# Patient Record
Sex: FEMALE | Race: WHITE | Employment: OTHER | URBAN - METROPOLITAN AREA
[De-identification: names, ages, dates, MRNs, and addresses within clinical notes are randomized per-mention and may not be internally consistent; named-entity substitution may affect disease eponyms.]

---

## 2017-01-15 ENCOUNTER — HOSPITAL ENCOUNTER (EMERGENCY)
Age: 77
Discharge: HOME | End: 2017-01-15
Attending: EMERGENCY MEDICINE | Admitting: EMERGENCY MEDICINE
Payer: MEDICARE

## 2017-01-15 VITALS
HEIGHT: 65 IN | BODY MASS INDEX: 20.83 KG/M2 | DIASTOLIC BLOOD PRESSURE: 84 MMHG | SYSTOLIC BLOOD PRESSURE: 159 MMHG | OXYGEN SATURATION: 100 % | RESPIRATION RATE: 18 BRPM | HEART RATE: 73 BPM | TEMPERATURE: 97.4 F | WEIGHT: 125 LBS
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
## 2017-01-15 PROCEDURE — 99282 EMERGENCY DEPT VISIT SF MDM: CPT

## 2017-01-15 RX ORDER — DOXYCYCLINE 100 MG/1
100 CAPSULE ORAL 2 TIMES DAILY
Qty: 20 CAP | Refills: 0 | Status: SHIPPED | OUTPATIENT
Start: 2017-01-15 | End: 2017-01-25

## 2017-01-15 NOTE — ED TRIAGE NOTES
Triage: pt reports a cat bite to her right hand that occurred last night. The cat is her pet and shots are UTD.

## 2017-01-15 NOTE — ED PROVIDER NOTES
HPI Comments: 8:40 AM Gerry Kuhn is a 64 y.o. female who presents to the ED c/o a cat bite to the right hand that occurred yesterday. The pt reports that both her immunizations and her cat's immunizations are UTD. The pt does not note pain to the area, she is just concerned because the area around the bite had become red. The pt's cat bit her once in the past~2 years ago and she required IV abx and rx abx because she waited too long to be seen. The pt denies fever, drainage from the bite, and any further complaints. The history is provided by the patient. Past Medical History:   Diagnosis Date    Basal cell cancer     CAD (coronary artery disease), native coronary artery 03/21/2011     s/p PCI with with CARLITO (Xience 2.75x12, 2.5x12) mLAD and mild disease in rest of coronaries. Midly depressed LV syst fct     Cardiomyopathy secondary      ischemic +/- stress induced    Dyslipidemia     GERD (gastroesophageal reflux disease)     History of echocardiogram 09/14/2011     EF 60%. Gr 1 DDfx. RVSP 25-30. Mild LAE. Mild TR.      Hx of colonoscopy 07/12/2016     Normal. Dr. Andersen Dianne    Hypertension     Normal nuclear stress test 05/20/2008     Negative for ischemia or infarction. EF 79%.  Parkinson's disease     S/P cardiac cath 03/21/2011     mLAD 90% (2.75 x 12 mm Xience stent). Otherwise patent coronaries. LVEDP 10 mmHg. EF 40-45%. Anteroapical hypk. Renal arteries patent.     Syncope      s/p ILD implantation       Past Surgical History:   Procedure Laterality Date    Hx hemorrhoidectomy      Hx tonsillectomy      Hx heart catheterization      Hx coronary stent placement      Hx bunionectomy       dorsal    Hx implantable loop recorder  3315-1714    Hx cataract removal  11/2012     left    Hx cataract removal  10/2012     right    Hx total abdominal hysterectomy  1996     partial    Hx hysterectomy  1996     partial    Colonoscopy N/A 7/12/2016     COLONOSCOPY performed by Michel Ashton MD at [facility]         Family History:   Problem Relation Age of Onset    Heart Attack Father 80    Heart Disease Father     Cancer Mother      pancreatic    Hypertension Brother        Social History     Social History    Marital status:      Spouse name: N/A    Number of children: N/A    Years of education: N/A     Occupational History    Not on file. Social History Main Topics    Smoking status: Never Smoker    Smokeless tobacco: Never Used    Alcohol use Yes      Comment: glass of wine occasionally.  Drug use: No    Sexual activity: No     Other Topics Concern    Not on file     Social History Narrative         ALLERGIES: Keflex [cephalexin] and Pcn [penicillins]    Review of Systems   Constitutional: Negative for chills and fever. HENT: Negative for congestion and rhinorrhea. Respiratory: Negative for cough and shortness of breath. Cardiovascular: Negative for chest pain and leg swelling. Gastrointestinal: Negative for abdominal pain and nausea. Genitourinary: Negative for dysuria and hematuria. Musculoskeletal: Negative for arthralgias and myalgias. Skin: Positive for wound (Cat bite to the right hand). Negative for rash. Neurological: Negative for light-headedness and headaches. Psychiatric/Behavioral: Negative for confusion and hallucinations. Vitals:    01/15/17 0830   BP: 159/84   Pulse: 73   Resp: 18   Temp: 97.4 °F (36.3 °C)   SpO2: 100%   Weight: 56.7 kg (125 lb)   Height: 5' 5\" (1.651 m)            Physical Exam   Constitutional: She is oriented to person, place, and time. She appears well-developed and well-nourished. No distress. HENT:   Head: Normocephalic and atraumatic. Right Ear: External ear normal.   Left Ear: External ear normal.   Nose: Nose normal.   Mouth/Throat: Oropharynx is clear and moist.   Eyes: Conjunctivae and EOM are normal. Pupils are equal, round, and reactive to light. No scleral icterus. Neck: Normal range of motion. Neck supple. No JVD present. No tracheal deviation present. No thyromegaly present. Cardiovascular: Normal rate, regular rhythm, normal heart sounds and intact distal pulses. Exam reveals no gallop and no friction rub. No murmur heard. Pulmonary/Chest: Effort normal and breath sounds normal. She exhibits no tenderness. Abdominal: Soft. Bowel sounds are normal. She exhibits no distension. There is no tenderness. There is no rebound and no guarding. Musculoskeletal: Normal range of motion. She exhibits no edema. Lymphadenopathy:     She has no cervical adenopathy. Neurological: She is alert and oriented to person, place, and time. No cranial nerve deficit. Coordination normal.   No sensory loss, Gait normal, Motor 5/5   Skin: Skin is warm and dry. Superficial cat bite to the dorsum of the right thumb with mild erythema and tenderness. No abscess. Psychiatric: She has a normal mood and affect. Her behavior is normal. Judgment and thought content normal.   Nursing note and vitals reviewed. MDM  Number of Diagnoses or Management Options  Cat bite, initial encounter:     ED Course       Procedures           Vitals:  Patient Vitals for the past 12 hrs:   Temp Pulse Resp BP SpO2   01/15/17 0830 97.4 °F (36.3 °C) 73 18 159/84 100 %     Pulse ox reviewed and WNL    Medications ordered:   Medications - No data to display        Progress notes, Consult notes or additional Procedure notes:   8:46 AM Pt reevaluated at this time and is resting comfortably in NAD. Discussed results and findings, as well as, diagnosis and plan for discharge. Pt verbalizes understanding and agreement with plan. All questions addressed at this time. Disposition:  Diagnosis:   1.  Cat bite, initial encounter        Disposition: Discharge    Follow-up Information     Follow up With Details Comments Leonid Quigley MD In 1 week If no better Jaren Reece DIONISIO/Kristine Na[address]u [address]  [phone]      [facility] EMERGENCY DEPT  As needed, If symptoms worsen, or in 5-7 days if no better [place] [id number] [phone]           Patient's Medications   Start Taking    DOXYCYCLINE (MONODOX) 100 MG CAPSULE    Take 1 Cap by mouth two (2) times a day for 10 days. Continue Taking    ACETAMINOPHEN (TYLENOL) 650 MG CR TABLET    Take 650 mg by mouth every six (6) hours as needed for Pain. ASPIRIN 81 MG TABLET    Take 1 Tab by mouth daily. CARBIDOPA-LEVODOPA (SINEMET)  MG PER TABLET    Take 1 Tab by mouth three (3) times daily. CARBIDOPA-LEVODOPA CR (SINEMET CR)  MG PER TABLET    Take  by mouth nightly. CHOLECALCIFEROL, VITAMIN D3, (VITAMIN D) 1,000 UNIT CAP    Take 1 Cap by mouth daily. CO-ENZYME Q-10 (CO Q-10) 100 MG CAPSULE    Take 100 mg by mouth daily. DEXLANSOPRAZOLE (DEXILANT) 60 MG CPDM    Take 1 Cap by mouth daily. DICLOFENAC (VOLTAREN) 1 % TOPICAL GEL    Apply 2 g to affected area four (4) times daily as needed. DOCUSATE CALCIUM (STOOL SOFTENER PO)    Take 1 Cap by mouth. FLAXSEED OIL 1,000 MG CAP    Take 1 Cap by mouth daily. FLUTICASONE (FLONASE) 50 MCG/ACTUATION NASAL SPRAY    2 Sprays by Both Nostrils route daily. LECITHIN 1,200 MG CAP    Take 1 Cap by mouth daily. LISINOPRIL (PRINIVIL, ZESTRIL) 10 MG TABLET    Take 1 Tab by mouth daily. MAGNESIUM 250 MG TAB    Take 250 mg by mouth two (2) times a day. MAGNESIUM HYDROXIDE (NEGRO MILK OF MAGNESIA) 400 MG/5 ML SUSPENSION    Take 30 mL by mouth daily as needed for Constipation. METOPROLOL SUCCINATE (TOPROL-XL) 50 MG XL TABLET    Take 1 Tab by mouth daily. MULTIVITAMIN PO    Take 1 Tab by mouth daily. NITROGLYCERIN (NITROSTAT) 0.4 MG SL TABLET    1 sublingual every 5 minutes for chest pain for no more than 3 doses    OMEGA-3 FATTY ACIDS-VITAMIN E (FISH OIL) 1,000 MG CAP    Take 1 Cap by mouth daily.     POTASSIUM CHLORIDE (K-DUR, KLOR-CON) 10 MEQ TABLET    Take 1 Tab by mouth every other day. POTASSIUM CHLORIDE SR 10 MEQ TAB    1 tab by mouth every other day    PRAVASTATIN (PRAVACHOL) 20 MG TABLET    Take 1 Tab by mouth nightly. PSYLLIUM SEED-SUCROSE (METAMUCIL) POWD    1 tablespoon bid    RESVERATROL 100 MG CAP    Take 2 Caps by mouth daily. VIT A/VIT C/VIT E/ZINC/COPPER (ICAPS AREDS PO)    Take 1 Tab by mouth two (2) times a day. VITAMIN B COMPLEX (BALANCE B-50 PO)    Take 1 Tab by mouth daily. These Medications have changed    No medications on file   Stop Taking    No medications on file         SCRIBE ATTESTATION STATEMENT  Documented by: Perla Arauz for, and in the presence of, Eduar Paz MD 8:46 AM     Signed by: Hue Aly, 01/15/17 8:46 AM    PROVIDER ATTESTATION STATEMENT  I personally performed the services described in the documentation, reviewed the documentation, as recorded by the scribe in my presence, and it accurately and completely records my words and actions.   Eduar Paz MD

## 2017-01-15 NOTE — DISCHARGE INSTRUCTIONS
Animal Bites: Care Instructions  Your Care Instructions  After an animal bite, the biggest concern is infection. The chance of infection depends on the type of animal that bit you, where on your body you were bitten, and your general health. Many animal bites are not closed with stitches, because this can increase the chance of infection. Your bite may take as little as 7 days or as long as several months to heal, depending on how bad it is. Taking good care of your wound at home will help it heal and reduce your chance of infection. The doctor has checked you carefully, but problems can develop later. If you notice any problems or new symptoms, get medical treatment right away. Follow-up care is a key part of your treatment and safety. Be sure to make and go to all appointments, and call your doctor if you are having problems. It's also a good idea to know your test results and keep a list of the medicines you take. How can you care for yourself at home? · If your doctor told you how to care for your wound, follow your doctor's instructions. If you did not get instructions, follow this general advice:  ¨ After 24 to 48 hours, gently wash the wound with clean water 2 times a day. Do not scrub or soak the wound. Don't use hydrogen peroxide or alcohol, which can slow healing. ¨ You may cover the wound with a thin layer of petroleum jelly, such as Vaseline, and a nonstick bandage. ¨ Apply more petroleum jelly and replace the bandage as needed. · After you shower, gently dry the wound with a clean towel. · If your doctor has closed the wound, cover the bandage with a plastic bag before you take a shower. · A small amount of skin redness and swelling around the wound edges and the stitches or staples is normal. Your wound may itch or feel irritated. Do not scratch or rub the wound.   · Ask your doctor if you can take an over-the-counter pain medicine, such as acetaminophen (Tylenol), ibuprofen (Advil, Motrin), or naproxen (Aleve). Read and follow all instructions on the label. · Do not take two or more pain medicines at the same time unless the doctor told you to. Many pain medicines have acetaminophen, which is Tylenol. Too much acetaminophen (Tylenol) can be harmful. · If your bite puts you at risk for rabies, you will get a series of shots over the next few weeks to prevent rabies. Your doctor will tell you when to get the shots. It is very important that you get the full cycle of shots. Follow your doctor's instructions exactly. · You may need a tetanus shot if you have not received one in the last 5 years. · If your doctor prescribed antibiotics, take them as directed. Do not stop taking them just because you feel better. You need to take the full course of antibiotics. When should you call for help? Call your doctor now or seek immediate medical care if:  · The skin near the bite turns cold or pale or it changes color. · You lose feeling in the area near the bite, or it feels numb or tingly. · You have trouble moving a limb near the bite. · You have symptoms of infection, such as:  ¨ Increased pain, swelling, warmth, or redness near the wound. ¨ Red streaks leading from the wound. ¨ Pus draining from the wound. ¨ A fever. · Blood soaks through the bandage. Oozing small amounts of blood is normal.  · Your pain is getting worse. Watch closely for changes in your health, and be sure to contact your doctor if you are not getting better as expected. Where can you learn more? Go to http://torin-atif.info/. Enter D206 in the search box to learn more about \"Animal Bites: Care Instructions. \"  Current as of: May 27, 2016  Content Version: 11.1  © 7871-6951 [employer]. Care instructions adapted under license by [employer] (which disclaims liability or warranty for this information).  If you have questions about a medical condition or this instruction, always ask your healthcare professional. [place] [address] any warranty or liability for your use of this information.

## 2017-01-16 ENCOUNTER — PATIENT OUTREACH (OUTPATIENT)
Dept: INTERNAL MEDICINE CLINIC | Age: 77
End: 2017-01-16

## 2017-01-16 NOTE — PROGRESS NOTES
NNTOCED  Patient admitted to [address] [place] [address], 1/15/17 to 1/15/17 for cat bite to right hand. New medications/changes to current medications:  Monodox    [place] Comorbidity Score: 4  ED admissions in the past 6 months: 0    Contacted patient for ED follow up. Verified 2 patient identifiers. Introduced self, role and reason for call. Patient reported redness to right hand has improved. Denied pain, warmth, fever or drainage. Has started Monodox. C/o gas since starting medication. No other complaints. Instructed patient to contact office is condition worsens.

## 2017-02-06 NOTE — TELEPHONE ENCOUNTER
Patient called for   Requested Prescriptions     Pending Prescriptions Disp Refills    potassium chloride (K-DUR, KLOR-CON) 10 mEq tablet 45 Tab 3     Sig: Take 1 Tab by mouth every other day. Please print patient would like to .

## 2017-02-07 RX ORDER — POTASSIUM CHLORIDE 750 MG/1
10 TABLET, EXTENDED RELEASE ORAL EVERY OTHER DAY
Qty: 45 TAB | Refills: 3 | Status: SHIPPED | OUTPATIENT
Start: 2017-02-07 | End: 2018-02-06 | Stop reason: SDUPTHER

## 2017-02-16 ENCOUNTER — PATIENT OUTREACH (OUTPATIENT)
Dept: INTERNAL MEDICINE CLINIC | Age: 77
End: 2017-02-16

## 2017-02-16 NOTE — PROGRESS NOTES
Episode closed:   Patient admitted to [address] [place] [address], 1/15/17 to 1/15/17 for cat bite to right hand. No hospitalization or ED admission post 30 days from discharge of 1/15/2017.

## 2017-03-20 RX ORDER — LISINOPRIL 10 MG/1
10 TABLET ORAL DAILY
Qty: 90 TAB | Refills: 3 | Status: SHIPPED | OUTPATIENT
Start: 2017-03-20 | End: 2017-12-20 | Stop reason: SDUPTHER

## 2017-03-20 NOTE — TELEPHONE ENCOUNTER
Patient called for   Requested Prescriptions     Pending Prescriptions Disp Refills    lisinopril (PRINIVIL, ZESTRIL) 10 mg tablet 90 Tab 3     Sig: Take 1 Tab by mouth daily. Please call when ready for .

## 2017-05-03 ENCOUNTER — HOSPITAL ENCOUNTER (OUTPATIENT)
Dept: LAB | Age: 77
Discharge: HOME | End: 2017-05-03
Payer: MEDICARE

## 2017-05-03 DIAGNOSIS — M85.9 DISORDER OF BONE DENSITY AND STRUCTURE, UNSPECIFIED: ICD-10-CM

## 2017-05-03 DIAGNOSIS — E78.5 DYSLIPIDEMIA: ICD-10-CM

## 2017-05-03 DIAGNOSIS — I10 ESSENTIAL HYPERTENSION: ICD-10-CM

## 2017-05-03 DIAGNOSIS — M85.80 OSTEOPENIA: ICD-10-CM

## 2017-05-03 LAB
25(OH)D3 SERPL-MCNC: 49.8 NG/ML (ref 30–100)
ALBUMIN SERPL BCP-MCNC: 3.9 G/DL (ref 3.4–5)
ALBUMIN/GLOB SERPL: 1.4 {RATIO} (ref 0.8–1.7)
ALP SERPL-CCNC: 92 U/L (ref 45–117)
ALT SERPL-CCNC: 27 U/L (ref 13–56)
ANION GAP BLD CALC-SCNC: 6 MMOL/L (ref 3–18)
APPEARANCE UR: CLEAR
AST SERPL W P-5'-P-CCNC: 20 U/L (ref 15–37)
BACTERIA URNS QL MICRO: NEGATIVE /HPF
BASOPHILS # BLD AUTO: 0 K/UL (ref 0–0.06)
BASOPHILS # BLD: 0 % (ref 0–2)
BILIRUB SERPL-MCNC: 0.5 MG/DL (ref 0.2–1)
BILIRUB UR QL: NEGATIVE
BUN SERPL-MCNC: 11 MG/DL (ref 7–18)
BUN/CREAT SERPL: 14 (ref 12–20)
CALCIUM SERPL-MCNC: 9.3 MG/DL (ref 8.5–10.1)
CHLORIDE SERPL-SCNC: 105 MMOL/L (ref 100–108)
CHOLEST SERPL-MCNC: 150 MG/DL
CO2 SERPL-SCNC: 31 MMOL/L (ref 21–32)
COLOR UR: ABNORMAL
CREAT SERPL-MCNC: 0.76 MG/DL (ref 0.6–1.3)
DIFFERENTIAL METHOD BLD: ABNORMAL
EOSINOPHIL # BLD: 0.2 K/UL (ref 0–0.4)
EOSINOPHIL NFR BLD: 5 % (ref 0–5)
EPITH CASTS URNS QL MICRO: ABNORMAL /LPF (ref 0–5)
ERYTHROCYTE [DISTWIDTH] IN BLOOD BY AUTOMATED COUNT: 13.5 % (ref 11.6–14.5)
GLOBULIN SER CALC-MCNC: 2.8 G/DL (ref 2–4)
GLUCOSE SERPL-MCNC: 87 MG/DL (ref 74–99)
GLUCOSE UR STRIP.AUTO-MCNC: NEGATIVE MG/DL
HCT VFR BLD AUTO: 41.3 % (ref 35–45)
HDLC SERPL-MCNC: 66 MG/DL (ref 40–60)
HDLC SERPL: 2.3 {RATIO} (ref 0–5)
HGB BLD-MCNC: 13.4 G/DL (ref 12–16)
HGB UR QL STRIP: NEGATIVE
KETONES UR QL STRIP.AUTO: NEGATIVE MG/DL
LDLC SERPL CALC-MCNC: 69.2 MG/DL (ref 0–100)
LEUKOCYTE ESTERASE UR QL STRIP.AUTO: ABNORMAL
LIPID PROFILE,FLP: ABNORMAL
LYMPHOCYTES # BLD AUTO: 26 % (ref 21–52)
LYMPHOCYTES # BLD: 1 K/UL (ref 0.9–3.6)
MAGNESIUM SERPL-MCNC: 2.3 MG/DL (ref 1.6–2.6)
MCH RBC QN AUTO: 31.1 PG (ref 24–34)
MCHC RBC AUTO-ENTMCNC: 32.4 G/DL (ref 31–37)
MCV RBC AUTO: 95.8 FL (ref 74–97)
MONOCYTES # BLD: 0.5 K/UL (ref 0.05–1.2)
MONOCYTES NFR BLD AUTO: 12 % (ref 3–10)
NEUTS SEG # BLD: 2.2 K/UL (ref 1.8–8)
NEUTS SEG NFR BLD AUTO: 57 % (ref 40–73)
NITRITE UR QL STRIP.AUTO: NEGATIVE
PH UR STRIP: 7.5 [PH] (ref 5–8)
PLATELET # BLD AUTO: 223 K/UL (ref 135–420)
PMV BLD AUTO: 9.8 FL (ref 9.2–11.8)
POTASSIUM SERPL-SCNC: 4.5 MMOL/L (ref 3.5–5.5)
PROT SERPL-MCNC: 6.7 G/DL (ref 6.4–8.2)
PROT UR STRIP-MCNC: NEGATIVE MG/DL
RBC # BLD AUTO: 4.31 M/UL (ref 4.2–5.3)
RBC #/AREA URNS HPF: 0 /HPF (ref 0–5)
SODIUM SERPL-SCNC: 142 MMOL/L (ref 136–145)
SP GR UR REFRACTOMETRY: 1.02 (ref 1–1.03)
TRIGL SERPL-MCNC: 74 MG/DL (ref ?–150)
TSH SERPL DL<=0.05 MIU/L-ACNC: 2.17 UIU/ML (ref 0.36–3.74)
UROBILINOGEN UR QL STRIP.AUTO: 1 EU/DL (ref 0.2–1)
VLDLC SERPL CALC-MCNC: 14.8 MG/DL
WBC # BLD AUTO: 3.9 K/UL (ref 4.6–13.2)
WBC URNS QL MICRO: ABNORMAL /HPF (ref 0–4)

## 2017-05-03 PROCEDURE — 81001 URINALYSIS AUTO W/SCOPE: CPT | Performed by: INTERNAL MEDICINE

## 2017-05-03 PROCEDURE — 80061 LIPID PANEL: CPT | Performed by: INTERNAL MEDICINE

## 2017-05-03 PROCEDURE — 84443 ASSAY THYROID STIM HORMONE: CPT | Performed by: INTERNAL MEDICINE

## 2017-05-03 PROCEDURE — 82306 VITAMIN D 25 HYDROXY: CPT | Performed by: INTERNAL MEDICINE

## 2017-05-03 PROCEDURE — 80053 COMPREHEN METABOLIC PANEL: CPT | Performed by: INTERNAL MEDICINE

## 2017-05-03 PROCEDURE — 85025 COMPLETE CBC W/AUTO DIFF WBC: CPT | Performed by: INTERNAL MEDICINE

## 2017-05-03 PROCEDURE — 36415 COLL VENOUS BLD VENIPUNCTURE: CPT | Performed by: INTERNAL MEDICINE

## 2017-05-03 PROCEDURE — 83735 ASSAY OF MAGNESIUM: CPT | Performed by: INTERNAL MEDICINE

## 2017-05-10 ENCOUNTER — TELEPHONE (OUTPATIENT)
Dept: INTERNAL MEDICINE CLINIC | Age: 77
End: 2017-05-10

## 2017-05-10 ENCOUNTER — PATIENT OUTREACH (OUTPATIENT)
Dept: INTERNAL MEDICINE CLINIC | Age: 77
End: 2017-05-10

## 2017-05-10 ENCOUNTER — OFFICE VISIT (OUTPATIENT)
Dept: INTERNAL MEDICINE CLINIC | Age: 77
End: 2017-05-10

## 2017-05-10 VITALS
OXYGEN SATURATION: 100 % | HEIGHT: 65 IN | SYSTOLIC BLOOD PRESSURE: 160 MMHG | TEMPERATURE: 98.4 F | HEART RATE: 55 BPM | BODY MASS INDEX: 21.99 KG/M2 | WEIGHT: 132 LBS | DIASTOLIC BLOOD PRESSURE: 70 MMHG

## 2017-05-10 DIAGNOSIS — K21.9 LARYNGOPHARYNGEAL REFLUX DISEASE: ICD-10-CM

## 2017-05-10 DIAGNOSIS — Z98.61 CAD S/P PERCUTANEOUS CORONARY ANGIOPLASTY: ICD-10-CM

## 2017-05-10 DIAGNOSIS — I25.10 CAD S/P PERCUTANEOUS CORONARY ANGIOPLASTY: ICD-10-CM

## 2017-05-10 DIAGNOSIS — H35.30 MACULAR DEGENERATION OF LEFT EYE: ICD-10-CM

## 2017-05-10 DIAGNOSIS — E78.5 DYSLIPIDEMIA: ICD-10-CM

## 2017-05-10 DIAGNOSIS — Z12.31 SCREENING MAMMOGRAM, ENCOUNTER FOR: ICD-10-CM

## 2017-05-10 DIAGNOSIS — G20 PARKINSON DISEASE (HCC): ICD-10-CM

## 2017-05-10 DIAGNOSIS — I10 ESSENTIAL HYPERTENSION: Primary | ICD-10-CM

## 2017-05-10 DIAGNOSIS — R55 VASOVAGAL SYNCOPE: ICD-10-CM

## 2017-05-10 DIAGNOSIS — R92.8 ABNORMAL MAMMOGRAM: ICD-10-CM

## 2017-05-10 DIAGNOSIS — Z78.9 ADVANCE DIRECTIVE ON FILE: ICD-10-CM

## 2017-05-10 DIAGNOSIS — Z00.00 MEDICARE ANNUAL WELLNESS VISIT, SUBSEQUENT: ICD-10-CM

## 2017-05-10 DIAGNOSIS — M85.89 OSTEOPENIA OF MULTIPLE SITES: ICD-10-CM

## 2017-05-10 RX ORDER — CLOBETASOL PROPIONATE 0.46 MG/ML
SOLUTION TOPICAL
Qty: 25 ML | Refills: 1 | Status: SHIPPED | OUTPATIENT
Start: 2017-05-10 | End: 2019-04-11 | Stop reason: ALTCHOICE

## 2017-05-10 NOTE — TELEPHONE ENCOUNTER
You want to see Ms. Rivera Corbett back in 2 weeks but your schedule is full.  Where would you like us to fit her in?

## 2017-05-10 NOTE — PROGRESS NOTES
1. Have you been to the ER, urgent care clinic or hospitalized since your last visit? NO.     2. Have you seen or consulted any other health care providers outside of the [address] [place] [address] since your last visit (Include any pap smears or colon screening)? YES  Neurology Melanie Singh 3 May 2017. Dr. Salmon Prost ENT Yesterday. [facility] with Dr. Susana Quintanilla.

## 2017-05-10 NOTE — PROGRESS NOTES
Advance Care Planning (ACP) Provider Note - Comprehensive     Date of ACP Conversation: 05/10/17  Persons included in Conversation:  patient  Length of ACP Conversation in minutes:  16 minutes    Authorized Decision Maker (if patient is incapable of making informed decisions):  (primary) sons (secondary)  This person is:  Healthcare Agent/Medical Power of  under Advance Directive        General ACP for ALL Patients with Decision Making Capacity:   Importance of advance care planning, including choosing a healthcare agent to communicate patient's healthcare decisions if patient lost the ability to make decisions, such as after a sudden illness or accident  Understanding of the healthcare agent role was assessed and information provided  Exploration of values, goals, and preferences if recovery is not expected, even with continued medical treatment in the event of: Imminent death  Severe, permanent brain injury  \"In these circumstances, what matters most to you? \"  Care focused more on comfort or quality of life. Review of Existing Advance Directive:  Patient with advance directive on file. Reviewed document with patient. Designates her  as her primary healthcare agent and two of her three sons as her secondary agents. However, she reports today that both of those sons are not readily accessible since one lives in [facility] and the other travels frequently. She would like to designate her son, Luciana Escobar, as her secondary healthcare agent. She also stresses her wish to not have life prolonging measures at end of life. For Serious or Chronic Illness:  Understanding of medical condition      Interventions Provided:  Reviewed existing Advance Directive    Provided with new paperwork to complete so as to change her heathcare agent designees. Recommended to complete and return completed form to office to be copied and scanned into chart.

## 2017-05-10 NOTE — ACP (ADVANCE CARE PLANNING)
Advance care planning discussion initiated, copy on file. Patient would like to make a change, advance directive form provided to patient to update.

## 2017-05-10 NOTE — PATIENT INSTRUCTIONS
Medicare Part B Preventive Services Limitations Recommendation Scheduled   Bone Mass Measurement  (age 72 & older, biennial) Requires diagnosis related to osteoporosis or estrogen deficiency. Biennial benefit unless patient has history of long-term glucocorticoid tx or baseline is needed because initial test was by other method Every 2 years or more frequently if medically necessary. Done:  5/23/2016       Cardiovascular Screening Blood Tests (every 5 years)  Total cholesterol, HDL, Triglycerides Order as a panel if possible Every 5 years. Done:  5/3/2017         Colorectal Cancer Screening  -Fecal occult blood test (annual)  -Flexible sigmoidoscopy (5y)  -Screening colonoscopy (10y)  -Barium Enema Colorectal cancer screening, ages 54-65. For all patients 48 and older:  -Annual fecal occult blood test or  colonoscopy every 10 years or  -Flexible sigmoidoscopy every 5 years or  -lower endoscopy to be performed more frequently, if advised by GI. N/A       Counseling to Prevent Tobacco Use (up to 8 sessions per year)  - Counseling greater than 3 and up to 10 minutes  - Counseling greater than 10 minutes Patients must be asymptomatic of tobacco-related conditions to receive as preventive service Two cessation counseling attempts (up to 8 counseling sessions) per year. N/A   Diabetes Screening Tests (at least every 3 years, Medicare covers annually or at 6-month intervals for prediabetic patients)    Fasting blood sugar (FBS) or glucose tolerance test (GTT) Patient must be diagnosed with one of the following:  -Hypertension, Dyslipidemia, obesity, previous impaired FBS or GTT  Or any two of the following: overweight, FH of diabetes, age ? 72, history of gestational diabetes, birth of baby weighing more than 9 pounds Annually or every 6 months if previous diagnosis of elevated FBS, elevated HbA1c, or impaired GTT, or glucosuria.  Done:  5/3/2017         Diabetes Self-Management Training (DSMT) (no USPSTF recommendation) Requires referral by treating physician for patient with diabetes or renal disease. 10 hours of initial DSMT session of no less than 30 minutes each in a continuous 12-month period. 2 hours of follow-up DSMT in subsequent years. Up to 10 hours of initial training within a continuous 12 month period of subsequent years: up to 2 hours of follow-up training each year after the initial year. N/A   Glaucoma Screening (no USPSTF recommendation) Diabetes mellitus, family history, , age 48 or over,  American, age 72 or over Annually for covered beneficiaries. Done:  5/6/2016         Human Immunodeficiency Virus (HIV) Screening (annually for increased risk patients)  HIV-1 and HIV-2 by EIA, PRECIOUS, rapid antibody test, or oral mucosa transudate Patient must be at increased risk for HIV infection per USPSTF guidelines or pregnant. Tests covered annually for patients at increased risk. Pregnant patients may receive up to 3 test during pregnancy. Annually for beneficiaries at increased risk, including anyone who asks for the test. Not high risk   Medical Nutrition Therapy (MNT) (for diabetes or renal disease not recommended schedule) Requires referral by treating physician for patient with diabetes or renal disease. Can be provided in same year as diabetes self-management training (DSMT), and CMS recommends medical nutrition therapy take place after DSMT. Up to 3 hours for initial year and 2 hours in subsequent years. First year: 3 hours of one-on-one counseling or subsequent years: 2 hours.  N/A   Prostate Cancer Screening (annually up to age 76)  - Digital rectal exam (DEREK)  - Prostate specific antigen (PSA) Annually (age 48 or over), DEREK not paid separately when covered E/M service is provided on same date Once every 12 months for patients age older than 48years of age includes: digital rectal exam and/or prostate specific antigen test. N/A       Seasonal Influenza Vaccination (annually)  Once per fall or winter season. Done:  11/1/2016         Pneumococcal Vaccination (once after 72)  Once after age 72 and if more than 5 years since last vaccination and/or uncertainty of vaccine status. Pneumococcal:  1/1/2003    Prevnar 13:  10/5/2015   Hepatitis B Vaccinations (if medium/high risk) Medium/high risk factors:  End-stage renal disease,  Hemophiliacs who received Factor VIII or IX concentrates, Clients of institutions for the mentally retarded, Persons who live in the same house as a HepB virus carrier, Homosexual men, Illicit injectable drug abusers. Schedule course of vaccines if patient not previously vaccinated  *additional shots if medically necessary. Not high risk   Screening Mammography (biennial age 54-69)? Annually (age 36 or over) Age 28 through 44: one baseline or aged 36 and older: annually. Done:  5/23/2016    Ordered     Screening Pap Tests and Pelvic Examination (up to age 72 and after 72 if unknown history or abnormal study last 10 years) Every 24 months except high risk Annually if at high risk for developing cervical or vaginal cancer, or childbearing, age with abnormal Pap test within past 3 years or every 2 years for women at normal risk. N/A         Ultrasound Screening for Abdominal Aortic Aneurysm (AAA) (once) Patient must be referred through IPPE and not have had a screening for abdominal aortic aneurysm before under Medicare. Limited to patients who meet one of the following criteria:  - Men who are 73-68 years old and have smoked more than 100 cigarettes in their lifetime.  -Anyone with a FH of AAA  -Anyone recommended for screening by USPSTF Once in a lifetime. N/A           Schedule of Personalized Health Plan  (Provide Copy to Patient)  The best way to stay healthy is to live a healthy lifestyle. A healthy lifestyle includes regular exercise, eating a well-balanced diet, keeping a healthy weight and not smoking.     Regular physical exams and screening tests are another important way to take care of yourself. Preventive exams provided by health care providers can find health problems early when treatment works best and can keep you from getting certain diseases or illnesses. Preventive services include exams, lab tests, screenings, shots, monitoring and information to help you take care of your own health. All people over 65 should have a pneumonia shot. Pneumonia shots are usually only needed once in a lifetime unless your doctor decides differently. All people over 65 should have a yearly flu shot. People over 65 are at medium to high risk for Hepatitis B. Three shots are needed for complete protection. In addition to your physical exam, some screening tests are recommended:    Bone mass measurement (dexa scan) is recommended every two years  Diabetes Mellitus screening is recommended every year. Glaucoma is an eye disease caused by high pressure in the eye. An eye exam is recommended every year. Cardiovascular screening tests that check your cholesterol and other blood fat (lipid) levels are recommended every five years. Colorectal Cancer screening tests help to find pre-cancerous polyps (growths in the colon) so they can be removed before they turn into cancer. Tests ordered for screening depend on your personal and family history risk factors.     Screening for Breast Cancer is recommended yearly with a mammogram.    Screening for Cervical Cancer is recommended every two years (annually for certain risk factors, such as previous history of STD or abnormal PAP in past 7 years), with a Pelvic Exam with PAP    Here is a list of your current Health Maintenance items with a due date:  Health Maintenance   Topic Date Due    INFLUENZA AGE 9 TO ADULT  08/01/2017    GLAUCOMA SCREENING Q2Y  05/06/2018    MEDICARE YEARLY EXAM  05/11/2018    DTaP/Tdap/Td series (2 - Td) 09/25/2023    OSTEOPOROSIS SCREENING (DEXA)  Completed    ZOSTER VACCINE AGE 60>  Completed  Pneumococcal 65+ Low/Medium Risk  Addressed          Preventing Falls: Care Instructions  Your Care Instructions  Getting around your home safely can be a challenge if you have injuries or health problems that make it easy for you to fall. Loose rugs and furniture in walkways are among the dangers for many older people who have problems walking or who have poor eyesight. People who have conditions such as arthritis, osteoporosis, or dementia also have to be careful not to fall. You can make your home safer with a few simple measures. Follow-up care is a key part of your treatment and safety. Be sure to make and go to all appointments, and call your doctor if you are having problems. It's also a good idea to know your test results and keep a list of the medicines you take. How can you care for yourself at home? Taking care of yourself  · You may get dizzy if you do not drink enough water. To prevent dehydration, drink plenty of fluids, enough so that your urine is light yellow or clear like water. Choose water and other caffeine-free clear liquids. If you have kidney, heart, or liver disease and have to limit fluids, talk with your doctor before you increase the amount of fluids you drink. · Exercise regularly to improve your strength, muscle tone, and balance. Walk if you can. Swimming may be a good choice if you cannot walk easily. · Have your vision and hearing checked each year or any time you notice a change. If you have trouble seeing and hearing, you might not be able to avoid objects and could lose your balance. · Know the side effects of the medicines you take. Ask your doctor or pharmacist whether the medicines you take can affect your balance. Sleeping pills or sedatives can affect your balance. · Limit the amount of alcohol you drink. Alcohol can impair your balance and other senses. · Ask your doctor whether calluses or corns on your feet need to be removed.  If you wear loose-fitting shoes because of calluses or corns, you can lose your balance and fall. · Talk to your doctor if you have numbness in your feet. Preventing falls at home  · Remove raised doorway thresholds, throw rugs, and clutter. Repair loose carpet or raised areas in the floor. · Move furniture and electrical cords to keep them out of walking paths. · Use nonskid floor wax, and wipe up spills right away, especially on ceramic tile floors. · If you use a walker or cane, put rubber tips on it. If you use crutches, clean the bottoms of them regularly with an abrasive pad, such as steel wool. · Keep your house well lit, especially Taryn Sin, and outside walkways. Use night-lights in areas such as hallways and bathrooms. Add extra light switches or use remote switches (such as switches that go on or off when you clap your hands) to make it easier to turn lights on if you have to get up during the night. · Install sturdy handrails on stairways. · Move items in your cabinets so that the things you use a lot are on the lower shelves (about waist level). · Keep a cordless phone and a flashlight with new batteries by your bed. If possible, put a phone in each of the main rooms of your house, or carry a cell phone in case you fall and cannot reach a phone. Or, you can wear a device around your neck or wrist. You push a button that sends a signal for help. · Wear low-heeled shoes that fit well and give your feet good support. Use footwear with nonskid soles. Check the heels and soles of your shoes for wear. Repair or replace worn heels or soles. · Do not wear socks without shoes on wood floors. · Walk on the grass when the sidewalks are slippery. If you live in an area that gets snow and ice in the winter, sprinkle salt on slippery steps and sidewalks. Preventing falls in the bath  · Install grab bars and nonskid mats inside and outside your shower or tub and near the toilet and sinks.   · Use shower chairs and bath benches. · Use a hand-held shower head that will allow you to sit while showering. · Get into a tub or shower by putting the weaker leg in first. Get out of a tub or shower with your strong side first.  · Repair loose toilet seats and consider installing a raised toilet seat to make getting on and off the toilet easier. · Keep your bathroom door unlocked while you are in the shower. Where can you learn more? Go to http://torin-atif.info/. Enter [phone] [id number] [phone] in the search box to learn more about \"Preventing Falls: Care Instructions. \"  Current as of: August 4, 2016  Content Version: 11.2  © 1231-3942 [place], [place]. Care instructions adapted under license by [employer] (which disclaims liability or warranty for this information). If you have questions about a medical condition or this instruction, always ask your healthcare professional. [place] [address] any warranty or liability for your use of this information. Please monitor and record your blood pressure every morning for the next 2 weeks. Please bring record of readings to our office for appointment.

## 2017-05-10 NOTE — PROGRESS NOTES
Bryan Coates is a 64 y.o. female and presents for annual Medicare Wellness Visit. Problem List: Reviewed with patient and discussed risk factors. Patient Active Problem List   Diagnosis Code    Hypertension I10    CAD S/P percutaneous coronary angioplasty I25.10, Z98.61    Dyslipidemia E78.5    Syncope R55    Parkinson disease ([facility] [address] G20    GERD (gastroesophageal reflux disease) K21.9    Abnormal mammogram R92.8    Advance directive on file Z78.9    Macular degeneration of left eye H35.30    Laryngopharyngeal reflux disease K21.9    Osteopenia M85.80       Current medical providers:  Patient Care Team:  Ann Prather MD as PCP - General (Internal Medicine)  Tracy Garcia MD (Cardiology)  Brandon Reilly MD as Consulting Provider (Neurology)  Brenda Neff RN as Ambulatory Care Navigator (Internal Medicine)  Rakesh Carnes RN as Ambulatory Care Navigator (Internal Medicine)  Taylor Camacho MD (Otolaryngology)  Gio Kumar MD (Orthopedic Surgery)  Estela Cary MD (Ophthalmology)  Jaki Tilley MD (Gastroenterology)    PSH: Reviewed with patient  Past Surgical History:   Procedure Laterality Date    COLONOSCOPY N/A 7/12/2016    COLONOSCOPY performed by Jaki Tilley MD at [address]      dorsal    HX CATARACT REMOVAL  11/2012    left    HX CATARACT REMOVAL  10/2012    right    HX CORONARY STENT PLACEMENT      HX HEART CATHETERIZATION      HX HEMORRHOIDECTOMY      HX [address] [employer]    partial    HX IMPLANTABLE LOOP RECORDER  [id number]    HX TONSILLECTOMY      HX TOTAL ABDOMINAL HYSTERECTOMY  1996    partial        SH: Reviewed with patient  Social History   Substance Use Topics    Smoking status: Never Smoker    Smokeless tobacco: Never Used    Alcohol use Yes      Comment: glass of wine occasionally.        FH: Reviewed with patient  Family History   Problem Relation Age of Onset    Heart Attack Father 80    Heart Disease Father     Cancer Mother      pancreatic    Hypertension Brother        Medications/Allergies: Reviewed with patient  Current Outpatient Prescriptions on File Prior to Visit   Medication Sig Dispense Refill    lisinopril (PRINIVIL, ZESTRIL) 10 mg tablet Take 1 Tab by mouth daily. 90 Tab 3    potassium chloride (K-DUR, KLOR-CON) 10 mEq tablet Take 1 Tab by mouth every other day. 45 Tab 3    metoprolol succinate (TOPROL-XL) 50 mg XL tablet Take 1 Tab by mouth daily. 90 Tab 3    magnesium hydroxide (NEGRO MILK OF MAGNESIA) 400 mg/5 mL suspension Take 30 mL by mouth daily as needed for Constipation.  pravastatin (PRAVACHOL) 20 mg tablet Take 1 Tab by mouth nightly. 90 Tab 3    carbidopa-levodopa CR (SINEMET CR)  mg per tablet Take  by mouth nightly.  nitroglycerin (NITROSTAT) 0.4 mg SL tablet 1 sublingual every 5 minutes for chest pain for no more than 3 doses 25 tablet 3    psyllium seed-sucrose (METAMUCIL) powd 1 tablespoon bid 861 g 3    acetaminophen (TYLENOL) 650 mg CR tablet Take 650 mg by mouth every six (6) hours as needed for Pain.  fluticasone (FLONASE) 50 mcg/actuation nasal spray 2 Sprays by Both Nostrils route daily. 1 Bottle 2    co-enzyme Q-10 (CO Q-10) 100 mg capsule Take 100 mg by mouth daily.  VIT A/VIT C/VIT E/ZINC/COPPER (ICAPS AREDS PO) Take 1 Tab by mouth two (2) times a day.  Cholecalciferol, Vitamin D3, (VITAMIN D) 1,000 unit Cap Take 1 Cap by mouth daily.  VITAMIN B COMPLEX (BALANCE B-50 PO) Take 1 Tab by mouth daily.  carbidopa-levodopa (SINEMET)  mg per tablet Take 1 Tab by mouth three (3) times daily.  flaxseed oil 1,000 mg Cap Take 1 Cap by mouth daily.  aspirin 81 mg tablet Take 1 Tab by mouth daily. 1 Tab 0    MULTIVITAMIN PO Take 1 Tab by mouth daily.  omega-3 fatty acids-vitamin e (FISH OIL) 1,000 mg Cap Take 1 Cap by mouth daily.  Dexlansoprazole (DEXILANT) 60 mg CpDM Take 1 Cap by mouth daily.       DOCUSATE CALCIUM (STOOL SOFTENER PO) Take 1 Cap by mouth.  magnesium 250 mg Tab Take 250 mg by mouth two (2) times a day.  diclofenac (VOLTAREN) 1 % topical gel Apply 2 g to affected area four (4) times daily as needed. 200 g 3    resveratrol 100 mg Cap Take 2 Caps by mouth daily.  lecithin 1,200 mg Cap Take 1 Cap by mouth daily. No current facility-administered medications on file prior to visit. Allergies   Allergen Reactions    Keflex [Cephalexin] Other (comments)     Yeast infection    Pcn [Penicillins] Other (comments)       Objective:  Visit Vitals    /70    Pulse (!) 55    Temp 98.4 °F (36.9 °C) (Oral)    Ht 5' 5\" (1.651 m)    Wt 132 lb (59.9 kg)    SpO2 100%    BMI 21.97 kg/m2    Body mass index is 21.97 kg/(m^2). Assessment of cognitive impairment: Alert and oriented x 3    Depression Screen:   PHQ over the last two weeks 5/10/2017   Little interest or pleasure in doing things Not at all   Feeling down, depressed or hopeless Not at all   Total Score PHQ 2 0       Fall Risk Assessment:    Fall Risk Assessment, last 12 mths 5/10/2017   Able to walk? Yes   Fall in past 12 months? No       Functional Ability:   Does the patient exhibit a steady gait? yes   How long did it take the patient to get up and walk from a sitting position? 1 second. Is the patient self reliant?  (ie can do own laundry, meals, household chores)  yes     Does the patient handle his/her own medications? yes     Does the patient handle his/her own money? yes     Is the patients home safe (ie good lighting, handrails on stairs and bath, etc.)? yes     Did you notice or did patient express any hearing difficulties? no     Did you notice or did patient express any vision difficulties?   no     Were distance and reading eye charts used?   no       Advance Care Planning:   Patient was offered the opportunity to discuss advance care planning:  yes     Does patient have an Advance Directive:  Yes, copy on file. Patient provided with a blank form to update. If no, did you provide information on [facility]? Yes. Plan:      Orders Placed This Encounter    clobetasol (TEMOVATE) 0.05 % external solution       Health Maintenance   Topic Date Due    INFLUENZA AGE 9 TO ADULT  08/01/2017    GLAUCOMA SCREENING Q2Y  05/06/2018    MEDICARE YEARLY EXAM  05/11/2018    DTaP/Tdap/Td series (2 - Td) 09/25/2023    OSTEOPOROSIS SCREENING (DEXA)  Completed    ZOSTER VACCINE AGE 60>  Completed    Pneumococcal 65+ Low/Medium Risk  Addressed       *Patient verbalized understanding and agreement with the plan. A copy of the After Visit Summary with personalized health plan was given to the patient today.

## 2017-05-14 NOTE — PROGRESS NOTES
HPI:   Rex Banuelos is a 64y.o. year old female who presents today for evaluation of hypertension, ASCVD, hyperlipidemia, syncope, Parkinson's disease, GERD, and macular degeneration. She reports that she is doing relatively well. She states that she has been having some difficulty with left ankle pain and recently went to a lecture at [place] regarding joint pain. She was impressed with the physician who gave the lecture and has made an appointment to see him. She also reports that her 's memory difficulties continue to progress, and she has had to hire an  to do their finances as he missed filing their tax returns last year. This has been causing her some stress, although she is also relieved that they now have help. SHe is otherwise without complaints. She has a history of hypertension, hyperlipidemia, ASCVD, and syncope. In 3/2011, she had three syncopal episodes while donating blood. Over the subsequent four days, she developed exertional substernal chest tightness with left arm pain and dyspnea. She was evaluated and EKG revealed new T wave inversions in leads V2 and V3. Troponins were negative. She underwent cardiac catheterization (3/21/2011) which showed a 90% mid LAD and mild disease in the rest of the coronaries. She underwent PCI and placement of two drug-eluting stents for the mid LAD lesion; LV function was mildly diminished (EF 40-45%) with anteroapical hypokinesis. Follow-up echocardiogram (9/2011) showed return to normal LV function (EF 60%) and no RWMA. She has a history of multiple syncopal episodes, usually related to stressful situations, and thought to be vasovagal in origin. An implantable loop recorder was placed in 3/2011 and remained until 4/2013, and interrogation was negative for any significant arrhythmia. In 4/2014, she was hospitalized following another syncopal episode, which occurred after she had taken her morning medications.  Afterward, she became nauseated and flushed, and called EMS. When they arrived, they found her on the floor and reportedly without a pulse. They began CPR and within 10-15 seconds, she recovered. Evaluation included EKG/troponins, which were negative, and an echocardiogram showing mild MR and normal LV function (EF 60-65%). She was found to have hypokalemia and hypomagnesemia and hydrochlorothiazide was discontinued. It was thought that this event also represented a vasovagal reaction given her prodromal symptoms. She has had no further events since that time. Her current regimen includes metoprolol, lisinopril, aspirin, pravastatin, and sublingual nitroglycerin prn. She has been followed by Dr. Michelle Menchaca, and will begin seeing Dr. Liberty Cleveland. She remains very active line dancing 3x/week, doing yardwork and riding her bicycle. She denies any chest pain, shortness of breath at rest or with exertion, palpitations, lightheadedness, or edema. She has a history of idiopathic Parkinson's disease, predominantly left-sided. She is currently being treated with Sinemet, and reports relatively good control of symptoms. She has difficulty with  writing at times, particularly towards the end of the day, and also describes increasing tremors midday. She is followed by Dr. Jonathan Almodoavr. She has a history of laryngopharyngeal reflux disease, treated with dexlansoprazole, and she is followed by Dr. Abida Ojeda. In 3/2010, she underwent evaluation for iron deficiency anemia. She had previously had a negative colonoscopy and air contrast barium enema in 2009 by Dr. Garth Mcdonald, and she had an upper endoscopy by Dr. Ghassan Rodriguez which was normal. She was treated with iron with improvement. She had a repeat screening colonoscopy in 7/2016 by Dr. Ghassan Rodriguez which was normal. No further follow-up was recommended given her age. She denies any abdominal pain, nausea, vomiting, melena, hematochezia, or change in bowel movements.     She has a history of osteopenia with last bone density study in 5/2016 showing T-scores:  femoral neck left -1.0  /right -1.4 and lumbar -0.8 . She continues to take calcium and Vitamin D supplements. She has no history of pathologic fractures. She has a recent history of abnormal mammograms since 10/2014 with microcalcifcations in the right breast. She has been undergoing surveillance mammograms every six months, which have been unchanged. She had a follow-up mammogram in 5/2016 which was negative, and routine annual screening was recommended. Past Medical History:   Diagnosis Date    Basal cell cancer     CAD (coronary artery disease), native coronary artery 03/21/2011    s/p PCI with with CARLITO (Xience 2.75x12, 2.5x12) mLAD and mild disease in rest of coronaries. Midly depressed LV syst fct     Cardiomyopathy secondary     ischemic +/- stress induced    Dyslipidemia     GERD (gastroesophageal reflux disease)     History of echocardiogram 09/14/2011    EF 60%. Gr 1 DDfx. RVSP 25-30. Mild LAE. Mild TR.      Hx of colonoscopy 07/12/2016    Normal. Dr. Ernestina Sow    Hypertension     Normal nuclear stress test 05/20/2008    Negative for ischemia or infarction. EF 79%.  Parkinson's disease     S/P cardiac cath 03/21/2011    mLAD 90% (2.75 x 12 mm Xience stent). Otherwise patent coronaries. LVEDP 10 mmHg. EF 40-45%. Anteroapical hypk. Renal arteries patent.     Syncope     s/p ILD implantation     Past Surgical History:   Procedure Laterality Date    COLONOSCOPY N/A 7/12/2016    COLONOSCOPY performed by Maikel Vuong MD at [address] HX BUNIONECTOMY      dorsal    HX CATARACT REMOVAL  11/2012    left    HX CATARACT REMOVAL  10/2012    right    HX CORONARY STENT PLACEMENT      HX HEART CATHETERIZATION      HX HEMORRHOIDECTOMY      HX HYSTERECTOMY  1996    partial    HX IMPLANTABLE LOOP RECORDER  [id number]    HX TONSILLECTOMY      HX TOTAL ABDOMINAL HYSTERECTOMY  1996    partial     Current Outpatient Prescriptions   Medication Sig  clobetasol (TEMOVATE) 0.05 % external solution Apply as directed.  lisinopril (PRINIVIL, ZESTRIL) 10 mg tablet Take 1 Tab by mouth daily.  potassium chloride (K-DUR, KLOR-CON) 10 mEq tablet Take 1 Tab by mouth every other day.  metoprolol succinate (TOPROL-XL) 50 mg XL tablet Take 1 Tab by mouth daily.  magnesium hydroxide (NEGRO MILK OF MAGNESIA) 400 mg/5 mL suspension Take 30 mL by mouth daily as needed for Constipation.  pravastatin (PRAVACHOL) 20 mg tablet Take 1 Tab by mouth nightly.  carbidopa-levodopa CR (SINEMET CR)  mg per tablet Take  by mouth nightly.  nitroglycerin (NITROSTAT) 0.4 mg SL tablet 1 sublingual every 5 minutes for chest pain for no more than 3 doses    psyllium seed-sucrose (METAMUCIL) powd 1 tablespoon bid    acetaminophen (TYLENOL) 650 mg CR tablet Take 650 mg by mouth every six (6) hours as needed for Pain.  fluticasone (FLONASE) 50 mcg/actuation nasal spray 2 Sprays by Both Nostrils route daily.  co-enzyme Q-10 (CO Q-10) 100 mg capsule Take 100 mg by mouth daily.  VIT A/VIT C/VIT E/ZINC/COPPER (ICAPS AREDS PO) Take 1 Tab by mouth two (2) times a day.  Cholecalciferol, Vitamin D3, (VITAMIN D) 1,000 unit Cap Take 1 Cap by mouth daily.  VITAMIN B COMPLEX (BALANCE B-50 PO) Take 1 Tab by mouth daily.  carbidopa-levodopa (SINEMET)  mg per tablet Take 1 Tab by mouth three (3) times daily.  flaxseed oil 1,000 mg Cap Take 1 Cap by mouth daily.  aspirin 81 mg tablet Take 1 Tab by mouth daily.  MULTIVITAMIN PO Take 1 Tab by mouth daily.  omega-3 fatty acids-vitamin e (FISH OIL) 1,000 mg Cap Take 1 Cap by mouth daily.  Dexlansoprazole (DEXILANT) 60 mg CpDM Take 1 Cap by mouth daily.  DOCUSATE CALCIUM (STOOL SOFTENER PO) Take 1 Cap by mouth.  magnesium 250 mg Tab Take 250 mg by mouth two (2) times a day.  diclofenac (VOLTAREN) 1 % topical gel Apply 2 g to affected area four (4) times daily as needed.     resveratrol 100 mg Cap Take 2 Caps by mouth daily.  lecithin 1,200 mg Cap Take 1 Cap by mouth daily. No current facility-administered medications for this visit. Allergies and Intolerances: Allergies   Allergen Reactions    Keflex [Cephalexin] Other (comments)     Yeast infection    Pcn [Penicillins] Other (comments)     Family History: She has no family history of colon or breast cancer. Family History   Problem Relation Age of Onset    Heart Attack Father 80    Heart Disease Father     Cancer Mother      pancreatic    Hypertension Brother      Social History:   She  reports that she has never smoked. She has never used smokeless tobacco. She lives with her , who is having difficulty with mild cognitive impairment. She states that they sold their RV and now stay at home for the winter. She is a retired x-ray technician. History   Alcohol Use    Yes     Comment: glass of wine occasionally. Immunization History:  Immunization History   Administered Date(s) Administered    Influenza High Dose Vaccine PF 09/29/2014, 10/05/2015, 11/01/2016    Influenza Vaccine Whole 09/07/2010, 09/03/2011, 10/04/2012    Pneumococcal Conjugate (PCV-13) 10/05/2015    Pneumococcal Vaccine (Unspecified Type) 01/01/2003    TD Vaccine 01/01/2003    Tdap 09/25/2013    Zoster 01/01/2007       Review of Systems:   As above included in HPI. Otherwise 11 point review of systems negative including constitutional, skin, HENT, eyes, respiratory, cardiovascular, gastrointestinal, genitourinary, musculoskeletal, endo/heme/aller, neurological.    Physical:   Vitals:   BP: 160/70  HR: (!) 55  WT: 132 lb (59.9 kg)  BMI:  21.97 kg/m2    Exam:   Patient appears in no apparent distress. Affect is appropriate.   HEENT --Anicteric sclerae, tympanic membranes normal,  ear canals normal.  PERRL, EOMI, conjunctiva and lids normal.   Sinuses were nontender, turbinates normal, hearing normal.  Oropharynx without  erythema, normal tongue, oral mucosa and tonsils. No cervical lymphadenopathy. No thyromegaly, JVD, or bruits. Carotid pulses 2+ with normal upstroke. Lungs --Clear to auscultation. No wheezing or rales. Heart --Regular rate and rhythm, no murmurs, rubs, gallops, or clicks. Breasts -- no masses, skin dimpling, asymmetry, nipple discharge, nodules, axillary lymphadenopathy. Chest wall --Nontender to palpation. PMI normal.  Abdomen -- Soft and nontender, no hepatosplenomegaly or masses. Extremities -- Without cyanosis, clubbing, edema. 2+ pulses equally and bilaterally. Normal looking digits, ROM intact  Derm  no obvious abnormalities noted, no rash    Review of Data:  Labs:  Hospital Outpatient Visit on 05/03/2017   Component Date Value Ref Range Status    WBC 05/03/2017 3.9* 4.6 - 13.2 K/uL Final    RBC 05/03/2017 4.31  4.20 - 5.30 M/uL Final    HGB 05/03/2017 13.4  12.0 - 16.0 g/dL Final    HCT 05/03/2017 41.3  35.0 - 45.0 % Final    MCV 05/03/2017 95.8  74.0 - 97.0 FL Final    MCH 05/03/2017 31.1  24.0 - 34.0 PG Final    MCHC 05/03/2017 32.4  31.0 - 37.0 g/dL Final    RDW 05/03/2017 13.5  11.6 - 14.5 % Final    PLATELET 11/09/4024 073  135 - 420 K/uL Final    MPV 05/03/2017 9.8  9.2 - 11.8 FL Final    NEUTROPHILS 05/03/2017 57  40 - 73 % Final    LYMPHOCYTES 05/03/2017 26  21 - 52 % Final    MONOCYTES 05/03/2017 12* 3 - 10 % Final    EOSINOPHILS 05/03/2017 5  0 - 5 % Final    BASOPHILS 05/03/2017 0  0 - 2 % Final    ABS. NEUTROPHILS 05/03/2017 2.2  1.8 - 8.0 K/UL Final    ABS. LYMPHOCYTES 05/03/2017 1.0  0.9 - 3.6 K/UL Final    ABS. MONOCYTES 05/03/2017 0.5  0.05 - 1.2 K/UL Final    ABS. EOSINOPHILS 05/03/2017 0.2  0.0 - 0.4 K/UL Final    ABS.  BASOPHILS 05/03/2017 0.0  0.0 - 0.06 K/UL Final    DF 05/03/2017 AUTOMATED    Final    LIPID PROFILE 05/03/2017        Final    Cholesterol, total 05/03/2017 150  <200 MG/DL Final    Triglyceride 05/03/2017 74  <150 MG/DL Final    HDL Cholesterol 05/03/2017 66* 40 - 60 MG/DL Final    LDL, calculated 05/03/2017 69.2  0 - 100 MG/DL Final    VLDL, calculated 05/03/2017 14.8  MG/DL Final    CHOL/HDL Ratio 05/03/2017 2.3  0 - 5.0   Final    Sodium 05/03/2017 142  136 - 145 mmol/L Final    Potassium 05/03/2017 4.5  3.5 - 5.5 mmol/L Final    Chloride 05/03/2017 105  100 - 108 mmol/L Final    CO2 05/03/2017 31  21 - 32 mmol/L Final    Anion gap 05/03/2017 6  3.0 - 18 mmol/L Final    Glucose 05/03/2017 87  74 - 99 mg/dL Final    BUN 05/03/2017 11  7.0 - 18 MG/DL Final    Creatinine 05/03/2017 0.76  0.6 - 1.3 MG/DL Final    BUN/Creatinine ratio 05/03/2017 14  12 - 20   Final    GFR est AA 05/03/2017 >60  >60 ml/min/1.73m2 Final    GFR est non-AA 05/03/2017 >60  >60 ml/min/1.73m2 Final    Calcium 05/03/2017 9.3  8.5 - 10.1 MG/DL Final    Bilirubin, total 05/03/2017 0.5  0.2 - 1.0 MG/DL Final    ALT (SGPT) 05/03/2017 27  13 - 56 U/L Final    AST (SGOT) 05/03/2017 20  15 - 37 U/L Final    Alk.  phosphatase 05/03/2017 92  45 - 117 U/L Final    Protein, total 05/03/2017 6.7  6.4 - 8.2 g/dL Final    Albumin 05/03/2017 3.9  3.4 - 5.0 g/dL Final    Globulin 05/03/2017 2.8  2.0 - 4.0 g/dL Final    A-G Ratio 05/03/2017 1.4  0.8 - 1.7   Final    Color 05/03/2017 DARK YELLOW    Final    Appearance 05/03/2017 CLEAR    Final    Specific gravity 05/03/2017 1.020  1.005 - 1.030   Final    pH (UA) 05/03/2017 7.5  5.0 - 8.0   Final    Protein 05/03/2017 NEGATIVE   NEG mg/dL Final    Glucose 05/03/2017 NEGATIVE   NEG mg/dL Final    Ketone 05/03/2017 NEGATIVE   NEG mg/dL Final    Bilirubin 05/03/2017 NEGATIVE   NEG   Final    Blood 05/03/2017 NEGATIVE   NEG   Final    Urobilinogen 05/03/2017 1.0  0.2 - 1.0 EU/dL Final    Nitrites 05/03/2017 NEGATIVE   NEG   Final    Leukocyte Esterase 05/03/2017 MODERATE* NEG   Final    WBC 05/03/2017 4 to 8  0 - 4 /hpf Final    RBC 05/03/2017 0  0 - 5 /hpf Final    Epithelial cells 05/03/2017 FEW  0 - 5 /lpf Final  Bacteria 05/03/2017 NEGATIVE   NEG /hpf Final    TSH 05/03/2017 2.17  0.36 - 3.74 uIU/mL Final    Vitamin D 25-Hydroxy 05/03/2017 49.8  30 - 100 ng/mL Final    Magnesium 05/03/2017 2.3  1.6 - 2.6 mg/dL Final     Health Maintenance:  Screening:    Mammogram: negative (5/2016)   PAP smear: s/p ROHINI. No further screening. Colorectal: colonoscopy (7/2016) normal. Dr. Marylen Cheney. No further screening. Depression: none   DM (HbA1c/FPG): FPG 87 (5/2017)   Hepatitis C: N/A   Falls: none   DEXA: osteopenia (5/2016)   Glaucoma: regular eye exams with Dr. Gianna Linares (last 5/2017) and Dr. Adarsh Youngblood for left macular degeneration. Smoking: none   Vitamin D: 49.8 (5/2017)   Medicare Wellness: today    Impression:  Patient Active Problem List   Diagnosis Code    Hypertension I10    CAD S/P percutaneous coronary angioplasty I25.10, Z98.61    Dyslipidemia E78.5    Syncope R55    Parkinson disease ([facility] [address] G20    GERD (gastroesophageal reflux disease) K21.9    Abnormal mammogram R92.8    Advance directive on file Z78.9    Macular degeneration of left eye H35.30    Laryngopharyngeal reflux disease K21.9    Osteopenia M85.80       Plan:  1. Hypertension. Blood pressure elevated today, although she reports that it has been controlled at all of her recent physician visits. Continues taking metoprolol and lisinopril. Instructed patient to monitor and record her blood pressure every morning for the next 2 weeks prior to and 2 hours after taking her medications. Asked to bring record of readings to our office for review. Renal function normal with creatinine 0.76 / eGFR >60. Continue to follow. 2. ASCVD. Remains asymptomatic. Resolution of cardiomyopathy with last echocardiogram revealing normal LV function. Continue current regimen. To be followed by Dr. Chante Martinez. 3. H/O syncope. No further episodes since 2014. Most likely secondary to vasovagal reaction. Follow. 4. Hyperlipidemia.  On low intensity dose pravastatin with LDL 69, indicative of excellent control. Emphasized importance of lifestyle modifications, including diet, exercise, and weight loss. Continue to follow. 5. Parkinson's disease. Doing well on Sinemet. Followed by Dr. Conner Prado.  6. Osteopenia. Last bone density scan 5/2016. Using femoral neck T-scores, calculated FRAX score estimates her 10 year risk of a major osteoporetic fracture at 11 % and hip fracture at 2.3 %, which are not an indication for biphosphonate treatment (>20% and >3%, respectively). Continue calcium and vitamin D supplements. Encouraged exercise, particularly weight bearing activities. Repeat bone density scan in 2018. 7. Macular degeneration, left. Being followed by Dr. Shobha Parker. 8. Laryngopharyngeal reflux. Doing well on Dexilant. Followed by Dr. Jason Solorzano. 9. Abnormal mammograms. Most recent mammogram in 5/2016 stable, so annual follow-up recommended. Due this month and encouraged to schedule. 10. Psoriasis. Patient reports that she has had some difficulty with scalp psoriasis and is asking for a refill for clobetasol solution. Prescription given. 11. Health maintenance. Immunizations up to date. No further colorectal cancer screening needed. Encouraged to schedule mammogram. Continue regular eye exams with Ankur Narayan and Shobha Parker. Vitamin D level normal. Continue maintenance dose supplement. In addition, an annual Medicare wellness visit was done today. Reviewed and agree with assessment. Patient understands recommendations and agrees with plan. Follow-up in weeks to reevaluate blood pressure.

## 2017-05-21 ENCOUNTER — HOSPITAL ENCOUNTER (EMERGENCY)
Age: 77
Discharge: HOME | End: 2017-05-21
Attending: EMERGENCY MEDICINE | Admitting: EMERGENCY MEDICINE
Payer: MEDICARE

## 2017-05-21 VITALS
OXYGEN SATURATION: 100 % | WEIGHT: 133 LBS | HEART RATE: 68 BPM | DIASTOLIC BLOOD PRESSURE: 88 MMHG | TEMPERATURE: 98.1 F | BODY MASS INDEX: 22.13 KG/M2 | SYSTOLIC BLOOD PRESSURE: 172 MMHG | RESPIRATION RATE: 23 BRPM

## 2017-05-21 DIAGNOSIS — R04.0 EPISTAXIS: Primary | ICD-10-CM

## 2017-05-21 LAB
ANION GAP BLD CALC-SCNC: 9 MMOL/L (ref 3–18)
APTT PPP: 27.5 SEC (ref 23–36.4)
BASOPHILS # BLD AUTO: 0 K/UL (ref 0–0.1)
BASOPHILS # BLD: 1 % (ref 0–2)
BUN SERPL-MCNC: 12 MG/DL (ref 7–18)
BUN/CREAT SERPL: 18 (ref 12–20)
CALCIUM SERPL-MCNC: 9.2 MG/DL (ref 8.5–10.1)
CHLORIDE SERPL-SCNC: 107 MMOL/L (ref 100–108)
CO2 SERPL-SCNC: 25 MMOL/L (ref 21–32)
CREAT SERPL-MCNC: 0.65 MG/DL (ref 0.6–1.3)
DIFFERENTIAL METHOD BLD: ABNORMAL
EOSINOPHIL # BLD: 0.3 K/UL (ref 0–0.4)
EOSINOPHIL NFR BLD: 6 % (ref 0–5)
ERYTHROCYTE [DISTWIDTH] IN BLOOD BY AUTOMATED COUNT: 12.9 % (ref 11.6–14.5)
GLUCOSE SERPL-MCNC: 108 MG/DL (ref 74–99)
HCT VFR BLD AUTO: 35.9 % (ref 35–45)
HGB BLD-MCNC: 12.3 G/DL (ref 12–16)
INR PPP: 0.9 (ref 0.8–1.2)
LYMPHOCYTES # BLD AUTO: 43 % (ref 21–52)
LYMPHOCYTES # BLD: 1.9 K/UL (ref 0.9–3.6)
MCH RBC QN AUTO: 31.4 PG (ref 24–34)
MCHC RBC AUTO-ENTMCNC: 34.3 G/DL (ref 31–37)
MCV RBC AUTO: 91.6 FL (ref 74–97)
MONOCYTES # BLD: 0.5 K/UL (ref 0.05–1.2)
MONOCYTES NFR BLD AUTO: 11 % (ref 3–10)
NEUTS SEG # BLD: 1.7 K/UL (ref 1.8–8)
NEUTS SEG NFR BLD AUTO: 39 % (ref 40–73)
PLATELET # BLD AUTO: 209 K/UL (ref 135–420)
PMV BLD AUTO: 9.7 FL (ref 9.2–11.8)
POTASSIUM SERPL-SCNC: 3.9 MMOL/L (ref 3.5–5.5)
PROTHROMBIN TIME: 12.1 SEC (ref 11.5–15.2)
RBC # BLD AUTO: 3.92 M/UL (ref 4.2–5.3)
SODIUM SERPL-SCNC: 141 MMOL/L (ref 136–145)
WBC # BLD AUTO: 4.4 K/UL (ref 4.6–13.2)

## 2017-05-21 PROCEDURE — 85730 THROMBOPLASTIN TIME PARTIAL: CPT | Performed by: PHYSICIAN ASSISTANT

## 2017-05-21 PROCEDURE — 74011250637 HC RX REV CODE- 250/637: Performed by: PHYSICIAN ASSISTANT

## 2017-05-21 PROCEDURE — 99284 EMERGENCY DEPT VISIT MOD MDM: CPT

## 2017-05-21 PROCEDURE — 85610 PROTHROMBIN TIME: CPT | Performed by: PHYSICIAN ASSISTANT

## 2017-05-21 PROCEDURE — 80048 BASIC METABOLIC PNL TOTAL CA: CPT | Performed by: EMERGENCY MEDICINE

## 2017-05-21 PROCEDURE — 99285 EMERGENCY DEPT VISIT HI MDM: CPT

## 2017-05-21 PROCEDURE — 85025 COMPLETE CBC W/AUTO DIFF WBC: CPT | Performed by: EMERGENCY MEDICINE

## 2017-05-21 RX ORDER — METOPROLOL SUCCINATE 50 MG/1
50 TABLET, EXTENDED RELEASE ORAL
Status: COMPLETED | OUTPATIENT
Start: 2017-05-21 | End: 2017-05-21

## 2017-05-21 RX ORDER — LISINOPRIL 10 MG/1
10 TABLET ORAL
Status: COMPLETED | OUTPATIENT
Start: 2017-05-21 | End: 2017-05-21

## 2017-05-21 RX ADMIN — LISINOPRIL 10 MG: 10 TABLET ORAL at 07:19

## 2017-05-21 RX ADMIN — METOPROLOL SUCCINATE 50 MG: 50 TABLET, FILM COATED, EXTENDED RELEASE ORAL at 07:19

## 2017-05-21 NOTE — ED PROVIDER NOTES
HPI Comments: 68yo presents to ER complaining of nose bleed that started last night about 2am.  States bleeding started initially several days ago while she was in the bathroom having bowel movement. Admits to slight straining with BM and bending/leaning forward and states she felt something wet dripping on leg and noted blood. Patient admits to saline nasal rinses intermittently last time was Wednesday, 4 days ago. Saw her ENT specialist Dr. Yves Carrel last week for daily sore throats and was told sore throat was likely secondary to GERD, taking Dexilant. Denies any steroid nasal sprays, recent URI symptoms, sneezing, nose picking, injuries. Patient takes daily 81mg ASA. Denies any other bleeding issues. Patient is a 64 y.o. female presenting with hypertension and epistaxis. Hypertension    Pertinent negatives include no headaches, no dizziness, no nausea and no vomiting. Epistaxis           Past Medical History:   Diagnosis Date    Basal cell cancer     CAD (coronary artery disease), native coronary artery 03/21/2011    s/p PCI with with CARLITO (Xience 2.75x12, 2.5x12) mLAD and mild disease in rest of coronaries. Midly depressed LV syst fct     Cardiomyopathy secondary     ischemic +/- stress induced    Dyslipidemia     GERD (gastroesophageal reflux disease)     History of echocardiogram 09/14/2011    EF 60%. Gr 1 DDfx. RVSP 25-30. Mild LAE. Mild TR.      Hx of colonoscopy 07/12/2016    Normal. Dr. Harmony Bond    Hypertension     Normal nuclear stress test 05/20/2008    Negative for ischemia or infarction. EF 79%.  Parkinson's disease     S/P cardiac cath 03/21/2011    mLAD 90% (2.75 x 12 mm Xience stent). Otherwise patent coronaries. LVEDP 10 mmHg. EF 40-45%. Anteroapical hypk. Renal arteries patent.     Syncope     s/p ILD implantation       Past Surgical History:   Procedure Laterality Date    COLONOSCOPY N/A 7/12/2016    COLONOSCOPY performed by Brittnee Ibarra MD at [facility]COPY  HX BUNIONECTOMY      dorsal    HX CATARACT REMOVAL  11/2012    left    HX CATARACT REMOVAL  10/2012    right    HX CORONARY STENT PLACEMENT      HX HEART CATHETERIZATION      HX HEMORRHOIDECTOMY      HX HYSTERECTOMY  1996    partial    HX IMPLANTABLE LOOP RECORDER  [id number]    HX TONSILLECTOMY      HX TOTAL ABDOMINAL HYSTERECTOMY  1996    partial         Family History:   Problem Relation Age of Onset    Heart Attack Father 80    Heart Disease Father     Cancer Mother      pancreatic    Hypertension Brother        Social History     Social History    Marital status:      Spouse name: N/A    Number of children: N/A    Years of education: N/A     Occupational History    Not on file. Social History Main Topics    Smoking status: Never Smoker    Smokeless tobacco: Never Used    Alcohol use Yes      Comment: glass of wine occasionally.  Drug use: No    Sexual activity: No     Other Topics Concern    Not on file     Social History Narrative         ALLERGIES: Keflex [cephalexin] and Pcn [penicillins]    Review of Systems   Constitutional: Negative for activity change, appetite change and fatigue. HENT: Positive for nosebleeds. Negative for ear pain, rhinorrhea, sinus pressure, sneezing and sore throat. Respiratory: Negative. Cardiovascular: Negative. Gastrointestinal: Negative for nausea and vomiting. Neurological: Negative for dizziness, light-headedness and headaches. All other systems reviewed and are negative. Vitals:    05/21/17 0430 05/21/17 0500 05/21/17 0530 05/21/17 0552   BP: (!) 160/91 (!) 169/97 (!) 166/96    Pulse: 71 65 66    Resp: 18 15 13    Temp:       SpO2: 98% 99% 99% 99%   Weight:                Physical Exam   Constitutional: She is oriented to person, place, and time. She appears well-developed and well-nourished. No distress. HENT:   Clotted blood noted in posterior pharynx without evidence of gross active bleeding.   There is clotted blood noted in right nare without active bleeding, I am unable to visualize where bleeding originated. Neck: Normal range of motion. Neck supple. Cardiovascular: Normal rate, regular rhythm and normal heart sounds. Pulmonary/Chest: Effort normal and breath sounds normal.   Musculoskeletal: Normal range of motion. Neurological: She is alert and oriented to person, place, and time. Skin: Skin is warm and dry. She is not diaphoretic. Psychiatric: She has a normal mood and affect. Nursing note and vitals reviewed. MDM  Number of Diagnoses or Management Options  Epistaxis:   Diagnosis management comments: 68yo female presents to ER with nosebleed. Started earlier this past week, she was able to stop the bleeding herself with pressure. Admits initially started while she was having BM, able to stop herself and re-bleed again while she was using exercise rubber band 2 days ago, again able to stop herself. Bleeding started again last night at 2am and was unable to stop but when I evaluated patient the bleed was not active. Clots evacuated from nose and patient eventually able to clear clots in nasopharynx without reoccurrence of bleeding. Labs reassuring. Advised to follow up with her ENT specialist in 1-2 days for recheck or return to ER if bleeding resumes and unable to stop herself.  .    ED Course       Procedures

## 2017-05-21 NOTE — ED TRIAGE NOTES
The patient's nose started bleeding at 02:30. The patient experienced two nose bleed this week that last 15-20 minutes. This nose bleed has been persistent since 02:30. The patient has not seen ENT for this.

## 2017-05-21 NOTE — ED NOTES
Patient ambulated to the restroom with no complaints. The patient denies SOB, lightheadedness, SOB, chest pain. Will continue to monitor.

## 2017-05-22 ENCOUNTER — PATIENT OUTREACH (OUTPATIENT)
Dept: INTERNAL MEDICINE CLINIC | Age: 77
End: 2017-05-22

## 2017-05-22 NOTE — PROGRESS NOTES
Patient admitted to [facility], 5/21/17 to 5/21/17 for epistaxis. Presenting symptoms:   Nosebleed X 2    New medications/changes to current medications:  None     ED utilization in past 6 months: 1    Contacted patient for ED follow up. Verified 2 patient identifiers. Introduced self, role and reason for call. Patient reports:  Taking BP meds as directed  /65    Patient denies:  Recurrent nosebleed  Lightheadedness/dizziness    ADL's:  Independently performs all ADL's. DME:   None      Support:        Patient states \"I don't feel like a million bucks\"; \"moving slowly\". Reports nosebleeds on Friday and Saturday nights. Reported Nosebleed on Sunday morning bleed uncontrollable for 30 minutes so she decided to go the ED. Denies any further nosebleeds, lightheadedness or dizziness. BP today 113/65; verbalizes taking Lisinopril and Metoprolol as directed. does have a follow up appointment with Dr. Clara White, ENT on 6/13/17. Avoiding bending/leaning forward or heavy lifting. Not using saline spray or taking baby aspirin. Patient reports she was given directions on what to do if she experiences another nosebleed. Was able to verbalize directions given. Opportunity to ask questions was provided. Contact information was provided for future reference or further questions. Appointment(s):  Dr. Yani Srivastava on 5/23/17 at 2 PM  Patient aware.

## 2017-05-23 ENCOUNTER — OFFICE VISIT (OUTPATIENT)
Dept: INTERNAL MEDICINE CLINIC | Age: 77
End: 2017-05-23

## 2017-05-23 VITALS
WEIGHT: 130 LBS | HEART RATE: 72 BPM | BODY MASS INDEX: 21.66 KG/M2 | OXYGEN SATURATION: 96 % | DIASTOLIC BLOOD PRESSURE: 58 MMHG | TEMPERATURE: 97.7 F | HEIGHT: 65 IN | SYSTOLIC BLOOD PRESSURE: 108 MMHG

## 2017-05-23 DIAGNOSIS — I10 ESSENTIAL HYPERTENSION: Primary | ICD-10-CM

## 2017-05-23 DIAGNOSIS — M85.89 OSTEOPENIA OF MULTIPLE SITES: ICD-10-CM

## 2017-05-23 DIAGNOSIS — R04.0 EPISTAXIS: ICD-10-CM

## 2017-05-23 DIAGNOSIS — E78.5 DYSLIPIDEMIA: ICD-10-CM

## 2017-05-23 DIAGNOSIS — K21.9 LARYNGOPHARYNGEAL REFLUX DISEASE: ICD-10-CM

## 2017-05-23 DIAGNOSIS — I25.10 CAD S/P PERCUTANEOUS CORONARY ANGIOPLASTY: ICD-10-CM

## 2017-05-23 DIAGNOSIS — Z98.61 CAD S/P PERCUTANEOUS CORONARY ANGIOPLASTY: ICD-10-CM

## 2017-05-23 DIAGNOSIS — H35.30 MACULAR DEGENERATION OF LEFT EYE: ICD-10-CM

## 2017-05-23 DIAGNOSIS — G20 PARKINSON DISEASE (HCC): ICD-10-CM

## 2017-05-23 NOTE — MR AVS SNAPSHOT
Visit Information Date & Time Provider Department Dept. Phone Encounter #  
 5/23/2017  2:00 PM Waleska Darling MD Internist of [address] [place] [address] [phone] Follow-up Instructions Return in about 6 months (around 11/23/2017), or if symptoms worsen or fail to improve. Your Appointments 7/7/2017  8:20 AM  
Follow Up with Odalis Read MD  
[facility] ([address]) Appt Note: 1 year follow up with an EKG with Dr. Grecia Clarkejose e [address] [address] [id number] [phone] [place]  
  
    
 11/21/2017  8:30 AM  
LAB with [facility] NURSE VISIT Internist of [facility] ([address] [place] [address]) Appt Note: lab  
 [address], [address] [place] [address] [address] [place]  
  
   
 [address], [address]  
  
    
 11/28/2017 10:30 AM  
Office Visit with Waleska Darling MD  
Internist of [address]) Appt Note: 6 months [address], [address] [place] [address] [address] [place]  
  
   
 [address], [address] Upcoming Health Maintenance Date Due INFLUENZA AGE 9 TO ADULT 8/1/2017 GLAUCOMA SCREENING Q2Y 5/6/2018 MEDICARE YEARLY EXAM 5/11/2018 DTaP/Tdap/Td series (2 - Td) 9/25/2023 Allergies as of 5/23/2017  Review Complete On: 5/23/2017 By: Billie Reyes Severity Noted Reaction Type Reactions Keflex [Cephalexin]  11/21/2011   Intolerance Other (comments) Yeast infection Pcn [Penicillins]   Intolerance Other (comments) Current Immunizations  Reviewed on 11/1/2016 Name Date Influenza High Dose Vaccine PF 11/1/2016  1:08 PM, 10/5/2015  1:45 PM, 9/29/2014 Influenza Vaccine Whole 10/4/2012, 9/3/2011, 9/7/2010 Pneumococcal Conjugate (PCV-13) 10/5/2015  1:46 PM  
 Pneumococcal Vaccine (Unspecified Type) 1/1/2003 TD Vaccine 1/1/2003 Tdap 9/25/2013 Zoster 1/1/2007 Not reviewed this visit Vitals BP Pulse Temp Height(growth percentile) Weight(growth percentile) SpO2  
 108/58 72 97.7 °F (36.5 °C) (Oral) 5' 5\" (1.651 m) 130 lb (59 kg) 96% BMI OB Status Smoking Status 21.63 kg/m2 Hysterectomy Never Smoker Vitals History BMI and BSA Data Body Mass Index Body Surface Area  
 21.63 kg/m 2 1.64 m 2 Your Updated Medication List  
  
   
This list is accurate as of: 5/23/17  2:19 PM.  Always use your most recent med list.  
  
  
  
  
 acetaminophen 650 mg CR tablet Commonly known as:  TYLENOL Take 650 mg by mouth every six (6) hours as needed for Pain. aspirin 81 mg tablet Take 1 Tab by mouth daily. BALANCE B-50 PO Take 1 Tab by mouth daily. clobetasol 0.05 % external solution Commonly known as:  [place] Diones Apply as directed. co-enzyme Q-10 100 mg capsule Commonly known as:  CO Q-10 Take 100 mg by mouth daily. DEXILANT 60 mg Cpdb Generic drug:  Dexlansoprazole Take 1 Cap by mouth daily. diclofenac 1 % Gel Commonly known as:  VOLTAREN Apply 2 g to affected area four (4) times daily as needed. FISH OIL 1,000 mg Cap Generic drug:  omega-3 fatty acids-vitamin e Take 1 Cap by mouth daily. flaxseed oil 1,000 mg Cap Take 1 Cap by mouth daily. fluticasone 50 mcg/actuation nasal spray Commonly known as:  Ala Lips 2 Sprays by Both Nostrils route daily. ICAPS AREDS PO Take 1 Tab by mouth two (2) times a day. lecithin 1,200 mg Cap Take 1 Cap by mouth daily. lisinopril 10 mg tablet Commonly known as:  Nury Acosta Take 1 Tab by mouth daily. magnesium 250 mg Tab Take 250 mg by mouth two (2) times a day. metoprolol succinate 50 mg XL tablet Commonly known as:  TOPROL-XL Take 1 Tab by mouth daily. MULTIVITAMIN PO Take 1 Tab by mouth daily. nitroglycerin 0.4 mg SL tablet Commonly known as:  NITROSTAT  
1 sublingual every 5 minutes for chest pain for no more than 3 doses NEGRO MILK OF MAGNESIA 400 mg/5 mL suspension Generic drug:  magnesium hydroxide Take 30 mL by mouth daily as needed for Constipation. potassium chloride 10 mEq tablet Commonly known as:  KLOR-CON Take 1 Tab by mouth every other day. pravastatin 20 mg tablet Commonly known as:  PRAVACHOL Take 1 Tab by mouth nightly. psyllium seed-sucrose Powd Commonly known as:  METAMUCIL (SUGAR) 1 tablespoon bid  
  
 resveratrol 100 mg Cap Take 2 Caps by mouth daily. * SINEMET CR  mg per tablet Generic drug:  carbidopa-levodopa CR Take  by mouth nightly. * SINEMET  mg per tablet Generic drug:  carbidopa-levodopa Take 1 Tab by mouth three (3) times daily. STOOL SOFTENER PO Take 1 Cap by mouth. VITAMIN D3 1,000 unit Cap Generic drug:  cholecalciferol Take 1 Cap by mouth daily. * Notice: This list has 2 medication(s) that are the same as other medications prescribed for you. Read the directions carefully, and ask your doctor or other care provider to review them with you. Follow-up Instructions Return in about 6 months (around 11/23/2017), or if symptoms worsen or fail to improve. To-Do List   
 05/24/2017 7:00 AM  
  Appointment with ISREAL VICK 2 at [address] [place] [address] ([phone]) OUTSIDE FILMS  - Any outside films related to the study being scheduled should be brought with you on the day of the exam.  If this cannot be done there may be a delay in the reading of the study.   MEDICATIONS  - Patient must bring a complete list of all medications currently taking to include prescriptions, over-the-counter meds, herbals, vitamins & any dietary supplements  GENERAL INSTRUCTIONS  - On the day of your exam do not use any bath powder, deodorant or lotions on the armpit area. -Tenderness of breasts may cause an increase of discomfort during procedure. If you are experiencing breast tenderness on the day of your appointment and would like to reschedule, please call [phone]. Patient Instructions Nosebleeds: Care Instructions Your Care Instructions Nosebleeds are common, especially if you have colds or allergies. Many things can cause a nosebleed. Some nosebleeds stop on their own with pressure. Others need packing. Some get cauterized (sealed). If you have gauze or other packing materials in your nose, you will need to follow up with your doctor to have the packing removed. You may need more treatment if you get nosebleeds a lot. The doctor has checked you carefully, but problems can develop later. If you notice any problems or new symptoms, get medical treatment right away. Follow-up care is a key part of your treatment and safety. Be sure to make and go to all appointments, and call your doctor if you are having problems. It's also a good idea to know your test results and keep a list of the medicines you take. How can you care for yourself at home? · If you get another nosebleed: ¨ Sit up and tilt your head slightly forward. This keeps blood from going down your throat. ¨ Use your thumb and index finger to pinch your nose shut for 10 minutes. Use a clock. Do not check to see if the bleeding has stopped before the 10 minutes are up. If the bleeding has not stopped, pinch your nose shut for another 10 minutes. ¨ When the bleeding has stopped, try not to pick, rub, or blow your nose for 12 hours. Avoiding these things helps keep your nose from bleeding again. · If your doctor prescribed antibiotics, take them as directed. Do not stop taking them just because you feel better. You need to take the full course of antibiotics. To prevent nosebleeds · Do not blow your nose too hard. · Try not to lift or strain after a nosebleed. · Raise your head on a pillow while you sleep. · Put a thin layer of a saline- or water-based nasal gel, such as NasoGel, inside your nose. Put it on the septum, which divides your nostrils. This will prevent dryness that can cause nosebleeds. · Use a vaporizer or humidifier to add moisture to your bedroom. Follow the directions for cleaning the machine. · Do not use aspirin, ibuprofen (Advil, Motrin), or naproxen (Aleve) for 36 to 48 hours after a nosebleed unless your doctor tells you to. You can use acetaminophen (Tylenol) for pain relief. · Talk to your doctor about stopping any other medicines you are taking. Some medicines may make you more likely to get a nosebleed. · Do not use cold medicines or nasal sprays without first talking to your doctor. They can make your nose dry. When should you call for help? Call 911 anytime you think you may need emergency care. For example, call if: 
· You passed out (lost consciousness). Call your doctor now or seek immediate medical care if: 
· You get another nosebleed and your nose is still bleeding after you have applied pressure 3 times for 10 minutes each time (30 minutes total). · There is a lot of blood running down the back of your throat even after you pinch your nose and tilt your head forward. · You have a fever. · You have sinus pain. Watch closely for changes in your health, and be sure to contact your doctor if: 
· You get nosebleeds often, even if they stop. · You do not get better as expected. Where can you learn more? Go to http://torin-atif.info/. Enter S156 in the search box to learn more about \"Nosebleeds: Care Instructions. \" Current as of: May 27, 2016 Content Version: 11.2 © 2317-2702 [employer].  Care instructions adapted under license by [employer] (which disclaims liability or warranty for this information). If you have questions about a medical condition or this instruction, always ask your healthcare professional. [address] any warranty or liability for your use of this information. Introducing [facility] SERVICES! [facility] introduces flexReceipts patient portal. Now you can access parts of your medical record, email your doctor's office, and request medication refills online. 1. In your internet browser, go to [email or web address] 2. Click on the First Time User? Click Here link in the Sign In box. You will see the New Member Sign Up page. 3. Enter your flexReceipts Access Code exactly as it appears below. You will not need to use this code after youve completed the sign-up process. If you do not sign up before the expiration date, you must request a new code. · flexReceipts Access Code: [email or web address] Expires: 8/19/2017  7:44 AM 
 
4. Enter the last four digits of your Social Security Number (xxxx) and Date of Birth (mm/dd/yyyy) as indicated and click Submit. You will be taken to the next sign-up page. 5. Create a flexReceipts ID. This will be your flexReceipts login ID and cannot be changed, so think of one that is secure and easy to remember. 6. Create a flexReceipts password. You can change your password at any time. 7. Enter your Password Reset Question and Answer. This can be used at a later time if you forget your password. 8. Enter your e-mail address. You will receive e-mail notification when new information is available in [address] 9. Click Sign Up. You can now view and download portions of your medical record. 10. Click the Download Summary menu link to download a portable copy of your medical information. If you have questions, please visit the Frequently Asked Questions section of the flexReceipts website. Remember, flexReceipts is NOT to be used for urgent needs. For medical emergencies, dial 911. Now available from your iPhone and Android! Please provide this summary of care documentation to your next provider. Your primary care clinician is listed as Suzette Cho. If you have any questions after today's visit, please call [phone].

## 2017-05-23 NOTE — PATIENT INSTRUCTIONS
Nosebleeds: Care Instructions  Your Care Instructions    Nosebleeds are common, especially if you have colds or allergies. Many things can cause a nosebleed. Some nosebleeds stop on their own with pressure. Others need packing. Some get cauterized (sealed). If you have gauze or other packing materials in your nose, you will need to follow up with your doctor to have the packing removed. You may need more treatment if you get nosebleeds a lot. The doctor has checked you carefully, but problems can develop later. If you notice any problems or new symptoms, get medical treatment right away. Follow-up care is a key part of your treatment and safety. Be sure to make and go to all appointments, and call your doctor if you are having problems. It's also a good idea to know your test results and keep a list of the medicines you take. How can you care for yourself at home? · If you get another nosebleed:  ¨ Sit up and tilt your head slightly forward. This keeps blood from going down your throat. ¨ Use your thumb and index finger to pinch your nose shut for 10 minutes. Use a clock. Do not check to see if the bleeding has stopped before the 10 minutes are up. If the bleeding has not stopped, pinch your nose shut for another 10 minutes. ¨ When the bleeding has stopped, try not to pick, rub, or blow your nose for 12 hours. Avoiding these things helps keep your nose from bleeding again. · If your doctor prescribed antibiotics, take them as directed. Do not stop taking them just because you feel better. You need to take the full course of antibiotics. To prevent nosebleeds  · Do not blow your nose too hard. · Try not to lift or strain after a nosebleed. · Raise your head on a pillow while you sleep. · Put a thin layer of a saline- or water-based nasal gel, such as NasoGel, inside your nose. Put it on the septum, which divides your nostrils. This will prevent dryness that can cause nosebleeds.   · Use a vaporizer or humidifier to add moisture to your bedroom. Follow the directions for cleaning the machine. · Do not use aspirin, ibuprofen (Advil, Motrin), or naproxen (Aleve) for 36 to 48 hours after a nosebleed unless your doctor tells you to. You can use acetaminophen (Tylenol) for pain relief. · Talk to your doctor about stopping any other medicines you are taking. Some medicines may make you more likely to get a nosebleed. · Do not use cold medicines or nasal sprays without first talking to your doctor. They can make your nose dry. When should you call for help? Call 911 anytime you think you may need emergency care. For example, call if:  · You passed out (lost consciousness). Call your doctor now or seek immediate medical care if:  · You get another nosebleed and your nose is still bleeding after you have applied pressure 3 times for 10 minutes each time (30 minutes total). · There is a lot of blood running down the back of your throat even after you pinch your nose and tilt your head forward. · You have a fever. · You have sinus pain. Watch closely for changes in your health, and be sure to contact your doctor if:  · You get nosebleeds often, even if they stop. · You do not get better as expected. Where can you learn more? Go to http://torin-atif.info/. Enter S156 in the search box to learn more about \"Nosebleeds: Care Instructions. \"  Current as of: May 27, 2016  Content Version: 11.2  © 7805-6984 [employer]. Care instructions adapted under license by [employer] (which disclaims liability or warranty for this information). If you have questions about a medical condition or this instruction, always ask your healthcare professional. [address] any warranty or liability for your use of this information.

## 2017-05-23 NOTE — PROGRESS NOTES
1. Have you been to the ER, urgent care clinic or hospitalized since your last visit? YES. [facility] ER 5/21/2017 for high blood pressure and a nose bleed     2. Have you seen or consulted any other health care providers outside of the [address] [place] [address] since your last visit (Include any pap smears or colon screening)? YES Dr. Josee Rich ENT on 3 weeks ago and next appointment is in June.

## 2017-05-24 ENCOUNTER — HOSPITAL ENCOUNTER (OUTPATIENT)
Dept: MAMMOGRAPHY | Age: 77
Discharge: HOME | End: 2017-05-24
Attending: INTERNAL MEDICINE
Payer: MEDICARE

## 2017-05-24 DIAGNOSIS — Z12.31 SCREENING MAMMOGRAM, ENCOUNTER FOR: ICD-10-CM

## 2017-05-24 PROCEDURE — 77063 BREAST TOMOSYNTHESIS BI: CPT

## 2017-05-27 PROBLEM — R04.0 EPISTAXIS: Status: ACTIVE | Noted: 2017-05-27

## 2017-05-27 NOTE — PROGRESS NOTES
HPI:   Elina Paul is a 64y.o. year old female who presents today for evaluation of hypertension, ASCVD, hyperlipidemia, syncope, Parkinson's disease, GERD, and macular degeneration. She reports that she is doing relatively well. She was last seen on 5/10/2017 and noted to have elevated blood pressure. She returns today with her blood pressure readings for the last two weeks with monitoring twice per day, showing them generally 120-140/ 60-80 with occasional readings 150-160/ 80-85. Since her last visit, however, she experienced three episodes of epistaxis. The first episode occurred on 5/19/2017 while she was having a bowel movement. She stated that the bleeding lasted for approximately 20-30 minutes and resolved. She had another episode on 5/20/2017 while exercising using a tension band, and this lasted a similar amount of time. On the morning of 5/21/2017, while moving around on her mattress, she developed another episode. This was more severe and continued for at least two hours, at which point she presented to the [facility] ED for evaluation. Upon arrival in ED, her bleeding was noted to have resolved. Clots were evacuated from her nose and she cleared them from her nasopharynx without recurrence. CBC showed a stable Hb/ Hct of 12.3/ 35.9 and PT/INR and PTT were normal. She admitted to using saline irrigation and was advised to discontinue this practice. She reports that she has had not further episodes of bleeding since being discharged from ED. She has an appointment this week with Dr. Jason Solorzano. She is otherwise without complaints. She has a history of hypertension, hyperlipidemia, ASCVD, and syncope. In 3/2011, she had three syncopal episodes while donating blood. Over the subsequent four days, she developed exertional substernal chest tightness with left arm pain and dyspnea. She was evaluated and EKG revealed new T wave inversions in leads V2 and V3. Troponins were negative.  She underwent cardiac catheterization (3/21/2011) which showed a 90% mid LAD and mild disease in the rest of the coronaries. She underwent PCI and placement of two drug-eluting stents for the mid LAD lesion; LV function was mildly diminished (EF 40-45%) with anteroapical hypokinesis. Follow-up echocardiogram (9/2011) showed return to normal LV function (EF 60%) and no RWMA. She has a history of multiple syncopal episodes, usually related to stressful situations, and thought to be vasovagal in origin. An implantable loop recorder was placed in 3/2011 and remained until 4/2013, and interrogation was negative for any significant arrhythmia. In 4/2014, she was hospitalized following another syncopal episode, which occurred after she had taken her morning medications. Afterward, she became nauseated and flushed, and called EMS. When they arrived, they found her on the floor and reportedly without a pulse. They began CPR and within 10-15 seconds, she recovered. Evaluation included EKG/troponins, which were negative, and an echocardiogram showing mild MR and normal LV function (EF 60-65%). She was found to have hypokalemia and hypomagnesemia and hydrochlorothiazide was discontinued. It was thought that this event also represented a vasovagal reaction given her prodromal symptoms. She has had no further events since that time. Her current regimen includes metoprolol, lisinopril, aspirin, pravastatin, and sublingual nitroglycerin prn. She has been followed by Dr. Layton Callow, and will begin seeing Dr. Dent Later. She remains very active line dancing 3x/week, doing yardwork and riding her bicycle. She denies any chest pain, shortness of breath at rest or with exertion, palpitations, lightheadedness, or edema. She has a history of idiopathic Parkinson's disease, predominantly left-sided. She is currently being treated with Sinemet, and reports relatively good control of symptoms.  She has difficulty with  writing at times, particularly towards the end of the day, and also describes increasing tremors midday. She is followed by Dr. Jocelyn Connors. She has a history of laryngopharyngeal reflux disease, treated with dexlansoprazole, and she is followed by Dr. Bret Acevedo. In 3/2010, she underwent evaluation for iron deficiency anemia. She had previously had a negative colonoscopy and air contrast barium enema in 2009 by Dr. Corrie Gomez, and she had an upper endoscopy by Dr. Juan Ramsey which was normal. She was treated with iron with improvement. She had a repeat screening colonoscopy in 7/2016 by Dr. Juan Ramsey which was normal. No further follow-up was recommended given her age. She denies any abdominal pain, nausea, vomiting, melena, hematochezia, or change in bowel movements. She has a history of osteopenia with last bone density study in 5/2016 showing T-scores:  femoral neck left -1.0  /right -1.4 and lumbar -0.8 . She continues to take calcium and Vitamin D supplements. She has no history of pathologic fractures. She has a recent history of abnormal mammograms since 10/2014 with microcalcifcations in the right breast. She has been undergoing surveillance mammograms every six months, which have been unchanged. She had a follow-up mammogram in 5/2016 which was negative, and routine annual screening was recommended. Past Medical History:   Diagnosis Date    Basal cell cancer     CAD (coronary artery disease), native coronary artery 03/21/2011    s/p PCI with with CARLITO (Xience 2.75x12, 2.5x12) mLAD and mild disease in rest of coronaries. Midly depressed LV syst fct     Cardiomyopathy secondary     ischemic +/- stress induced    Dyslipidemia     GERD (gastroesophageal reflux disease)     History of echocardiogram 09/14/2011    EF 60%. Gr 1 DDfx. RVSP 25-30. Mild LAE. Mild TR.      Hx of colonoscopy 07/12/2016    Normal. Dr. Juan Ramsey    Hypertension     Normal nuclear stress test 05/20/2008    Negative for ischemia or infarction. EF 79%.     Parkinson's disease     S/P cardiac cath 03/21/2011    mLAD 90% (2.75 x 12 mm Xience stent). Otherwise patent coronaries. LVEDP 10 mmHg. EF 40-45%. Anteroapical hypk. Renal arteries patent.  Syncope     s/p ILD implantation     Past Surgical History:   Procedure Laterality Date    COLONOSCOPY N/A 7/12/2016    COLONOSCOPY performed by Brittnee Ibarra MD at [address] HX BUNIONECTOMY      dorsal    HX CATARACT REMOVAL  11/2012    left    HX CATARACT REMOVAL  10/2012    right    HX CORONARY STENT PLACEMENT      HX HEART CATHETERIZATION      HX HEMORRHOIDECTOMY      HX HYSTERECTOMY  1996    partial    HX IMPLANTABLE LOOP RECORDER  [id number]    HX TONSILLECTOMY      HX TOTAL ABDOMINAL HYSTERECTOMY  1996    partial     Current Outpatient Prescriptions   Medication Sig    clobetasol (TEMOVATE) 0.05 % external solution Apply as directed.  lisinopril (PRINIVIL, ZESTRIL) 10 mg tablet Take 1 Tab by mouth daily.  potassium chloride (K-DUR, KLOR-CON) 10 mEq tablet Take 1 Tab by mouth every other day.  metoprolol succinate (TOPROL-XL) 50 mg XL tablet Take 1 Tab by mouth daily.  magnesium hydroxide (NEGRO MILK OF MAGNESIA) 400 mg/5 mL suspension Take 30 mL by mouth daily as needed for Constipation.  pravastatin (PRAVACHOL) 20 mg tablet Take 1 Tab by mouth nightly.  diclofenac (VOLTAREN) 1 % topical gel Apply 2 g to affected area four (4) times daily as needed.  carbidopa-levodopa CR (SINEMET CR)  mg per tablet Take  by mouth nightly.  nitroglycerin (NITROSTAT) 0.4 mg SL tablet 1 sublingual every 5 minutes for chest pain for no more than 3 doses    acetaminophen (TYLENOL) 650 mg CR tablet Take 650 mg by mouth every six (6) hours as needed for Pain.  co-enzyme Q-10 (CO Q-10) 100 mg capsule Take 100 mg by mouth daily.  VIT A/VIT C/VIT E/ZINC/COPPER (ICAPS AREDS PO) Take 1 Tab by mouth two (2) times a day.  Cholecalciferol, Vitamin D3, (VITAMIN D) 1,000 unit Cap Take 1 Cap by mouth daily.  VITAMIN B COMPLEX (BALANCE B-50 PO) Take 1 Tab by mouth daily.  carbidopa-levodopa (SINEMET)  mg per tablet Take 1 Tab by mouth three (3) times daily.  flaxseed oil 1,000 mg Cap Take 1 Cap by mouth daily.  aspirin 81 mg tablet Take 1 Tab by mouth daily.  MULTIVITAMIN PO Take 1 Tab by mouth daily.  omega-3 fatty acids-vitamin e (FISH OIL) 1,000 mg Cap Take 1 Cap by mouth daily.  Dexlansoprazole (DEXILANT) 60 mg CpDM Take 1 Cap by mouth daily.  DOCUSATE CALCIUM (STOOL SOFTENER PO) Take 1 Cap by mouth.  magnesium 250 mg Tab Take 250 mg by mouth two (2) times a day.  resveratrol 100 mg Cap Take 2 Caps by mouth daily.  lecithin 1,200 mg Cap Take 1 Cap by mouth daily.  psyllium seed-sucrose (METAMUCIL) powd 1 tablespoon bid    fluticasone (FLONASE) 50 mcg/actuation nasal spray 2 Sprays by Both Nostrils route daily. No current facility-administered medications for this visit. Allergies and Intolerances: Allergies   Allergen Reactions    Keflex [Cephalexin] Other (comments)     Yeast infection    Pcn [Penicillins] Other (comments)     Family History: She has no family history of colon or breast cancer. Family History   Problem Relation Age of Onset    Heart Attack Father 80    Heart Disease Father     Cancer Mother      pancreatic    Hypertension Brother      Social History:   She  reports that she has never smoked. She has never used smokeless tobacco. She lives with her , who is having difficulty with mild cognitive impairment. She states that they sold their RV and now stay at home for the winter. She is a retired x-ray technician. History   Alcohol Use    Yes     Comment: glass of wine occasionally.      Immunization History:  Immunization History   Administered Date(s) Administered    Influenza High Dose Vaccine PF 09/29/2014, 10/05/2015, 11/01/2016    Influenza Vaccine Whole 09/07/2010, 09/03/2011, 10/04/2012    Pneumococcal Conjugate (PCV-13) 10/05/2015    Pneumococcal Vaccine (Unspecified Type) 01/01/2003    TD Vaccine 01/01/2003    Tdap 09/25/2013    Zoster 01/01/2007       Review of Systems:   As above included in HPI. Otherwise 11 point review of systems negative including constitutional, skin, HENT, eyes, respiratory, cardiovascular, gastrointestinal, genitourinary, musculoskeletal, endo/heme/aller, neurological.    Physical:   Vitals:   BP: 108/58; repeat: 132/60 and patient's cuff 132/90  HR: 72  WT: 130 lb (59 kg)  BMI:  21.63 kg/m2    Exam:   Pt appears well; alert and oriented x 3; appropriate affect. HEENT: PERRLA, anicteric, nares without evidence of bleeding or dried blood, oropharynx clear, no JVD, adenopathy or thyromegaly. No carotid bruits or radiated murmur. Lungs: clear to auscultation, no wheezes, rhonchi, or rales. Heart: regular rate and rhythm. No murmur, rubs, gallops  Abdomen: soft, nontender, nondistended, normal bowel sounds, no hepatosplenomegaly or masses. Extremities: without edema. Pulses 1-2+ bilaterally. Review of Data:  Labs:  Admission on 05/21/2017, Discharged on 05/21/2017   Component Date Value Ref Range Status    WBC 05/21/2017 4.4* 4.6 - 13.2 K/uL Final    RBC 05/21/2017 3.92* 4.20 - 5.30 M/uL Final    HGB 05/21/2017 12.3  12.0 - 16.0 g/dL Final    HCT 05/21/2017 35.9  35.0 - 45.0 % Final    MCV 05/21/2017 91.6  74.0 - 97.0 FL Final    MCH 05/21/2017 31.4  24.0 - 34.0 PG Final    MCHC 05/21/2017 34.3  31.0 - 37.0 g/dL Final    RDW 05/21/2017 12.9  11.6 - 14.5 % Final    PLATELET 55/15/1033 445  135 - 420 K/uL Final    MPV 05/21/2017 9.7  9.2 - 11.8 FL Final    NEUTROPHILS 05/21/2017 39* 40 - 73 % Final    LYMPHOCYTES 05/21/2017 43  21 - 52 % Final    MONOCYTES 05/21/2017 11* 3 - 10 % Final    EOSINOPHILS 05/21/2017 6* 0 - 5 % Final    BASOPHILS 05/21/2017 1  0 - 2 % Final    ABS. NEUTROPHILS 05/21/2017 1.7* 1.8 - 8.0 K/UL Final    ABS.  LYMPHOCYTES 05/21/2017 1.9  0.9 - 3.6 K/UL Final    ABS. MONOCYTES 05/21/2017 0.5  0.05 - 1.2 K/UL Final    ABS. EOSINOPHILS 05/21/2017 0.3  0.0 - 0.4 K/UL Final    ABS. BASOPHILS 05/21/2017 0.0  0.0 - 0.1 K/UL Final    DF 05/21/2017 AUTOMATED    Final    Sodium 05/21/2017 141  136 - 145 mmol/L Final    Potassium 05/21/2017 3.9  3.5 - 5.5 mmol/L Final    Chloride 05/21/2017 107  100 - 108 mmol/L Final    CO2 05/21/2017 25  21 - 32 mmol/L Final    Anion gap 05/21/2017 9  3.0 - 18 mmol/L Final    Glucose 05/21/2017 108* 74 - 99 mg/dL Final    BUN 05/21/2017 12  7.0 - 18 MG/DL Final    Creatinine 05/21/2017 0.65  0.6 - 1.3 MG/DL Final    BUN/Creatinine ratio 05/21/2017 18  12 - 20   Final    GFR est AA 05/21/2017 >60  >60 ml/min/1.73m2 Final    GFR est non-AA 05/21/2017 >60  >60 ml/min/1.73m2 Final    Calcium 05/21/2017 9.2  8.5 - 10.1 MG/DL Final    Prothrombin time 05/21/2017 12.1  11.5 - 15.2 sec Final    INR 05/21/2017 0.9  0.8 - 1.2   Final    aPTT 05/21/2017 27.5  23.0 - 36.4 SEC Final   Hospital Outpatient Visit on 05/03/2017   Component Date Value Ref Range Status    WBC 05/03/2017 3.9* 4.6 - 13.2 K/uL Final    RBC 05/03/2017 4.31  4.20 - 5.30 M/uL Final    HGB 05/03/2017 13.4  12.0 - 16.0 g/dL Final    HCT 05/03/2017 41.3  35.0 - 45.0 % Final    MCV 05/03/2017 95.8  74.0 - 97.0 FL Final    MCH 05/03/2017 31.1  24.0 - 34.0 PG Final    MCHC 05/03/2017 32.4  31.0 - 37.0 g/dL Final    RDW 05/03/2017 13.5  11.6 - 14.5 % Final    PLATELET 22/39/0449 348  135 - 420 K/uL Final    MPV 05/03/2017 9.8  9.2 - 11.8 FL Final    NEUTROPHILS 05/03/2017 57  40 - 73 % Final    LYMPHOCYTES 05/03/2017 26  21 - 52 % Final    MONOCYTES 05/03/2017 12* 3 - 10 % Final    EOSINOPHILS 05/03/2017 5  0 - 5 % Final    BASOPHILS 05/03/2017 0  0 - 2 % Final    ABS. NEUTROPHILS 05/03/2017 2.2  1.8 - 8.0 K/UL Final    ABS. LYMPHOCYTES 05/03/2017 1.0  0.9 - 3.6 K/UL Final    ABS. MONOCYTES 05/03/2017 0.5  0.05 - 1.2 K/UL Final    ABS.  EOSINOPHILS 05/03/2017 0.2  0.0 - 0.4 K/UL Final    ABS. BASOPHILS 05/03/2017 0.0  0.0 - 0.06 K/UL Final    DF 05/03/2017 AUTOMATED    Final    LIPID PROFILE 05/03/2017        Final    Cholesterol, total 05/03/2017 150  <200 MG/DL Final    Triglyceride 05/03/2017 74  <150 MG/DL Final    HDL Cholesterol 05/03/2017 66* 40 - 60 MG/DL Final    LDL, calculated 05/03/2017 69.2  0 - 100 MG/DL Final    VLDL, calculated 05/03/2017 14.8  MG/DL Final    CHOL/HDL Ratio 05/03/2017 2.3  0 - 5.0   Final    Sodium 05/03/2017 142  136 - 145 mmol/L Final    Potassium 05/03/2017 4.5  3.5 - 5.5 mmol/L Final    Chloride 05/03/2017 105  100 - 108 mmol/L Final    CO2 05/03/2017 31  21 - 32 mmol/L Final    Anion gap 05/03/2017 6  3.0 - 18 mmol/L Final    Glucose 05/03/2017 87  74 - 99 mg/dL Final    BUN 05/03/2017 11  7.0 - 18 MG/DL Final    Creatinine 05/03/2017 0.76  0.6 - 1.3 MG/DL Final    BUN/Creatinine ratio 05/03/2017 14  12 - 20   Final    GFR est AA 05/03/2017 >60  >60 ml/min/1.73m2 Final    GFR est non-AA 05/03/2017 >60  >60 ml/min/1.73m2 Final    Calcium 05/03/2017 9.3  8.5 - 10.1 MG/DL Final    Bilirubin, total 05/03/2017 0.5  0.2 - 1.0 MG/DL Final    ALT (SGPT) 05/03/2017 27  13 - 56 U/L Final    AST (SGOT) 05/03/2017 20  15 - 37 U/L Final    Alk.  phosphatase 05/03/2017 92  45 - 117 U/L Final    Protein, total 05/03/2017 6.7  6.4 - 8.2 g/dL Final    Albumin 05/03/2017 3.9  3.4 - 5.0 g/dL Final    Globulin 05/03/2017 2.8  2.0 - 4.0 g/dL Final    A-G Ratio 05/03/2017 1.4  0.8 - 1.7   Final    Color 05/03/2017 DARK YELLOW    Final    Appearance 05/03/2017 CLEAR    Final    Specific gravity 05/03/2017 1.020  1.005 - 1.030   Final    pH (UA) 05/03/2017 7.5  5.0 - 8.0   Final    Protein 05/03/2017 NEGATIVE   NEG mg/dL Final    Glucose 05/03/2017 NEGATIVE   NEG mg/dL Final    Ketone 05/03/2017 NEGATIVE   NEG mg/dL Final    Bilirubin 05/03/2017 NEGATIVE   NEG   Final    Blood 05/03/2017 NEGATIVE   NEG   Final  Urobilinogen 05/03/2017 1.0  0.2 - 1.0 EU/dL Final    Nitrites 05/03/2017 NEGATIVE   NEG   Final    Leukocyte Esterase 05/03/2017 MODERATE* NEG   Final    WBC 05/03/2017 4 to 8  0 - 4 /hpf Final    RBC 05/03/2017 0  0 - 5 /hpf Final    Epithelial cells 05/03/2017 FEW  0 - 5 /lpf Final    Bacteria 05/03/2017 NEGATIVE   NEG /hpf Final    TSH 05/03/2017 2.17  0.36 - 3.74 uIU/mL Final    Vitamin D 25-Hydroxy 05/03/2017 49.8  30 - 100 ng/mL Final    Magnesium 05/03/2017 2.3  1.6 - 2.6 mg/dL Final     Health Maintenance:  Screening:    Mammogram: negative (5/2017)   PAP smear: s/p ROHINI. No further screening. Colorectal: colonoscopy (7/2016) normal. Dr. Fani Bui. No further screening. Depression: none   DM (HbA1c/FPG): FPG 87 (5/2017)   Hepatitis C: N/A   Falls: none   DEXA: osteopenia (5/2016)   Glaucoma: regular eye exams with Dr. Jeanne Laguna (last 5/2017) and Dr. Shayy Rodriguez for left macular degeneration. Smoking: none   Vitamin D: 49.8 (5/2017)   Medicare Wellness: 5/10/2017    Impression:  Patient Active Problem List   Diagnosis Code    Hypertension I10    CAD S/P percutaneous coronary angioplasty I25.10, Z98.61    Dyslipidemia E78.5    Syncope R55    Parkinson disease ([facility] [address] G20    GERD (gastroesophageal reflux disease) K21.9    Abnormal mammogram R92.8    Advance directive on file Z78.9    Macular degeneration of left eye H35.30    Laryngopharyngeal reflux disease K21.9    Osteopenia M85.80    Epistaxis R04.0       Plan:  1. Hypertension. Blood pressure improved today and home readings acceptable. Home cuff appears comparable for systolic blood pressure readings, so relatively reliable. Will continue metoprolol and lisinopril and instructed patient to continue to monitor and record her blood pressure readings. Instructed to notify office if >140/90. Renal function normal with creatinine 0.76 / eGFR >60. Continue to follow. 2. Epistaxis. No further recurrence since ED visit.  To avoid nasal irrigation for now. Has follow-up appointment with Dr. Clarice Monroe this week. Instructed on conservative measures to prevent repeat episode and means to control episode if recurs. Follow. 3. ASCVD. Remains asymptomatic. Resolution of cardiomyopathy with last echocardiogram revealing normal LV function. Continue current regimen. To be followed by Dr. Destiny Orozco. 4. H/O syncope. No further episodes since 2014. Most likely secondary to vasovagal reaction. Follow. 5. Hyperlipidemia. On low intensity dose pravastatin with LDL 69, indicative of excellent control. Emphasized importance of lifestyle modifications, including diet, exercise, and weight loss. Continue to follow. 6. Parkinson's disease. Doing well on Sinemet. Followed by Dr. Aba Holland.  7. Osteopenia. Last bone density scan 5/2016. Using femoral neck T-scores, calculated FRAX score estimates her 10 year risk of a major osteoporetic fracture at 11 % and hip fracture at 2.3 %, which are not an indication for biphosphonate treatment (>20% and >3%, respectively). Continue calcium and vitamin D supplements. Encouraged exercise, particularly weight bearing activities. Repeat bone density scan in 2018. 8. Macular degeneration, left. Being followed by Dr. Dorena Baumgarten. 9. Laryngopharyngeal reflux. Doing well on Dexilant. Followed by Dr. Clarice Monroe. 10. Abnormal mammograms. Most recent mammogram in 5/2016 stable, so annual follow-up recommended. Appointment scheduled for tomorrow. 11. Psoriasis. Using clobetasol solution for scalp psoriasis. 12. Health maintenance. Immunizations up to date. No further colorectal cancer screening needed. Mammogram scheduled for tomorrow. Continue regular eye exams with Ankur Mercer and Dorena Baumgarten. Vitamin D level normal. Continue maintenance dose supplement. Patient understands recommendations and agrees with plan. Follow-up in 6 months.

## 2017-05-31 ENCOUNTER — OFFICE VISIT (OUTPATIENT)
Dept: ORTHOPEDIC SURGERY | Facility: CLINIC | Age: 77
End: 2017-05-31

## 2017-05-31 VITALS
BODY MASS INDEX: 21.8 KG/M2 | WEIGHT: 131 LBS | SYSTOLIC BLOOD PRESSURE: 149 MMHG | HEART RATE: 70 BPM | DIASTOLIC BLOOD PRESSURE: 60 MMHG | TEMPERATURE: 98 F

## 2017-05-31 DIAGNOSIS — M25.562 LEFT KNEE PAIN, UNSPECIFIED CHRONICITY: Primary | ICD-10-CM

## 2017-05-31 DIAGNOSIS — M25.572 LEFT ANKLE PAIN, UNSPECIFIED CHRONICITY: ICD-10-CM

## 2017-05-31 RX ORDER — TRIAMCINOLONE ACETONIDE 40 MG/ML
60 INJECTION, SUSPENSION INTRA-ARTICULAR; INTRAMUSCULAR ONCE
Qty: 2 ML | Refills: 0
Start: 2017-05-31 | End: 2017-05-31

## 2017-05-31 RX ORDER — LIDOCAINE HYDROCHLORIDE 10 MG/ML
4 INJECTION INFILTRATION; PERINEURAL ONCE
Qty: 6 ML | Refills: 0
Start: 2017-05-31 | End: 2017-05-31

## 2017-05-31 NOTE — PROGRESS NOTES
Patient: Elina Paul                MRN: [id number]       SSN: [id number]  YOB: 1940        AGE: 64 y.o. SEX: female  There is no height or weight on file to calculate BMI. PCP: Kareem Alberto MD  05/31/17      No chief complaint on file. HISTORY OF PRESENT ILLNESS: Elina Paul is a 64 y.o. female who presents to the office for left knee pain and left ankle pain. She has had no recent falls or other injuries. She notices the pain going down stairs. She has had no locking or giving way of the knee. She has had no numbness in the LLE. Review of Systems   Constitutional: Negative. HENT: Negative. Eyes: Negative. Respiratory: Negative. Cardiovascular: Negative. Gastrointestinal: Negative. Genitourinary: Negative. Musculoskeletal: Positive for joint pain (left knee). Skin: Negative. Neurological: Negative. Endo/Heme/Allergies: Negative. Psychiatric/Behavioral: Negative. Social History     Social History    Marital status:      Spouse name: N/A    Number of children: N/A    Years of education: N/A     Occupational History    Not on file. Social History Main Topics    Smoking status: Never Smoker    Smokeless tobacco: Never Used    Alcohol use Yes      Comment: glass of wine occasionally.  Drug use: No    Sexual activity: No     Other Topics Concern    Not on file     Social History Narrative        Past Medical History:   Diagnosis Date    Basal cell cancer     CAD (coronary artery disease), native coronary artery 03/21/2011    s/p PCI with with CARLITO (Xience 2.75x12, 2.5x12) mLAD and mild disease in rest of coronaries. Midly depressed LV syst fct     Cardiomyopathy secondary     ischemic +/- stress induced    Dyslipidemia     GERD (gastroesophageal reflux disease)     History of echocardiogram 09/14/2011    EF 60%. Gr 1 DDfx. RVSP 25-30. Mild LAE.   Mild TR.      Hx of colonoscopy 07/12/2016 Normal. Dr. Pratima Collins Hypertension     Normal nuclear stress test 05/20/2008    Negative for ischemia or infarction. EF 79%.  Parkinson's disease     S/P cardiac cath 03/21/2011    mLAD 90% (2.75 x 12 mm Xience stent). Otherwise patent coronaries. LVEDP 10 mmHg. EF 40-45%. Anteroapical hypk. Renal arteries patent.  Syncope     s/p ILD implantation        Allergies   Allergen Reactions    Keflex [Cephalexin] Other (comments)     Yeast infection    Pcn [Penicillins] Other (comments)         Current Outpatient Prescriptions   Medication Sig    clobetasol (TEMOVATE) 0.05 % external solution Apply as directed.  lisinopril (PRINIVIL, ZESTRIL) 10 mg tablet Take 1 Tab by mouth daily.  potassium chloride (K-DUR, KLOR-CON) 10 mEq tablet Take 1 Tab by mouth every other day.  metoprolol succinate (TOPROL-XL) 50 mg XL tablet Take 1 Tab by mouth daily.  magnesium hydroxide (AGNITiO MILK OF MAGNESIA) 400 mg/5 mL suspension Take 30 mL by mouth daily as needed for Constipation.  pravastatin (PRAVACHOL) 20 mg tablet Take 1 Tab by mouth nightly.  diclofenac (VOLTAREN) 1 % topical gel Apply 2 g to affected area four (4) times daily as needed.  carbidopa-levodopa CR (SINEMET CR)  mg per tablet Take  by mouth nightly.  nitroglycerin (NITROSTAT) 0.4 mg SL tablet 1 sublingual every 5 minutes for chest pain for no more than 3 doses    psyllium seed-sucrose (METAMUCIL) powd 1 tablespoon bid    acetaminophen (TYLENOL) 650 mg CR tablet Take 650 mg by mouth every six (6) hours as needed for Pain.  fluticasone (FLONASE) 50 mcg/actuation nasal spray 2 Sprays by Both Nostrils route daily.  co-enzyme Q-10 (CO Q-10) 100 mg capsule Take 100 mg by mouth daily.  VIT A/VIT C/VIT E/ZINC/COPPER (ICAPS AREDS PO) Take 1 Tab by mouth two (2) times a day.  Cholecalciferol, Vitamin D3, (VITAMIN D) 1,000 unit Cap Take 1 Cap by mouth daily.     VITAMIN B COMPLEX (BALANCE B-50 PO) Take 1 Tab by mouth daily.  carbidopa-levodopa (SINEMET)  mg per tablet Take 1 Tab by mouth three (3) times daily.  flaxseed oil 1,000 mg Cap Take 1 Cap by mouth daily.  aspirin 81 mg tablet Take 1 Tab by mouth daily.  MULTIVITAMIN PO Take 1 Tab by mouth daily.  omega-3 fatty acids-vitamin e (FISH OIL) 1,000 mg Cap Take 1 Cap by mouth daily.  Dexlansoprazole (DEXILANT) 60 mg CpDM Take 1 Cap by mouth daily.  DOCUSATE CALCIUM (STOOL SOFTENER PO) Take 1 Cap by mouth.  magnesium 250 mg Tab Take 250 mg by mouth two (2) times a day.  resveratrol 100 mg Cap Take 2 Caps by mouth daily.  lecithin 1,200 mg Cap Take 1 Cap by mouth daily. No current facility-administered medications for this visit. Physical Exam  Constitutional: she is oriented to person, place, and time and well-developed, well-nourished, and in no distress. No distress. HENT:   Head: Normocephalic and atraumatic. Right Ear: Hearing normal.   Left Ear: Hearing normal.   Nose: Nose normal.   Eyes: Conjunctivae, EOM and lids are normal. Pupils are equal, round, and reactive to light. Neck: Trachea normal.   Pulmonary/Chest: Effort normal and breath sounds normal. No respiratory distress. Abdominal: Soft. Neurological: she is alert and oriented to person, place, and time. Skin: Skin is warm, dry and intact. she is not diaphoretic. Psychiatric: Affect normal.   Nursing note and vitals reviewed. Ortho Exam   Left knee shows small effusion but no increased warmth. Ligaments were stable when tested. Knee extension against resistance caused some mild pain. Ankle showed no swelling or increased warmth. She localizes the pain to the lateral aspect of the ankle. ROM of the ankle and foot against resistance caused no pain. Procedure: After timeout and under sterile conditions, patient's left knee was injected with 4 cc of Xylocaine and 1.5 cc of Kenalog.     [address] [place] [address] PROCEDURE PROGRESS NOTE        Chart reviewed for the following:   Viv Khanna MD, have reviewed the History, Physical and updated the Allergic reactions for [place] [address] performed immediately prior to start of procedure:   Viv Khanna MD, have performed the following reviews on Jossy Stark prior to the start of the procedure:            * Patient was identified by name and date of birth   * Agreement on procedure being performed was verified  * Risks and Benefits explained to the patient  * Procedure site verified and marked as necessary  * Patient was positioned for comfort  * Consent was signed and verified     Time: 11:09 AM        Date of procedure: 5/31/2017    Procedure performed by: Naldo Christine MD    Provider assisted by: None     Patient assisted by: self    How tolerated by patient: tolerated the procedure well with no complications    Comments: none      RADIOGRAPHS:   XR's of the ankle were normal for all 3 views. AP and tunnel views show a small bone spur on the medial femoral condylar. Otherwise the joint space was well preserved. IMPRESSION & PLAN: I feel the knee pain is due to early OA. I feel the ankle pain is due to altering her gait due to her knee pain. I reccommended a cortisone injection for her knee. I feel once the knee pain is resolved, the ankle pain will resolve as well. I will see her back prn. Written by Leon Palma, as dictated by Dr. Eleno Gaston, Dr. Naldo Christine, confirm that all documentation is accurate.

## 2017-06-07 ENCOUNTER — TELEPHONE (OUTPATIENT)
Dept: ORTHOPEDIC SURGERY | Facility: CLINIC | Age: 77
End: 2017-06-07

## 2017-06-07 NOTE — TELEPHONE ENCOUNTER
Informed pt that Dr. Rhiannon Lucas was out of the office today and will not be back until Monday. She just wanted him to know the information. Informed her that I would definitely forward it on to him.

## 2017-06-07 NOTE — TELEPHONE ENCOUNTER
Patient came to the [facility] office today, wanting to let Dr Dulce Maria Phillips know that her lt knee has improved, but that she still has pain in her lt ankle. Patient states the Dr Dulce Maria Phillips told her to stop by to let him know how she was doing. Patient can be reahced at [phone].

## 2017-06-12 NOTE — TELEPHONE ENCOUNTER
Contacted pt at her home number. Pt informed of Dr. Shireen Blanchard recommendation. Pt states that she is unable to take NSAIDs due to 2 stents and she is taking Aspirin daily. Pt states that she has an appointment on Wednesday morning with Dr. Shireen Blanchard and will discuss her ankle at that time.

## 2017-06-14 ENCOUNTER — OFFICE VISIT (OUTPATIENT)
Dept: ORTHOPEDIC SURGERY | Facility: CLINIC | Age: 77
End: 2017-06-14

## 2017-06-14 VITALS
TEMPERATURE: 98 F | HEART RATE: 70 BPM | SYSTOLIC BLOOD PRESSURE: 161 MMHG | DIASTOLIC BLOOD PRESSURE: 73 MMHG | BODY MASS INDEX: 21.8 KG/M2 | WEIGHT: 131 LBS

## 2017-06-14 DIAGNOSIS — M25.572 ACUTE LEFT ANKLE PAIN: Primary | ICD-10-CM

## 2017-06-14 NOTE — PROGRESS NOTES
Patient: Ness Dyson                MRN: [id number]       SSN: [id number]  YOB: 1940        AGE: 64 y.o. SEX: female  There is no height or weight on file to calculate BMI. PCP: Waleska Darling MD  06/14/17      Chief Complaint   Patient presents with    Ankle Pain     Left       HISTORY OF PRESENT ILLNESS: Ness Dyson is a 64 y.o. female who presents to the office for continued pain to the left lateral aspect of the ankle. It is to be remembered we performed a cortisone for her and that alleviated her pain. She has had no event or trauma to account for the ankle pain. She localizes the pain over the lateral aspect as before. Review of Systems   Constitutional: Negative. HENT: Negative. Eyes: Negative. Respiratory: Negative. Cardiovascular: Negative. Gastrointestinal: Negative. Genitourinary: Negative. Musculoskeletal: Positive for joint pain (left ankle). Skin: Negative. Neurological: Negative. Endo/Heme/Allergies: Negative. Psychiatric/Behavioral: Negative. Social History     Social History    Marital status:      Spouse name: N/A    Number of children: N/A    Years of education: N/A     Occupational History    Not on file. Social History Main Topics    Smoking status: Never Smoker    Smokeless tobacco: Never Used    Alcohol use Yes      Comment: glass of wine occasionally.  Drug use: No    Sexual activity: No     Other Topics Concern    Not on file     Social History Narrative        Past Medical History:   Diagnosis Date    Basal cell cancer     CAD (coronary artery disease), native coronary artery 03/21/2011    s/p PCI with with CARLITO (Xience 2.75x12, 2.5x12) mLAD and mild disease in rest of coronaries.  Midly depressed LV syst fct     Cardiomyopathy secondary     ischemic +/- stress induced    Dyslipidemia     GERD (gastroesophageal reflux disease)     History of echocardiogram 09/14/2011 EF 60%. Gr 1 DDfx. RVSP 25-30. Mild LAE. Mild TR.      Hx of colonoscopy 07/12/2016    Normal. Dr. Jose Alcaraz    Hypertension     Normal nuclear stress test 05/20/2008    Negative for ischemia or infarction. EF 79%.  Parkinson's disease     S/P cardiac cath 03/21/2011    mLAD 90% (2.75 x 12 mm Xience stent). Otherwise patent coronaries. LVEDP 10 mmHg. EF 40-45%. Anteroapical hypk. Renal arteries patent.  Syncope     s/p ILD implantation        Allergies   Allergen Reactions    Keflex [Cephalexin] Other (comments)     Yeast infection    Pcn [Penicillins] Other (comments)         Current Outpatient Prescriptions   Medication Sig    clobetasol (TEMOVATE) 0.05 % external solution Apply as directed.  lisinopril (PRINIVIL, ZESTRIL) 10 mg tablet Take 1 Tab by mouth daily.  potassium chloride (K-DUR, KLOR-CON) 10 mEq tablet Take 1 Tab by mouth every other day.  metoprolol succinate (TOPROL-XL) 50 mg XL tablet Take 1 Tab by mouth daily.  magnesium hydroxide (Applicasa MILK OF MAGNESIA) 400 mg/5 mL suspension Take 30 mL by mouth daily as needed for Constipation.  diclofenac (VOLTAREN) 1 % topical gel Apply 2 g to affected area four (4) times daily as needed.  carbidopa-levodopa CR (SINEMET CR)  mg per tablet Take  by mouth nightly.  nitroglycerin (NITROSTAT) 0.4 mg SL tablet 1 sublingual every 5 minutes for chest pain for no more than 3 doses    acetaminophen (TYLENOL) 650 mg CR tablet Take 650 mg by mouth every six (6) hours as needed for Pain.  fluticasone (FLONASE) 50 mcg/actuation nasal spray 2 Sprays by Both Nostrils route daily.  co-enzyme Q-10 (CO Q-10) 100 mg capsule Take 100 mg by mouth daily.  VIT A/VIT C/VIT E/ZINC/COPPER (ICAPS AREDS PO) Take 1 Tab by mouth two (2) times a day.  Cholecalciferol, Vitamin D3, (VITAMIN D) 1,000 unit Cap Take 1 Cap by mouth daily.  VITAMIN B COMPLEX (BALANCE B-50 PO) Take 1 Tab by mouth daily.     carbidopa-levodopa (SINEMET)  mg per tablet Take 1 Tab by mouth three (3) times daily.  flaxseed oil 1,000 mg Cap Take 1 Cap by mouth daily.  aspirin 81 mg tablet Take 1 Tab by mouth daily.  MULTIVITAMIN PO Take 1 Tab by mouth daily.  omega-3 fatty acids-vitamin e (FISH OIL) 1,000 mg Cap Take 1 Cap by mouth daily.  Dexlansoprazole (DEXILANT) 60 mg CpDM Take 1 Cap by mouth daily.  DOCUSATE CALCIUM (STOOL SOFTENER PO) Take 1 Cap by mouth.  magnesium 250 mg Tab Take 250 mg by mouth two (2) times a day.  resveratrol 100 mg Cap Take 2 Caps by mouth daily.  lecithin 1,200 mg Cap Take 1 Cap by mouth daily.  pravastatin (PRAVACHOL) 20 mg tablet Take 1 Tab by mouth nightly.  psyllium seed-sucrose (METAMUCIL) powd 1 tablespoon bid     No current facility-administered medications for this visit. Physical Exam  Constitutional: she is oriented to person, place, and time and well-developed, well-nourished, and in no distress. No distress. HENT:   Head: Normocephalic and atraumatic. Right Ear: Hearing normal.   Left Ear: Hearing normal.   Nose: Nose normal.   Eyes: Conjunctivae, EOM and lids are normal. Pupils are equal, round, and reactive to light. Neck: Trachea normal.   Pulmonary/Chest: Effort normal and breath sounds normal. No respiratory distress. Abdominal: Soft. Neurological: she is alert and oriented to person, place, and time. Skin: Skin is warm, dry and intact. she is not diaphoretic. Psychiatric: Affect normal.   Nursing note and vitals reviewed. Ortho Exam   Shows swelling over the lateral right ankle primarily posterior to the lateral malleus. It is TTP over the perineal tendons in that location. Otherwise ROM and do not have much pain against resistance. There was not much warmth or bruising of the ankle. RADIOGRAPHS:   No new XR's taken today. IMPRESSION & PLAN: I am concerned the pain may be coming from degenerative changes in the eirnela tendons.  We will obtain an MRI. Depending on what we find will dictate her further tretment. I will see her back after the results of the MRI. Written by Drew Nobles, as dictated by Dr. Kimberlyn Bahena, Dr. Alex Duke, confirm that all documentation is accurate.

## 2017-06-22 ENCOUNTER — HOSPITAL ENCOUNTER (OUTPATIENT)
Age: 77
Discharge: HOME | End: 2017-06-22
Attending: ORTHOPAEDIC SURGERY
Payer: MEDICARE

## 2017-06-22 ENCOUNTER — PATIENT OUTREACH (OUTPATIENT)
Dept: INTERNAL MEDICINE CLINIC | Age: 77
End: 2017-06-22

## 2017-06-22 DIAGNOSIS — M25.572 ACUTE LEFT ANKLE PAIN: ICD-10-CM

## 2017-06-22 PROCEDURE — 73721 MRI JNT OF LWR EXTRE W/O DYE: CPT

## 2017-06-22 NOTE — PROGRESS NOTES
Episode closed:   Patient admitted to [facility], 5/21/17 to 5/21/17 for epistaxis. Attended ED follow up appt on 5/23. No hospitalization or ED admission post 30 days from discharge of 5/21/17.

## 2017-06-27 ENCOUNTER — OFFICE VISIT (OUTPATIENT)
Dept: ORTHOPEDIC SURGERY | Age: 77
End: 2017-06-27

## 2017-06-27 VITALS
DIASTOLIC BLOOD PRESSURE: 66 MMHG | TEMPERATURE: 96.5 F | WEIGHT: 131.2 LBS | BODY MASS INDEX: 21.86 KG/M2 | SYSTOLIC BLOOD PRESSURE: 136 MMHG | HEIGHT: 65 IN | HEART RATE: 68 BPM

## 2017-06-27 DIAGNOSIS — M25.572 ACUTE LEFT ANKLE PAIN: Primary | ICD-10-CM

## 2017-06-27 NOTE — PROGRESS NOTES
Patient: Tyler Piedra                MRN: [id number]       SSN: [id number]  YOB: 1940        AGE: 64 y.o. SEX: female  There is no height or weight on file to calculate BMI. PCP: Americo Jonas MD  06/27/17      No chief complaint on file. HISTORY OF PRESENT ILLNESS: Tyler Piedra is a 64 y.o. female who presents to the office for management of her left ankle pain. MRI showed increased edema and fluid both on the medial and lateral sides of her ankle when compared to a previous MRI of the same region but there was no tendon degeneration or tears. Review of Systems   Constitutional: Negative. HENT: Negative. Eyes: Negative. Respiratory: Negative. Cardiovascular: Negative. Gastrointestinal: Negative. Genitourinary: Negative. Musculoskeletal: Positive for joint pain (left ankle). Skin: Negative. Neurological: Negative. Endo/Heme/Allergies: Negative. Psychiatric/Behavioral: Negative. Social History     Social History    Marital status:      Spouse name: N/A    Number of children: N/A    Years of education: N/A     Occupational History    Not on file. Social History Main Topics    Smoking status: Never Smoker    Smokeless tobacco: Never Used    Alcohol use Yes      Comment: glass of wine occasionally.  Drug use: No    Sexual activity: No     Other Topics Concern    Not on file     Social History Narrative        Past Medical History:   Diagnosis Date    Basal cell cancer     CAD (coronary artery disease), native coronary artery 03/21/2011    s/p PCI with with CARLITO (Xience 2.75x12, 2.5x12) mLAD and mild disease in rest of coronaries. Midly depressed LV syst fct     Cardiomyopathy secondary     ischemic +/- stress induced    Dyslipidemia     GERD (gastroesophageal reflux disease)     History of echocardiogram 09/14/2011    EF 60%. Gr 1 DDfx. RVSP 25-30. Mild LAE.   Mild TR.      Hx of colonoscopy 07/12/2016    Normal. Dr. Cherry     Hypertension     Normal nuclear stress test 05/20/2008    Negative for ischemia or infarction. EF 79%.  Parkinson's disease     S/P cardiac cath 03/21/2011    mLAD 90% (2.75 x 12 mm Xience stent). Otherwise patent coronaries. LVEDP 10 mmHg. EF 40-45%. Anteroapical hypk. Renal arteries patent.  Syncope     s/p ILD implantation        Allergies   Allergen Reactions    Keflex [Cephalexin] Other (comments)     Yeast infection    Pcn [Penicillins] Other (comments)         Current Outpatient Prescriptions   Medication Sig    clobetasol (TEMOVATE) 0.05 % external solution Apply as directed.  lisinopril (PRINIVIL, ZESTRIL) 10 mg tablet Take 1 Tab by mouth daily.  potassium chloride (K-DUR, KLOR-CON) 10 mEq tablet Take 1 Tab by mouth every other day.  metoprolol succinate (TOPROL-XL) 50 mg XL tablet Take 1 Tab by mouth daily.  magnesium hydroxide (NEGRO MILK OF MAGNESIA) 400 mg/5 mL suspension Take 30 mL by mouth daily as needed for Constipation.  pravastatin (PRAVACHOL) 20 mg tablet Take 1 Tab by mouth nightly.  diclofenac (VOLTAREN) 1 % topical gel Apply 2 g to affected area four (4) times daily as needed.  carbidopa-levodopa CR (SINEMET CR)  mg per tablet Take  by mouth nightly.  nitroglycerin (NITROSTAT) 0.4 mg SL tablet 1 sublingual every 5 minutes for chest pain for no more than 3 doses    psyllium seed-sucrose (METAMUCIL) powd 1 tablespoon bid    acetaminophen (TYLENOL) 650 mg CR tablet Take 650 mg by mouth every six (6) hours as needed for Pain.  fluticasone (FLONASE) 50 mcg/actuation nasal spray 2 Sprays by Both Nostrils route daily.  co-enzyme Q-10 (CO Q-10) 100 mg capsule Take 100 mg by mouth daily.  VIT A/VIT C/VIT E/ZINC/COPPER (ICAPS AREDS PO) Take 1 Tab by mouth two (2) times a day.  Cholecalciferol, Vitamin D3, (VITAMIN D) 1,000 unit Cap Take 1 Cap by mouth daily.     VITAMIN B COMPLEX (BALANCE B-50 PO) Take 1 Tab by mouth daily.  carbidopa-levodopa (SINEMET)  mg per tablet Take 1 Tab by mouth three (3) times daily.  flaxseed oil 1,000 mg Cap Take 1 Cap by mouth daily.  aspirin 81 mg tablet Take 1 Tab by mouth daily.  MULTIVITAMIN PO Take 1 Tab by mouth daily.  omega-3 fatty acids-vitamin e (FISH OIL) 1,000 mg Cap Take 1 Cap by mouth daily.  Dexlansoprazole (DEXILANT) 60 mg CpDM Take 1 Cap by mouth daily.  DOCUSATE CALCIUM (STOOL SOFTENER PO) Take 1 Cap by mouth.  magnesium 250 mg Tab Take 250 mg by mouth two (2) times a day.  resveratrol 100 mg Cap Take 2 Caps by mouth daily.  lecithin 1,200 mg Cap Take 1 Cap by mouth daily. No current facility-administered medications for this visit. Physical Exam  Constitutional: she is oriented to person, place, and time and well-developed, well-nourished, and in no distress. No distress. HENT:   Head: Normocephalic and atraumatic. Right Ear: Hearing normal.   Left Ear: Hearing normal.   Nose: Nose normal.   Eyes: Conjunctivae, EOM and lids are normal. Pupils are equal, round, and reactive to light. Neck: Trachea normal.   Pulmonary/Chest: Effort normal and breath sounds normal. No respiratory distress. Abdominal: Soft. Neurological: she is alert and oriented to person, place, and time. Skin: Skin is warm, dry and intact. she is not diaphoretic. Psychiatric: Affect normal.   Nursing note and vitals reviewed. Ortho Exam   Shows the swelling of the lateral aspect is less. ROM of the ankle is normal but she still has some TTP laterally. Otherwise ROM of the ankle is normal and her walking gait is normal.    RADIOGRAPHS:   No new XR's taken today. IMPRESSION & PLAN: Soft tissue inflammation of the above structures but no damage to the structures. Soaking, antiinflammatories and avoidance of activities that cause her pain. I will see her back prn.       Written by Kathryn Olea, as dictated by Dr. Grace Schofield, Dr. Jennifer Cordoba, confirm that all documentation is accurate.

## 2017-06-28 ENCOUNTER — OFFICE VISIT (OUTPATIENT)
Dept: ORTHOPEDIC SURGERY | Facility: CLINIC | Age: 77
End: 2017-06-28

## 2017-06-28 VITALS
WEIGHT: 131 LBS | TEMPERATURE: 97.2 F | HEIGHT: 65 IN | DIASTOLIC BLOOD PRESSURE: 83 MMHG | SYSTOLIC BLOOD PRESSURE: 160 MMHG | HEART RATE: 67 BPM | BODY MASS INDEX: 21.83 KG/M2

## 2017-06-28 DIAGNOSIS — M17.11 PRIMARY OSTEOARTHRITIS OF RIGHT KNEE: Primary | ICD-10-CM

## 2017-06-28 RX ORDER — TRIAMCINOLONE ACETONIDE 40 MG/ML
80 INJECTION, SUSPENSION INTRA-ARTICULAR; INTRAMUSCULAR ONCE
Qty: 2 ML | Refills: 0
Start: 2017-06-28 | End: 2017-06-28

## 2017-06-28 RX ORDER — LIDOCAINE HYDROCHLORIDE 10 MG/ML
2 INJECTION INFILTRATION; PERINEURAL ONCE
Qty: 6 ML | Refills: 0
Start: 2017-06-28 | End: 2017-06-28

## 2017-06-28 NOTE — PROGRESS NOTES
Patient: Cecile Miller                MRN: [id number]       SSN: [id number]  YOB: 1940        AGE: 64 y.o. SEX: female  There is no height or weight on file to calculate BMI. PCP: Suzette Cho MD  06/28/17      Chief Complaint   Patient presents with    Knee Pain     RIGHT       HISTORY OF PRESENT ILLNESS: Cecile Miller is a 64 y.o. female who presents to the office for an accident by tripping over her husbands cane. She struck her right knee when she fell. She is complaining of tightness and stiffness in the knee. She states she has some discomfort in the knee when attempting to ambulate. Review of Systems   Constitutional: Negative. HENT: Negative. Eyes: Negative. Respiratory: Negative. Cardiovascular: Negative. Gastrointestinal: Negative. Genitourinary: Negative. Musculoskeletal: Positive for joint pain (right knee). Skin: Negative. Neurological: Negative. Endo/Heme/Allergies: Negative. Psychiatric/Behavioral: Negative. Social History     Social History    Marital status:      Spouse name: N/A    Number of children: N/A    Years of education: N/A     Occupational History    Not on file. Social History Main Topics    Smoking status: Never Smoker    Smokeless tobacco: Never Used    Alcohol use Yes      Comment: glass of wine occasionally.  Drug use: No    Sexual activity: No     Other Topics Concern    Not on file     Social History Narrative        Past Medical History:   Diagnosis Date    Basal cell cancer     CAD (coronary artery disease), native coronary artery 03/21/2011    s/p PCI with with CARLITO (Xience 2.75x12, 2.5x12) mLAD and mild disease in rest of coronaries. Midly depressed LV syst fct     Cardiomyopathy secondary     ischemic +/- stress induced    Dyslipidemia     GERD (gastroesophageal reflux disease)     History of echocardiogram 09/14/2011    EF 60%. Gr 1 DDfx. RVSP 25-30. Mild LAE. Mild TR.      Hx of colonoscopy 07/12/2016    Normal. Dr. Warren Garner    Hypertension     Normal nuclear stress test 05/20/2008    Negative for ischemia or infarction. EF 79%.  Parkinson's disease     S/P cardiac cath 03/21/2011    mLAD 90% (2.75 x 12 mm Xience stent). Otherwise patent coronaries. LVEDP 10 mmHg. EF 40-45%. Anteroapical hypk. Renal arteries patent.  Syncope     s/p ILD implantation        Allergies   Allergen Reactions    Keflex [Cephalexin] Other (comments)     Yeast infection    Pcn [Penicillins] Other (comments)         Current Outpatient Prescriptions   Medication Sig    clobetasol (TEMOVATE) 0.05 % external solution Apply as directed.  potassium chloride (K-DUR, KLOR-CON) 10 mEq tablet Take 1 Tab by mouth every other day.  metoprolol succinate (TOPROL-XL) 50 mg XL tablet Take 1 Tab by mouth daily.  pravastatin (PRAVACHOL) 20 mg tablet Take 1 Tab by mouth nightly.  carbidopa-levodopa CR (SINEMET CR)  mg per tablet Take  by mouth nightly.  nitroglycerin (NITROSTAT) 0.4 mg SL tablet 1 sublingual every 5 minutes for chest pain for no more than 3 doses    psyllium seed-sucrose (METAMUCIL) powd 1 tablespoon bid    acetaminophen (TYLENOL) 650 mg CR tablet Take 650 mg by mouth every six (6) hours as needed for Pain.  fluticasone (FLONASE) 50 mcg/actuation nasal spray 2 Sprays by Both Nostrils route daily.  VIT A/VIT C/VIT E/ZINC/COPPER (ICAPS AREDS PO) Take 1 Tab by mouth two (2) times a day.  Cholecalciferol, Vitamin D3, (VITAMIN D) 1,000 unit Cap Take 1 Cap by mouth daily.  VITAMIN B COMPLEX (BALANCE B-50 PO) Take 1 Tab by mouth daily.  carbidopa-levodopa (SINEMET)  mg per tablet Take 1 Tab by mouth three (3) times daily.  flaxseed oil 1,000 mg Cap Take 1 Cap by mouth daily.  aspirin 81 mg tablet Take 1 Tab by mouth daily.  MULTIVITAMIN PO Take 1 Tab by mouth daily.     omega-3 fatty acids-vitamin e (FISH OIL) 1,000 mg Cap Take 1 Cap by mouth daily.  Dexlansoprazole (DEXILANT) 60 mg CpDM Take 1 Cap by mouth daily.  DOCUSATE CALCIUM (STOOL SOFTENER PO) Take 1 Cap by mouth.  magnesium 250 mg Tab Take 250 mg by mouth two (2) times a day.  resveratrol 100 mg Cap Take 2 Caps by mouth daily.  lecithin 1,200 mg Cap Take 1 Cap by mouth daily.  lisinopril (PRINIVIL, ZESTRIL) 10 mg tablet Take 1 Tab by mouth daily.  magnesium hydroxide (NEGRO MILK OF MAGNESIA) 400 mg/5 mL suspension Take 30 mL by mouth daily as needed for Constipation.  diclofenac (VOLTAREN) 1 % topical gel Apply 2 g to affected area four (4) times daily as needed.  co-enzyme Q-10 (CO Q-10) 100 mg capsule Take 100 mg by mouth daily. No current facility-administered medications for this visit. Physical Exam  Constitutional: she is oriented to person, place, and time and well-developed, well-nourished, and in no distress. No distress. HENT:   Head: Normocephalic and atraumatic. Right Ear: Hearing normal.   Left Ear: Hearing normal.   Nose: Nose normal.   Eyes: Conjunctivae, EOM and lids are normal. Pupils are equal, round, and reactive to light. Neck: Trachea normal.   Pulmonary/Chest: Effort normal and breath sounds normal. No respiratory distress. Abdominal: Soft. Neurological: she is alert and oriented to person, place, and time. Skin: Skin is warm, dry and intact. she is not diaphoretic. Psychiatric: Affect normal.   Nursing note and vitals reviewed. Ortho Exam   Shows an obvious effusion but no increased warmth. She has some mild superficial abrasions from the fall. Collateral ligaments are stable. She was too uncomfortable to perform an anterior draw test.     Procedure: After timeout and under sterile conditions, patient's right knee was injected with 2 cc of Xylocaine and 2 cc of Kenalog.     [facility]  OFFICE PROCEDURE PROGRESS NOTE        Chart reviewed for the following:   Maxwell Oliva MD, have reviewed the History, Physical and updated the Allergic reactions for [place] [address] performed immediately prior to start of procedure:   Maxwell Oliva MD, have performed the following reviews on Katia Harry prior to the start of the procedure:            * Patient was identified by name and date of birth   * Agreement on procedure being performed was verified  * Risks and Benefits explained to the patient  * Procedure site verified and marked as necessary  * Patient was positioned for comfort  * Consent was signed and verified     Time: 11:12 AM        Date of procedure: 6/28/2017    Procedure performed by: Young Noriega MD    Provider assisted by: None     Patient assisted by: self    How tolerated by patient: tolerated the procedure well with no complications    Comments: none      RADIOGRAPHS:   Standing AP and tunnel views show some medial compartment joint space narrowing of the right knee consistent with early OA but no evidence of fracture. IMPRESSION & PLAN: After discussing treatment options she agreed to aspirate the knee. The knee produced 45 cc of joint fluid containing blood. I mentioned to the pt that if this does not resolve with aspiration, use of a walker and decrease activities, we could consider an MRI to see if she sustained a bone bruise or a torn ligament. But at this stage, I will leave the follow up to her discretion. Written by Pete Walton, as dictated by Dr. Adriana Ferrer, Dr. Young Noriega, confirm that all documentation is accurate.

## 2017-07-07 ENCOUNTER — OFFICE VISIT (OUTPATIENT)
Dept: CARDIOLOGY CLINIC | Age: 77
End: 2017-07-07

## 2017-07-07 VITALS
OXYGEN SATURATION: 98 % | DIASTOLIC BLOOD PRESSURE: 74 MMHG | WEIGHT: 130 LBS | SYSTOLIC BLOOD PRESSURE: 138 MMHG | HEART RATE: 63 BPM | HEIGHT: 65 IN | BODY MASS INDEX: 21.66 KG/M2

## 2017-07-07 DIAGNOSIS — I10 ESSENTIAL HYPERTENSION: ICD-10-CM

## 2017-07-07 DIAGNOSIS — E78.5 DYSLIPIDEMIA: ICD-10-CM

## 2017-07-07 DIAGNOSIS — Z98.61 CAD S/P PERCUTANEOUS CORONARY ANGIOPLASTY: Primary | ICD-10-CM

## 2017-07-07 DIAGNOSIS — I25.10 CAD S/P PERCUTANEOUS CORONARY ANGIOPLASTY: Primary | ICD-10-CM

## 2017-07-07 NOTE — MR AVS SNAPSHOT
Visit Information Date & Time Provider Department Dept. Phone Encounter #  
 7/7/2017  8:20 AM King Sanderson MD [facility] [phone] Follow-up Instructions Return in about 1 year (around 7/7/2018). Follow-up and Disposition History Your Appointments 7/19/2017 10:40 AM  
New Patient with Vera Williamson MD  
[address] [facility], [address] and [facility] ([address]) Appt Note: rt knee pain, ref'd by Bill Cee office [address], [address] [facility]  
[phone] [address] [facility], [address]  
  
    
 11/21/2017  8:30 AM  
LAB with [facility] NURSE VISIT Internist of [facility] ([address]) Appt Note: lab  
 [address], [address] [place] [address] [address]  
  
   
 [address], [address]  
  
    
 11/28/2017 10:30 AM  
Office Visit with Sabrina Staton MD  
Internist of [address]) Appt Note: 6 months [address], [address] [place] [address] [address]  
  
   
 [address], [address] Upcoming Health Maintenance Date Due INFLUENZA AGE 9 TO ADULT 8/1/2017 GLAUCOMA SCREENING Q2Y 5/6/2018 MEDICARE YEARLY EXAM 5/11/2018 DTaP/Tdap/Td series (2 - Td) 9/25/2023 Allergies as of 7/7/2017  Review Complete On: 7/7/2017 By: King Sanderson MD  
  
 Severity Noted Reaction Type Reactions Keflex [Cephalexin]  11/21/2011   Intolerance Other (comments) Yeast infection Pcn [Penicillins]   Intolerance Other (comments) Current Immunizations  Reviewed on 11/1/2016 Name Date Influenza High Dose Vaccine PF 11/1/2016  1:08 PM, 10/5/2015  1:45 PM, 9/29/2014 Influenza Vaccine Whole 10/4/2012, 9/3/2011, 9/7/2010  Pneumococcal Conjugate (PCV-13) 10/5/2015  1:46 PM  
 Pneumococcal Vaccine (Unspecified Type) 1/1/2003 TD Vaccine 1/1/2003 Tdap 9/25/2013 Zoster 1/1/2007 Not reviewed this visit You Were Diagnosed With   
  
 Codes Comments CAD S/P percutaneous coronary angioplasty    -  Primary ICD-10-CM: I25.10, Z98.61 ICD-9-CM: 414.01, V45.82 Dyslipidemia     ICD-10-CM: E78.5 ICD-9-CM: 272.4 Essential hypertension     ICD-10-CM: I10 
ICD-9-CM: 401.9 Vitals BP Pulse Height(growth percentile) Weight(growth percentile) SpO2 BMI  
 138/74 63 5' 5\" (1.651 m) 130 lb (59 kg) 98% 21.63 kg/m2 OB Status Smoking Status Hysterectomy Never Smoker Vitals History BMI and BSA Data Body Mass Index Body Surface Area  
 21.63 kg/m 2 1.64 m 2 Your Updated Medication List  
  
   
This list is accurate as of: 7/7/17  9:12 AM.  Always use your most recent med list.  
  
  
  
  
 acetaminophen 650 mg CR tablet Commonly known as:  TYLENOL Take 650 mg by mouth every six (6) hours as needed for Pain. aspirin 81 mg tablet Take 1 Tab by mouth daily. BALANCE B-50 PO Take 1 Tab by mouth daily. clobetasol 0.05 % external solution Commonly known as:  Petra Diaz Apply as directed. co-enzyme Q-10 100 mg capsule Commonly known as:  CO Q-10 Take 100 mg by mouth daily. DEXILANT 60 mg Cpdb Generic drug:  Dexlansoprazole Take 1 Cap by mouth daily. diclofenac 1 % Gel Commonly known as:  VOLTAREN Apply 2 g to affected area four (4) times daily as needed. FISH OIL 1,000 mg Cap Generic drug:  omega-3 fatty acids-vitamin e Take 1 Cap by mouth daily. flaxseed oil 1,000 mg Cap Take 1 Cap by mouth daily. fluticasone 50 mcg/actuation nasal spray Commonly known as:  Jed Bobby 2 Sprays by Both Nostrils route daily. ICAPS AREDS PO Take 1 Tab by mouth two (2) times a day. lecithin 1,200 mg Cap Take 1 Cap by mouth daily. lisinopril 10 mg tablet Commonly known as:  Everardo Juan Take 1 Tab by mouth daily. magnesium 250 mg Tab Take 250 mg by mouth two (2) times a day. metoprolol succinate 50 mg XL tablet Commonly known as:  TOPROL-XL Take 1 Tab by mouth daily. MULTIVITAMIN PO Take 1 Tab by mouth daily. nitroglycerin 0.4 mg SL tablet Commonly known as:  NITROSTAT  
1 sublingual every 5 minutes for chest pain for no more than 3 doses NEGRO MILK OF MAGNESIA 400 mg/5 mL suspension Generic drug:  magnesium hydroxide Take 30 mL by mouth daily as needed for Constipation. potassium chloride 10 mEq tablet Commonly known as:  KLOR-CON Take 1 Tab by mouth every other day. pravastatin 20 mg tablet Commonly known as:  PRAVACHOL Take 1 Tab by mouth nightly. psyllium seed-sucrose Powd Commonly known as:  METAMUCIL (SUGAR) 1 tablespoon bid  
  
 resveratrol 100 mg Cap Take 2 Caps by mouth daily. * SINEMET CR  mg per tablet Generic drug:  carbidopa-levodopa CR Take  by mouth nightly. * SINEMET  mg per tablet Generic drug:  carbidopa-levodopa Take 1 Tab by mouth three (3) times daily. STOOL SOFTENER PO Take 1 Cap by mouth. VITAMIN D3 1,000 unit Cap Generic drug:  cholecalciferol Take 1 Cap by mouth daily. * Notice: This list has 2 medication(s) that are the same as other medications prescribed for you. Read the directions carefully, and ask your doctor or other care provider to review them with you. We Performed the Following AMB POC EKG ROUTINE W/ 12 LEADS, INTER & REP [44545 CPT(R)] Follow-up Instructions Return in about 1 year (around 7/7/2018). To-Do List   
 07/07/2017 ECG:  CARDIAC SPECT REST AND STRESS   
  
 07/07/2017 ECG:  NUCLEAR STRESS TEST   
  
 07/14/2017 7:30 AM  
  Appointment with [facility] NUC CARD ROOM at [facility] NON-INVASIVE CARD ([phone]) This is a 2-part test which takes approximately 4 hours to complete. Please see part 2 of exam below for full instructions 07/14/2017 8:00 AM  
  Appointment with [facility] NUC CARD ROOM at [facility] NON-INVASIVE CARD ([phone]) 1-No eating or coffee after midnight  2-Do not take diabetic meds (bring with) 3-Please take all other meds unless specified by cardiology Introducing \[facility]\"" SERVICES! Senait Cervantes introduces [employer] patient portal. Now you can access parts of your medical record, email your doctor's office, and request medication refills online. 1. In your internet browser, go to [email or web address] 2. Click on the First Time User? Click Here link in the Sign In box. You will see the New Member Sign Up page. 3. Enter your [employer] Access Code exactly as it appears below. You will not need to use this code after youve completed the sign-up process. If you do not sign up before the expiration date, you must request a new code. · [employer] Access Code: [email or web address] Expires: 8/19/2017  7:44 AM 
 
4. Enter the last four digits of your Social Security Number (xxxx) and Date of Birth (mm/dd/yyyy) as indicated and click Submit. You will be taken to the next sign-up page. 5. Create a [employer] ID. This will be your [employer] login ID and cannot be changed, so think of one that is secure and easy to remember. 6. Create a [employer] password. You can change your password at any time. 7. Enter your Password Reset Question and Answer. This can be used at a later time if you forget your password. 8. Enter your e-mail address. You will receive e-mail notification when new information is available in [address] 9. Click Sign Up. You can now view and download portions of your medical record. 10. Click the Download Summary menu link to download a portable copy of your medical information.  
 
If you have questions, please visit the Frequently Asked Questions section of the [employer]. Remember, [employer] is NOT to be used for urgent needs. For medical emergencies, dial 911. Now available from your iPhone and Android! Please provide this summary of care documentation to your next provider. Your primary care clinician is listed as Audelia Franco. If you have any questions after today's visit, please call [phone].

## 2017-07-07 NOTE — PROGRESS NOTES
HISTORY OF PRESENT ILLNESS  Ilya Berger is a 64 y.o. female. HPI    Patient presents for scheduled follow-up office visit. She has a past medical history significant for single-vessel coronary artery disease status post PCI to her mid LAD with a total of 2 drug-eluting stents in 2011. Patient presents with symptoms of unstable angina at that time. It should be noted that she did have classic exertional anginal symptoms leading up to her PCI that were likely unrecognized. She reports symptoms of substernal chest pain radiating to her left arm and jaw prior to her PCI. Since her procedure, she has had no recurrent symptoms. The patient stays active doing yard work and gardening. She denies any recurrent chest pain or shortness of breath. She states she is able to tolerate a low-dose pravastatin as long as she takes coenzyme Q 10. She did have difficult myalgias on higher doses of this medication and with atorvastatin as well. Past Medical History:   Diagnosis Date    Basal cell cancer     CAD (coronary artery disease), native coronary artery 03/21/2011    s/p PCI with with CARLITO (Xience 2.75x12, 2.5x12) mLAD and mild disease in rest of coronaries. Midly depressed LV syst fct     Cardiac cath 03/21/2011    mLAD 90% (2.75 x 12 mm Xience stent). Otherwise patent coronaries. LVEDP 10 mmHg. EF 40-45%. Anteroapical hypk. Renal arteries patent.  Cardiac echocardiogram 04/17/2014    EF 60-65%. No WMA. Gr 1 DDfx. Mild MR. Similar to study of 9/14/11.  Cardiac nuclear imaging test 05/20/2008    Negative for ischemia or infarction. EF 79%.     Cardiomyopathy secondary     ischemic +/- stress induced    Dyslipidemia     GERD (gastroesophageal reflux disease)     Hx of colonoscopy 07/12/2016    Normal. Dr. lAex Calderon Hypertension     Parkinson's disease     Syncope     s/p ILD implantation     Current Outpatient Prescriptions   Medication Sig Dispense Refill    clobetasol (Nadeen Falk) 0.05 % external solution Apply as directed. 25 mL 1    lisinopril (PRINIVIL, ZESTRIL) 10 mg tablet Take 1 Tab by mouth daily. 90 Tab 3    potassium chloride (K-DUR, KLOR-CON) 10 mEq tablet Take 1 Tab by mouth every other day. 45 Tab 3    metoprolol succinate (TOPROL-XL) 50 mg XL tablet Take 1 Tab by mouth daily. 90 Tab 3    magnesium hydroxide (NEGRO MILK OF MAGNESIA) 400 mg/5 mL suspension Take 30 mL by mouth daily as needed for Constipation.  pravastatin (PRAVACHOL) 20 mg tablet Take 1 Tab by mouth nightly. 90 Tab 3    diclofenac (VOLTAREN) 1 % topical gel Apply 2 g to affected area four (4) times daily as needed. 200 g 3    carbidopa-levodopa CR (SINEMET CR)  mg per tablet Take  by mouth nightly.  nitroglycerin (NITROSTAT) 0.4 mg SL tablet 1 sublingual every 5 minutes for chest pain for no more than 3 doses 25 tablet 3    psyllium seed-sucrose (METAMUCIL) powd 1 tablespoon bid 861 g 3    acetaminophen (TYLENOL) 650 mg CR tablet Take 650 mg by mouth every six (6) hours as needed for Pain.  fluticasone (FLONASE) 50 mcg/actuation nasal spray 2 Sprays by Both Nostrils route daily. 1 Bottle 2    co-enzyme Q-10 (CO Q-10) 100 mg capsule Take 100 mg by mouth daily.  VIT A/VIT C/VIT E/ZINC/COPPER (ICAPS AREDS PO) Take 1 Tab by mouth two (2) times a day.  Cholecalciferol, Vitamin D3, (VITAMIN D) 1,000 unit Cap Take 1 Cap by mouth daily.  VITAMIN B COMPLEX (BALANCE B-50 PO) Take 1 Tab by mouth daily.  carbidopa-levodopa (SINEMET)  mg per tablet Take 1 Tab by mouth three (3) times daily.  flaxseed oil 1,000 mg Cap Take 1 Cap by mouth daily.  aspirin 81 mg tablet Take 1 Tab by mouth daily. 1 Tab 0    MULTIVITAMIN PO Take 1 Tab by mouth daily.  omega-3 fatty acids-vitamin e (FISH OIL) 1,000 mg Cap Take 1 Cap by mouth daily.  Dexlansoprazole (DEXILANT) 60 mg CpDM Take 1 Cap by mouth daily.       DOCUSATE CALCIUM (STOOL SOFTENER PO) Take 1 Cap by mouth.      magnesium 250 mg Tab Take 250 mg by mouth two (2) times a day.  resveratrol 100 mg Cap Take 2 Caps by mouth daily.  lecithin 1,200 mg Cap Take 1 Cap by mouth daily. Allergies   Allergen Reactions    Keflex [Cephalexin] Other (comments)     Yeast infection    Pcn [Penicillins] Other (comments)      Social History   Substance Use Topics    Smoking status: Never Smoker    Smokeless tobacco: Never Used    Alcohol use Yes      Comment: glass of wine occasionally. Review of Systems   Constitutional: Negative for chills, fever and weight loss. HENT: Negative for nosebleeds. Eyes: Negative for blurred vision and double vision. Respiratory: Negative for cough, shortness of breath and wheezing. Cardiovascular: Negative for chest pain, palpitations, orthopnea, claudication, leg swelling and PND. Gastrointestinal: Negative for abdominal pain, heartburn, nausea and vomiting. Genitourinary: Negative for dysuria and hematuria. Musculoskeletal: Negative for falls and myalgias. Skin: Negative for rash. Neurological: Negative for dizziness, focal weakness and headaches. Endo/Heme/Allergies: Does not bruise/bleed easily. Psychiatric/Behavioral: Negative for substance abuse. Visit Vitals    /74    Pulse 63    Ht 5' 5\" (1.651 m)    Wt 59 kg (130 lb)    SpO2 98%    BMI 21.63 kg/m2       Physical Exam   Constitutional: She is oriented to person, place, and time. She appears well-developed and well-nourished. HENT:   Head: Normocephalic and atraumatic. Eyes: Conjunctivae are normal.   Neck: Neck supple. No JVD present. Carotid bruit is not present. Cardiovascular: Normal rate, regular rhythm, S1 normal, S2 normal and normal pulses. Exam reveals no gallop. Murmur heard. Midsystolic murmur is present with a grade of 2/6   Pulmonary/Chest: Effort normal and breath sounds normal. She has no wheezes. She has no rales. Abdominal: Soft.  Bowel sounds are normal. There is no tenderness. Musculoskeletal: She exhibits no edema. Neurological: She is alert and oriented to person, place, and time. Skin: Skin is warm and dry. EKG: Normal sinus rhythm, normal axis, normal QTc interval, no ST or T-wave abnormalities concerning for ischemia. Normal tracing. No change compared to the previous EKG. ASSESSMENT and PLAN    Coronary artery disease. Single-vessel disease, status post PCI to her mid LAD in 2011 using 2 drug-eluting stents (Xience 2.75 x 12, 2.5 x 12). No recurrent anginal symptoms since that time. Patient is now taking an aspirin, beta-blocker low-dose statin. I have recommended an exercise nuclear stress test for further evaluation now that has been over 6 years since her PCI. Dyslipidemia. This has been managed with a low-dose pravastatin due to myalgias on more potent statins. However her lipids have been well controlled on this regimen her most recent lipid panel, total cholesterol 150, HDL 66, LDL 69. I will continue this regimen. Essential hypertension. Patient's blood pressure looks very reasonable on her current regimen which includes metoprolol and lisinopril. As long as her stress test is unremarkable, the patient can continue to follow up annually.

## 2017-07-14 ENCOUNTER — HOSPITAL ENCOUNTER (OUTPATIENT)
Dept: NON INVASIVE DIAGNOSTICS | Age: 77
Discharge: HOME | End: 2017-07-14
Attending: INTERNAL MEDICINE
Payer: MEDICARE

## 2017-07-14 DIAGNOSIS — I25.10 CAD S/P PERCUTANEOUS CORONARY ANGIOPLASTY: ICD-10-CM

## 2017-07-14 DIAGNOSIS — Z98.61 CAD S/P PERCUTANEOUS CORONARY ANGIOPLASTY: ICD-10-CM

## 2017-07-14 LAB
ATTENDING PHYSICIAN, CST07: NORMAL
DIAGNOSIS, 93000: NORMAL
DUKE TM SCORE RESULT, CST14: NORMAL
DUKE TREADMILL SCORE, CST13: NORMAL
ECG INTERP BEFORE EX, CST11: NORMAL
ECG INTERP DURING EX, CST12: NORMAL
FUNCTIONAL CAPACITY, CST17: NORMAL
KNOWN CARDIAC CONDITION, CST08: NORMAL
MAX. DIASTOLIC BP, CST04: 68 MMHG
MAX. HEART RATE, CST05: 102 BPM
MAX. SYSTOLIC BP, CST03: 140 MMHG
OVERALL BP RESPONSE TO EXERCISE, CST16: NORMAL
OVERALL HR RESPONSE TO EXERCISE, CST15: NORMAL
PEAK EX METS, CST10: 1.5 METS
PROTOCOL NAME, CST01: NORMAL
TEST INDICATION, CST09: NORMAL

## 2017-07-14 PROCEDURE — 74011250636 HC RX REV CODE- 250/636: Performed by: INTERNAL MEDICINE

## 2017-07-14 PROCEDURE — A9500 TC99M SESTAMIBI: HCPCS

## 2017-07-14 PROCEDURE — 78452 HT MUSCLE IMAGE SPECT MULT: CPT | Performed by: INTERNAL MEDICINE

## 2017-07-14 PROCEDURE — 93017 CV STRESS TEST TRACING ONLY: CPT | Performed by: INTERNAL MEDICINE

## 2017-07-14 RX ORDER — SODIUM CHLORIDE 0.9 % (FLUSH) 0.9 %
10 SYRINGE (ML) INJECTION AS NEEDED
Status: COMPLETED | OUTPATIENT
Start: 2017-07-14 | End: 2017-07-14

## 2017-07-14 RX ADMIN — Medication 10 ML: at 07:05

## 2017-07-14 RX ADMIN — Medication 10 ML: at 08:45

## 2017-07-14 RX ADMIN — REGADENOSON 0.4 MG: 0.08 INJECTION, SOLUTION INTRAVENOUS at 08:45

## 2017-07-14 NOTE — PROGRESS NOTES
Patient was injected with 30.8 millicuries 01MOE Sestamibi on 7/14/17 at 0705. Patient was injected with 05.4 millicuries 70ZNW Sestamibi on  7/14/17 at [facility]. Patient's armbands were removed and placed in shred-it box.     Patient had a Nuclear Lexiscan Stress Test.

## 2017-07-15 NOTE — PROCEDURES
[address] STRESS    Name:  Neha Euceda  MR#:  [id number]  :  1940  Account #:  [de-identified]  Date of Adm:  2017  Date of Service:  2017      PHARMACOLOGIC NUCLEAR SCAN RESULTS    PROCEDURE PERFORMED:  Pharmacologic nuclear scan. FINDINGS:  The patient's electrocardiogram showed a normal sinus  rhythm. The patient had an IV in the left arm. She received Lexiscan per  protocol. She tolerated the procedure well. No chest pain was noted. She received a resting dose of sestamibi 50.0 millicuries at 8:13 a.m. She received a stress dose of sestamibi 02.9 millicuries at 4:70 a.m. TREADMILL FINDINGS  1. ST segment changes:  None. 2. Chest pain:  None. 3. Dysrhythmia:  None. 4. Both heart rate and blood pressure responses were normal.    TREADMILL CONCLUSION:  This was a negative pharmacologic  stress test from an electrocardiographic standpoint. MYOCARDIAL NUCLEAR PERFUSION STUDY FINDINGS  1. Perfusion imaging showed no evidence of significant ongoing  ischemia or prior infarction. 2. Gated SPECT imaging showed normal left ventricular chamber size  and hyperdynamic left ventricular function, with an estimated ejection  fraction of over 70%. No obvious regional wall motion abnormalities  were noted. CONCLUSIONS  1. Negative pharmacologic stress from and electrocardiographic  standpoint. 2. Normal perfusion study. 3. Perfusion imaging showed no evidence of significant ongoing  ischemia or prior infarction. The resting images are somewhat lacking. Nevertheless, the stress images are normal.  4. Gated SPECT imaging showed normal left ventricular chamber size  and hyperdynamic left ventricular function, with an estimated ejection  fraction noted at 70%. No obvious regional wall motion abnormalities  noted. 5. In comparison to the prior study of , no significant changes  were noted.   6. This is a low risk finding.         MD Yoko Morgan / 1971 [address]  D:  07/14/2017   13:26  T:  07/14/2017   21:21  Job #:  [id number]

## 2017-08-07 ENCOUNTER — TELEPHONE (OUTPATIENT)
Dept: CARDIOLOGY CLINIC | Age: 77
End: 2017-08-07

## 2017-08-07 NOTE — LETTER
9/21/2017 1:36 PM 
 
Ms. Yarelis Brown [address] [facility] [address] Dear Ms. Darshan Mcmahan, We have been unable to reach you by phone to notify you of your test results. Please call our office at [phone] and ask to speak with my nurse in order to explain these results to you and advise you of any recommendations. Sincerely, Maren Sutherland MD

## 2017-08-07 NOTE — TELEPHONE ENCOUNTER
----- Message from Jefferson Stevenson MD sent at 7/27/2017  3:50 PM EDT -----  Please let the patient know that her stress test was unremarkable  ----- Message -----     From: Madelin Grover RN     Sent: 7/18/2017  12:10 PM       To: Jefferson Stevenson MD    Per your last note \" Coronary artery disease. Single-vessel disease, status post PCI to her mid LAD in 2011 using 2 drug-eluting stents (Xience 2.75 x 12, 2.5 x 12). No recurrent anginal symptoms since that time. Patient is now taking an aspirin, beta-blocker low-dose statin. I have recommended an exercise nuclear stress test for further evaluation now that has been over 6 years since her PCI.

## 2017-08-30 ENCOUNTER — CLINICAL SUPPORT (OUTPATIENT)
Dept: INTERNAL MEDICINE CLINIC | Age: 77
End: 2017-08-30

## 2017-08-30 DIAGNOSIS — Z23 ENCOUNTER FOR IMMUNIZATION: Primary | ICD-10-CM

## 2017-08-30 NOTE — PROGRESS NOTES
Patient given influenza vaccine, Fluzone, into left deltoid without complications or adverse reactions. Patient given vaccine information statement handout.

## 2017-09-12 RX ORDER — PRAVASTATIN SODIUM 20 MG/1
20 TABLET ORAL
Qty: 90 TAB | Refills: 3 | Status: SHIPPED | OUTPATIENT
Start: 2017-09-12 | End: 2017-12-20 | Stop reason: SINTOL

## 2017-09-27 ENCOUNTER — TELEPHONE (OUTPATIENT)
Dept: CARDIOLOGY CLINIC | Age: 77
End: 2017-09-27

## 2017-11-21 ENCOUNTER — HOSPITAL ENCOUNTER (OUTPATIENT)
Dept: LAB | Age: 77
Discharge: HOME | End: 2017-11-21
Payer: MEDICARE

## 2017-11-21 DIAGNOSIS — R04.0 EPISTAXIS: ICD-10-CM

## 2017-11-21 DIAGNOSIS — I10 ESSENTIAL HYPERTENSION: ICD-10-CM

## 2017-11-21 DIAGNOSIS — E78.5 DYSLIPIDEMIA: ICD-10-CM

## 2017-11-21 LAB
ANION GAP SERPL CALC-SCNC: 7 MMOL/L (ref 3–18)
BASOPHILS # BLD: 0 K/UL (ref 0–0.06)
BASOPHILS NFR BLD: 1 % (ref 0–2)
BUN SERPL-MCNC: 14 MG/DL (ref 7–18)
BUN/CREAT SERPL: 19 (ref 12–20)
CALCIUM SERPL-MCNC: 9 MG/DL (ref 8.5–10.1)
CHLORIDE SERPL-SCNC: 105 MMOL/L (ref 100–108)
CHOLEST SERPL-MCNC: 139 MG/DL
CO2 SERPL-SCNC: 29 MMOL/L (ref 21–32)
CREAT SERPL-MCNC: 0.73 MG/DL (ref 0.6–1.3)
DIFFERENTIAL METHOD BLD: ABNORMAL
EOSINOPHIL # BLD: 0.2 K/UL (ref 0–0.4)
EOSINOPHIL NFR BLD: 6 % (ref 0–5)
ERYTHROCYTE [DISTWIDTH] IN BLOOD BY AUTOMATED COUNT: 13.4 % (ref 11.6–14.5)
GLUCOSE SERPL-MCNC: 92 MG/DL (ref 74–99)
HCT VFR BLD AUTO: 40.3 % (ref 35–45)
HDLC SERPL-MCNC: 66 MG/DL (ref 40–60)
HDLC SERPL: 2.1 {RATIO} (ref 0–5)
HGB BLD-MCNC: 12.7 G/DL (ref 12–16)
LDLC SERPL CALC-MCNC: 58.4 MG/DL (ref 0–100)
LIPID PROFILE,FLP: ABNORMAL
LYMPHOCYTES # BLD: 1.2 K/UL (ref 0.9–3.6)
LYMPHOCYTES NFR BLD: 28 % (ref 21–52)
MCH RBC QN AUTO: 30.6 PG (ref 24–34)
MCHC RBC AUTO-ENTMCNC: 31.5 G/DL (ref 31–37)
MCV RBC AUTO: 97.1 FL (ref 74–97)
MONOCYTES # BLD: 0.3 K/UL (ref 0.05–1.2)
MONOCYTES NFR BLD: 8 % (ref 3–10)
NEUTS SEG # BLD: 2.4 K/UL (ref 1.8–8)
NEUTS SEG NFR BLD: 57 % (ref 40–73)
PLATELET # BLD AUTO: 228 K/UL (ref 135–420)
PMV BLD AUTO: 10.2 FL (ref 9.2–11.8)
POTASSIUM SERPL-SCNC: 4 MMOL/L (ref 3.5–5.5)
RBC # BLD AUTO: 4.15 M/UL (ref 4.2–5.3)
SODIUM SERPL-SCNC: 141 MMOL/L (ref 136–145)
TRIGL SERPL-MCNC: 73 MG/DL (ref ?–150)
VLDLC SERPL CALC-MCNC: 14.6 MG/DL
WBC # BLD AUTO: 4.2 K/UL (ref 4.6–13.2)

## 2017-11-21 PROCEDURE — 85025 COMPLETE CBC W/AUTO DIFF WBC: CPT | Performed by: INTERNAL MEDICINE

## 2017-11-21 PROCEDURE — 80048 BASIC METABOLIC PNL TOTAL CA: CPT | Performed by: INTERNAL MEDICINE

## 2017-11-21 PROCEDURE — 36415 COLL VENOUS BLD VENIPUNCTURE: CPT | Performed by: INTERNAL MEDICINE

## 2017-11-21 PROCEDURE — 80061 LIPID PANEL: CPT | Performed by: INTERNAL MEDICINE

## 2017-11-28 ENCOUNTER — OFFICE VISIT (OUTPATIENT)
Dept: INTERNAL MEDICINE CLINIC | Age: 77
End: 2017-11-28

## 2017-11-28 VITALS
HEART RATE: 63 BPM | TEMPERATURE: 98.3 F | BODY MASS INDEX: 21.66 KG/M2 | SYSTOLIC BLOOD PRESSURE: 134 MMHG | DIASTOLIC BLOOD PRESSURE: 74 MMHG | HEIGHT: 65 IN | OXYGEN SATURATION: 99 % | RESPIRATION RATE: 14 BRPM | WEIGHT: 130 LBS

## 2017-11-28 DIAGNOSIS — H35.30 MACULAR DEGENERATION OF LEFT EYE: ICD-10-CM

## 2017-11-28 DIAGNOSIS — I25.10 CAD S/P PERCUTANEOUS CORONARY ANGIOPLASTY: ICD-10-CM

## 2017-11-28 DIAGNOSIS — L40.9 PSORIASIS OF SCALP: ICD-10-CM

## 2017-11-28 DIAGNOSIS — K21.9 LARYNGOPHARYNGEAL REFLUX DISEASE: ICD-10-CM

## 2017-11-28 DIAGNOSIS — E78.5 DYSLIPIDEMIA: ICD-10-CM

## 2017-11-28 DIAGNOSIS — G20 PARKINSON DISEASE (HCC): ICD-10-CM

## 2017-11-28 DIAGNOSIS — M79.651 BILATERAL THIGH PAIN: ICD-10-CM

## 2017-11-28 DIAGNOSIS — Z98.61 CAD S/P PERCUTANEOUS CORONARY ANGIOPLASTY: ICD-10-CM

## 2017-11-28 DIAGNOSIS — I10 ESSENTIAL HYPERTENSION: Primary | ICD-10-CM

## 2017-11-28 DIAGNOSIS — R55 VASOVAGAL SYNCOPE: ICD-10-CM

## 2017-11-28 DIAGNOSIS — M85.89 OSTEOPENIA OF MULTIPLE SITES: ICD-10-CM

## 2017-11-28 DIAGNOSIS — M79.652 BILATERAL THIGH PAIN: ICD-10-CM

## 2017-11-28 RX ORDER — PNV NO.95/FERROUS FUM/FOLIC AC 28MG-0.8MG
TABLET ORAL
COMMUNITY
End: 2018-04-04

## 2017-11-28 NOTE — PROGRESS NOTES
Chief Complaint   Patient presents with    Hypertension     6 month follow up with lab results. 1. Have you been to the ER, urgent care clinic or hospitalized since your last visit? NO.     2. Have you seen or consulted any other health care providers outside of the [address] [place] [address] since your last visit (Include any pap smears or colon screening)? YES  Dr. Edgar Angelo, neurologist, October 2017.

## 2017-11-28 NOTE — MR AVS SNAPSHOT
Visit Information Date & Time Provider Department Dept. Phone Encounter #  
 11/28/2017 10:30 AM Anselmo Rick MD [facility] [phone] [id number] Follow-up Instructions Return in about 3 weeks (around 12/19/2017), or if symptoms worsen or fail to improve. Your Appointments 7/6/2018  8:20 AM  
Follow Up with Olena Cook MD  
[facility] (Ijeoma Neff) Appt Note: 1 year follow up with an EKG  
 [facility] [address] [address] [address] [phone] [address] Upcoming Health Maintenance Date Due  
 GLAUCOMA SCREENING Q2Y 5/6/2018 MEDICARE YEARLY EXAM 5/11/2018 DTaP/Tdap/Td series (2 - Td) 9/25/2023 Allergies as of 11/28/2017  Review Complete On: 11/28/2017 By: Belgica Cage Severity Noted Reaction Type Reactions Keflex [Cephalexin]  11/21/2011   Intolerance Other (comments) Yeast infection Pcn [Penicillins]   Intolerance Other (comments) Current Immunizations  Reviewed on 8/30/2017 Name Date Influenza High Dose Vaccine PF 8/30/2017  2:22 PM, 11/1/2016  1:08 PM, 10/5/2015  1:45 PM, 9/29/2014 Influenza Vaccine Whole 10/4/2012, 9/3/2011, 9/7/2010 Pneumococcal Conjugate (PCV-13) 10/5/2015  1:46 PM  
 TD Vaccine 1/1/2003 Tdap 9/25/2013 ZZZ-RETIRED (DO NOT USE) Pneumococcal Vaccine (Unspecified Type) 1/1/2003 Zoster 1/1/2007 Not reviewed this visit Vitals BP Pulse Temp Resp Height(growth percentile) Weight(growth percentile) 134/74 (BP 1 Location: Left arm, BP Patient Position: Sitting) 63 98.3 °F (36.8 °C) (Oral) 14 5' 5\" (1.651 m) 130 lb (59 kg) SpO2 BMI OB Status Smoking Status 99% 21.63 kg/m2 Hysterectomy Never Smoker Vitals History BMI and BSA Data Body Mass Index Body Surface Area  
 21.63 kg/m 2 1.64 m 2 Your Updated Medication List  
  
 This list is accurate as of: 11/28/17 11:43 AM.  Always use your most recent med list.  
  
  
  
  
 acetaminophen 650 mg Tber Commonly known as:  TYLENOL Take 650 mg by mouth every six (6) hours as needed for Pain. aspirin 81 mg tablet Take 1 Tab by mouth daily. BALANCE B-50 PO Take 1 Tab by mouth daily. clobetasol 0.05 % external solution Commonly known as:  Rufino Pipes Apply as directed. co-enzyme Q-10 100 mg capsule Commonly known as:  CO Q-10 Take 100 mg by mouth daily. DEXILANT 60 mg Cpdb Generic drug:  Dexlansoprazole Take 1 Cap by mouth daily. 1 tab every other day. Indications: Gastric Ulcer  
  
 diclofenac 1 % Gel Commonly known as:  VOLTAREN Apply 2 g to affected area four (4) times daily as needed. flaxseed oil 1,000 mg Cap Take 1 Cap by mouth daily. fluticasone 50 mcg/actuation nasal spray Commonly known as:  Shelvia Birks 2 Sprays by Both Nostrils route daily. ICAPS AREDS PO Take 1 Tab by mouth two (2) times a day. KRILL -62-79-50 mg Cap Generic drug:  krill-omega-3-dha-epa-lipids Take  by mouth.  
  
 lecithin 1,200 mg Cap Take 1 Cap by mouth daily. lisinopril 10 mg tablet Commonly known as:  Angela Primmer Take 1 Tab by mouth daily. magnesium 250 mg Tab Take 250 mg by mouth two (2) times a day. metoprolol succinate 50 mg XL tablet Commonly known as:  TOPROL-XL Take 1 Tab by mouth daily. MULTIVITAMIN PO Take 1 Tab by mouth daily. nitroglycerin 0.4 mg SL tablet Commonly known as:  NITROSTAT  
1 sublingual every 5 minutes for chest pain for no more than 3 doses NEGRO MILK OF MAGNESIA 400 mg/5 mL suspension Generic drug:  magnesium hydroxide Take 30 mL by mouth daily as needed for Constipation. potassium chloride 10 mEq tablet Commonly known as:  KLOR-CON Take 1 Tab by mouth every other day. pravastatin 20 mg tablet Commonly known as:  PRAVACHOL Take 1 Tab by mouth nightly. psyllium seed-sucrose Powd Commonly known as:  METAMUCIL (SUGAR) 1 tablespoon bid  
  
 resveratrol 100 mg Cap Take 2 Caps by mouth daily. * SINEMET CR  mg per tablet Generic drug:  carbidopa-levodopa ER Take  by mouth nightly. * SINEMET  mg per tablet Generic drug:  carbidopa-levodopa Take 1 Tab by mouth three (3) times daily. STOOL SOFTENER PO Take 1 Cap by mouth. VITAMIN D3 1,000 unit Cap Generic drug:  cholecalciferol Take 1 Cap by mouth daily. * Notice: This list has 2 medication(s) that are the same as other medications prescribed for you. Read the directions carefully, and ask your doctor or other care provider to review them with you. Follow-up Instructions Return in about 3 weeks (around 12/19/2017), or if symptoms worsen or fail to improve. Patient Instructions Hyperlipidemia: After Your Visit Your Care Instructions Hyperlipidemia is too much fat in your blood. The body has several kinds of fat, including cholesterol and triglycerides. Your body needs fat for many things, such as making new cells. But too much fat in your blood increases your chances of having a heart attack or stroke. You may be able to lower your cholesterol and triglycerides with a heart-healthy diet, exercise, and if needed, medicine. Your doctor may want you to try lifestyle changes first to see whether they lower the fat in your blood. You may need to take medicine if lifestyle changes do not lower the fat in your blood enough. Follow-up care is a key part of your treatment and safety. Be sure to make and go to all appointments, and call your doctor if you are having problems. Its also a good idea to know your test results and keep a list of the medicines you take. How can you care for yourself at home? Take your medicines · Take your medicines exactly as prescribed. Call your doctor if you think you are having a problem with your medicine. · If you take medicine to lower your cholesterol, go to follow-up visits. You will need to have blood tests. · Do not take large doses of niacin, which is a B vitamin, while taking medicine called statins. It may increase the chance of muscle pain and liver problems. · Talk to your doctor about avoiding grapefruit juice if you are taking statins. Grapefruit juice can raise the level of this medicine in your blood. This could increase side effects. Eat more fruits, vegetables, and fiber · Fruits and vegetables have lots of nutrients that help protect against heart disease, and they have littleif anyfat. Try to eat at least five servings a day. Dark green, deep orange, or yellow fruits and vegetables are healthy choices. · Keep carrots, celery, and other veggies handy for snacks. Buy fruit that is in season and store it where you can see it so that you will be tempted to eat it. Cook dishes that have a lot of veggies in them, such as stir-fries and soups. · Foods high in fiber may reduce your cholesterol and provide important vitamins and minerals. High-fiber foods include whole-grain cereals and breads, oatmeal, beans, brown rice, citrus fruits, and apples. · Buy whole-grain breads and cereals instead of white bread and pastries. Limit saturated fat · Read food labels and try to avoid saturated fat and trans fat. They increase your risk of heart disease. · Use olive or canola oil when you cook. Try cholesterol-lowering spreads, such as Benecol or Take Control. · Bake, broil, grill, or steam foods instead of frying them. · Limit the amount of high-fat meats you eat, including hot dogs and sausages. Cut out all visible fat when you prepare meat. · Eat fish, skinless poultry, and soy products such as tofu instead of high-fat meats. Soybeans may be especially good for your heart.  Eat at least two servings of fish a week. Certain fish, such as salmon, contain omega-3 fatty acids, which may help reduce your risk of heart attack. · Choose low-fat or fat-free milk and dairy products. Get exercise, limit alcohol, and quit smoking · Get more exercise. Work with your doctor to set up an exercise program. Even if you can do only a small amount, exercise will help you get stronger, have more energy, and manage your weight and your stress. Walking is an easy way to get exercise. Gradually increase the amount you walk every day. Aim for at least 30 minutes on most days of the week. You also may want to swim, bike, or do other activities. · Limit alcohol to no more than 2 drinks a day for men and 1 drink a day for women. · Do not smoke. If you need help quitting, talk to your doctor about stop-smoking programs and medicines. These can increase your chances of quitting for good. When should you call for help? Call 911 anytime you think you may need emergency care. For example, call if: 
· You have symptoms of a heart attack. These may include: ¨ Chest pain or pressure, or a strange feeling in the chest. 
¨ Sweating. ¨ Shortness of breath. ¨ Nausea or vomiting. ¨ Pain, pressure, or a strange feeling in the back, neck, jaw, or upper belly or in one or both shoulders or arms. ¨ Lightheadedness or sudden weakness. ¨ A fast or irregular heartbeat. After you call 911, the  may tell you to chew 1 adult-strength or 2 to 4 low-dose aspirin. Wait for an ambulance. Do not try to drive yourself. · You have signs of a stroke. These may include: 
¨ Sudden numbness, paralysis, or weakness in your face, arm, or leg, especially on only one side of your body. ¨ New problems with walking or balance. ¨ Sudden vision changes. ¨ Drooling or slurred speech. ¨ New problems speaking or understanding simple statements, or feeling confused. ¨ A sudden, severe headache that is different from past headaches. · You passed out (lost consciousness). Call your doctor now or seek immediate medical care if: 
· You have muscle pain or weakness. Watch closely for changes in your health, and be sure to contact your doctor if: 
· You are very tired. · You have an upset stomach, gas, constipation, or belly pain or cramps. Where can you learn more? Go to [employer] Enter C406 in the search box to learn more about \"Hyperlipidemia: After Your Visit. \"  
© 1168-5913 Healthwise, Incorporated. Care instructions adapted under license by [facility] (which disclaims liability or warranty for this information). This care instruction is for use with your licensed healthcare professional. If you have questions about a medical condition or this instruction, always ask your healthcare professional. [address] any warranty or liability for your use of this information. Content Version: 3.4.870302; Last Revised: October 13, 2011 Introducing [facility] SERVICES! [facility] introduces [employer] patient portal. Now you can access parts of your medical record, email your doctor's office, and request medication refills online. 1. In your internet browser, go to [email or web address] 2. Click on the First Time User? Click Here link in the Sign In box. You will see the New Member Sign Up page. 3. Enter your [employer] Access Code exactly as it appears below. You will not need to use this code after youve completed the sign-up process. If you do not sign up before the expiration date, you must request a new code. · [employer] Access Code: [email or web address] Expires: 11/28/2017  1:08 PM 
 
4. Enter the last four digits of your Social Security Number (xxxx) and Date of Birth (mm/dd/yyyy) as indicated and click Submit. You will be taken to the next sign-up page. 5. Create a [employer] ID.  This will be your [employer] login ID and cannot be changed, so think of one that is secure and easy to remember. 6. Create a [employer] password. You can change your password at any time. 7. Enter your Password Reset Question and Answer. This can be used at a later time if you forget your password. 8. Enter your e-mail address. You will receive e-mail notification when new information is available in [address] 9. Click Sign Up. You can now view and download portions of your medical record. 10. Click the Download Summary menu link to download a portable copy of your medical information. If you have questions, please visit the Frequently Asked Questions section of the [employer] website. Remember, [employer] is NOT to be used for urgent needs. For medical emergencies, dial 911. Now available from your iPhone and Android! Please provide this summary of care documentation to your next provider. Your primary care clinician is listed as James Ferguson. If you have any questions after today's visit, please call [phone].

## 2017-11-28 NOTE — PATIENT INSTRUCTIONS
Hyperlipidemia: After Your Visit  Your Care Instructions  Hyperlipidemia is too much fat in your blood. The body has several kinds of fat, including cholesterol and triglycerides. Your body needs fat for many things, such as making new cells. But too much fat in your blood increases your chances of having a heart attack or stroke. You may be able to lower your cholesterol and triglycerides with a heart-healthy diet, exercise, and if needed, medicine. Your doctor may want you to try lifestyle changes first to see whether they lower the fat in your blood. You may need to take medicine if lifestyle changes do not lower the fat in your blood enough. Follow-up care is a key part of your treatment and safety. Be sure to make and go to all appointments, and call your doctor if you are having problems. Its also a good idea to know your test results and keep a list of the medicines you take. How can you care for yourself at home? Take your medicines  · Take your medicines exactly as prescribed. Call your doctor if you think you are having a problem with your medicine. · If you take medicine to lower your cholesterol, go to follow-up visits. You will need to have blood tests. · Do not take large doses of niacin, which is a B vitamin, while taking medicine called statins. It may increase the chance of muscle pain and liver problems. · Talk to your doctor about avoiding grapefruit juice if you are taking statins. Grapefruit juice can raise the level of this medicine in your blood. This could increase side effects. Eat more fruits, vegetables, and fiber  · Fruits and vegetables have lots of nutrients that help protect against heart disease, and they have littleif anyfat. Try to eat at least five servings a day. Dark green, deep orange, or yellow fruits and vegetables are healthy choices. · Keep carrots, celery, and other veggies handy for snacks.  Buy fruit that is in season and store it where you can see it so that you will be tempted to eat it. Cook dishes that have a lot of veggies in them, such as stir-fries and soups. · Foods high in fiber may reduce your cholesterol and provide important vitamins and minerals. High-fiber foods include whole-grain cereals and breads, oatmeal, beans, brown rice, citrus fruits, and apples. · Buy whole-grain breads and cereals instead of white bread and pastries. Limit saturated fat  · Read food labels and try to avoid saturated fat and trans fat. They increase your risk of heart disease. · Use olive or canola oil when you cook. Try cholesterol-lowering spreads, such as Benecol or Take Control. · Bake, broil, grill, or steam foods instead of frying them. · Limit the amount of high-fat meats you eat, including hot dogs and sausages. Cut out all visible fat when you prepare meat. · Eat fish, skinless poultry, and soy products such as tofu instead of high-fat meats. Soybeans may be especially good for your heart. Eat at least two servings of fish a week. Certain fish, such as salmon, contain omega-3 fatty acids, which may help reduce your risk of heart attack. · Choose low-fat or fat-free milk and dairy products. Get exercise, limit alcohol, and quit smoking  · Get more exercise. Work with your doctor to set up an exercise program. Even if you can do only a small amount, exercise will help you get stronger, have more energy, and manage your weight and your stress. Walking is an easy way to get exercise. Gradually increase the amount you walk every day. Aim for at least 30 minutes on most days of the week. You also may want to swim, bike, or do other activities. · Limit alcohol to no more than 2 drinks a day for men and 1 drink a day for women. · Do not smoke. If you need help quitting, talk to your doctor about stop-smoking programs and medicines. These can increase your chances of quitting for good. When should you call for help?   Call 911 anytime you think you may need emergency care. For example, call if:  · You have symptoms of a heart attack. These may include:  ¨ Chest pain or pressure, or a strange feeling in the chest.  ¨ Sweating. ¨ Shortness of breath. ¨ Nausea or vomiting. ¨ Pain, pressure, or a strange feeling in the back, neck, jaw, or upper belly or in one or both shoulders or arms. ¨ Lightheadedness or sudden weakness. ¨ A fast or irregular heartbeat. After you call 911, the  may tell you to chew 1 adult-strength or 2 to 4 low-dose aspirin. Wait for an ambulance. Do not try to drive yourself. · You have signs of a stroke. These may include:  ¨ Sudden numbness, paralysis, or weakness in your face, arm, or leg, especially on only one side of your body. ¨ New problems with walking or balance. ¨ Sudden vision changes. ¨ Drooling or slurred speech. ¨ New problems speaking or understanding simple statements, or feeling confused. ¨ A sudden, severe headache that is different from past headaches. · You passed out (lost consciousness). Call your doctor now or seek immediate medical care if:  · You have muscle pain or weakness. Watch closely for changes in your health, and be sure to contact your doctor if:  · You are very tired. · You have an upset stomach, gas, constipation, or belly pain or cramps. Where can you learn more? Go to [employer]  Enter C406 in the search box to learn more about \"Hyperlipidemia: After Your Visit. \"   © 5831-2724 Healthwise, Incorporated. Care instructions adapted under license by [employer] (which disclaims liability or warranty for this information). This care instruction is for use with your licensed healthcare professional. If you have questions about a medical condition or this instruction, always ask your healthcare professional. [place] [address] any warranty or liability for your use of this information.   Content Version: 5.0.749763; Last Revised: October 13, 2011

## 2017-11-30 ENCOUNTER — DOCUMENTATION ONLY (OUTPATIENT)
Dept: INTERNAL MEDICINE CLINIC | Age: 77
End: 2017-11-30

## 2017-11-30 PROBLEM — R04.0 EPISTAXIS: Status: RESOLVED | Noted: 2017-05-27 | Resolved: 2017-11-30

## 2017-11-30 PROBLEM — L40.9 PSORIASIS OF SCALP: Status: ACTIVE | Noted: 2017-11-30

## 2017-11-30 PROBLEM — M79.652 BILATERAL THIGH PAIN: Status: ACTIVE | Noted: 2017-11-30

## 2017-11-30 PROBLEM — M79.651 BILATERAL THIGH PAIN: Status: ACTIVE | Noted: 2017-11-30

## 2017-11-30 NOTE — PROGRESS NOTES
HPI:   Mellissa Bamberger is a 64y.o. year old female who presents today for evaluation of hypertension, ASCVD, hyperlipidemia, syncope, Parkinson's disease, GERD, and macular degeneration. She reports that she is doing relatively well. She reports for the last several months, she has noted increasing bilateral thigh aching pain, which increases with ambulation particularly when walking up stairs. She states that the discomfort is similar to that which developed previously with use of simvastatin and atorvastatin. She has been on pravastatin since 10/2014 and did not note any difficulty until the last several months. She states that she has been using Tylenol arthritis without improvement. She denies any back pain, and denies any pain or weakness involving other muscles. She does have chronic bilateral knee pain, but reports that this is unchanged. She is otherwise without complaints. She has a history of hypertension, hyperlipidemia, ASCVD, and syncope. In 3/2011, she had three syncopal episodes while donating blood. Over the subsequent four days, she developed exertional substernal chest tightness with left arm pain and dyspnea. She was evaluated and EKG revealed new T wave inversions in leads V2 and V3. Troponins were negative. She underwent cardiac catheterization (3/21/2011) which showed a 90% mid LAD and mild disease in the rest of the coronaries. She underwent PCI and placement of two drug-eluting stents for the mid LAD lesion; LV function was mildly diminished (EF 40-45%) with anteroapical hypokinesis. Follow-up echocardiogram (9/2011) showed return to normal LV function (EF 60%) and no RWMA. She has a history of multiple syncopal episodes, usually related to stressful situations, and thought to be vasovagal in origin. An implantable loop recorder was placed in 3/2011 and remained until 4/2013, and interrogation was negative for any significant arrhythmia.  In 4/2014, she was hospitalized following another syncopal episode, which occurred after she had taken her morning medications. Afterward, she became nauseated and flushed, and called EMS. When they arrived, they found her on the floor and reportedly without a pulse. They began CPR and within 10-15 seconds, she recovered. Evaluation included EKG/troponins, which were negative, and an echocardiogram showing mild MR and normal LV function (EF 60-65%). She was found to have hypokalemia and hypomagnesemia and hydrochlorothiazide was discontinued. It was thought that this event also represented a vasovagal reaction given her prodromal symptoms. She has had no further events since that time. She had a repeat pharmacologic nuclear stress test (7/14/2017) which was a normal, low risk study, negative for evidence of ischemia or prior infarction, and with normal LV size and function (EF 70%). Her current regimen includes metoprolol, lisinopril, aspirin, pravastatin, and sublingual nitroglycerin prn. She is being followed by Dr. Kathleen Zaidi. She remains very active line dancing 3x/week, doing yardwork and riding her bicycle. She denies any chest pain, shortness of breath at rest or with exertion, palpitations, lightheadedness, or edema. She has a history of idiopathic Parkinson's disease, predominantly left-sided. She is currently being treated with Sinemet, and reports relatively good control of symptoms. She has difficulty with  writing at times, particularly towards the end of the day, and also describes increasing tremors midday. She is followed by Dr. Al Morgan. She has a history of laryngopharyngeal reflux disease, treated with dexlansoprazole, and she is followed by Dr. Leroy Sapp. In 5/2017 she had multiple episodes of epistaxis prompting an ED visit. She subsequently underwent silver nitrate cautery of anterior vessels in her right nares by Dr. Leroy Sapp, and has had no recurrence. In 3/2010, she underwent evaluation for iron deficiency anemia.  She had previously had a negative colonoscopy and air contrast barium enema in 2009 by Dr. Evelin Cuevas, and she had an upper endoscopy by Dr. Saida Sierra which was normal. She was treated with iron with improvement. She had a repeat screening colonoscopy in 7/2016 by Dr. Cintron Records which was normal. No further follow-up was recommended given her age. She denies any abdominal pain, nausea, vomiting, melena, hematochezia, or change in bowel movements. She has a history of osteopenia with last bone density study in 5/2016 showing T-scores:  femoral neck left -1.0  /right -1.4 and lumbar -0.8 . She continues to take calcium and Vitamin D supplements. She has no history of pathologic fractures. She has a recent history of abnormal mammograms since 10/2014 with microcalcifcations in the right breast. She has been undergoing surveillance mammograms every six months, which have been unchanged. She had a follow-up mammogram in 5/2016 which was negative, and routine annual screening was recommended. Past Medical History:   Diagnosis Date    Basal cell cancer     CAD (coronary artery disease), native coronary artery 03/21/2011    s/p PCI with with CARLITO (Xience 2.75x12, 2.5x12) mLAD and mild disease in rest of coronaries. Midly depressed LV syst fct     Cardiac cath 03/21/2011    mLAD 90% (2.75 x 12 mm Xience stent). Otherwise patent coronaries. LVEDP 10 mmHg. EF 40-45%. Anteroapical hypk. Renal arteries patent.  Cardiac echocardiogram 04/17/2014    EF 60-65%. No WMA. Gr 1 DDfx. Mild MR. Similar to study of 9/14/11.  Cardiac nuclear imaging test 05/20/2008    Negative for ischemia or infarction. EF 79%.     Cardiomyopathy secondary     ischemic +/- stress induced    Dyslipidemia     GERD (gastroesophageal reflux disease)     Hx of colonoscopy 07/12/2016    Normal. Dr. Will Vigil Hypertension     Parkinson's disease     Syncope     s/p ILD implantation     Past Surgical History:   Procedure Laterality Date    COLONOSCOPY N/A 7/12/2016    COLONOSCOPY performed by Brianne Araya MD at [address] [facility] HX BUNIONECTOMY      dorsal    HX CATARACT REMOVAL  11/2012    left    HX CATARACT REMOVAL  10/2012    right    HX CORONARY STENT PLACEMENT      HX HEART CATHETERIZATION      HX HEMORRHOIDECTOMY      HX HYSTERECTOMY  1996    partial    HX IMPLANTABLE LOOP RECORDER  [id number]    HX TONSILLECTOMY      HX TOTAL ABDOMINAL HYSTERECTOMY  1996    partial     Current Outpatient Prescriptions   Medication Sig    krill-omega-3-dha-epa-lipids (KRILL OIL) 492-34-08-57 mg cap Take  by mouth.  pravastatin (PRAVACHOL) 20 mg tablet Take 1 Tab by mouth nightly.  clobetasol (TEMOVATE) 0.05 % external solution Apply as directed.  lisinopril (PRINIVIL, ZESTRIL) 10 mg tablet Take 1 Tab by mouth daily.  potassium chloride (K-DUR, KLOR-CON) 10 mEq tablet Take 1 Tab by mouth every other day.  metoprolol succinate (TOPROL-XL) 50 mg XL tablet Take 1 Tab by mouth daily.  carbidopa-levodopa CR (SINEMET CR)  mg per tablet Take  by mouth nightly.  co-enzyme Q-10 (CO Q-10) 100 mg capsule Take 100 mg by mouth daily.  VIT A/VIT C/VIT E/ZINC/COPPER (ICAPS AREDS PO) Take 1 Tab by mouth two (2) times a day.  Cholecalciferol, Vitamin D3, (VITAMIN D) 1,000 unit Cap Take 1 Cap by mouth daily.  VITAMIN B COMPLEX (BALANCE B-50 PO) Take 1 Tab by mouth daily.  carbidopa-levodopa (SINEMET)  mg per tablet Take 1 Tab by mouth three (3) times daily.  flaxseed oil 1,000 mg Cap Take 1 Cap by mouth daily.  aspirin 81 mg tablet Take 1 Tab by mouth daily.  MULTIVITAMIN PO Take 1 Tab by mouth daily.  Dexlansoprazole (DEXILANT) 60 mg CpDM Take 1 Cap by mouth daily. 1 tab every other day. Indications: Gastric Ulcer    DOCUSATE CALCIUM (STOOL SOFTENER PO) Take 1 Cap by mouth.  magnesium 250 mg Tab Take 250 mg by mouth two (2) times a day.  resveratrol 100 mg Cap Take 2 Caps by mouth daily.     lecithin 1,200 mg Cap Take 1 Cap by mouth daily.  magnesium hydroxide (NEGRO MILK OF MAGNESIA) 400 mg/5 mL suspension Take 30 mL by mouth daily as needed for Constipation.  diclofenac (VOLTAREN) 1 % topical gel Apply 2 g to affected area four (4) times daily as needed.  nitroglycerin (NITROSTAT) 0.4 mg SL tablet 1 sublingual every 5 minutes for chest pain for no more than 3 doses    psyllium seed-sucrose (METAMUCIL) powd 1 tablespoon bid    acetaminophen (TYLENOL) 650 mg CR tablet Take 650 mg by mouth every six (6) hours as needed for Pain.  fluticasone (FLONASE) 50 mcg/actuation nasal spray 2 Sprays by Both Nostrils route daily. No current facility-administered medications for this visit. Allergies and Intolerances: Allergies   Allergen Reactions    Keflex [Cephalexin] Other (comments)     Yeast infection    Pcn [Penicillins] Other (comments)     Family History: She has no family history of colon or breast cancer. Family History   Problem Relation Age of Onset    Heart Attack Father 80    Heart Disease Father     Cancer Mother      pancreatic    Hypertension Brother      Social History:   She  reports that she has never smoked. She has never used smokeless tobacco. She lives with her , who is having difficulty with mild cognitive impairment. She states that they sold their RV and now stay at home for the winter. She is a retired x-ray technician. History   Alcohol Use    Yes     Comment: glass of wine occasionally.      Immunization History:  Immunization History   Administered Date(s) Administered    Influenza High Dose Vaccine PF 09/29/2014, 10/05/2015, 11/01/2016, 08/30/2017    Influenza Vaccine Whole 09/07/2010, 09/03/2011, 10/04/2012    Pneumococcal Conjugate (PCV-13) 10/05/2015    TD Vaccine 01/01/2003    Tdap 09/25/2013    ZZZ-RETIRED (DO NOT USE) Pneumococcal Vaccine (Unspecified Type) 01/01/2003    Zoster 01/01/2007       Review of Systems:   As above included in HPI.  Otherwise 11 point review of systems negative including constitutional, skin, HENT, eyes, respiratory, cardiovascular, gastrointestinal, genitourinary, musculoskeletal, endo/heme/aller, neurological.    Physical:   Vitals:   BP: 134/74   HR: 63  WT: 130 lb (59 kg)  BMI:  21.63 kg/m2    Exam:   Pt appears well; alert and oriented x 3; appropriate affect. HEENT: PERRLA, anicteric, oropharynx clear, no JVD, adenopathy or thyromegaly. No carotid bruits or radiated murmur. Lungs: clear to auscultation, no wheezes, rhonchi, or rales. Heart: regular rate and rhythm. No murmur, rubs, gallops  Abdomen: soft, nontender, nondistended, normal bowel sounds, no hepatosplenomegaly or masses. Extremities: without edema. Pulses 1-2+ bilaterally. Review of Data:  Labs:  Hospital Outpatient Visit on 11/21/2017   Component Date Value Ref Range Status    WBC 11/21/2017 4.2* 4.6 - 13.2 K/uL Final    RBC 11/21/2017 4.15* 4.20 - 5.30 M/uL Final    HGB 11/21/2017 12.7  12.0 - 16.0 g/dL Final    HCT 11/21/2017 40.3  35.0 - 45.0 % Final    MCV 11/21/2017 97.1* 74.0 - 97.0 FL Final    MCH 11/21/2017 30.6  24.0 - 34.0 PG Final    MCHC 11/21/2017 31.5  31.0 - 37.0 g/dL Final    RDW 11/21/2017 13.4  11.6 - 14.5 % Final    PLATELET 02/30/5940 383  135 - 420 K/uL Final    MPV 11/21/2017 10.2  9.2 - 11.8 FL Final    NEUTROPHILS 11/21/2017 57  40 - 73 % Final    LYMPHOCYTES 11/21/2017 28  21 - 52 % Final    MONOCYTES 11/21/2017 8  3 - 10 % Final    EOSINOPHILS 11/21/2017 6* 0 - 5 % Final    BASOPHILS 11/21/2017 1  0 - 2 % Final    ABS. NEUTROPHILS 11/21/2017 2.4  1.8 - 8.0 K/UL Final    ABS. LYMPHOCYTES 11/21/2017 1.2  0.9 - 3.6 K/UL Final    ABS. MONOCYTES 11/21/2017 0.3  0.05 - 1.2 K/UL Final    ABS. EOSINOPHILS 11/21/2017 0.2  0.0 - 0.4 K/UL Final    ABS.  BASOPHILS 11/21/2017 0.0  0.0 - 0.06 K/UL Final    DF 11/21/2017 AUTOMATED    Final    Sodium 11/21/2017 141  136 - 145 mmol/L Final    Potassium 11/21/2017 4.0  3.5 - 5.5 mmol/L Final    Chloride 11/21/2017 105  100 - 108 mmol/L Final    CO2 11/21/2017 29  21 - 32 mmol/L Final    Anion gap 11/21/2017 7  3.0 - 18 mmol/L Final    Glucose 11/21/2017 92  74 - 99 mg/dL Final    BUN 11/21/2017 14  7.0 - 18 MG/DL Final    Creatinine 11/21/2017 0.73  0.6 - 1.3 MG/DL Final    BUN/Creatinine ratio 11/21/2017 19  12 - 20   Final    GFR est AA 11/21/2017 >60  >60 ml/min/1.73m2 Final    GFR est non-AA 11/21/2017 >60  >60 ml/min/1.73m2 Final    Calcium 11/21/2017 9.0  8.5 - 10.1 MG/DL Final    LIPID PROFILE 11/21/2017        Final    Cholesterol, total 11/21/2017 139  <200 MG/DL Final    Triglyceride 11/21/2017 73  <150 MG/DL Final    HDL Cholesterol 11/21/2017 66* 40 - 60 MG/DL Final    LDL, calculated 11/21/2017 58.4  0 - 100 MG/DL Final    VLDL, calculated 11/21/2017 14.6  MG/DL Final    CHOL/HDL Ratio 11/21/2017 2.1  0 - 5.0   Final     Health Maintenance:  Screening:    Mammogram: negative (5/2017)   PAP smear: s/p ROHINI. No further screening. Colorectal: colonoscopy (7/2016) normal. Dr. Candido Laguerre. No further screening. Depression: none   DM (HbA1c/FPG): FPG 92 (11/2017)   Hepatitis C: N/A   Falls: none   DEXA: osteopenia (5/2016)   Glaucoma: regular eye exams with Dr. Jeannette Almonte (last 5/2017) and Dr. James Hammond for left macular degeneration. Smoking: none   Vitamin D: 49.8 (5/2017)   Medicare Wellness: 5/10/2017    Impression:  Patient Active Problem List   Diagnosis Code    Hypertension I10    CAD S/P percutaneous coronary angioplasty I25.10, Z98.61    Dyslipidemia E78.5    Syncope R55    Parkinson disease ([address] G20    GERD (gastroesophageal reflux disease) K21.9    Advance directive on file Z78.9    Macular degeneration of left eye H35.30    Laryngopharyngeal reflux disease K21.9    Osteopenia M85.80    Epistaxis R04.0       Plan:  1. Hypertension. Blood pressure appears well controlled on lisinopril and metoprolol.  Renal function remains normal with creatinine 0.73 / eGFR >60. Continue to follow. 2. ASCVD. Remains asymptomatic. Resolution of cardiomyopathy with last echocardiogram revealing normal LV function. Negative pharmacologic nuclear stress test in 7/2017. Continue current regimen. Being followed by Dr. Los Driver. 3. H/O syncope. No further episodes since 2014. Most likely secondary to vasovagal reaction. Follow. 4. Hyperlipidemia. On low intensity dose pravastatin with LDL 58, indicative of excellent control. However, patient complaining of severe bilateral thigh pain, worse with ambulation. She has continued to take CoQ-10 without relief. Reports similar symptoms in past with statin. Was taking Zocor until 2012, and then Lipitor until 9/2014, but due to myalgias, was changed to Crestor. She only took Crestor for one month, but due to lack of insurance coverage, was changed to pravastatin in 10/2014. Discussed importance of statin therapy due to known CAD and LAD stent. Patient aware of need. Will discontinue pravastatin to see if symptoms resolve. If do not improve, will restart pravastatin and consider other etiology for myalgias. If do resolve, would consider changing to rosuvastatin. Will check Vitamin D level, CK, and hepatic panel at next visit. Emphasized importance of lifestyle modifications, including diet, exercise, and weight loss. Continue to follow. 5. Parkinson's disease. Doing well on Sinemet. Followed by Dr. Campos King.  6. Osteopenia. Last bone density scan 5/2016. Using femoral neck T-scores, calculated FRAX score estimates her 10 year risk of a major osteoporetic fracture at 11 % and hip fracture at 2.3 %, which are not an indication for biphosphonate treatment (>20% and >3%, respectively). Continue calcium and vitamin D supplements. Encouraged exercise, particularly weight bearing activities. Repeat bone density scan in 5/2018. 7. Macular degeneration, left. Being followed by Dr. Aaliyah Lilly. 8. Laryngopharyngeal reflux. Doing well on Dexilant. Followed by Dr. Leroy Sapp. 9. Epistaxis. No further recurrence since application of silver nitrate by Dr. Leroy Sapp. Continue to follow. 10. Psoriasis. Using clobetasol solution for scalp psoriasis. 11. Health maintenance. Already received influenza vaccine. Other immunizations up to date. No further colorectal cancer screening needed. Mammogram up to date. Continue regular eye exams with [facility] Lerm[facility] and [place]. Vitamin D level normal. Continue maintenance dose supplement. Patient understands recommendations and agrees with plan. Follow-up in 3 weeks.

## 2017-12-20 ENCOUNTER — HOSPITAL ENCOUNTER (OUTPATIENT)
Dept: LAB | Age: 77
Discharge: HOME | End: 2017-12-20
Payer: MEDICARE

## 2017-12-20 ENCOUNTER — OFFICE VISIT (OUTPATIENT)
Dept: INTERNAL MEDICINE CLINIC | Age: 77
End: 2017-12-20

## 2017-12-20 VITALS
OXYGEN SATURATION: 97 % | WEIGHT: 130.6 LBS | HEART RATE: 64 BPM | HEIGHT: 65 IN | TEMPERATURE: 98.6 F | SYSTOLIC BLOOD PRESSURE: 140 MMHG | BODY MASS INDEX: 21.76 KG/M2 | RESPIRATION RATE: 16 BRPM | DIASTOLIC BLOOD PRESSURE: 80 MMHG

## 2017-12-20 DIAGNOSIS — G20 PARKINSON DISEASE (HCC): ICD-10-CM

## 2017-12-20 DIAGNOSIS — R55 VASOVAGAL SYNCOPE: ICD-10-CM

## 2017-12-20 DIAGNOSIS — M85.9 DISORDER OF BONE DENSITY AND STRUCTURE, UNSPECIFIED: ICD-10-CM

## 2017-12-20 DIAGNOSIS — K21.9 LARYNGOPHARYNGEAL REFLUX DISEASE: ICD-10-CM

## 2017-12-20 DIAGNOSIS — Z98.61 CAD S/P PERCUTANEOUS CORONARY ANGIOPLASTY: ICD-10-CM

## 2017-12-20 DIAGNOSIS — M79.652 BILATERAL THIGH PAIN: ICD-10-CM

## 2017-12-20 DIAGNOSIS — E78.5 DYSLIPIDEMIA: Primary | ICD-10-CM

## 2017-12-20 DIAGNOSIS — E78.5 DYSLIPIDEMIA: ICD-10-CM

## 2017-12-20 DIAGNOSIS — M85.89 OSTEOPENIA OF MULTIPLE SITES: ICD-10-CM

## 2017-12-20 DIAGNOSIS — M79.651 BILATERAL THIGH PAIN: ICD-10-CM

## 2017-12-20 DIAGNOSIS — H35.30 MACULAR DEGENERATION OF LEFT EYE: ICD-10-CM

## 2017-12-20 DIAGNOSIS — I25.10 CAD S/P PERCUTANEOUS CORONARY ANGIOPLASTY: ICD-10-CM

## 2017-12-20 DIAGNOSIS — I10 ESSENTIAL HYPERTENSION: ICD-10-CM

## 2017-12-20 LAB
ALBUMIN SERPL-MCNC: 3.7 G/DL (ref 3.4–5)
ALBUMIN/GLOB SERPL: 1.3 {RATIO} (ref 0.8–1.7)
ALP SERPL-CCNC: 100 U/L (ref 45–117)
ALT SERPL-CCNC: 17 U/L (ref 13–56)
ANION GAP SERPL CALC-SCNC: 7 MMOL/L (ref 3–18)
AST SERPL-CCNC: 22 U/L (ref 15–37)
BILIRUB SERPL-MCNC: 0.3 MG/DL (ref 0.2–1)
BUN SERPL-MCNC: 13 MG/DL (ref 7–18)
BUN/CREAT SERPL: 19 (ref 12–20)
CALCIUM SERPL-MCNC: 9.1 MG/DL (ref 8.5–10.1)
CHLORIDE SERPL-SCNC: 103 MMOL/L (ref 100–108)
CK SERPL-CCNC: 138 U/L (ref 26–192)
CO2 SERPL-SCNC: 30 MMOL/L (ref 21–32)
CREAT SERPL-MCNC: 0.7 MG/DL (ref 0.6–1.3)
GLOBULIN SER CALC-MCNC: 2.9 G/DL (ref 2–4)
GLUCOSE SERPL-MCNC: 123 MG/DL (ref 74–99)
MAGNESIUM SERPL-MCNC: 2.4 MG/DL (ref 1.6–2.6)
POTASSIUM SERPL-SCNC: 3.9 MMOL/L (ref 3.5–5.5)
PROT SERPL-MCNC: 6.6 G/DL (ref 6.4–8.2)
SODIUM SERPL-SCNC: 140 MMOL/L (ref 136–145)

## 2017-12-20 PROCEDURE — 82550 ASSAY OF CK (CPK): CPT | Performed by: INTERNAL MEDICINE

## 2017-12-20 PROCEDURE — 80053 COMPREHEN METABOLIC PANEL: CPT | Performed by: INTERNAL MEDICINE

## 2017-12-20 PROCEDURE — 83735 ASSAY OF MAGNESIUM: CPT | Performed by: INTERNAL MEDICINE

## 2017-12-20 PROCEDURE — 82306 VITAMIN D 25 HYDROXY: CPT | Performed by: INTERNAL MEDICINE

## 2017-12-20 RX ORDER — METOPROLOL SUCCINATE 50 MG/1
50 TABLET, EXTENDED RELEASE ORAL DAILY
Qty: 90 TAB | Refills: 3 | Status: SHIPPED | OUTPATIENT
Start: 2017-12-20 | End: 2018-12-17 | Stop reason: SDUPTHER

## 2017-12-20 RX ORDER — LISINOPRIL 10 MG/1
10 TABLET ORAL DAILY
Qty: 90 TAB | Refills: 3 | Status: SHIPPED | OUTPATIENT
Start: 2017-12-20 | End: 2018-12-17 | Stop reason: SDUPTHER

## 2017-12-20 RX ORDER — ROSUVASTATIN CALCIUM 10 MG/1
10 TABLET, COATED ORAL
Qty: 90 TAB | Refills: 3 | Status: SHIPPED | OUTPATIENT
Start: 2017-12-20 | End: 2019-04-11 | Stop reason: SDUPTHER

## 2017-12-20 NOTE — PROGRESS NOTES
Chief Complaint   Patient presents with    Hypertension     3 week follow up. Patient states she has been checking her blood pressure at home daily. Patient's blood drawn, for lab, out of right antecubital area without complications.

## 2017-12-20 NOTE — PATIENT INSTRUCTIONS
Hyperlipidemia: After Your Visit  Your Care Instructions  Hyperlipidemia is too much fat in your blood. The body has several kinds of fat, including cholesterol and triglycerides. Your body needs fat for many things, such as making new cells. But too much fat in your blood increases your chances of having a heart attack or stroke. You may be able to lower your cholesterol and triglycerides with a heart-healthy diet, exercise, and if needed, medicine. Your doctor may want you to try lifestyle changes first to see whether they lower the fat in your blood. You may need to take medicine if lifestyle changes do not lower the fat in your blood enough. Follow-up care is a key part of your treatment and safety. Be sure to make and go to all appointments, and call your doctor if you are having problems. Its also a good idea to know your test results and keep a list of the medicines you take. How can you care for yourself at home? Take your medicines  · Take your medicines exactly as prescribed. Call your doctor if you think you are having a problem with your medicine. · If you take medicine to lower your cholesterol, go to follow-up visits. You will need to have blood tests. · Do not take large doses of niacin, which is a B vitamin, while taking medicine called statins. It may increase the chance of muscle pain and liver problems. · Talk to your doctor about avoiding grapefruit juice if you are taking statins. Grapefruit juice can raise the level of this medicine in your blood. This could increase side effects. Eat more fruits, vegetables, and fiber  · Fruits and vegetables have lots of nutrients that help protect against heart disease, and they have littleif anyfat. Try to eat at least five servings a day. Dark green, deep orange, or yellow fruits and vegetables are healthy choices. · Keep carrots, celery, and other veggies handy for snacks.  Buy fruit that is in season and store it where you can see it so that you will be tempted to eat it. Cook dishes that have a lot of veggies in them, such as stir-fries and soups. · Foods high in fiber may reduce your cholesterol and provide important vitamins and minerals. High-fiber foods include whole-grain cereals and breads, oatmeal, beans, brown rice, citrus fruits, and apples. · Buy whole-grain breads and cereals instead of white bread and pastries. Limit saturated fat  · Read food labels and try to avoid saturated fat and trans fat. They increase your risk of heart disease. · Use olive or canola oil when you cook. Try cholesterol-lowering spreads, such as Benecol or Take Control. · Bake, broil, grill, or steam foods instead of frying them. · Limit the amount of high-fat meats you eat, including hot dogs and sausages. Cut out all visible fat when you prepare meat. · Eat fish, skinless poultry, and soy products such as tofu instead of high-fat meats. Soybeans may be especially good for your heart. Eat at least two servings of fish a week. Certain fish, such as salmon, contain omega-3 fatty acids, which may help reduce your risk of heart attack. · Choose low-fat or fat-free milk and dairy products. Get exercise, limit alcohol, and quit smoking  · Get more exercise. Work with your doctor to set up an exercise program. Even if you can do only a small amount, exercise will help you get stronger, have more energy, and manage your weight and your stress. Walking is an easy way to get exercise. Gradually increase the amount you walk every day. Aim for at least 30 minutes on most days of the week. You also may want to swim, bike, or do other activities. · Limit alcohol to no more than 2 drinks a day for men and 1 drink a day for women. · Do not smoke. If you need help quitting, talk to your doctor about stop-smoking programs and medicines. These can increase your chances of quitting for good. When should you call for help?   Call 911 anytime you think you may need emergency care. For example, call if:  · You have symptoms of a heart attack. These may include:  ¨ Chest pain or pressure, or a strange feeling in the chest.  ¨ Sweating. ¨ Shortness of breath. ¨ Nausea or vomiting. ¨ Pain, pressure, or a strange feeling in the back, neck, jaw, or upper belly or in one or both shoulders or arms. ¨ Lightheadedness or sudden weakness. ¨ A fast or irregular heartbeat. After you call 911, the  may tell you to chew 1 adult-strength or 2 to 4 low-dose aspirin. Wait for an ambulance. Do not try to drive yourself. · You have signs of a stroke. These may include:  ¨ Sudden numbness, paralysis, or weakness in your face, arm, or leg, especially on only one side of your body. ¨ New problems with walking or balance. ¨ Sudden vision changes. ¨ Drooling or slurred speech. ¨ New problems speaking or understanding simple statements, or feeling confused. ¨ A sudden, severe headache that is different from past headaches. · You passed out (lost consciousness). Call your doctor now or seek immediate medical care if:  · You have muscle pain or weakness. Watch closely for changes in your health, and be sure to contact your doctor if:  · You are very tired. · You have an upset stomach, gas, constipation, or belly pain or cramps. Where can you learn more? Go to [employer]  Enter C406 in the search box to learn more about \"Hyperlipidemia: After Your Visit. \"   © 4059-2073 [employer]. Care instructions adapted under license by [facility] (which disclaims liability or warranty for this information). This care instruction is for use with your licensed healthcare professional. If you have questions about a medical condition or this instruction, always ask your healthcare professional. [place] [address] any warranty or liability for your use of this information.   Content Version: 5.3.731943; Last Revised: October 13, 2011

## 2017-12-20 NOTE — MR AVS SNAPSHOT
Visit Information Date & Time Provider Department Dept. Phone Encounter #  
 12/20/2017 12:00 PM Yandel Contreras MD [facility] [phone] [id number] Follow-up Instructions Return in about 3 months (around 3/20/2018), or if symptoms worsen or fail to improve. Your Appointments 3/28/2018  8:00 AM  
LAB with [facility] NURSE VISIT [facility] ([address] [place] [address]) Appt Note: lab  
 [address], [address] [id number] [address] [place]  
  
   
 [address], [facility]  
  
    
 4/4/2018  8:00 AM  
Office Visit with Yandel Contreras MD  
[facility] [address] Appt Note: 3 month follow up  
 [address], [address] [address] [address] [place]  
  
   
 [address], [facility]  
  
    
 7/6/2018  8:20 AM  
Follow Up with Ignacia Bundy MD  
[facility] ([address] [place] [address]) Appt Note: 1 year follow up with an EKG  
 [facility] [address] [address] [id number] [phone] [address] [place] [address] [facility] Upcoming Health Maintenance Date Due  
 GLAUCOMA SCREENING Q2Y 5/6/2018 MEDICARE YEARLY EXAM 5/11/2018 DTaP/Tdap/Td series (2 - Td) 9/25/2023 Allergies as of 12/20/2017  Review Complete On: 12/20/2017 By: Viky Connolly Severity Noted Reaction Type Reactions Keflex [Cephalexin]  11/21/2011   Intolerance Other (comments) Yeast infection Pcn [Penicillins]   Intolerance Other (comments) Current Immunizations  Reviewed on 8/30/2017 Name Date Influenza High Dose Vaccine PF 8/30/2017  2:22 PM, 11/1/2016  1:08 PM, 10/5/2015  1:45 PM, 9/29/2014 Influenza Vaccine Whole 10/4/2012, 9/3/2011, 9/7/2010 Pneumococcal Conjugate (PCV-13) 10/5/2015  1:46 PM  
 TD Vaccine 1/1/2003 Tdap 9/25/2013 ZZZ-RETIRED (DO NOT USE) Pneumococcal Vaccine (Unspecified Type) 1/1/2003 Zoster 1/1/2007 Not reviewed this visit You Were Diagnosed With   
  
 Codes Comments Dyslipidemia    -  Primary ICD-10-CM: E78.5 ICD-9-CM: 272.4 Bilateral thigh pain     ICD-10-CM: M79.651, W17.199 ICD-9-CM: 729.5 Disorder of bone density and structure, unspecified     ICD-10-CM: M85.9 ICD-9-CM: 733.90 Vitals BP Pulse Temp Resp Height(growth percentile) Weight(growth percentile) 140/80 (BP 1 Location: Left arm, BP Patient Position: Sitting) 64 98.6 °F (37 °C) (Oral) 16 5' 5\" (1.651 m) 130 lb 9.6 oz (59.2 kg) SpO2 BMI OB Status Smoking Status 97% 21.73 kg/m2 Hysterectomy Never Smoker Vitals History BMI and BSA Data Body Mass Index Body Surface Area 21.73 kg/m 2 1.65 m 2 Your Updated Medication List  
  
   
This list is accurate as of: 12/20/17 12:58 PM.  Always use your most recent med list.  
  
  
  
  
 acetaminophen 650 mg Tber Commonly known as:  TYLENOL Take 650 mg by mouth every six (6) hours as needed for Pain. aspirin 81 mg tablet Take 1 Tab by mouth daily. BALANCE B-50 PO Take 1 Tab by mouth daily. clobetasol 0.05 % external solution Commonly known as:  Roxanne Lunch Apply as directed. co-enzyme Q-10 100 mg capsule Commonly known as:  CO Q-10 Take 100 mg by mouth daily. DEXILANT 60 mg Cpdb Generic drug:  Dexlansoprazole Take 1 Cap by mouth daily. 1 tab every other day. Indications: Gastric Ulcer  
  
 diclofenac 1 % Gel Commonly known as:  VOLTAREN Apply 2 g to affected area four (4) times daily as needed. flaxseed oil 1,000 mg Cap Take 1 Cap by mouth daily. fluticasone 50 mcg/actuation nasal spray Commonly known as:  [place] 2 Sprays by Both Nostrils route daily. ICAPS AREDS PO Take 1 Tab by mouth two (2) times a day. KRILL -87-75-50 mg Cap Generic drug:  krill-omega-3-dha-epa-lipids Take  by mouth.  
  
 lecithin 1,200 mg Cap Take 1 Cap by mouth daily. lisinopril 10 mg tablet Commonly known as:  Doris Pinch Take 1 Tab by mouth daily. magnesium 250 mg Tab Take 250 mg by mouth two (2) times a day. metoprolol succinate 50 mg XL tablet Commonly known as:  TOPROL-XL Take 1 Tab by mouth daily. MULTIVITAMIN PO Take 1 Tab by mouth daily. nitroglycerin 0.4 mg SL tablet Commonly known as:  NITROSTAT  
1 sublingual every 5 minutes for chest pain for no more than 3 doses NEGRO MILK OF MAGNESIA 400 mg/5 mL suspension Generic drug:  magnesium hydroxide Take 30 mL by mouth daily as needed for Constipation. potassium chloride 10 mEq tablet Commonly known as:  KLOR-CON Take 1 Tab by mouth every other day. psyllium seed-sucrose Powd Commonly known as:  METAMUCIL (SUGAR) 1 tablespoon bid  
  
 resveratrol 100 mg Cap Take 2 Caps by mouth daily. rosuvastatin 10 mg tablet Commonly known as:  CRESTOR Take 1 Tab by mouth nightly. * SINEMET CR  mg per tablet Generic drug:  carbidopa-levodopa ER Take  by mouth nightly. * SINEMET  mg per tablet Generic drug:  carbidopa-levodopa Take 1 Tab by mouth three (3) times daily. STOOL SOFTENER PO Take 1 Cap by mouth. VITAMIN D3 1,000 unit Cap Generic drug:  cholecalciferol Take 1 Cap by mouth daily. * Notice: This list has 2 medication(s) that are the same as other medications prescribed for you. Read the directions carefully, and ask your doctor or other care provider to review them with you. Prescriptions Printed Refills  
 lisinopril (PRINIVIL, ZESTRIL) 10 mg tablet 3 Sig: Take 1 Tab by mouth daily. Class: Print Route: Oral  
 metoprolol succinate (TOPROL-XL) 50 mg XL tablet 3 Sig: Take 1 Tab by mouth daily. Class: Print  Route: Oral  
 rosuvastatin (CRESTOR) 10 mg tablet 3 Sig: Take 1 Tab by mouth nightly. Class: Print Route: Oral  
  
Follow-up Instructions Return in about 3 months (around 3/20/2018), or if symptoms worsen or fail to improve. To-Do List   
 12/20/2017 Lab:  CK   
  
 12/20/2017 Lab:  MAGNESIUM   
  
 12/20/2017 Lab:  METABOLIC PANEL, COMPREHENSIVE   
  
 12/20/2017 Lab:  VITAMIN D, 25 HYDROXY Patient Instructions Hyperlipidemia: After Your Visit Your Care Instructions Hyperlipidemia is too much fat in your blood. The body has several kinds of fat, including cholesterol and triglycerides. Your body needs fat for many things, such as making new cells. But too much fat in your blood increases your chances of having a heart attack or stroke. You may be able to lower your cholesterol and triglycerides with a heart-healthy diet, exercise, and if needed, medicine. Your doctor may want you to try lifestyle changes first to see whether they lower the fat in your blood. You may need to take medicine if lifestyle changes do not lower the fat in your blood enough. Follow-up care is a key part of your treatment and safety. Be sure to make and go to all appointments, and call your doctor if you are having problems. Its also a good idea to know your test results and keep a list of the medicines you take. How can you care for yourself at home? Take your medicines · Take your medicines exactly as prescribed. Call your doctor if you think you are having a problem with your medicine. · If you take medicine to lower your cholesterol, go to follow-up visits. You will need to have blood tests. · Do not take large doses of niacin, which is a B vitamin, while taking medicine called statins. It may increase the chance of muscle pain and liver problems. · Talk to your doctor about avoiding grapefruit juice if you are taking statins.  Grapefruit juice can raise the level of this medicine in your blood. This could increase side effects. Eat more fruits, vegetables, and fiber · Fruits and vegetables have lots of nutrients that help protect against heart disease, and they have littleif anyfat. Try to eat at least five servings a day. Dark green, deep orange, or yellow fruits and vegetables are healthy choices. · Keep carrots, celery, and other veggies handy for snacks. Buy fruit that is in season and store it where you can see it so that you will be tempted to eat it. Cook dishes that have a lot of veggies in them, such as stir-fries and soups. · Foods high in fiber may reduce your cholesterol and provide important vitamins and minerals. High-fiber foods include whole-grain cereals and breads, oatmeal, beans, brown rice, citrus fruits, and apples. · Buy whole-grain breads and cereals instead of white bread and pastries. Limit saturated fat · Read food labels and try to avoid saturated fat and trans fat. They increase your risk of heart disease. · Use olive or canola oil when you cook. Try cholesterol-lowering spreads, such as Benecol or Take Control. · Bake, broil, grill, or steam foods instead of frying them. · Limit the amount of high-fat meats you eat, including hot dogs and sausages. Cut out all visible fat when you prepare meat. · Eat fish, skinless poultry, and soy products such as tofu instead of high-fat meats. Soybeans may be especially good for your heart. Eat at least two servings of fish a week. Certain fish, such as salmon, contain omega-3 fatty acids, which may help reduce your risk of heart attack. · Choose low-fat or fat-free milk and dairy products. Get exercise, limit alcohol, and quit smoking · Get more exercise. Work with your doctor to set up an exercise program. Even if you can do only a small amount, exercise will help you get stronger, have more energy, and manage your weight and your stress. Walking is an easy way to get exercise.  Gradually increase the amount you walk every day. Aim for at least 30 minutes on most days of the week. You also may want to swim, bike, or do other activities. · Limit alcohol to no more than 2 drinks a day for men and 1 drink a day for women. · Do not smoke. If you need help quitting, talk to your doctor about stop-smoking programs and medicines. These can increase your chances of quitting for good. When should you call for help? Call 911 anytime you think you may need emergency care. For example, call if: 
· You have symptoms of a heart attack. These may include: ¨ Chest pain or pressure, or a strange feeling in the chest. 
¨ Sweating. ¨ Shortness of breath. ¨ Nausea or vomiting. ¨ Pain, pressure, or a strange feeling in the back, neck, jaw, or upper belly or in one or both shoulders or arms. ¨ Lightheadedness or sudden weakness. ¨ A fast or irregular heartbeat. After you call 911, the  may tell you to chew 1 adult-strength or 2 to 4 low-dose aspirin. Wait for an ambulance. Do not try to drive yourself. · You have signs of a stroke. These may include: 
¨ Sudden numbness, paralysis, or weakness in your face, arm, or leg, especially on only one side of your body. ¨ New problems with walking or balance. ¨ Sudden vision changes. ¨ Drooling or slurred speech. ¨ New problems speaking or understanding simple statements, or feeling confused. ¨ A sudden, severe headache that is different from past headaches. · You passed out (lost consciousness). Call your doctor now or seek immediate medical care if: 
· You have muscle pain or weakness. Watch closely for changes in your health, and be sure to contact your doctor if: 
· You are very tired. · You have an upset stomach, gas, constipation, or belly pain or cramps. Where can you learn more? Go to [employer].be Enter C406 in the search box to learn more about \"Hyperlipidemia: After Your Visit. \"  
 © 6909-1586 [employer]. Care instructions adapted under license by Eliza Cottrell (which disclaims liability or warranty for this information). This care instruction is for use with your licensed healthcare professional. If you have questions about a medical condition or this instruction, always ask your healthcare professional. [address] any warranty or liability for your use of this information. Content Version: 4.9.719671; Last Revised: October 13, 2011 Introducing \[facility]\"" & [facility] SERVICES! Eliza Cottrell introduces [employer] patient portal. Now you can access parts of your medical record, email your doctor's office, and request medication refills online. 1. In your internet browser, go to [email or web address] 2. Click on the First Time User? Click Here link in the Sign In box. You will see the New Member Sign Up page. 3. Enter your [employer] Access Code exactly as it appears below. You will not need to use this code after youve completed the sign-up process. If you do not sign up before the expiration date, you must request a new code. · [employer] Access Code: [email or web address] Expires: 3/20/2018 12:58 PM 
 
4. Enter the last four digits of your Social Security Number (xxxx) and Date of Birth (mm/dd/yyyy) as indicated and click Submit. You will be taken to the next sign-up page. 5. Create a e[employer]o ID. This will be your e[employer]o login ID and cannot be changed, so think of one that is secure and easy to remember. 6. Create a [employer] password. You can change your password at any time. 7. Enter your Password Reset Question and Answer. This can be used at a later time if you forget your password. 8. Enter your e-mail address. You will receive e-mail notification when new information is available in [address] 9. Click Sign Up. You can now view and download portions of your medical record. 10. Click the Download Summary menu link to download a portable copy of your medical information. If you have questions, please visit the Frequently Asked Questions section of the [employer] website. Remember, [employer] is NOT to be used for urgent needs. For medical emergencies, dial 911. Now available from your iPhone and Android! Please provide this summary of care documentation to your next provider. Your primary care clinician is listed as Vanita Jefferson. If you have any questions after today's visit, please call [phone].

## 2017-12-21 ENCOUNTER — TELEPHONE (OUTPATIENT)
Dept: INTERNAL MEDICINE CLINIC | Age: 77
End: 2017-12-21

## 2017-12-21 LAB — 25(OH)D3 SERPL-MCNC: 52.6 NG/ML (ref 30–100)

## 2017-12-21 NOTE — TELEPHONE ENCOUNTER
Please let the patient know that the blood work drawn at her visit showed normal liver and kidney function.  Vitamin D level was normal.

## 2017-12-22 ENCOUNTER — TELEPHONE (OUTPATIENT)
Dept: INTERNAL MEDICINE CLINIC | Age: 77
End: 2017-12-22

## 2017-12-22 NOTE — TELEPHONE ENCOUNTER
Patient returned call regarding lab results and I gave her Dr Mark Orozco'  message. She voiced understanding and was happy to get good news!

## 2017-12-24 NOTE — PROGRESS NOTES
HPI:   Oliver Larose is a 76y.o. year old female who presents today for evaluation of hypertension, ASCVD, hyperlipidemia, syncope, Parkinson's disease, GERD, and macular degeneration. She reports that she is doing relatively well. She was last seen on 11/28/2017 and reported that for the last several months, she had noted increasing bilateral thigh aching pain, which increased with ambulation particularly when walking up stairs. She stated that the discomfort is similar to that which developed previously with use of simvastatin and atorvastatin. She had been on pravastatin since 10/2014 and did not note any difficulty previously. She was instructed to discontinue pravastatin to see if her symptoms improved. She presents today for follow-up and reports that the thigh pain has completely resolved. She reports no further difficulty with ambulation. She is otherwise without complaints. She has a history of hypertension, hyperlipidemia, ASCVD, and syncope. In 3/2011, she had three syncopal episodes while donating blood. Over the subsequent four days, she developed exertional substernal chest tightness with left arm pain and dyspnea. She was evaluated and EKG revealed new T wave inversions in leads V2 and V3. Troponins were negative. She underwent cardiac catheterization (3/21/2011) which showed a 90% mid LAD and mild disease in the rest of the coronaries. She underwent PCI and placement of two drug-eluting stents for the mid LAD lesion; LV function was mildly diminished (EF 40-45%) with anteroapical hypokinesis. Follow-up echocardiogram (9/2011) showed return to normal LV function (EF 60%) and no RWMA. She has a history of multiple syncopal episodes, usually related to stressful situations, and thought to be vasovagal in origin. An implantable loop recorder was placed in 3/2011 and remained until 4/2013, and interrogation was negative for any significant arrhythmia.  In 4/2014, she was hospitalized following another syncopal episode, which occurred after she had taken her morning medications. Afterward, she became nauseated and flushed, and called EMS. When they arrived, they found her on the floor and reportedly without a pulse. They began CPR and within 10-15 seconds, she recovered. Evaluation included EKG/troponins, which were negative, and an echocardiogram showing mild MR and normal LV function (EF 60-65%). She was found to have hypokalemia and hypomagnesemia and hydrochlorothiazide was discontinued. It was thought that this event also represented a vasovagal reaction given her prodromal symptoms. She has had no further events since that time. She had a repeat pharmacologic nuclear stress test (7/14/2017) which was a normal, low risk study, negative for evidence of ischemia or prior infarction, and with normal LV size and function (EF 70%). Her current regimen includes metoprolol, lisinopril, aspirin, pravastatin, and sublingual nitroglycerin prn. She is being followed by Dr. Emely Roland. She remains very active line dancing 3x/week, doing yardwork and riding her bicycle. She denies any chest pain, shortness of breath at rest or with exertion, palpitations, lightheadedness, or edema. She has a history of idiopathic Parkinson's disease, predominantly left-sided. She is currently being treated with Sinemet, and reports relatively good control of symptoms. She has difficulty with  writing at times, particularly towards the end of the day, and also describes increasing tremors midday. She is followed by Dr. Sneha Horn. She has a history of laryngopharyngeal reflux disease, treated with dexlansoprazole, and she is followed by Dr. Abram Cedeño. In 5/2017 she had multiple episodes of epistaxis prompting an ED visit. She subsequently underwent silver nitrate cautery of anterior vessels in her right nares by Dr. Abram Cedeño, and has had no recurrence. In 3/2010, she underwent evaluation for iron deficiency anemia.  She had previously had a negative colonoscopy and air contrast barium enema in 2009 by Dr. Aida You, and she had an upper endoscopy by Dr. Destiny Hills which was normal. She was treated with iron with improvement. She had a repeat screening colonoscopy in 7/2016 by Dr. Destiny Hills which was normal. No further follow-up was recommended given her age. She denies any abdominal pain, nausea, vomiting, melena, hematochezia, or change in bowel movements. She has a history of osteopenia with last bone density study in 5/2016 showing T-scores:  femoral neck left -1.0  /right -1.4 and lumbar -0.8 . She continues to take calcium and Vitamin D supplements. She has no history of pathologic fractures. She has a recent history of abnormal mammograms since 10/2014 with microcalcifcations in the right breast. She has been undergoing surveillance mammograms every six months, which have been unchanged. She had a follow-up mammogram in 5/2016 which was negative, and routine annual screening was recommended. Past Medical History:   Diagnosis Date    Basal cell cancer     CAD (coronary artery disease), native coronary artery 03/21/2011    s/p PCI with with CARLITO (Xience 2.75x12, 2.5x12) mLAD and mild disease in rest of coronaries. Midly depressed LV syst fct     Cardiac cath 03/21/2011    mLAD 90% (2.75 x 12 mm Xience stent). Otherwise patent coronaries. LVEDP 10 mmHg. EF 40-45%. Anteroapical hypk. Renal arteries patent.  Cardiac echocardiogram 04/17/2014    EF 60-65%. No WMA. Gr 1 DDfx. Mild MR. Similar to study of 9/14/11.  Cardiac nuclear imaging test 05/20/2008    Negative for ischemia or infarction. EF 79%.     Cardiomyopathy secondary     ischemic +/- stress induced    Dyslipidemia     GERD (gastroesophageal reflux disease)     Hx of colonoscopy 07/12/2016    Normal. Dr. Selvin Valdez Hypertension     Parkinson's disease     Syncope     s/p ILD implantation     Past Surgical History:   Procedure Laterality Date    COLONOSCOPY N/A 7/12/2016    COLONOSCOPY performed by Adan Owusu MD at [address] [facility] HX BUNIONECTOMY      dorsal    HX CATARACT REMOVAL  11/2012    left    HX CATARACT REMOVAL  10/2012    right    HX CORONARY STENT PLACEMENT      HX HEART CATHETERIZATION      HX HEMORRHOIDECTOMY      HX HYSTERECTOMY  1996    partial    HX IMPLANTABLE LOOP RECORDER  [id number]    HX TONSILLECTOMY      HX TOTAL ABDOMINAL HYSTERECTOMY  1996    partial     Current Outpatient Prescriptions   Medication Sig    lisinopril (PRINIVIL, ZESTRIL) 10 mg tablet Take 1 Tab by mouth daily.  metoprolol succinate (TOPROL-XL) 50 mg XL tablet Take 1 Tab by mouth daily.  rosuvastatin (CRESTOR) 10 mg tablet Take 1 Tab by mouth nightly.  krill-omega-3-dha-epa-lipids (KRILL OIL) 861-36-64-81 mg cap Take  by mouth.  potassium chloride (K-DUR, KLOR-CON) 10 mEq tablet Take 1 Tab by mouth every other day.  magnesium hydroxide (NEGRO MILK OF MAGNESIA) 400 mg/5 mL suspension Take 30 mL by mouth daily as needed for Constipation.  carbidopa-levodopa CR (SINEMET CR)  mg per tablet Take  by mouth nightly.  psyllium seed-sucrose (METAMUCIL) powd 1 tablespoon bid    acetaminophen (TYLENOL) 650 mg CR tablet Take 650 mg by mouth every six (6) hours as needed for Pain.  co-enzyme Q-10 (CO Q-10) 100 mg capsule Take 100 mg by mouth daily.  VIT A/VIT C/VIT E/ZINC/COPPER (ICAPS AREDS PO) Take 1 Tab by mouth two (2) times a day.  Cholecalciferol, Vitamin D3, (VITAMIN D) 1,000 unit Cap Take 1 Cap by mouth daily.  VITAMIN B COMPLEX (BALANCE B-50 PO) Take 1 Tab by mouth daily.  carbidopa-levodopa (SINEMET)  mg per tablet Take 1 Tab by mouth three (3) times daily.  flaxseed oil 1,000 mg Cap Take 1 Cap by mouth daily.  aspirin 81 mg tablet Take 1 Tab by mouth daily.  MULTIVITAMIN PO Take 1 Tab by mouth daily.  Dexlansoprazole (DEXILANT) 60 mg CpDM Take 1 Cap by mouth daily. 1 tab every other day. Indications: Gastric Ulcer    DOCUSATE CALCIUM (STOOL SOFTENER PO) Take 1 Cap by mouth.  magnesium 250 mg Tab Take 250 mg by mouth two (2) times a day.  resveratrol 100 mg Cap Take 2 Caps by mouth daily.  lecithin 1,200 mg Cap Take 1 Cap by mouth daily.  clobetasol (TEMOVATE) 0.05 % external solution Apply as directed.  diclofenac (VOLTAREN) 1 % topical gel Apply 2 g to affected area four (4) times daily as needed.  nitroglycerin (NITROSTAT) 0.4 mg SL tablet 1 sublingual every 5 minutes for chest pain for no more than 3 doses    fluticasone (FLONASE) 50 mcg/actuation nasal spray 2 Sprays by Both Nostrils route daily. No current facility-administered medications for this visit. Allergies and Intolerances: Allergies   Allergen Reactions    Keflex [Cephalexin] Other (comments)     Yeast infection    Pcn [Penicillins] Other (comments)     Family History: She has no family history of colon or breast cancer. Family History   Problem Relation Age of Onset    Heart Attack Father 80    Heart Disease Father     Cancer Mother      pancreatic    Hypertension Brother      Social History:   She  reports that she has never smoked. She has never used smokeless tobacco. She lives with her , who is having difficulty with mild cognitive impairment. She states that they sold their RV and now stay at home for the winter. She is a retired x-ray technician. History   Alcohol Use    Yes     Comment: glass of wine occasionally.      Immunization History:  Immunization History   Administered Date(s) Administered    Influenza High Dose Vaccine PF 09/29/2014, 10/05/2015, 11/01/2016, 08/30/2017    Influenza Vaccine Whole 09/07/2010, 09/03/2011, 10/04/2012    Pneumococcal Conjugate (PCV-13) 10/05/2015    TD Vaccine 01/01/2003    Tdap 09/25/2013    ZZZ-RETIRED (DO NOT USE) Pneumococcal Vaccine (Unspecified Type) 01/01/2003    Zoster 01/01/2007       Review of Systems:   As above included in HPI.  Otherwise 11 point review of systems negative including constitutional, skin, HENT, eyes, respiratory, cardiovascular, gastrointestinal, genitourinary, musculoskeletal, endo/heme/aller, neurological.    Physical:   Vitals:   BP: 140/80   HR: 64  WT: 130 lb 9.6 oz (59.2 kg)  BMI:  21.73 kg/m2    Exam:   Pt appears well; alert and oriented x 3; appropriate affect. HEENT: PERRLA, anicteric, oropharynx clear, no JVD, adenopathy or thyromegaly. No carotid bruits or radiated murmur. Lungs: clear to auscultation, no wheezes, rhonchi, or rales. Heart: regular rate and rhythm. No murmur, rubs, gallops  Abdomen: soft, nontender, nondistended, normal bowel sounds, no hepatosplenomegaly or masses. Extremities: without edema. Pulses 1-2+ bilaterally. Muscle strength 5/5 bilaterally. Review of Data:  Labs:  Hospital Outpatient Visit on 11/21/2017   Component Date Value Ref Range Status    WBC 11/21/2017 4.2* 4.6 - 13.2 K/uL Final    RBC 11/21/2017 4.15* 4.20 - 5.30 M/uL Final    HGB 11/21/2017 12.7  12.0 - 16.0 g/dL Final    HCT 11/21/2017 40.3  35.0 - 45.0 % Final    MCV 11/21/2017 97.1* 74.0 - 97.0 FL Final    MCH 11/21/2017 30.6  24.0 - 34.0 PG Final    MCHC 11/21/2017 31.5  31.0 - 37.0 g/dL Final    RDW 11/21/2017 13.4  11.6 - 14.5 % Final    PLATELET 60/30/1470 429  135 - 420 K/uL Final    MPV 11/21/2017 10.2  9.2 - 11.8 FL Final    NEUTROPHILS 11/21/2017 57  40 - 73 % Final    LYMPHOCYTES 11/21/2017 28  21 - 52 % Final    MONOCYTES 11/21/2017 8  3 - 10 % Final    EOSINOPHILS 11/21/2017 6* 0 - 5 % Final    BASOPHILS 11/21/2017 1  0 - 2 % Final    ABS. NEUTROPHILS 11/21/2017 2.4  1.8 - 8.0 K/UL Final    ABS. LYMPHOCYTES 11/21/2017 1.2  0.9 - 3.6 K/UL Final    ABS. MONOCYTES 11/21/2017 0.3  0.05 - 1.2 K/UL Final    ABS. EOSINOPHILS 11/21/2017 0.2  0.0 - 0.4 K/UL Final    ABS.  BASOPHILS 11/21/2017 0.0  0.0 - 0.06 K/UL Final    DF 11/21/2017 AUTOMATED    Final    Sodium 11/21/2017 141  136 - 145 mmol/L Final    Potassium 11/21/2017 4.0  3.5 - 5.5 mmol/L Final    Chloride 11/21/2017 105  100 - 108 mmol/L Final    CO2 11/21/2017 29  21 - 32 mmol/L Final    Anion gap 11/21/2017 7  3.0 - 18 mmol/L Final    Glucose 11/21/2017 92  74 - 99 mg/dL Final    BUN 11/21/2017 14  7.0 - 18 MG/DL Final    Creatinine 11/21/2017 0.73  0.6 - 1.3 MG/DL Final    BUN/Creatinine ratio 11/21/2017 19  12 - 20   Final    GFR est AA 11/21/2017 >60  >60 ml/min/1.73m2 Final    GFR est non-AA 11/21/2017 >60  >60 ml/min/1.73m2 Final    Calcium 11/21/2017 9.0  8.5 - 10.1 MG/DL Final    LIPID PROFILE 11/21/2017        Final    Cholesterol, total 11/21/2017 139  <200 MG/DL Final    Triglyceride 11/21/2017 73  <150 MG/DL Final    HDL Cholesterol 11/21/2017 66* 40 - 60 MG/DL Final    LDL, calculated 11/21/2017 58.4  0 - 100 MG/DL Final    VLDL, calculated 11/21/2017 14.6  MG/DL Final    CHOL/HDL Ratio 11/21/2017 2.1  0 - 5.0   Final     Health Maintenance:  Screening:    Mammogram: negative (5/2017)   PAP smear: s/p ROHINI. No further screening. Colorectal: colonoscopy (7/2016) normal. Dr. Michelle Montesinos. No further screening. Depression: none   DM (HbA1c/FPG): FPG 92 (11/2017)   Hepatitis C: N/A   Falls: none   DEXA: osteopenia (5/2016)   Glaucoma: regular eye exams with Dr. Saqib Rogel (last 5/2017) and Dr. Merry Encinas for left macular degeneration. Smoking: none   Vitamin D: 49.8 (5/2017)   Medicare Wellness: 5/10/2017    Impression:  Patient Active Problem List   Diagnosis Code    Hypertension I10    CAD S/P percutaneous coronary angioplasty I25.10, Z98.61    Dyslipidemia E78.5    Syncope R55    Parkinson disease ([facility] [address] G20    GERD (gastroesophageal reflux disease) K21.9    Advance directive on file Z78.9    Macular degeneration of left eye H35.30    Laryngopharyngeal reflux disease K21.9    Osteopenia M85.80    Bilateral thigh pain M79.651, M79.652    Psoriasis of scalp L40.9       Plan:  1. Hyperlipidemia. Previously on low intensity dose pravastatin with LDL 58, indicative of excellent control. However, patient complaining of severe bilateral thigh pain, worse with ambulation. She has continued to take CoQ-10 without relief. Reports similar symptoms in past with statin. Was taking Zocor until 2012, and then Lipitor until 9/2014, but due to myalgias, was changed to Crestor. She only took Crestor for one month, but due to lack of insurance coverage, was changed to pravastatin in 10/2014. Discussed importance of statin therapy due to known CAD and LAD stent. Patient aware of need. Discontinuation of pravastatin did result in resolution of symptoms. Will check Vitamin D level, CK, and hepatic panel today and begin rosuvastatin 10 mg daily. Emphasized importance of lifestyle modifications, including diet, exercise, and weight loss. Continue to follow and recheck lipid panel in 3 months. 2. Hypertension. Blood pressure mildly elevated today, but has been well controlled previously at home and in office on lisinopril and metoprolol. Renal function remains normal with creatinine 0.73 / eGFR >60. Continue to follow. 3. ASCVD. Remains asymptomatic. Resolution of cardiomyopathy with last echocardiogram revealing normal LV function. Negative pharmacologic nuclear stress test in 7/2017. Continue current regimen. Being followed by Dr. Ivis Lee. 4. H/O syncope. No further episodes since 2014. Most likely secondary to vasovagal reaction. Follow. 5. Parkinson's disease. Doing well on Sinemet. Followed by Dr. Natty Anders.  6. Osteopenia. Last bone density scan 5/2016. Using femoral neck T-scores, calculated FRAX score estimates her 10 year risk of a major osteoporetic fracture at 11 % and hip fracture at 2.3 %, which are not an indication for biphosphonate treatment (>20% and >3%, respectively). Continue calcium and vitamin D supplements. Encouraged exercise, particularly weight bearing activities.  Repeat bone density scan in 5/2018. 7. Macular degeneration, left. Being followed by Dr. Javier Rush. 8. Laryngopharyngeal reflux. Doing well on Dexilant. Followed by Dr. John Henderson. 9. Epistaxis. No further recurrence since application of silver nitrate by Dr. John Henderson. Continue to follow. 10. Psoriasis. Using clobetasol solution for scalp psoriasis. 11. Health maintenance. Already received influenza vaccine. Other immunizations up to date. No further colorectal cancer screening needed. Mammogram up to date. Continue regular eye exams with Ankur Zavala and Javier Rush. Vitamin D level normal. Continue maintenance dose supplement. Medicare wellness up to date. Patient understands recommendations and agrees with plan. Follow-up in 3 months.

## 2017-12-26 NOTE — TELEPHONE ENCOUNTER
Patient reached and states she spoke last week with someone regarding her Lab results and understands all are normal.

## 2018-02-06 RX ORDER — POTASSIUM CHLORIDE 750 MG/1
10 TABLET, EXTENDED RELEASE ORAL EVERY OTHER DAY
Qty: 45 TAB | Refills: 3 | Status: SHIPPED | OUTPATIENT
Start: 2018-02-06 | End: 2018-02-07 | Stop reason: SDUPTHER

## 2018-02-07 RX ORDER — POTASSIUM CHLORIDE 750 MG/1
10 TABLET, EXTENDED RELEASE ORAL EVERY OTHER DAY
Qty: 45 TAB | Refills: 3 | Status: SHIPPED | OUTPATIENT
Start: 2018-02-07 | End: 2019-12-23 | Stop reason: SDUPTHER

## 2018-03-14 ENCOUNTER — OFFICE VISIT (OUTPATIENT)
Dept: ORTHOPEDIC SURGERY | Age: 78
End: 2018-03-14

## 2018-03-14 VITALS
TEMPERATURE: 96.5 F | BODY MASS INDEX: 20.83 KG/M2 | WEIGHT: 125 LBS | HEIGHT: 65 IN | OXYGEN SATURATION: 100 % | DIASTOLIC BLOOD PRESSURE: 88 MMHG | HEART RATE: 61 BPM | SYSTOLIC BLOOD PRESSURE: 143 MMHG

## 2018-03-14 DIAGNOSIS — M77.52 RETROCALCANEAL BURSITIS, LEFT: ICD-10-CM

## 2018-03-14 DIAGNOSIS — M17.12 PRIMARY OSTEOARTHRITIS OF LEFT KNEE: Primary | ICD-10-CM

## 2018-03-14 RX ORDER — TRIAMCINOLONE ACETONIDE 40 MG/ML
40 INJECTION, SUSPENSION INTRA-ARTICULAR; INTRAMUSCULAR ONCE
Qty: 1 ML | Refills: 0
Start: 2018-03-14 | End: 2018-03-14

## 2018-03-14 NOTE — PROGRESS NOTES
Cory Villar  1940   Chief Complaint   Patient presents with    Knee Pain     BILAT    Ankle Pain     LEFT        HISTORY OF PRESENT ILLNESS  Cory Villar is a 76 y.o. female who presents today for evaluation of bilateral knee pain and left ankle pain. she rates her pain 8/10 today. Pain has been present for a long time. She has aching in the ankle that worsens throughout the day. Reports swelling in the ankle. She would like an injection in the left ankle. Patient's b/l knee pain L>R is worse with walking up and down stairs. Has tried injection in the past. She was seen by Dr. Christin Dinh in the past.        Allergies   Allergen Reactions    Keflex [Cephalexin] Other (comments)     Yeast infection    Pcn [Penicillins] Other (comments)        Past Medical History:   Diagnosis Date    Basal cell cancer     CAD (coronary artery disease), native coronary artery 03/21/2011    s/p PCI with with CARLITO (Xience 2.75x12, 2.5x12) mLAD and mild disease in rest of coronaries. Midly depressed LV syst fct     Cardiac cath 03/21/2011    mLAD 90% (2.75 x 12 mm Xience stent). Otherwise patent coronaries. LVEDP 10 mmHg. EF 40-45%. Anteroapical hypk. Renal arteries patent.  Cardiac echocardiogram 04/17/2014    EF 60-65%. No WMA. Gr 1 DDfx. Mild MR. Similar to study of 9/14/11.  Cardiac nuclear imaging test 05/20/2008    Negative for ischemia or infarction. EF 79%.  Cardiomyopathy secondary     ischemic +/- stress induced    Dyslipidemia     GERD (gastroesophageal reflux disease)     Hx of colonoscopy 07/12/2016    Normal. Dr. Tika Cruz Hypertension     Parkinson's disease     Syncope     s/p ILD implantation      Social History     Social History    Marital status:      Spouse name: N/A    Number of children: N/A    Years of education: N/A     Occupational History    Not on file.      Social History Main Topics    Smoking status: Never Smoker    Smokeless tobacco: Never Used    Alcohol use Yes      Comment: glass of wine occasionally.  Drug use: No    Sexual activity: No     Other Topics Concern    Not on file     Social History Narrative      Past Surgical History:   Procedure Laterality Date    COLONOSCOPY N/A 7/12/2016    COLONOSCOPY performed by Harpreet Fabian MD at [address] [place] [address] HX BUNIONECTOMY      dorsal    HX CATARACT REMOVAL  11/2012    left    HX CATARACT REMOVAL  10/2012    right    HX CORONARY STENT PLACEMENT      HX HEART CATHETERIZATION      HX HEMORRHOIDECTOMY      HX HYSTERECTOMY  1996    partial    HX IMPLANTABLE LOOP RECORDER  [id number]    HX TONSILLECTOMY      HX TOTAL ABDOMINAL HYSTERECTOMY  1996    partial      Family History   Problem Relation Age of Onset    Heart Attack Father 80    Heart Disease Father     Cancer Mother      pancreatic    Hypertension Brother       Current Outpatient Prescriptions   Medication Sig    triamcinolone acetonide (KENALOG) 40 mg/mL injection 1 mL by IntraMUSCular route once for 1 dose.  triamcinolone acetonide (KENALOG) 40 mg/mL injection 1 mL by IntraMUSCular route once for 1 dose.  potassium chloride (KLOR-CON) 10 mEq tablet Take 1 Tab by mouth every other day.  lisinopril (PRINIVIL, ZESTRIL) 10 mg tablet Take 1 Tab by mouth daily.  metoprolol succinate (TOPROL-XL) 50 mg XL tablet Take 1 Tab by mouth daily.  rosuvastatin (CRESTOR) 10 mg tablet Take 1 Tab by mouth nightly.  krill-omega-3-dha-epa-lipids (KRILL OIL) 401-10-86-95 mg cap Take  by mouth.  clobetasol (TEMOVATE) 0.05 % external solution Apply as directed.  magnesium hydroxide (NEGRO MILK OF MAGNESIA) 400 mg/5 mL suspension Take 30 mL by mouth daily as needed for Constipation.  carbidopa-levodopa CR (SINEMET CR)  mg per tablet Take  by mouth nightly.     nitroglycerin (NITROSTAT) 0.4 mg SL tablet 1 sublingual every 5 minutes for chest pain for no more than 3 doses    psyllium seed-sucrose (METAMUCIL) powd 1 tablespoon bid    co-enzyme Q-10 (CO Q-10) 100 mg capsule Take 100 mg by mouth daily.  VIT A/VIT C/VIT E/ZINC/COPPER (ICAPS AREDS PO) Take 1 Tab by mouth two (2) times a day.  Cholecalciferol, Vitamin D3, (VITAMIN D) 1,000 unit Cap Take 1 Cap by mouth daily.  VITAMIN B COMPLEX (BALANCE B-50 PO) Take 1 Tab by mouth daily.  flaxseed oil 1,000 mg Cap Take 1 Cap by mouth daily.  aspirin 81 mg tablet Take 1 Tab by mouth daily.  MULTIVITAMIN PO Take 1 Tab by mouth daily.  Dexlansoprazole (DEXILANT) 60 mg CpDM Take 1 Cap by mouth daily. 1 tab every other day. Indications: Gastric Ulcer    DOCUSATE CALCIUM (STOOL SOFTENER PO) Take 1 Cap by mouth.  magnesium 250 mg Tab Take 250 mg by mouth two (2) times a day.  resveratrol 100 mg Cap Take 2 Caps by mouth daily.  lecithin 1,200 mg Cap Take 1 Cap by mouth daily.  diclofenac (VOLTAREN) 1 % topical gel Apply 2 g to affected area four (4) times daily as needed.  acetaminophen (TYLENOL) 650 mg CR tablet Take 650 mg by mouth every six (6) hours as needed for Pain.  fluticasone (FLONASE) 50 mcg/actuation nasal spray 2 Sprays by Both Nostrils route daily. No current facility-administered medications for this visit. REVIEW OF SYSTEM   Patient denies: Weight loss, Fever/Chills, HA, Visual changes, Fatigue, Chest pain, SOB, Abdominal pain, N/V/D/C, Blood in stool or urine, Edema. Pertinent positive as above in HPI. All others were negative    PHYSICAL EXAM:   Visit Vitals    /88    Pulse 61    Temp 96.5 °F (35.8 °C) (Oral)    Ht 5' 5\" (1.651 m)    Wt 125 lb (56.7 kg)    SpO2 100%    BMI 20.8 kg/m2     The patient is a well-developed, well-nourished female   in no acute distress. The patient is alert and oriented times three. The patient is alert and oriented times three. Mood and affect are normal.  LYMPHATIC: lymph nodes are not enlarged and are within normal limits  SKIN: normal in color and non tender to palpation. There are no bruises or abrasions noted. NEUROLOGICAL: Motor sensory exam is within normal limits. Reflexes are equal bilaterally. There is normal sensation to pinprick and light touch  MUSCULOSKELETAL:  Examination Left knee Right knee   Skin Intact Intact   Range of motion 0-130 0-130   Effusion + -   Medial joint line tenderness + -   Lateral joint line tenderness - -   Tenderness Pes Bursa - -   Tenderness insertion MCL - -   Tenderness insertion LCL - -   Moshes - -   Patella crepitus + +   Patella grind - -   Lachman - -   Pivot shift - -   Anterior drawer - -   Posterior drawer - -   Varus stress - -   Valgus stress - -   Neurovascular Intact Intact   Calf Swelling and Tenderness to Palpation - -   Breezy's Test - -   Hamstring Cord Tightness - -        Examination Left Ankle/Foot   Skin Intact   Swelling +, retrocalcaneal bursa   Dorsiflexion 10   Plantarflexion 25   Deformity -   Inversion laxity -   Anterior drawer -   Medial malleolus tenderness -   Lateral malleolus tenderness -   Heel cord Intact   Heel cord Tightness -   Silva's Test -   Breezy's Test -   Sensation Intact   Bunion -   Capillary refill Normal       PROCEDURE: After sterile prep, 4 cc of Xylocaine and 1 cc of Kenalog were injected into the left knee and 1 cc of Xylocaine and 1 cc of Kenalog were injected into the left ankle.        [facility]  OFFICE PROCEDURE PROGRESS NOTE        Chart reviewed for the following:  John Ruiz MD, have reviewed the History, Physical and updated the Allergic reactions for [place] [address] performed immediately prior to start of procedure:  John Ruiz MD, have performed the following reviews on Sabina Rahman prior to the start of the procedure:            * Patient was identified by name and date of birth   * Agreement on procedure being performed was verified  * Risks and Benefits explained to the patient  * Procedure site verified and marked as necessary  * Patient was positioned for comfort  * Consent was signed and verified     Time: 3:50 PM    Date of procedure: 3/14/2018    Procedure performed by:  Alison Bay MD    Provider assisted by: (see medication administration)    How tolerated by patient: tolerated the procedure well with no complications    Comments: none      IMAGING: MRI of the left ankle dated 6/22/17 was reviewed and read:   IMPRESSION: More severe subcutaneous edema, more severe loculated joint  effusion. More severe flexor hallucis longusret tenosynovitis with os trigonum, os  trigonum syndrome. Anterior talofibular ligament tear, with additional findings  as above. IMPRESSION:      ICD-10-CM ICD-9-CM    1. Primary osteoarthritis of left knee M17.12 715.16 TRIAMCINOLONE ACETONIDE INJ      triamcinolone acetonide (KENALOG) 40 mg/mL injection      DRAIN/INJECT LARGE JOINT/BURSA   2. Retrocalcaneal bursitis, left M77.52 726.79 TRIAMCINOLONE ACETONIDE INJ      triamcinolone acetonide (KENALOG) 40 mg/mL injection      CA DRAIN/INJECT SMALL JOINT/BURSA        PLAN:  1. Patient's b/l knee pain is coming from OA. Her left ankle pain is coming from retrocalcaneal bursitis. Since the left knee is worse than the right, we will inject that side today. I will have her follow up in a few weeks to inject the right knee. Risk factors include: htn  2. Yes cortisone injection indicated today L KNEE, L ANKLE  3. No Physical/Occupational Therapy indicated today  4. No diagnostic test indicated today  5. No durable medical equipment indicated today  6. Yes referral indicated today HILARY  7. No medications indicated today  8. No Narcotic indicated today     RTC 4 weeks  Follow-up Disposition: Not on File    Scribed by Shanta Hendrix [facility]) as dictated by Alison Bay MD    I, Dr. Alison Bay, confirm that all documentation is accurate.     Alison Bay M.D. [facility]

## 2018-03-27 ENCOUNTER — OFFICE VISIT (OUTPATIENT)
Dept: ORTHOPEDIC SURGERY | Age: 78
End: 2018-03-27

## 2018-03-27 VITALS
RESPIRATION RATE: 14 BRPM | OXYGEN SATURATION: 100 % | DIASTOLIC BLOOD PRESSURE: 77 MMHG | WEIGHT: 132 LBS | HEART RATE: 67 BPM | BODY MASS INDEX: 21.99 KG/M2 | HEIGHT: 65 IN | TEMPERATURE: 97.5 F | SYSTOLIC BLOOD PRESSURE: 141 MMHG

## 2018-03-27 DIAGNOSIS — M76.72 TENDINITIS OF LEFT PERONEUS BREVIS TENDON: Primary | ICD-10-CM

## 2018-03-27 DIAGNOSIS — M25.572 LEFT ANKLE PAIN, UNSPECIFIED CHRONICITY: ICD-10-CM

## 2018-03-27 NOTE — MR AVS SNAPSHOT
[address], [address] 200 [facility] 
[phone] Patient: Maxim Ann MRN: [id number] Visit Information Date & Time Provider Department Dept. Phone Encounter #  
 3/27/2018  8:30 AM Robby Huitron, [facility] [phone] [id number] Your Appointments 3/28/2018  8:00 AM  
LAB with [facility] NURSE VISIT [facility] [address] [place] [address] ([facility]) Appt Note: lab  
 [address], [address] [address] [address]  
  
   
 [address], [address]  
  
    
 4/4/2018  8:00 AM  
Office Visit with Tyler Ferguson MD  
[facility] Appt Note: 3 month follow up  
 [address], [address] [id number] [address]  
  
   
 [address], [address]  
  
    
 7/6/2018  8:20 AM  
Follow Up with Deng Jang MD  
[facility] ([facility]) Appt Note: 1 year follow up with an EKG  
 [facility] [address] [place] [phone]  
[phone] [facility] [place] [address] Upcoming Health Maintenance Date Due  
 GLAUCOMA SCREENING Q2Y 5/6/2018 MEDICARE YEARLY EXAM 5/11/2018 DTaP/Tdap/Td series (2 - Td) 9/25/2023 Allergies as of 3/27/2018  Review Complete On: 3/27/2018 By: Robby Huitron MD  
  
 Severity Noted Reaction Type Reactions Keflex [Cephalexin]  11/21/2011   Intolerance Other (comments) Yeast infection Pcn [Penicillins]   Intolerance Other (comments) Current Immunizations  Reviewed on 8/30/2017 Name Date Influenza High Dose Vaccine PF 8/30/2017  2:22 PM, 11/1/2016  1:08 PM, 10/5/2015  1:45 PM, 9/29/2014 Influenza Vaccine Whole 10/4/2012, 9/3/2011, 9/7/2010 Pneumococcal Conjugate (PCV-13) 10/5/2015  1:46 PM  
 TD Vaccine 1/1/2003 Tdap 9/25/2013 ZZZ-RETIRED (DO NOT USE) Pneumococcal Vaccine (Unspecified Type) 1/1/2003 Zoster 1/1/2007 Not reviewed this visit You Were Diagnosed With   
  
 Codes Comments Left ankle pain, unspecified chronicity    -  Primary ICD-10-CM: J73.922 ICD-9-CM: 719.47 Vitals BP Pulse Temp Resp Height(growth percentile) Weight(growth percentile) 141/77 67 97.5 °F (36.4 °C) 14 5' 5\" (1.651 m) 132 lb (59.9 kg) SpO2 BMI OB Status Smoking Status 100% 21.97 kg/m2 Hysterectomy Never Smoker Vitals History BMI and BSA Data Body Mass Index Body Surface Area  
 21.97 kg/m 2 1.66 m 2 Preferred Pharmacy Pharmacy Name Phone [address] [place] [phone] Your Updated Medication List  
  
   
This list is accurate as of 3/27/18  8:58 AM.  Always use your most recent med list.  
  
  
  
  
 acetaminophen 650 mg Tber Commonly known as:  TYLENOL Take 650 mg by mouth every six (6) hours as needed for Pain. aspirin 81 mg tablet Take 1 Tab by mouth daily. BALANCE B-50 PO Take 1 Tab by mouth daily. clobetasol 0.05 % external solution Commonly known as:  Renetta Oar Apply as directed. co-enzyme Q-10 100 mg capsule Commonly known as:  CO Q-10 Take 100 mg by mouth daily. DEXILANT 60 mg Cpdb Generic drug:  Dexlansoprazole Take 1 Cap by mouth daily. 1 tab every other day. Indications: Gastric Ulcer  
  
 diclofenac 1 % Gel Commonly known as:  VOLTAREN Apply 2 g to affected area four (4) times daily as needed. flaxseed oil 1,000 mg Cap Take 1 Cap by mouth daily. fluticasone 50 mcg/actuation nasal spray Commonly known as:  Lorry Sinning 2 Sprays by Both Nostrils route daily. ICAPS AREDS PO Take 1 Tab by mouth two (2) times a day. KRILL -66-14-50 mg Cap Generic drug:  krill-omega-3-dha-epa-lipids Take  by mouth.  
  
 lecithin 1,200 mg Cap Take 1 Cap by mouth daily. lisinopril 10 mg tablet Commonly known as:  Era Shaji Take 1 Tab by mouth daily. magnesium 250 mg Tab Take 250 mg by mouth two (2) times a day. metoprolol succinate 50 mg XL tablet Commonly known as:  TOPROL-XL Take 1 Tab by mouth daily. MULTIVITAMIN PO Take 1 Tab by mouth daily. nitroglycerin 0.4 mg SL tablet Commonly known as:  NITROSTAT  
1 sublingual every 5 minutes for chest pain for no more than 3 doses NEGRO MILK OF MAGNESIA 400 mg/5 mL suspension Generic drug:  magnesium hydroxide Take 30 mL by mouth daily as needed for Constipation. potassium chloride 10 mEq tablet Commonly known as:  KLOR-CON Take 1 Tab by mouth every other day. psyllium seed-sucrose Powd Commonly known as:  METAMUCIL (SUGAR) 1 tablespoon bid  
  
 resveratrol 100 mg Cap Take 2 Caps by mouth daily. rosuvastatin 10 mg tablet Commonly known as:  CRESTOR Take 1 Tab by mouth nightly. SINEMET CR  mg per tablet Generic drug:  carbidopa-levodopa ER Take  by mouth nightly. STOOL SOFTENER PO Take 1 Cap by mouth. VITAMIN D3 1,000 unit Cap Generic drug:  cholecalciferol Take 1 Cap by mouth daily. We Performed the Following AMB POC XRAY, ANKLE; COMPLETE, 3+ VIE [13356 CPT(R)] Patient Instructions Please follow up in 4 weeks. You are advised to contact us if your condition worsens. Foot Pain: Care Instructions Your Care Instructions Foot injuries that cause pain and swelling are fairly common. Almost all sports or home repair projects can cause a misstep that ends up as foot pain. Normal wear and tear, especially as you get older, also can cause foot pain.  
Most minor foot injuries will heal on their own, and home treatment is usually all you need to do. If you have a severe injury, you may need tests and treatment. Follow-up care is a key part of your treatment and safety. Be sure to make and go to all appointments, and call your doctor if you are having problems. It's also a good idea to know your test results and keep a list of the medicines you take. How can you care for yourself at home? · Take pain medicines exactly as directed. ¨ If the doctor gave you a prescription medicine for pain, take it as prescribed. ¨ If you are not taking a prescription pain medicine, ask your doctor if you can take an over-the-counter medicine. · Rest and protect your foot. Take a break from any activity that may cause pain. · Put ice or a cold pack on your foot for 10 to 20 minutes at a time. Put a thin cloth between the ice and your skin. · Prop up the sore foot on a pillow when you ice it or anytime you sit or lie down during the next 3 days. Try to keep it above the level of your heart. This will help reduce swelling. · Your doctor may recommend that you wrap your foot with an elastic bandage. Keep your foot wrapped for as long as your doctor advises. · If your doctor recommends crutches, use them as directed. · Wear roomy footwear. · As soon as pain and swelling end, begin gentle exercises of your foot. Your doctor can tell you which exercises will help. When should you call for help? Call 911 anytime you think you may need emergency care. For example, call if: 
? · Your foot turns pale, white, blue, or cold. ?Call your doctor now or seek immediate medical care if: 
? · You cannot move or stand on your foot. ? · Your foot looks twisted or out of its normal position. ? · Your foot is not stable when you step down. ? · You have signs of infection, such as: 
¨ Increased pain, swelling, warmth, or redness. ¨ Red streaks leading from the sore area. ¨ Pus draining from a place on your foot. ¨ A fever. ? · Your foot is numb or tingly. ? Watch closely for changes in your health, and be sure to contact your doctor if: 
? · You do not get better as expected. ? · You have bruises from an injury that last longer than 2 weeks. Where can you learn more? Go to http://torin-atif.info/. Enter Z364 in the search box to learn more about \"Foot Pain: Care Instructions. \" Current as of: March 21, 2017 Content Version: 11.4 © 3795-2606 [email or web address].[employer]. Care instructions adapted under license by [employer] (which disclaims liability or warranty for this information). If you have questions about a medical condition or this instruction, always ask your healthcare professional. [address] any warranty or liability for your use of this information. Introducing [facility]! Florecita De La Garza introduces [employer] patient portal. Now you can access parts of your medical record, email your doctor's office, and request medication refills online. 1. In your internet browser, go to [email or web address] 2. Click on the First Time User? Click Here link in the Sign In box. You will see the New Member Sign Up page. 3. Enter your Revistronic Access Code exactly as it appears below. You will not need to use this code after youve completed the sign-up process. If you do not sign up before the expiration date, you must request a new code. · Rev[employer] Access Code: [email or web address] Expires: 6/25/2018  8:58 AM 
 
4. Enter the last four digits of your Social Security Number (xxxx) and Date of Birth (mm/dd/yyyy) as indicated and click Submit. You will be taken to the next sign-up page. 5. Create a Revistronic ID. This will be your Revistronic login ID and cannot be changed, so think of one that is secure and easy to remember. 6. Create a Exclusive Networkst password. You can change your password at any time. 7. Enter your Password Reset Question and Answer. This can be used at a later time if you forget your password. 8. Enter your e-mail address. You will receive e-mail notification when new information is available in [address] 9. Click Sign Up. You can now view and download portions of your medical record. 10. Click the Download Summary menu link to download a portable copy of your medical information. If you have questions, please visit the Frequently Asked Questions section of the Intrallect website. Remember, Intr[employer] is NOT to be used for urgent needs. For medical emergencies, dial 911. Now available from your iPhone and Android! Please provide this summary of care documentation to your next provider. Your primary care clinician is listed as Nishi Felton. If you have any questions after today's visit, please call [phone].

## 2018-03-27 NOTE — PATIENT INSTRUCTIONS
Please follow up in 4 weeks. You are advised to contact us if your condition worsens. Foot Pain: Care Instructions  Your Care Instructions  Foot injuries that cause pain and swelling are fairly common. Almost all sports or home repair projects can cause a misstep that ends up as foot pain. Normal wear and tear, especially as you get older, also can cause foot pain. Most minor foot injuries will heal on their own, and home treatment is usually all you need to do. If you have a severe injury, you may need tests and treatment. Follow-up care is a key part of your treatment and safety. Be sure to make and go to all appointments, and call your doctor if you are having problems. It's also a good idea to know your test results and keep a list of the medicines you take. How can you care for yourself at home? · Take pain medicines exactly as directed. ¨ If the doctor gave you a prescription medicine for pain, take it as prescribed. ¨ If you are not taking a prescription pain medicine, ask your doctor if you can take an over-the-counter medicine. · Rest and protect your foot. Take a break from any activity that may cause pain. · Put ice or a cold pack on your foot for 10 to 20 minutes at a time. Put a thin cloth between the ice and your skin. · Prop up the sore foot on a pillow when you ice it or anytime you sit or lie down during the next 3 days. Try to keep it above the level of your heart. This will help reduce swelling. · Your doctor may recommend that you wrap your foot with an elastic bandage. Keep your foot wrapped for as long as your doctor advises. · If your doctor recommends crutches, use them as directed. · Wear roomy footwear. · As soon as pain and swelling end, begin gentle exercises of your foot. Your doctor can tell you which exercises will help. When should you call for help? Call 911 anytime you think you may need emergency care.  For example, call if:  ? · Your foot turns pale, white, blue, or cold. ?Call your doctor now or seek immediate medical care if:  ? · You cannot move or stand on your foot. ? · Your foot looks twisted or out of its normal position. ? · Your foot is not stable when you step down. ? · You have signs of infection, such as:  ¨ Increased pain, swelling, warmth, or redness. ¨ Red streaks leading from the sore area. ¨ Pus draining from a place on your foot. ¨ A fever. ? · Your foot is numb or tingly. ? Watch closely for changes in your health, and be sure to contact your doctor if:  ? · You do not get better as expected. ? · You have bruises from an injury that last longer than 2 weeks. Where can you learn more? Go to http://torin-atif.info/. Enter M056 in the search box to learn more about \"Foot Pain: Care Instructions. \"  Current as of: March 21, 2017  Content Version: 11.4  © 8788-4592 [employer]. Care instructions adapted under license by [employer] (which disclaims liability or warranty for this information). If you have questions about a medical condition or this instruction, always ask your healthcare professional. [address] any warranty or liability for your use of this information.

## 2018-03-27 NOTE — PROGRESS NOTES
AMBULATORY PROGRESS NOTE      Patient: Vivian Grier             MRN: [id number]     SSN: [id number] Body mass index is 21.97 kg/(m^2). YOB: 1940     AGE: 76 y.o. EX: female    PCP: Jeffrey Mccall MD    IMPRESSION/DIAGNOSIS AND TREATMENT PLAN     DIAGNOSES  1. Tendinitis of left peroneus brevis tendon    2. Left ankle pain, unspecified chronicity        Orders Placed This Encounter    AMB SUPPLY ORDER    [31658] Ankle Min 3V      Vivian Grier understands her diagnoses and the proposed plan. As such, in this individual who has had posterolateral left ankle pain for about one year now and has had an MRI, roughly June 2017, that showed soft tissue swelling on the opposite side, the medial ankle, but showed the peroneal tendons to be intact without any evidence or significant tendinopathy, I am still going to recommend conservative care for her. A detailed examination did reveal some mild tenderness to the peroneal tendons and good peripheral pulses. It did not show any instability of her ankle. She is nontender medially. There is no instability of her ankle and/or the peroneal tendons, but clearly, she still has some tenderness posterolaterally along the peroneal tendons, but it was improved after she had a cortisone injection by Dr. Kristie Roth a few weeks ago. The plan is to continue to observe her and see how well she does. I do recommend a laterally-posted orthotic just to kind of offload her peroneal tendons and decrease some of the tension along the peroneal tendons as she is walking and standing. DIAGNOSTIC STUDIES:  She had x-rays of her ankle today, three views, AP, lateral, and oblique. It looks like she had an old injury to the lateral process of the talus, but no acute fractures are seen. She has some generalized osteopenia to the distal tibia, fibula, and to the talus but no osteochondral defect.   There is a large calcific bone spur seen at the first TMT region and degenerative changes across this area, but otherwise, no acute fractures are seen on these images. Plan:    1) DME Order: Low Profile laterally posted orthotic, goal to offload peroneal tendons. 2) Continue activity modification as directed. RTO - 4 weeks    HPI AND EXAMINATION     Janeth Millan IS A 76 y.o. female who presents to my outpatient office complaining of left ankle pain. Ms. Rashad Ambriz reports that she has experienced lateral left ankle pain for over a year. She notes the discomfort is exacerbated by prolonged ambulation. She denies any recent falls, twists, or trauma. She previously saw Dr. Bal Perry who injected Cortisone to the affected area but the patient notes she continues to experience discomfort on a daily basis even though the injected provided some relief. Trace swelling anterolateral   Crepitus on inversion or eversion    ANKLE/FOOT Left    Psychiatry: Alert, Oriented x 3; Speech normal in context and clarity,            Memory intact grossly, no involuntary movements - tremors, no dementia  Gait: slow  Tenderness: Slight tenderness to posterior tibial tendon, no to plantar fascia, no PM spring ligament, no  for calf or gastrocnemius muscle regions  Cutaneous: Trace swelling to posterior tibial tendon,            otherwise WNL   Joint Motion: Normal ROM noted to ankle/foot,  Inversion motion is not diminished. Joint / Tendon Stability: No Ankle or Subtalar instability or joint laxity.                        No peroneal sublux ability or dislocation  Alignment: Hindfoot remains in neutral position with single stance rise attempt    Hindfoot alignment when patient seated: neutral         Calcaneo fibular Impingement is not present with weight bearing   Abduction of hindfoot at TN not present, midfoot not present          Medial column collapse not present, planovalgus alignment is not present          Forefoot compensatory Varus is not present  Contractures:  Achilles not present, Gastrocnemius Contractures not present  Neuro Motor/Sensory: NL/NL, Vascular: NL foot/ankle pulses  Lymphatics: No extremity lymphedema, No calf swelling, no tenderness to calf muscles. CHART REVIEW     Past Medical History:   Diagnosis Date    Basal cell cancer     CAD (coronary artery disease), native coronary artery 03/21/2011    s/p PCI with with CARLITO (Xience 2.75x12, 2.5x12) mLAD and mild disease in rest of coronaries. Midly depressed LV syst fct     Cardiac cath 03/21/2011    mLAD 90% (2.75 x 12 mm Xience stent). Otherwise patent coronaries. LVEDP 10 mmHg. EF 40-45%. Anteroapical hypk. Renal arteries patent.  Cardiac echocardiogram 04/17/2014    EF 60-65%. No WMA. Gr 1 DDfx. Mild MR. Similar to study of 9/14/11.  Cardiac nuclear imaging test 05/20/2008    Negative for ischemia or infarction. EF 79%.  Cardiomyopathy secondary     ischemic +/- stress induced    Dyslipidemia     GERD (gastroesophageal reflux disease)     Hx of colonoscopy 07/12/2016    Normal. Dr. Tika Cruz Hypertension     Parkinson's disease     Syncope     s/p ILD implantation     Current Outpatient Prescriptions   Medication Sig    potassium chloride (KLOR-CON) 10 mEq tablet Take 1 Tab by mouth every other day.  lisinopril (PRINIVIL, ZESTRIL) 10 mg tablet Take 1 Tab by mouth daily.  metoprolol succinate (TOPROL-XL) 50 mg XL tablet Take 1 Tab by mouth daily.  rosuvastatin (CRESTOR) 10 mg tablet Take 1 Tab by mouth nightly.  krill-omega-3-dha-epa-lipids (KRILL OIL) 115-27-20-05 mg cap Take  by mouth.  clobetasol (TEMOVATE) 0.05 % external solution Apply as directed.  magnesium hydroxide (NEGRO MILK OF MAGNESIA) 400 mg/5 mL suspension Take 30 mL by mouth daily as needed for Constipation.  diclofenac (VOLTAREN) 1 % topical gel Apply 2 g to affected area four (4) times daily as needed.     carbidopa-levodopa CR (SINEMET CR)  mg per tablet Take  by mouth nightly.  nitroglycerin (NITROSTAT) 0.4 mg SL tablet 1 sublingual every 5 minutes for chest pain for no more than 3 doses    psyllium seed-sucrose (METAMUCIL) powd 1 tablespoon bid    acetaminophen (TYLENOL) 650 mg CR tablet Take 650 mg by mouth every six (6) hours as needed for Pain.  fluticasone (FLONASE) 50 mcg/actuation nasal spray 2 Sprays by Both Nostrils route daily.  co-enzyme Q-10 (CO Q-10) 100 mg capsule Take 100 mg by mouth daily.  VIT A/VIT C/VIT E/ZINC/COPPER (ICAPS AREDS PO) Take 1 Tab by mouth two (2) times a day.  Cholecalciferol, Vitamin D3, (VITAMIN D) 1,000 unit Cap Take 1 Cap by mouth daily.  VITAMIN B COMPLEX (BALANCE B-50 PO) Take 1 Tab by mouth daily.  flaxseed oil 1,000 mg Cap Take 1 Cap by mouth daily.  aspirin 81 mg tablet Take 1 Tab by mouth daily.  MULTIVITAMIN PO Take 1 Tab by mouth daily.  Dexlansoprazole (DEXILANT) 60 mg CpDM Take 1 Cap by mouth daily. 1 tab every other day. Indications: Gastric Ulcer    DOCUSATE CALCIUM (STOOL SOFTENER PO) Take 1 Cap by mouth.  magnesium 250 mg Tab Take 250 mg by mouth two (2) times a day.  resveratrol 100 mg Cap Take 2 Caps by mouth daily.  lecithin 1,200 mg Cap Take 1 Cap by mouth daily. No current facility-administered medications for this visit.       Allergies   Allergen Reactions    Keflex [Cephalexin] Other (comments)     Yeast infection    Pcn [Penicillins] Other (comments)     Past Surgical History:   Procedure Laterality Date    COLONOSCOPY N/A 7/12/2016    COLONOSCOPY performed by [facility] [place] [facility] [place] at [address] HX BUNIONECTOMY      dorsal    HX CATARACT REMOVAL  11/2012    left    HX CATARACT REMOVAL  10/2012    right    HX CORONARY STENT PLACEMENT      HX HEART CATHETERIZATION      HX HEMORRHOIDECTOMY      HX HYSTERECTOMY  1996    partial    HX IMPLANTABLE LOOP RECORDER  [id number]    HX TONSILLECTOMY      HX TOTAL ABDOMINAL HYSTERECTOMY  1996    partial Social History     Occupational History    Not on file. Social History Main Topics    Smoking status: Never Smoker    Smokeless tobacco: Never Used    Alcohol use Yes      Comment: glass of wine occasionally.  Drug use: No    Sexual activity: No     Family History   Problem Relation Age of Onset    Heart Attack Father 80    Heart Disease Father     Cancer Mother      pancreatic    Hypertension Brother        REVIEW OF SYSTEMS : 3/27/2018  ALL BELOW ARE Negative except : SEE HPI       Constitutional: Negative for fever, chills and weight loss. Neg Weigh Loss  Cardiovascular: Negative for chest pain, claudication and leg swelling. SOB, HOGUE   Gastrointestinal: Negative for  pain, N/V/D/C, Blood in stool or urine,dysuria, hematuria,        Incontinence, pelvic pain  Musculoskeletal: see HPI. Neurological: Negative for dizziness and weakness. Negative for headaches,Visual Changes, Confusion, Seizures,   Psychiatric/Behavioral: Negative for depression, memory loss and substance abuse. Extremities:  Negative for  hair changes, rash or skin lesion changes. Hematologic: Negative for Bleeding problems, bruising, pallor or swollen lymph nodes. Peripheral Vascular: No calf pain, vascular vein tenderness to calf pain              No calf throbbing, posterior knee throbbing pain    DIAGNOSTIC IMAGING     No notes on file    Result Information   Status Provider Status      Final result (Exam End: 6/22/2017  7:16 AM) Reviewed    Study Result   Multisequence multiplanar MR images of the left ankle were obtained.     Comparison September 24, 2009     Achilles tendon is intact with mild edema in the soleus muscle. Thickened  plantar fascia with no active inflammation. No fracture. Developing edema along  the lateral side of the talus. Talar dome remains intact. No fracture line. Much  more severe loculated anterior and posterior tibiotalar joint effusion, subtalar  joint effusion.  Loculated fluid collection in the sinus tarsi.     Os trigonum present with mild edema. Advanced flexor hallucis longus  tenosynovitis, much worse than before. Mild posterior tibial tendinosis. Flexor  digitorum longus is unremarkable. More severe medial subcutaneous edema. Deltoid  ligament is intact.     More severe lateral subcutaneous edema. Peroneus longus and brevis remain  intact. Tibiofibular ligaments are intact. Anterior talofibular ligament tear  with edema in the posterior ligament. Calcaneofibular ligament is intact.     More severe tarsometatarsal articular disease, worse in the second toe. Lisfranc  ligament is intact.     Dorsal soft tissue edema also seen with no discrete fluid collection. Dorsal  spur at the second cuneiform and metatarsal joint. Dorsal distal talar spur.     IMPRESSION  IMPRESSION: More severe subcutaneous edema, more severe loculated joint  effusion. More severe flexor hallucis longus tenosynovitis with os trigonum, os  trigonum syndrome. Anterior talofibular ligament tear, with additional findings  as above. Written by Reginald Navarro, as dictated by Dyan Love MD. Dr. JOSE ROBERTO, Dyan Love MD, confirm that all documentation is accurate.

## 2018-03-28 ENCOUNTER — HOSPITAL ENCOUNTER (OUTPATIENT)
Dept: LAB | Age: 78
Discharge: HOME | End: 2018-03-28
Payer: MEDICARE

## 2018-03-28 ENCOUNTER — APPOINTMENT (OUTPATIENT)
Dept: INTERNAL MEDICINE CLINIC | Age: 78
End: 2018-03-28

## 2018-03-28 DIAGNOSIS — M85.89 OSTEOPENIA OF MULTIPLE SITES: ICD-10-CM

## 2018-03-28 DIAGNOSIS — M85.9 DISORDER OF BONE DENSITY AND STRUCTURE, UNSPECIFIED: ICD-10-CM

## 2018-03-28 DIAGNOSIS — E78.5 DYSLIPIDEMIA: ICD-10-CM

## 2018-03-28 DIAGNOSIS — I10 ESSENTIAL HYPERTENSION: ICD-10-CM

## 2018-03-28 DIAGNOSIS — I25.10 CAD S/P PERCUTANEOUS CORONARY ANGIOPLASTY: ICD-10-CM

## 2018-03-28 DIAGNOSIS — Z98.61 CAD S/P PERCUTANEOUS CORONARY ANGIOPLASTY: ICD-10-CM

## 2018-03-28 LAB
ALBUMIN SERPL-MCNC: 3.8 G/DL (ref 3.4–5)
ALBUMIN/GLOB SERPL: 1.4 {RATIO} (ref 0.8–1.7)
ALP SERPL-CCNC: 85 U/L (ref 45–117)
ALT SERPL-CCNC: 27 U/L (ref 13–56)
ANION GAP SERPL CALC-SCNC: 4 MMOL/L (ref 3–18)
APPEARANCE UR: CLEAR
AST SERPL-CCNC: 21 U/L (ref 15–37)
BACTERIA URNS QL MICRO: NEGATIVE /HPF
BASOPHILS # BLD: 0 K/UL (ref 0–0.06)
BASOPHILS NFR BLD: 0 % (ref 0–2)
BILIRUB SERPL-MCNC: 0.5 MG/DL (ref 0.2–1)
BILIRUB UR QL: NEGATIVE
BUN SERPL-MCNC: 12 MG/DL (ref 7–18)
BUN/CREAT SERPL: 16 (ref 12–20)
CALCIUM SERPL-MCNC: 9.1 MG/DL (ref 8.5–10.1)
CHLORIDE SERPL-SCNC: 103 MMOL/L (ref 100–108)
CHOLEST SERPL-MCNC: 140 MG/DL
CK SERPL-CCNC: 140 U/L (ref 26–192)
CO2 SERPL-SCNC: 30 MMOL/L (ref 21–32)
COLOR UR: YELLOW
CREAT SERPL-MCNC: 0.76 MG/DL (ref 0.6–1.3)
DIFFERENTIAL METHOD BLD: NORMAL
EOSINOPHIL # BLD: 0.2 K/UL (ref 0–0.4)
EOSINOPHIL NFR BLD: 3 % (ref 0–5)
EPITH CASTS URNS QL MICRO: ABNORMAL /LPF (ref 0–5)
ERYTHROCYTE [DISTWIDTH] IN BLOOD BY AUTOMATED COUNT: 13.9 % (ref 11.6–14.5)
GLOBULIN SER CALC-MCNC: 2.8 G/DL (ref 2–4)
GLUCOSE SERPL-MCNC: 93 MG/DL (ref 74–99)
GLUCOSE UR STRIP.AUTO-MCNC: NEGATIVE MG/DL
HCT VFR BLD AUTO: 41.4 % (ref 35–45)
HDLC SERPL-MCNC: 81 MG/DL (ref 40–60)
HDLC SERPL: 1.7 {RATIO} (ref 0–5)
HGB BLD-MCNC: 13.5 G/DL (ref 12–16)
HGB UR QL STRIP: NEGATIVE
KETONES UR QL STRIP.AUTO: NEGATIVE MG/DL
LDLC SERPL CALC-MCNC: 48.2 MG/DL (ref 0–100)
LEUKOCYTE ESTERASE UR QL STRIP.AUTO: ABNORMAL
LIPID PROFILE,FLP: ABNORMAL
LYMPHOCYTES # BLD: 1.3 K/UL (ref 0.9–3.6)
LYMPHOCYTES NFR BLD: 23 % (ref 21–52)
MCH RBC QN AUTO: 31 PG (ref 24–34)
MCHC RBC AUTO-ENTMCNC: 32.6 G/DL (ref 31–37)
MCV RBC AUTO: 95.2 FL (ref 74–97)
MONOCYTES # BLD: 0.5 K/UL (ref 0.05–1.2)
MONOCYTES NFR BLD: 9 % (ref 3–10)
NEUTS SEG # BLD: 3.6 K/UL (ref 1.8–8)
NEUTS SEG NFR BLD: 65 % (ref 40–73)
NITRITE UR QL STRIP.AUTO: NEGATIVE
PH UR STRIP: 7 [PH] (ref 5–8)
PLATELET # BLD AUTO: 183 K/UL (ref 135–420)
PMV BLD AUTO: 10.2 FL (ref 9.2–11.8)
POTASSIUM SERPL-SCNC: 4.9 MMOL/L (ref 3.5–5.5)
PROT SERPL-MCNC: 6.6 G/DL (ref 6.4–8.2)
PROT UR STRIP-MCNC: NEGATIVE MG/DL
RBC # BLD AUTO: 4.35 M/UL (ref 4.2–5.3)
RBC #/AREA URNS HPF: 0 /HPF (ref 0–5)
SODIUM SERPL-SCNC: 137 MMOL/L (ref 136–145)
SP GR UR REFRACTOMETRY: 1.02 (ref 1–1.03)
TRIGL SERPL-MCNC: 54 MG/DL (ref ?–150)
TSH SERPL DL<=0.05 MIU/L-ACNC: 2.43 UIU/ML (ref 0.36–3.74)
UROBILINOGEN UR QL STRIP.AUTO: 0.2 EU/DL (ref 0.2–1)
VLDLC SERPL CALC-MCNC: 10.8 MG/DL
WBC # BLD AUTO: 5.6 K/UL (ref 4.6–13.2)
WBC URNS QL MICRO: ABNORMAL /HPF (ref 0–4)

## 2018-03-28 PROCEDURE — 81001 URINALYSIS AUTO W/SCOPE: CPT | Performed by: INTERNAL MEDICINE

## 2018-03-28 PROCEDURE — 82306 VITAMIN D 25 HYDROXY: CPT | Performed by: INTERNAL MEDICINE

## 2018-03-28 PROCEDURE — 82550 ASSAY OF CK (CPK): CPT | Performed by: INTERNAL MEDICINE

## 2018-03-28 PROCEDURE — 85025 COMPLETE CBC W/AUTO DIFF WBC: CPT | Performed by: INTERNAL MEDICINE

## 2018-03-28 PROCEDURE — 80053 COMPREHEN METABOLIC PANEL: CPT | Performed by: INTERNAL MEDICINE

## 2018-03-28 PROCEDURE — 80061 LIPID PANEL: CPT | Performed by: INTERNAL MEDICINE

## 2018-03-28 PROCEDURE — 36415 COLL VENOUS BLD VENIPUNCTURE: CPT | Performed by: INTERNAL MEDICINE

## 2018-03-28 PROCEDURE — 84443 ASSAY THYROID STIM HORMONE: CPT | Performed by: INTERNAL MEDICINE

## 2018-03-29 LAB — 25(OH)D3 SERPL-MCNC: 57.1 NG/ML (ref 30–100)

## 2018-04-04 ENCOUNTER — TELEPHONE (OUTPATIENT)
Dept: INTERNAL MEDICINE CLINIC | Age: 78
End: 2018-04-04

## 2018-04-04 ENCOUNTER — OFFICE VISIT (OUTPATIENT)
Dept: INTERNAL MEDICINE CLINIC | Age: 78
End: 2018-04-04

## 2018-04-04 VITALS
OXYGEN SATURATION: 98 % | DIASTOLIC BLOOD PRESSURE: 72 MMHG | WEIGHT: 130.4 LBS | HEART RATE: 64 BPM | SYSTOLIC BLOOD PRESSURE: 136 MMHG | BODY MASS INDEX: 21.73 KG/M2 | RESPIRATION RATE: 14 BRPM | TEMPERATURE: 98.5 F | HEIGHT: 65 IN

## 2018-04-04 DIAGNOSIS — I25.10 CAD S/P PERCUTANEOUS CORONARY ANGIOPLASTY: ICD-10-CM

## 2018-04-04 DIAGNOSIS — I10 ESSENTIAL HYPERTENSION: ICD-10-CM

## 2018-04-04 DIAGNOSIS — R55 VASOVAGAL SYNCOPE: ICD-10-CM

## 2018-04-04 DIAGNOSIS — G20 PARKINSON DISEASE (HCC): ICD-10-CM

## 2018-04-04 DIAGNOSIS — Z12.31 SCREENING MAMMOGRAM, ENCOUNTER FOR: ICD-10-CM

## 2018-04-04 DIAGNOSIS — M85.89 OSTEOPENIA OF MULTIPLE SITES: Primary | ICD-10-CM

## 2018-04-04 DIAGNOSIS — K21.9 GASTROESOPHAGEAL REFLUX DISEASE, ESOPHAGITIS PRESENCE NOT SPECIFIED: ICD-10-CM

## 2018-04-04 DIAGNOSIS — Z98.61 CAD S/P PERCUTANEOUS CORONARY ANGIOPLASTY: ICD-10-CM

## 2018-04-04 DIAGNOSIS — K21.9 LARYNGOPHARYNGEAL REFLUX DISEASE: ICD-10-CM

## 2018-04-04 DIAGNOSIS — L40.9 PSORIASIS OF SCALP: ICD-10-CM

## 2018-04-04 DIAGNOSIS — E78.5 DYSLIPIDEMIA: ICD-10-CM

## 2018-04-04 DIAGNOSIS — H35.30 MACULAR DEGENERATION OF LEFT EYE, UNSPECIFIED TYPE: ICD-10-CM

## 2018-04-04 NOTE — TELEPHONE ENCOUNTER
Please request recent eye exam from Dr. Pantera Dickerson and Dr. Serenity Domingo . Patient was seen within last 6 months . Thank you.

## 2018-04-04 NOTE — PATIENT INSTRUCTIONS
Please monitor and record your blood pressure every morning for the next 2 weeks two hours after taking your medications. Please deliver record of readings to our office if greater than 130/80. DASH Diet: Care Instructions  Your Care Instructions    The DASH diet is an eating plan that can help lower your blood pressure. DASH stands for Dietary Approaches to Stop Hypertension. Hypertension is high blood pressure. The DASH diet focuses on eating foods that are high in calcium, potassium, and magnesium. These nutrients can lower blood pressure. The foods that are highest in these nutrients are fruits, vegetables, low-fat dairy products, nuts, seeds, and legumes. But taking calcium, potassium, and magnesium supplements instead of eating foods that are high in those nutrients does not have the same effect. The DASH diet also includes whole grains, fish, and poultry. The DASH diet is one of several lifestyle changes your doctor may recommend to lower your high blood pressure. Your doctor may also want you to decrease the amount of sodium in your diet. Lowering sodium while following the DASH diet can lower blood pressure even further than just the DASH diet alone. Follow-up care is a key part of your treatment and safety. Be sure to make and go to all appointments, and call your doctor if you are having problems. It's also a good idea to know your test results and keep a list of the medicines you take. How can you care for yourself at home? Following the DASH diet  · Eat 4 to 5 servings of fruit each day. A serving is 1 medium-sized piece of fruit, ½ cup chopped or canned fruit, 1/4 cup dried fruit, or 4 ounces (½ cup) of fruit juice. Choose fruit more often than fruit juice. · Eat 4 to 5 servings of vegetables each day. A serving is 1 cup of lettuce or raw leafy vegetables, ½ cup of chopped or cooked vegetables, or 4 ounces (½ cup) of vegetable juice.  Choose vegetables more often than vegetable juice.  · Get 2 to 3 servings of low-fat and fat-free dairy each day. A serving is 8 ounces of milk, 1 cup of yogurt, or 1 ½ ounces of cheese. · Eat 6 to 8 servings of grains each day. A serving is 1 slice of bread, 1 ounce of dry cereal, or ½ cup of cooked rice, pasta, or cooked cereal. Try to choose whole-grain products as much as possible. · Limit lean meat, poultry, and fish to 2 servings each day. A serving is 3 ounces, about the size of a deck of cards. · Eat 4 to 5 servings of nuts, seeds, and legumes (cooked dried beans, lentils, and split peas) each week. A serving is 1/3 cup of nuts, 2 tablespoons of seeds, or ½ cup of cooked beans or peas. · Limit fats and oils to 2 to 3 servings each day. A serving is 1 teaspoon of vegetable oil or 2 tablespoons of salad dressing. · Limit sweets and added sugars to 5 servings or less a week. A serving is 1 tablespoon jelly or jam, ½ cup sorbet, or 1 cup of lemonade. · Eat less than 2,300 milligrams (mg) of sodium a day. If you limit your sodium to 1,500 mg a day, you can lower your blood pressure even more. Tips for success  · Start small. Do not try to make dramatic changes to your diet all at once. You might feel that you are missing out on your favorite foods and then be more likely to not follow the plan. Make small changes, and stick with them. Once those changes become habit, add a few more changes. · Try some of the following:  ¨ Make it a goal to eat a fruit or vegetable at every meal and at snacks. This will make it easy to get the recommended amount of fruits and vegetables each day. ¨ Try yogurt topped with fruit and nuts for a snack or healthy dessert. ¨ Add lettuce, tomato, cucumber, and onion to sandwiches. ¨ Combine a ready-made pizza crust with low-fat mozzarella cheese and lots of vegetable toppings. Try using tomatoes, squash, spinach, broccoli, carrots, cauliflower, and onions.   ¨ Have a variety of cut-up vegetables with a low-fat dip as an appetizer instead of chips and dip. ¨ Sprinkle sunflower seeds or chopped almonds over salads. Or try adding chopped walnuts or almonds to cooked vegetables. ¨ Try some vegetarian meals using beans and peas. Add garbanzo or kidney beans to salads. Make burritos and tacos with mashed isbell beans or black beans. Where can you learn more? Go to http://torin-atif.info/. Enter T278 in the search box to learn more about \"DASH Diet: Care Instructions. \"  Current as of: September 21, 2016  Content Version: 11.4  © 0273-1102 [employer]. Care instructions adapted under license by [employer] (which disclaims liability or warranty for this information). If you have questions about a medical condition or this instruction, always ask your healthcare professional. [address] any warranty or liability for your use of this information.

## 2018-04-04 NOTE — MR AVS SNAPSHOT
[facility] 
 
 
 [address], [facility] [address] [facility] 
[phone] Patient: Paul Brooks MRN: [id number] Visit Information Date & Time Provider Department Dept. Phone Encounter #  
 4/4/2018  8:00 AM Maritza Carmichael MD [facility] [phone] [id number] Follow-up Instructions Return in about 6 months (around 10/4/2018), or if symptoms worsen or fail to improve. Your Appointments 7/6/2018  8:20 AM  
Follow Up with Agusto Richardson MD  
[facility] [place] [address] ([address] [place] [address]) Appt Note: 1 year follow up with an EKG  
 [address] [address] [phone] [place] [id number]  
  
    
 10/2/2018  8:15 AM  
LAB with [facility] NURSE VISIT [facility] ([address] [place] [address]) Appt Note: labs [address], [facility] [address] [address] [place] [place] [address]  
  
   
 [address], [address]  
  
    
 10/9/2018  8:00 AM  
Office Visit with Maritza Carmichael MD  
[facility] [address] Appt Note: ov 6mos. sarris [address], [facility] [address] [address]  
  
   
 [address], [address] Upcoming Health Maintenance Date Due  
 GLAUCOMA SCREENING Q2Y 5/6/2018 MEDICARE YEARLY EXAM 5/11/2018 DTaP/Tdap/Td series (2 - Td) 9/25/2023 Allergies as of 4/4/2018  Review Complete On: 4/4/2018 By: Bong Lorenzo Severity Noted Reaction Type Reactions Keflex [Cephalexin]  11/21/2011   Intolerance Other (comments) Yeast infection Pcn [Penicillins]   Intolerance Other (comments) Current Immunizations  Reviewed on 8/30/2017 Name Date Influenza High Dose Vaccine PF 8/30/2017  2:22 PM, 11/1/2016  1:08 PM, 10/5/2015  1:45 PM, 9/29/2014 Influenza Vaccine Whole 10/4/2012, 9/3/2011, 9/7/2010 Pneumococcal Conjugate (PCV-13) 10/5/2015  1:46 PM  
 TD Vaccine 1/1/2003 Tdap 9/25/2013 ZZZ-RETIRED (DO NOT USE) Pneumococcal Vaccine (Unspecified Type) 1/1/2003 Zoster 1/1/2007 Not reviewed this visit Vitals BP Pulse Temp Resp Height(growth percentile) Weight(growth percentile) 136/72 (BP 1 Location: Left arm, BP Patient Position: Sitting) 64 98.5 °F (36.9 °C) (Oral) 14 5' 5\" (1.651 m) 130 lb 6.4 oz (59.1 kg) SpO2 BMI OB Status Smoking Status 98% 21.7 kg/m2 Hysterectomy Never Smoker Vitals History BMI and BSA Data Body Mass Index Body Surface Area 21.7 kg/m 2 1.65 m 2 Preferred Pharmacy Pharmacy Name Phone [address] [place] [address], [address] [place] [address] [phone] Your Updated Medication List  
  
   
This list is accurate as of 4/4/18  8:37 AM.  Always use your most recent med list.  
  
  
  
  
 acetaminophen 650 mg Tber Commonly known as:  TYLENOL Take 650 mg by mouth every six (6) hours as needed for Pain. aspirin 81 mg tablet Take 1 Tab by mouth daily. BALANCE B-50 PO Take 1 Tab by mouth daily. clobetasol 0.05 % external solution Commonly known as:  Gudelia Namrata Apply as directed. co-enzyme Q-10 100 mg capsule Commonly known as:  CO Q-10 Take 200 mg by mouth daily. DEXILANT 60 mg Cpdb Generic drug:  Dexlansoprazole Take 1 Cap by mouth daily. 1 tab every other day. Indications: Gastric Ulcer  
  
 diclofenac 1 % Gel Commonly known as:  VOLTAREN Apply 2 g to affected area four (4) times daily as needed. flaxseed oil 1,000 mg Cap Take 1 Cap by mouth daily. fluticasone 50 mcg/actuation nasal spray Commonly known as:  Shireen Bobby 2 Sprays by Both Nostrils route daily. ICAPS AREDS PO Take 1 Tab by mouth two (2) times a day. KRILL -44-44-50 mg Cap Generic drug:  krill-omega-3-dha-epa-lipids Take  by mouth.  
  
 lecithin 1,200 mg Cap Take 1 Cap by mouth daily. lisinopril 10 mg tablet Commonly known as:  Kathyleen Alen Take 1 Tab by mouth daily. magnesium 250 mg Tab Take 250 mg by mouth two (2) times a day. metoprolol succinate 50 mg XL tablet Commonly known as:  TOPROL-XL Take 1 Tab by mouth daily. MULTIVITAMIN PO Take 1 Tab by mouth daily. nitroglycerin 0.4 mg SL tablet Commonly known as:  NITROSTAT  
1 sublingual every 5 minutes for chest pain for no more than 3 doses NEGRO MILK OF MAGNESIA 400 mg/5 mL suspension Generic drug:  magnesium hydroxide Take 30 mL by mouth daily as needed for Constipation. potassium chloride 10 mEq tablet Commonly known as:  KLOR-CON Take 1 Tab by mouth every other day. psyllium seed-sucrose Powd Commonly known as:  METAMUCIL (SUGAR) 1 tablespoon bid  
  
 resveratrol 100 mg Cap Take 2 Caps by mouth daily. rosuvastatin 10 mg tablet Commonly known as:  CRESTOR Take 1 Tab by mouth nightly. SINEMET CR  mg per tablet Generic drug:  carbidopa-levodopa ER Take  by mouth nightly. STOOL SOFTENER PO Take 1 Cap by mouth. VITAMIN D3 1,000 unit Cap Generic drug:  cholecalciferol Take 1 Cap by mouth daily. Follow-up Instructions Return in about 6 months (around 10/4/2018), or if symptoms worsen or fail to improve. Patient Instructions Please monitor and record your blood pressure every morning for the next 2 weeks two hours after taking your medications. Please deliver record of readings to our office if greater than 130/80. DASH Diet: Care Instructions Your Care Instructions The DASH diet is an eating plan that can help lower your blood pressure. DASH stands for Dietary Approaches to Stop Hypertension. Hypertension is high blood pressure. The DASH diet focuses on eating foods that are high in calcium, potassium, and magnesium. These nutrients can lower blood pressure. The foods that are highest in these nutrients are fruits, vegetables, low-fat dairy products, nuts, seeds, and legumes. But taking calcium, potassium, and magnesium supplements instead of eating foods that are high in those nutrients does not have the same effect. The DASH diet also includes whole grains, fish, and poultry. The DASH diet is one of several lifestyle changes your doctor may recommend to lower your high blood pressure. Your doctor may also want you to decrease the amount of sodium in your diet. Lowering sodium while following the DASH diet can lower blood pressure even further than just the DASH diet alone. Follow-up care is a key part of your treatment and safety. Be sure to make and go to all appointments, and call your doctor if you are having problems. It's also a good idea to know your test results and keep a list of the medicines you take. How can you care for yourself at home? Following the DASH diet · Eat 4 to 5 servings of fruit each day. A serving is 1 medium-sized piece of fruit, ½ cup chopped or canned fruit, 1/4 cup dried fruit, or 4 ounces (½ cup) of fruit juice. Choose fruit more often than fruit juice. · Eat 4 to 5 servings of vegetables each day. A serving is 1 cup of lettuce or raw leafy vegetables, ½ cup of chopped or cooked vegetables, or 4 ounces (½ cup) of vegetable juice. Choose vegetables more often than vegetable juice. · Get 2 to 3 servings of low-fat and fat-free dairy each day. A serving is 8 ounces of milk, 1 cup of yogurt, or 1 ½ ounces of cheese. · Eat 6 to 8 servings of grains each day. A serving is 1 slice of bread, 1 ounce of dry cereal, or ½ cup of cooked rice, pasta, or cooked cereal. Try to choose whole-grain products as much as possible. · Limit lean meat, poultry, and fish to 2 servings each day.  A serving is 3 ounces, about the size of a deck of cards. · Eat 4 to 5 servings of nuts, seeds, and legumes (cooked dried beans, lentils, and split peas) each week. A serving is 1/3 cup of nuts, 2 tablespoons of seeds, or ½ cup of cooked beans or peas. · Limit fats and oils to 2 to 3 servings each day. A serving is 1 teaspoon of vegetable oil or 2 tablespoons of salad dressing. · Limit sweets and added sugars to 5 servings or less a week. A serving is 1 tablespoon jelly or jam, ½ cup sorbet, or 1 cup of lemonade. · Eat less than 2,300 milligrams (mg) of sodium a day. If you limit your sodium to 1,500 mg a day, you can lower your blood pressure even more. Tips for success · Start small. Do not try to make dramatic changes to your diet all at once. You might feel that you are missing out on your favorite foods and then be more likely to not follow the plan. Make small changes, and stick with them. Once those changes become habit, add a few more changes. · Try some of the following: ¨ Make it a goal to eat a fruit or vegetable at every meal and at snacks. This will make it easy to get the recommended amount of fruits and vegetables each day. ¨ Try yogurt topped with fruit and nuts for a snack or healthy dessert. ¨ Add lettuce, tomato, cucumber, and onion to sandwiches. ¨ Combine a ready-made pizza crust with low-fat mozzarella cheese and lots of vegetable toppings. Try using tomatoes, squash, spinach, broccoli, carrots, cauliflower, and onions. ¨ Have a variety of cut-up vegetables with a low-fat dip as an appetizer instead of chips and dip. ¨ Sprinkle sunflower seeds or chopped almonds over salads. Or try adding chopped walnuts or almonds to cooked vegetables. ¨ Try some vegetarian meals using beans and peas. Add garbanzo or kidney beans to salads. Make burritos and tacos with mashed isbell beans or black beans. Where can you learn more? Go to http://harkins-atif.info/. Enter A956 in the search box to learn more about \"DASH Diet: Care Instructions. \" Current as of: September 21, 2016 Content Version: 11.4 © 5641-1267 Healthwise, [employer]. Care instructions adapted under license by [employer] (which disclaims liability or warranty for this information). If you have questions about a medical condition or this instruction, always ask your healthcare professional. [address] any warranty or liability for your use of this information. Introducing [facility]! [employer] introduces [employer] patient portal. Now you can access parts of your medical record, email your doctor's office, and request medication refills online. 1. In your internet browser, go to [email or web address] 2. Click on the First Time User? Click Here link in the Sign In box. You will see the New Member Sign Up page. 3. Enter your [employer] Access Code exactly as it appears below. You will not need to use this code after youve completed the sign-up process. If you do not sign up before the expiration date, you must request a new code. · [employer] Access Code: [email or web address] Expires: 6/25/2018  8:58 AM 
 
4. Enter the last four digits of your Social Security Number (xxxx) and Date of Birth (mm/dd/yyyy) as indicated and click Submit. You will be taken to the next sign-up page. 5. Create a [employer] ID. This will be your [employer] login ID and cannot be changed, so think of one that is secure and easy to remember. 6. Create a [employer] password. You can change your password at any time. 7. Enter your Password Reset Question and Answer. This can be used at a later time if you forget your password. 8. Enter your e-mail address. You will receive e-mail notification when new information is available in [address] 9. Click Sign Up. You can now view and download portions of your medical record. 10. Click the Download Summary menu link to download a portable copy of your medical information. If you have questions, please visit the Frequently Asked Questions section of the [employer] website. Remember, [employer] is NOT to be used for urgent needs. For medical emergencies, dial 911. Now available from your iPhone and Android! Please provide this summary of care documentation to your next provider. Your primary care clinician is listed as Carmen Gonzalez. If you have any questions after today's visit, please call [phone].

## 2018-04-04 NOTE — PROGRESS NOTES
Chief Complaint   Patient presents with    Cholesterol Problem     3 month follow up with labs. Patient states she has had some dizzy spells within the past week and a half. Health Maintenance Due   Topic Date Due    GLAUCOMA SCREENING Q2Y  05/06/2018     1. Have you been to the ER, urgent care clinic or hospitalized since your last visit? NO.     2. Have you seen or consulted any other health care providers outside of the [address] [place] [address] since your last visit (Include any pap smears or colon screening)? YES, Dr. Dominick Avery, opthalmologist, last seen yesterday for right eye lid stye.

## 2018-04-07 PROBLEM — M79.651 BILATERAL THIGH PAIN: Status: RESOLVED | Noted: 2017-11-30 | Resolved: 2018-04-07

## 2018-04-07 PROBLEM — M79.652 BILATERAL THIGH PAIN: Status: RESOLVED | Noted: 2017-11-30 | Resolved: 2018-04-07

## 2018-04-08 NOTE — PROGRESS NOTES
HPI:   Shy Chen is a 76y.o. year old female who presents today for evaluation of hypertension, ASCVD, hyperlipidemia, syncope, Parkinson's disease, GERD, and macular degeneration. She reports that she is doing relatively well. In 11/2017, she reported bilateral thigh aching pain, which increased with ambulation particularly when walking up stairs. She stated that the discomfort was similar to that which developed previously with use of simvastatin and atorvastatin. She had been on pravastatin since 10/2014 and did not note any difficulty previously. She discontinued pravastatin and had resolution of her symptoms. She was subsequently started on rosuvastatin in 12/2017. She reports today that she has been tolerating it well without difficulty. She is otherwise without complaints. She has a history of hypertension, hyperlipidemia, ASCVD, and syncope. In 3/2011, she had three syncopal episodes while donating blood. Over the subsequent four days, she developed exertional substernal chest tightness with left arm pain and dyspnea. She was evaluated and EKG revealed new T wave inversions in leads V2 and V3. Troponins were negative. She underwent cardiac catheterization (3/21/2011) which showed a 90% mid LAD and mild disease in the rest of the coronaries. She underwent PCI and placement of two drug-eluting stents for the mid LAD lesion; LV function was mildly diminished (EF 40-45%) with anteroapical hypokinesis. Follow-up echocardiogram (9/2011) showed return to normal LV function (EF 60%) and no RWMA. She has a history of multiple syncopal episodes, usually related to stressful situations, and thought to be vasovagal in origin. An implantable loop recorder was placed in 3/2011 and remained until 4/2013, and interrogation was negative for any significant arrhythmia. In 4/2014, she was hospitalized following another syncopal episode, which occurred after she had taken her morning medications.  Afterward, she became nauseated and flushed, and called EMS. When they arrived, they found her on the floor and reportedly without a pulse. They began CPR and within 10-15 seconds, she recovered. Evaluation included EKG/troponins, which were negative, and an echocardiogram showing mild MR and normal LV function (EF 60-65%). She was found to have hypokalemia and hypomagnesemia and hydrochlorothiazide was discontinued. It was thought that this event also represented a vasovagal reaction given her prodromal symptoms. She has had no further events since that time. She had a repeat pharmacologic nuclear stress test (7/14/2017) which was a normal, low risk study, negative for evidence of ischemia or prior infarction, and with normal LV size and function (EF 70%). Her current regimen includes metoprolol, lisinopril, aspirin, sublingual nitroglycerin prn, and moderate intensity dose rosuvastatin. She is being followed by Dr. Jeffery Melvin. She remains very active line dancing 3x/week, doing yardwork and riding her bicycle. She denies any chest pain, shortness of breath at rest or with exertion, palpitations, lightheadedness, or edema. She has a history of idiopathic Parkinson's disease, predominantly left-sided. She is currently being treated with Sinemet, and reports relatively good control of symptoms. She has difficulty with  writing at times, particularly towards the end of the day, and also describes increasing tremors midday. She is followed by Dr. Lorin Shone. She has a history of laryngopharyngeal reflux disease, treated with dexlansoprazole, and she is followed by Dr. John Abdul. She states that she is attempting to wean taking it and has decreased her dose to every other day. In 5/2017 she had multiple episodes of epistaxis prompting an ED visit. She subsequently underwent silver nitrate cautery of anterior vessels in her right nares by Dr. John Abdul, and has had no recurrence.   In 3/2010, she underwent evaluation for iron deficiency anemia. She had previously had a negative colonoscopy and air contrast barium enema in 2009 by Dr. Reginaldo Whitaker, and she had an upper endoscopy by Dr. Nancy Fine which was normal. She was treated with iron with improvement. She had a repeat screening colonoscopy in 7/2016 by Dr. Nancy Fine which was normal. No further follow-up was recommended given her age. She denies any abdominal pain, nausea, vomiting, melena, hematochezia, or change in bowel movements. She has a history of osteopenia with last bone density study in 5/2016 showing T-scores:  femoral neck left -1.0  /right -1.4 and lumbar -0.8 . She continues to take calcium and Vitamin D supplements. She has no history of pathologic fractures. She has a recent history of abnormal mammograms since 10/2014 with microcalcifcations in the right breast. She has been undergoing surveillance mammograms every six months, which have been unchanged. She had a follow-up mammogram in 5/2016 which was negative, and routine annual screening was recommended. She has a history of bilateral knee pain secondary to osteoarthritis. She was evaluated by Dr. Jaida Beyer in 3/2018 and received a cortisone injection with improvement. She also has difficutly with left ankle pain which increase with ambulation. She received a cortisone injection for this as well from Dr. Jaida Beyer in 3/2018, but did not experience significant improvement. She was evaluated by Dr. Richard Kebede on 3/27/2018 and diagnosed with left peroneus brevis tendinitis. An orthotic was recommended, and she states that she is waiting to receive this. Past Medical History:   Diagnosis Date    Basal cell cancer     CAD (coronary artery disease), native coronary artery 03/21/2011    s/p PCI with with CARLITO (Xience 2.75x12, 2.5x12) mLAD and mild disease in rest of coronaries. Midly depressed LV syst fct     Cardiac cath 03/21/2011    mLAD 90% (2.75 x 12 mm Xience stent). Otherwise patent coronaries. LVEDP 10 mmHg.   EF 40-45%. Anteroapical hypk. Renal arteries patent.  Cardiac echocardiogram 04/17/2014    EF 60-65%. No WMA. Gr 1 DDfx. Mild MR. Similar to study of 9/14/11.  Cardiac nuclear imaging test 05/20/2008    Negative for ischemia or infarction. EF 79%.  Cardiomyopathy secondary     ischemic +/- stress induced    Dyslipidemia     GERD (gastroesophageal reflux disease)     Hx of colonoscopy 07/12/2016    Normal. Dr. Iman Jimenes Hypertension     Parkinson's disease     Syncope     s/p ILD implantation     Past Surgical History:   Procedure Laterality Date    COLONOSCOPY N/A 7/12/2016    COLONOSCOPY performed by Mo Keith MD at [address] HX BUNIONECTOMY      dorsal    HX CATARACT REMOVAL  11/2012    left    HX CATARACT REMOVAL  10/2012    right    HX CORONARY STENT PLACEMENT      HX HEART CATHETERIZATION      HX HEMORRHOIDECTOMY      HX HYSTERECTOMY  1996    partial    HX IMPLANTABLE LOOP RECORDER  [id number]    HX TONSILLECTOMY      HX TOTAL ABDOMINAL HYSTERECTOMY  1996    partial     Current Outpatient Prescriptions   Medication Sig    potassium chloride (KLOR-CON) 10 mEq tablet Take 1 Tab by mouth every other day.  lisinopril (PRINIVIL, ZESTRIL) 10 mg tablet Take 1 Tab by mouth daily.  metoprolol succinate (TOPROL-XL) 50 mg XL tablet Take 1 Tab by mouth daily.  rosuvastatin (CRESTOR) 10 mg tablet Take 1 Tab by mouth nightly.  magnesium hydroxide (NEGRO MILK OF MAGNESIA) 400 mg/5 mL suspension Take 30 mL by mouth daily as needed for Constipation.  carbidopa-levodopa CR (SINEMET CR)  mg per tablet Take  by mouth nightly.  psyllium seed-sucrose (METAMUCIL) powd 1 tablespoon bid    acetaminophen (TYLENOL) 650 mg CR tablet Take 650 mg by mouth every six (6) hours as needed for Pain.  co-enzyme Q-10 (CO Q-10) 100 mg capsule Take 200 mg by mouth daily.  VIT A/VIT C/VIT E/ZINC/COPPER (ICAPS AREDS PO) Take 1 Tab by mouth two (2) times a day.     Cholecalciferol, Vitamin D3, (VITAMIN D) 1,000 unit Cap Take 1 Cap by mouth daily.  VITAMIN B COMPLEX (BALANCE B-50 PO) Take 1 Tab by mouth daily.  flaxseed oil 1,000 mg Cap Take 1 Cap by mouth daily.  aspirin 81 mg tablet Take 1 Tab by mouth daily.  MULTIVITAMIN PO Take 1 Tab by mouth daily.  Dexlansoprazole (DEXILANT) 60 mg CpDM Take 1 Cap by mouth daily. 1 tab every other day. Indications: Gastric Ulcer    DOCUSATE CALCIUM (STOOL SOFTENER PO) Take 1 Cap by mouth.  magnesium 250 mg Tab Take 250 mg by mouth two (2) times a day.  resveratrol 100 mg Cap Take 2 Caps by mouth daily.  lecithin 1,200 mg Cap Take 1 Cap by mouth daily.  clobetasol (TEMOVATE) 0.05 % external solution Apply as directed.  diclofenac (VOLTAREN) 1 % topical gel Apply 2 g to affected area four (4) times daily as needed.  nitroglycerin (NITROSTAT) 0.4 mg SL tablet 1 sublingual every 5 minutes for chest pain for no more than 3 doses    fluticasone (FLONASE) 50 mcg/actuation nasal spray 2 Sprays by Both Nostrils route daily. No current facility-administered medications for this visit. Allergies and Intolerances: Allergies   Allergen Reactions    Keflex [Cephalexin] Other (comments)     Yeast infection    Pcn [Penicillins] Other (comments)     Family History: She has no family history of colon or breast cancer. Family History   Problem Relation Age of Onset    Heart Attack Father 80    Heart Disease Father     Cancer Mother      pancreatic    Hypertension Brother      Social History:   She  reports that she has never smoked. She has never used smokeless tobacco. She lives with her , who is having difficulty with mild cognitive impairment. She states that they sold their RV and now stay at home for the winter. She is a retired x-ray technician. History   Alcohol Use    Yes     Comment: glass of wine occasionally.      Immunization History:  Immunization History   Administered Date(s) Administered    Influenza High Dose Vaccine PF 09/29/2014, 10/05/2015, 11/01/2016, 08/30/2017    Influenza Vaccine Whole 09/07/2010, 09/03/2011, 10/04/2012    Pneumococcal Conjugate (PCV-13) 10/05/2015    TD Vaccine 01/01/2003    Tdap 09/25/2013    ZZZ-RETIRED (DO NOT USE) Pneumococcal Vaccine (Unspecified Type) 01/01/2003    Zoster 01/01/2007       Review of Systems:   As above included in HPI. Otherwise 11 point review of systems negative including constitutional, skin, HENT, eyes, respiratory, cardiovascular, gastrointestinal, genitourinary, musculoskeletal, endo/heme/aller, neurological.    Physical:   Vitals:   BP: 136/72   HR: 64  WT: 130 lb 6.4 oz (59.1 kg)  BMI:  21.70 kg/m2    Exam:   Patient appears in no apparent distress. Affect is appropriate. HEENT --Anicteric sclerae, tympanic membranes normal,  ear canals normal.  PERRL, EOMI, conjunctiva and lids normal.   Sinuses were nontender, turbinates normal, hearing normal.  Oropharynx without  erythema, normal tongue, oral mucosa and tonsils. No cervical lymphadenopathy. No thyromegaly, JVD, or bruits. Carotid pulses 2+ with normal upstroke. Lungs --Clear to auscultation. No wheezing or rales. Heart --Regular rate and rhythm, no murmurs, rubs, gallops, or clicks. Breasts -- no masses, skin dimpling, asymmetry, nipple discharge, nodules, axillary lymphadenopathy. Chest wall --Nontender to palpation. PMI normal.  Abdomen -- Soft and nontender, no hepatosplenomegaly or masses. Extremities -- Without cyanosis, clubbing, edema. 2+ pulses equally and bilaterally.   Normal looking digits, ROM intact  Neuro -- CN 2-12 intact, strength 5/5 with intact soft touch in all extremities  Derm  no obvious abnormalities noted, no rash      Review of Data:  Labs:  Hospital Outpatient Visit on 03/28/2018   Component Date Value Ref Range Status    WBC 03/28/2018 5.6  4.6 - 13.2 K/uL Final    RBC 03/28/2018 4.35  4.20 - 5.30 M/uL Final    HGB 03/28/2018 13.5 12.0 - 16.0 g/dL Final    HCT 03/28/2018 41.4  35.0 - 45.0 % Final    MCV 03/28/2018 95.2  74.0 - 97.0 FL Final    MCH 03/28/2018 31.0  24.0 - 34.0 PG Final    MCHC 03/28/2018 32.6  31.0 - 37.0 g/dL Final    RDW 03/28/2018 13.9  11.6 - 14.5 % Final    PLATELET 94/66/4007 244  135 - 420 K/uL Final    MPV 03/28/2018 10.2  9.2 - 11.8 FL Final    NEUTROPHILS 03/28/2018 65  40 - 73 % Final    LYMPHOCYTES 03/28/2018 23  21 - 52 % Final    MONOCYTES 03/28/2018 9  3 - 10 % Final    EOSINOPHILS 03/28/2018 3  0 - 5 % Final    BASOPHILS 03/28/2018 0  0 - 2 % Final    ABS. NEUTROPHILS 03/28/2018 3.6  1.8 - 8.0 K/UL Final    ABS. LYMPHOCYTES 03/28/2018 1.3  0.9 - 3.6 K/UL Final    ABS. MONOCYTES 03/28/2018 0.5  0.05 - 1.2 K/UL Final    ABS. EOSINOPHILS 03/28/2018 0.2  0.0 - 0.4 K/UL Final    ABS.  BASOPHILS 03/28/2018 0.0  0.0 - 0.06 K/UL Final    DF 03/28/2018 AUTOMATED    Final    LIPID PROFILE 03/28/2018        Final    Cholesterol, total 03/28/2018 140  <200 MG/DL Final    Triglyceride 03/28/2018 54  <150 MG/DL Final    HDL Cholesterol 03/28/2018 81* 40 - 60 MG/DL Final    LDL, calculated 03/28/2018 48.2  0 - 100 MG/DL Final    VLDL, calculated 03/28/2018 10.8  MG/DL Final    CHOL/HDL Ratio 03/28/2018 1.7  0 - 5.0   Final    Sodium 03/28/2018 137  136 - 145 mmol/L Final    Potassium 03/28/2018 4.9  3.5 - 5.5 mmol/L Final    Chloride 03/28/2018 103  100 - 108 mmol/L Final    CO2 03/28/2018 30  21 - 32 mmol/L Final    Anion gap 03/28/2018 4  3.0 - 18 mmol/L Final    Glucose 03/28/2018 93  74 - 99 mg/dL Final    BUN 03/28/2018 12  7.0 - 18 MG/DL Final    Creatinine 03/28/2018 0.76  0.6 - 1.3 MG/DL Final    BUN/Creatinine ratio 03/28/2018 16  12 - 20   Final    GFR est AA 03/28/2018 >60  >60 ml/min/1.73m2 Final    GFR est non-AA 03/28/2018 >60  >60 ml/min/1.73m2 Final    Calcium 03/28/2018 9.1  8.5 - 10.1 MG/DL Final    Bilirubin, total 03/28/2018 0.5  0.2 - 1.0 MG/DL Final    ALT (SGPT) 03/28/2018 27  13 - 56 U/L Final    AST (SGOT) 03/28/2018 21  15 - 37 U/L Final    Alk. phosphatase 03/28/2018 85  45 - 117 U/L Final    Protein, total 03/28/2018 6.6  6.4 - 8.2 g/dL Final    Albumin 03/28/2018 3.8  3.4 - 5.0 g/dL Final    Globulin 03/28/2018 2.8  2.0 - 4.0 g/dL Final    A-G Ratio 03/28/2018 1.4  0.8 - 1.7   Final    Color 03/28/2018 YELLOW    Final    Appearance 03/28/2018 CLEAR    Final    Specific gravity 03/28/2018 1.021  1.005 - 1.030   Final    pH (UA) 03/28/2018 7.0  5.0 - 8.0   Final    Protein 03/28/2018 NEGATIVE   NEG mg/dL Final    Glucose 03/28/2018 NEGATIVE   NEG mg/dL Final    Ketone 03/28/2018 NEGATIVE   NEG mg/dL Final    Bilirubin 03/28/2018 NEGATIVE   NEG   Final    Blood 03/28/2018 NEGATIVE   NEG   Final    Urobilinogen 03/28/2018 0.2  0.2 - 1.0 EU/dL Final    Nitrites 03/28/2018 NEGATIVE   NEG   Final    Leukocyte Esterase 03/28/2018 TRACE* NEG   Final    WBC 03/28/2018 0 to 3  0 - 4 /hpf Final    RBC 03/28/2018 0  0 - 5 /hpf Final    Epithelial cells 03/28/2018 FEW  0 - 5 /lpf Final    Bacteria 03/28/2018 NEGATIVE   NEG /hpf Final    TSH 03/28/2018 2.43  0.36 - 3.74 uIU/mL Final    CK 03/28/2018 140  26 - 192 U/L Final    Vitamin D 25-Hydroxy 03/28/2018 57.1  30 - 100 ng/mL Final     Health Maintenance:  Screening:    Mammogram: negative (5/2017)   PAP smear: s/p ROHINI. No further screening. Colorectal: colonoscopy (7/2016) normal. Dr. Ortega Webb. No further screening. Depression: none   DM (HbA1c/FPG): FPG 93 (3/2018)   Hepatitis C: N/A   Falls: none   DEXA: osteopenia (5/2016)   Glaucoma: regular eye exams with Dr. Dennise Bennett (last 5/2017) and Dr. Daysi Montelongo for left macular degeneration.    Smoking: none   Vitamin D: 57.1 (3/2018)   Medicare Wellness: 5/10/2017    Impression:  Patient Active Problem List   Diagnosis Code    Hypertension I10    CAD S/P percutaneous coronary angioplasty I25.10, Z98.61    Dyslipidemia E78.5    Syncope R55    [facility] G20    GERD (gastroesophageal reflux disease) K21.9    Advance directive on file Z78.9    Macular degeneration of left eye H35.30    Laryngopharyngeal reflux disease K21.9    Osteopenia M85.80    Bilateral thigh pain M79.651, M79.652    Psoriasis of scalp L40.9       Plan:  1. Hyperlipidemia. Previously on low intensity dose pravastatin, but developed severe bilateral thigh pain, worse with ambulation and was discontinued in 11/2018 with improvement. Reported similar symptoms in past with statin (Zocor until 2012, and then Lipitor until 9/2014). Started on rosuvastatin 10 mg daily in 12/2018 and tolerating it without difficulty. Discussed importance of statin therapy due to known CAD and LAD stent. Lipid panel today with LDL 48.2 and HDL 81, indicative of excellent control. Emphasized importance of lifestyle modifications, including diet, exercise, and weight loss. Continue to follow. 2. Hypertension. Blood pressure mildly elevated today, but has been well controlled previously at home and in office on lisinopril and metoprolol. Renal function remains normal with creatinine 0.76 / eGFR >60. Continue to follow. 3. ASCVD. Remains asymptomatic. Resolution of cardiomyopathy with last echocardiogram revealing normal LV function. Negative pharmacologic nuclear stress test in 7/2017. Continue current regimen. Being followed by Dr. Luis Camarena. 4. H/O syncope. No further episodes since 2014. Most likely secondary to vasovagal reaction. Follow. 5. Parkinson's disease. Doing well on Sinemet. Followed by Dr. Juan Ramon Villeda.  6. Osteopenia. Last bone density scan 5/2016. Using femoral neck T-scores, calculated FRAX score estimates her 10 year risk of a major osteoporetic fracture at 11 % and hip fracture at 2.3 %, which are not an indication for biphosphonate treatment (>20% and >3%, respectively). Continue calcium and vitamin D supplements. Encouraged exercise, particularly weight bearing activities.  Will repeat bone density scan in 5/2018 with mammogram.  7. Macular degeneration, left. Being followed by Dr. Jasvir Lee. 8. Laryngopharyngeal reflux. Doing well on Dexilant. Attempting to wean and decreased to every other day. Followed by Dr. Debbie Grigsby. 9. Epistaxis. No further recurrence since application of silver nitrate by Dr. Debbie Grigsby. Continue to follow. 10. Psoriasis. Using clobetasol solution for scalp psoriasis. 11. Health maintenance. Already received influenza vaccine. Other immunizations up to date. No further colorectal cancer screening needed. Mammogram due in 5/2018. Will order with bone density study. Continue regular eye exams with Ankur Jensen and Jasvir Lee. Vitamin D level normal. Continue maintenance dose supplement. Medicare wellness up to date. Patient understands recommendations and agrees with plan. Follow-up in 6 months.

## 2018-05-24 ENCOUNTER — HOSPITAL ENCOUNTER (OUTPATIENT)
Dept: BONE DENSITY | Age: 78
Discharge: HOME | End: 2018-05-24
Attending: INTERNAL MEDICINE
Payer: MEDICARE

## 2018-05-24 ENCOUNTER — HOSPITAL ENCOUNTER (OUTPATIENT)
Dept: MAMMOGRAPHY | Age: 78
Discharge: HOME | End: 2018-05-24
Attending: INTERNAL MEDICINE
Payer: MEDICARE

## 2018-05-24 DIAGNOSIS — Z12.39 BREAST CANCER SCREENING: ICD-10-CM

## 2018-05-24 DIAGNOSIS — M85.89 OSTEOPENIA OF MULTIPLE SITES: ICD-10-CM

## 2018-05-24 PROCEDURE — 77067 SCR MAMMO BI INCL CAD: CPT

## 2018-05-24 PROCEDURE — 77080 DXA BONE DENSITY AXIAL: CPT

## 2018-06-13 ENCOUNTER — TELEPHONE (OUTPATIENT)
Dept: INTERNAL MEDICINE CLINIC | Age: 78
End: 2018-06-13

## 2018-07-02 ENCOUNTER — TELEPHONE (OUTPATIENT)
Dept: INTERNAL MEDICINE CLINIC | Age: 78
End: 2018-07-02

## 2018-07-06 ENCOUNTER — OFFICE VISIT (OUTPATIENT)
Dept: CARDIOLOGY CLINIC | Age: 78
End: 2018-07-06

## 2018-07-06 VITALS
SYSTOLIC BLOOD PRESSURE: 124 MMHG | BODY MASS INDEX: 21.66 KG/M2 | OXYGEN SATURATION: 96 % | HEART RATE: 61 BPM | DIASTOLIC BLOOD PRESSURE: 80 MMHG | HEIGHT: 65 IN | WEIGHT: 130 LBS

## 2018-07-06 DIAGNOSIS — I10 ESSENTIAL HYPERTENSION: ICD-10-CM

## 2018-07-06 DIAGNOSIS — Z98.61 CAD S/P PERCUTANEOUS CORONARY ANGIOPLASTY: Primary | ICD-10-CM

## 2018-07-06 DIAGNOSIS — I25.10 CAD S/P PERCUTANEOUS CORONARY ANGIOPLASTY: Primary | ICD-10-CM

## 2018-07-06 DIAGNOSIS — E78.5 DYSLIPIDEMIA: ICD-10-CM

## 2018-07-06 NOTE — MR AVS SNAPSHOT
[address] [id number] [address] [address] [phone] Patient: Gerry Kuhn MRN: [id number]:[id number] Visit Information Date & Time Provider Department Dept. Phone Encounter #  
 7/6/2018  8:20 AM Joya Burris MD [facility] [id number] Your Appointments 10/2/2018  8:15 AM  
LAB with C NURSE VISIT [facility]) Appt Note: labs [address], [address] [place] [id number] [place] [address] [address]  
  
   
 [address], [address]  
  
    
 10/9/2018  8:00 AM  
Office Visit with Vince Goldberg, MD  
[facility] Appt Note: ov 6mos. sarris [address], [facility] [id number] [place] [address] [address]  
  
   
 [address], [address] Upcoming Health Maintenance Date Due  
 MEDICARE YEARLY EXAM 5/11/2018 Influenza Age 5 to Adult 8/1/2018 GLAUCOMA SCREENING Q2Y 4/3/2020 DTaP/Tdap/Td series (2 - Td) 9/25/2023 Allergies as of 7/6/2018  Review Complete On: 5/24/2018 By: Fran Montero Severity Noted Reaction Type Reactions Keflex [Cephalexin]  11/21/2011   Intolerance Other (comments) Yeast infection Pcn [Penicillins]   Intolerance Other (comments) Current Immunizations  Reviewed on 8/30/2017 Name Date Influenza High Dose Vaccine PF 8/30/2017  2:22 PM, 11/1/2016  1:08 PM, 10/5/2015  1:45 PM, 9/29/2014 Influenza Vaccine Whole 10/4/2012, 9/3/2011, 9/7/2010 Pneumococcal Conjugate (PCV-13) 10/5/2015  1:46 PM  
 TD Vaccine 1/1/2003 Tdap 9/25/2013 ZZZ-RETIRED (DO NOT USE) Pneumococcal Vaccine (Unspecified Type) 1/1/2003 Zoster 1/1/2007 Not reviewed this visit You Were Diagnosed With   
  
 Codes Comments CAD S/P percutaneous coronary angioplasty    -  Primary ICD-10-CM: I25.10, Z98.61 ICD-9-CM: 414.01, V45.82 Dyslipidemia     ICD-10-CM: E78.5 ICD-9-CM: 272.4 Essential hypertension     ICD-10-CM: I10 
ICD-9-CM: 401.9 Vitals BP Pulse Height(growth percentile) Weight(growth percentile) SpO2 BMI  
 124/80 61 5' 5\" (1.651 m) 130 lb (59 kg) 96% 21.63 kg/m2 OB Status Smoking Status Hysterectomy Never Smoker Vitals History BMI and BSA Data Body Mass Index Body Surface Area  
 21.63 kg/m 2 1.64 m 2 Preferred Pharmacy Pharmacy Name Phone [address], [facility] [address] [phone] Your Updated Medication List  
  
   
This list is accurate as of 7/6/18  8:55 AM.  Always use your most recent med list.  
  
  
  
  
 acetaminophen 650 mg Tber Commonly known as:  TYLENOL Take 650 mg by mouth every six (6) hours as needed for Pain. aspirin 81 mg tablet Take 1 Tab by mouth daily. BALANCE B-50 PO Take 1 Tab by mouth daily. clobetasol 0.05 % external solution Commonly known as:  Lore Sciara Apply as directed. co-enzyme Q-10 100 mg capsule Commonly known as:  CO Q-10 Take 200 mg by mouth daily. DEXILANT 60 mg Cpdb Generic drug:  Dexlansoprazole Take 1 Cap by mouth daily. 1 tab every other day. Indications: Gastric Ulcer  
  
 diclofenac 1 % Gel Commonly known as:  VOLTAREN Apply 2 g to affected area four (4) times daily as needed. flaxseed oil 1,000 mg Cap Take 1 Cap by mouth daily. fluticasone 50 mcg/actuation nasal spray Commonly known as:  [facility] 2 Sprays by Both Nostrils route daily. ICAPS AREDS PO Take 1 Tab by mouth two (2) times a day. lecithin 1,200 mg Cap Take 1 Cap by mouth daily. lisinopril 10 mg tablet Commonly known as:  Ana M Duncan Take 1 Tab by mouth daily. magnesium 250 mg Tab Take 250 mg by mouth two (2) times a day. metoprolol succinate 50 mg XL tablet Commonly known as:  TOPROL-XL Take 1 Tab by mouth daily. MULTIVITAMIN PO Take 1 Tab by mouth daily. nitroglycerin 0.4 mg SL tablet Commonly known as:  NITROSTAT  
1 sublingual every 5 minutes for chest pain for no more than 3 doses NEGRO MILK OF MAGNESIA 400 mg/5 mL suspension Generic drug:  magnesium hydroxide Take 30 mL by mouth daily as needed for Constipation. potassium chloride 10 mEq tablet Commonly known as:  KLOR-CON Take 1 Tab by mouth every other day. psyllium seed-sucrose Powd Commonly known as:  METAMUCIL (SUGAR) 1 tablespoon bid  
  
 resveratrol 100 mg Cap Take 2 Caps by mouth daily. rosuvastatin 10 mg tablet Commonly known as:  CRESTOR Take 1 Tab by mouth nightly. SINEMET CR  mg per tablet Generic drug:  carbidopa-levodopa ER Take  by mouth nightly. STOOL SOFTENER PO Take 1 Cap by mouth. VITAMIN D3 1,000 unit Cap Generic drug:  cholecalciferol Take 1 Cap by mouth daily. We Performed the Following AMB POC EKG ROUTINE W/ 12 LEADS, INTER & REP [55491 CPT(R)] Introducing [facility]! [employer] introduces Zakada patient portal. Now you can access parts of your medical record, email your doctor's office, and request medication refills online. 1. In your internet browser, go to [email or web address] 2. Click on the First Time User? Click Here link in the Sign In box. You will see the New Member Sign Up page. 3. Enter your Zakada Access Code exactly as it appears below. You will not need to use this code after youve completed the sign-up process. If you do not sign up before the expiration date, you must request a new code. · Zakada Access Code: [email or web address] Expires: 10/4/2018  8:55 AM 
 
4.  Enter the last four digits of your Social Security Number (xxxx) and Date of Birth (mm/dd/yyyy) as indicated and click Submit. You will be taken to the next sign-up page. 5. Create a Manatron ID. This will be your Manatron login ID and cannot be changed, so think of one that is secure and easy to remember. 6. Create a Manatron password. You can change your password at any time. 7. Enter your Password Reset Question and Answer. This can be used at a later time if you forget your password. 8. Enter your e-mail address. You will receive e-mail notification when new information is available in [address] 9. Click Sign Up. You can now view and download portions of your medical record. 10. Click the Download Summary menu link to download a portable copy of your medical information. If you have questions, please visit the Frequently Asked Questions section of the Manatron website. Remember, Manatron is NOT to be used for urgent needs. For medical emergencies, dial 911. Now available from your iPhone and Android! Please provide this summary of care documentation to your next provider. Your primary care clinician is listed as Venkat Kristofer. If you have any questions after today's visit, please call [phone].

## 2018-07-06 NOTE — PROGRESS NOTES
1. Have you been to the ER, urgent care clinic since your last visit? Hospitalized since your last visit? No     2. Have you seen or consulted any other health care providers outside of the [address] [place] [address] since your last visit? Include any pap smears or colon screening.  No

## 2018-07-06 NOTE — PROGRESS NOTES
HISTORY OF PRESENT ILLNESS  Will Dallas is a 76 y.o. female. Dizziness   Pertinent negatives include no chest pain, no abdominal pain, no headaches and no shortness of breath. Patient presents for scheduled follow-up office visit. She has a past medical history significant for single-vessel coronary artery disease status post PCI to her mid LAD with a total of 2 drug-eluting stents in 2011. Patient presents with symptoms of unstable angina at that time. It should be noted that she did have classic exertional anginal symptoms leading up to her PCI that were likely unrecognized. She reports symptoms of substernal chest pain radiating to her left arm and jaw prior to her PCI. Since her procedure, she has had no recurrent symptoms. The patient underwent a pharmacologic nuclear stress test in July 2017 which was a normal low risk study. No evidence of ischemia or infarction, EF greater than 70%. She was last seen in the office 1 year ago. Since that time she has been feeling well. No major change in her activity level. No new exertional chest pain or shortness of breath. She was having increased myalgias with pravastatin, so this was switched over to Crestor by her PCP. Patient states she did fairly well this medication for a month or 2 and then stop started having myalgias against it recently discontinued this medication altogether. Past Medical History:   Diagnosis Date    Basal cell cancer     CAD (coronary artery disease), native coronary artery 03/21/2011    s/p PCI with with CARLITO (Xience 2.75x12, 2.5x12) mLAD and mild disease in rest of coronaries. Midly depressed LV syst fct     Cardiac cath 03/21/2011    mLAD 90% (2.75 x 12 mm Xience stent). Otherwise patent coronaries. LVEDP 10 mmHg. EF 40-45%. Anteroapical hypk. Renal arteries patent.  Cardiac echocardiogram 04/17/2014    EF 60-65%. No WMA. Gr 1 DDfx. Mild MR. Similar to study of 9/14/11.     Cardiac nuclear imaging test 07/2017    Negative for ischemia or infarction. EF > 70%.  Cardiomyopathy secondary     ischemic +/- stress induced    Dyslipidemia     GERD (gastroesophageal reflux disease)     Hx of colonoscopy 07/12/2016    Normal. Dr. Porter Live Hypertension     Parkinson's disease     Syncope     s/p ILD implantation     Current Outpatient Prescriptions   Medication Sig Dispense Refill    potassium chloride (KLOR-CON) 10 mEq tablet Take 1 Tab by mouth every other day. 45 Tab 3    lisinopril (PRINIVIL, ZESTRIL) 10 mg tablet Take 1 Tab by mouth daily. 90 Tab 3    metoprolol succinate (TOPROL-XL) 50 mg XL tablet Take 1 Tab by mouth daily. 90 Tab 3    clobetasol (TEMOVATE) 0.05 % external solution Apply as directed. 25 mL 1    magnesium hydroxide (NEGRO MILK OF MAGNESIA) 400 mg/5 mL suspension Take 30 mL by mouth daily as needed for Constipation.  diclofenac (VOLTAREN) 1 % topical gel Apply 2 g to affected area four (4) times daily as needed. 200 g 3    carbidopa-levodopa CR (SINEMET CR)  mg per tablet Take  by mouth nightly.  nitroglycerin (NITROSTAT) 0.4 mg SL tablet 1 sublingual every 5 minutes for chest pain for no more than 3 doses 25 tablet 3    psyllium seed-sucrose (METAMUCIL) powd 1 tablespoon bid 861 g 3    acetaminophen (TYLENOL) 650 mg CR tablet Take 650 mg by mouth every six (6) hours as needed for Pain.  fluticasone (FLONASE) 50 mcg/actuation nasal spray 2 Sprays by Both Nostrils route daily. 1 Bottle 2    co-enzyme Q-10 (CO Q-10) 100 mg capsule Take 200 mg by mouth daily.  VIT A/VIT C/VIT E/ZINC/COPPER (ICAPS AREDS PO) Take 1 Tab by mouth two (2) times a day.  Cholecalciferol, Vitamin D3, (VITAMIN D) 1,000 unit Cap Take 1 Cap by mouth daily.  VITAMIN B COMPLEX (BALANCE B-50 PO) Take 1 Tab by mouth daily.  flaxseed oil 1,000 mg Cap Take 1 Cap by mouth daily.  aspirin 81 mg tablet Take 1 Tab by mouth daily.  1 Tab 0    MULTIVITAMIN PO Take 1 Tab by mouth daily.  Dexlansoprazole (DEXILANT) 60 mg CpDM Take 1 Cap by mouth daily. 1 tab every other day. Indications: Gastric Ulcer      DOCUSATE CALCIUM (STOOL SOFTENER PO) Take 1 Cap by mouth.  magnesium 250 mg Tab Take 250 mg by mouth two (2) times a day.  resveratrol 100 mg Cap Take 2 Caps by mouth daily.  lecithin 1,200 mg Cap Take 1 Cap by mouth daily.  rosuvastatin (CRESTOR) 10 mg tablet Take 1 Tab by mouth nightly. 90 Tab 3     Allergies   Allergen Reactions    Keflex [Cephalexin] Other (comments)     Yeast infection    Pcn [Penicillins] Other (comments)      Social History   Substance Use Topics    Smoking status: Never Smoker    Smokeless tobacco: Never Used    Alcohol use Yes      Comment: glass of wine occasionally. Review of Systems   Constitutional: Negative for chills, fever and weight loss. HENT: Negative for nosebleeds. Eyes: Negative for blurred vision and double vision. Respiratory: Negative for cough, shortness of breath and wheezing. Cardiovascular: Negative for chest pain, palpitations, orthopnea, claudication, leg swelling and PND. Gastrointestinal: Negative for abdominal pain, heartburn, nausea and vomiting. Genitourinary: Negative for dysuria and hematuria. Musculoskeletal: Negative for falls and myalgias. Skin: Negative for rash. Neurological: Negative for dizziness, focal weakness and headaches. Endo/Heme/Allergies: Does not bruise/bleed easily. Psychiatric/Behavioral: Negative for substance abuse. Visit Vitals    /80    Pulse 61    Ht 5' 5\" (1.651 m)    Wt 59 kg (130 lb)    SpO2 96%    BMI 21.63 kg/m2       Physical Exam   Constitutional: She is oriented to person, place, and time. She appears well-developed and well-nourished. HENT:   Head: Normocephalic and atraumatic. Eyes: Conjunctivae are normal.   Neck: Neck supple. No JVD present. Carotid bruit is not present.    Cardiovascular: Normal rate, regular rhythm, S1 normal, S2 normal and normal pulses. Exam reveals no gallop. Murmur heard. Midsystolic murmur is present with a grade of 2/6   Pulmonary/Chest: Effort normal and breath sounds normal. She has no wheezes. She has no rales. Abdominal: Soft. Bowel sounds are normal. There is no tenderness. Musculoskeletal: She exhibits no edema. Neurological: She is alert and oriented to person, place, and time. Skin: Skin is warm and dry. EKG: Normal sinus rhythm, normal axis, normal QTc interval, no ST or T-wave abnormalities concerning for ischemia. Normal tracing. No change compared to the previous EKG. ASSESSMENT and PLAN    Coronary artery disease. Single-vessel disease, status post PCI to her mid LAD in 2011 using 2 drug-eluting stents (Xience 2.75 x 12, 2.5 x 12). No recurrent anginal symptoms since that time. Patient is now taking an aspirin, beta-blocker low-dose statin. Patient underwent a low risk pharmacologic nuclear stress test in July 2017. Dyslipidemia. Patient was previously tolerating pravastatin her last office visit, however, she developed myalgias and then was switched over to Crestor. She states that she tolerated this for a month or 2 and then developed myalgias again so she is no longer taking this medication. I have recommended a trial of Crestor 5 mg every other day to see if she will tolerate this. Essential hypertension. Patient's blood pressure looks very reasonable on her current regimen which includes metoprolol and lisinopril.     Follow-up in 12 months, sooner if needed

## 2018-07-09 ENCOUNTER — TELEPHONE (OUTPATIENT)
Dept: INTERNAL MEDICINE CLINIC | Age: 78
End: 2018-07-09

## 2018-07-09 NOTE — TELEPHONE ENCOUNTER
Pt called and stated tht she is now taking crestor 1/2 tabs every other day per Dr. Chris Luna, she said it is working well for her that way, jay, BS

## 2018-08-01 ENCOUNTER — TELEPHONE (OUTPATIENT)
Dept: ORTHOPEDIC SURGERY | Facility: CLINIC | Age: 78
End: 2018-08-01

## 2018-08-01 NOTE — TELEPHONE ENCOUNTER
Pt called to request all available imaging with Dr Josephine Early on CD. Please bring to medical records at [facility] when ready - pt is also getting paper reports.

## 2018-08-16 ENCOUNTER — HOSPITAL ENCOUNTER (OUTPATIENT)
Dept: PHYSICAL THERAPY | Age: 78
Discharge: HOME | End: 2018-08-16
Payer: MEDICARE

## 2018-08-16 PROCEDURE — G8979 MOBILITY GOAL STATUS: HCPCS

## 2018-08-16 PROCEDURE — G8978 MOBILITY CURRENT STATUS: HCPCS

## 2018-08-16 PROCEDURE — 97161 PT EVAL LOW COMPLEX 20 MIN: CPT

## 2018-08-16 PROCEDURE — 97110 THERAPEUTIC EXERCISES: CPT

## 2018-08-16 NOTE — PROGRESS NOTES
[facility], [address] [place] [address]  [phone] fax  Plan of Care/ Statement of Necessity for Physical Therapy Services    Patient name: Brijesh Dhaliwal Start of Care: 2018   Referral source: Dinah Le MD : 1940    Medical Diagnosis: Pain in right buttock [M79.1]   Onset Date:8/15/18    Treatment Diagnosis: Right Buttock Pain   Prior Hospitalization: see medical history Provider#: [id number]   Medications: Verified on Patient summary List    Comorbidities: Arthritis; Coronary artery disease; HTN; Parkinson's disease   Prior Level of Function: [facility]; 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 Assessment/ key information: Pt is a 76 y.o. female who presents with c/o point tenderness to right buttock that has persisted > 6 months. No ROLO noted but pain has progressively worsened from pain with sitting, especially on hard surfaces - now has to sit on cushions - to now noting disrupted sleep from buttock pain. Pt notes just this week three episodes of tingling in right LE only. Upon exam, Pt exhibited point tenderness with trigger points to right glute/piriformis; (+) piriformis test right LE; and pain to right buttock with HS stretch. No other deficits noted. Signs and symptoms consistent with received diagnosis or right piriformis syndrome. Pt would benefit from skilled PT to address above deficits to improve Pt's function and ability to return to prior activity level without pain or difficulty.     Evaluation Complexity History MEDIUM  Complexity : 1-2 comorbidities / personal factors will impact the outcome/ POC ; Examination LOW Complexity : 1-2 Standardized tests and measures addressing body structure, function, activity limitation and / or participation in recreation  ;Presentation LOW Complexity : Stable, uncomplicated  ;Clinical Decision Making MEDIUM Complexity : FOTO score of 26-74  Overall Complexity Rating: LOW   Problem List: pain affecting function, decrease ROM, decrease ADL/ functional abilitiies, decrease activity tolerance, decrease flexibility/ joint mobility and decrease transfer abilities   Treatment Plan may include any combination of the following: Therapeutic exercise, Therapeutic activities, Neuromuscular re-education, Physical agent/modality, Manual therapy and Patient education  Patient / Family readiness to learn indicated by: asking questions, trying to perform skills and interest  Persons(s) to be included in education: patient (P)  Barriers to Learning/Limitations: None  Patient Goal (s): More tolerable pain level.   Patient Self Reported Health Status: excellent  Rehabilitation Potential: good    Short Term Goals: To be accomplished in 1 weeks:  Goal: Pt to be compliant with initial HEP to improve flexibility in right buttock. Status at last note/certification: Established and reviewed with Pt  Long Term Goals: To be accomplished in 3 weeks:  Goal: Pt to exhibit (-) piriformis test to show resolution of right buttock pain and muscle tightness. Status at last note/certification: (+) piriformis test right LE  Goal: Pt to be able to sit desired time frames in any chair without need for pillow to return to prior functional level. Status at last note/certification: pain with sitting, especially hard chairs, must use cushion  Goal: Pt to report FOTO score of 63 pts to show improved function. Status at last note/certification: FOTO 57 pts    Frequency / Duration: Patient to be seen 2 times per week for 3 weeks.     Patient/ Caregiver education and instruction: Diagnosis, prognosis, exercises   [x]  Plan of care has been reviewed with PTA    G-Codes (GP)  Mobility   Current  CK= 40-59%   Goal  CJ= 20-39%    The severity rating is based on clinical judgment and the FOTO score. Certification Period: 8/16/18 - 9/14/18  Rosanne Sands, PT 8/16/2018 3:12 PM  _____________________________________________________________________  I certify that the above Therapy Services are being furnished while the patient is under my care. I agree with the treatment plan and certify that this therapy is necessary.     Physician's Signature:____________Date:_________TIME:________    ** Signature, Date and Time must be completed for valid certification **    Please sign and return to [facility], [address] [place] [address]  [phone] fax

## 2018-08-16 NOTE — PROGRESS NOTES
PT DAILY TREATMENT NOTE - [facility] 3-16    Patient Name: Sissy Conway  Date:2018  : 1940  [x]  Patient  Verified  [place] MED[place] / Plan: [place] MEDICARE PART A & B / Product Type: Medicare /    In time:2:40  Out time:3:05  Total Treatment Time (min): 25  Total Timed Codes (min): 8  1:1 Treatment Time ( only): 25   Visit #: 1 of 6    Treatment Area: Pain in right buttock [M79.1]    SUBJECTIVE  Pain Level (0-10 scale): 5/10  Any medication changes, allergies to medications, adverse drug reactions, diagnosis change, or new procedure performed?: [x] No    [] Yes (see summary sheet for update)  Subjective functional status/changes:   [x] See paper initial evaluation form in patient chart      OBJECTIVE    17 min [x]Eval                  []Re-Eval       8 min Therapeutic Exercise:  [] See flow sheet : HEP given and reviewed with Pt   Rationale: increase ROM to improve the patients ability to sit and sleep without increased pain          With   [x] TE   [] TA   [] neuro   [] other: Patient Education: [x] Review HEP    [] Progressed/Changed HEP based on:   [] positioning   [] body mechanics   [] transfers   [] heat/ice application    [] other:      Other Objective/Functional Measures: FOTO 57 pts    Pain Level (0-10 scale) post treatment: 5/10    ASSESSMENT/Changes in Function:     Patient will continue to benefit from skilled PT services to address functional mobility deficits, address ROM deficits, analyze and address soft tissue restrictions, analyze and cue movement patterns, analyze and modify body mechanics/ergonomics, assess and modify postural abnormalities and instruct in home and community integration to attain remaining goals.      [x]  See Plan of Care         PLAN  []  Upgrade activities as tolerated     [x]  Continue plan of care  []  Update interventions per flow sheet       []  Discharge due to:_  []  Other:_      Sabina Sands, PT 2018  3:07 PM

## 2018-08-17 ENCOUNTER — HOSPITAL ENCOUNTER (OUTPATIENT)
Dept: PHYSICAL THERAPY | Age: 78
Discharge: HOME | End: 2018-08-17
Payer: MEDICARE

## 2018-08-17 PROCEDURE — 97140 MANUAL THERAPY 1/> REGIONS: CPT

## 2018-08-17 PROCEDURE — 97110 THERAPEUTIC EXERCISES: CPT

## 2018-08-20 ENCOUNTER — HOSPITAL ENCOUNTER (OUTPATIENT)
Dept: PHYSICAL THERAPY | Age: 78
Discharge: HOME | End: 2018-08-20
Payer: MEDICARE

## 2018-08-20 PROCEDURE — 97140 MANUAL THERAPY 1/> REGIONS: CPT

## 2018-08-20 PROCEDURE — 97110 THERAPEUTIC EXERCISES: CPT

## 2018-08-20 NOTE — PROGRESS NOTES
PT DAILY TREATMENT NOTE - [facility]     Patient Name: aDvid Cunningham  Date:2018  : 1940  [x]  Patient  Verified  Payor: [place] MEDICARE / Plan: [place] MEDICARE PART A & B / Product Type: Medicare /    In time:7:32  Out time:8:05  Total Treatment Time (min): 33  Total Timed Codes (min): 23  1:1 Treatment Time ( only): 23   Visit #: 3 of 6    Treatment Area: Pain in right buttock [M79.1]    SUBJECTIVE  Pain Level (0-10 scale): 10/10  Any medication changes, allergies to medications, adverse drug reactions, diagnosis change, or new procedure performed?: [x] No    [] Yes (see summary sheet for update)  Subjective functional status/changes:   [] No changes reported  \"Last night was tough. I tried to do some of the but even my knees were swollen. I will say I was able to sleep through the night since Friday. So I take that as a positive. I have been using the tennis ball too. \"    OBJECTIVE    Modality rationale: decrease pain and increase tissue extensibility to improve the patients ability to sit without discomfort   Min Type Additional Details    [] Estim:  []Unatt       []IFC  []Premod                        []Other:  []w/ice   []w/heat  Position:  Location:    [] Estim: []Att    []TENS instruct  []NMES                    []Other:  []w/US   []w/ice   []w/heat  Position:  Location:    []  Traction: [] Cervical       []Lumbar                       [] Prone          []Supine                       []Intermittent   []Continuous Lbs:  [] before manual  [] after manual    []  Ultrasound: []Continuous   [] Pulsed                           []1MHz   []3MHz W/cm2:  Location:    []  Iontophoresis with dexamethasone         Location: [] Take home patch   [] In clinic   10 []  Ice     [x]  heat  []  Ice massage  []  Laser   []  Anodyne Position: prone  Location: lower buttocks    []  Laser with stim  []  Other:  Position:  Location:    []  Vasopneumatic Device Pressure:       [] lo [] med [] hi   Temperature: [] lo [] med [] hi   [] Skin assessment post-treatment:  []intact []redness- no adverse reaction    []redness  adverse reaction:       8 min Therapeutic Exercise:  [] See flow sheet :   Rationale: increase ROM to improve the patients ability to decrease muscle tightness in right glute    15 min Manual Therapy:  TPR to right glute, piriformis   Rationale: decrease pain, increase tissue extensibility and decrease trigger points to be able to sit without discomfort on any surface          With   [] TE   [] TA   [] neuro   [] other: Patient Education: [x] Review HEP    [] Progressed/Changed HEP based on:   [] positioning   [] body mechanics   [] transfers   [] heat/ice application    [] other:      Other Objective/Functional Measures: continued with Rx program per flow sheet. Trigger points decreased today with palpation from last session. Pt noted tightness in right quad - new symptom. Pain Level (0-10 scale) post treatment: 7/10 (one knot in buttock)    ASSESSMENT/Changes in Function: Pt still exhibits tenderness in right buttock to palpation but was able to sleep better over the weekend, showing some progress. Will continue per current program to focus on reducing soft tissue restrictions and assist with pain relief. Patient will continue to benefit from skilled PT services to address functional mobility deficits, address ROM deficits, analyze and address soft tissue restrictions, analyze and cue movement patterns, analyze and modify body mechanics/ergonomics and instruct in home and community integration to attain remaining goals. []  See Plan of Care  []  See progress note/recertification  []  See Discharge Summary         Progress towards goals / Updated goals:  Short Term Goals: To be accomplished in 1 weeks:  Goal: Pt to be compliant with initial HEP to improve flexibility in right buttock.   Status at last note/certification: Established and reviewed with Pt  Current status: met - Pt reports compliance  Long Term Goals: To be accomplished in 3 weeks:  Goal: Pt to exhibit (-) piriformis test to show resolution of right buttock pain and muscle tightness. Status at last note/certification: (+) piriformis test right LE  Current status:  Goal: Pt to be able to sit desired time frames in any chair without need for pillow to return to prior functional level. Status at last note/certification: pain with sitting, especially hard chairs, must use cushion  Current status:  Goal: Pt to report FOTO score of 63 pts to show improved function.   Status at last note/certification: FOTO 57 pts  Current status: met - FOTO 66 pts     PLAN  []  Upgrade activities as tolerated     [x]  Continue plan of care  []  Update interventions per flow sheet       []  Discharge due to:_  [x]  Other:_ add quad stretch     Jose Delong Shavon, PT 8/20/2018  1:56 PM    Future Appointments  Date Time Provider Kristofer Chow   8/22/2018 7:30 AM Kelly Duggan, PT [facility]   8/27/2018 7:30 AM Jose Delong Shavon, PT [facility]   8/30/2018 7:30 AM Kelly Duggan, PT [facility]   10/2/2018 8:15 AM [facility] NURSE VISIT [facility]   10/9/2018 8:00 AM Joleen Oliver MD [facility]   7/5/2019 8:40 AM Kee Marie MD [address] [place] [address]

## 2018-08-22 ENCOUNTER — HOSPITAL ENCOUNTER (OUTPATIENT)
Dept: PHYSICAL THERAPY | Age: 78
Discharge: HOME | End: 2018-08-22
Payer: MEDICARE

## 2018-08-22 PROCEDURE — 97110 THERAPEUTIC EXERCISES: CPT

## 2018-08-22 PROCEDURE — 97140 MANUAL THERAPY 1/> REGIONS: CPT

## 2018-08-22 NOTE — PROGRESS NOTES
PT DAILY TREATMENT NOTE - [facility]     Patient Name: Awais Mims  Date:2018  : 1940  [x]  Patient  Verified  Payor: Misti Rincon / Plan: [place] MEDICARE PART A & B / Product Type: Medicare /    In time:728  Out time:804  Total Treatment Time (min): 36  Total Timed Codes (min): 26  1:1 Treatment Time ( only): 26   Visit #: 4 of 6    Treatment Area: Pain in right buttock [M79.1]    SUBJECTIVE  Pain Level (0-10 scale): 5  Any medication changes, allergies to medications, adverse drug reactions, diagnosis change, or new procedure performed?: [x] No    [] Yes (see summary sheet for update)  Subjective functional status/changes:   [] No changes reported  I went to a baseball game last night and I took my cushion, and thank goodness I did.      OBJECTIVE    Modality rationale: decrease pain and increase tissue extensibility to improve the patients ability to improve activity tolerance   Min Type Additional Details    [] Estim:  []Unatt       []IFC  []Premod                        []Other:  []w/ice   []w/heat  Position:  Location:    [] Estim: []Att    []TENS instruct  []NMES                    []Other:  []w/US   []w/ice   []w/heat  Position:  Location:    []  Traction: [] Cervical       []Lumbar                       [] Prone          []Supine                       []Intermittent   []Continuous Lbs:  [] before manual  [] after manual    []  Ultrasound: []Continuous   [] Pulsed                           []1MHz   []3MHz W/cm2:  Location:    []  Iontophoresis with dexamethasone         Location: [] Take home patch   [] In clinic   10 []  Ice     [x]  heat  []  Ice massage  []  Laser   []  Anodyne Position:prone  Location:Right buttock    []  Laser with stim  []  Other:  Position:  Location:    []  Vasopneumatic Device Pressure:       [] lo [] med [] hi   Temperature: [] lo [] med [] hi   [] Skin assessment post-treatment:  []intact []redness- no adverse reaction    []redness  adverse reaction:     11 min Therapeutic Exercise:  [x] See flow sheet :   Rationale: increase ROM and increase strength to improve the patients ability to improve ease of daily tasks    15 min Manual Therapy:  STM/TrP release Right gluteal musculature, piriformis   Rationale: decrease pain, increase ROM, increase tissue extensibility and decrease trigger points to improve sitting tolerance      With   [] TE   [] TA   [] neuro   [] other: Patient Education: [x] Review HEP    [] Progressed/Changed HEP based on:   [] positioning   [] body mechanics   [] transfers   [] heat/ice application    [] other:      Other Objective/Functional Measures: added supine quad assisted stretch     Pain Level (0-10 scale) post treatment: 0    ASSESSMENT/Changes in Function: Patient reports no drastic changes in overall level of tightness/in sitting tolerance, but does report that stretches/modalities/manual has been helping. Increased soreness today following attending [employer] game last night. Patient will continue to benefit from skilled PT services to modify and progress therapeutic interventions, address functional mobility deficits, address ROM deficits, address strength deficits, analyze and address soft tissue restrictions, analyze and cue movement patterns, analyze and modify body mechanics/ergonomics, assess and modify postural abnormalities, address imbalance/dizziness and instruct in home and community integration to attain remaining goals. []  See Plan of Care  []  See progress note/recertification  []  See Discharge Summary         Progress towards goals / Updated goals:  Short Term Goals: To be accomplished in 1 weeks:  Goal: Pt to be compliant with initial HEP to improve flexibility in right buttock.   Status at last note/certification: Established and reviewed with Pt  Current status: met - Pt reports compliance  Long Term Goals: To be accomplished in 3 weeks:  Goal: Pt to exhibit (-) piriformis test to show resolution of right buttock pain and muscle tightness. Status at last note/certification: (+) piriformis test right LE  Current status: progressing - less painful  Goal: Pt to be able to sit desired time frames in any chair without need for pillow to return to prior functional level. Status at last note/certification: pain with sitting, especially hard chairs, must use cushion  Current status: progressing - does report increased pain with sitting, with cushion, in plastic seat at baseball game  Goal: Pt to report FOTO score of 63 pts to show improved function.   Status at last note/certification: FOTO 57 pts  Current status: met - FOTO 66 pts    PLAN  [x]  Upgrade activities as tolerated     [x]  Continue plan of care  []  Update interventions per flow sheet       []  Discharge due to:_  []  Other:_      Moncho Hood, PT 8/22/2018  7:17 AM    Future Appointments  Date Time Provider Kristofer Chow   8/22/2018 7:30 AM Moncho Hood, PT [facility]   8/27/2018 7:30 AM Favian Sands, PT [facility]   8/30/2018 7:30 AM Moncho Hood, PT [facility]   10/2/2018 8:15 AM [facility] NURSE VISIT [facility]   10/9/2018 8:00 AM Charisse Houston MD [facility]   7/5/2019 8:40 AM Janella Pallas, MD [address] [place] [address]

## 2018-08-27 ENCOUNTER — HOSPITAL ENCOUNTER (OUTPATIENT)
Dept: PHYSICAL THERAPY | Age: 78
Discharge: HOME | End: 2018-08-27
Payer: MEDICARE

## 2018-08-27 PROCEDURE — G8978 MOBILITY CURRENT STATUS: HCPCS

## 2018-08-27 PROCEDURE — 97035 APP MDLTY 1+ULTRASOUND EA 15: CPT

## 2018-08-27 PROCEDURE — 97140 MANUAL THERAPY 1/> REGIONS: CPT

## 2018-08-27 PROCEDURE — G8979 MOBILITY GOAL STATUS: HCPCS

## 2018-08-27 NOTE — PROGRESS NOTES
[facility], [address] [place] [address]  [phone] fax    Continued Plan of Care/ Re-certification for Physical Therapy Services    Patient name: Patrick Park Start of Care: 2018   Referral source: Bradford Claude, MD : 1940                         Medical Diagnosis: Pain in right buttock [M79.1] Onset Date:8/15/18                         Treatment Diagnosis: Right Buttock Pain   Prior Hospitalization: see medical history Provider#: [id number]   Medications: Verified on Patient summary List    Comorbidities: Arthritis; Coronary artery disease; HTN; Parkinson's disease   Prior Level of Function: Lives in [facility] with family; manages home with spouse; active individual    Visits from Start of Care: 5    Missed Visits: 0    The Plan of Care and following information is based on the patient's current status:    Short Term Goals: To be accomplished in 1 weeks:  Goal: Pt to be compliant with initial HEP to improve flexibility in right buttock. Status at last note/certification: Established and reviewed with Pt  Current status: met - Pt reports compliance  Long Term Goals: To be accomplished in 3 weeks:  Goal: Pt to exhibit (-) piriformis test to show resolution of right buttock pain and muscle tightness. Status at last note/certification: (+) piriformis test right LE  Current status: progressing - less painful  Goal: Pt to be able to sit desired time frames in any chair without need for pillow to return to prior functional level. Status at last note/certification: pain with sitting, especially hard chairs, must use cushion  Current status: progressing - does report increased pain with sitting, with cushion, in plastic seat at baseball game  Goal: Pt to report FOTO score of 63 pts to show improved function.   Status at last note/certification: FOTO 57 pts  Current status: met - FOTO 66 pts    Key functional changes: Pt is making slow, but steady progress with therapy. Pt reports being able to cross right leg over left which she could not do prior to start of therapy. Pt reports relief with therapy measures but tightness returns. Pt's main complaints are still sitting for any length of time without a cushion and discomfort while sleeping. Pt is now getting tightness in right distal quad that also increases with glute tightness and pain. Pt concerned that tightness may be coming as side effects of cholesterol medication. Problems/ barriers to goal attainment: None     Problem List: pain affecting function, decrease ROM, edema affecting function, impaired gait/ balance, decrease ADL/ functional abilitiies, decrease activity tolerance, decrease flexibility/ joint mobility and decrease transfer abilities    Treatment Plan: Therapeutic exercise, Therapeutic activities, Neuromuscular re-education, Physical agent/modality, Gait/balance training, Manual therapy and Patient education     Patient Goal (s) has been updated and includes: \"Get rid of the pain. \"     Goals for this certification period to be accomplished in 4 weeks:  Goal: Pt to exhibit (-) piriformis test to show resolution of right buttock pain and muscle tightness. Status at last note/certification: (+) but less painful  Current status:   Goal: Pt to be able to sit desired time frames in any chair without need for pillow to return to prior functional level. Status at last note/certification: does report increased pain with sitting, with cushion, in plastic seat at baseball game  Current status:     Frequency / Duration: Patient to be seen 2 times per week for 4 weeks:    Assessment / Recommendations: Pt would benefit from continued skilled PT to address soft tissue restrictions and pain to improve Pt's sleep and sitting tolerance.     G-Codes (GP)  Mobility  [id number] Current  CJ= 20-39%  [id number] Goal  CJ= 20-39%    The severity rating is based on clinical judgment and the FOTO score.    Certification Period: 8/28/28 - 9/26/18    Rosanne Sands, PT 8/27/2018 8:20 AM    ________________________________________________________________________  I certify that the above Therapy Services are being furnished while the patient is under my care. I agree with the treatment plan and certify that this therapy is necessary. [] I have read the above and request that my patient continue as recommended.   [] I have read the above report and request that my patient continue therapy with the following changes/special instructions: ______________________________________  [] I have read the above report and request that my patient be discharged from therapy    Physician's Signature:____________Date:_________TIME:________    ** Signature, Date and Time must be completed for valid certification **    Please sign and return to [facility], [address] [place] [address]  [phone] fax

## 2018-08-27 NOTE — PROGRESS NOTES
PT DAILY TREATMENT NOTE - [facility]     Patient Name: Juan Ramon Omalley  Date:2018  : 1940  [x]  Patient  Verified  Payor: Sylvie Bah / Plan: [place] MEDICARE PART A & B / Product Type: Medicare /    In time:7:33  Out time:8:10  Total Treatment Time (min): 37  Total Timed Codes (min): 37  1:1 Treatment Time [facility] only):37    Visit #: 5 of 6    Treatment Area: Pain in right buttock [M79.1]    SUBJECTIVE  Pain Level (0-10 scale): 6/10  Any medication changes, allergies to medications, adverse drug reactions, diagnosis change, or new procedure performed?: [x] No    [] Yes (see summary sheet for update)  Subjective functional status/changes:   [] No changes reported  \"I have had a time going to sleep at night. I was able to sleep one night with the heating pad. I've been able to cross my leg more. Today, though, the butt has just been tight. I've been doing the stretches and rolling on the roller. I'm not quite there yet. \"    OBJECTIVE    Modality rationale: decrease pain and increase tissue extensibility to improve the patients ability to improve activity tolerance   Min Type Additional Details    [] Estim:  []Unatt       []IFC  []Premod                        []Other:  []w/ice   []w/heat  Position:  Location:    [] Estim: []Att    []TENS instruct  []NMES                    []Other:  []w/US   []w/ice   []w/heat  Position:  Location:    []  Traction: [] Cervical       []Lumbar                       [] Prone          []Supine                       []Intermittent   []Continuous Lbs:  [] before manual  [] after manual   8 [x]  Ultrasound: [x]Continuous   [] Pulsed                           [x]1MHz   []3MHz W/cm2: 1.2  Location: right buttock    []  Iontophoresis with dexamethasone         Location: [] Take home patch   [] In clinic    []  Ice     []  heat  []  Ice massage  []  Laser   []  Anodyne Position:  Location:    []  Laser with stim  []  Other:  Position:  Location:    []  Vasopneumatic Device Pressure:       [] lo [] med [] hi   Temperature: [] lo [] med [] hi   [] Skin assessment post-treatment:  []intact []redness- no adverse reaction    []redness  adverse reaction:     9 min Therapeutic Exercise:  [x] See flow sheet :   Rationale: increase ROM and increase strength to improve the patients ability to improve ease of daily tasks    20 min Manual Therapy:  STM/TrP release to right glute, piriformis   Rationale: decrease pain, increase ROM, increase tissue extensibility and decrease trigger points to improve sitting tolerance      With   [] TE   [] TA   [] neuro   [] other: Patient Education: [x] Review HEP    [] Progressed/Changed HEP based on:   [] positioning   [] body mechanics   [] transfers   [] heat/ice application    [] other:      Other Objective/Functional Measures: Performed exercises per flow sheet. Pt uncomfortable throughout manual therapy due to increased right quad and buttock pain. Initiated US per Pt and MD request.    Pain Level (0-10 scale) post treatment: 3/10    ASSESSMENT/Changes in Function: Pt continues to report tightness in right quad that increases with tightness in buttock. Initiated US to right buttock as Pt reported MD had mentioned getting it. Will reassess next visit for effectiveness. Pt advised to perform quad stretch at home to assist with decreasing muscle tightness. Patient will continue to benefit from skilled PT services to modify and progress therapeutic interventions, address functional mobility deficits, address ROM deficits, address strength deficits, analyze and address soft tissue restrictions, analyze and cue movement patterns, analyze and modify body mechanics/ergonomics, assess and modify postural abnormalities, address imbalance/dizziness and instruct in home and community integration to attain remaining goals.      []  See Plan of Care  [x]  See progress note/recertification  []  See Discharge Summary         Progress towards goals / Updated goals:  Short Term Goals: To be accomplished in 1 weeks:  Goal: Pt to be compliant with initial HEP to improve flexibility in right buttock. Status at last note/certification: Established and reviewed with Pt  Current status: met - Pt reports compliance  Long Term Goals: To be accomplished in 3 weeks:  Goal: Pt to exhibit (-) piriformis test to show resolution of right buttock pain and muscle tightness. Status at last note/certification: (+) piriformis test right LE  Current status: progressing - less painful  Goal: Pt to be able to sit desired time frames in any chair without need for pillow to return to prior functional level. Status at last note/certification: pain with sitting, especially hard chairs, must use cushion  Current status: progressing - does report increased pain with sitting, with cushion, in plastic seat at baseball game  Goal: Pt to report FOTO score of 63 pts to show improved function.   Status at last note/certification: FOTO 57 pts  Current status: met - FOTO 66 pts    PLAN  [x]  Upgrade activities as tolerated     [x]  Continue plan of care  []  Update interventions per flow sheet       []  Discharge due to:_  []  Other:_      Vimal Sands PT 8/27/2018  7:17 AM    Future Appointments  Date Time Provider Kristofer Chow   8/30/2018 7:30 AM Cisco Mcguire PT [facility]   10/2/2018 8:15 AM [facility] NURSE VISIT [facility]   10/9/2018 8:00 AM Orlin Vo MD [facility]   7/5/2019 8:40 AM Dinorah Arrington MD [address] [place] [address]

## 2018-08-30 ENCOUNTER — HOSPITAL ENCOUNTER (OUTPATIENT)
Dept: PHYSICAL THERAPY | Age: 78
Discharge: HOME | End: 2018-08-30
Payer: MEDICARE

## 2018-08-30 PROCEDURE — 97035 APP MDLTY 1+ULTRASOUND EA 15: CPT

## 2018-08-30 PROCEDURE — 97110 THERAPEUTIC EXERCISES: CPT

## 2018-08-30 NOTE — PROGRESS NOTES
PT DAILY TREATMENT NOTE - [facility]  Patient Name: Rodriguez Ferrera Date:2018 : 1940 [x]  Patient  Verified Payor: [place] MED[place]ARE / Plan: Polly Booth y / Product Type: Medicare / In time:725  Out time:748 Total Treatment Time (min): 23 Total Timed Codes (min): 23 
1:1 Treatment Time ( only): 23 Visit #: 1 of 8 Treatment Area: Pain in right buttock [M79.1] SUBJECTIVE Pain Level (0-10 scale): 4 Any medication changes, allergies to medications, adverse drug reactions, diagnosis change, or new procedure performed?: [x] No    [] Yes (see summary sheet for update) Subjective functional status/changes:   [] No changes reported 
I'm not as sore as I was Monday. OBJECTIVE Modality rationale: decrease pain and increase tissue extensibility to improve the patients ability to improve activity tolerance Min Type Additional Details  
 [] Estim:  []Unatt       []IFC  []Premod []Other:  []w/ice   []w/heat Position: Location:  
 [] Estim: []Att    []TENS instruct  []NMES []Other:  []w/US   []w/ice   []w/heat Position: Location:  
 []  Traction: [] Cervical       []Lumbar 
                     [] Prone          []Supine []Intermittent   []Continuous Lbs: 
[] before manual 
[] after manual  
8 [x]  Ultrasound: [x]Continuous   [] Pulsed 
                         [x]1MHz   []3MHz W/cm2:1.2 Location:Right buttocks  
 []  Iontophoresis with dexamethasone Location: [] Take home patch  
[] In clinic  
 []  Ice     []  heat 
[]  Ice massage 
[]  Laser  
[]  Anodyne Position: Location:  
 []  Laser with stim 
[]  Other:  Position: Location:  
 []  Vasopneumatic Device Pressure:       [] lo [] med [] hi  
Temperature: [] lo [] med [] hi  
[] Skin assessment post-treatment:  []intact []redness- no adverse reaction 
  []redness  adverse reaction:  
 
15 min Therapeutic Exercise:  [x] See flow sheet :  
 Rationale: increase ROM and increase strength to improve the patients ability to improve ease of mobility With 
 [] TE 
 [] TA 
 [] neuro 
 [] other: Patient Education: [x] Review HEP [] Progressed/Changed HEP based on:  
[] positioning   [] body mechanics   [] transfers   [] heat/ice application   
[] other:   
 
Other Objective/Functional Measures: increased repetitions per flow sheet Pain Level (0-10 scale) post treatment: 0 
 
ASSESSMENT/Changes in Function: Patient reports good reduction in pain following ultrasound last visit for at least an hour or more. Held manual today due to no pain following stretches and then ultrasound. Patient will continue to benefit from skilled PT services to modify and progress therapeutic interventions, address functional mobility deficits, address ROM deficits, address strength deficits, analyze and address soft tissue restrictions, analyze and cue movement patterns, analyze and modify body mechanics/ergonomics, assess and modify postural abnormalities, address imbalance/dizziness and instruct in home and community integration to attain remaining goals. []  See Plan of Care 
[]  See progress note/recertification 
[]  See Discharge Summary Progress towards goals / Updated goals: 
Goal: Pt to exhibit (-) piriformis test to show resolution of right buttock pain and muscle tightness. Status at last note/certification: (+) but less painful Current status:  
Goal: Pt to be able to sit desired time frames in any chair without need for pillow to return to prior functional level. Status at last note/certification: does report increased pain with sitting, with cushion, in plastic seat at baseball game Current status: PLAN [x]  Upgrade activities as tolerated     [x]  Continue plan of care 
[]  Update interventions per flow sheet      
[]  Discharge due to:_ 
[]  Other:_   
 
Mervin Cline PT 8/30/2018  7:08 AM 
 
Future Appointments Date Time Provider Kristofer Chow 8/30/2018 7:30 AM [facility]  
10/2/2018 8:15 AM IO NURSE VISIT [facility] SCHED  
10/9/2018 8:00 AM Anselmo Rick MD [facility] LU SCHED  
7/5/2019 8:40 AM Olena Cook MD [address] [place] [address]

## 2018-09-04 ENCOUNTER — HOSPITAL ENCOUNTER (OUTPATIENT)
Dept: PHYSICAL THERAPY | Age: 78
Discharge: HOME | End: 2018-09-04
Payer: MEDICARE

## 2018-09-04 PROCEDURE — 97035 APP MDLTY 1+ULTRASOUND EA 15: CPT

## 2018-09-04 PROCEDURE — 97112 NEUROMUSCULAR REEDUCATION: CPT

## 2018-09-04 NOTE — PROGRESS NOTES
PT DAILY TREATMENT NOTE - [facility]  Patient Name: Patrick Park Date:2018 : 1940 [x]  Patient  Verified Payor: [place] MEDICARE / Plan: Polly Booth y / Product Type: Medicare / In time:725  Out time:755 Total Treatment Time (min): 30 Total Timed Codes (min): 30 
1:1 Treatment Time ( only): 30 Visit #: 2 of 8 Treatment Area: Pain in right buttock [M79.1] SUBJECTIVE Pain Level (0-10 scale): 3 Any medication changes, allergies to medications, adverse drug reactions, diagnosis change, or new procedure performed?: [x] No    [] Yes (see summary sheet for update) Subjective functional status/changes:   [] No changes reported I don't feel too bad this morning. OBJECTIVE Modality rationale: decrease pain and increase tissue extensibility to improve the patients ability to improve ease of daily tasks Min Type Additional Details  
 [] Estim:  []Unatt       []IFC  []Premod []Other:  []w/ice   []w/heat Position: Location:  
 [] Estim: []Att    []TENS instruct  []NMES []Other:  []w/US   []w/ice   []w/heat Position: Location:  
 []  Traction: [] Cervical       []Lumbar 
                     [] Prone          []Supine []Intermittent   []Continuous Lbs: 
[] before manual 
[] after manual  
8 [x]  Ultrasound: [x]Continuous   [] Pulsed 
                         [x]1MHz   []3MHz W/cm2:1.2 Location:Right buttocks  
 []  Iontophoresis with dexamethasone Location: [] Take home patch  
[] In clinic  
 []  Ice     []  heat 
[]  Ice massage 
[]  Laser  
[]  Anodyne Position: Location:  
 []  Laser with stim 
[]  Other:  Position: Location:  
 []  Vasopneumatic Device Pressure:       [] lo [] med [] hi  
Temperature: [] lo [] med [] hi  
[] Skin assessment post-treatment:  []intact []redness- no adverse reaction 
  []redness  adverse reaction:  
 
12 min Therapeutic Exercise:  [x] See flow sheet :  
 Rationale: increase ROM and increase strength to improve the patients ability to improve ease of mobility 10 min Neuromuscular Re-education:  [x]  See flow sheet :  
Rationale: increase strength and improve coordination  to improve the patients ability to improve hip stability With 
 [] TE 
 [] TA 
 [] neuro 
 [] other: Patient Education: [x] Review HEP [] Progressed/Changed HEP based on:  
[] positioning   [] body mechanics   [] transfers   [] heat/ice application   
[] other:   
 
Other Objective/Functional Measures: added Chair standing leg pumps, sidelying hip abduction Pain Level (0-10 scale) post treatment: 0 
 
ASSESSMENT/Changes in Function: Patient tolerated increased strengthening to the LEs well this morning, with no pain following session. She does report some discomfort in the distal lateral thighs with sidelying hip abduction; explained that we will slowly add in more resistance training to build up stability. Patient will continue to benefit from skilled PT services to modify and progress therapeutic interventions, address functional mobility deficits, address ROM deficits, address strength deficits, analyze and address soft tissue restrictions, analyze and cue movement patterns, analyze and modify body mechanics/ergonomics, assess and modify postural abnormalities, address imbalance/dizziness and instruct in home and community integration to attain remaining goals. []  See Plan of Care 
[]  See progress note/recertification 
[]  See Discharge Summary Progress towards goals / Updated goals: 
Goal: Pt to exhibit (-) piriformis test to show resolution of right buttock pain and muscle tightness. Status at last note/certification: (+) but less painful Current status:  
Goal: Pt to be able to sit desired time frames in any chair without need for pillow to return to prior functional level.  
Status at last note/certification: does report increased pain with sitting, with cushion, in plastic seat at baseball game Current status: PLAN [x]  Upgrade activities as tolerated     [x]  Continue plan of care [x]  Update interventions per flow sheet      
[]  Discharge due to:_ 
[]  Other:_   
 
Ramón Sky, PT 9/4/2018  7:08 AM 
 
Future Appointments Date Time Provider Kristofer Chow 9/4/2018 7:30 AM Ramón Sky, PT [facility]  
9/6/2018 7:30 AM Ramón Sky, PT [facility]  
9/11/2018 7:30 AM Ramón Sky, PT [facility]  
9/13/2018 7:30 AM Ramón Sky, PT [facility]  
9/18/2018 7:30 AM Ramón Sky PT [facility]  
9/20/2018 7:30 AM Ramón Sky PT [facility]  
9/25/2018 7:30 AM Ramón Sky, PT [facility]  
10/2/2018 8:15 AM [facility] NURSE VISIT [facility] SCHED  
10/9/2018 8:00 AM Lisa Gaspar MD [facility] SCHED  
7/5/2019 8:40 AM Karyle Fairly, MD [address] [place] [address]

## 2018-09-06 ENCOUNTER — HOSPITAL ENCOUNTER (OUTPATIENT)
Dept: PHYSICAL THERAPY | Age: 78
Discharge: HOME | End: 2018-09-06
Payer: MEDICARE

## 2018-09-06 PROCEDURE — 97110 THERAPEUTIC EXERCISES: CPT

## 2018-09-06 PROCEDURE — 97035 APP MDLTY 1+ULTRASOUND EA 15: CPT

## 2018-09-06 NOTE — PROGRESS NOTES
PT DAILY TREATMENT NOTE - [facility]  Patient Name: Gerry Kuhn Date:2018 : 1940 [x]  Patient  Verified Payor: [place] MEDICARE / Plan: Polly Booth y / Product Type: Medicare / In time:725  Out time:759 Total Treatment Time (min): 34 Total Timed Codes (min): 34 
1:1 Treatment Time ( only): 34 Visit #: 3 of 8 Treatment Area: Pain in right buttock [M79.1] SUBJECTIVE Pain Level (0-10 scale): 0 Any medication changes, allergies to medications, adverse drug reactions, diagnosis change, or new procedure performed?: [x] No    [] Yes (see summary sheet for update) Subjective functional status/changes:   [] No changes reported Well right this moment I don't feel anything. OBJECTIVE Modality rationale: decrease pain and increase tissue extensibility to improve the patients ability to improve activity tolerance Min Type Additional Details  
 [] Estim:  []Unatt       []IFC  []Premod []Other:  []w/ice   []w/heat Position: Location:  
 [] Estim: []Att    []TENS instruct  []NMES []Other:  []w/US   []w/ice   []w/heat Position: Location:  
 []  Traction: [] Cervical       []Lumbar 
                     [] Prone          []Supine []Intermittent   []Continuous Lbs: 
[] before manual 
[] after manual  
8 [x]  Ultrasound: [x]Continuous   [] Pulsed 
                         [x]1MHz   []3MHz W/cm2:1.2 Location:Right buttock  
 []  Iontophoresis with dexamethasone Location: [] Take home patch  
[] In clinic  
 []  Ice     []  heat 
[]  Ice massage 
[]  Laser  
[]  Anodyne Position: Location:  
 []  Laser with stim 
[]  Other:  Position: Location:  
 []  Vasopneumatic Device Pressure:       [] lo [] med [] hi  
Temperature: [] lo [] med [] hi  
[] Skin assessment post-treatment:  []intact []redness- no adverse reaction 
  []redness  adverse reaction: 18 min Therapeutic Exercise:  [x] See flow sheet :  
Rationale: increase ROM and increase strength to improve the patients ability to improve sitting tolerance, ease of daily tasks 8 min Neuromuscular Re-education:  [x]  See flow sheet :  
Rationale: increase strength and improve coordination  to improve the patients ability to improve hip stability with recreational activities With 
 [] TE 
 [] TA 
 [] neuro 
 [] other: Patient Education: [x] Review HEP [] Progressed/Changed HEP based on:  
[] positioning   [] body mechanics   [] transfers   [] heat/ice application   
[] other:   
 
Other Objective/Functional Measures: increased repetitions per flow sheet Pain Level (0-10 scale) post treatment: 0 
 
ASSESSMENT/Changes in Function: Patient unable to add standing mini squats due to knee pain, but able to add in supine bridging for further gluteal activation. Patient will continue to benefit from skilled PT services to modify and progress therapeutic interventions, address functional mobility deficits, address ROM deficits, address strength deficits, analyze and address soft tissue restrictions, analyze and cue movement patterns, analyze and modify body mechanics/ergonomics, assess and modify postural abnormalities, address imbalance/dizziness and instruct in home and community integration to attain remaining goals. []  See Plan of Care 
[]  See progress note/recertification 
[]  See Discharge Summary Progress towards goals / Updated goals: 
Goal: Pt to exhibit (-) piriformis test to show resolution of right buttock pain and muscle tightness. Status at last note/certification: (+) but less painful Current status: Not met, remains painful/positive Goal: Pt to be able to sit desired time frames in any chair without need for pillow to return to prior functional level.  
Status at last note/certification: does report increased pain with sitting, with cushion, in plastic seat at baseball game Current status: Progressing, improving tolerance recently but some limitations PLAN [x]  Upgrade activities as tolerated     [x]  Continue plan of care 
[]  Update interventions per flow sheet      
[]  Discharge due to:_ 
[]  Other:_   
 
Anahi Myers, PT 9/6/2018  7:08 AM 
 
Future Appointments Date Time Provider Kristofer Chow 9/6/2018 7:30 AM Anahi Myers, PT [facility]  
9/11/2018 7:30 AM Anahi Myers, PT [facility]  
9/13/2018 7:30 AM Anahi Myers, PT [facility]  
9/18/2018 7:30 AM [facility]  
9/20/2018 7:30 AM Anahi Myers, PT [facility]  
9/25/2018 7:30 AM Anahi Myers, PT [facility]  
10/2/2018 8:15 AM [facility] NURSE VISIT [facility] SCHED  
10/9/2018 8:00 AM Estrella Hu MD [facility] SCHED  
7/5/2019 8:40 AM Alison Pal MD [address] [place] [address]

## 2018-09-11 ENCOUNTER — HOSPITAL ENCOUNTER (OUTPATIENT)
Dept: PHYSICAL THERAPY | Age: 78
Discharge: HOME | End: 2018-09-11
Payer: MEDICARE

## 2018-09-11 PROCEDURE — 97112 NEUROMUSCULAR REEDUCATION: CPT

## 2018-09-11 PROCEDURE — 97035 APP MDLTY 1+ULTRASOUND EA 15: CPT

## 2018-09-13 ENCOUNTER — HOSPITAL ENCOUNTER (OUTPATIENT)
Dept: PHYSICAL THERAPY | Age: 78
Discharge: HOME | End: 2018-09-13
Payer: MEDICARE

## 2018-09-13 PROCEDURE — 97112 NEUROMUSCULAR REEDUCATION: CPT

## 2018-09-13 PROCEDURE — 97035 APP MDLTY 1+ULTRASOUND EA 15: CPT

## 2018-09-13 NOTE — PROGRESS NOTES
PT DAILY TREATMENT NOTE - [facility]  Patient Name: Mini Herrera Date:2018 : 1940 [x]  Patient  Verified Payor: [place] MEDICARE / Plan: Polly Wooteny / Product Type: Medicare / In time:725  Out time:755 Total Treatment Time (min): 30 Total Timed Codes (min): 30 
1:1 Treatment Time ( only): 30 Visit #: 5 of 8 Treatment Area: Pain in right buttock [M79.1] SUBJECTIVE Pain Level (0-10 scale): 3 Any medication changes, allergies to medications, adverse drug reactions, diagnosis change, or new procedure performed?: [x] No    [] Yes (see summary sheet for update) Subjective functional status/changes:   [] No changes reported Feeling pretty good this morning. OBJECTIVE Modality rationale: decrease pain and increase tissue extensibility to improve the patients ability to improve activity tolerance Min Type Additional Details  
 [] Estim:  []Unatt       []IFC  []Premod []Other:  []w/ice   []w/heat Position: Location:  
 [] Estim: []Att    []TENS instruct  []NMES []Other:  []w/US   []w/ice   []w/heat Position: Location:  
 []  Traction: [] Cervical       []Lumbar 
                     [] Prone          []Supine []Intermittent   []Continuous Lbs: 
[] before manual 
[] after manual  
8 [x]  Ultrasound: [x]Continuous   [] Pulsed 
                         [x]1MHz   []3MHz W/cm2:1.2 Location:Right buttock  
 []  Iontophoresis with dexamethasone Location: [] Take home patch  
[] In clinic  
 []  Ice     []  heat 
[]  Ice massage 
[]  Laser  
[]  Anodyne Position: Location:  
 []  Laser with stim 
[]  Other:  Position: Location:  
 []  Vasopneumatic Device Pressure:       [] lo [] med [] hi  
Temperature: [] lo [] med [] hi  
[] Skin assessment post-treatment:  []intact []redness- no adverse reaction 
  []redness  adverse reaction:  
 
14 min Therapeutic Exercise:  [x] See flow sheet :  
 Rationale: increase ROM and increase strength to improve the patients ability to improve sitting/standing tolerance 8 min Neuromuscular Re-education:  [x]  See flow sheet :  
Rationale: increase strength, improve coordination and increase proprioception  to improve the patients ability to improve LE stability With 
 [] TE 
 [] TA 
 [] neuro 
 [] other: Patient Education: [x] Review HEP [] Progressed/Changed HEP based on:  
[] positioning   [] body mechanics   [] transfers   [] heat/ice application   
[] other:   
 
Other Objective/Functional Measures: increased repetitions per flow sheet Pain Level (0-10 scale) post treatment: 2 
 
ASSESSMENT/Changes in Function: Will progress strengthening in the coming visits to further target deficits. Patient will continue to benefit from skilled PT services to modify and progress therapeutic interventions, address functional mobility deficits, address ROM deficits, address strength deficits, analyze and address soft tissue restrictions, analyze and cue movement patterns, analyze and modify body mechanics/ergonomics, assess and modify postural abnormalities, address imbalance/dizziness and instruct in home and community integration to attain remaining goals. []  See Plan of Care 
[]  See progress note/recertification 
[]  See Discharge Summary Progress towards goals / Updated goals: 
Goal: Pt to exhibit (-) piriformis test to show resolution of right buttock pain and muscle tightness. Status at last note/certification: (+) but less painful Current status: Not met, remains somewhat painful Goal: Pt to be able to sit desired time frames in any chair without need for pillow to return to prior functional level. Status at last note/certification: does report increased pain with sitting, with cushion, in plastic seat at baseball game Current status: Progressing, able to sit desired times in soft chairs PLAN 
 [x]  Upgrade activities as tolerated     [x]  Continue plan of care 
[]  Update interventions per flow sheet      
[]  Discharge due to:_ 
[]  Other:_   
 
Weston Martinez, PT 9/13/2018  7:06 AM 
 
Future Appointments Date Time Provider Kristofer Chow 9/13/2018 7:30 AM Weston Martinez, PT [facility]  
9/18/2018 7:30 AM Weston Martinez, PT [facility]  
9/20/2018 7:30 AM Weston Martinez, PT [facility]  
9/25/2018 7:30 AM Weston Martinez, PT [facility]  
10/2/2018 8:15 AM [facility] NURSE VISIT [facility] SCHED  
10/9/2018 8:00 AM Trevon Pozo MD [facility] SCHED  
7/5/2019 8:40 AM Chelo Ponce MD [address] [place] [address]

## 2018-09-18 ENCOUNTER — HOSPITAL ENCOUNTER (OUTPATIENT)
Dept: PHYSICAL THERAPY | Age: 78
Discharge: HOME | End: 2018-09-18
Payer: MEDICARE

## 2018-09-18 PROCEDURE — 97035 APP MDLTY 1+ULTRASOUND EA 15: CPT

## 2018-09-18 PROCEDURE — 97112 NEUROMUSCULAR REEDUCATION: CPT

## 2018-09-18 NOTE — PROGRESS NOTES
PT DAILY TREATMENT NOTE - [facility]  Patient Name: Mellisa Monahan Date:2018 : 1940 [x]  Patient  Verified Payor: [place] MEDICARE / Plan: Polly Bejarano / Product Type: Medicare / In time:727  Out time:804 Total Treatment Time (min): 37 Total Timed Codes (min): 37 
1:1 Treatment Time ( only): 37 Visit #: 6 of 8 Treatment Area: Pain in right buttock [M79.1] SUBJECTIVE Pain Level (0-10 scale): 4 Any medication changes, allergies to medications, adverse drug reactions, diagnosis change, or new procedure performed?: [x] No    [] Yes (see summary sheet for update) Subjective functional status/changes:   [] No changes reported It's still an ache. Does this ever go away? OBJECTIVE Modality rationale: decrease pain and increase tissue extensibility to improve the patients ability to improve activity tolerance Min Type Additional Details  
 [] Estim:  []Unatt       []IFC  []Premod []Other:  []w/ice   []w/heat Position: Location:  
 [] Estim: []Att    []TENS instruct  []NMES []Other:  []w/US   []w/ice   []w/heat Position: Location:  
 []  Traction: [] Cervical       []Lumbar 
                     [] Prone          []Supine []Intermittent   []Continuous Lbs: 
[] before manual 
[] after manual  
8 [x]  Ultrasound: [x]Continuous   [] Pulsed 
                         [x]1MHz   []3MHz W/cm2:1.2 Location:Right buttocks  
 []  Iontophoresis with dexamethasone Location: [] Take home patch  
[] In clinic  
 []  Ice     []  heat 
[]  Ice massage 
[]  Laser  
[]  Anodyne Position: Location:  
 []  Laser with stim 
[]  Other:  Position: Location:  
 []  Vasopneumatic Device Pressure:       [] lo [] med [] hi  
Temperature: [] lo [] med [] hi  
[] Skin assessment post-treatment:  []intact []redness- no adverse reaction 
  []redness  adverse reaction: 29 min Neuromuscular Re-education:  [x]  See flow sheet :  
Rationale: increase strength and improve coordination  to improve the patients ability to improve hip stability With 
 [] TE 
 [] TA 
 [] neuro 
 [] other: Patient Education: [x] Review HEP [] Progressed/Changed HEP based on:  
[] positioning   [] body mechanics   [] transfers   [] heat/ice application   
[] other:   
 
Other Objective/Functional Measures: progressed LE strengthening per flow sheet Pain Level (0-10 scale) post treatment: 1 ASSESSMENT/Changes in Function: Good tolerance to updated routine for LE strengthening. Limited discomfort following visit. Patient will continue to benefit from skilled PT services to modify and progress therapeutic interventions, address functional mobility deficits, address ROM deficits, address strength deficits, analyze and address soft tissue restrictions, analyze and cue movement patterns, analyze and modify body mechanics/ergonomics, assess and modify postural abnormalities, address imbalance/dizziness and instruct in home and community integration to attain remaining goals. []  See Plan of Care 
[]  See progress note/recertification 
[]  See Discharge Summary Progress towards goals / Updated goals: 
Goal: Pt to exhibit (-) piriformis test to show resolution of right buttock pain and muscle tightness. Status at last note/certification: (+) but less painful Current status: Not met, remains somewhat painful Goal: Pt to be able to sit desired time frames in any chair without need for pillow to return to prior functional level. Status at last note/certification: does report increased pain with sitting, with cushion, in plastic seat at baseball game Current status: Progressing, able to sit desired times in soft chairs PLAN [x]  Upgrade activities as tolerated     [x]  Continue plan of care 
[]  Update interventions per flow sheet      
[]  Discharge due to:_ 
[]  Other:_   
 
 Mervin Cline, PT 9/18/2018  7:07 AM 
 
Future Appointments Date Time Provider Kristofer Chow 9/18/2018 7:30 AM Mervin Cline PT [facility]  
9/20/2018 7:30 AM Mervin Cline PT [facility]  
9/25/2018 7:30 AM Mervin Cline PT [facility]  
10/2/2018 8:15 AM IO NURSE VISIT [facility] SCHED  
10/9/2018 8:00 AM Gina Lee MD [facility] SCHED  
7/5/2019 8:40 AM Jelly Bradshaw MD [address] [place] [address]

## 2018-09-20 ENCOUNTER — HOSPITAL ENCOUNTER (OUTPATIENT)
Dept: PHYSICAL THERAPY | Age: 78
Discharge: HOME | End: 2018-09-20
Payer: MEDICARE

## 2018-09-20 PROCEDURE — 97035 APP MDLTY 1+ULTRASOUND EA 15: CPT

## 2018-09-20 PROCEDURE — 97112 NEUROMUSCULAR REEDUCATION: CPT

## 2018-09-20 NOTE — PROGRESS NOTES
PT DAILY TREATMENT NOTE - [facility]  Patient Name: Kieran Salmeron Date:2018 : 1940 [x]  Patient  Verified Payor: [place] MED[place] / Plan: Polly Booth y / Product Type: Medicare / In time:725  Out time:757 Total Treatment Time (min): 32 Total Timed Codes (min): 32 
1:1 Treatment Time ( only): 32 Visit #: 7 of 8 Treatment Area: Pain in right buttock [M79.1] SUBJECTIVE Pain Level (0-10 scale): 1-2 Any medication changes, allergies to medications, adverse drug reactions, diagnosis change, or new procedure performed?: [x] No    [] Yes (see summary sheet for update) Subjective functional status/changes:   [] No changes reported I was sore in the hips after last time. It's low now. OBJECTIVE Modality rationale: decrease pain and increase tissue extensibility to improve the patients ability to improve ease of recreational activities Min Type Additional Details  
 [] Estim:  []Unatt       []IFC  []Premod []Other:  []w/ice   []w/heat Position: Location:  
 [] Estim: []Att    []TENS instruct  []NMES []Other:  []w/US   []w/ice   []w/heat Position: Location:  
 []  Traction: [] Cervical       []Lumbar 
                     [] Prone          []Supine []Intermittent   []Continuous Lbs: 
[] before manual 
[] after manual  
8 [x]  Ultrasound: [x]Continuous   [] Pulsed 
                         [x]1MHz   []3MHz W/cm2:1.2 Location:Right buttocks  
 []  Iontophoresis with dexamethasone Location: [] Take home patch  
[] In clinic  
 []  Ice     []  heat 
[]  Ice massage 
[]  Laser  
[]  Anodyne Position: Location:  
 []  Laser with stim 
[]  Other:  Position: Location:  
 []  Vasopneumatic Device Pressure:       [] lo [] med [] hi  
Temperature: [] lo [] med [] hi  
[] Skin assessment post-treatment:  []intact []redness- no adverse reaction 
  []redness  adverse reaction: 24 min Neuromuscular Re-education:  [x]  See flow sheet :  
Rationale: increase strength and improve coordination  to improve the patients ability to improve LE stability With 
 [] TE 
 [] TA 
 [] neuro 
 [] other: Patient Education: [x] Review HEP [] Progressed/Changed HEP based on:  
[] positioning   [] body mechanics   [] transfers   [] heat/ice application   
[] other:   
 
Other Objective/Functional Measures: completed exercises per flow sheet Pain Level (0-10 scale) post treatment: 0 
 
ASSESSMENT/Changes in Function: Patient admits to increased Right groin pain with getting into/out of car, and with stair negotiation. Patient will continue to benefit from skilled PT services to modify and progress therapeutic interventions, address functional mobility deficits, address ROM deficits, address strength deficits, analyze and address soft tissue restrictions, analyze and cue movement patterns, analyze and modify body mechanics/ergonomics, assess and modify postural abnormalities, address imbalance/dizziness and instruct in home and community integration to attain remaining goals. []  See Plan of Care 
[]  See progress note/recertification 
[]  See Discharge Summary Progress towards goals / Updated goals: 
Goal: Pt to exhibit (-) piriformis test to show resolution of right buttock pain and muscle tightness. Status at last note/certification: (+) but less painful Current status: Progressing, some pulling Goal: Pt to be able to sit desired time frames in any chair without need for pillow to return to prior functional level. Status at last note/certification: does report increased pain with sitting, with cushion, in plastic seat at baseball game Current status: Progressing, able to sit desired times in soft chairs; avoiding hard surfaces PLAN [x]  Upgrade activities as tolerated     [x]  Continue plan of care 
[]  Update interventions per flow sheet      
[]  Discharge due to:_ 
 []  Other:_   
 
Claudeen Clay, PT 9/20/2018  7:11 AM 
 
Future Appointments Date Time Provider Kristofer Claudine 9/20/2018 7:30 AM Claudeen Clay, PT [facility]  
9/25/2018 7:30 AM Claudeen Clay, PT [facility]  
10/2/2018 8:15 AM IO NURSE VISIT [facility] SCHED  
10/9/2018 8:00 AM Connie Gilman MD [facility] SCHED  
7/5/2019 8:40 AM Augie Benjamin MD [address] [place] [address]

## 2018-09-25 ENCOUNTER — HOSPITAL ENCOUNTER (OUTPATIENT)
Dept: PHYSICAL THERAPY | Age: 78
Discharge: HOME | End: 2018-09-25
Payer: MEDICARE

## 2018-09-25 PROCEDURE — G8980 MOBILITY D/C STATUS: HCPCS

## 2018-09-25 PROCEDURE — 97035 APP MDLTY 1+ULTRASOUND EA 15: CPT

## 2018-09-25 PROCEDURE — G8979 MOBILITY GOAL STATUS: HCPCS

## 2018-09-25 PROCEDURE — 97112 NEUROMUSCULAR REEDUCATION: CPT

## 2018-09-25 PROCEDURE — G8978 MOBILITY CURRENT STATUS: HCPCS

## 2018-09-25 NOTE — PROGRESS NOTES
PT DAILY TREATMENT NOTE - [facility]  Patient Name: Hector Woo Date:2018 : 1940 [x]  Patient  Verified Payor: [place] MEDICARE / Plan: Julio Cesar Parents / Product Type: Medicare / In time:725  Out time:800 Total Treatment Time (min): 35 Total Timed Codes (min): 35 
1:1 Treatment Time ( only): 35 Visit #: 8 of 8 Treatment Area: Pain in right buttock [M79.1] SUBJECTIVE Pain Level (0-10 scale): 4 Any medication changes, allergies to medications, adverse drug reactions, diagnosis change, or new procedure performed?: [x] No    [] Yes (see summary sheet for update) Subjective functional status/changes:   [] No changes reported Saturday I just had to work it out. OBJECTIVE Modality rationale: decrease pain to improve the patients ability to improve activity tolerance Min Type Additional Details  
 [] Estim:  []Unatt       []IFC  []Premod []Other:  []w/ice   []w/heat Position: Location:  
 [] Estim: []Att    []TENS instruct  []NMES []Other:  []w/US   []w/ice   []w/heat Position: Location:  
 []  Traction: [] Cervical       []Lumbar 
                     [] Prone          []Supine []Intermittent   []Continuous Lbs: 
[] before manual 
[] after manual  
8 [x]  Ultrasound: [x]Continuous   [] Pulsed 
                         [x]1MHz   []3MHz W/cm2:1.3 Location:Right buttock  
 []  Iontophoresis with dexamethasone Location: [] Take home patch  
[] In clinic  
 []  Ice     []  heat 
[]  Ice massage 
[]  Laser  
[]  Anodyne Position: Location:  
 []  Laser with stim 
[]  Other:  Position: Location:  
 []  Vasopneumatic Device Pressure:       [] lo [] med [] hi  
Temperature: [] lo [] med [] hi  
[] Skin assessment post-treatment:  []intact []redness- no adverse reaction 
  []redness  adverse reaction:  
 
27 min Neuromuscular Re-education:  [x]  See flow sheet :  
 Rationale: increase strength, improve coordination, improve balance and increase proprioception  to improve the patients ability to improve LE stability with daily tasks With 
 [] TE 
 [] TA 
 [] neuro 
 [] other: Patient Education: [x] Review HEP [] Progressed/Changed HEP based on:  
[] positioning   [] body mechanics   [] transfers   [] heat/ice application   
[] other:   
 
Other Objective/Functional Measures: see recertification Pain Level (0-10 scale) post treatment: 3 
 
ASSESSMENT/Changes in Function: Patient following up with MD later this week. Encouraged her to remain compliant with home routine. Will await MD recommendations, but recommending discharge to re-certification note to MD.  
 
Patient will continue to benefit from skilled PT services to modify and progress therapeutic interventions, address functional mobility deficits, address ROM deficits, address strength deficits, analyze and address soft tissue restrictions, analyze and cue movement patterns, analyze and modify body mechanics/ergonomics, assess and modify postural abnormalities, address imbalance/dizziness and instruct in home and community integration to attain remaining goals. []  See Plan of Care [x]  See progress note/recertification 
[]  See Discharge Summary Progress towards goals / Updated goals: 
Goal: Pt to exhibit (-) piriformis test to show resolution of right buttock pain and muscle tightness. Status at last note/certification: (+) but less painful Current status: Progressing, slight pull Goal: Pt to be able to sit desired time frames in any chair without need for pillow to return to prior functional level. Status at last note/certification: does report increased pain with sitting, with cushion, in plastic seat at baseball game Current status: Progressing, able to sit desired times in soft chairs; avoiding hard surfaces PLAN 
[]  Upgrade activities as tolerated     []  Continue plan of care []  Update interventions per flow sheet      
[]  Discharge due to:_ 
[x]  Other:_await MD recommendation Natalie Monahan, PT 9/25/2018  7:11 AM 
 
Future Appointments Date Time Provider Kristofer Chow 9/25/2018 7:30 AM [facility]  
10/2/2018 8:15 AM IO NURSE VISIT [facility] SCHED  
10/9/2018 8:00 AM Alexei Love MD [facility] SCHED  
7/5/2019 8:40 AM Darryl Gandhi MD [address] [place] [address]

## 2018-09-25 NOTE — PROGRESS NOTES
[facility] 
[address] [place], [address] [place] [address] 
[phone] fax Continued Plan of Care/ Re-certification for Physical Therapy Services Patient name: Julio Conrad Start of Care: 2018 Referral source: Agus Zhou MD : 1940                        
Medical Diagnosis: Pain in right buttock [M79.1] Onset Date:8/15/18                        
Treatment Diagnosis: Right Buttock Pain Prior Hospitalization: see medical history Provider#: [id number] Medications: Verified on Patient summary List  
 Comorbidities: Arthritis; Coronary artery disease; HTN; Parkinson's disease 
 Prior Level of Function: Lives in [facility] with family; manages home with spouse; active individual 
 
Visits from Start of Care: 13    Missed Visits: 0 The Plan of Care and following information is based on the patient's current status: 
Goal: Pt to exhibit (-) piriformis test to show resolution of right buttock pain and muscle tightness. Status at last note/certification: (+) but less painful Current status: Progressing, slight pull Goal: Pt to be able to sit desired time frames in any chair without need for pillow to return to prior functional level. Status at last note/certification: does report increased pain with sitting, with cushion, in plastic seat at baseball game Current status: Progressing, able to sit desired times in soft chairs; avoiding hard surfaces Key functional changes:  
Functional Gains: improved activity tolerance, improved sitting tolerance/able to sit more comfortably Functional Deficits: aching, unable to sit on hard surface  
% improvement: 60% Pain   Average: 4/10 Best: 0/10 Worst: /10 Patient Goal: \"to be free of it\" Problems/ barriers to goal attainment: none Problem List: pain affecting function, decrease ROM, decrease strength, edema affecting function, impaired gait/ balance, decrease ADL/ functional abilitiies, decrease activity tolerance, decrease flexibility/ joint mobility and decrease transfer abilities Treatment Plan: Therapeutic exercise, Therapeutic activities, Neuromuscular re-education, Physical agent/modality, Gait/balance training, Manual therapy, Aquatic therapy, Patient education, Self Care training, Functional mobility training, Home safety training and Stair training Goals for this certification period to be accomplished in 4 weeks (IF MD requests): 
Goal: Patient will report overall at least 70% improvement in function in order to transition to independent management. Status at last note/certification: 59% Goal: Patient will report worst Right buttock pain as 4/10 or less in order to progress toward personal goals. Status at last note/certification: 9/90 Frequency / Duration: Patient to be seen 2 times per week for 4 weeks: 
 
Assessment / Recommendations:Patient has been seen consistently over two months for Right buttock pain. She reports improved sitting tolerance, as long as it is not on a hard surface, and overall improved activity tolerance. Patient reports limited buttock pain during therapy sessions performing Pilates routine, as well as with ultrasound. MD follow up later this week. Recommend discharge to independent management as we have maximized gains at this time, but will await MD recommendations. G-Codes (GP) Mobility  Current  CK= 40-59%   Goal  CJ= 20-39% The severity rating is based on clinical judgment and the FOTO score. Certification Period: 9/25/18 to 10/24/18 Jose Davis, PT 9/25/2018 7:12 AM 
 
________________________________________________________________________ I certify that the above Therapy Services are being furnished while the patient is under my care. I agree with the treatment plan and certify that this therapy is necessary. [] I have read the above and request that my patient continue as recommended. [] I have read the above report and request that my patient continue therapy with the following changes/special instructions: ______________________________________ [] I have read the above report and request that my patient be discharged from therapy [de-identified] Signature:____________Date:_________TIME:________ 
 
[employer], Date and Time must be completed for valid certification ** Please sign and return to [facility] [address] [place] [address] 
[phone] fax

## 2018-10-02 ENCOUNTER — HOSPITAL ENCOUNTER (OUTPATIENT)
Dept: LAB | Age: 78
Discharge: HOME | End: 2018-10-02
Payer: MEDICARE

## 2018-10-02 ENCOUNTER — APPOINTMENT (OUTPATIENT)
Dept: INTERNAL MEDICINE CLINIC | Age: 78
End: 2018-10-02

## 2018-10-02 DIAGNOSIS — Z98.61 CAD S/P PERCUTANEOUS CORONARY ANGIOPLASTY: ICD-10-CM

## 2018-10-02 DIAGNOSIS — I10 ESSENTIAL HYPERTENSION: ICD-10-CM

## 2018-10-02 DIAGNOSIS — E78.5 DYSLIPIDEMIA: ICD-10-CM

## 2018-10-02 DIAGNOSIS — I25.10 CAD S/P PERCUTANEOUS CORONARY ANGIOPLASTY: ICD-10-CM

## 2018-10-02 LAB
ALBUMIN SERPL-MCNC: 3.8 G/DL (ref 3.4–5)
ALBUMIN/GLOB SERPL: 1.3 {RATIO} (ref 0.8–1.7)
ALP SERPL-CCNC: 81 U/L (ref 45–117)
ALT SERPL-CCNC: 26 U/L (ref 13–56)
ANION GAP SERPL CALC-SCNC: 8 MMOL/L (ref 3–18)
AST SERPL-CCNC: 26 U/L (ref 15–37)
BASOPHILS # BLD: 0 K/UL (ref 0–0.1)
BASOPHILS NFR BLD: 0 % (ref 0–2)
BILIRUB SERPL-MCNC: 0.5 MG/DL (ref 0.2–1)
BUN SERPL-MCNC: 14 MG/DL (ref 7–18)
BUN/CREAT SERPL: 18 (ref 12–20)
CALCIUM SERPL-MCNC: 9.4 MG/DL (ref 8.5–10.1)
CHLORIDE SERPL-SCNC: 104 MMOL/L (ref 100–108)
CHOLEST SERPL-MCNC: 163 MG/DL
CO2 SERPL-SCNC: 29 MMOL/L (ref 21–32)
CREAT SERPL-MCNC: 0.76 MG/DL (ref 0.6–1.3)
DIFFERENTIAL METHOD BLD: ABNORMAL
EOSINOPHIL # BLD: 0.1 K/UL (ref 0–0.4)
EOSINOPHIL NFR BLD: 2 % (ref 0–5)
ERYTHROCYTE [DISTWIDTH] IN BLOOD BY AUTOMATED COUNT: 13.5 % (ref 11.6–14.5)
GLOBULIN SER CALC-MCNC: 2.9 G/DL (ref 2–4)
GLUCOSE SERPL-MCNC: 93 MG/DL (ref 74–99)
HCT VFR BLD AUTO: 42.2 % (ref 35–45)
HDLC SERPL-MCNC: 73 MG/DL (ref 40–60)
HDLC SERPL: 2.2 {RATIO} (ref 0–5)
HGB BLD-MCNC: 13.3 G/DL (ref 12–16)
LDLC SERPL CALC-MCNC: 74.2 MG/DL (ref 0–100)
LIPID PROFILE,FLP: ABNORMAL
LYMPHOCYTES # BLD: 1.3 K/UL (ref 0.9–3.6)
LYMPHOCYTES NFR BLD: 25 % (ref 21–52)
MAGNESIUM SERPL-MCNC: 2.4 MG/DL (ref 1.6–2.6)
MCH RBC QN AUTO: 30.9 PG (ref 24–34)
MCHC RBC AUTO-ENTMCNC: 31.5 G/DL (ref 31–37)
MCV RBC AUTO: 98.1 FL (ref 74–97)
MONOCYTES # BLD: 0.6 K/UL (ref 0.05–1.2)
MONOCYTES NFR BLD: 12 % (ref 3–10)
NEUTS SEG # BLD: 3.3 K/UL (ref 1.8–8)
NEUTS SEG NFR BLD: 61 % (ref 40–73)
PLATELET # BLD AUTO: 226 K/UL (ref 135–420)
PMV BLD AUTO: 9.9 FL (ref 9.2–11.8)
POTASSIUM SERPL-SCNC: 3.9 MMOL/L (ref 3.5–5.5)
PROT SERPL-MCNC: 6.7 G/DL (ref 6.4–8.2)
RBC # BLD AUTO: 4.3 M/UL (ref 4.2–5.3)
SODIUM SERPL-SCNC: 141 MMOL/L (ref 136–145)
TRIGL SERPL-MCNC: 79 MG/DL (ref ?–150)
VLDLC SERPL CALC-MCNC: 15.8 MG/DL
WBC # BLD AUTO: 5.4 K/UL (ref 4.6–13.2)

## 2018-10-02 PROCEDURE — 80061 LIPID PANEL: CPT | Performed by: INTERNAL MEDICINE

## 2018-10-02 PROCEDURE — 36415 COLL VENOUS BLD VENIPUNCTURE: CPT | Performed by: INTERNAL MEDICINE

## 2018-10-02 PROCEDURE — 83735 ASSAY OF MAGNESIUM: CPT | Performed by: INTERNAL MEDICINE

## 2018-10-02 PROCEDURE — 85025 COMPLETE CBC W/AUTO DIFF WBC: CPT | Performed by: INTERNAL MEDICINE

## 2018-10-02 PROCEDURE — 80053 COMPREHEN METABOLIC PANEL: CPT | Performed by: INTERNAL MEDICINE

## 2018-10-05 NOTE — PROGRESS NOTES
[facility] 
[address] [facility], [address] 
[phone] fax Discharge Summary Patient name: Brijesh Dhaliwal Start of Care: 2018 Referral source: Dinah Le MD : 1940                        
Medical Diagnosis: Pain in right buttock [M79.1] Onset Date:8/15/18                        
Treatment Diagnosis: Right Buttock Pain Prior Hospitalization: see medical history Provider#: [id number] Medications: Verified on Patient summary List  
 Comorbidities: Arthritis; Coronary artery disease; HTN; Parkinson's disease 
 Prior Level of Function: Lives in [facility] with family; manages home with spouse; active individual 
  
Visits from Start of Care: 13                                                                                    Missed Visits: 0 Reporting Period : 18 to 18 Goal: Patient will report overall at least 70% improvement in function in order to transition to independent management. Status at last note/certification: 28% Status at discharge: Not reassessed due to discharge following re-certification Goal: Patient will report worst Right buttock pain as 4/10 or less in order to progress toward personal goals. Status at last note/certification:  Status at discharge: Not reassessed due to discharge following re-certification G-Codes (GP) Mobility  Goal  CJ= 20-39%  D/C  CK= 40-59% The severity rating is based on clinical judgment and the FOTO score. Assessment/ Summary of Care: Patient consistently attended therapy for right buttock pain, reporting good reduction in pain and good activity levels at reassessment. She followed up with MD after most recent session, and has been released from therapy. Thank you for the referral of this Patient. RECOMMENDATIONS: 
[x]Discontinue therapy: [x]Patient has reached or is progressing toward set goals []Patient is non-compliant or has abdicated 
    []Due to lack of appreciable progress towards set goals Maura Ashton, PT 10/5/2018 4:32 PM

## 2018-10-09 ENCOUNTER — OFFICE VISIT (OUTPATIENT)
Dept: INTERNAL MEDICINE CLINIC | Age: 78
End: 2018-10-09

## 2018-10-09 ENCOUNTER — HOSPITAL ENCOUNTER (OUTPATIENT)
Dept: GENERAL RADIOLOGY | Age: 78
Discharge: HOME | End: 2018-10-09
Payer: MEDICARE

## 2018-10-09 ENCOUNTER — TELEPHONE (OUTPATIENT)
Dept: INTERNAL MEDICINE CLINIC | Age: 78
End: 2018-10-09

## 2018-10-09 VITALS
SYSTOLIC BLOOD PRESSURE: 132 MMHG | HEART RATE: 72 BPM | OXYGEN SATURATION: 97 % | RESPIRATION RATE: 14 BRPM | TEMPERATURE: 98.2 F | HEIGHT: 65 IN | WEIGHT: 132.6 LBS | DIASTOLIC BLOOD PRESSURE: 76 MMHG | BODY MASS INDEX: 22.09 KG/M2

## 2018-10-09 DIAGNOSIS — M54.31 RIGHT SIDED SCIATICA: ICD-10-CM

## 2018-10-09 DIAGNOSIS — M54.41 ACUTE RIGHT-SIDED LOW BACK PAIN WITH RIGHT-SIDED SCIATICA: Primary | ICD-10-CM

## 2018-10-09 DIAGNOSIS — R55 VASOVAGAL SYNCOPE: ICD-10-CM

## 2018-10-09 DIAGNOSIS — M85.89 OSTEOPENIA OF MULTIPLE SITES: ICD-10-CM

## 2018-10-09 DIAGNOSIS — I10 ESSENTIAL HYPERTENSION: ICD-10-CM

## 2018-10-09 DIAGNOSIS — Z98.61 CAD S/P PERCUTANEOUS CORONARY ANGIOPLASTY: ICD-10-CM

## 2018-10-09 DIAGNOSIS — K21.9 LARYNGOPHARYNGEAL REFLUX DISEASE: ICD-10-CM

## 2018-10-09 DIAGNOSIS — Z00.00 MEDICARE ANNUAL WELLNESS VISIT, SUBSEQUENT: ICD-10-CM

## 2018-10-09 DIAGNOSIS — K21.9 GASTROESOPHAGEAL REFLUX DISEASE, ESOPHAGITIS PRESENCE NOT SPECIFIED: ICD-10-CM

## 2018-10-09 DIAGNOSIS — E78.5 DYSLIPIDEMIA: ICD-10-CM

## 2018-10-09 DIAGNOSIS — Z71.89 ACP (ADVANCE CARE PLANNING): ICD-10-CM

## 2018-10-09 DIAGNOSIS — G20 PARKINSON DISEASE (HCC): ICD-10-CM

## 2018-10-09 DIAGNOSIS — M47.816 LUMBAR SPONDYLOSIS: ICD-10-CM

## 2018-10-09 DIAGNOSIS — M79.18 RIGHT BUTTOCK PAIN: ICD-10-CM

## 2018-10-09 DIAGNOSIS — L40.9 PSORIASIS OF SCALP: ICD-10-CM

## 2018-10-09 DIAGNOSIS — Z23 ENCOUNTER FOR IMMUNIZATION: ICD-10-CM

## 2018-10-09 DIAGNOSIS — I25.10 CAD S/P PERCUTANEOUS CORONARY ANGIOPLASTY: ICD-10-CM

## 2018-10-09 PROCEDURE — 72100 X-RAY EXAM L-S SPINE 2/3 VWS: CPT

## 2018-10-09 RX ORDER — GABAPENTIN 100 MG/1
CAPSULE ORAL
Qty: 90 CAP | Refills: 0 | Status: SHIPPED | OUTPATIENT
Start: 2018-10-09 | End: 2018-11-04 | Stop reason: SDUPTHER

## 2018-10-09 NOTE — ACP (ADVANCE CARE PLANNING)
Advance Care Planning (ACP) Provider Note - Comprehensive     Date of ACP Conversation: 10/09/18  Persons included in Conversation:  patient  Length of ACP Conversation in minutes:  16 minutes    Authorized Decision Maker (if patient is incapable of making informed decisions): two sons  This person is:  Healthcare Agent/Medical Power of  under Advance Directive          General ACP for ALL Patients with Decision Making Capacity:   Importance of advance care planning, including choosing a healthcare agent to communicate patient's healthcare decisions if patient lost the ability to make decisions, such as after a sudden illness or accident  Understanding of the healthcare agent role was assessed and information provided  Exploration of values, goals, and preferences if recovery is not expected, even with continued medical treatment in the event of: Imminent death  Severe, permanent brain injury  \"In these circumstances, what matters most to you? \"  Care focused more on comfort or quality of life. Review of Existing Advance Directive:  Patient has an existing advance directive on file, signed 11/30/2001 . It designates her  and two sons  as her healthcare agents. There is no living will on file, but she expresses today that she does not wish life prolonging procedures for end of life care. Discussed that due to her 's cognitive issues, she would prefer to change her advance directive to only include her sons. New paperwork was provided and reviewed with her and living will section was discussed. Advised to complete and bring to office to be scanned into the chart.     For Serious or Chronic Illness:  Understanding of medical condition      Interventions Provided:  Reviewed existing Advance Directive   Recommended communicating the plan and making copies for the healthcare agent, personal physician, and others as appropriate (e.g., health system)  Recommended review of completed ACP document annually or upon change in health status

## 2018-10-09 NOTE — MR AVS SNAPSHOT
[facility] 
 
 
 [address], [address] [facility] 
[phone] Patient: Nicole Linder MRN: [id number] Visit Information Date & Time Provider Department Dept. Phone Encounter #  
 10/9/2018  8:00 AM Neal Kay MD [facility] [address] [place] [address] [phone] [id number] Follow-up Instructions Return in about 4 weeks (around 11/6/2018), or if symptoms worsen or fail to improve. Your Appointments 11/8/2018  1:30 PM  
Office Visit with Neal Kay MD  
[facility] [address] [place] [address] [address] [facility]) Appt Note: 4 week follow up  
 [address] [facility], [address] [address] [address]  
  
   
 [address], [address]  
  
    
 7/5/2019  8:40 AM  
Follow Up with Lior William MD  
[facility] ([address]) Appt Note: 1 year follow up [place] [address] [address] [address] [phone] [address] [place] [address] [address] Upcoming Health Maintenance Date Due Shingrix Vaccine Age 50> (1 of 2) 12/21/1988 Influenza Age 5 to Adult 8/1/2018 MEDICARE YEARLY EXAM 10/10/2019 GLAUCOMA SCREENING Q2Y 4/3/2020 DTaP/Tdap/Td series (2 - Td) 9/25/2023 Allergies as of 10/9/2018  Review Complete On: 10/9/2018 By: Yefri Raygoza Severity Noted Reaction Type Reactions Keflex [Cephalexin]  11/21/2011   Intolerance Other (comments) Yeast infection Pcn [Penicillins]   Intolerance Other (comments) Current Immunizations  Reviewed on 8/30/2017 Name Date Influenza High Dose Vaccine PF 8/30/2017  2:22 PM, 11/1/2016  1:08 PM, 10/5/2015  1:45 PM, 9/29/2014 Influenza Vaccine (Tri) Adjuvanted  Incomplete Influenza Vaccine Whole 10/4/2012, 9/3/2011, 9/7/2010 Pneumococcal Conjugate (PCV-13) 10/5/2015  1:46 PM  
 TD Vaccine 1/1/2003 Tdap 9/25/2013 ZZZ-RETIRED (DO NOT USE) Pneumococcal Vaccine (Unspecified Type) 1/1/2003 Zoster 1/1/2007 Not reviewed this visit You Were Diagnosed With   
  
 Codes Comments Encounter for immunization    -  Primary ICD-10-CM: P47 ICD-9-CM: V03.89 Right sided sciatica     ICD-10-CM: M54.31 
ICD-9-CM: 724.3 Vitals BP Pulse Temp Resp Height(growth percentile) Weight(growth percentile) 132/76 (BP 1 Location: Left arm, BP Patient Position: Sitting) 72 98.2 °F (36.8 °C) (Oral) 14 5' 5\" (1.651 m) 132 lb 9.6 oz (60.1 kg) SpO2 BMI OB Status Smoking Status 97% 22.07 kg/m2 Hysterectomy Never Smoker Vitals History BMI and BSA Data Body Mass Index Body Surface Area 22.07 kg/m 2 1.66 m 2 Preferred Pharmacy Pharmacy Name Phone [address] [address] [facility] [phone] Your Updated Medication List  
  
   
This list is accurate as of 10/9/18  8:54 AM.  Always use your most recent med list.  
  
  
  
  
 acetaminophen 650 mg Tber Commonly known as:  TYLENOL Take 650 mg by mouth every six (6) hours as needed for Pain. ALEVE PO Take  by mouth. aspirin 81 mg tablet Take 1 Tab by mouth daily. BALANCE B-50 PO Take 1 Tab by mouth daily. clobetasol 0.05 % external solution Commonly known as:  Stella Bakes Apply as directed. co-enzyme Q-10 100 mg capsule Commonly known as:  CO Q-10 Take 200 mg by mouth daily. DEXILANT 60 mg Cpdb Generic drug:  Dexlansoprazole Take 1 Cap by mouth daily. 1 tab every other day. Indications: Gastric Ulcer  
  
 diclofenac 1 % Gel Commonly known as:  VOLTAREN Apply 2 g to affected area four (4) times daily as needed. flaxseed oil 1,000 mg Cap Take 1 Cap by mouth daily. fluticasone 50 mcg/actuation nasal spray Commonly known as:  Julisa Monisha 2 Sprays by Both Nostrils route daily. gabapentin 100 mg capsule Commonly known as:  NEURONTIN Take 100 mg at bedtime on day 1. On day 2, take 100 mg bid. On day 3, take 100 mg tid and continue. ICAPS AREDS PO Take 1 Tab by mouth two (2) times a day. lecithin 1,200 mg Cap Take 1 Cap by mouth daily. lisinopril 10 mg tablet Commonly known as:  Sunny Peppers Take 1 Tab by mouth daily. magnesium 250 mg Tab Take 250 mg by mouth two (2) times a day. metoprolol succinate 50 mg XL tablet Commonly known as:  TOPROL-XL Take 1 Tab by mouth daily. MULTIVITAMIN PO Take 1 Tab by mouth daily. nitroglycerin 0.4 mg SL tablet Commonly known as:  NITROSTAT  
1 sublingual every 5 minutes for chest pain for no more than 3 doses NEGRO MILK OF MAGNESIA 400 mg/5 mL suspension Generic drug:  magnesium hydroxide Take 30 mL by mouth daily as needed for Constipation. potassium chloride 10 mEq tablet Commonly known as:  KLOR-CON Take 1 Tab by mouth every other day. psyllium seed-sucrose Powd Commonly known as:  METAMUCIL (SUGAR) 1 tablespoon bid  
  
 resveratrol 100 mg Cap Take 2 Caps by mouth daily. rosuvastatin 10 mg tablet Commonly known as:  CRESTOR Take 1 Tab by mouth nightly. SINEMET CR  mg per tablet Generic drug:  carbidopa-levodopa ER Take  by mouth nightly. STOOL SOFTENER PO Take 1 Cap by mouth. VITAMIN D3 1,000 unit Cap Generic drug:  cholecalciferol Take 1 Cap by mouth daily. Prescriptions Sent to Pharmacy Refills  
 gabapentin (NEURONTIN) 100 mg capsule 0 Sig: Take 100 mg at bedtime on day 1. On day 2, take 100 mg bid. On day 3, take 100 mg tid and continue. Class: Normal  
 Pharmacy: [address] [place] [address] #: [phone] We Performed the Following  INFLUENZA VACCINE INACTIVATED (IIV), SUBUNIT, ADJUVANTED, IM [id number] CPT(R)] Follow-up Instructions Return in about 4 weeks (around 11/6/2018), or if symptoms worsen or fail to improve. To-Do List   
 10/09/2018 Imaging:  XR SPINE LUMB 2 OR 3 V Patient Instructions Low Back Pain: Exercises Your Care Instructions Here are some examples of typical rehabilitation exercises for your condition. Start each exercise slowly. Ease off the exercise if you start to have pain. Your doctor or physical therapist will tell you when you can start these exercises and which ones will work best for you. How to do the exercises Press-up 1. Lie on your stomach, supporting your body with your forearms. 2. Press your elbows down into the floor to raise your upper back. As you do this, relax your stomach muscles and allow your back to arch without using your back muscles. As your press up, do not let your hips or pelvis come off the floor. 3. Hold for 15 to 30 seconds, then relax. 4. Repeat 2 to 4 times. Alternate arm and leg (bird dog) exercise 1. Start on the floor, on your hands and knees. 2. Tighten your belly muscles. 3. Raise one leg off the floor, and hold it straight out behind you. Be careful not to let your hip drop down, because that will twist your trunk. 4. Hold for about 6 seconds, then lower your leg and switch to the other leg. 5. Repeat 8 to 12 times on each leg. 6. Over time, work up to holding for 10 to 30 seconds each time. 7. If you feel stable and secure with your leg raised, try raising the opposite arm straight out in front of you at the same time. Knee-to-chest exercise 1. Lie on your back with your knees bent and your feet flat on the floor. 2. Bring one knee to your chest, keeping the other foot flat on the floor (or keeping the other leg straight, whichever feels better on your lower back). 3. Keep your lower back pressed to the floor. Hold for at least 15 to 30 seconds. 4. Relax, and lower the knee to the starting position. 5. Repeat with the other leg. Repeat 2 to 4 times with each leg. 6. To get more stretch, put your other leg flat on the floor while pulling your knee to your chest. 
Curl-ups 1. Lie on the floor on your back with your knees bent at a 90-degree angle. Your feet should be flat on the floor, about 12 inches from your buttocks. 2. Cross your arms over your chest. If this bothers your neck, try putting your hands behind your neck (not your head), with your elbows spread apart. 3. Slowly tighten your belly muscles and raise your shoulder blades off the floor. 4. Keep your head in line with your body, and do not press your chin to your chest. 
5. Hold this position for 1 or 2 seconds, then slowly lower yourself back down to the floor. 6. Repeat 8 to 12 times. Pelvic tilt exercise 1. Lie on your back with your knees bent. 2. \"Brace\" your stomach. This means to tighten your muscles by pulling in and imagining your belly button moving toward your spine. You should feel like your back is pressing to the floor and your hips and pelvis are rocking back. 3. Hold for about 6 seconds while you breathe smoothly. 4. Repeat 8 to 12 times. Heel dig bridging 1. Lie on your back with both knees bent and your ankles bent so that only your heels are digging into the floor. Your knees should be bent about 90 degrees. 2. Then push your heels into the floor, squeeze your buttocks, and lift your hips off the floor until your shoulders, hips, and knees are all in a straight line. 3. Hold for about 6 seconds as you continue to breathe normally, and then slowly lower your hips back down to the floor and rest for up to 10 seconds. 4. Do 8 to 12 repetitions. Hamstring stretch in doorway 1. Lie on your back in a doorway, with one leg through the open door. 2. Slide your leg up the wall to straighten your knee.  You should feel a gentle stretch down the back of your leg. 3. Hold the stretch for at least 15 to 30 seconds. Do not arch your back, point your toes, or bend either knee. Keep one heel touching the floor and the other heel touching the wall. 4. Repeat with your other leg. 5. Do 2 to 4 times for each leg. Hip flexor stretch 1. Kneel on the floor with one knee bent and one leg behind you. Place your forward knee over your foot. Keep your other knee touching the floor. 2. Slowly push your hips forward until you feel a stretch in the upper thigh of your rear leg. 3. Hold the stretch for at least 15 to 30 seconds. Repeat with your other leg. 4. Do 2 to 4 times on each side. Wall sit 1. Stand with your back 10 to 12 inches away from a wall. 2. Lean into the wall until your back is flat against it. 3. Slowly slide down until your knees are slightly bent, pressing your lower back into the wall. 4. Hold for about 6 seconds, then slide back up the wall. 5. Repeat 8 to 12 times. Follow-up care is a key part of your treatment and safety. Be sure to make and go to all appointments, and call your doctor if you are having problems. It's also a good idea to know your test results and keep a list of the medicines you take. Where can you learn more? Go to http://torin-atif.info/. Enter S063 in the search box to learn more about \"Low Back Pain: Exercises. \" Current as of: November 29, 2017 Content Version: 11.8 © 4927-7148 [employer]. Care instructions adapted under license by [employer] (which disclaims liability or warranty for this information). If you have questions about a medical condition or this instruction, always ask your healthcare professional. [place] [address] any warranty or liability for your use of this information. Sciatica: Exercises Your Care Instructions Here are some examples of typical rehabilitation exercises for your condition. Start each exercise slowly. Ease off the exercise if you start to have pain. Your doctor or physical therapist will tell you when you can start these exercises and which ones will work best for you. When you are not being active, find a comfortable position for rest. Some people are comfortable on the floor or a medium-firm bed with a small pillow under their head and another under their knees. Some people prefer to lie on their side with a pillow between their knees. Don't stay in one position for too long. Take short walks (10 to 20 minutes) every 2 to 3 hours. Avoid slopes, hills, and stairs until you feel better. Walk only distances you can manage without pain, especially leg pain. How to do the exercises Back stretches 5. Get down on your hands and knees on the floor. 6. Relax your head and allow it to droop. Round your back up toward the ceiling until you feel a nice stretch in your upper, middle, and lower back. Hold this stretch for as long as it feels comfortable, or about 15 to 30 seconds. 7. Return to the starting position with a flat back while you are on your hands and knees. 8. Let your back sway by pressing your stomach toward the floor. Lift your buttocks toward the ceiling. 9. Hold this position for 15 to 30 seconds. 10. Repeat 2 to 4 times. Follow-up care is a key part of your treatment and safety. Be sure to make and go to all appointments, and call your doctor if you are having problems. It's also a good idea to know your test results and keep a list of the medicines you take. Where can you learn more? Go to http://torin-atif.info/. Enter A720 in the search box to learn more about \"Sciatica: Exercises. \" Current as of: November 29, 2017 Content Version: 11.8 © 7947-7830 [employer].  Care instructions adapted under license by [employer] (which disclaims liability or warranty for this information). If you have questions about a medical condition or this instruction, always ask your healthcare professional. [address] any warranty or liability for your use of this information. Introducing [facility]! Andrew Grant introduces Pipeline patient portal. Now you can access parts of your medical record, email your doctor's office, and request medication refills online. 1. In your internet browser, go to [email or web address] 2. Click on the First Time User? Click Here link in the Sign In box. You will see the New Member Sign Up page. 3. Enter your Pipeline Access Code exactly as it appears below. You will not need to use this code after youve completed the sign-up process. If you do not sign up before the expiration date, you must request a new code. · Pipeline Access Code: [email or web address] Expires: 1/3/2019 10:24 AM 
 
4. Enter the last four digits of your Social Security Number (xxxx) and Date of Birth (mm/dd/yyyy) as indicated and click Submit. You will be taken to the next sign-up page. 5. Create a Pipeline ID. This will be your Pipeline login ID and cannot be changed, so think of one that is secure and easy to remember. 6. Create a Pipeline password. You can change your password at any time. 7. Enter your Password Reset Question and Answer. This can be used at a later time if you forget your password. 8. Enter your e-mail address. You will receive e-mail notification when new information is available in [address] 9. Click Sign Up. You can now view and download portions of your medical record. 10. Click the Download Summary menu link to download a portable copy of your medical information. If you have questions, please visit the Frequently Asked Questions section of the Pipeline website. Remember, Pipeline is NOT to be used for urgent needs. For medical emergencies, dial 911. Now available from your iPhone and Android! Please provide this summary of care documentation to your next provider. Your primary care clinician is listed as Manuela Evans. If you have any questions after today's visit, please call [phone].

## 2018-10-09 NOTE — PROGRESS NOTES
This is the Subsequent Medicare Annual Wellness Exam, performed 12 months or more after the Initial AWV or the last Subsequent AWV I have reviewed the patient's medical history in detail and updated the computerized patient record. History Past Medical History:  
Diagnosis Date  Basal cell cancer  CAD (coronary artery disease), native coronary artery 03/21/2011  
 s/p PCI with with CARLITO (Xience 2.75x12, 2.5x12) mLAD and mild disease in rest of coronaries. Midly depressed LV syst fct  Cardiac cath 03/21/2011 mLAD 90% (2.75 x 12 mm Xience stent). Otherwise patent coronaries. LVEDP 10 mmHg. EF 40-45%. Anteroapical hypk. Renal arteries patent.  Cardiac echocardiogram 04/17/2014 EF 60-65%. No WMA. Gr 1 DDfx. Mild MR. Similar to study of 9/14/11.  Cardiac nuclear imaging test 07/2017 Negative for ischemia or infarction. EF > 70%.  Cardiomyopathy secondary   
 ischemic +/- stress induced  Dyslipidemia  GERD (gastroesophageal reflux disease)  Hx of colonoscopy 07/12/2016 Normal. Dr. Aldo Dumont  Hypertension  Parkinson's disease  Syncope   
 s/p ILD implantation Past Surgical History:  
Procedure Laterality Date  COLONOSCOPY N/A 7/12/2016 COLONOSCOPY performed by Elida Liu MD at [address] HX BUNIONECTOMY    
 dorsal  
 HX CATARACT REMOVAL  11/2012  
 left  HX CATARACT REMOVAL  10/2012  
 right  HX CORONARY STENT PLACEMENT    
 HX HEART CATHETERIZATION    
 HX HEMORRHOIDECTOMY  HX HYSTERECTOMY  1996  
 partial  
 HX IMPLANTABLE LOOP RECORDER  [id number]  HX TONSILLECTOMY  HX TOTAL ABDOMINAL HYSTERECTOMY  1996  
 partial  
 
Current Outpatient Prescriptions Medication Sig Dispense Refill  naproxen sodium (ALEVE PO) Take  by mouth.  gabapentin (NEURONTIN) 100 mg capsule Take 100 mg at bedtime on day 1. On day 2, take 100 mg bid. On day 3, take 100 mg tid and continue.  90 Cap 0  
  potassium chloride (KLOR-CON) 10 mEq tablet Take 1 Tab by mouth every other day. 45 Tab 3  
 lisinopril (PRINIVIL, ZESTRIL) 10 mg tablet Take 1 Tab by mouth daily. 90 Tab 3  
 metoprolol succinate (TOPROL-XL) 50 mg XL tablet Take 1 Tab by mouth daily. 90 Tab 3  
 rosuvastatin (CRESTOR) 10 mg tablet Take 1 Tab by mouth nightly. 90 Tab 3  
 magnesium hydroxide (NEGRO MILK OF MAGNESIA) 400 mg/5 mL suspension Take 30 mL by mouth daily as needed for Constipation.  carbidopa-levodopa CR (SINEMET CR)  mg per tablet Take  by mouth nightly.  fluticasone (FLONASE) 50 mcg/actuation nasal spray 2 Sprays by Both Nostrils route daily. 1 Bottle 2  
 co-enzyme Q-10 (CO Q-10) 100 mg capsule Take 200 mg by mouth daily.  VIT A/VIT C/VIT E/ZINC/COPPER (ICAPS AREDS PO) Take 1 Tab by mouth two (2) times a day.  Cholecalciferol, Vitamin D3, (VITAMIN D) 1,000 unit Cap Take 1 Cap by mouth daily.  VITAMIN B COMPLEX (BALANCE B-50 PO) Take 1 Tab by mouth daily.  aspirin 81 mg tablet Take 1 Tab by mouth daily. 1 Tab 0  MULTIVITAMIN PO Take 1 Tab by mouth daily.  DOCUSATE CALCIUM (STOOL SOFTENER PO) Take 1 Cap by mouth.  magnesium 250 mg Tab Take 250 mg by mouth two (2) times a day.  clobetasol (TEMOVATE) 0.05 % external solution Apply as directed. 25 mL 1  diclofenac (VOLTAREN) 1 % topical gel Apply 2 g to affected area four (4) times daily as needed. 200 g 3  
 nitroglycerin (NITROSTAT) 0.4 mg SL tablet 1 sublingual every 5 minutes for chest pain for no more than 3 doses 25 tablet 3  psyllium seed-sucrose (METAMUCIL) powd 1 tablespoon bid 861 g 3  
 acetaminophen (TYLENOL) 650 mg CR tablet Take 650 mg by mouth every six (6) hours as needed for Pain.  flaxseed oil 1,000 mg Cap Take 1 Cap by mouth daily.  Dexlansoprazole (DEXILANT) 60 mg CpDM Take 1 Cap by mouth daily. 1 tab every other day. Indications: Gastric Ulcer  resveratrol 100 mg Cap Take 2 Caps by mouth daily.  lecithin 1,200 mg Cap Take 1 Cap by mouth daily. Allergies Allergen Reactions  Keflex [Cephalexin] Other (comments) Yeast infection  Pcn [Penicillins] Other (comments) Family History Problem Relation Age of Onset  Heart Attack Father 80  
 Heart Disease Father  Cancer Mother   
  pancreatic  Hypertension Brother Social History Substance Use Topics  Smoking status: Never Smoker  Smokeless tobacco: Never Used  Alcohol use Yes Comment: glass of wine occasionally. Patient Active Problem List  
Diagnosis Code  Hypertension I10  
 CAD S/P percutaneous coronary angioplasty I25.10, Z98.61  Dyslipidemia E78.5  Syncope R55  Parkinson disease ([facility] [address] G20  
 GERD (gastroesophageal reflux disease) K21.9  Macular degeneration of left eye H35.30  Laryngopharyngeal reflux disease K21.9  Osteopenia M85.80  Psoriasis of scalp L40.9  Acute right-sided low back pain with right-sided sciatica M54.41  Right sided sciatica M54.31  
 Lumbar spondylosis M47.816 Depression Risk Factor Screening: PHQ over the last two weeks 10/9/2018 Little interest or pleasure in doing things Not at all Feeling down, depressed, irritable, or hopeless Not at all Total Score PHQ 2 0 Alcohol Risk Factor Screening: You do not drink alcohol or very rarely. Functional Ability and Level of Safety:  
Hearing Loss Hearing is good. Activities of Daily Living The home contains: no safety equipment. Patient does total self care Fall Risk Fall Risk Assessment, last 12 mths 10/9/2018 Able to walk? Yes Fall in past 12 months? No  
Fall with injury? -  
Number of falls in past 12 months - Fall Risk Score -  
 
 
Abuse Screen Patient is not abused Cognitive Screening Evaluation of Cognitive Function: 
Has your family/caregiver stated any concerns about your memory: no 
Normal 
 
 Patient Care Team  
Patient Care Team: 
Florecita Parra MD as PCP - General (Internal Medicine) Nida Castillo MD (Cardiology) Easton Wood MD as Consulting Provider (Neurology) Martinez Meyer, RN as Ambulatory Care Navigator (Internal Medicine) Sandra Curry, RN as Ambulatory Care Navigator (Internal Medicine) Aleksandar Khanna MD (Otolaryngology) Flex Lipscomb MD (Ophthalmology) Lisa Rodarte MD (Gastroenterology) Sheryle Eriksson, MD (Ophthalmology) Lake Wright MD (Orthopedic Surgery) Assessment/Plan Education and counseling provided: 
Are appropriate based on today's review and evaluation End-of-Life planning (with patient's consent) Influenza Vaccine Screening Mammography Colorectal cancer screening tests Cardiovascular screening blood test 
Bone mass measurement (DEXA) Screening for glaucoma Diabetes screening test 
Shingrix vaccine Diagnoses and all orders for this visit: 
 
1. Acute right-sided low back pain with right-sided sciatica -     [facility] 
-     MRI LUMB SPINE WO CONT; Future 2. Right sided sciatica -     XR SPINE LUMB 2 OR 3 V; Future -     [facility] 
-     MRI LUMB SPINE WO CONT; Future 3. Right buttock pain 4. Essential hypertension 5. CAD S/P percutaneous coronary angioplasty 6. Vasovagal syncope 7. Dyslipidemia 8. Lumbar spondylosis 9. Parkinson disease ([facility] [address] 10. Laryngopharyngeal reflux disease 11. Gastroesophageal reflux disease, esophagitis presence not specified 12. Osteopenia of multiple sites 13. Encounter for immunization -     Influenza Vaccine Inactivated (IIV) (FLUAD), Subunit, Adjuvanted, IM ([id number]) 14. Psoriasis of scalp 15. Medicare annual wellness visit, subsequent 16. ACP (advance care planning) Other orders 
-     gabapentin (NEURONTIN) 100 mg capsule;  Take 100 mg at bedtime on day 1. On day 2, take 100 mg bid. On day 3, take 100 mg tid and continue. Health Maintenance Due Topic Date Due  Shingrix Vaccine Age 50> (1 of 2) 12/21/1988

## 2018-10-09 NOTE — PROGRESS NOTES
Chief Complaint Patient presents with  Hypertension 6 month follow up with lab results. Health Maintenance Due Topic Date Due  Shingrix Vaccine Age 50> (1 of 2) 12/21/1988  Influenza Age 5 to Adult  08/01/2018  MEDICARE YEARLY EXAM  10/06/2018 Patient given influenza vaccine, Adjuvanted FLUAD, in left deltoid, per verbal order from Dr. Cuca Leonardo. Instructed patient to sit and wait 10-20 minutes before leaving the premises so that we can watch for any complications or adverse reactions. Patient given vaccine information statement handout before vaccine was given. Patient tolerated well without adverse reactions or complications. 1. Have you been to the ER, urgent care clinic or hospitalized since your last visit? NO.  
 
2. Have you seen or consulted any other health care providers outside of the [address] [place] [address] since your last visit (Include any pap smears or colon screening)?  NO

## 2018-10-09 NOTE — PATIENT INSTRUCTIONS
Low Back Pain: Exercises Your Care Instructions Here are some examples of typical rehabilitation exercises for your condition. Start each exercise slowly. Ease off the exercise if you start to have pain. Your doctor or physical therapist will tell you when you can start these exercises and which ones will work best for you. How to do the exercises Press-up 1. Lie on your stomach, supporting your body with your forearms. 2. Press your elbows down into the floor to raise your upper back. As you do this, relax your stomach muscles and allow your back to arch without using your back muscles. As your press up, do not let your hips or pelvis come off the floor. 3. Hold for 15 to 30 seconds, then relax. 4. Repeat 2 to 4 times. Alternate arm and leg (bird dog) exercise 1. Start on the floor, on your hands and knees. 2. Tighten your belly muscles. 3. Raise one leg off the floor, and hold it straight out behind you. Be careful not to let your hip drop down, because that will twist your trunk. 4. Hold for about 6 seconds, then lower your leg and switch to the other leg. 5. Repeat 8 to 12 times on each leg. 6. Over time, work up to holding for 10 to 30 seconds each time. 7. If you feel stable and secure with your leg raised, try raising the opposite arm straight out in front of you at the same time. Knee-to-chest exercise 1. Lie on your back with your knees bent and your feet flat on the floor. 2. Bring one knee to your chest, keeping the other foot flat on the floor (or keeping the other leg straight, whichever feels better on your lower back). 3. Keep your lower back pressed to the floor. Hold for at least 15 to 30 seconds. 4. Relax, and lower the knee to the starting position. 5. Repeat with the other leg. Repeat 2 to 4 times with each leg. 6. To get more stretch, put your other leg flat on the floor while pulling your knee to your chest. 
Curl-ups 1. Lie on the floor on your back with your knees bent at a 90-degree angle. Your feet should be flat on the floor, about 12 inches from your buttocks. 2. Cross your arms over your chest. If this bothers your neck, try putting your hands behind your neck (not your head), with your elbows spread apart. 3. Slowly tighten your belly muscles and raise your shoulder blades off the floor. 4. Keep your head in line with your body, and do not press your chin to your chest. 
5. Hold this position for 1 or 2 seconds, then slowly lower yourself back down to the floor. 6. Repeat 8 to 12 times. Pelvic tilt exercise 1. Lie on your back with your knees bent. 2. \"Brace\" your stomach. This means to tighten your muscles by pulling in and imagining your belly button moving toward your spine. You should feel like your back is pressing to the floor and your hips and pelvis are rocking back. 3. Hold for about 6 seconds while you breathe smoothly. 4. Repeat 8 to 12 times. Heel dig bridging 1. Lie on your back with both knees bent and your ankles bent so that only your heels are digging into the floor. Your knees should be bent about 90 degrees. 2. Then push your heels into the floor, squeeze your buttocks, and lift your hips off the floor until your shoulders, hips, and knees are all in a straight line. 3. Hold for about 6 seconds as you continue to breathe normally, and then slowly lower your hips back down to the floor and rest for up to 10 seconds. 4. Do 8 to 12 repetitions. Hamstring stretch in doorway 1. Lie on your back in a doorway, with one leg through the open door. 2. Slide your leg up the wall to straighten your knee. You should feel a gentle stretch down the back of your leg. 3. Hold the stretch for at least 15 to 30 seconds. Do not arch your back, point your toes, or bend either knee. Keep one heel touching the floor and the other heel touching the wall. 4. Repeat with your other leg. 5. Do 2 to 4 times for each leg. Hip flexor stretch 1. Kneel on the floor with one knee bent and one leg behind you. Place your forward knee over your foot. Keep your other knee touching the floor. 2. Slowly push your hips forward until you feel a stretch in the upper thigh of your rear leg. 3. Hold the stretch for at least 15 to 30 seconds. Repeat with your other leg. 4. Do 2 to 4 times on each side. Wall sit 1. Stand with your back 10 to 12 inches away from a wall. 2. Lean into the wall until your back is flat against it. 3. Slowly slide down until your knees are slightly bent, pressing your lower back into the wall. 4. Hold for about 6 seconds, then slide back up the wall. 5. Repeat 8 to 12 times. Follow-up care is a key part of your treatment and safety. Be sure to make and go to all appointments, and call your doctor if you are having problems. It's also a good idea to know your test results and keep a list of the medicines you take. Where can you learn more? Go to http://torin-atif.info/. Enter J962 in the search box to learn more about \"Low Back Pain: Exercises. \" Current as of: November 29, 2017 Content Version: 11.8 © 4324-6150 Healthwise, [employer]. Care instructions adapted under license by [employer] (which disclaims liability or warranty for this information). If you have questions about a medical condition or this instruction, always ask your healthcare professional. [place] [address] any warranty or liability for your use of this information. Sciatica: Exercises Your Care Instructions Here are some examples of typical rehabilitation exercises for your condition. Start each exercise slowly. Ease off the exercise if you start to have pain. Your doctor or physical therapist will tell you when you can start these exercises and which ones will work best for you. When you are not being active, find a comfortable position for rest. Some people are comfortable on the floor or a medium-firm bed with a small pillow under their head and another under their knees. Some people prefer to lie on their side with a pillow between their knees. Don't stay in one position for too long. Take short walks (10 to 20 minutes) every 2 to 3 hours. Avoid slopes, hills, and stairs until you feel better. Walk only distances you can manage without pain, especially leg pain. How to do the exercises Back stretches 5. Get down on your hands and knees on the floor. 6. Relax your head and allow it to droop. Round your back up toward the ceiling until you feel a nice stretch in your upper, middle, and lower back. Hold this stretch for as long as it feels comfortable, or about 15 to 30 seconds. 7. Return to the starting position with a flat back while you are on your hands and knees. 8. Let your back sway by pressing your stomach toward the floor. Lift your buttocks toward the ceiling. 9. Hold this position for 15 to 30 seconds. 10. Repeat 2 to 4 times. Follow-up care is a key part of your treatment and safety. Be sure to make and go to all appointments, and call your doctor if you are having problems. It's also a good idea to know your test results and keep a list of the medicines you take. Where can you learn more? Go to http://torin-atif.info/. Enter D909 in the search box to learn more about \"Sciatica: Exercises. \" Current as of: November 29, 2017 Content Version: 11.8 © 5960-5182 Healthwise, [employer]. Care instructions adapted under license by [employer] (which disclaims liability or warranty for this information). If you have questions about a medical condition or this instruction, always ask your healthcare professional. [address] any warranty or liability for your use of this information. Medicare Wellness Visit, Female The best way to improve and maintain good health is to have a healthy lifestyle by eating a well-balanced diet, exercising regularly, limiting alcohol and stopping smoking. Regular visits with your physician or non-physician health care provider also support your good health. Preventive screening tests can find health problems before they become diseases or illnesses. Preventive services such as immunizations prevent serious infections. All people over age 72 should have a Pneumovax and a Prevnar-13 shot to prevent potentially life threatening infections with the pneumococcus bacteria, a common cause of pneumonia. These are once in a lifetime unless you and your provider decide differently. All people over 65 should have a yearly influenza vaccine or \"flu\" shot. This does not prevent infection with cold viruses but has been proven to prevent hospitalization and death from influenza. Although Medicare part B \"regular Medicare\" currently only covers tetanus vaccination in the context of an injury, a tetanus vaccine (Tdap or Td) is recommended every 10 years. A shingles vaccine is recommended once in a lifetime after age 61. The Shingles vaccine is also not covered by Medicare part B. Note, however, that both the Shingles vaccine and Tdap/Td are generally covered by secondary carriers. Please check your coverage and out of pocket expenses. Consider contacting your local health department because it may stock these vaccines for a reasonable charge. We currently have documentation of the following immunization history for you: 
Immunization History Administered Date(s) Administered  Influenza High Dose Vaccine PF 09/29/2014, 10/05/2015, 11/01/2016, 08/30/2017  Influenza Vaccine (Tri) Adjuvanted 10/09/2018  Influenza Vaccine Whole 09/07/2010, 09/03/2011, 10/04/2012  Pneumococcal Conjugate (PCV-13) 10/05/2015  TD Vaccine 01/01/2003  Tdap 09/25/2013  ZZZ-RETIRED (DO NOT USE) Pneumococcal Vaccine (Unspecified Type) 01/01/2003  Zoster 01/01/2007 Screening for infection with Hepatitis C is recommended for anyone born between [birth year] through [address]. The table at the bottom of this document indicates the status of this and other preventive services. A bone mass density test (DEXA) to screen for osteoporosis or thinning of the bones should be done at least once after age 72 and may be done up to every 2 years as determined by you and your health care provider. The most recent DEXA we have on file for you is: DEXA Results (most recent): 
 
Results from [facility] Encounter encounter on 05/24/18 DEXA BONE DENSITY STUDY AXIAL Narrative DEXA BONE DENSITOMETRY, CENTRAL 
 
CPT CODE: 43387 INDICATION: Postmenopausal. History of osteopenia. Parkinson's. Hypertension. Family history of osteoporosis. Taking calcium and vitamin D. TECHNIQUE: Using GE LUNAR Prodigy densitometer, bone density measurement was 
performed in the lumbar spine in addition to the proximal left and right femora 
and the right forearm. T score refers to standard deviations above or below 
average compared to a young adult of the same sex. Z score refers to standard 
deviations above or below average compared to a patient of the same sex, age, 
race and weight. COMPARISON: The prior studies of 18 August 2009 and 23 May 2016 are no longer 
available on the bone densitometry unit for comparison trending. The images are 
available on PACS. The data from the previous study of 23 May 2016 has been 
included on this study. FINDINGS:  
 
Lumbar Spine Levels: L1, L2, and L4 Mean Bone Mineral Density (BMD):  1.086 g/cm2  (1.083 BMD on previous study) T Score: -0.8       (-0.8 T score on previous). Z Score: 1.2 On PA image of the lumbar spine obtained for localization of vertebral levels (not a diagnostic quality radiograph), it is noted that there is apparent 
increased density at L3. The density is not well characterized on the basis of 
this image, and may be due to underlying degenerative changes, prior surgery, 
trauma, or other osseous process. Since this density spuriously increases 
measured bone mineral density, this level has been excluded. Distal 1/3 Radius:  0.850 g/cm2  ( 0.817 BMD on previous study) T Score: -0.4       ( -0.8 T score on previous). Z Score: 2.2 Left Total Proximal Femur BMD: 0.887  g/cm2  (0.918 BMD on previous study) T Score: -1.0    ( -0.7 T score on previous). Z Score: 1.2 Right Total Proximal Femur BMD: 0.911 g/cm2 (0.941 BMD on previous study) T Score: -0.8       (-0.5 T score on previous). Z Score: 1.3 Left Femoral Neck BMD: 0.872  g/cm2 T Score: -1.2 Z Score: 1.1 Right Femoral Neck BMD: 0.819  g/cm2 T Score: -1.6 Z Score: 0.7 Impression IMPRESSION: 
 
1. BMD measures consistent with osteopenia. 2.  This will serve as the new baseline study at this institution. Based upon current ISCD guidelines, the patient's overall diagnostic category, 
selected using WHO criteria in postmenopausal women and males aged 48 and above, 
is selected based upon the lowest T score from among the lumbar spine, total 
femur, femoral neck, (or distal third radius if measured). WHO Definition of Osteoporosis and Osteopenia on DXA (specified for post 
menopausal  females): 
 
 Normal:                     T Score at or above -1 SD Osteopenia:               T Score between -1 and -2.5 SD Osteoporosis:             T Score at or below -2.5 SD The risk of fracture approximately doubles for each 1 SD decrease in T score.   
It is important to consider other factors in assessing a patient's risk of 
fracture, including age, risk of falling/injury, history of fragility fracture, 
 family history of osteoporosis, smoking, low weight. Various fracture risk tools have been developed for adult patients and are 
available online. For example, the FRAX tool developed by [facility] is widely used. Reference www.iscd.org. It is also important to note that DXA measures bone density but does not 
distinguish among causes of decreased bone density, which include primary vs. 
secondary osteoporosis (such as metabolic bone disorders or possible effects of 
medications) and also other conditions (such as osteomalacia). Clinical 
considerations should determine what additional evaluation may be warranted to 
exclude secondary conditions in a patient with low bone density. It is 
suggested that secondary causes of low bone density be considered in patients 
with Z score lower than -2.0, and patients who are not responding to therapy for 
osteoporosis on followup DXA despite compliance with medications. Please note that reliable, valid comparisons can not be made between studies 
which have been performed on different densitometers. If clinically warranted, 
follow up study performed at this site would best permit assessment of trend for 
possible change in bone mineral density over time in comparison to this study. Thank you for this referral. 
   
 
 
Screening for diabetes mellitus with a blood sugar test (glucose) should be done at least every 3 years until age 79. You and your health care provider may decide whether to continue screening after age 79. The most recent blood glucose we have on file for you is:  
Lab Results Component Value Date/Time Glucose 93 10/02/2018 08:10 AM  
 Glucose, POC 91 07/12/2016 08:20 AM  
 
 
 
Glaucoma is a disease of the eye due to increased ocular pressure that can lead to blindness.  People with risk factors for glaucoma ( race, diabetes, family history) should be screened at least every 2 years by an eye professional.  
 
 Cardiovascular screening tests that check for elevated lipids or cholesterol (fatty part of blood) which can lead to heart disease and strokes should be done every 4-6 years through age 79. You and your health care provider may decide whether to continue screening after age 79. The most recent lipid panel we have on file for you is:  
Lab Results Component Value Date/Time Cholesterol, total 163 10/02/2018 08:10 AM  
 HDL Cholesterol 73 (H) 10/02/2018 08:10 AM  
 LDL, calculated 74.2 10/02/2018 08:10 AM  
 VLDL, calculated 15.8 10/02/2018 08:10 AM  
 Triglyceride 79 10/02/2018 08:10 AM  
 CHOL/HDL Ratio 2.2 10/02/2018 08:10 AM  
 
 
Colorectal cancer screening that evaluates for blood or polyps in your colon for people with average risk should be done yearly as a stool test, every five years as a flexible sigmoidoscope or every 10 years as a colonoscopy up to age 76. You and your health care provider may decide whether to continue screening after age 76. Breast cancer screening with a mammogram is recommended at least once every 2 years  for women age 54-69. You and your health care provider may decide whether to continue screening after age 76. The most recent mammogram we have on file for you is: [facility] Results (most recent): 
 
Results from Hospital Encounter encounter on 05/24/18 [facility] MAMMO BI SCREENING INCL CAD Narrative Bilateral Screening Mammogram Digital with CAD HISTORY: Asymptomatic. COMPARISON: Mammogram [id number], [id number] TECHNIQUE: Digital CC and MLO views of both breasts with computer assisted 
detection, iCAD Second Look 7.2- H. FINDINGS:  
No focal mass is seen. There is no distortion . There are no suspicious 
calcifications. Impression IMPRESSION: 
 
No evidence of malignancy. Annual mammography recommended as per the Society of Breast Imaging and the 
[employer] of Radiology guidelines. BIRADS 1: Negative. Breast composition: The breast tissue is heterogeneously dense, which could 
obscure detection of small masses (approximately 51-75% glandular). Screening for cervical cancer with a pap smear is recommended for all women with a cervix until age 72. The frequency of this test is based on the details of her prior pap smear testing. You and your health care provider may decide whether to continue screening after age 72. People who have smoked the equivalent of 1 pack per day for 30 years or more may benefit from screening for lung cancer with a yearly low dose CT scan until they have been non smokers for 15 years or competing health conditions render this unlikely to be beneficial. Our records show: N/A Your Medicare Wellness Exam is recommended annually. Here is a list of your current Health Maintenance items with a due date: 
Health Maintenance Topic Date Due  Shingrix Vaccine Age 50> (1 of 2) 12/21/1988  GLAUCOMA SCREENING Q2Y  04/03/2020  
 DTaP/Tdap/Td series (2 - Td) 09/25/2023  Bone Densitometry (Dexa) Screening  Completed  Pneumococcal 65+ Low/Medium Risk  Addressed  Influenza Age 5 to Adult  Addressed

## 2018-10-12 PROBLEM — Z23 ENCOUNTER FOR IMMUNIZATION: Status: RESOLVED | Noted: 2018-10-12 | Resolved: 2018-10-12

## 2018-10-12 PROBLEM — M54.31 RIGHT SIDED SCIATICA: Status: ACTIVE | Noted: 2018-10-12

## 2018-10-12 PROBLEM — Z23 ENCOUNTER FOR IMMUNIZATION: Status: ACTIVE | Noted: 2018-10-12

## 2018-10-12 PROBLEM — M47.816 LUMBAR SPONDYLOSIS: Status: ACTIVE | Noted: 2018-10-12

## 2018-10-12 PROBLEM — M54.41 ACUTE RIGHT-SIDED LOW BACK PAIN WITH RIGHT-SIDED SCIATICA: Status: ACTIVE | Noted: 2018-10-12

## 2018-10-12 NOTE — PROGRESS NOTES
HPI:  
Rose Mckeon is a 76y.o. year old female who presents today for evaluation of hypertension, ASCVD, hyperlipidemia, syncope, Parkinson's disease, GERD, and macular degeneration. She reports that she is doing relatively well. She reports that in 8/2018, she was evaluated by Dr. Veronica Walsh for right hip pain and was diagnosed with right piriformis syndrome. She was treated with a cortisone injection and physical therapy with some improvement, but reports today that she subsequently developed right buttocks pain and pain and paresthesias down her right leg. She states that she was treated with prednisone 50 mg for five days without improvement. She states that she completed the course of prednisone approximately 10 days ago. She states that her pain persists and she states that she has not been able to sleep at night. She states that she has been taking Tylenol ES without relief. She is otherwise without complaints. She has a history of hypertension, hyperlipidemia, ASCVD, and syncope. In 3/2011, she had three syncopal episodes while donating blood. Over the subsequent four days, she developed exertional substernal chest tightness with left arm pain and dyspnea. She was evaluated and EKG revealed new T wave inversions in leads V2 and V3. Troponins were negative. She underwent cardiac catheterization (3/21/2011) which showed a 90% mid LAD and mild disease in the rest of the coronaries. She underwent PCI and placement of two drug-eluting stents for the mid LAD lesion; LV function was mildly diminished (EF 40-45%) with anteroapical hypokinesis. Follow-up echocardiogram (9/2011) showed return to normal LV function (EF 60%) and no RWMA. She has a history of multiple syncopal episodes, usually related to stressful situations, and thought to be vasovagal in origin.  An implantable loop recorder was placed in 3/2011 and remained until 4/2013, and interrogation was negative for any significant arrhythmia. In 4/2014, she was hospitalized following another syncopal episode, which occurred after she had taken her morning medications. Afterward, she became nauseated and flushed, and called EMS. When they arrived, they found her on the floor and reportedly without a pulse. They began CPR and within 10-15 seconds, she recovered. Evaluation included EKG/troponins, which were negative, and an echocardiogram showing mild MR and normal LV function (EF 60-65%). She was found to have hypokalemia and hypomagnesemia and hydrochlorothiazide was discontinued. It was thought that this event also represented a vasovagal reaction given her prodromal symptoms. She has had no further events since that time. She had a repeat pharmacologic nuclear stress test (7/14/2017) which was a normal, low risk study, negative for evidence of ischemia or prior infarction, and with normal LV size and function (EF 70%). Her current regimen includes metoprolol, lisinopril, aspirin, sublingual nitroglycerin prn, and rosuvastatin. She is being followed by Dr. Naveen Contreras. She is usually very active line dancing 3x/week, doing yardwork and riding her bicycle. She denies any chest pain, shortness of breath at rest or with exertion, palpitations, lightheadedness, or edema. In 11/2017, she reported bilateral thigh aching pain, which increased with ambulation particularly when walking up stairs. She stated that the discomfort was similar to that which developed previously with use of simvastatin and atorvastatin. She had been on pravastatin since 10/2014 and did not note any difficulty previously. She discontinued pravastatin and had resolution of her symptoms. She was subsequently started on rosuvastatin in 12/2017, and did well until 7/2018 when she again developed myalgias. Her dose was decreased to 5 mg every other day without recurrence of pain. She has a history of idiopathic Parkinson's disease, predominantly left-sided. She is currently being treated with Sinemet, and reports relatively good control of symptoms. She has difficulty with  writing at times, particularly towards the end of the day, and also describes increasing tremors midday. She is followed by Dr. Rojelio Schwab. She has a history of laryngopharyngeal reflux disease, treated with dexlansoprazole, and she is followed by Dr. Audelia Dumont. She states that she is attempting to wean taking it and has decreased her dose to every other day. In 5/2017 she had multiple episodes of epistaxis prompting an ED visit. She subsequently underwent silver nitrate cautery of anterior vessels in her right nares by Dr. Audelia Dumont, and has had no recurrence. In 3/2010, she underwent evaluation for iron deficiency anemia. She had previously had a negative colonoscopy and air contrast barium enema in 2009 by Dr. Chele Jordan, and she had an upper endoscopy by Dr. Rebecca Griffin which was normal. She was treated with iron with improvement. She had a repeat screening colonoscopy in 7/2016 by Dr. Rebecca Griffin which was normal. No further follow-up was recommended given her age. She denies any abdominal pain, nausea, vomiting, melena, hematochezia, or change in bowel movements. She has a history of osteopenia with last bone density study in 5/2018 showing T-scores:  femoral neck left -1.2  /right -1.6 and lumbar -0.8 . She continues to take calcium and Vitamin D supplements. She has no history of pathologic fractures. She has a recent history of abnormal mammograms since 10/2014 with microcalcifcations in the right breast. She has been undergoing surveillance mammograms every six months, which have been unchanged. She had a follow-up mammogram in 5/2016 which was negative, and routine annual screening was recommended. She has a history of bilateral knee pain secondary to osteoarthritis.  She was evaluated by Dr. Kimberli Soares in 3/2018 and received a cortisone injection with improvement. She also has difficutly with left ankle pain which increase with ambulation. She received a cortisone injection for this as well from Dr. Kimberli Soares in 3/2018, but did not experience significant improvement. She was evaluated by Dr. Ml Rasmussen on 3/27/2018 and diagnosed with left peroneus brevis tendinitis. An orthotic was recommended with improvement. Past Medical History:  
Diagnosis Date  Basal cell cancer  CAD (coronary artery disease), native coronary artery 03/21/2011  
 s/p PCI with with CARLITO (Xience 2.75x12, 2.5x12) mLAD and mild disease in rest of coronaries. Midly depressed LV syst fct  Cardiac cath 03/21/2011 mLAD 90% (2.75 x 12 mm Xience stent). Otherwise patent coronaries. LVEDP 10 mmHg. EF 40-45%. Anteroapical hypk. Renal arteries patent.  Cardiac echocardiogram 04/17/2014 EF 60-65%. No WMA. Gr 1 DDfx. Mild MR. Similar to study of 9/14/11.  Cardiac nuclear imaging test 07/2017 Negative for ischemia or infarction. EF > 70%.  Cardiomyopathy secondary   
 ischemic +/- stress induced  Dyslipidemia  GERD (gastroesophageal reflux disease)  Hx of colonoscopy 07/12/2016 Normal. Dr. Reggy Frankel  Hypertension  Parkinson's disease  Syncope   
 s/p ILD implantation Past Surgical History:  
Procedure Laterality Date  COLONOSCOPY N/A 7/12/2016 COLONOSCOPY performed by Avelino Espinal MD at [address] HX BUNIONECTOMY    
 dorsal  
 HX CATARACT REMOVAL  11/2012  
 left  HX CATARACT REMOVAL  10/2012  
 right  HX CORONARY STENT PLACEMENT    
 HX HEART CATHETERIZATION    
 HX HEMORRHOIDECTOMY  HX HYSTERECTOMY  1996  
 partial  
 HX IMPLANTABLE LOOP RECORDER  [id number]  HX TONSILLECTOMY  HX TOTAL ABDOMINAL HYSTERECTOMY  1996  
 partial  
 
Current Outpatient Prescriptions Medication Sig  
  naproxen sodium (ALEVE PO) Take  by mouth.  gabapentin (NEURONTIN) 100 mg capsule Take 100 mg at bedtime on day 1. On day 2, take 100 mg bid. On day 3, take 100 mg tid and continue.  potassium chloride (KLOR-CON) 10 mEq tablet Take 1 Tab by mouth every other day.  lisinopril (PRINIVIL, ZESTRIL) 10 mg tablet Take 1 Tab by mouth daily.  metoprolol succinate (TOPROL-XL) 50 mg XL tablet Take 1 Tab by mouth daily.  rosuvastatin (CRESTOR) 10 mg tablet Take 1 Tab by mouth nightly.  magnesium hydroxide (NEGRO MILK OF MAGNESIA) 400 mg/5 mL suspension Take 30 mL by mouth daily as needed for Constipation.  carbidopa-levodopa CR (SINEMET CR)  mg per tablet Take  by mouth nightly.  fluticasone (FLONASE) 50 mcg/actuation nasal spray 2 Sprays by Both Nostrils route daily.  co-enzyme Q-10 (CO Q-10) 100 mg capsule Take 200 mg by mouth daily.  VIT A/VIT C/VIT E/ZINC/COPPER (ICAPS AREDS PO) Take 1 Tab by mouth two (2) times a day.  Cholecalciferol, Vitamin D3, (VITAMIN D) 1,000 unit Cap Take 1 Cap by mouth daily.  VITAMIN B COMPLEX (BALANCE B-50 PO) Take 1 Tab by mouth daily.  aspirin 81 mg tablet Take 1 Tab by mouth daily.  MULTIVITAMIN PO Take 1 Tab by mouth daily.  DOCUSATE CALCIUM (STOOL SOFTENER PO) Take 1 Cap by mouth.  magnesium 250 mg Tab Take 250 mg by mouth two (2) times a day.  clobetasol (TEMOVATE) 0.05 % external solution Apply as directed.  diclofenac (VOLTAREN) 1 % topical gel Apply 2 g to affected area four (4) times daily as needed.  nitroglycerin (NITROSTAT) 0.4 mg SL tablet 1 sublingual every 5 minutes for chest pain for no more than 3 doses  psyllium seed-sucrose (METAMUCIL) powd 1 tablespoon bid  acetaminophen (TYLENOL) 650 mg CR tablet Take 650 mg by mouth every six (6) hours as needed for Pain.  flaxseed oil 1,000 mg Cap Take 1 Cap by mouth daily.  Dexlansoprazole (DEXILANT) 60 mg CpDM Take 1 Cap by mouth daily.  1 tab every other day. Indications: Gastric Ulcer  resveratrol 100 mg Cap Take 2 Caps by mouth daily.  lecithin 1,200 mg Cap Take 1 Cap by mouth daily. No current facility-administered medications for this visit. Allergies and Intolerances: Allergies Allergen Reactions  Keflex [Cephalexin] Other (comments) Yeast infection  Pcn [Penicillins] Other (comments) Family History: She has no family history of colon or breast cancer. Family History Problem Relation Age of Onset  Heart Attack Father 80  
 Heart Disease Father  Cancer Mother   
  pancreatic  Hypertension Brother Social History: She  reports that she has never smoked. She has never used smokeless tobacco. She lives with her , who is having difficulty with mild cognitive impairment. She states that they sold their RV and now stay at home for the winter. She is a retired x-ray technician. History Alcohol Use  Yes Comment: glass of wine occasionally. Immunization History: 
Immunization History Administered Date(s) Administered  Influenza High Dose Vaccine PF 09/29/2014, 10/05/2015, 11/01/2016, 08/30/2017  Influenza Vaccine (Tri) Adjuvanted 10/09/2018  Influenza Vaccine Whole 09/07/2010, 09/03/2011, 10/04/2012  Pneumococcal Conjugate (PCV-13) 10/05/2015  TD Vaccine 01/01/2003  Tdap 09/25/2013  ZZZ-RETIRED (DO NOT USE) Pneumococcal Vaccine (Unspecified Type) 01/01/2003  Zoster 01/01/2007 Review of Systems: As above included in HPI. Otherwise 11 point review of systems negative including constitutional, skin, HENT, eyes, respiratory, cardiovascular, gastrointestinal, genitourinary, musculoskeletal, endo/heme/aller, neurological. 
 
Physical:  
Vitals:  
BP: 132/76; repeat 128/68 HR: 72 
WT: 132 lb 9.6 oz (60.1 kg) BMI:  22.07 kg/m2 Exam:  
 
Patient appears in no apparent distress. Affect is appropriate. HEENT: PERRLA, anicteric, oropharynx clear, no JVD, adenopathy or thyromegaly. No carotid bruits or radiated murmur. Lungs: clear to auscultation, no wheezes, rhonchi, or rales. Heart: regular rate and rhythm. No murmur, rubs, gallops Abdomen: soft, nontender, nondistended, normal bowel sounds, no hepatosplenomegaly or masses. Extremities: without edema. Pulses 1-2+ bilaterally. Back: negative straight leg raise; no palpation to tenderness along spine; mild tenderness over right SI joint. Review of Data: 
Labs: Hospital Outpatient Visit on 10/02/2018 Component Date Value Ref Range Status  WBC 10/02/2018 5.4  4.6 - 13.2 K/uL Final  
 RBC 10/02/2018 4.30  4.20 - 5.30 M/uL Final  
 HGB 10/02/2018 13.3  12.0 - 16.0 g/dL Final  
 HCT 10/02/2018 42.2  35.0 - 45.0 % Final  
 MCV 10/02/2018 98.1* 74.0 - 97.0 FL Final  
 MCH 10/02/2018 30.9  24.0 - 34.0 PG Final  
 MCHC 10/02/2018 31.5  31.0 - 37.0 g/dL Final  
 RDW 10/02/2018 13.5  11.6 - 14.5 % Final  
 PLATELET 64/42/0980 468  135 - 420 K/uL Final  
 MPV 10/02/2018 9.9  9.2 - 11.8 FL Final  
 NEUTROPHILS 10/02/2018 61  40 - 73 % Final  
 LYMPHOCYTES 10/02/2018 25  21 - 52 % Final  
 MONOCYTES 10/02/2018 12* 3 - 10 % Final  
 EOSINOPHILS 10/02/2018 2  0 - 5 % Final  
 BASOPHILS 10/02/2018 0  0 - 2 % Final  
 ABS. NEUTROPHILS 10/02/2018 3.3  1.8 - 8.0 K/UL Final  
 ABS. LYMPHOCYTES 10/02/2018 1.3  0.9 - 3.6 K/UL Final  
 ABS. MONOCYTES 10/02/2018 0.6  0.05 - 1.2 K/UL Final  
 ABS. EOSINOPHILS 10/02/2018 0.1  0.0 - 0.4 K/UL Final  
 ABS.  BASOPHILS 10/02/2018 0.0  0.0 - 0.1 K/UL Final  
 DF 10/02/2018 AUTOMATED    Final  
 LIPID PROFILE 10/02/2018        Final  
 Cholesterol, total 10/02/2018 163  <200 MG/DL Final  
 Triglyceride 10/02/2018 79  <150 MG/DL Final  
 HDL Cholesterol 10/02/2018 73* 40 - 60 MG/DL Final  
 LDL, calculated 10/02/2018 74.2  0 - 100 MG/DL Final  
 VLDL, calculated 10/02/2018 15.8  MG/DL Final  
  CHOL/HDL Ratio 10/02/2018 2.2  0 - 5.0   Final  
 Sodium 10/02/2018 141  136 - 145 mmol/L Final  
 Potassium 10/02/2018 3.9  3.5 - 5.5 mmol/L Final  
 Chloride 10/02/2018 104  100 - 108 mmol/L Final  
 CO2 10/02/2018 29  21 - 32 mmol/L Final  
 Anion gap 10/02/2018 8  3.0 - 18 mmol/L Final  
 Glucose 10/02/2018 93  74 - 99 mg/dL Final  
 BUN 10/02/2018 14  7.0 - 18 MG/DL Final  
 Creatinine 10/02/2018 0.76  0.6 - 1.3 MG/DL Final  
 BUN/Creatinine ratio 10/02/2018 18  12 - 20   Final  
 GFR est AA 10/02/2018 >60  >60 ml/min/1.73m2 Final  
 GFR est non-AA 10/02/2018 >60  >60 ml/min/1.73m2 Final  
 Calcium 10/02/2018 9.4  8.5 - 10.1 MG/DL Final  
 Bilirubin, total 10/02/2018 0.5  0.2 - 1.0 MG/DL Final  
 ALT (SGPT) 10/02/2018 26  13 - 56 U/L Final  
 AST (SGOT) 10/02/2018 26  15 - 37 U/L Final  
 Alk. phosphatase 10/02/2018 81  45 - 117 U/L Final  
 Protein, total 10/02/2018 6.7  6.4 - 8.2 g/dL Final  
 Albumin 10/02/2018 3.8  3.4 - 5.0 g/dL Final  
 Globulin 10/02/2018 2.9  2.0 - 4.0 g/dL Final  
 A-G Ratio 10/02/2018 1.3  0.8 - 1.7   Final  
 Magnesium 10/02/2018 2.4  1.6 - 2.6 mg/dL Final  
 
Health Maintenance: 
Screening:  
 Mammogram: negative (5/2018) PAP smear: s/p ROHINI. No further screening. Colorectal: colonoscopy (7/2016) normal. Dr. Orlean Lefort. No further screening. Depression: none DM (HbA1c/FPG): FPG 93 (10/2018) Hepatitis C: N/A Falls: none DEXA: osteopenia (5/2018) Glaucoma: regular eye exams with Dr. Kristy Mosquera (last 5/2018) and Dr. Fredis Lamb for left macular degeneration. Smoking: none Vitamin D: 57.1 (3/2018) Medicare Wellness: today Impression: 
Patient Active Problem List  
Diagnosis Code  Hypertension I10  
 CAD S/P percutaneous coronary angioplasty I25.10, Z98.61  Dyslipidemia E78.5  Syncope R55  Parkinson disease ([facility] [address] G20  
 GERD (gastroesophageal reflux disease) K21.9  Macular degeneration of left eye H35.30  Laryngopharyngeal reflux disease K21.9  Osteopenia M85.80  Psoriasis of scalp L40.9  Acute right-sided low back pain with right-sided sciatica M54.41  
 Encounter for immunization Z23  Right sided sciatica M54.31  
 Lumbar spondylosis M47.816 Plan: 1. Right buttock pain and right sided sciatica. Patient with symptoms of right buttock pain since 8/2018 and diagnosed with right piriformis syndrome. Completed physical therapy and received cortisone injection with some improvement, but subsequently developed right leg paresthesias and pain consistent with right sided sciatica. Treated with five day course of prednisone 50 mg without improvement. Patient reports that she has not had any imaging of her back. She states that her pain is severe and interfering with sleep. Not helped by Tylenol. Will obtain LS spine x-rays and begin gabapentin 100 mg and titrate to tid dosing. Will provide handout for back exercises. Further recommendations pending result of imaging. 2. Hyperlipidemia. Previously on low intensity dose pravastatin, but developed severe bilateral thigh pain, worse with ambulation and was discontinued in 11/2018 with improvement. Reported similar symptoms in past with statin (Zocor until 2012, and then Lipitor until 9/2014). Started on rosuvastatin 10 mg daily in 12/2018 and tolerated without difficulty until 7/2018 when developed recurrent myalgias and dose decreased to 5 mg every other day with improvement. Discussed importance of statin therapy due to known CAD and LAD stent. Lipid panel today with LDL 74 and HDL 73, indicative of reasonable control. Emphasized importance of lifestyle modifications, including diet and exercise. Would not recommend weight loss given BMI. Continue to follow. 3. Hypertension. Blood pressure well controlled on lisinopril 10 mg daily and metoprolol succinate 50 mg daily. Renal function remains normal with creatinine 0.76 / eGFR >60. Continue to follow. 4. ASCVD. Remains asymptomatic. Resolution of cardiomyopathy with last echocardiogram revealing normal LV function. Negative pharmacologic nuclear stress test in 7/2017. Continue current regimen. Being followed by Dr. Will Trent. 5. H/O syncope. No further episodes since 2014. Most likely secondary to vasovagal reaction. Follow. 6. Parkinson's disease. Doing well on Sinemet. Followed by Dr. Sophia Sheppard. 
7. Osteopenia. Last bone density scan 5/2018. Using femoral neck T-scores, calculated FRAX score estimates her 10 year risk of a major osteoporetic fracture at 12 % and hip fracture at 3.1 %, which are an indication for biphosphonate treatment based on hip fracture risk of >3%. However, no significant change from 5/2018. Continue calcium and vitamin D supplements. Encouraged exercise, particularly weight bearing activities. Will repeat bone density scan in 5/2018 with mammogram. 
8. Macular degeneration, left. Being followed by Dr. Ramiro Arana. 9. Laryngopharyngeal reflux. Doing well on Dexilant. Attempting to wean and decreased to every other day. Followed by Dr. Orville Rosenthal. 10. Psoriasis. Using clobetasol solution for scalp psoriasis. 11. Health maintenance. Will give influenza vaccine today. Received 1/2 doses of Shingrix vaccine. Other immunizations up to date. No further colorectal cancer screening needed. Mammogram up to date. Continue regular eye exams with Ankur Max and Ramiro Arana. Vitamin D level normal. Continue maintenance dose supplement. In addition, an annual Medicare wellness visit was done today. Patient understands recommendations and agrees with plan. Follow-up in 4 weeks to reassess right sciatica. Addendum XR Results (most recent): 
 
Results from Hospital Encounter encounter on 10/09/18 XR SPINE LUMB 2 OR 3 V Narrative LUMBAR SPINE RADIOGRAPH-3 VIEWS 
 
INDICATION: Low back pain with right-sided sciatica, right buttock pain COMPARISON: Thoracolumbar spine radiograph 4/14/2015 FINDINGS: AP, lateral, and lumbosacral views were obtained. No evidence of acute fracture. There is grade 1 anterolisthesis of L4 and L5, 
appears either new or increased since 4/2015 exam. Evaluation for spondylolysis 
is limited in the absence of oblique views. Multilevel degenerative spondylosis 
and disc disease has progressed since 2015, with severe disc height loss at L5/S1. There is mild apex left curvature of the lumbar spine centered at L2/L3. Facet joints appear aligned but demonstrate at least mild hypertrophic changes. A slight lordosis is maintained. Degenerative changes noted at the sacroiliac 
joints. Visualized portion of the bony pelvis and sacrum appear intact. Atherosclerosis noted. Round densities at the left hemiabdomen could represent 
retained debris within diverticula (favored) or nephrolithiasis. Impression IMPRESSION: 
1. Grade 1 anterolisthesis of L4 on L5, either new or increased since 4/2015. 2. No additional evidence of acute fracture or malalignment. 3. Multilevel degenerative spondylosis/disc disease noted. Reviewed lumbar x-ray results. Multilevel lumbar degenerative spondylosis and disc disease. Contacted patient and informed of results. Reports persistent pain, although some improvement with gabapentin and improved sleep. Discussed increasing dose of gabapentin to 200 mg tid. Will proceed with lumbar MRI and physical therapy.

## 2018-10-12 NOTE — PROGRESS NOTES
Informed patient of results of x-ray. Persistent pain with right sciatica. Using gabapentin and Tylenol ES. Will proceed with lumbar MRI to further define source of pain.

## 2018-10-17 ENCOUNTER — HOSPITAL ENCOUNTER (OUTPATIENT)
Age: 78
Discharge: HOME | End: 2018-10-17
Attending: INTERNAL MEDICINE
Payer: MEDICARE

## 2018-10-17 ENCOUNTER — TELEPHONE (OUTPATIENT)
Dept: INTERNAL MEDICINE CLINIC | Age: 78
End: 2018-10-17

## 2018-10-17 DIAGNOSIS — M54.41 ACUTE RIGHT-SIDED LOW BACK PAIN WITH RIGHT-SIDED SCIATICA: ICD-10-CM

## 2018-10-17 DIAGNOSIS — G96.89 SPINAL CORD CYSTS: ICD-10-CM

## 2018-10-17 DIAGNOSIS — M54.31 RIGHT SIDED SCIATICA: Primary | ICD-10-CM

## 2018-10-17 DIAGNOSIS — M54.31 RIGHT SIDED SCIATICA: ICD-10-CM

## 2018-10-17 PROCEDURE — 72148 MRI LUMBAR SPINE W/O DYE: CPT

## 2018-10-17 NOTE — TELEPHONE ENCOUNTER
Reviewed lumbar MRI results. Showed multilevel degenerative findings causing various degrees of neuroforaminal  
narrowing and lateral recess narrowing as discussed. Right L4-L5 neuroforamen cystic lesion measuring approximately 10 x 6 mm . Called and discussed with patient. Continuing to have pain, but some mild improvement with gabapentin. To have PT eval on Monday. Discussed need for referral to ortho given persistent symptoms and abnormal MRI results as above. Referral placed for Dr. Telma Alcantar.

## 2018-10-22 ENCOUNTER — HOSPITAL ENCOUNTER (OUTPATIENT)
Dept: PHYSICAL THERAPY | Age: 78
Discharge: HOME | End: 2018-10-22
Payer: MEDICARE

## 2018-10-22 PROCEDURE — 97162 PT EVAL MOD COMPLEX 30 MIN: CPT

## 2018-10-22 PROCEDURE — G8978 MOBILITY CURRENT STATUS: HCPCS

## 2018-10-22 PROCEDURE — 97140 MANUAL THERAPY 1/> REGIONS: CPT

## 2018-10-22 PROCEDURE — G8979 MOBILITY GOAL STATUS: HCPCS

## 2018-10-22 NOTE — PROGRESS NOTES
PT DAILY TREATMENT NOTE 10-18 Patient Name: Glenny Jackson Date:10/22/2018 : 1940 [x]  Patient  Verified Payor: [place] / Plan: Polly Bejarano / Product Type: Medicare / In time:9:10  Out time:9:40 Total Treatment Time (min): 30 Visit #: 1 of 10 Medicare/BCBS Only Total Timed Codes (min):  10 1:1 Treatment Time:  30 Treatment Area: Low back pain [M54.5] Lumbago with sciatica, right side [M54.41] SUBJECTIVE Pain Level (0-10 scale): 7/10 Any medication changes, allergies to medications, adverse drug reactions, diagnosis change, or new procedure performed?: [x] No    [] Yes (see summary sheet for update) Subjective functional status/changes:   [x] See paper initial evaluation form in patient chart. OBJECTIVE 20 min [x]Eval                  []Re-Eval  
 
2 min Therapeutic Exercise:  [] See flow sheet : HEP given and reviewed with Pt Rationale: increase ROM to improve the patients ability to improve LE flexibility 8 min Manual Therapy:  MET to correct left posteriorly rotated innominate Rationale: decrease pain and increase ROM to improve gait mechanics, reduce pain into low back, hips With 
 [] TE 
 [] TA 
 [] neuro 
 [] other: Patient Education: [x] Review HEP [] Progressed/Changed HEP based on:  
[] positioning   [] body mechanics   [] transfers   [] heat/ice application   
[] other:   
 
Other Objective/Functional Measures: FOTO 52 pts Pain Level (0-10 scale) post treatment: 7/10 ASSESSMENT/Changes in Function:  
 
Patient will continue to benefit from skilled PT services to address functional mobility deficits, address ROM deficits, address strength deficits, analyze and address soft tissue restrictions, analyze and cue movement patterns, analyze and modify body mechanics/ergonomics, assess and modify postural abnormalities, address imbalance/dizziness and instruct in home and community integration to attain remaining goals. [x]  See Plan of Care 
[]  See progress note/recertification 
[]  See Discharge Summary PLAN 
[]  Upgrade activities as tolerated     [x]  Continue plan of care 
[]  Update interventions per flow sheet      
[]  Discharge due to:_ 
[]  Other:_   
 
Gasper Sands, PT 10/22/2018  10:56 AM

## 2018-10-22 NOTE — TELEPHONE ENCOUNTER
Reviewed bone density study from 5/24/2018 showing T-scores:  femoral neck left -1.2  /right -1.6 and lumbar -0.8. Calculated FRAX score estimates her 10 year risk of a major osteoporetic fracture at 12 % and hip fracture at 3.1 %. Please let the patient know that her bone density study showed osteopenia. There is very little change from her bone density study from 5/2016. Would recommend that she continue taking calcium and Vitamin D. Also, please encourage her to exercise, particularly weight bearing activities. 20-Oct-2018

## 2018-10-22 NOTE — PROGRESS NOTES
[facility]AC[facility] JUL[facility]eida [facility] 
[address] 
[phone] fax Plan of Care/ Statement of Necessity for Physical Therapy Services Patient name: Hemalatha Diallo Start of Care: 10/22/2018 Referral source: Irais Pearson MD : 1940 Medical Diagnosis: Low back pain [M54.5] Lumbago with sciatica, right side [M54.41] Onset Date:10/12/18 Treatment Diagnosis: Low Back, Right LE pain Prior Hospitalization: see medical history Provider#: [id number] Medications: Verified on Patient summary List  
 Comorbidities: Arthritis; Back pain; HTN; Visual impairment Prior Level of Function: Lives with spouse in 1-story home; functionally independent; assists in care for spouse The Plan of Care and following information is based on the information from the initial evaluation. Assessment/ key information: Pt is a 76 y.o. female who presents with c/o lower back pain that radiates into right LE with paresthesias down to foot. Symptoms began as buttock pain earlier this year and was relieved temporarily with therapy measures, then returned and peripheralized, prompting Pt's return to PT. Pt has increased pain with sitting, especially on hard surfaces, and at times uses a pillow for relief. Pt takes care of spouse and continues to manage home and yard work, but does so with pain. Pt unable to sleep on left side or supine, preferring lying on right side to sleep comfortably at night. Upon exam, Pt exhibited full L/S AROM with increased pain during RFIS and peripheralization of symptoms during AILEEN. Point tenderness noted along sciatic nerve distribution of right LE; inflexibility noted in B quads/HS, right > left; and left posteriorly rotated innominate noted that was corrected with MET.   Pt would benefit from skilled PT for core stabilization in neutral and centralization program to address above deficits and improve Pt's function with less pain. Evaluation Complexity History HIGH Complexity :3+ comorbidities / personal factors will impact the outcome/ POC ; Examination MEDIUM Complexity : 3 Standardized tests and measures addressing body structure, function, activity limitation and / or participation in recreation  ;Presentation MEDIUM Complexity : Evolving with changing characteristics  ; Clinical Decision Making MEDIUM Complexity : FOTO score of 26-74 Overall Complexity Rating: MEDIUM Problem List: pain affecting function, decrease ROM, decrease strength, edema affecting function, impaired gait/ balance, decrease ADL/ functional abilitiies, decrease activity tolerance, decrease flexibility/ joint mobility and decrease transfer abilities Treatment Plan may include any combination of the following: Therapeutic exercise, Therapeutic activities, Neuromuscular re-education, Physical agent/modality, Gait/balance training, Manual therapy, Patient education, Stair training and Other: mechanical traction prn Patient / Family readiness to learn indicated by: asking questions, trying to perform skills and interest 
Persons(s) to be included in education: patient (P) Barriers to Learning/Limitations: None Patient Goal (s): Lessen pain.  Patient Self Reported Health Status: excellent Rehabilitation Potential: good Short Term Goals: To be accomplished in 1 weeks: 
Goal: Pt to be compliant with initial HEP to increase flexibility of right LE. Status at last note/certification: Established and reviewed with Pt Long Term Goals: To be accomplished in 5 weeks: 
Goal: Pt to maintain neutral pelvic alignment x 3 wks to increase stability and reduce pain with movements. Status at last note/certification: left posteriorly rotated innominate Goal: Pt to increase L/S AROM to full without increased pain to increase ease with household chores and yard work.  
Status at last note/certification: pain with full Flex/Ext 
 Goal: Pt to report at least 50% improvement in right LE radicular symptoms to increase sitting tolerance on any surface. Status at last note/certification: paresthesias right LE limiting sitting tolerance Goal: Pt to report < 5/10 pain at worst to perform daily tasks in home and in community. Status at last note/certification: 02/81 pain at worst 
Goal: Pt to report FOTO score of 66 pts to show improved function and quality of life. Status at last note/certification: FOTO 52 pts Frequency / Duration: Patient to be seen 2 times per week for 5 weeks. Patient/ Caregiver education and instruction: Diagnosis, prognosis, exercises 
 [x]  Plan of care has been reviewed with PTA 
 
G-Codes (GP) Mobility  Current  CK= 40-59%   Goal  CJ= 20-39% The severity rating is based on clinical judgment and the FOTO score. Certification Period: 10/22/18 - 11/20/18 María Elena Sands, PT 10/22/2018 11:01 AM 
_____________________________________________________________________ I certify that the above Therapy Services are being furnished while the patient is under my care. I agree with the treatment plan and certify that this therapy is necessary. [de-identified] Signature:____________Date:_________TIME:________ 
 
[employer], Date and Time must be completed for valid certification ** Please sign and return to [facility] ALTAGRACIA ESCALANTE [address] 
[phone] fax

## 2018-10-25 ENCOUNTER — HOSPITAL ENCOUNTER (OUTPATIENT)
Dept: PHYSICAL THERAPY | Age: 78
Discharge: HOME | End: 2018-10-25
Payer: MEDICARE

## 2018-10-25 PROCEDURE — 97112 NEUROMUSCULAR REEDUCATION: CPT

## 2018-10-25 PROCEDURE — 97012 MECHANICAL TRACTION THERAPY: CPT

## 2018-10-25 NOTE — PROGRESS NOTES
PT DAILY TREATMENT NOTE 10-18 Patient Name: Rose Mckeon Date:10/25/2018 : 1940 [x]  Patient  Verified Payor: [place] MED[place]ARE / Plan: Polly Booth y / Product Type: Medicare / In time:730  Out time:800 Total Treatment Time (min): 30 Visit #: 2 of 10 Medicare/BCBS Only Total Timed Codes (min):  20 1:1 Treatment Time:  20  
 
 
Treatment Area: Low back pain [M54.5] Lumbago with sciatica, right side [M54.41] SUBJECTIVE Pain Level (0-10 scale): 5 Any medication changes, allergies to medications, adverse drug reactions, diagnosis change, or new procedure performed?: [x] No    [] Yes (see summary sheet for update) Subjective functional status/changes:   [] No changes reported Well every morning I wake up and think maybe the pain will be gone. OBJECTIVE Modality rationale: decrease pain and increase tissue extensibility to improve the patients ability to reduce radicular sypmtoms Type Additional Details  
[] Estim:  []Unatt       []IFC  []Premod []Other:  []w/ice   []w/heat Position: Location:  
[] Estim: []Att    []TENS instruct  []NMES []Other:  []w/US   []w/ice   []w/heat Position: Location:  
[x]  Traction: [] Cervical       [x]Lumbar 10 min                  [] Prone          [x]Supine 
                     [x]Intermittent   []Continuous Lbs:65/30 [] before manual 
[] after manual  
[]  Ultrasound: []Continuous   [] Pulsed []1MHz   []3MHz W/cm2: 
Location:  
[]  Iontophoresis with dexamethasone Location: [] Take home patch  
[] In clinic  
[]  Ice     []  heat 
[]  Ice massage 
[]  Laser  
[]  Anodyne Position: Location:  
[]  Laser with stim 
[]  Other:  Position: Location:  
[]  Vasopneumatic Device Pressure:       [] lo [] med [] hi  
Temperature: [] lo [] med [] hi  
[] Skin assessment post-treatment:  []intact []redness- no adverse reaction []redness  adverse reaction:  
 
20 min Neuromuscular Re-education:  [x]  See flow sheet :  
Rationale: increase strength, improve coordination and increase proprioception  to improve the patients ability to improve core activation to assist with spinal stability with daily tasks With 
 [] TE 
 [] TA 
 [] neuro 
 [] other: Patient Education: [x] Review HEP [] Progressed/Changed HEP based on:  
[] positioning   [] body mechanics   [] transfers   [] heat/ice application   
[] other:   
 
Other Objective/Functional Measures: initiated treatment per flow sheet Pain Level (0-10 scale) post treatment: 4 
 
ASSESSMENT/Changes in Function: Increased pain with performing prone hip extension Right but able to complete. A couple of minutes into mechanical lumbar traction Patient reports reduced Right thigh pain, however she was very uncomfortable maintaining one position throughout the 10 minutes; no bolsters used under knees. Some decrease in pain after session. Patient will continue to benefit from skilled PT services to modify and progress therapeutic interventions, address functional mobility deficits, address ROM deficits, address strength deficits, analyze and address soft tissue restrictions, analyze and cue movement patterns, analyze and modify body mechanics/ergonomics, assess and modify postural abnormalities, address imbalance/dizziness and instruct in home and community integration to attain remaining goals. []  See Plan of Care 
[]  See progress note/recertification 
[]  See Discharge Summary Progress towards goals / Updated goals: 
Short Term Goals: To be accomplished in 1 weeks: 
Goal: Pt to be compliant with initial HEP to increase flexibility of right LE. Status at last note/certification: Established and reviewed with Pt Current status:  
Long Term Goals:  To be accomplished in 5 weeks: 
Goal: Pt to maintain neutral pelvic alignment x 3 wks to increase stability and reduce pain with movements. Status at last note/certification: left posteriorly rotated innominate Current status:  
Goal: Pt to increase L/S AROM to full without increased pain to increase ease with household chores and yard work. Status at last note/certification: pain with full Flex/Ext Current status:  
Goal: Pt to report at least 50% improvement in right LE radicular symptoms to increase sitting tolerance on any surface. Status at last note/certification: paresthesias right LE limiting sitting tolerance Current status:  
Goal: Pt to report < 5/10 pain at worst to perform daily tasks in home and in community. Status at last note/certification: 01/98 pain at worst 
Current status:  
Goal: Pt to report FOTO score of 66 pts to show improved function and quality of life. Status at last note/certification: FOTO 52 pts Current status: PLAN 
[]  Upgrade activities as tolerated     [x]  Continue plan of care 
[]  Update interventions per flow sheet      
[]  Discharge due to:_ 
[]  Other:_   
 
Cindi Ferraro, PT 10/25/2018  7:11 AM 
 
Future Appointments Date Time Provider Kristofer Chow 10/25/2018  7:30 AM Walter Pagan, PT [facility]  
10/29/2018  7:30 AM Fátima Sands, PT [facility]  
11/8/2018  1:30 PM Cherylene Median, MD [facility]  
7/5/2019  8:40 AM Ariana Schroeder MD [address] [place] [address]

## 2018-10-29 ENCOUNTER — HOSPITAL ENCOUNTER (OUTPATIENT)
Dept: PHYSICAL THERAPY | Age: 78
Discharge: HOME | End: 2018-10-29
Payer: MEDICARE

## 2018-10-29 PROCEDURE — 97112 NEUROMUSCULAR REEDUCATION: CPT

## 2018-10-29 PROCEDURE — 97012 MECHANICAL TRACTION THERAPY: CPT

## 2018-10-29 NOTE — PROGRESS NOTES
PT DAILY TREATMENT NOTE 10-18 Patient Name: Denis Barron Date:10/29/2018 : 1940 [x]  Patient  Verified Payor: [place] MEDICARE / Plan: Polly Booth y / Product Type: Medicare / In time:7:35  Out time:8:10 Total Treatment Time (min): 35 Visit #: 3 of 10 Medicare/BCBS Only Total Timed Codes (min): 25  1:1 Treatment Time:  25 Treatment Area: Low back pain [M54.5] Lumbago with sciatica, right side [M54.41] SUBJECTIVE Pain Level (0-10 scale): 6/10 Any medication changes, allergies to medications, adverse drug reactions, diagnosis change, or new procedure performed?: [x] No    [] Yes (see summary sheet for update) Subjective functional status/changes:   [] No changes reported \"I didn't feel so hot this morning, but its not as bad now. The pain is not gone yet. \" OBJECTIVE Modality rationale: decrease pain to improve the patients ability to reduce right LE radicular symptoms Min Type Additional Details  
 [] Estim:  []Unatt       []IFC  []Premod []Other:  []w/ice   []w/heat Position: Location:  
 [] Estim: []Att    []TENS instruct  []NMES []Other:  []w/US   []w/ice   []w/heat Position: Location:  
10 [x]  Traction: [] Cervical       [x]Lumbar 
                     [] Prone          [x]Supine 
                     [x]Intermittent   []Continuous Lbs: 70/30 [] before manual 
[] after manual  
 []  Ultrasound: []Continuous   [] Pulsed []1MHz   []3MHz Location: 
W/cm2:  
 []  Iontophoresis with dexamethasone Location: [] Take home patch  
[] In clinic  
 []  Ice     []  heat 
[]  Ice massage 
[]  Laser  
[]  Anodyne Position: Location:  
 []  Laser with stim 
[]  Other: Position: Location:  
 []  Vasopneumatic Device Pressure:       [] lo [] med [] hi  
Temperature: [] lo [] med [] hi  
[] Skin assessment post-treatment:  []intact []redness- no adverse reaction []redness  adverse reaction:  
 
25 min Neuromuscular Re-education:  [x]  See flow sheet :  
Rationale: increase strength, improve coordination and increase proprioception  to improve the patients ability to improve core stability to reduce LBP with daily tasks With 
 [] TE 
 [] TA 
 [] neuro 
 [] other: Patient Education: [x] Review HEP [] Progressed/Changed HEP based on:  
[] positioning   [] body mechanics   [] transfers   [] heat/ice application   
[] other:   
 
Other Objective/Functional Measures: Continued with Rx program per flow sheet. Neutral pelvic alignment noted. No bolster under right knee for traction due to discomfort. Pain Level (0-10 scale) post treatment: 6/10 ASSESSMENT/Changes in Function: Pt initially noted relief in right LE radicular symptoms during traction but by then end, traction stopped as Pt noted increasing discomfort. Will not do mechanical traction next visit. No change in pain level after session. Patient will continue to benefit from skilled PT services to modify and progress therapeutic interventions, address functional mobility deficits, address ROM deficits, address strength deficits, analyze and address soft tissue restrictions, analyze and cue movement patterns, analyze and modify body mechanics/ergonomics, assess and modify postural abnormalities, address imbalance/dizziness and instruct in home and community integration to attain remaining goals. []  See Plan of Care 
[]  See progress note/recertification 
[]  See Discharge Summary Progress towards goals / Updated goals: 
Short Term Goals: To be accomplished in 1 weeks: 
Goal: Pt to be compliant with initial HEP to increase flexibility of right LE. Status at last note/certification: Established and reviewed with Pt Current status: met - Pt reports compliance Long Term Goals:  To be accomplished in 5 weeks: 
Goal: Pt to maintain neutral pelvic alignment x 3 wks to increase stability and reduce pain with movements. Status at last note/certification: left posteriorly rotated innominate Current status:  
Goal: Pt to increase L/S AROM to full without increased pain to increase ease with household chores and yard work. Status at last note/certification: pain with full Flex/Ext Current status:  
Goal: Pt to report at least 50% improvement in right LE radicular symptoms to increase sitting tolerance on any surface. Status at last note/certification: paresthesias right LE limiting sitting tolerance Current status:  
Goal: Pt to report < 5/10 pain at worst to perform daily tasks in home and in community. Status at last note/certification: 32/76 pain at worst 
Current status:  
Goal: Pt to report FOTO score of 66 pts to show improved function and quality of life. Status at last note/certification: FOTO 52 pts Current status: reassess visit 5 PLAN 
[]  Upgrade activities as tolerated     [x]  Continue plan of care 
[]  Update interventions per flow sheet      
[]  Discharge due to:_ 
[]  Other:_   
 
Josey Sands, PT 10/29/2018  7:11 AM 
 
Future Appointments Date Time Provider Kristofer Chow 11/8/2018  1:30 PM Dixie Miner MD [facility]  
7/5/2019  8:40 AM Andrew Alberts MD [address] [place] [address]

## 2018-10-30 ENCOUNTER — HOSPITAL ENCOUNTER (OUTPATIENT)
Dept: PHYSICAL THERAPY | Age: 78
Discharge: HOME | End: 2018-10-30
Payer: MEDICARE

## 2018-10-30 PROCEDURE — 97112 NEUROMUSCULAR REEDUCATION: CPT

## 2018-10-30 PROCEDURE — 97140 MANUAL THERAPY 1/> REGIONS: CPT

## 2018-10-30 NOTE — PROGRESS NOTES
PT DAILY TREATMENT NOTE 10-18 Patient Name: Helena Peoples Date:10/30/2018 : 1940 [x]  Patient  Verified Payor: [place] MEDICARE / Plan: Polly Booth y / Product Type: Medicare / In time:728  Out time:804 Total Treatment Time (min): 36 Visit #: 4 of 10 Medicare/BCBS Only Total Timed Codes (min):  36 1:1 Treatment Time:  36  
 
 
Treatment Area: Low back pain [M54.5] Lumbago with sciatica, unspecified side [M54.40] SUBJECTIVE Pain Level (0-10 scale): 4 Any medication changes, allergies to medications, adverse drug reactions, diagnosis change, or new procedure performed?: [x] No    [] Yes (see summary sheet for update) Subjective functional status/changes:   [] No changes reported My leg was really talking to me yesterday. This morning when I got up I thought maybe I was better but then I bent down to wash my face and felt it. OBJECTIVE Modality rationale: Patient declined Min Type Additional Details  
 [] Estim:  []Unatt       []IFC  []Premod []Other:  []w/ice   []w/heat Position: Location:  
 [] Estim: []Att    []TENS instruct  []NMES []Other:  []w/US   []w/ice   []w/heat Position: Location:  
 []  Traction: [] Cervical       []Lumbar 
                     [] Prone          []Supine []Intermittent   []Continuous Lbs: 
[] before manual 
[] after manual  
 []  Ultrasound: []Continuous   [] Pulsed []1MHz   []3MHz W/cm2: 
Location:  
 []  Iontophoresis with dexamethasone Location: [] Take home patch  
[] In clinic  
 []  Ice     []  heat 
[]  Ice massage 
[]  Laser  
[]  Anodyne Position: Location:  
 []  Laser with stim 
[]  Other:  Position: Location:  
 []  Vasopneumatic Device Pressure:       [] lo [] med [] hi  
Temperature: [] lo [] med [] hi  
[] Skin assessment post-treatment:  []intact []redness- no adverse reaction 
  []redness  adverse reaction: 16 min Neuromuscular Re-education:  [x]  See flow sheet :  
Rationale: increase strength, improve coordination and increase proprioception  to improve the patients ability to improve core activation to reduce lumbar strain with daily tasks 20 min Manual Therapy:  Manual DKTC stretch in supine, STM/TrP release Right QL (sidelying over bolster), manual Right QL stretch, STM/TrP release Right longissimus/iliocostalis lumbar/Right gluts Rationale: decrease pain, increase ROM, increase tissue extensibility and decrease trigger points to reduce radicular symptoms With 
 [] TE 
 [] TA 
 [] neuro 
 [] other: Patient Education: [x] Review HEP [] Progressed/Changed HEP based on:  
[] positioning   [] body mechanics   [] transfers   [] heat/ice application   
[] other:   
 
Other Objective/Functional Measures: completed some Oov exercises Pain Level (0-10 scale) post treatment: 4 
 
ASSESSMENT/Changes in Function: Patient reports increasing Right thigh/hip pain with performing SLRs on Oov. No palpable tenderness in the Right quads, but tenderness palpable in the Right QL and psoas areas; no reproduction of radicular symptoms. Reduced pain with prolonged DKTC stretch performed by therapist, increased pain with prone prop on elbows. Instructed Patient to perform gentle DKTC stretch at home as well as to lie on her Left side with pillows propped underneath to stretch Right QL. Patient will continue to benefit from skilled PT services to modify and progress therapeutic interventions, address functional mobility deficits, address ROM deficits, address strength deficits, analyze and address soft tissue restrictions, analyze and cue movement patterns, analyze and modify body mechanics/ergonomics, assess and modify postural abnormalities, address imbalance/dizziness and instruct in home and community integration to attain remaining goals. []  See Plan of Care 
[]  See progress note/recertification []  See Discharge Summary Progress towards goals / Updated goals: 
Short Term Goals: To be accomplished in 1 weeks: 
Goal: Pt to be compliant with initial HEP to increase flexibility of right LE. Status at last note/certification: Established and reviewed with Pt Current status: met - Pt reports compliance Long Term Goals: To be accomplished in 5 weeks: 
Goal: Pt to maintain neutral pelvic alignment x 3 wks to increase stability and reduce pain with movements. Status at last note/certification: left posteriorly rotated innominate Current status: Progressing, neutral recently Goal: Pt to increase L/S AROM to full without increased pain to increase ease with household chores and yard work. Status at last note/certification: pain with full Flex/Ext Current status: Not met, pain with flexion, less pain with extension Goal: Pt to report at least 50% improvement in right LE radicular symptoms to increase sitting tolerance on any surface. Status at last note/certification: paresthesias right LE limiting sitting tolerance Current status: Not met, limited overall change thus far 
Goal: Pt to report < 5/10 pain at worst to perform daily tasks in home and in community. Status at last note/certification: 43/34 pain at worst 
Current status: Not met, limited overall change thus far 
Goal: Pt to report FOTO score of 66 pts to show improved function and quality of life. Status at last note/certification: FOTO 52 pts  
Current status: reassess visit 5 PLAN [x]  Upgrade activities as tolerated     [x]  Continue plan of care 
[]  Update interventions per flow sheet      
[]  Discharge due to:_ 
[]  Other:_   
 
Lory Jackson, PT 10/30/2018  7:11 AM 
 
Future Appointments Date Time Provider Kristofer Chow 10/30/2018  7:30 AM Siva Gamez, PT [facility]  
11/5/2018  7:30 AM Daughtry, Meryle Metz, PT [facility]  
11/7/2018  7:30 AM Siva Gamez, PT [facility]  
 11/8/2018  1:30 PM Tay Bar MD [facility] [address]  
11/12/2018  7:30 AM Sarah Sands, PT [facility]  
11/14/2018  7:30 AM Norman Abo, [facility]  
11/19/2018  7:30 AM Sarah Sands, PT [facility]  
11/21/2018  7:30 AM Norman Abo, PT [facility]  
7/5/2019  8:40 AM Andressa Lee MD [address] [place] [address]

## 2018-11-05 ENCOUNTER — HOSPITAL ENCOUNTER (OUTPATIENT)
Dept: PHYSICAL THERAPY | Age: 78
Discharge: HOME | End: 2018-11-05
Payer: MEDICARE

## 2018-11-05 PROCEDURE — 97112 NEUROMUSCULAR REEDUCATION: CPT

## 2018-11-05 PROCEDURE — 97140 MANUAL THERAPY 1/> REGIONS: CPT

## 2018-11-05 NOTE — PROGRESS NOTES
PT DAILY TREATMENT NOTE 10-18 Patient Name: Tito Beasley Date:2018 : 1940 [x]  Patient  Verified Payor: [place] MEDICARE / Plan: Polly Bejarano / Product Type: Medicare / In [address] Total Treatment Time (min):38 Visit #: 5 of 10 Medicare/BCBS Only Total Timed Codes (min):  38 1:1 Treatment Time:  45 Treatment Area: Low back pain [M54.5] Lumbago with sciatica, unspecified side [M54.40] SUBJECTIVE Pain Level (0-10 scale): 0/10 Any medication changes, allergies to medications, adverse drug reactions, diagnosis change, or new procedure performed?: [x] No    [] Yes (see summary sheet for update) Subjective functional status/changes:   [] No changes reported \"This is the first morning that I woke up and didn't really have any pain. I started to feel better on Friday and I was able to do the health fair and sit without any problems. I was fine over the weekend. I'm still taking the pain meds and I also some OTC Motrin for sciatica. \" OBJECTIVE 15 min Neuromuscular Re-education:  [x]  See flow sheet :  
Rationale: increase strength, improve coordination and increase proprioception  to improve the patients ability to improve core activation to reduce LBP with daily tasks 23 min Manual Therapy: left sidelying over red bolster - manual Q/L stretch; STM, TrP release to right longissimus/iliocostalis, glutes Rationale: decrease pain, increase ROM, increase tissue extensibility and decrease trigger points to reduce radicular symptoms in right LE With 
 [] TE 
 [] TA 
 [] neuro 
 [] other: Patient Education: [x] Review HEP [] Progressed/Changed HEP based on:  
[] positioning   [] body mechanics   [] transfers   [] heat/ice application   
[] other:   
 
Other Objective/Functional Measures: Performed Oov exercises in 1/2 supine position to reduce discomfort to Pt.   Continued to hold other exercises per flow sheet to avoid aggravation of lower back, right LE pain. Initiated chair HS stretch. Pain Level (0-10 scale) post treatment: 0/10 ASSESSMENT/Changes in Function: Pt able to report improved lower back and right LE symptoms since Friday of last week and notes improved sleep. Pt still taking medication but is noticing a change in pain. Minimal trigger points noted in right Q/L, longissimus, illiocostalis today. Pt continues compliance with stretches and use of tennis balls in sock for self TrP release at home. Pt advised to continue with stretches as main focus with HEP. Patient will continue to benefit from skilled PT services to modify and progress therapeutic interventions, address functional mobility deficits, address ROM deficits, address strength deficits, analyze and address soft tissue restrictions, analyze and cue movement patterns, analyze and modify body mechanics/ergonomics, assess and modify postural abnormalities, address imbalance/dizziness and instruct in home and community integration to attain remaining goals. []  See Plan of Care 
[]  See progress note/recertification 
[]  See Discharge Summary Progress towards goals / Updated goals: 
Short Term Goals: To be accomplished in 1 weeks: 
Goal: Pt to be compliant with initial HEP to increase flexibility of right LE. Status at last note/certification: Established and reviewed with Pt Current status: met - Pt reports compliance Long Term Goals: To be accomplished in 5 weeks: 
Goal: Pt to maintain neutral pelvic alignment x 3 wks to increase stability and reduce pain with movements. Status at last note/certification: left posteriorly rotated innominate Current status: progressing - neutral in past 2 weeks Goal: Pt to increase L/S AROM to full without increased pain to increase ease with household chores and yard work.  
Status at last note/certification: pain with full Flex/Ext 
 Current status: Not met, pain with flexion, less pain with extension Goal: Pt to report at least 50% improvement in right LE radicular symptoms to increase sitting tolerance on any surface. Status at last note/certification: paresthesias right LE limiting sitting tolerance Current status: Not met, limited overall change thus far 
Goal: Pt to report < 5/10 pain at worst to perform daily tasks in home and in community. Status at last note/certification: 89/01 pain at worst 
Current status: Not met, limited overall change thus far 
Goal: Pt to report FOTO score of 66 pts to show improved function and quality of life. Status at last note/certification: FOTO 52 pts  
Current status: progressing - FOTO 56 pts PLAN [x]  Upgrade activities as tolerated     [x]  Continue plan of care 
[]  Update interventions per flow sheet      
[]  Discharge due to:_ 
[]  Other:_   
 
María Elena Sands, PT 11/5/2018  7:11 AM 
 
Future Appointments Date Time Provider Kristofer Chow 11/7/2018  7:30 AM Glen Harris, PT [facility]  
11/8/2018  1:30 PM Lisa Heredia MD [facility]  
11/12/2018  7:30 AM María Elena Sands, PT [facility]  
11/14/2018  7:30 AM Glen Harris, PT [facility]  
11/19/2018  7:30 AM María Elena Sands, PT [facility]  
11/21/2018  7:30 AM Glen Harris, PT [facility]  
7/5/2019  8:40 AM Ernesto Nobles MD [address] [place] [address]

## 2018-11-07 ENCOUNTER — HOSPITAL ENCOUNTER (OUTPATIENT)
Dept: PHYSICAL THERAPY | Age: 78
Discharge: HOME | End: 2018-11-07
Payer: MEDICARE

## 2018-11-07 PROCEDURE — 97112 NEUROMUSCULAR REEDUCATION: CPT

## 2018-11-07 PROCEDURE — 97140 MANUAL THERAPY 1/> REGIONS: CPT

## 2018-11-07 NOTE — PROGRESS NOTES
PT DAILY TREATMENT NOTE 10-18 Patient Name: Alyson Eugene Date:2018 : 1940 [x]  Patient  Verified Payor: [place] MEDICARE / Plan: Polly Booth y / Product Type: Medicare / In time:725  Out time:804 Total Treatment Time (min): 39 Visit #: 6 of 10 Medicare/BCBS Only Total Timed Codes (min):  39 1:1 Treatment Time:  44 Treatment Area: Low back pain [M54.5] Lumbago with sciatica, unspecified side [M54.40] SUBJECTIVE Pain Level (0-10 scale): 3 Any medication changes, allergies to medications, adverse drug reactions, diagnosis change, or new procedure performed?: [x] No    [] Yes (see summary sheet for update) Subjective functional status/changes:   [] No changes reported  I was miserable. I can't get 8 hours of relief. OBJECTIVE Modality rationale:   
Type Additional Details  
[] Estim:  []Unatt       []IFC  []Premod []Other:  []w/ice   []w/heat Position: Location:  
[] Estim: []Att    []TENS instruct  []NMES []Other:  []w/US   []w/ice   []w/heat Position: Location:  
[]  Traction: [] Cervical       []Lumbar 
                     [] Prone          []Supine []Intermittent   []Continuous Lbs: 
[] before manual 
[] after manual  
[]  Ultrasound: []Continuous   [] Pulsed []1MHz   []3MHz W/cm2: 
Location:  
[]  Iontophoresis with dexamethasone Location: [] Take home patch  
[] In clinic  
[]  Ice     []  heat 
[]  Ice massage 
[]  Laser  
[]  Anodyne Position: Location:  
[]  Laser with stim 
[]  Other:  Position: Location:  
[]  Vasopneumatic Device Pressure:       [] lo [] med [] hi  
Temperature: [] lo [] med [] hi  
[] Skin assessment post-treatment:  []intact []redness- no adverse reaction 
  []redness  adverse reaction:  
 
24 min Neuromuscular Re-education:  [x]  See flow sheet :  
 Rationale: increase strength and improve coordination  to improve the patients ability to improve core/hip activation in order to maintain stability with daily tasks 15 min Manual Therapy:  STM Right QL/glut musculature, manual Right QL stretch Rationale: decrease pain, increase ROM, increase tissue extensibility and decrease trigger points to reduce radicular symptoms With 
 [] TE 
 [] TA 
 [] neuro 
 [] other: Patient Education: [x] Review HEP [] Progressed/Changed HEP based on:  
[] positioning   [] body mechanics   [] transfers   [] heat/ice application   
[] other:   
 
Other Objective/Functional Measures: added breathing with UEs on Oov to improve core recruitment, stability Pain Level (0-10 scale) post treatment: 3 
 
ASSESSMENT/Changes in Function: While Patient felt minimal pain at previous session, her pain did return later that day. Today she reports increasing Right thigh pain with lumbar flexion and Right rotation during ROM assessment, however reports minimal pain with HS I on Chair. Able to perform Oov exercises in full supine. No increase in pain following session. Patient will continue to benefit from skilled PT services to modify and progress therapeutic interventions, address functional mobility deficits, address ROM deficits, address strength deficits, analyze and address soft tissue restrictions, analyze and cue movement patterns, analyze and modify body mechanics/ergonomics, assess and modify postural abnormalities, address imbalance/dizziness and instruct in home and community integration to attain remaining goals. []  See Plan of Care 
[]  See progress note/recertification 
[]  See Discharge Summary Progress towards goals / Updated goals: 
Short Term Goals: To be accomplished in 1 weeks: 
Goal: Pt to be compliant with initial HEP to increase flexibility of right LE.  
Status at last note/certification: Established and reviewed with Pt 
 Current status: met - Pt reports compliance Long Term Goals: To be accomplished in 5 weeks: 
Goal: Pt to maintain neutral pelvic alignment x 3 wks to increase stability and reduce pain with movements. Status at last note/certification: left posteriorly rotated innominate Current status: progressing - neutral in past 2 weeks Goal: Pt to increase L/S AROM to full without increased pain to increase ease with household chores and yard work. Status at last note/certification: pain with full Flex/Ext Current status: Progressing, Right thigh pain with flexion and Right rotation Goal: Pt to report at least 50% improvement in right LE radicular symptoms to increase sitting tolerance on any surface. Status at last note/certification: paresthesias right LE limiting sitting tolerance Current status: Progressing, did report no pain earlier this week but then symptoms returned Goal: Pt to report < 5/10 pain at worst to perform daily tasks in home and in community. Status at last note/certification: 40/31 pain at worst 
Current status: Progressing, 7/10 Goal: Pt to report FOTO score of 66 pts to show improved function and quality of life. Status at last note/certification: FOTO 52 pts  
Current status: progressing - FOTO 56 pts PLAN [x]  Upgrade activities as tolerated     [x]  Continue plan of care 
[]  Update interventions per flow sheet      
[]  Discharge due to:_ 
[]  Other:_   
 
Roseline Zazueta, PT 11/7/2018  7:10 AM 
 
Future Appointments Date Time Provider Kristofer Chow 11/7/2018  7:30 AM Arcelia Wood PT [facility]  
11/8/2018  1:30 PM Remi Ware MD [facility]  
11/12/2018  7:30 AM Sukhwinder Sands, PT [facility]  
11/14/2018  7:30 AM [facility]  
11/19/2018  7:30 AM Sukhwinder Sands, PT [facility]  
11/21/2018  7:30 AM Arcelia Wood PT [facility]  
7/5/2019  8:40 AM Kacey Leos MD [address] [place] [address]

## 2018-11-08 ENCOUNTER — OFFICE VISIT (OUTPATIENT)
Dept: INTERNAL MEDICINE CLINIC | Age: 78
End: 2018-11-08

## 2018-11-08 VITALS
OXYGEN SATURATION: 97 % | SYSTOLIC BLOOD PRESSURE: 130 MMHG | BODY MASS INDEX: 22.33 KG/M2 | WEIGHT: 134 LBS | DIASTOLIC BLOOD PRESSURE: 68 MMHG | HEART RATE: 76 BPM | HEIGHT: 65 IN | TEMPERATURE: 97.7 F | RESPIRATION RATE: 16 BRPM

## 2018-11-08 DIAGNOSIS — E78.5 DYSLIPIDEMIA: ICD-10-CM

## 2018-11-08 DIAGNOSIS — L40.9 PSORIASIS OF SCALP: ICD-10-CM

## 2018-11-08 DIAGNOSIS — M47.816 LUMBAR SPONDYLOSIS: ICD-10-CM

## 2018-11-08 DIAGNOSIS — R93.7 ABNORMAL MRI, SPINE: ICD-10-CM

## 2018-11-08 DIAGNOSIS — I25.10 CAD S/P PERCUTANEOUS CORONARY ANGIOPLASTY: ICD-10-CM

## 2018-11-08 DIAGNOSIS — K21.9 LARYNGOPHARYNGEAL REFLUX DISEASE: ICD-10-CM

## 2018-11-08 DIAGNOSIS — G96.198 EXTRADURAL CYST OF SPINE: ICD-10-CM

## 2018-11-08 DIAGNOSIS — I10 ESSENTIAL HYPERTENSION: ICD-10-CM

## 2018-11-08 DIAGNOSIS — K21.9 GASTROESOPHAGEAL REFLUX DISEASE, ESOPHAGITIS PRESENCE NOT SPECIFIED: ICD-10-CM

## 2018-11-08 DIAGNOSIS — M85.89 OSTEOPENIA OF MULTIPLE SITES: ICD-10-CM

## 2018-11-08 DIAGNOSIS — Z98.61 CAD S/P PERCUTANEOUS CORONARY ANGIOPLASTY: ICD-10-CM

## 2018-11-08 DIAGNOSIS — G20 PARKINSON DISEASE (HCC): ICD-10-CM

## 2018-11-08 DIAGNOSIS — R55 VASOVAGAL SYNCOPE: ICD-10-CM

## 2018-11-08 DIAGNOSIS — M54.31 RIGHT SIDED SCIATICA: Primary | ICD-10-CM

## 2018-11-08 DIAGNOSIS — M54.41 ACUTE RIGHT-SIDED LOW BACK PAIN WITH RIGHT-SIDED SCIATICA: ICD-10-CM

## 2018-11-08 DIAGNOSIS — M85.9 DISORDER OF BONE DENSITY AND STRUCTURE, UNSPECIFIED: ICD-10-CM

## 2018-11-08 NOTE — PROGRESS NOTES
Chief Complaint Patient presents with  LOW BACK PAIN  
  4 week follow up for right side lower back pain. 1. Have you been to the ER, urgent care clinic or hospitalized since your last visit? NO.  
 
2. Have you seen or consulted any other health care providers outside of the [address] [place] [address] since your last visit (Include any pap smears or colon screening)?  NO

## 2018-11-08 NOTE — PATIENT INSTRUCTIONS
Sciatica: Care Instructions Your Care Instructions Sciatica (say \"ucz-IZ-lx-kuh\") is an irritation of one of the sciatic nerves, which come from the spinal cord in the lower back. The sciatic nerves and their branches extend down through the buttock to the foot. Sciatica can develop when an injured disc in the back presses against a spinal nerve root. Its main symptom is pain, numbness, or weakness that is often worse in the leg or foot than in the back. Sciatica often will improve and go away with time. Early treatment usually includes medicines and exercises to relieve pain. Follow-up care is a key part of your treatment and safety. Be sure to make and go to all appointments, and call your doctor if you are having problems. It's also a good idea to know your test results and keep a list of the medicines you take. How can you care for yourself at home? · Take pain medicines exactly as directed. ? If the doctor gave you a prescription medicine for pain, take it as prescribed. ? If you are not taking a prescription pain medicine, ask your doctor if you can take an over-the-counter medicine. · Use heat or ice to relieve pain. ? To apply heat, put a warm water bottle, heating pad set on low, or warm cloth on your back. Do not go to sleep with a heating pad on your skin. ? To use ice, put ice or a cold pack on the area for 10 to 20 minutes at a time. Put a thin cloth between the ice and your skin. · Avoid sitting if possible, unless it feels better than standing. · Alternate lying down with short walks. Increase your walking distance as you are able to without making your symptoms worse. · Do not do anything that makes your symptoms worse. When should you call for help? Call 911 anytime you think you may need emergency care. For example, call if: 
  · You are unable to move a leg at all.  
[facility] your doctor now or seek immediate medical care if:   · You have new or worse symptoms in your legs or buttocks. Symptoms may include: 
? Numbness or tingling. ? Weakness. ? Pain.  
  · You lose bladder or bowel control.  
 Watch closely for changes in your health, and be sure to contact your doctor if: 
  · You are not getting better as expected. Where can you learn more? Go to http://torin-atif.info/. Enter [phone] in the search box to learn more about \"Sciatica: Care Instructions. \" Current as of: November 29, 2017 Content Version: 11.8 © 9297-6249 [employer]. Care instructions adapted under license by [employer] (which disclaims liability or warranty for this information). If you have questions about a medical condition or this instruction, always ask your healthcare professional. [address] any warranty or liability for your use of this information.

## 2018-11-12 ENCOUNTER — HOSPITAL ENCOUNTER (OUTPATIENT)
Dept: PHYSICAL THERAPY | Age: 78
Discharge: HOME | End: 2018-11-12
Payer: MEDICARE

## 2018-11-12 PROBLEM — R93.7 ABNORMAL MRI, SPINE: Status: ACTIVE | Noted: 2018-11-12

## 2018-11-12 PROBLEM — G96.198 EXTRADURAL CYST OF SPINE: Status: ACTIVE | Noted: 2018-11-12

## 2018-11-12 PROCEDURE — 97140 MANUAL THERAPY 1/> REGIONS: CPT

## 2018-11-12 PROCEDURE — 97112 NEUROMUSCULAR REEDUCATION: CPT

## 2018-11-12 NOTE — PROGRESS NOTES
PT DAILY TREATMENT NOTE 10-18 Patient Name: Helena Peoples Date:2018 : 1940 [x]  Patient  Verified Payor: [place] MEDICARE / Plan: Polly Wooteny / Product Type: Medicare / In time:7:35  Out time:8:06 Total Treatment Time (min): 31 Visit #: 7 of 10 Medicare/BCBS Only Total Timed Codes (min):  31 1:1 Treatment Time:  31  
 
 
Treatment Area: Low back pain [M54.5] SUBJECTIVE Pain Level (0-10 scale): 3/10 Any medication changes, allergies to medications, adverse drug reactions, diagnosis change, or new procedure performed?: [x] No    [] Yes (see summary sheet for update) Subjective functional status/changes:   [] No changes reported \"I have a balance test tomorrow so the only thing I can take right now is my BP medication. So, I have aches and pains everywhere. My back and leg are not that bad, though. I didn't have any creepy crawlies all weekend. \" OBJECTIVE 11 min Neuromuscular Re-education:  [x]  See flow sheet :  
Rationale: increase strength and improve coordination  to improve the patients ability to improve core/hip activation in order to maintain stability with daily tasks 20 min Manual Therapy:  STM right Q/L, glutes; manual Q/L stretch; Q/L release Rationale: decrease pain, increase ROM, increase tissue extensibility and decrease trigger points to reduce radicular symptoms With 
 [] TE 
 [] TA 
 [] neuro 
 [] other: Patient Education: [x] Review HEP [] Progressed/Changed HEP based on:  
[] positioning   [] body mechanics   [] transfers   [] heat/ice application   
[] other:   
 
Other Objective/Functional Measures: Continued with Rx program per flow sheet. Increased reps. Pain Level (0-10 scale) post treatment: 2-3/10 ASSESSMENT/Changes in Function: Pt able to complete full session without any increase in pain. Decreased tenderness noted to right Q/L, glutes with palpation and manual therapy.   Pt noted minimal pain at end of session and amb out of clinic with erect posture and no gait deviations. Patient will continue to benefit from skilled PT services to modify and progress therapeutic interventions, address functional mobility deficits, address ROM deficits, address strength deficits, analyze and address soft tissue restrictions, analyze and cue movement patterns, analyze and modify body mechanics/ergonomics, assess and modify postural abnormalities, address imbalance/dizziness and instruct in home and community integration to attain remaining goals. []  See Plan of Care 
[]  See progress note/recertification 
[]  See Discharge Summary Progress towards goals / Updated goals: 
Short Term Goals: To be accomplished in 1 weeks: 
Goal: Pt to be compliant with initial HEP to increase flexibility of right LE. Status at last note/certification: Established and reviewed with Pt Current status: met - Pt reports compliance Long Term Goals: To be accomplished in 5 weeks: 
Goal: Pt to maintain neutral pelvic alignment x 3 wks to increase stability and reduce pain with movements. Status at last note/certification: left posteriorly rotated innominate Current status: progressing - neutral in past 2 weeks Goal: Pt to increase L/S AROM to full without increased pain to increase ease with household chores and yard work. Status at last note/certification: pain with full Flex/Ext Current status: Progressing, Right thigh pain with flexion and Right rotation Goal: Pt to report at least 50% improvement in right LE radicular symptoms to increase sitting tolerance on any surface. Status at last note/certification: paresthesias right LE limiting sitting tolerance Current status: Progressing, did report no pain earlier this week but then symptoms returned Goal: Pt to report < 5/10 pain at worst to perform daily tasks in home and in community. Status at last note/certification: 75/54 pain at worst 
Current status: Progressing, 7/10 Goal: Pt to report FOTO score of 66 pts to show improved function and quality of life. Status at last note/certification: FOTO 52 pts  
Current status: progressing - FOTO 56 pts PLAN [x]  Upgrade activities as tolerated     [x]  Continue plan of care 
[]  Update interventions per flow sheet      
[]  Discharge due to:_ 
[]  Other:_   
 
Donna Sands, PT 11/12/2018  7:10 AM 
 
Future Appointments Date Time Provider Kristofer Chow 11/14/2018  7:30 AM Tiffany Hummel, PT [facility]  
11/19/2018  7:30 AM Donna Sands, PT [facility]  
11/21/2018  7:30 AM Tiffany Hummel, PT [facility]  
4/4/2019  8:00 AM [facility] NURSE VISIT [facility] SCHED  
4/11/2019  8:00 AM Adán Altman MD [facility] SCHED  
7/5/2019  8:40 AM Leslye Adams MD [address] [place] [address]

## 2018-11-12 NOTE — PROGRESS NOTES
HPI:  
Charlette Adamson is a 76y.o. year old female who presents today for reevaluation of low back pain and right sciatica. She has a history of hypertension, ASCVD, hyperlipidemia, syncope, Parkinson's disease, GERD, and macular degeneration. In 8/2018, she was evaluated by Dr. Bernardo Amador for right hip pain and was diagnosed with right piriformis syndrome. She was treated with a cortisone injection and physical therapy with some improvement, but subsequently developed right buttocks pain and pain and paresthesias down her right leg. She was treated with prednisone 50 mg for five days without improvement. She was last seen on 10/9/2018, which was 10 days after completing the course of prednisone, and reported persistent severe right leg pain which was interfering with her activities and with sleep. She was taking Tylenol ES without relief, and was started on gabapentin. Lumbar spine x-ray was obtained (10/9/2018) showing grade 1 anterolisthesis of L4 on L5, either new or increased since 4/2015, and multilevel degenerative spondylosis/disc disease. She was referred to physical therapy, but due to continued persistent symptoms, she had a lumbar MRI (10/17/2018) which showed multilevel degenerative findings causing various degrees of neuroforaminal  narrowing and lateral recess narrowing; also a right L4-L5 neuroforamen cystic lesion measuring approximately 10 x 6 mm was noted. She continued physical therapy and presents today for follow-up. She reports that her pain has significantly improved over the last five days. She states that she has not had any right sciatica symptoms since last weekend, and feels that the physical therapy is quite helpful. She continues to take gabapentin 100 mg tid and is no longer taking Tylenol. She is sleeping better and moving better and is pleased with her progress. She is otherwise without complaints. She has a history of hypertension, hyperlipidemia, ASCVD, and syncope.  In 3/2011, she had three syncopal episodes while donating blood. Over the subsequent four days, she developed exertional substernal chest tightness with left arm pain and dyspnea. She was evaluated and EKG revealed new T wave inversions in leads V2 and V3. Troponins were negative. She underwent cardiac catheterization (3/21/2011) which showed a 90% mid LAD and mild disease in the rest of the coronaries. She underwent PCI and placement of two drug-eluting stents for the mid LAD lesion; LV function was mildly diminished (EF 40-45%) with anteroapical hypokinesis. Follow-up echocardiogram (9/2011) showed return to normal LV function (EF 60%) and no RWMA. She has a history of multiple syncopal episodes, usually related to stressful situations, and thought to be vasovagal in origin. An implantable loop recorder was placed in 3/2011 and remained until 4/2013, and interrogation was negative for any significant arrhythmia. In 4/2014, she was hospitalized following another syncopal episode, which occurred after she had taken her morning medications. Afterward, she became nauseated and flushed, and called EMS. When they arrived, they found her on the floor and reportedly without a pulse. They began CPR and within 10-15 seconds, she recovered. Evaluation included EKG/troponins, which were negative, and an echocardiogram showing mild MR and normal LV function (EF 60-65%). She was found to have hypokalemia and hypomagnesemia and hydrochlorothiazide was discontinued. It was thought that this event also represented a vasovagal reaction given her prodromal symptoms. She has had no further events since that time. She had a repeat pharmacologic nuclear stress test (7/14/2017) which was a normal, low risk study, negative for evidence of ischemia or prior infarction, and with normal LV size and function (EF 70%). Her current regimen includes metoprolol, lisinopril, aspirin, sublingual nitroglycerin prn, and rosuvastatin.  She is being followed by Dr. Zulay Whalen. She is usually very active line dancing 3x/week, doing yardwork and riding her bicycle. She denies any chest pain, shortness of breath at rest or with exertion, palpitations, lightheadedness, or edema. In 11/2017, she reported bilateral thigh aching pain, which increased with ambulation particularly when walking up stairs. She stated that the discomfort was similar to that which developed previously with use of simvastatin and atorvastatin. She had been on pravastatin since 10/2014 and did not note any difficulty previously. She discontinued pravastatin and had resolution of her symptoms. She was subsequently started on rosuvastatin in 12/2017, and did well until 7/2018 when she again developed myalgias. Her dose was decreased to 5 mg every other day. She reports today that she continues to have aching discomfort in her bilateral thighs on the days which she takes rosuvastatin, but reports that it is tolerable so she will continue. She has a history of idiopathic Parkinson's disease, predominantly left-sided. She is currently being treated with Sinemet, and reports relatively good control of symptoms. She has difficulty with  writing at times, particularly towards the end of the day, and also describes increasing tremors midday. She is followed by Dr. Radha Garcia. She has a history of laryngopharyngeal reflux disease, treated with dexlansoprazole, and she is followed by Dr. Yomi Euceda. She states that she is attempting to wean taking it and has decreased her dose to every other day. In 5/2017 she had multiple episodes of epistaxis prompting an ED visit. She subsequently underwent silver nitrate cautery of anterior vessels in her right nares by Dr. Yomi Euceda, and has had no recurrence. In 3/2010, she underwent evaluation for iron deficiency anemia.  She had previously had a negative colonoscopy and air contrast barium enema in 2009 by Dr. Zuri Christine, and she had an upper endoscopy by Dr. Leonela Adler which was normal. She was treated with iron with improvement. She had a repeat screening colonoscopy in 7/2016 by Dr. Leonela Adler which was normal. No further follow-up was recommended given her age. She denies any abdominal pain, nausea, vomiting, melena, hematochezia, or change in bowel movements. She has a history of osteopenia with last bone density study in 5/2018 showing T-scores:  femoral neck left -1.2  /right -1.6 and lumbar -0.8 . She continues to take calcium and Vitamin D supplements. She has no history of pathologic fractures. She has a recent history of abnormal mammograms since 10/2014 with microcalcifcations in the right breast. She has been undergoing surveillance mammograms every six months, which have been unchanged. She had a follow-up mammogram in 5/2016 which was negative, and routine annual screening was recommended. She has a history of bilateral knee pain secondary to osteoarthritis. She was evaluated by Dr. Jie Cuellar in 3/2018 and received a cortisone injection with improvement. She also has difficutly with left ankle pain which increase with ambulation. She received a cortisone injection for this as well from Dr. Jie Cuellar in 3/2018, but did not experience significant improvement. She was evaluated by Dr. Rayray White on 3/27/2018 and diagnosed with left peroneus brevis tendinitis. An orthotic was recommended with improvement. Past Medical History:  
Diagnosis Date  Basal cell cancer  CAD (coronary artery disease), native coronary artery 03/21/2011  
 s/p PCI with with CARLITO (Xience 2.75x12, 2.5x12) mLAD and mild disease in rest of coronaries. Midly depressed LV syst fct  Cardiac cath 03/21/2011 mLAD 90% (2.75 x 12 mm Xience stent). Otherwise patent coronaries. LVEDP 10 mmHg. EF 40-45%. Anteroapical hypk. Renal arteries patent.  Cardiac echocardiogram 04/17/2014 EF 60-65%. No WMA. Gr 1 DDfx. Mild MR. Similar to study of 9/14/11.  Cardiac nuclear imaging test 07/2017 Negative for ischemia or infarction. EF > 70%.  Cardiomyopathy secondary   
 ischemic +/- stress induced  Dyslipidemia  GERD (gastroesophageal reflux disease)  Hx of colonoscopy 07/12/2016 Normal. Dr. Aldo Dumont  Hypertension  Parkinson's disease  Syncope   
 s/p ILD implantation Past Surgical History:  
Procedure Laterality Date  HX BUNIONECTOMY    
 dorsal  
 HX CATARACT REMOVAL  11/2012  
 left  HX CATARACT REMOVAL  10/2012  
 right  HX CORONARY STENT PLACEMENT    
 HX HEART CATHETERIZATION    
 HX HEMORRHOIDECTOMY  HX HYSTERECTOMY  1996  
 partial  
 HX IMPLANTABLE LOOP RECORDER  [id number]  HX TONSILLECTOMY  HX TOTAL ABDOMINAL HYSTERECTOMY  1996  
 partial  
 
Current Outpatient Medications Medication Sig  
 gabapentin (NEURONTIN) 100 mg capsule Take 1 Cap by mouth three (3) times daily.  potassium chloride (KLOR-CON) 10 mEq tablet Take 1 Tab by mouth every other day.  lisinopril (PRINIVIL, ZESTRIL) 10 mg tablet Take 1 Tab by mouth daily.  metoprolol succinate (TOPROL-XL) 50 mg XL tablet Take 1 Tab by mouth daily.  rosuvastatin (CRESTOR) 10 mg tablet Take 1 Tab by mouth nightly.  carbidopa-levodopa CR (SINEMET CR)  mg per tablet Take  by mouth nightly.  acetaminophen (TYLENOL) 650 mg CR tablet Take 650 mg by mouth every six (6) hours as needed for Pain.  co-enzyme Q-10 (CO Q-10) 100 mg capsule Take 200 mg by mouth daily.  VIT A/VIT C/VIT E/ZINC/COPPER (ICAPS AREDS PO) Take 1 Tab by mouth two (2) times a day.  VITAMIN B COMPLEX (BALANCE B-50 PO) Take 1 Tab by mouth daily.  aspirin 81 mg tablet Take 1 Tab by mouth daily.  MULTIVITAMIN PO Take 1 Tab by mouth daily.  DOCUSATE CALCIUM (STOOL SOFTENER PO) Take 1 Cap by mouth.  magnesium 250 mg Tab Take 250 mg by mouth two (2) times a day.  naproxen sodium (ALEVE PO) Take  by mouth.  clobetasol (TEMOVATE) 0.05 % external solution Apply as directed.  magnesium hydroxide (NEGRO MILK OF MAGNESIA) 400 mg/5 mL suspension Take 30 mL by mouth daily as needed for Constipation.  nitroglycerin (NITROSTAT) 0.4 mg SL tablet 1 sublingual every 5 minutes for chest pain for no more than 3 doses  psyllium seed-sucrose (METAMUCIL) powd 1 tablespoon bid  fluticasone (FLONASE) 50 mcg/actuation nasal spray 2 Sprays by Both Nostrils route daily.  Cholecalciferol, Vitamin D3, (VITAMIN D) 1,000 unit Cap Take 1 Cap by mouth daily.  resveratrol 100 mg Cap Take 2 Caps by mouth daily.  lecithin 1,200 mg Cap Take 1 Cap by mouth daily. No current facility-administered medications for this visit. Allergies and Intolerances: Allergies Allergen Reactions  Keflex [Cephalexin] Other (comments) Yeast infection  Pcn [Penicillins] Other (comments) Family History: She has no family history of colon or breast cancer. Family History Problem Relation Age of Onset  Heart Attack Father 80  
 Heart Disease Father  Cancer Mother   
     pancreatic  Hypertension Brother Social History: She  reports that  has never smoked. she has never used smokeless tobacco. She lives with her , who is having difficulty with mild cognitive impairment. She states that they sold their RV and now stay at home for the winter. She is a retired x-ray technician. Social History Substance and Sexual Activity Alcohol Use Yes Comment: glass of wine occasionally. Immunization History: 
Immunization History Administered Date(s) Administered  Influenza High Dose Vaccine PF 09/29/2014, 10/05/2015, 11/01/2016, 08/30/2017  Influenza Vaccine (Tri) Adjuvanted 10/09/2018  Influenza Vaccine Whole 09/07/2010, 09/03/2011, 10/04/2012  Pneumococcal Conjugate (PCV-13) 10/05/2015  TD Vaccine 01/01/2003  Tdap 09/25/2013  ZZZ-RETIRED (DO NOT USE) Pneumococcal Vaccine (Unspecified Type) 01/01/2003  Zoster 01/01/2007 Review of Systems: As above included in HPI. Otherwise 11 point review of systems negative including constitutional, skin, HENT, eyes, respiratory, cardiovascular, gastrointestinal, genitourinary, musculoskeletal, endo/heme/aller, neurological. 
 
Physical:  
Vitals:  
BP: 130/68 HR: 76 WT: 134 lb (60.8 kg) BMI:  22.30 kg/m2 Exam:  
 
Patient appears in no apparent distress. Affect is appropriate. HEENT: PERRLA, anicteric, oropharynx clear, no JVD, adenopathy or thyromegaly. No carotid bruits or radiated murmur. Lungs: clear to auscultation, no wheezes, rhonchi, or rales. Heart: regular rate and rhythm. No murmur, rubs, gallops Abdomen: soft, nontender, nondistended, normal bowel sounds, no hepatosplenomegaly or masses. Extremities: without edema. Pulses 1-2+ bilaterally. Back: negative bilateral straight leg raise; no palpation to tenderness along spine Review of Data: 
Labs: 
No visits with results within 1 Month(s) from this visit. Latest known visit with results is:  
Hospital Outpatient Visit on 10/02/2018 Component Date Value Ref Range Status  WBC 10/02/2018 5.4  4.6 - 13.2 K/uL Final  
 RBC 10/02/2018 4.30  4.20 - 5.30 M/uL Final  
 HGB 10/02/2018 13.3  12.0 - 16.0 g/dL Final  
 HCT 10/02/2018 42.2  35.0 - 45.0 % Final  
 MCV 10/02/2018 98.1* 74.0 - 97.0 FL Final  
 MCH 10/02/2018 30.9  24.0 - 34.0 PG Final  
 MCHC 10/02/2018 31.5  31.0 - 37.0 g/dL Final  
 RDW 10/02/2018 13.5  11.6 - 14.5 % Final  
 PLATELET 56/31/7835 708  135 - 420 K/uL Final  
 MPV 10/02/2018 9.9  9.2 - 11.8 FL Final  
 NEUTROPHILS 10/02/2018 61  40 - 73 % Final  
 LYMPHOCYTES 10/02/2018 25  21 - 52 % Final  
 MONOCYTES 10/02/2018 12* 3 - 10 % Final  
 EOSINOPHILS 10/02/2018 2  0 - 5 % Final  
 BASOPHILS 10/02/2018 0  0 - 2 % Final  
 ABS.  NEUTROPHILS 10/02/2018 3.3  1.8 - 8.0 K/UL Final  
  ABS. LYMPHOCYTES 10/02/2018 1.3  0.9 - 3.6 K/UL Final  
 ABS. MONOCYTES 10/02/2018 0.6  0.05 - 1.2 K/UL Final  
 ABS. EOSINOPHILS 10/02/2018 0.1  0.0 - 0.4 K/UL Final  
 ABS. BASOPHILS 10/02/2018 0.0  0.0 - 0.1 K/UL Final  
 DF 10/02/2018 AUTOMATED    Final  
 LIPID PROFILE 10/02/2018        Final  
 Cholesterol, total 10/02/2018 163  <200 MG/DL Final  
 Triglyceride 10/02/2018 79  <150 MG/DL Final  
 HDL Cholesterol 10/02/2018 73* 40 - 60 MG/DL Final  
 LDL, calculated 10/02/2018 74.2  0 - 100 MG/DL Final  
 VLDL, calculated 10/02/2018 15.8  MG/DL Final  
 CHOL/HDL Ratio 10/02/2018 2.2  0 - 5.0   Final  
 Sodium 10/02/2018 141  136 - 145 mmol/L Final  
 Potassium 10/02/2018 3.9  3.5 - 5.5 mmol/L Final  
 Chloride 10/02/2018 104  100 - 108 mmol/L Final  
 CO2 10/02/2018 29  21 - 32 mmol/L Final  
 Anion gap 10/02/2018 8  3.0 - 18 mmol/L Final  
 Glucose 10/02/2018 93  74 - 99 mg/dL Final  
 BUN 10/02/2018 14  7.0 - 18 MG/DL Final  
 Creatinine 10/02/2018 0.76  0.6 - 1.3 MG/DL Final  
 BUN/Creatinine ratio 10/02/2018 18  12 - 20   Final  
 GFR est AA 10/02/2018 >60  >60 ml/min/1.73m2 Final  
 GFR est non-AA 10/02/2018 >60  >60 ml/min/1.73m2 Final  
 Calcium 10/02/2018 9.4  8.5 - 10.1 MG/DL Final  
 Bilirubin, total 10/02/2018 0.5  0.2 - 1.0 MG/DL Final  
 ALT (SGPT) 10/02/2018 26  13 - 56 U/L Final  
 AST (SGOT) 10/02/2018 26  15 - 37 U/L Final  
 Alk. phosphatase 10/02/2018 81  45 - 117 U/L Final  
 Protein, total 10/02/2018 6.7  6.4 - 8.2 g/dL Final  
 Albumin 10/02/2018 3.8  3.4 - 5.0 g/dL Final  
 Globulin 10/02/2018 2.9  2.0 - 4.0 g/dL Final  
 A-G Ratio 10/02/2018 1.3  0.8 - 1.7   Final  
 Magnesium 10/02/2018 2.4  1.6 - 2.6 mg/dL Final  
 
Health Maintenance: 
Screening:  
 Mammogram: negative (5/2018) PAP smear: s/p ROHINI. No further screening. Colorectal: colonoscopy (7/2016) normal. Dr. Tanner Bishop. No further screening. Depression: none DM (HbA1c/FPG): FPG 93 (10/2018) Hepatitis C: N/A Falls: none DEXA: osteopenia (5/2018) Glaucoma: regular eye exams with Dr. Kt Bird (last 5/2018) and Dr. Chico Perry for left macular degeneration. Smoking: none Vitamin D: 57.1 (3/2018) Medicare Wellness: 10/2/2018 Impression: 
Patient Active Problem List  
Diagnosis Code  Hypertension I10  
 CAD S/P percutaneous coronary angioplasty I25.10, Z98.61  Dyslipidemia E78.5  Syncope R55  Parkinson disease ([address] G20  
 GERD (gastroesophageal reflux disease) K21.9  Macular degeneration of left eye H35.30  Laryngopharyngeal reflux disease K21.9  Osteopenia M85.80  Psoriasis of scalp L40.9  Acute right-sided low back pain with right-sided sciatica M54.41  Right sided sciatica M54.31  
 Lumbar spondylosis M47.816  Extradural cyst of spine G96.19  
 Abnormal MRI, spine R93.7 Plan: 1. Right buttock pain and right sided sciatica. Patient with symptoms of right buttock pain since 8/2018 and diagnosed with right piriformis syndrome. Completed physical therapy and received cortisone injection with some improvement, but subsequently developed right leg paresthesias and pain consistent with right sided sciatica. Treated with five day course of prednisone 50 mg without improvement. LS spine x-rays and lumbar MRI with degenerative changes, although MRI showed a right L4-L5 neuroforamen cystic lesion measuring approximately 10 x 6 mm of unclear etiology. Referred for physical therapy and treated with gabapentin 100 mg tid and reports significant improvement today with resolution of the sciatica symptoms. Feels physical therapy is very helpful and will continue. Discussed weaning gabapentin as symptoms continue to improve. Follow. 2. Right L4-L5 neuroforamen cystic lesion. Measuring approximately 10 x 6 mm. Unclear etiology since difficult to assess without contrast study. Recommendation for repeat MRI with gadolinium to assess further.  Patient agreeable but wishing to wait until 1/2019. Will order. 3. Hyperlipidemia. Previously on low intensity dose pravastatin, but developed severe bilateral thigh pain, worse with ambulation and was discontinued in 11/2018 with improvement. Reported similar symptoms in past with statin (Zocor until 2012, and then Lipitor until 9/2014). Started on rosuvastatin 10 mg daily in 12/2018 and tolerated without difficulty until 7/2018 when developed recurrent myalgias and dose decreased to 5 mg every other day with improvement. Discussed importance of statin therapy due to known CAD and LAD stent. Lipid panel today with LDL 74 and HDL 73, indicative of reasonable control. Emphasized importance of lifestyle modifications, including diet and exercise. Would not recommend weight loss given BMI. Continue to follow. 4. Hypertension. Blood pressure well controlled on lisinopril 10 mg daily and metoprolol succinate 50 mg daily. Renal function remains normal with creatinine 0.76 / eGFR >60. Continue to follow. 5. ASCVD. Remains asymptomatic. Resolution of cardiomyopathy with last echocardiogram revealing normal LV function. Negative pharmacologic nuclear stress test in 7/2017. Continue current regimen. Being followed by Dr. Ismael Benítez. 6. H/O syncope. No further episodes since 2014. Most likely secondary to vasovagal reaction. Follow. 7. Parkinson's disease. Doing well on Sinemet. Followed by Dr. Rojelio Schwab. 
8. Osteopenia. Last bone density scan 5/2018. Using femoral neck T-scores, calculated FRAX score estimates her 10 year risk of a major osteoporetic fracture at 12 % and hip fracture at 3.1 %, which are an indication for biphosphonate treatment based on hip fracture risk of >3%. However, no significant change from 5/2018. Continue calcium and vitamin D supplements. Encouraged exercise, particularly weight bearing activities. Will follow. Fall precautions stressed. 9. Macular degeneration, left. Being followed by Dr. Yao Valentin. 10. Laryngopharyngeal reflux. Doing well. Discontinued Dexilant without an increase in symptoms. Followed by Dr. Ortega Delgado. 11. Psoriasis. Using clobetasol solution for scalp psoriasis. 12. Health maintenance. Already received influenza vaccine. Received 1/2 doses of Shingrix vaccine. Other immunizations up to date. No further colorectal cancer screening needed. Mammogram up to date. Continue regular eye exams with Ankur Myers and [place]. Vitamin D level normal. Continue maintenance dose supplement. Medicare wellness visit up to date. Patient understands recommendations and agrees with plan.  
Follow-up in 4/2019 for physical.

## 2018-11-14 ENCOUNTER — HOSPITAL ENCOUNTER (OUTPATIENT)
Dept: PHYSICAL THERAPY | Age: 78
Discharge: HOME | End: 2018-11-14
Payer: MEDICARE

## 2018-11-14 PROCEDURE — 97140 MANUAL THERAPY 1/> REGIONS: CPT

## 2018-11-14 PROCEDURE — 97112 NEUROMUSCULAR REEDUCATION: CPT

## 2018-11-14 NOTE — PROGRESS NOTES
PT DAILY TREATMENT NOTE 10-18 Patient Name: Silvia Yeboah Date:2018 : 1940 [x]  Patient  Verified Payor: [place] MEDICARE / Plan: Polly Wooteny / Product Type: Medicare / In time:727  Out time:811 Total Treatment Time (min): 44 Visit #: 8 of 10 Medicare/BCBS Only Total Timed Codes (min):  44 1:1 Treatment Time:  44 Treatment Area: Low back pain [M54.5] SUBJECTIVE Pain Level (0-10 scale): 6 Any medication changes, allergies to medications, adverse drug reactions, diagnosis change, or new procedure performed?: [x] No    [] Yes (see summary sheet for update) Subjective functional status/changes:   [] No changes reported It's just awful. OBJECTIVE Modality rationale:   
Min Type Additional Details  
 [] Estim:  []Unatt       []IFC  []Premod []Other:  []w/ice   []w/heat Position: Location:  
 [] Estim: []Att    []TENS instruct  []NMES []Other:  []w/US   []w/ice   []w/heat Position: Location:  
 []  Traction: [] Cervical       []Lumbar 
                     [] Prone          []Supine []Intermittent   []Continuous Lbs: 
[] before manual 
[] after manual  
 []  Ultrasound: []Continuous   [] Pulsed []1MHz   []3MHz W/cm2: 
Location:  
 []  Iontophoresis with dexamethasone Location: [] Take home patch  
[] In clinic  
 []  Ice     []  heat 
[]  Ice massage 
[]  Laser  
[]  Anodyne Position: Location:  
 []  Laser with stim 
[]  Other:  Position: Location:  
 []  Vasopneumatic Device Pressure:       [] lo [] med [] hi  
Temperature: [] lo [] med [] hi  
[] Skin assessment post-treatment:  []intact []redness- no adverse reaction 
  []redness  adverse reaction:  
 
23 min Neuromuscular Re-education:  [x]  See flow sheet :  
Rationale: increase strength and improve coordination  to improve the patients ability to improve core/hip activation to reduce increased pain/strain with daily tasks 21 min Manual Therapy:  Left ilial upslip MET, STM/TrP release Left QL/longissimus/iliocostalis, Left QL manual stretching Rationale: decrease pain, increase ROM, increase tissue extensibility and decrease trigger points to reduce radicular symptoms With 
 [] TE 
 [] TA 
 [] neuro 
 [] other: Patient Education: [x] Review HEP [] Progressed/Changed HEP based on:  
[] positioning   [] body mechanics   [] transfers   [] heat/ice application   
[] other:   
 
Other Objective/Functional Measures: added ball squeeze in half supine on Oov Pain Level (0-10 scale) post treatment: 3 
 
ASSESSMENT/Changes in Function: Patient scheduled for MRI with contrast on 11/16/18 at 5:30am. Despite continued elevated pain and reports of thigh discomfort during sessions, she does report some overall improvement. Patient demonstrates significant Left knee valgus deformity. Pelvic alignment appears neutral at ankles but Left upslip noted at ASIS/PSIS; reduce somewhat with MET. She also admits to Left ankle pain and inflammation due to history of sprains, leading to increased weight bearing Right LE over the years. Palpated in the Left lumbar musculature- very tender to palpation, and prolonged pressure did reproduce Right anterior thigh pain. Focused on the left for manual.  
 
Patient will continue to benefit from skilled PT services to modify and progress therapeutic interventions, address functional mobility deficits, address ROM deficits, address strength deficits, analyze and address soft tissue restrictions, analyze and cue movement patterns, analyze and modify body mechanics/ergonomics, assess and modify postural abnormalities, address imbalance/dizziness and instruct in home and community integration to attain remaining goals. []  See Plan of Care 
[]  See progress note/recertification 
[]  See Discharge Summary Progress towards goals / Updated goals: Short Term Goals: To be accomplished in 1 weeks: 
Goal: Pt to be compliant with initial HEP to increase flexibility of right LE. Status at last note/certification: Established and reviewed with Pt Current status: met - Pt reports compliance Long Term Goals: To be accomplished in 5 weeks: 
Goal: Pt to maintain neutral pelvic alignment x 3 wks to increase stability and reduce pain with movements. Status at last note/certification: left posteriorly rotated innominate Current status: progressing - appears neutral at malleoli however Left knee valgus deformity present and asymmetry noted at ASIS/PSIS Goal: Pt to increase L/S AROM to full without increased pain to increase ease with household chores and yard work. Status at last note/certification: pain with full Flex/Ext Current status: Progressing, Right thigh pain remains with flexion Goal: Pt to report at least 50% improvement in right LE radicular symptoms to increase sitting tolerance on any surface. Status at last note/certification: paresthesias right LE limiting sitting tolerance Current status: Progressing, maybe around 45% Goal: Pt to report < 5/10 pain at worst to perform daily tasks in home and in community. Status at last note/certification: 38/26 pain at worst 
Current status: Progressing, 6/10 recently Goal: Pt to report FOTO score of 66 pts to show improved function and quality of life. Status at last note/certification: FOTO 52 pts  
Current status: progressing - FOTO 56 pts PLAN [x]  Upgrade activities as tolerated     [x]  Continue plan of care 
[]  Update interventions per flow sheet      
[]  Discharge due to:_ 
[]  Other:_   
 
Mago Toth PT 11/14/2018  7:07 AM 
 
Future Appointments Date Time Provider Kristofer Chow 11/14/2018  7:30 AM Tiffany Hummel, PT [facility]  
11/16/2018  5:30 AM [facility] RM 2 [facility]  
11/19/2018  7:30 AM Donna Sands, PT [facility]  
 11/21/2018  7:30 AM Jax Swartz, PT [facility]  
4/4/2019  8:00 AM IO NURSE VISIT [facility] SCHED  
4/11/2019  8:00 AM Cheyanne Rivera MD [facility] SCHED  
7/5/2019  8:40 AM Jorge Luis Quijano MD [address] [place] [address]

## 2018-11-16 ENCOUNTER — HOSPITAL ENCOUNTER (OUTPATIENT)
Age: 78
Discharge: HOME | End: 2018-11-16
Attending: INTERNAL MEDICINE
Payer: MEDICARE

## 2018-11-16 DIAGNOSIS — R93.7 ABNORMAL MRI, SPINE: ICD-10-CM

## 2018-11-16 DIAGNOSIS — G96.198 EXTRADURAL CYST OF SPINE: ICD-10-CM

## 2018-11-16 LAB — CREAT UR-MCNC: 0.8 MG/DL (ref 0.6–1.3)

## 2018-11-16 PROCEDURE — 72149 MRI LUMBAR SPINE W/DYE: CPT

## 2018-11-16 PROCEDURE — A9575 INJ GADOTERATE MEGLUMI 0.1ML: HCPCS | Performed by: INTERNAL MEDICINE

## 2018-11-16 PROCEDURE — 82565 ASSAY OF CREATININE: CPT

## 2018-11-16 PROCEDURE — 74011636320 HC RX REV CODE- 636/320: Performed by: INTERNAL MEDICINE

## 2018-11-16 RX ADMIN — GADOTERATE MEGLUMINE 13 ML: 376.9 INJECTION INTRAVENOUS at 06:00

## 2018-11-16 RX ADMIN — GADOTERATE MEGLUMINE 13 ML: 376.9 INJECTION INTRAVENOUS at 07:00

## 2018-11-19 ENCOUNTER — HOSPITAL ENCOUNTER (OUTPATIENT)
Dept: PHYSICAL THERAPY | Age: 78
Discharge: HOME | End: 2018-11-19
Payer: MEDICARE

## 2018-11-19 PROCEDURE — G8979 MOBILITY GOAL STATUS: HCPCS

## 2018-11-19 PROCEDURE — G8978 MOBILITY CURRENT STATUS: HCPCS

## 2018-11-19 PROCEDURE — 97140 MANUAL THERAPY 1/> REGIONS: CPT

## 2018-11-19 NOTE — PROGRESS NOTES
PT DAILY TREATMENT NOTE 10-18 Patient Name: Denis Barron Date:2018 : 1940 [x]  Patient  Verified Payor: [place] MEDICARE / Plan: Polly Booth y / Product Type: Medicare / In time:7:34  Out time:8:20 Total Treatment Time (min): 46 Visit #: 9 of 10 Medicare/BCBS Only Total Timed Codes (min):  36 1:1 Treatment Time:  23 Treatment Area: Low back pain [M54.5] SUBJECTIVE Pain Level (0-10 scale): 8/10 Any medication changes, allergies to medications, adverse drug reactions, diagnosis change, or new procedure performed?: [x] No    [] Yes (see summary sheet for update) Subjective functional status/changes:   [] No changes reported \"I hurt from my head to my toes today. My hip hurts, my knees, and my feet. I've never had foot pain before. I don't know what caused it. I've been rubbing my hip and stretching but its been talking to me all weekend. \" OBJECTIVE Modality rationale: decrease inflammation and decrease pain to improve the patients ability to perform ADLs Min Type Additional Details  
 [] Estim:  []Unatt       []IFC  []Premod []Other:  []w/ice   []w/heat Position: Location:  
 [] Estim: []Att    []TENS instruct  []NMES []Other:  []w/US   []w/ice   []w/heat Position: Location:  
 []  Traction: [] Cervical       []Lumbar 
                     [] Prone          []Supine []Intermittent   []Continuous Lbs: 
[] before manual 
[] after manual  
 []  Ultrasound: []Continuous   [] Pulsed []1MHz   []3MHz Location: 
W/cm2:  
 []  Iontophoresis with dexamethasone Location: [] Take home patch  
[] In clinic  
10 []  Ice     [x]  heat 
[]  Ice massage 
[]  Laser  
[]  Anodyne Position: left sidelying Location: right glute/piriformis  
 []  Laser with stim 
[]  Other: Position: Location:  
 []  Vasopneumatic Device Pressure:       [] lo [] med [] hi  
 Temperature: [] lo [] med [] hi  
[] Skin assessment post-treatment:  []intact []redness- no adverse reaction 
  []redness  adverse reaction:  
 
13 min Neuromuscular Re-education:  [x]  See flow sheet :  
Rationale: increase strength and improve coordination  to improve the patients ability to improve core/hip activation to reduce increased pain with ADLs 23 min Manual Therapy:  MET to correct right anteriorly rotated ilium; TrP release to right piriformis, glutes Rationale: decrease pain, increase ROM, increase tissue extensibility and decrease trigger points to reduce radicular symptoms With 
 [] TE 
 [] TA 
 [] neuro 
 [] other: Patient Education: [x] Review HEP [] Progressed/Changed HEP based on:  
[] positioning   [] body mechanics   [] transfers   [] heat/ice application   
[] other:   
 
Other Objective/Functional Measures: Abbreviated exercise program today due to Pt being uncomfortable and unable to fully perform. Pain Level (0-10 scale) post treatment: 5/10 ASSESSMENT/Changes in Function: Pt exhibited minimal tenderness in right or left Q/L, longissimus, iliocostalis muscles today with palpation but palpation of right glute/piriformis reproduced Pt's radicular symptoms, as well as pelvic obliquity of right anteriorly rotated innominate. Continued with manual focus and Pt able to report decrease in overall pain and amb with more normalized gait at end of session. Patient will continue to benefit from skilled PT services to modify and progress therapeutic interventions, address functional mobility deficits, address ROM deficits, address strength deficits, analyze and address soft tissue restrictions, analyze and cue movement patterns, analyze and modify body mechanics/ergonomics, assess and modify postural abnormalities, address imbalance/dizziness and instruct in home and community integration to attain remaining goals. []  See Plan of Care [x]  See progress note/recertification 
[]  See Discharge Summary Progress towards goals / Updated goals: 
Short Term Goals: To be accomplished in 1 weeks: 
Goal: Pt to be compliant with initial HEP to increase flexibility of right LE. Status at last note/certification: Established and reviewed with Pt Current status: met - Pt reports compliance Long Term Goals: To be accomplished in 5 weeks: 
Goal: Pt to maintain neutral pelvic alignment x 3 wks to increase stability and reduce pain with movements. Status at last note/certification: left posteriorly rotated innominate Current status: progressing - appears neutral at malleoli however Left knee valgus deformity present and asymmetry noted at ASIS/PSIS Goal: Pt to increase L/S AROM to full without increased pain to increase ease with household chores and yard work. Status at last note/certification: pain with full Flex/Ext Current status: Progressing, Right thigh pain remains with flexion Goal: Pt to report at least 50% improvement in right LE radicular symptoms to increase sitting tolerance on any surface. Status at last note/certification: paresthesias right LE limiting sitting tolerance Current status: Progressing, maybe around 45% Goal: Pt to report < 5/10 pain at worst to perform daily tasks in home and in community. Status at last note/certification: 45/83 pain at worst 
Current status: Progressing, 6/10 recently Goal: Pt to report FOTO score of 66 pts to show improved function and quality of life. Status at last note/certification: FOTO 52 pts  
Current status: progressing - FOTO 56 pts PLAN [x]  Upgrade activities as tolerated     [x]  Continue plan of care 
[]  Update interventions per flow sheet      
[]  Discharge due to:_ 
[]  Other:_   
 
Thalia Sands, PT 11/19/2018  7:07 AM 
 
Future Appointments Date Time Provider Kristofer Chow 11/21/2018  7:30 AM Padmini Arana, PT [facility]  
 1/3/2019  8:20 AM Maru Moyer  [address]  
4/4/2019  8:00 AM [facility] NURSE VISIT [facility] SCHED  
4/11/2019  8:00 AM Ludivina Tim MD [facility] SCHED  
7/5/2019  8:40 AM Jo Hwang MD [address] [place] [address]

## 2018-11-19 NOTE — PROGRESS NOTES
[facility] 
[address] [place], [address] [place] [address] 
[phone] fax Continued Plan of Care/ Re-certification for Physical Therapy Services Patient name: Kalpana Denis Start of Care: 10/22/2018 Referral source: Cira Ronquillo MD : 1940 Medical Diagnosis: Low back pain [M54.5] Lumbago with sciatica, right side [M54.41] 
  Onset Date:10/12/18 Treatment Diagnosis: Low Back, Right LE pain Prior Hospitalization: see medical history Provider#: [id number] Medications: Verified on Patient summary List  
 Comorbidities: Arthritis; Back pain; HTN; Visual impairment Prior Level of Function: Lives with spouse in 1-story home; functionally independent; assists in care for spouse Visits from Start of Care: 9    Missed Visits: 0 The Plan of Care and following information is based on the patient's current status: 
 
Short Term Goals: To be accomplished in 1 weeks: 
Goal: Pt to be compliant with initial HEP to increase flexibility of right LE. Status at last note/certification: Established and reviewed with Pt Current status: met - Pt reports compliance Long Term Goals: To be accomplished in 5 weeks: 
Goal: Pt to maintain neutral pelvic alignment x 3 wks to increase stability and reduce pain with movements. Status at last note/certification: left posteriorly rotated innominate Current status: progressing - appears neutral at malleoli however Left knee valgus deformity present and asymmetry noted at ASIS/PSIS Goal: Pt to increase L/S AROM to full without increased pain to increase ease with household chores and yard work. Status at last note/certification: pain with full Flex/Ext Current status: Progressing, Right thigh pain remains with flexion Goal: Pt to report at least 50% improvement in right LE radicular symptoms to increase sitting tolerance on any surface. Status at last note/certification: paresthesias right LE limiting sitting tolerance Current status: Progressing, maybe around 45% Goal: Pt to report < 5/10 pain at worst to perform daily tasks in home and in community. Status at last note/certification: 97/29 pain at worst 
Current status: Progressing, 6/10 recently Goal: Pt to report FOTO score of 66 pts to show improved function and quality of life. Status at last note/certification: FOTO 52 pts  
Current status: progressing - FOTO 56 pts Key functional changes: Pt reports 45% improvement since start of PT.  Pt's pain fluctuates, being reproduced during palpation of left longissimus, iliocostalis muscles or right piriformis muscle. Pt has had a period of up to 5 days with minimal pain but recently, pain has returned to 6/10, increasing flexion postures, especially sitting. Pt is compliant with HEP. Manual therapy is assisting with reducing pain levels during session. Pt would benefit from continued skilled PT to reduce trigger points, pain and improve overall function with less difficulty. Problems/ barriers to goal attainment: None other than persistent, fluctuating pain  
 
Problem List: pain affecting function, decrease ROM, decrease strength, edema affecting function, impaired gait/ balance, decrease ADL/ functional abilitiies, decrease activity tolerance, decrease flexibility/ joint mobility and decrease transfer abilities Treatment Plan: Therapeutic exercise, Therapeutic activities, Neuromuscular re-education, Physical agent/modality, Gait/balance training, Manual therapy, Patient education, Functional mobility training and Stair training Patient Goal (s) has been updated and includes: \"Stop the pain. \"  
 
Goals for this certification period to be accomplished in 4 weeks: 
Long term goals above remain appropriate. Frequency / Duration: Patient to be seen 2 times per week for 4 weeks: Assessment / Recommendations: Pt would benefit from continued skilled PT to reduce radicular right LE pain, improve core strength, to improve functional capabilities with less pain. G-Codes (GP) Mobility  Current  CK= 40-59%   Goal  CJ= 20-39% The severity rating is based on clinical judgment and the FOTO score. Certification Period: 11/20/18 - 12/19/18 Meryle Metz Daughtry, PT 11/19/2018 9:52 AM 
 
________________________________________________________________________ I certify that the above Therapy Services are being furnished while the patient is under my care. I agree with the treatment plan and certify that this therapy is necessary. [] I have read the above and request that my patient continue as recommended. [] I have read the above report and request that my patient continue therapy with the following changes/special instructions: ______________________________________ [] I have read the above report and request that my patient be discharged from therapy [de-identified] Signature:____________Date:_________TIME:________ 
 
[employer], Date and Time must be completed for valid certification ** Please sign and return to [facility] [address] 
[phone] fax

## 2018-11-21 ENCOUNTER — HOSPITAL ENCOUNTER (OUTPATIENT)
Dept: PHYSICAL THERAPY | Age: 78
Discharge: HOME | End: 2018-11-21
Payer: MEDICARE

## 2018-11-21 PROCEDURE — 97112 NEUROMUSCULAR REEDUCATION: CPT

## 2018-11-21 PROCEDURE — 97140 MANUAL THERAPY 1/> REGIONS: CPT

## 2018-11-21 NOTE — PROGRESS NOTES
PT DAILY TREATMENT NOTE 10-18 Patient Name: Denis Barron Date:2018 : 1940 [x]  Patient  Verified Payor: [place] MEDICARE / Plan: Polly Booth y / Product Type: Medicare / In time:727  Out time:810 Total Treatment Time (min): 43 Visit #: 1 of 8 Medicare/BCBS Only Total Timed Codes (min):  43 1:1 Treatment Time:  37 Treatment Area: Low back pain [M54.5] SUBJECTIVE Pain Level (0-10 scale): 6 Any medication changes, allergies to medications, adverse drug reactions, diagnosis change, or new procedure performed?: [x] No    [] Yes (see summary sheet for update) Subjective functional status/changes:   [] No changes reported I feel better than the other day. I've noticed that I can't stand or sit as long as I used to be able to. That's been the past four days or so. OBJECTIVE Modality rationale:   
Type Additional Details  
[] Estim:  []Unatt       []IFC  []Premod []Other:  []w/ice   []w/heat Position: Location:  
[] Estim: []Att    []TENS instruct  []NMES []Other:  []w/US   []w/ice   []w/heat Position: Location:  
[]  Traction: [] Cervical       []Lumbar 
                     [] Prone          []Supine []Intermittent   []Continuous Lbs: 
[] before manual 
[] after manual  
[]  Ultrasound: []Continuous   [] Pulsed []1MHz   []3MHz W/cm2: 
Location:  
[]  Iontophoresis with dexamethasone Location: [] Take home patch  
[] In clinic  
[]  Ice     []  heat 
[]  Ice massage 
[]  Laser  
[]  Anodyne Position: Location:  
[]  Laser with stim 
[]  Other:  Position: Location:  
[]  Vasopneumatic Device Pressure:       [] lo [] med [] hi  
Temperature: [] lo [] med [] hi  
[] Skin assessment post-treatment:  []intact []redness- no adverse reaction 
  []redness  adverse reaction:  
 
28 min Neuromuscular Re-education:  [x]  See flow sheet :exercises, including patient education on sciatic nerve anatomy and referrals Rationale: increase strength and improve coordination  to improve the patients ability to improve core and hip activation in order to reduce lumbar strain, radicular symptoms 15 min Manual Therapy:  STM Right hip flexors/proximal quads Rationale: decrease pain, increase ROM, increase tissue extensibility and decrease trigger points to reduce radicular symptoms With 
 [] TE 
 [] TA 
 [] neuro 
 [] other: Patient Education: [x] Review HEP [] Progressed/Changed HEP based on:  
[] positioning   [] body mechanics   [] transfers   [] heat/ice application   
[] other:   
 
Other Objective/Functional Measures: completed exercises per flow sheet Pain Level (0-10 scale) post treatment: 5 
 
ASSESSMENT/Changes in Function: Patient reports over the past week feeling intermittent altered sensations in her Right lower leg in a specific spot about the size of the tip of her digit, as if her skin were wet. Increasing Right thigh pain with exercises and able to reproduce local and some referred pain with palpable in the thigh musculature, so focused manual here. Improved discomfort after session. Patient will continue to benefit from skilled PT services to modify and progress therapeutic interventions, address functional mobility deficits, address ROM deficits, address strength deficits, analyze and address soft tissue restrictions, analyze and cue movement patterns, analyze and modify body mechanics/ergonomics, assess and modify postural abnormalities, address imbalance/dizziness and instruct in home and community integration to attain remaining goals. []  See Plan of Care 
[]  See progress note/recertification 
[]  See Discharge Summary Progress towards goals / Updated goals: 
Goal: Pt to maintain neutral pelvic alignment x 3 wks to increase stability and reduce pain with movements. Status at last note/certification: left posteriorly rotated innominate Current status: progressing - appears neutral at malleoli however Left knee valgus deformity present and asymmetry noted at ASIS/PSIS Goal: Pt to increase L/S AROM to full without increased pain to increase ease with household chores and yard work. Status at last note/certification: pain with full Flex/Ext Current status: Progressing, Right thigh pain remains with flexion Goal: Pt to report at least 50% improvement in right LE radicular symptoms to increase sitting tolerance on any surface. Status at last note/certification: paresthesias right LE limiting sitting tolerance Current status: Progressing, maybe around 45% Goal: Pt to report < 5/10 pain at worst to perform daily tasks in home and in community. Status at last note/certification: 29/56 pain at worst 
Current status: Progressing, 6/10 recently Goal: Pt to report FOTO score of 66 pts to show improved function and quality of life. Status at last note/certification: FOTO 52 pts  
Current status: progressing - FOTO 63 pts PLAN [x]  Upgrade activities as tolerated     [x]  Continue plan of care 
[]  Update interventions per flow sheet      
[]  Discharge due to:_ 
[]  Other:_   
 
Juwan Thompson, PT 11/21/2018  7:08 AM 
 
Future Appointments Date Time Provider Kristofer Chow 11/21/2018  7:30 AM Jax Swartz, PT [facility]  
1/3/2019  8:20 AM Melva Cagle  [facility]  
4/4/2019  8:00 AM [facility] NURSE VISIT [facility] SCHED  
4/11/2019  8:00 AM Cheyanne Rivera MD [facility] SCHED  
7/5/2019  8:40 AM Jorge Luis Quijano MD [address] [place] [address]

## 2018-12-03 ENCOUNTER — HOSPITAL ENCOUNTER (OUTPATIENT)
Dept: PHYSICAL THERAPY | Age: 78
Discharge: HOME | End: 2018-12-03
Payer: MEDICARE

## 2018-12-03 PROCEDURE — G8980 MOBILITY D/C STATUS: HCPCS

## 2018-12-03 PROCEDURE — 97530 THERAPEUTIC ACTIVITIES: CPT

## 2018-12-03 PROCEDURE — G8979 MOBILITY GOAL STATUS: HCPCS

## 2018-12-03 NOTE — PROGRESS NOTES
PT DISCHARGE DAILY NOTE AND [id number] Date:12/3/2018 Patient name: Jannette Sevilla Start of Care: 10/22/2018 Referral Donis Newberry MD [facility]:               
Medical Diagnosis: Low back pain [M54.5] Lumbago with sciatica, right side [M54.41] 
  Onset Date:10/12/18              
Treatment Diagnosis: Low Back, Right LE pain Prior Hospitalization: see medical history Provider#: [id number] Medications: Verified on Patient summary List  
 Comorbidities: Arthritis; Back pain; HTN; Visual impairment 
 Prior Level of Function: Lives with spouse in 1-story home; functionally independent; assists in care for spouse Visits from Start of Care: 11    Missed Visits: 0 Reporting Period : 18 to 12/3/18 [x]  Patient  Verified Payor: [place] / Plan: [place] y / Product Type: Medicare / In time:7:30  Out time:7:45 Total Treatment Time (min): 15 Total Timed Codes (min): 15 
1:1 Treatment Time ( only): 15 Visit #: 2 of 8 SUBJECTIVE Pain Level (0-10 scale): 0/10 Any medication changes, allergies to medications, adverse drug reactions, diagnosis change, or new procedure performed?: [x] No    [] Yes (see summary sheet for update) Subjective functional status/changes:   [] No changes reported \"Surprisingly I have no pain in the leg this morning. Dr. Cuca Leonardo called and the results of the MRI with contrast showed the foraminal cyst is pressing on the nerve that is giving me so much grief. So, the PT is going to help. I need to get the cyst addressed. I don't see the Orthopedist until 1/3/19. So, I don't think I should keep coming. \" OBJECTIVE 15 min Therapeutic Activity:  []  See flow sheet : Reviewed HEP managment Rationale: increase ROM  to improve the patients ability to manage symptoms until medical treatment provided to alleviate foraminal cyst pressure With 
 [] TE 
 [] TA 
 [] neuro 
 [] other: Patient Education: [x] Review HEP   
 [] Progressed/Changed HEP based on:  
[] positioning   [] body mechanics   [] transfers   [] heat/ice application   
[] other:   
 
Other Objective/Functional Measures: Reassessed goals and reviewed HEP management. Pain Level (0-10 scale) post treatment: 0/10 Summary of Care: 
Goal: Pt to maintain neutral pelvic alignment x 3 wks to increase stability and reduce pain with movements. Status at last note/certification: left posteriorly rotated innominate Current status: progressing - appears neutral at malleoli however Left knee valgus deformity present and asymmetry noted at ASIS/PSIS Goal: Pt to increase L/S AROM to full without increased pain to increase ease with household chores and yard work. Status at last note/certification: pain with full Flex/Ext Current status: Progressing, Right thigh pain remains with flexion but lessened intensity Goal: Pt to report at least 50% improvement in right LE radicular symptoms to increase sitting tolerance on any surface. Status at last note/certification: paresthesias right LE limiting sitting tolerance Current status: Progressing, maybe around 45% Goal: Pt to report < 5/10 pain at worst to perform daily tasks in home and in community. Status at last note/certification: 72/37 pain at worst 
Current status: Progressing, 6/10 recently Goal: Pt to report FOTO score of 66 pts to show improved function and quality of life. Status at last note/certification: FOTO 52 pts  
Current status: progressing - FOTO 63 pts ASSESSMENT/Changes in Function: Pt made some progress with skilled therapy. Pt notes decreased pain in hip and right buttock areas. Pt is cautious still in movements to not aggravate pain. Pt continues to report difficulty finding a comfortable position to sleep in. Pt is independent and compliant with HEP of stretches and heating pad to assist with reduction in symptoms.   Pt will follow up with Orthopedist in January 2019 for treatment options to alleviate pressure of cyst on spinal nerve. Pt is appropriate for D/C at this time. G-Codes (GP) Mobility  Goal  CJ= 20-39%  D/C  CJ= 20-39% The severity rating is based on clinical judgment and the FOTO score. Thank you for this referral! 
  
PLAN [x]Discontinue therapy: [x]Patient has reached or is progressing toward set goals []Patient is non-compliant or has abdicated 
    []Due to lack of appreciable progress towards set goals Wilbert Sands, PT 12/3/2018  7:36 AM

## 2018-12-05 ENCOUNTER — APPOINTMENT (OUTPATIENT)
Dept: PHYSICAL THERAPY | Age: 78
End: 2018-12-05
Payer: MEDICARE

## 2018-12-10 ENCOUNTER — APPOINTMENT (OUTPATIENT)
Dept: PHYSICAL THERAPY | Age: 78
End: 2018-12-10
Payer: MEDICARE

## 2018-12-12 ENCOUNTER — APPOINTMENT (OUTPATIENT)
Dept: PHYSICAL THERAPY | Age: 78
End: 2018-12-12
Payer: MEDICARE

## 2018-12-17 ENCOUNTER — APPOINTMENT (OUTPATIENT)
Dept: PHYSICAL THERAPY | Age: 78
End: 2018-12-17
Payer: MEDICARE

## 2018-12-17 RX ORDER — LISINOPRIL 10 MG/1
10 TABLET ORAL DAILY
Qty: 90 TAB | Refills: 3 | Status: SHIPPED | OUTPATIENT
Start: 2018-12-17 | End: 2020-01-13 | Stop reason: SDUPTHER

## 2018-12-17 RX ORDER — METOPROLOL SUCCINATE 50 MG/1
50 TABLET, EXTENDED RELEASE ORAL DAILY
Qty: 90 TAB | Refills: 3 | Status: SHIPPED | OUTPATIENT
Start: 2018-12-17 | End: 2019-12-10 | Stop reason: SDUPTHER

## 2018-12-17 NOTE — TELEPHONE ENCOUNTER
Last Visit: 11/08/2018 with MD Gabriel Santa  Next Appointment: 04/11/2019 with MD Gabriel Santa  Previous Refill Encounter(s): 12/20/2017 per MD Felipe Galeano & Zestril #90 with 3 refills    Requested Prescriptions     Pending Prescriptions Disp Refills    metoprolol succinate (TOPROL-XL) 50 mg XL tablet 90 Tab 3     Sig: Take 1 Tab by mouth daily.  lisinopril (PRINIVIL, ZESTRIL) 10 mg tablet 90 Tab 3     Sig: Take 1 Tab by mouth daily.

## 2018-12-19 ENCOUNTER — APPOINTMENT (OUTPATIENT)
Dept: PHYSICAL THERAPY | Age: 78
End: 2018-12-19
Payer: MEDICARE

## 2018-12-27 ENCOUNTER — APPOINTMENT (OUTPATIENT)
Dept: PHYSICAL THERAPY | Age: 78
End: 2018-12-27
Payer: MEDICARE

## 2019-01-03 ENCOUNTER — OFFICE VISIT (OUTPATIENT)
Dept: ORTHOPEDIC SURGERY | Age: 79
End: 2019-01-03

## 2019-01-03 VITALS
WEIGHT: 132 LBS | DIASTOLIC BLOOD PRESSURE: 82 MMHG | BODY MASS INDEX: 21.99 KG/M2 | SYSTOLIC BLOOD PRESSURE: 168 MMHG | HEART RATE: 64 BPM | HEIGHT: 65 IN

## 2019-01-03 DIAGNOSIS — M54.16 LUMBAR NEURITIS: ICD-10-CM

## 2019-01-03 DIAGNOSIS — M47.817 LUMBOSACRAL SPONDYLOSIS WITHOUT MYELOPATHY: Primary | ICD-10-CM

## 2019-01-03 DIAGNOSIS — M71.38 SYNOVIAL CYST OF LUMBAR SPINE: ICD-10-CM

## 2019-01-03 DIAGNOSIS — M51.36 DDD (DEGENERATIVE DISC DISEASE), LUMBAR: ICD-10-CM

## 2019-01-03 NOTE — PROGRESS NOTES
[facility] 
[address], [place] [address] Phone: [phone] Fax: [phone] INITIAL CONSULTATION 
 
 
HISTORY OF PRESENT ILLNESS: 
Thelma Szymanski is a [de-identified] y.o. female whom is referred from Yael Carmichael MD secondary to low back and right buttock pain extending into the RLE with paraesthesias in an S1 distribution to the plantar surface of the foot since July. She rates her pain 0/10. She is completing her HEP inconsistently. She denies specific injury or trauma. Her onset of pain was in the right buttock, which eventually extended intto the RLE in an S1 distribution with paraesthesias around September without injury or trauma. She described symptoms consistent with spinal stenosis. She has limitations with activity tolerance. She reports her pain has now subsided. She denies a hx of lumbar surgery or recent blocks. She completed PT with relief. She took Neurontin with relief, but has now discontinued it. Pt denies change in bowel or bladder habits. Pt denies fever, weight loss, or skin changes. Note from Yael Carmichael MD dated 11/8/18 indicating patient was seen with c/o low back pain with right-sided sciatica with paraesthesias. Bilateral thigh aching, increased with ambulation and walking up stairs. Hx of Parkinson's disease (She is followed by Dr. Tucker Bautista for this). She has seen Dr. Nany Rush for right hip pain and dx with piriformis syndrome at that time. Treated with PT and a cortisone injection with some improvement, but subsequently developed right buttock pain extending into the RLE with paraesthesias. She was treated with Prednisone,which did not provide relief. She was started on Neurontin which did help. She is followed by Dr. Lucila Lee for her knee pain. Lumbar spine MRI w/out contrast dated 10/17/18 reviewed.  Per report, multilevel degenerative findings causing various degrees of neuroforaminal narrowing and lateral recess narrowing as discussed. Right L4-L5 neuroforamen cystic lesion. This may reflect a nerve sheath cyst or perhaps facet joint cyst. Other etiologies not excluded. Repeat contrast enhanced exam can be performed. Alternatively short interval follow-up can be performed. Lumbar spine MRI w/ contrast dated 11/16/18 reviewed. Per report, post contrast lumbar spine MRI confirms an extruding synovial cyst from the right L4-5 advanced facet arthropathy. Significant right foraminal stenosis and nerve impingement persists. The patient is RHD.  reviewed. Body mass index is 21.97 kg/m². PCP: Ryley Cano MD 
 
Past Medical History:  
Diagnosis Date  Basal cell cancer  CAD (coronary artery disease), native coronary artery 03/21/2011  
 s/p PCI with with CARLITO (Xience 2.75x12, 2.5x12) mLAD and mild disease in rest of coronaries. Midly depressed LV syst fct  Cardiac cath 03/21/2011 mLAD 90% (2.75 x 12 mm Xience stent). Otherwise patent coronaries. LVEDP 10 mmHg. EF 40-45%. Anteroapical hypk. Renal arteries patent.  Cardiac echocardiogram 04/17/2014 EF 60-65%. No WMA. Gr 1 DDfx. Mild MR. Similar to study of 9/14/11.  Cardiac nuclear imaging test 07/2017 Negative for ischemia or infarction. EF > 70%.  Cardiomyopathy secondary   
 ischemic +/- stress induced  Dyslipidemia  GERD (gastroesophageal reflux disease)  Hx of colonoscopy 07/12/2016 Normal. Dr. Robles Rivera  Hypertension  Parkinson's disease  Syncope   
 s/p ILD implantation Past Surgical History:  
Procedure Laterality Date  COLONOSCOPY N/A 7/12/2016 COLONOSCOPY performed by Eleazar Smith MD at [address] HX BUNIONECTOMY    
 dorsal  
 HX CATARACT REMOVAL  11/2012  
 left  HX CATARACT REMOVAL  10/2012  
 right  HX CORONARY STENT PLACEMENT    
 HX HEART CATHETERIZATION    
 HX HEMORRHOIDECTOMY  HX HYSTERECTOMY  1996  
 partial  
  HX IMPLANTABLE LOOP RECORDER  [id number]  HX TONSILLECTOMY  HX TOTAL ABDOMINAL HYSTERECTOMY  1996  
 partial  
 
 
Social History Tobacco Use  Smoking status: Never Smoker  Smokeless tobacco: Never Used Substance Use Topics  Alcohol use: Yes Comment: glass of wine occasionally. Work status: The patient is retired. Marital status: The patient is . Current Outpatient Medications Medication Sig Dispense Refill  metoprolol succinate (TOPROL-XL) 50 mg XL tablet Take 1 Tab by mouth daily. 90 Tab 3  
 lisinopril (PRINIVIL, ZESTRIL) 10 mg tablet Take 1 Tab by mouth daily. 90 Tab 3  
 gabapentin (NEURONTIN) 100 mg capsule Take 1 Cap by mouth three (3) times daily. 90 Cap 0  
 potassium chloride (KLOR-CON) 10 mEq tablet Take 1 Tab by mouth every other day. 45 Tab 3  
 rosuvastatin (CRESTOR) 10 mg tablet Take 1 Tab by mouth nightly. 90 Tab 3  clobetasol (TEMOVATE) 0.05 % external solution Apply as directed. 25 mL 1  
 magnesium hydroxide (NEGRO MILK OF MAGNESIA) 400 mg/5 mL suspension Take 30 mL by mouth daily as needed for Constipation.  carbidopa-levodopa CR (SINEMET CR)  mg per tablet Take  by mouth nightly.  nitroglycerin (NITROSTAT) 0.4 mg SL tablet 1 sublingual every 5 minutes for chest pain for no more than 3 doses 25 tablet 3  
 acetaminophen (TYLENOL) 650 mg CR tablet Take 650 mg by mouth every six (6) hours as needed for Pain.  fluticasone (FLONASE) 50 mcg/actuation nasal spray 2 Sprays by Both Nostrils route daily. 1 Bottle 2  
 co-enzyme Q-10 (CO Q-10) 100 mg capsule Take 200 mg by mouth daily.  VITAMIN B COMPLEX (BALANCE B-50 PO) Take 1 Tab by mouth daily.  aspirin 81 mg tablet Take 1 Tab by mouth daily. 1 Tab 0  MULTIVITAMIN PO Take 1 Tab by mouth daily.  DOCUSATE CALCIUM (STOOL SOFTENER PO) Take 1 Cap by mouth.  magnesium 250 mg Tab Take 250 mg by mouth two (2) times a day.  resveratrol 100 mg Cap Take 2 Caps by mouth daily. Allergies Allergen Reactions  Keflex [Cephalexin] Other (comments) Yeast infection  Pcn [Penicillins] Other (comments) Family History Problem Relation Age of Onset  Heart Attack Father 80  
 Heart Disease Father  Cancer Mother   
     pancreatic  Hypertension Brother REVIEW OF SYSTEMS Constitutional symptoms: Negative Eyes: Negative Ears, Nose, Throat, and Mouth: Negative Cardiovascular: Negative Respiratory: Negative Genitourinary: Negative Integumentary (Skin and/or breast): Negative Musculoskeletal: Positive for low back pain, right buttock pain, RLE pain/paraesthesias Extremities: Negative for edema. Endocrine/Rheumatologic: Negative Hematologic/Lymphatic: Negative Allergic/Immunologic: Negative Psychiatric: Negative PHYSICAL EXAMINATION Visit Vitals /82 Pulse 64 Ht 5' 5\" (1.651 m) Wt 132 lb (59.9 kg) BMI 21.97 kg/m² CONSTITUTIONAL: NAD, A&O x 3 HEART: Regular rate and rhythm ABDOMEN: Positive bowel sounds, soft, nontender, and nondistended LUNGS: Clear to auscultation bilaterally. SKIN: Negative for rash. RANGE OF MOTION: The patient has full passive range of motion in all four extremities. SENSATION: Sensation is intact to light touch throughout. MOTOR:  
Straight Leg Raise: Negative, bilateral 
Kimble: Negative, bilateral 
Deep tendon reflexes are 0 at the brachioradialis, trace at the biceps, and 0 at the triceps bilaterally. Deep tendon reflexes are 0 at the knees and ankles bilaterally. Slight resting tremor, 1st digit of the LUE Shoulder AB/Flex Elbow Flex Wrist Ext Elbow Ext Wrist Flex Hand Intrin Tone Right +4/5 +4/5 +4/5 +4/5 +4/5 +4/5 +4/5 Left +4/5 +4/5 +4/5 +4/5 +4/5 +4/5 +4/5 Hip Flex Knee Ext Knee Flex Ankle DF GTE Ankle PF Tone Right +4/5 +4/5 +4/5 +4/5 +4/5 +4/5 +4/5 Left +4/5 +4/5 +4/5 +4/5 +4/5 +4/5 +4/5 ASSESSMENT Diagnoses and all orders for this visit: 1. Lumbosacral spondylosis without myelopathy 2. Lumbar neuritis 3. DDD (degenerative disc disease), lumbar 4. Synovial cyst of lumbar spine IMPRESSIONS/RECOMMENDATIONS: 
Patient presents with low back and right buttock pain extending into the RLE with paraesthesias in an S1 distribution to the plantar surface of the foot, which has now resolved. She described symptoms consistent with spinal stenosis. The patient does not think her remaining pain complaints are severe enough to warrant additional workup/treatment at this time. Patient is neurologically intact. I recommend she increase the frequency of her HEP to daily. Multiple treatment options were discussed. I will see the patient back prn. Written by Aubrey Ortega, as dictated by Sandie Kerr MD 
I examined the patient, reviewed and agree with the note.

## 2019-03-11 ENCOUNTER — OFFICE VISIT (OUTPATIENT)
Dept: INTERNAL MEDICINE CLINIC | Age: 79
End: 2019-03-11

## 2019-03-11 VITALS
BODY MASS INDEX: 21.39 KG/M2 | HEIGHT: 65 IN | WEIGHT: 128.4 LBS | SYSTOLIC BLOOD PRESSURE: 136 MMHG | DIASTOLIC BLOOD PRESSURE: 78 MMHG | TEMPERATURE: 97.6 F | RESPIRATION RATE: 14 BRPM | OXYGEN SATURATION: 98 % | HEART RATE: 78 BPM

## 2019-03-11 DIAGNOSIS — J06.9 VIRAL UPPER RESPIRATORY TRACT INFECTION: Primary | ICD-10-CM

## 2019-03-11 RX ORDER — BENZONATATE 200 MG/1
200 CAPSULE ORAL
Qty: 30 CAP | Refills: 1 | Status: SHIPPED | OUTPATIENT
Start: 2019-03-11 | End: 2019-03-18

## 2019-03-11 NOTE — PROGRESS NOTES
HPI     Marcell Lockhart is a [de-identified] y.o. female with relevant past medical history of Parkinson's disease, HTN, GERD, HLD, CAD, BCC, here for evaluation of cough and sore throat. The patient reports that since last Thursday (4 days ago), she began having sore throat associated with dysphonia, and dry cough, malaise, nasal congestion and clear rhinorrhea. She denies any HA, facial pain, ear pain, sputum production, SOB, wheezing, CP, dizziness, malaise, fever, chills, diaphoresis, skin changes, N/V/D. She did notice some pain in her R lower scapular area with cough, that she describes as sharp, acute pain. Not present at this time. She has noticed some progress, in her voice, slowly returning. Denies any known close contacts. No history of smoking. Had influenza vaccination 10/2018. ROS  As above included in HPI. Otherwise 11 point review of systems negative including constitutional, skin, HENT, eyes, respiratory, cardiovascular, gastrointestinal, genitourinary, musculoskeletal, endocrine, hematologic, allergy, and neurologic. Past Medical History  Past Medical History:   Diagnosis Date    Basal cell cancer     CAD (coronary artery disease), native coronary artery 03/21/2011    s/p PCI with with CARLITO (Xience 2.75x12, 2.5x12) mLAD and mild disease in rest of coronaries. Midly depressed LV syst fct     Cardiac cath 03/21/2011    mLAD 90% (2.75 x 12 mm Xience stent). Otherwise patent coronaries. LVEDP 10 mmHg. EF 40-45%. Anteroapical hypk. Renal arteries patent.  Cardiac echocardiogram 04/17/2014    EF 60-65%. No WMA. Gr 1 DDfx. Mild MR. Similar to study of 9/14/11.  Cardiac nuclear imaging test 07/2017    Negative for ischemia or infarction. EF > 70%.     Cardiomyopathy secondary     ischemic +/- stress induced    Dyslipidemia     GERD (gastroesophageal reflux disease)     Hx of colonoscopy 07/12/2016    Normal. Dr. Isela Nelson    Hypertension     Parkinson's disease     Syncope     s/p ILD implantation     Past Surgical History:   Procedure Laterality Date    COLONOSCOPY N/A 7/12/2016    COLONOSCOPY performed by Clayton Alonzo MD at [address] HX BUNIONECTOMY      dorsal    HX CATARACT REMOVAL  11/2012    left    HX CATARACT REMOVAL  10/2012    right    HX CORONARY STENT PLACEMENT      HX HEART CATHETERIZATION      HX HEMORRHOIDECTOMY      HX HYSTERECTOMY  1996    partial    HX IMPLANTABLE LOOP RECORDER  [id number]    HX TONSILLECTOMY      HX TOTAL ABDOMINAL HYSTERECTOMY  1996    partial        Family History  Family History   Problem Relation Age of Onset    Heart Attack Father 80    Heart Disease Father     Cancer Mother         pancreatic    Hypertension Brother        Social History  She  reports that  has never smoked. she has never used smokeless tobacco.   Social History     Substance and Sexual Activity   Alcohol Use Yes    Comment: glass of wine occasionally. Immunization History  Immunization History   Administered Date(s) Administered    Influenza High Dose Vaccine PF 09/29/2014, 10/05/2015, 11/01/2016, 08/30/2017    Influenza Vaccine (Tri) Adjuvanted 10/09/2018    Influenza Vaccine Whole 09/07/2010, 09/03/2011, 10/04/2012    Pneumococcal Conjugate (PCV-13) 10/05/2015    TD Vaccine 01/01/2003    Tdap 09/25/2013    ZZZ-RETIRED (DO NOT USE) Pneumococcal Vaccine (Unspecified Type) 01/01/2003    Zoster 01/01/2007    Zoster Recombinant 09/18/2018       Allergies  Allergies   Allergen Reactions    Keflex [Cephalexin] Other (comments)     Yeast infection    Pcn [Penicillins] Other (comments)       Medications  Current Outpatient Medications   Medication Sig    benzonatate (TESSALON) 200 mg capsule Take 1 Cap by mouth three (3) times daily as needed for Cough for up to 7 days.  metoprolol succinate (TOPROL-XL) 50 mg XL tablet Take 1 Tab by mouth daily.  lisinopril (PRINIVIL, ZESTRIL) 10 mg tablet Take 1 Tab by mouth daily.     potassium chloride (KLOR-CON) 10 mEq tablet Take 1 Tab by mouth every other day.  rosuvastatin (CRESTOR) 10 mg tablet Take 1 Tab by mouth nightly.  clobetasol (TEMOVATE) 0.05 % external solution Apply as directed.  magnesium hydroxide (NEGRO MILK OF MAGNESIA) 400 mg/5 mL suspension Take 30 mL by mouth daily as needed for Constipation.  carbidopa-levodopa CR (SINEMET CR)  mg per tablet Take  by mouth nightly.  nitroglycerin (NITROSTAT) 0.4 mg SL tablet 1 sublingual every 5 minutes for chest pain for no more than 3 doses    acetaminophen (TYLENOL) 650 mg CR tablet Take 650 mg by mouth every six (6) hours as needed for Pain.  co-enzyme Q-10 (CO Q-10) 100 mg capsule Take 200 mg by mouth daily.  aspirin 81 mg tablet Take 1 Tab by mouth daily.  MULTIVITAMIN PO Take 1 Tab by mouth daily.  DOCUSATE CALCIUM (STOOL SOFTENER PO) Take 1 Cap by mouth.  magnesium 250 mg Tab Take 250 mg by mouth two (2) times a day.  gabapentin (NEURONTIN) 100 mg capsule Take 1 Cap by mouth three (3) times daily. (Patient not taking: Reported on 1/3/2019)    fluticasone (FLONASE) 50 mcg/actuation nasal spray 2 Sprays by Both Nostrils route daily.  VITAMIN B COMPLEX (BALANCE B-50 PO) Take 1 Tab by mouth daily.  resveratrol 100 mg Cap Take 2 Caps by mouth daily. No current facility-administered medications for this visit. Visit Vitals  /78 (BP 1 Location: Left arm, BP Patient Position: Sitting)   Pulse 78   Temp 97.6 °F (36.4 °C) (Oral)   Resp 14   Ht 5' 5\" (1.651 m)   Wt 128 lb 6.4 oz (58.2 kg)   SpO2 98%   BMI 21.37 kg/m²     Body mass index is 21.37 kg/m². Physical Exam   Constitutional: She is oriented to person, place, and time and well-developed, well-nourished, and in no distress. No distress. HENT:   Head: Normocephalic and atraumatic. Right Ear: External ear normal.   Left Ear: External ear normal.   Nose: Nose normal.   Mouth/Throat: No oropharyngeal exudate.    Oropharynx is congested, erythematous, no exudates, no postnasal drip. Eyes: Conjunctivae and EOM are normal.   Neck: Neck supple. No thyromegaly present. Cardiovascular: Normal rate and regular rhythm. Pulmonary/Chest: Effort normal and breath sounds normal. No respiratory distress. She has no wheezes. She has no rales. She exhibits no tenderness. Abdominal: Soft. Bowel sounds are normal.   Musculoskeletal: She exhibits no edema. Lymphadenopathy:     She has no cervical adenopathy. Neurological: She is alert and oriented to person, place, and time. Skin: Skin is warm. No rash noted. She is not diaphoretic. Psychiatric: Affect normal.   Nursing note and vitals reviewed. REVIEW OF DATA    Labs  No visits with results within 1 Month(s) from this visit. Latest known visit with results is:   Hospital Outpatient Visit on 11/16/2018   Component Date Value Ref Range Status    Creatinine, POC 11/16/2018 0.8  0.6 - 1.3 MG/DL Final    GFRAA, POC 11/16/2018 >60  >60 ml/min/1.73m2 Final    GFRNA, POC 11/16/2018 >60  >60 ml/min/1.73m2 Final         CT Results (most recent):  No results found for this or any previous visit. XR Results (most recent):  Results from Hospital Encounter encounter on 10/09/18   XR SPINE LUMB 2 OR 3 V    Narrative LUMBAR SPINE RADIOGRAPH-3 VIEWS    INDICATION: Low back pain with right-sided sciatica, right buttock pain    COMPARISON: Thoracolumbar spine radiograph 4/14/2015    FINDINGS: AP, lateral, and lumbosacral views were obtained. No evidence of acute fracture. There is grade 1 anterolisthesis of L4 and L5,  appears either new or increased since 4/2015 exam. Evaluation for spondylolysis  is limited in the absence of oblique views. Multilevel degenerative spondylosis  and disc disease has progressed since 2015, with severe disc height loss at  L5/S1. There is mild apex left curvature of the lumbar spine centered at L2/L3.   Facet joints appear aligned but demonstrate at least mild hypertrophic changes. A slight lordosis is maintained. Degenerative changes noted at the sacroiliac  joints. Visualized portion of the bony pelvis and sacrum appear intact. Atherosclerosis noted. Round densities at the left hemiabdomen could represent  retained debris within diverticula (favored) or nephrolithiasis. Impression IMPRESSION:  1. Grade 1 anterolisthesis of L4 on L5, either new or increased since 4/2015. 2. No additional evidence of acute fracture or malalignment. 3. Multilevel degenerative spondylosis/disc disease noted. CT   All Micro Results     None                  DIAGNOSIS AND PLAN  Patient Active Problem List   Diagnosis Code    Hypertension I10    CAD S/P percutaneous coronary angioplasty I25.10, Z98.61    Dyslipidemia E78.5    Syncope R55    Parkinson disease ([facility] [address] G20    GERD (gastroesophageal reflux disease) K21.9    Macular degeneration of left eye H35.30    Laryngopharyngeal reflux disease K21.9    Osteopenia M85.80    Psoriasis of scalp L40.9    Acute right-sided low back pain with right-sided sciatica M54.41    Right sided sciatica M54.31    Lumbar spondylosis M47.816    Extradural cyst of spine G96.19    Abnormal MRI, spine R93.7     1. Viral upper respiratory tract infection  Laryngitis  Supportive care recommended with vit C, zinc, abundant fluids, recommended to drink lemon, ginger, honey tea. Rest. Avoid extreme temperature changes. Call back if symptoms are not improving withing 72 hours or sooner if worsening.   - Aceteminophen PRNfor pain, malaise, fever, chills. - benzonatate (TESSALON) 200 mg capsule; Take 1 Cap by mouth three (3) times daily as needed for Cough for up to 7 days. Dispense: 30 Cap; Refill: 1      Follow-up Disposition: Not on File     Corrie Barbour MD

## 2019-03-11 NOTE — PROGRESS NOTES
1. Have you been to the ER, urgent care clinic or hospitalized since your last visit? NO.     2. Have you seen or consulted any other health care providers outside of the [address] [place] [address] since your last visit (Include any pap smears or colon screening)?  NO

## 2019-03-14 ENCOUNTER — TELEPHONE (OUTPATIENT)
Dept: INTERNAL MEDICINE CLINIC | Age: 79
End: 2019-03-14

## 2019-03-14 NOTE — TELEPHONE ENCOUNTER
Pt called to give Dr Shukri Charlton an update , pt said her cough is worse , she went to see her opthamologist yesterday (3/13) they perscribed something for her eyes because she said the virus got into her eyes and they were sealed shut .

## 2019-03-15 NOTE — TELEPHONE ENCOUNTER
There is no appt. Open on your sched I asked that the patient see me for an immediate exam if there is another episode of this problem.  Week

## 2019-04-04 ENCOUNTER — TELEPHONE (OUTPATIENT)
Dept: INTERNAL MEDICINE CLINIC | Age: 79
End: 2019-04-04

## 2019-04-04 ENCOUNTER — HOSPITAL ENCOUNTER (OUTPATIENT)
Dept: LAB | Age: 79
Discharge: HOME | End: 2019-04-04
Payer: MEDICARE

## 2019-04-04 ENCOUNTER — APPOINTMENT (OUTPATIENT)
Dept: INTERNAL MEDICINE CLINIC | Age: 79
End: 2019-04-04

## 2019-04-04 DIAGNOSIS — Z98.61 CAD S/P PERCUTANEOUS CORONARY ANGIOPLASTY: ICD-10-CM

## 2019-04-04 DIAGNOSIS — M85.9 DISORDER OF BONE DENSITY AND STRUCTURE, UNSPECIFIED: ICD-10-CM

## 2019-04-04 DIAGNOSIS — M54.41 ACUTE RIGHT-SIDED LOW BACK PAIN WITH RIGHT-SIDED SCIATICA: ICD-10-CM

## 2019-04-04 DIAGNOSIS — R93.7 ABNORMAL MRI, SPINE: ICD-10-CM

## 2019-04-04 DIAGNOSIS — M47.816 LUMBAR SPONDYLOSIS: ICD-10-CM

## 2019-04-04 DIAGNOSIS — I10 ESSENTIAL HYPERTENSION: ICD-10-CM

## 2019-04-04 DIAGNOSIS — E78.5 DYSLIPIDEMIA: ICD-10-CM

## 2019-04-04 DIAGNOSIS — I25.10 CAD S/P PERCUTANEOUS CORONARY ANGIOPLASTY: ICD-10-CM

## 2019-04-04 DIAGNOSIS — M85.89 OSTEOPENIA OF MULTIPLE SITES: ICD-10-CM

## 2019-04-04 DIAGNOSIS — K21.9 LARYNGOPHARYNGEAL REFLUX DISEASE: ICD-10-CM

## 2019-04-04 DIAGNOSIS — L40.9 PSORIASIS OF SCALP: ICD-10-CM

## 2019-04-04 DIAGNOSIS — M54.31 RIGHT SIDED SCIATICA: ICD-10-CM

## 2019-04-04 DIAGNOSIS — K21.9 GASTROESOPHAGEAL REFLUX DISEASE, ESOPHAGITIS PRESENCE NOT SPECIFIED: ICD-10-CM

## 2019-04-04 DIAGNOSIS — G96.198 EXTRADURAL CYST OF SPINE: ICD-10-CM

## 2019-04-04 DIAGNOSIS — R55 VASOVAGAL SYNCOPE: ICD-10-CM

## 2019-04-04 DIAGNOSIS — G20 PARKINSON DISEASE (HCC): ICD-10-CM

## 2019-04-04 LAB
ALBUMIN SERPL-MCNC: 3.6 G/DL (ref 3.4–5)
ALBUMIN/GLOB SERPL: 1.2 {RATIO} (ref 0.8–1.7)
ALP SERPL-CCNC: 118 U/L (ref 45–117)
ALT SERPL-CCNC: 27 U/L (ref 13–56)
ANION GAP SERPL CALC-SCNC: 7 MMOL/L (ref 3–18)
APPEARANCE UR: CLEAR
AST SERPL-CCNC: 26 U/L (ref 15–37)
BACTERIA URNS QL MICRO: NEGATIVE /HPF
BASOPHILS # BLD: 0 K/UL (ref 0–0.1)
BASOPHILS NFR BLD: 1 % (ref 0–2)
BILIRUB SERPL-MCNC: 0.5 MG/DL (ref 0.2–1)
BILIRUB UR QL: NEGATIVE
BUN SERPL-MCNC: 11 MG/DL (ref 7–18)
BUN/CREAT SERPL: 17 (ref 12–20)
CALCIUM SERPL-MCNC: 8.8 MG/DL (ref 8.5–10.1)
CHLORIDE SERPL-SCNC: 102 MMOL/L (ref 100–108)
CHOLEST SERPL-MCNC: 137 MG/DL
CO2 SERPL-SCNC: 28 MMOL/L (ref 21–32)
COLOR UR: YELLOW
CREAT SERPL-MCNC: 0.64 MG/DL (ref 0.6–1.3)
DIFFERENTIAL METHOD BLD: NORMAL
EOSINOPHIL # BLD: 0.1 K/UL (ref 0–0.4)
EOSINOPHIL NFR BLD: 2 % (ref 0–5)
EPITH CASTS URNS QL MICRO: ABNORMAL /LPF (ref 0–5)
ERYTHROCYTE [DISTWIDTH] IN BLOOD BY AUTOMATED COUNT: 13.8 % (ref 11.6–14.5)
GLOBULIN SER CALC-MCNC: 3.1 G/DL (ref 2–4)
GLUCOSE SERPL-MCNC: 97 MG/DL (ref 74–99)
GLUCOSE UR STRIP.AUTO-MCNC: NEGATIVE MG/DL
HCT VFR BLD AUTO: 40.3 % (ref 35–45)
HDLC SERPL-MCNC: 67 MG/DL (ref 40–60)
HDLC SERPL: 2 {RATIO} (ref 0–5)
HGB BLD-MCNC: 12.5 G/DL (ref 12–16)
HGB UR QL STRIP: NEGATIVE
KETONES UR QL STRIP.AUTO: NEGATIVE MG/DL
LDLC SERPL CALC-MCNC: 55.8 MG/DL (ref 0–100)
LEUKOCYTE ESTERASE UR QL STRIP.AUTO: ABNORMAL
LIPID PROFILE,FLP: ABNORMAL
LYMPHOCYTES # BLD: 1.6 K/UL (ref 0.9–3.6)
LYMPHOCYTES NFR BLD: 32 % (ref 21–52)
MCH RBC QN AUTO: 29.6 PG (ref 24–34)
MCHC RBC AUTO-ENTMCNC: 31 G/DL (ref 31–37)
MCV RBC AUTO: 95.5 FL (ref 74–97)
MONOCYTES # BLD: 0.4 K/UL (ref 0.05–1.2)
MONOCYTES NFR BLD: 9 % (ref 3–10)
NEUTS SEG # BLD: 2.9 K/UL (ref 1.8–8)
NEUTS SEG NFR BLD: 56 % (ref 40–73)
NITRITE UR QL STRIP.AUTO: NEGATIVE
PH UR STRIP: 7.5 [PH] (ref 5–8)
PLATELET # BLD AUTO: 243 K/UL (ref 135–420)
PMV BLD AUTO: 9.7 FL (ref 9.2–11.8)
POTASSIUM SERPL-SCNC: 4.3 MMOL/L (ref 3.5–5.5)
PROT SERPL-MCNC: 6.7 G/DL (ref 6.4–8.2)
PROT UR STRIP-MCNC: NEGATIVE MG/DL
RBC # BLD AUTO: 4.22 M/UL (ref 4.2–5.3)
RBC #/AREA URNS HPF: 0 /HPF (ref 0–5)
SODIUM SERPL-SCNC: 137 MMOL/L (ref 136–145)
SP GR UR REFRACTOMETRY: 1.01 (ref 1–1.03)
TRIGL SERPL-MCNC: 71 MG/DL (ref ?–150)
TSH SERPL DL<=0.05 MIU/L-ACNC: 2 UIU/ML (ref 0.36–3.74)
UROBILINOGEN UR QL STRIP.AUTO: 0.2 EU/DL (ref 0.2–1)
VLDLC SERPL CALC-MCNC: 14.2 MG/DL
WBC # BLD AUTO: 5 K/UL (ref 4.6–13.2)
WBC URNS QL MICRO: ABNORMAL /HPF (ref 0–4)

## 2019-04-04 PROCEDURE — 84443 ASSAY THYROID STIM HORMONE: CPT

## 2019-04-04 PROCEDURE — 80053 COMPREHEN METABOLIC PANEL: CPT

## 2019-04-04 PROCEDURE — 82306 VITAMIN D 25 HYDROXY: CPT

## 2019-04-04 PROCEDURE — 36415 COLL VENOUS BLD VENIPUNCTURE: CPT

## 2019-04-04 PROCEDURE — 80061 LIPID PANEL: CPT

## 2019-04-04 PROCEDURE — 81001 URINALYSIS AUTO W/SCOPE: CPT

## 2019-04-04 PROCEDURE — 85025 COMPLETE CBC W/AUTO DIFF WBC: CPT

## 2019-04-04 NOTE — TELEPHONE ENCOUNTER
Spoke with patient and she is aware that we will call her with results when we get them. Her urine was sent out for UA. Unfortunately she didn't let anyone know this when she came in for labs so we were unable to do a dipstick. We'll have to wait for her urinalysis to come back, likely late tonight or tomorrow sometime.

## 2019-04-04 NOTE — TELEPHONE ENCOUNTER
ELAINE PT- PT had a urine specimen this morning since she had labs done--she had noticed blood in her urine and has burning with urination- please advise Haily Erickson

## 2019-04-05 LAB — 25(OH)D3 SERPL-MCNC: 52.9 NG/ML (ref 30–100)

## 2019-04-05 NOTE — TELEPHONE ENCOUNTER
Urinalysis does NOT show signs of infection with negative nitrites, trace LE, 1-2 WBC and no RBC. Will address her symptoms at her upcoming visit.

## 2019-04-08 ENCOUNTER — TELEPHONE (OUTPATIENT)
Dept: INTERNAL MEDICINE CLINIC | Age: 79
End: 2019-04-08

## 2019-04-08 NOTE — TELEPHONE ENCOUNTER
Called and left a message on patient's cell phone to give us a call back regarding Dr. Toma Silvestre' message/ orders.

## 2019-04-08 NOTE — TELEPHONE ENCOUNTER
Patient called back and verified full name and date of birth. Informed patient of Dr. Cindy Alcantar' message and patient verbalized understanding and stated that she isn't having any problems or symptoms regarding the urinalysis.

## 2019-04-11 ENCOUNTER — OFFICE VISIT (OUTPATIENT)
Dept: INTERNAL MEDICINE CLINIC | Age: 79
End: 2019-04-11

## 2019-04-11 VITALS
HEART RATE: 68 BPM | BODY MASS INDEX: 20.76 KG/M2 | TEMPERATURE: 98.2 F | WEIGHT: 124.6 LBS | DIASTOLIC BLOOD PRESSURE: 72 MMHG | HEIGHT: 65 IN | SYSTOLIC BLOOD PRESSURE: 120 MMHG | OXYGEN SATURATION: 98 % | RESPIRATION RATE: 14 BRPM

## 2019-04-11 DIAGNOSIS — Z98.61 CAD S/P PERCUTANEOUS CORONARY ANGIOPLASTY: ICD-10-CM

## 2019-04-11 DIAGNOSIS — M47.27 OSTEOARTHRITIS OF SPINE WITH RADICULOPATHY, LUMBOSACRAL REGION: ICD-10-CM

## 2019-04-11 DIAGNOSIS — K21.9 LARYNGOPHARYNGEAL REFLUX DISEASE: ICD-10-CM

## 2019-04-11 DIAGNOSIS — M51.36 DDD (DEGENERATIVE DISC DISEASE), LUMBAR: ICD-10-CM

## 2019-04-11 DIAGNOSIS — M47.816 LUMBAR SPONDYLOSIS: ICD-10-CM

## 2019-04-11 DIAGNOSIS — I25.10 CAD S/P PERCUTANEOUS CORONARY ANGIOPLASTY: ICD-10-CM

## 2019-04-11 DIAGNOSIS — E78.5 DYSLIPIDEMIA: ICD-10-CM

## 2019-04-11 DIAGNOSIS — M71.38 SYNOVIAL CYST OF LUMBAR SPINE: ICD-10-CM

## 2019-04-11 DIAGNOSIS — Z12.31 SCREENING MAMMOGRAM, ENCOUNTER FOR: ICD-10-CM

## 2019-04-11 DIAGNOSIS — K21.9 GASTROESOPHAGEAL REFLUX DISEASE, ESOPHAGITIS PRESENCE NOT SPECIFIED: ICD-10-CM

## 2019-04-11 DIAGNOSIS — M85.89 OSTEOPENIA OF MULTIPLE SITES: ICD-10-CM

## 2019-04-11 DIAGNOSIS — G20 PARKINSON DISEASE (HCC): ICD-10-CM

## 2019-04-11 DIAGNOSIS — I10 ESSENTIAL HYPERTENSION: Primary | ICD-10-CM

## 2019-04-11 RX ORDER — ROSUVASTATIN CALCIUM 10 MG/1
10 TABLET, COATED ORAL
Qty: 90 TAB | Refills: 3 | Status: SHIPPED | OUTPATIENT
Start: 2019-04-11 | End: 2019-11-03

## 2019-04-11 NOTE — PROGRESS NOTES
Chief Complaint Patient presents with  Hypertension 5 month follow up with lab results. Yearly physical   
 
Health Maintenance Due Topic Date Due  Pneumococcal 65+ years (2 of 2 - PPSV23) 10/05/2016  Shingrix Vaccine Age 50> (2 of 2) 11/13/2018 1. Have you been to the ER, urgent care clinic or hospitalized since your last visit? NO.  
 
2. Have you seen or consulted any other health care providers outside of the [address] [place] [address] since your last visit (Include any pap smears or colon screening)? YES, ENT last seen 2 weeks. Dr. Eb Beckett, ophthalmology, last seen March 2019. Learning Assessment 7/6/2018 PRIMARY LEARNER Patient HIGHEST LEVEL OF EDUCATION - PRIMARY LEARNER  -  
BARRIERS PRIMARY LEARNER -  
CO-LEARNER CAREGIVER -  
PRIMARY LANGUAGE ENGLISH  
LEARNER PREFERENCE PRIMARY DEMONSTRATION  
ANSWERED BY Patient RELATIONSHIP SELF

## 2019-04-11 NOTE — PATIENT INSTRUCTIONS
DASH Diet: Care Instructions Your Care Instructions The DASH diet is an eating plan that can help lower your blood pressure. DASH stands for Dietary Approaches to Stop Hypertension. Hypertension is high blood pressure. The DASH diet focuses on eating foods that are high in calcium, potassium, and magnesium. These nutrients can lower blood pressure. The foods that are highest in these nutrients are fruits, vegetables, low-fat dairy products, nuts, seeds, and legumes. But taking calcium, potassium, and magnesium supplements instead of eating foods that are high in those nutrients does not have the same effect. The DASH diet also includes whole grains, fish, and poultry. The DASH diet is one of several lifestyle changes your doctor may recommend to lower your high blood pressure. Your doctor may also want you to decrease the amount of sodium in your diet. Lowering sodium while following the DASH diet can lower blood pressure even further than just the DASH diet alone. Follow-up care is a key part of your treatment and safety. Be sure to make and go to all appointments, and call your doctor if you are having problems. It's also a good idea to know your test results and keep a list of the medicines you take. How can you care for yourself at home? Following the DASH diet · Eat 4 to 5 servings of fruit each day. A serving is 1 medium-sized piece of fruit, ½ cup chopped or canned fruit, 1/4 cup dried fruit, or 4 ounces (½ cup) of fruit juice. Choose fruit more often than fruit juice. · Eat 4 to 5 servings of vegetables each day. A serving is 1 cup of lettuce or raw leafy vegetables, ½ cup of chopped or cooked vegetables, or 4 ounces (½ cup) of vegetable juice. Choose vegetables more often than vegetable juice. · Get 2 to 3 servings of low-fat and fat-free dairy each day. A serving is 8 ounces of milk, 1 cup of yogurt, or 1 ½ ounces of cheese. · Eat 6 to 8 servings of grains each day. A serving is 1 slice of bread, 1 ounce of dry cereal, or ½ cup of cooked rice, pasta, or cooked cereal. Try to choose whole-grain products as much as possible. · Limit lean meat, poultry, and fish to 2 servings each day. A serving is 3 ounces, about the size of a deck of cards. · Eat 4 to 5 servings of nuts, seeds, and legumes (cooked dried beans, lentils, and split peas) each week. A serving is 1/3 cup of nuts, 2 tablespoons of seeds, or ½ cup of cooked beans or peas. · Limit fats and oils to 2 to 3 servings each day. A serving is 1 teaspoon of vegetable oil or 2 tablespoons of salad dressing. · Limit sweets and added sugars to 5 servings or less a week. A serving is 1 tablespoon jelly or jam, ½ cup sorbet, or 1 cup of lemonade. · Eat less than 2,300 milligrams (mg) of sodium a day. If you limit your sodium to 1,500 mg a day, you can lower your blood pressure even more. Tips for success · Start small. Do not try to make dramatic changes to your diet all at once. You might feel that you are missing out on your favorite foods and then be more likely to not follow the plan. Make small changes, and stick with them. Once those changes become habit, add a few more changes. · Try some of the following: ? Make it a goal to eat a fruit or vegetable at every meal and at snacks. This will make it easy to get the recommended amount of fruits and vegetables each day. ? Try yogurt topped with fruit and nuts for a snack or healthy dessert. ? Add lettuce, tomato, cucumber, and onion to sandwiches. ? Combine a ready-made pizza crust with low-fat mozzarella cheese and lots of vegetable toppings. Try using tomatoes, squash, spinach, broccoli, carrots, cauliflower, and onions. ? Have a variety of cut-up vegetables with a low-fat dip as an appetizer instead of chips and dip. ? Sprinkle sunflower seeds or chopped almonds over salads.  Or try adding chopped walnuts or almonds to cooked vegetables. ? Try some vegetarian meals using beans and peas. Add garbanzo or kidney beans to salads. Make burritos and tacos with mashed isbell beans or black beans. Where can you learn more? Go to http://torin-atif.info/. Enter Q236 in the search box to learn more about \"DASH Diet: Care Instructions. \" Current as of: July 22, 2018 Content Version: 11.9 © 1332-7704 [employer]. Care instructions adapted under license by [employer] (which disclaims liability or warranty for this information). If you have questions about a medical condition or this instruction, always ask your healthcare professional. [address] any warranty or liability for your use of this information.

## 2019-04-15 PROBLEM — M54.31 RIGHT SIDED SCIATICA: Status: RESOLVED | Noted: 2018-10-12 | Resolved: 2019-04-15

## 2019-04-15 PROBLEM — M54.41 ACUTE RIGHT-SIDED LOW BACK PAIN WITH RIGHT-SIDED SCIATICA: Status: RESOLVED | Noted: 2018-10-12 | Resolved: 2019-04-15

## 2019-04-15 PROBLEM — R93.7 ABNORMAL MRI, SPINE: Status: RESOLVED | Noted: 2018-11-12 | Resolved: 2019-04-15

## 2019-04-15 PROBLEM — M71.38 SYNOVIAL CYST OF LUMBAR SPINE: Status: ACTIVE | Noted: 2018-11-12

## 2019-04-15 PROBLEM — M47.27 OSTEOARTHRITIS OF SPINE WITH RADICULOPATHY, LUMBOSACRAL REGION: Status: ACTIVE | Noted: 2019-04-15

## 2019-04-15 PROBLEM — M51.36 DDD (DEGENERATIVE DISC DISEASE), LUMBAR: Status: ACTIVE | Noted: 2019-04-15

## 2019-04-15 NOTE — PROGRESS NOTES
HPI:  
Faye Nash is a [de-identified]y.o. year old female who presents today for a physical exam and for evaluation of hypertension, ASCVD, hyperlipidemia, syncope, Parkinson's disease, lumbar degenerative disease, GERD, and macular degeneration. She was evaluated by Dr. Freddy Lozada on 3/11/2019 for a viral URI and was treated with symptomatic agents of Tylenol an Tessalon. She reports that she subsequently developed a bilateral conjunctivitis and was prescribed prednisone drops by Dr. Kerwin Torres. She states that she is feeling much better and her symptoms have resolved. She also reports that she is receiving immunotherapy with Dr. Dann Duncan to address her allergies and hoarseness. She is otherwise without complaints. In 8/2018, she was evaluated by Dr. Alisa Borges for right hip pain and was diagnosed with right piriformis syndrome. She was treated with a cortisone injection and physical therapy with some improvement, but subsequently developed right buttocks pain and pain and paresthesias down her right leg. She was treated with prednisone 50 mg for five days without improvement. She was seen on 10/9/2018, which was 10 days after completing the course of prednisone, and reported persistent severe right leg pain which was interfering with her activities and with sleep. She was taking Tylenol ES without relief, and was started on gabapentin. Lumbar spine x-ray was obtained (10/9/2018) showing grade 1 anterolisthesis of L4 on L5, either new or increased since 4/2015, and multilevel degenerative spondylosis/disc disease. She was referred to physical therapy, but due to continued persistent symptoms, she had a lumbar MRI (10/17/2018) which showed multilevel degenerative findings causing various degrees of neuroforaminal  narrowing and lateral recess narrowing; also a right L4-L5 neuroforamen cystic lesion measuring approximately 10 x 6 mm was noted.  She had a lumbar MRI with contrast (11/16/2018) which confirmed an extruding synovial cyst from the right L4-5 advanced facet arthropathy; significant right foraminal stenosis and 
nerve impingement persists. She continued physical therapy and reported that her pain has significantly improved. She was evaluated by Dr. Dina Garcia on 1/3/2019 but her pain had already resolved. She is no longer taking gabapentin or Tylenol. She has a history of hypertension, hyperlipidemia, ASCVD, and syncope. In 3/2011, she had three syncopal episodes while donating blood. Over the subsequent four days, she developed exertional substernal chest tightness with left arm pain and dyspnea. She was evaluated and EKG revealed new T wave inversions in leads V2 and V3. Troponins were negative. She underwent cardiac catheterization (3/21/2011) which showed a 90% mid LAD and mild disease in the rest of the coronaries. She underwent PCI and placement of two drug-eluting stents for the mid LAD lesion; LV function was mildly diminished (EF 40-45%) with anteroapical hypokinesis. Follow-up echocardiogram (9/2011) showed return to normal LV function (EF 60%) and no RWMA. She has a history of multiple syncopal episodes, usually related to stressful situations, and thought to be vasovagal in origin. An implantable loop recorder was placed in 3/2011 and remained until 4/2013, and interrogation was negative for any significant arrhythmia. In 4/2014, she was hospitalized following another syncopal episode, which occurred after she had taken her morning medications. Afterward, she became nauseated and flushed, and called EMS. When they arrived, they found her on the floor and reportedly without a pulse. They began CPR and within 10-15 seconds, she recovered. Evaluation included EKG/troponins, which were negative, and an echocardiogram showing mild MR and normal LV function (EF 60-65%). She was found to have hypokalemia and hypomagnesemia and hydrochlorothiazide was discontinued.  It was thought that this event also represented a vasovagal reaction given her prodromal symptoms. She has had no further events since that time. She had a repeat pharmacologic nuclear stress test (7/14/2017) which was a normal, low risk study, negative for evidence of ischemia or prior infarction, and with normal LV size and function (EF 70%). Her current regimen includes metoprolol, lisinopril, aspirin, sublingual nitroglycerin prn, and rosuvastatin. She is being followed by Dr. Juan Alberto Issa. She is usually very active line dancing 3x/week, doing yardwork and riding her bicycle. She denies any chest pain, shortness of breath at rest or with exertion, palpitations, lightheadedness, or edema. In 11/2017, she reported bilateral thigh aching pain, which increased with ambulation particularly when walking up stairs. She stated that the discomfort was similar to that which developed previously with use of simvastatin and atorvastatin. She had been on pravastatin since 10/2014 and did not note any difficulty previously. She discontinued pravastatin and had resolution of her symptoms. She was subsequently started on rosuvastatin in 12/2017, and did well until 7/2018 when she again developed myalgias. Her dose was decreased to 5 mg every other day. She reports today that she continues to have aching discomfort in her bilateral thighs on the days which she takes rosuvastatin, but reports that it is tolerable so she will continue. She has a history of idiopathic Parkinson's disease, predominantly left-sided. She is currently being treated with Sinemet, and reports relatively good control of symptoms. She has difficulty with  writing at times, particularly towards the end of the day, and also describes increasing tremors midday. She is followed by Dr. Robert Chakraborty. She has a history of laryngopharyngeal reflux disease, treated with dexlansoprazole, and she is followed by Dr. Ave Ospina.  She states that she is attempting to wean taking it and has decreased her dose to every other day. In 5/2017 she had multiple episodes of epistaxis prompting an ED visit. She subsequently underwent silver nitrate cautery of anterior vessels in her right nares by Dr. Georgia Zepeda, and has had no recurrence. In 3/2010, she underwent evaluation for iron deficiency anemia. She had previously had a negative colonoscopy and air contrast barium enema in 2009 by Dr. Jeff Ortez, and she had an upper endoscopy by Dr. Tino Salazar which was normal. She was treated with iron with improvement. She had a repeat screening colonoscopy in 7/2016 by Dr. Tino Salazar which was normal. No further follow-up was recommended given her age. She denies any abdominal pain, nausea, vomiting, melena, hematochezia, or change in bowel movements. She has a history of osteopenia with last bone density study in 5/2018 showing T-scores:  femoral neck left -1.2  /right -1.6 and lumbar -0.8 . She continues to take calcium and Vitamin D supplements. She has no history of pathologic fractures. She has a recent history of abnormal mammograms since 10/2014 with microcalcifcations in the right breast. She has been undergoing surveillance mammograms every six months, which have been unchanged. She had a follow-up mammogram in 5/2016 which was negative, and routine annual screening was recommended. She has a history of bilateral knee pain secondary to osteoarthritis. She was evaluated by Dr. Jennifer Mullins in 3/2018 and received a cortisone injection with improvement. She also has difficutly with left ankle pain which increase with ambulation. She received a cortisone injection for this as well from Dr. Jennifer Mullins in 3/2018, but did not experience significant improvement. She was evaluated by Dr. Collin Mello on 3/27/2018 and diagnosed with left peroneus brevis tendinitis. An orthotic was recommended with improvement. Past Medical History:  
Diagnosis Date  Basal cell cancer  CAD (coronary artery disease), native coronary artery 03/21/2011  
 s/p PCI with with CARLITO (Xience 2.75x12, 2.5x12) mLAD and mild disease in rest of coronaries. Midly depressed LV syst fct  Cardiac cath 03/21/2011 mLAD 90% (2.75 x 12 mm Xience stent). Otherwise patent coronaries. LVEDP 10 mmHg. EF 40-45%. Anteroapical hypk. Renal arteries patent.  Cardiac echocardiogram 04/17/2014 EF 60-65%. No WMA. Gr 1 DDfx. Mild MR. Similar to study of 9/14/11.  Cardiac nuclear imaging test 07/2017 Negative for ischemia or infarction. EF > 70%.  Cardiomyopathy secondary   
 ischemic +/- stress induced  Dyslipidemia  GERD (gastroesophageal reflux disease)  Hx of colonoscopy 07/12/2016 Normal. Dr. Hill Villafana  Hypertension  Parkinson's disease  Syncope   
 s/p ILD implantation Past Surgical History:  
Procedure Laterality Date  COLONOSCOPY N/A 7/12/2016 COLONOSCOPY performed by Governor Yolanda MD at [address] HX BUNIONECTOMY    
 dorsal  
 HX CATARACT REMOVAL  11/2012  
 left  HX CATARACT REMOVAL  10/2012  
 right  HX CORONARY STENT PLACEMENT    
 HX HEART CATHETERIZATION    
 HX HEMORRHOIDECTOMY  HX HYSTERECTOMY  1996  
 partial  
 HX IMPLANTABLE LOOP RECORDER  0504-1220  HX TONSILLECTOMY  HX TOTAL ABDOMINAL HYSTERECTOMY  1996  
 partial  
 
Current Outpatient Medications Medication Sig  
 rosuvastatin (CRESTOR) 10 mg tablet Take 1 Tab by mouth nightly.  metoprolol succinate (TOPROL-XL) 50 mg XL tablet Take 1 Tab by mouth daily.  lisinopril (PRINIVIL, ZESTRIL) 10 mg tablet Take 1 Tab by mouth daily.  potassium chloride (KLOR-CON) 10 mEq tablet Take 1 Tab by mouth every other day.  magnesium hydroxide (SoftLayer MILK OF MAGNESIA) 400 mg/5 mL suspension Take 30 mL by mouth daily as needed for Constipation.  carbidopa-levodopa CR (SINEMET CR)  mg per tablet Take  by mouth nightly.  acetaminophen (TYLENOL) 650 mg CR tablet Take 650 mg by mouth every six (6) hours as needed for Pain.  co-enzyme Q-10 (CO Q-10) 100 mg capsule Take 200 mg by mouth daily.  aspirin 81 mg tablet Take 1 Tab by mouth daily.  MULTIVITAMIN PO Take 1 Tab by mouth daily.  DOCUSATE CALCIUM (STOOL SOFTENER PO) Take 1 Cap by mouth.  magnesium 250 mg Tab Take 250 mg by mouth two (2) times a day.  nitroglycerin (NITROSTAT) 0.4 mg SL tablet 1 sublingual every 5 minutes for chest pain for no more than 3 doses  fluticasone (FLONASE) 50 mcg/actuation nasal spray 2 Sprays by Both Nostrils route daily. No current facility-administered medications for this visit. Allergies and Intolerances: Allergies Allergen Reactions  Keflex [Cephalexin] Other (comments) Yeast infection  Pcn [Penicillins] Other (comments) Family History: She has no family history of colon or breast cancer. Family History Problem Relation Age of Onset  Heart Attack Father 80  
 Heart Disease Father  Cancer Mother   
     pancreatic  Hypertension Brother Social History: She  reports that she has never smoked. She has never used smokeless tobacco. She lives with her , who is having difficulty with mild cognitive impairment. She states that they sold their RV and now stay at home for the winter. She is a retired x-ray technician. Social History Substance and Sexual Activity Alcohol Use Yes Comment: glass of wine occasionally. Immunization History: 
Immunization History Administered Date(s) Administered  (RETIRED) Pneumococcal Vaccine (Unspecified Type) 01/01/2003  Influenza High Dose Vaccine PF 09/29/2014, 10/05/2015, 11/01/2016, 08/30/2017  Influenza Vaccine (Tri) Adjuvanted 10/09/2018  Influenza Vaccine Whole 09/07/2010, 09/03/2011, 10/04/2012  Pneumococcal Conjugate (PCV-13) 10/05/2015  TD Vaccine 01/01/2003  Tdap 09/25/2013  Zoster 01/01/2007  Zoster Recombinant 09/18/2018 Review of Systems: As above included in HPI. Otherwise 11 point review of systems negative including constitutional, skin, HENT, eyes, respiratory, cardiovascular, gastrointestinal, genitourinary, musculoskeletal, endo/heme/aller, neurological. 
 
Physical:  
Vitals:  
BP: 120/72 HR: 68 
WT: 124 lb 9.6 oz (56.5 kg) BMI:  20.73 kg/m2 Exam:  
 
Patient appears in no apparent distress. Affect is appropriate. HEENT --Anicteric sclerae, tympanic membranes normal,  ear canals normal. 
PERRL, EOMI, conjunctiva and lids normal.  
Sinuses were nontender, turbinates normal, hearing normal.  Oropharynx without 
erythema, normal tongue, oral mucosa and tonsils. No cervical lymphadenopathy. No thyromegaly, JVD, or bruits. Carotid pulses 2+ with normal upstroke. Lungs --Clear to auscultation. No wheezing or rales. Heart --Regular rate and rhythm, no murmurs, rubs, gallops, or clicks. Breasts -- no masses, skin dimpling, asymmetry, nipple discharge, nodules, axillary lymphadenopathy. Chest wall --Nontender to palpation. PMI normal. 
Abdomen -- Soft and nontender, no hepatosplenomegaly or masses. Extremities -- Without cyanosis, clubbing, edema. 2+ pulses equally and bilaterally. Normal looking digits, ROM intact Neuro -- CN 2-12 intact, strength 5/5 with intact soft touch in all extremities Derm  no obvious abnormalities noted, no rash Review of Data: 
Labs: Hospital Outpatient Visit on 04/04/2019 Component Date Value Ref Range Status  WBC 04/04/2019 5.0  4.6 - 13.2 K/uL Final  
 RBC 04/04/2019 4.22  4.20 - 5.30 M/uL Final  
 HGB 04/04/2019 12.5  12.0 - 16.0 g/dL Final  
 HCT 04/04/2019 40.3  35.0 - 45.0 % Final  
 MCV 04/04/2019 95.5  74.0 - 97.0 FL Final  
 MCH 04/04/2019 29.6  24.0 - 34.0 PG Final  
 MCHC 04/04/2019 31.0  31.0 - 37.0 g/dL Final  
 RDW 04/04/2019 13.8  11.6 - 14.5 % Final  
 PLATELET 64/89/6163 273  135 - 420 K/uL Final  
  MPV 04/04/2019 9.7  9.2 - 11.8 FL Final  
 NEUTROPHILS 04/04/2019 56  40 - 73 % Final  
 LYMPHOCYTES 04/04/2019 32  21 - 52 % Final  
 MONOCYTES 04/04/2019 9  3 - 10 % Final  
 EOSINOPHILS 04/04/2019 2  0 - 5 % Final  
 BASOPHILS 04/04/2019 1  0 - 2 % Final  
 ABS. NEUTROPHILS 04/04/2019 2.9  1.8 - 8.0 K/UL Final  
 ABS. LYMPHOCYTES 04/04/2019 1.6  0.9 - 3.6 K/UL Final  
 ABS. MONOCYTES 04/04/2019 0.4  0.05 - 1.2 K/UL Final  
 ABS. EOSINOPHILS 04/04/2019 0.1  0.0 - 0.4 K/UL Final  
 ABS. BASOPHILS 04/04/2019 0.0  0.0 - 0.1 K/UL Final  
 DF 04/04/2019 AUTOMATED    Final  
 LIPID PROFILE 04/04/2019        Final  
 Cholesterol, total 04/04/2019 137  <200 MG/DL Final  
 Triglyceride 04/04/2019 71  <150 MG/DL Final  
 HDL Cholesterol 04/04/2019 67* 40 - 60 MG/DL Final  
 LDL, calculated 04/04/2019 55.8  0 - 100 MG/DL Final  
 VLDL, calculated 04/04/2019 14.2  MG/DL Final  
 CHOL/HDL Ratio 04/04/2019 2.0  0 - 5.0   Final  
 Sodium 04/04/2019 137  136 - 145 mmol/L Final  
 Potassium 04/04/2019 4.3  3.5 - 5.5 mmol/L Final  
 Chloride 04/04/2019 102  100 - 108 mmol/L Final  
 CO2 04/04/2019 28  21 - 32 mmol/L Final  
 Anion gap 04/04/2019 7  3.0 - 18 mmol/L Final  
 Glucose 04/04/2019 97  74 - 99 mg/dL Final  
 BUN 04/04/2019 11  7.0 - 18 MG/DL Final  
 Creatinine 04/04/2019 0.64  0.6 - 1.3 MG/DL Final  
 BUN/Creatinine ratio 04/04/2019 17  12 - 20   Final  
 GFR est AA 04/04/2019 >60  >60 ml/min/1.73m2 Final  
 GFR est non-AA 04/04/2019 >60  >60 ml/min/1.73m2 Final  
 Calcium 04/04/2019 8.8  8.5 - 10.1 MG/DL Final  
 Bilirubin, total 04/04/2019 0.5  0.2 - 1.0 MG/DL Final  
 ALT (SGPT) 04/04/2019 27  13 - 56 U/L Final  
 AST (SGOT) 04/04/2019 26  15 - 37 U/L Final  
 Alk.  phosphatase 04/04/2019 118* 45 - 117 U/L Final  
 Protein, total 04/04/2019 6.7  6.4 - 8.2 g/dL Final  
 Albumin 04/04/2019 3.6  3.4 - 5.0 g/dL Final  
 Globulin 04/04/2019 3.1  2.0 - 4.0 g/dL Final  
  A-G Ratio 04/04/2019 1.2  0.8 - 1.7   Final  
 TSH 04/04/2019 2.00  0.36 - 3.74 uIU/mL Final  
 Color 04/04/2019 YELLOW    Final  
 Appearance 04/04/2019 CLEAR    Final  
 Specific gravity 04/04/2019 1.012  1.005 - 1.030   Final  
 pH (UA) 04/04/2019 7.5  5.0 - 8.0   Final  
 Protein 04/04/2019 NEGATIVE   NEG mg/dL Final  
 Glucose 04/04/2019 NEGATIVE   NEG mg/dL Final  
 Ketone 04/04/2019 NEGATIVE   NEG mg/dL Final  
 Bilirubin 04/04/2019 NEGATIVE   NEG   Final  
 Blood 04/04/2019 NEGATIVE   NEG   Final  
 Urobilinogen 04/04/2019 0.2  0.2 - 1.0 EU/dL Final  
 Nitrites 04/04/2019 NEGATIVE   NEG   Final  
 Leukocyte Esterase 04/04/2019 TRACE* NEG   Final  
 WBC 04/04/2019 1 to 2  0 - 4 /hpf Final  
 RBC 04/04/2019 0  0 - 5 /hpf Final  
 Epithelial cells 04/04/2019 FEW  0 - 5 /lpf Final  
 Bacteria 04/04/2019 NEGATIVE   NEG /hpf Final  
 Vitamin D 25-Hydroxy 04/04/2019 52.9  30 - 100 ng/mL Final  
 
Health Maintenance: 
Screening:  
 Mammogram: negative (5/2018) PAP smear: s/p ROHINI. No further screening. Colorectal: colonoscopy (7/2016) normal. Dr. Hill Villafana. No further screening. Depression: none DM (HbA1c/FPG): FPG 97 (4/2019) Hepatitis C: N/A Falls: none DEXA: osteopenia (5/2018) Glaucoma: regular eye exams with Dr. Adan Zavala (last 3/2019) and Dr. Javier Rush for left macular degeneration. Smoking: none Vitamin D: 52.9 (4/2019) Medicare Wellness: 10/2/2018 Impression: 
Patient Active Problem List  
Diagnosis Code  Hypertension I10  
 CAD S/P percutaneous coronary angioplasty I25.10, Z98.61  Dyslipidemia E78.5  Syncope R55  Parkinson disease ([address] G20  
 GERD (gastroesophageal reflux disease) K21.9  Macular degeneration of left eye H35.30  Laryngopharyngeal reflux disease K21.9  Osteopenia M85.80  Psoriasis of scalp L40.9  Acute right-sided low back pain with right-sided sciatica M54.41  Right sided sciatica M54.31  
 Lumbar spondylosis M47.816  
 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  
 Abnormal MRI, spine R93.7  DDD (degenerative disc disease), lumbar M51.36  
 Osteoarthritis of spine with radiculopathy, lumbosacral region M47.27 Plan: 1. Right buttock pain and right sided sciatica. Patient with symptoms of right buttock pain since 8/2018 and diagnosed with right piriformis syndrome. Completed physical therapy and received cortisone injection with some improvement, but subsequently developed right leg paresthesias and pain consistent with right sided sciatica. Treated with five day course of prednisone 50 mg without improvement. LS spine x-rays and lumbar MRI with degenerative changes, although MRI showed a right L4-L5 neuroforamen synovial cyst from the right L4-5 advanced facet arthropathy with significant right foraminal stenosis and nerve impingement. Referred for physical therapy and treated with gabapentin 100 mg tid with resolution of the sciatica symptoms. Evaluated by Dr. Loy Almeida, but symptoms had already resolved. No longer taking gabapentin. Follow. 2. Hyperlipidemia. Previously on low intensity dose pravastatin, but developed severe bilateral thigh pain, worse with ambulation and was discontinued in 11/2018 with improvement. Reported similar symptoms in past with statin (Zocor until 2012, and then Lipitor until 9/2014). Started on rosuvastatin 10 mg daily in 12/2018 and tolerated without difficulty until 7/2018 when developed recurrent myalgias and dose decreased to 5 mg every other day with improvement. Lipid panel today with LDL 55 and HDL 67, indicative of excellent control. Emphasized importance of lifestyle modifications, including diet and exercise. Would not recommend weight loss given BMI. Continue to follow. 3. Hypertension. Blood pressure well controlled on lisinopril 10 mg daily and metoprolol succinate 50 mg daily. Renal function remains normal with creatinine 0.64 / eGFR >60. Continue to follow. 4. ASCVD. Remains asymptomatic. Resolution of cardiomyopathy with last echocardiogram revealing normal LV function. Negative pharmacologic nuclear stress test in 7/2017. Continue current regimen. Being followed by Dr. Josy Cardoso. 5. H/O syncope. No further episodes since 2014. Most likely secondary to vasovagal reaction. Follow. 6. Parkinson's disease. Doing well on Sinemet. Followed by Dr. Vel García. 
7. Osteopenia. Last bone density scan 5/2018. Using femoral neck T-scores, calculated FRAX score estimates her 10 year risk of a major osteoporetic fracture at 12 % and hip fracture at 3.1 %, which are an indication for biphosphonate treatment based on hip fracture risk of >3%. However, no significant change from 5/2018. Continue calcium and vitamin D supplements. Encouraged exercise, particularly weight bearing activities. Will follow. Fall precautions stressed. 8. Macular degeneration, left. Being followed by Dr. Jeferson Cain. 9. Laryngopharyngeal reflux. Doing well. Discontinued Dexilant without an increase in symptoms. Now receiving immunotherapy to address hoarseness and allergies. Followed by Dr. Allan Frausto. 10. Psoriasis. Using clobetasol solution for scalp psoriasis. 11. Health maintenance. Received 2/2 doses of Shingrix vaccine. Other immunizations up to date. No further colorectal cancer screening needed. Mammogram due next month. Will order. Continue regular eye exams with Ankur Ayon and Jeferson Cain. Vitamin D level normal. Continue maintenance dose supplement. Medicare wellness visit up to date. Patient understands recommendations and agrees with plan. Follow-up in 6 months.

## 2019-07-05 ENCOUNTER — OFFICE VISIT (OUTPATIENT)
Dept: CARDIOLOGY CLINIC | Age: 79
End: 2019-07-05

## 2019-07-05 VITALS
HEART RATE: 60 BPM | WEIGHT: 125 LBS | DIASTOLIC BLOOD PRESSURE: 80 MMHG | BODY MASS INDEX: 20.83 KG/M2 | SYSTOLIC BLOOD PRESSURE: 140 MMHG | HEIGHT: 65 IN | OXYGEN SATURATION: 98 %

## 2019-07-05 DIAGNOSIS — I25.10 CAD S/P PERCUTANEOUS CORONARY ANGIOPLASTY: Primary | ICD-10-CM

## 2019-07-05 DIAGNOSIS — Z98.61 CAD S/P PERCUTANEOUS CORONARY ANGIOPLASTY: Primary | ICD-10-CM

## 2019-07-05 DIAGNOSIS — G20 PARKINSON DISEASE (HCC): ICD-10-CM

## 2019-07-05 DIAGNOSIS — I10 ESSENTIAL HYPERTENSION: ICD-10-CM

## 2019-07-05 DIAGNOSIS — E78.5 DYSLIPIDEMIA: ICD-10-CM

## 2019-07-05 NOTE — PROGRESS NOTES
Grazyna Johnson presents today for No chief complaint on file. Grazyna Johnson preferred language for health care discussion is english/other. Is someone accompanying this pt? no    Is the patient using any DME equipment during [address]? no    Depression Screening:  3 most recent PHQ Screens 4/11/2019   Little interest or pleasure in doing things Not at all   Feeling down, depressed, irritable, or hopeless Not at all   Total Score PHQ 2 0       Learning Assessment:  Learning Assessment 7/6/2018   PRIMARY LEARNER Patient   HIGHEST LEVEL OF EDUCATION - PRIMARY LEARNER  -   BARRIERS PRIMARY LEARNER -   CO-LEARNER CAREGIVER -   PRIMARY LANGUAGE ENGLISH   LEARNER PREFERENCE PRIMARY DEMONSTRATION   ANSWERED BY Patient   RELATIONSHIP SELF       Abuse Screening:  Abuse Screening Questionnaire 4/11/2019   Do you ever feel afraid of your partner? N   Are you in a relationship with someone who physically or mentally threatens you? N   Is it safe for you to go home? Y       Fall Risk  Fall Risk Assessment, last 12 mths 4/11/2019   Able to walk? Yes   Fall in past 12 months? No   Fall with injury? -   Number of falls in past 12 months -   Fall Risk Score -       Pt currently taking Anticoagulant therapy? no    Coordination of Care:  1. Have you been to the ER, urgent care clinic since your last visit? Hospitalized since your last visit? no    2. Have you seen or consulted any other health care providers outside of the [address] [place] [address] since your last visit? Include any pap smears or colon screening.  no

## 2019-07-05 NOTE — PROGRESS NOTES
HISTORY OF PRESENT ILLNESS  Paris Lefort is a [de-identified] y.o. female. Leg Swelling   Pertinent negatives include no chest pain, no abdominal pain, no headaches and no shortness of breath. Patient presents for scheduled follow-up office visit. She has a past medical history significant for single-vessel coronary artery disease status post PCI to her mid LAD with a total of 2 drug-eluting stents in 2011. Patient presents with symptoms of unstable angina at that time. It should be noted that she did have classic exertional anginal symptoms leading up to her PCI that were likely unrecognized. She reports symptoms of substernal chest pain radiating to her left arm and jaw prior to her PCI. Since her procedure, she has had no recurrent symptoms. The patient underwent a pharmacologic nuclear stress test in July 2017 which was a normal low risk study. No evidence of ischemia or infarction, EF greater than 70%. She was last seen in the office 1 year ago. Since last visit, she continues to feel well. She has been having issues with some left ankle swelling for which she is seen an orthopedic surgeon. She also started allergy shots to help improve her allergic rhinitis, but is yet to seeing any results. She denies any recurrent chest pain or shortness of breath. No major change in her activity level. Her heart palpitations, dizziness or syncope. Past Medical History:   Diagnosis Date    Basal cell cancer     CAD (coronary artery disease), native coronary artery 03/21/2011    s/p PCI with with CARLITO (Xience 2.75x12, 2.5x12) mLAD and mild disease in rest of coronaries. Midly depressed LV syst fct     Cardiac cath 03/21/2011    mLAD 90% (2.75 x 12 mm Xience stent). Otherwise patent coronaries. LVEDP 10 mmHg. EF 40-45%. Anteroapical hypk. Renal arteries patent.  Cardiac echocardiogram 04/17/2014    EF 60-65%. No WMA. Gr 1 DDfx. Mild MR. Similar to study of 9/14/11.     Cardiac nuclear imaging test 07/2017    Negative for ischemia or infarction. EF > 70%.  Cardiomyopathy secondary     ischemic +/- stress induced    Dyslipidemia     GERD (gastroesophageal reflux disease)     Hx of colonoscopy 07/12/2016    Normal. Dr. Brooks Packer Hypertension     Parkinson's disease     Syncope     s/p ILD implantation     Current Outpatient Medications   Medication Sig Dispense Refill    rosuvastatin (CRESTOR) 10 mg tablet Take 1 Tab by mouth nightly. (Patient taking differently: Take 5 mg by mouth nightly.) 90 Tab 3    metoprolol succinate (TOPROL-XL) 50 mg XL tablet Take 1 Tab by mouth daily. 90 Tab 3    lisinopril (PRINIVIL, ZESTRIL) 10 mg tablet Take 1 Tab by mouth daily. 90 Tab 3    potassium chloride (KLOR-CON) 10 mEq tablet Take 1 Tab by mouth every other day. 45 Tab 3    magnesium hydroxide (NEGRO MILK OF MAGNESIA) 400 mg/5 mL suspension Take 30 mL by mouth daily as needed for Constipation.  carbidopa-levodopa CR (SINEMET CR)  mg per tablet Take 1 Tab by mouth four (4) times daily.  nitroglycerin (NITROSTAT) 0.4 mg SL tablet 1 sublingual every 5 minutes for chest pain for no more than 3 doses 25 tablet 3    acetaminophen (TYLENOL) 650 mg CR tablet Take 650 mg by mouth every six (6) hours as needed for Pain.  co-enzyme Q-10 (CO Q-10) 100 mg capsule Take 200 mg by mouth daily.  aspirin 81 mg tablet Take 1 Tab by mouth daily. 1 Tab 0    MULTIVITAMIN PO Take 1 Tab by mouth daily.  DOCUSATE CALCIUM (STOOL SOFTENER PO) Take 1 Cap by mouth.  magnesium 250 mg Tab Take 250 mg by mouth two (2) times a day.  fluticasone (FLONASE) 50 mcg/actuation nasal spray 2 Sprays by Both Nostrils route daily.  1 Bottle 2     Allergies   Allergen Reactions    Keflex [Cephalexin] Other (comments)     Yeast infection    Pcn [Penicillins] Other (comments)      Social History     Tobacco Use    Smoking status: Never Smoker    Smokeless tobacco: Never Used   Substance Use Topics    Alcohol use: Yes     Comment: glass of wine occasionally.  Drug use: No       Review of Systems   Constitutional: Negative for chills, fever and weight loss. HENT: Negative for nosebleeds. Eyes: Negative for blurred vision and double vision. Respiratory: Negative for cough, shortness of breath and wheezing. Cardiovascular: Negative for chest pain, palpitations, orthopnea, claudication, leg swelling and PND. Gastrointestinal: Negative for abdominal pain, heartburn, nausea and vomiting. Genitourinary: Negative for dysuria and hematuria. Musculoskeletal: Negative for falls and myalgias. Skin: Negative for rash. Neurological: Negative for dizziness, focal weakness and headaches. Endo/Heme/Allergies: Does not bruise/bleed easily. Psychiatric/Behavioral: Negative for substance abuse. Visit Vitals  /80   Pulse 60   Ht 5' 5\" (1.651 m)   Wt 56.7 kg (125 lb)   SpO2 98%   BMI 20.80 kg/m²       Physical Exam   Constitutional: She is oriented to person, place, and time. She appears well-developed and well-nourished. HENT:   Head: Normocephalic and atraumatic. Eyes: Conjunctivae are normal.   Neck: Neck supple. No JVD present. Carotid bruit is not present. Cardiovascular: Normal rate, regular rhythm, S1 normal, S2 normal and normal pulses. Exam reveals no gallop. Murmur heard. Midsystolic murmur is present with a grade of 2/6. Pulmonary/Chest: Effort normal and breath sounds normal. She has no wheezes. She has no rales. Abdominal: Soft. Bowel sounds are normal. There is no tenderness. Musculoskeletal: She exhibits no edema. Neurological: She is alert and oriented to person, place, and time. Skin: Skin is warm and dry. EKG: Normal sinus rhythm, normal axis, normal QTc interval, no ST or T-wave abnormalities concerning for ischemia. Normal tracing. No change compared to the previous EKG. ASSESSMENT and PLAN    Coronary artery disease.   Single-vessel disease, status post PCI to her mid LAD in 2011 using 2 drug-eluting stents (Xience 2.75 x 12, 2.5 x 12). No recurrent anginal symptoms since that time. Patient is now taking an aspirin, beta-blocker low-dose statin. Patient underwent a low risk pharmacologic nuclear stress test in July 2017. I recommended repeating a stress test next summer prior to her follow-up visit. Dyslipidemia. Patient is now tolerating Crestor 5 mg daily. Is being followed closely by her PCP. Her most recent lipid panel from April 2019 was excellent: Total cholesterol 137, HDL 67, LDL 56. Continue this regimen. Essential hypertension. Patient's blood pressure looks very reasonable on her current regimen which includes metoprolol and lisinopril.     Follow-up in 12 months, sooner if needed

## 2019-07-09 ENCOUNTER — HOSPITAL ENCOUNTER (OUTPATIENT)
Dept: MAMMOGRAPHY | Age: 79
Discharge: HOME | End: 2019-07-09
Attending: INTERNAL MEDICINE
Payer: MEDICARE

## 2019-07-09 DIAGNOSIS — Z12.31 SCREENING MAMMOGRAM, ENCOUNTER FOR: ICD-10-CM

## 2019-07-09 PROCEDURE — 77067 SCR MAMMO BI INCL CAD: CPT

## 2019-07-18 ENCOUNTER — HOSPITAL ENCOUNTER (OUTPATIENT)
Dept: CT IMAGING | Age: 79
Discharge: HOME | End: 2019-07-18
Attending: PODIATRIST
Payer: MEDICARE

## 2019-07-18 DIAGNOSIS — M19.072 ARTHRITIS OF ANKLE OR FOOT, DEGENERATIVE, LEFT: ICD-10-CM

## 2019-07-18 DIAGNOSIS — M76.72 PERONEAL TENDINITIS OF LEFT LOWER EXTREMITY: ICD-10-CM

## 2019-07-18 DIAGNOSIS — R26.2 DIFFICULTY WALKING: ICD-10-CM

## 2019-07-18 PROCEDURE — 73700 CT LOWER EXTREMITY W/O DYE: CPT

## 2019-07-24 ENCOUNTER — OFFICE VISIT (OUTPATIENT)
Dept: ORTHOPEDIC SURGERY | Facility: CLINIC | Age: 79
End: 2019-07-24

## 2019-07-24 DIAGNOSIS — S60.212A CONTUSION OF LEFT WRIST, INITIAL ENCOUNTER: Primary | ICD-10-CM

## 2019-07-24 NOTE — PROGRESS NOTES
Kushal Acuna is a [de-identified] y.o. female right handed retiree. Worker's Compensation and legal considerations: none filed. There were no vitals filed for this visit. Chief Complaint   Patient presents with    Wrist Pain     Left         HPI: Patient comes in today as a walk-in appointment after sustaining a fall. She reports an injury to her wrist as well as bruising over the ulnar aspect of her wrist.  She denies any other injuries or head trauma. Date of onset: 7/24/2019    Injury: Yes: Comment: Fall    Prior Treatment:  No    Numbness/ Tingling: No    ROS: Review of Systems - General ROS: negative  Respiratory ROS: no cough, shortness of breath, or wheezing  Cardiovascular ROS: no chest pain or dyspnea on exertion  Musculoskeletal ROS: positive for - pain in wrist - left  Neurological ROS: negative  Dermatological ROS: negative    Past Medical History:   Diagnosis Date    Basal cell cancer     CAD (coronary artery disease), native coronary artery 03/21/2011    s/p PCI with with CARLITO (Xience 2.75x12, 2.5x12) mLAD and mild disease in rest of coronaries. Midly depressed LV syst fct     Cardiac cath 03/21/2011    mLAD 90% (2.75 x 12 mm Xience stent). Otherwise patent coronaries. LVEDP 10 mmHg. EF 40-45%. Anteroapical hypk. Renal arteries patent.  Cardiac echocardiogram 04/17/2014    EF 60-65%. No WMA. Gr 1 DDfx. Mild MR. Similar to study of 9/14/11.  Cardiac nuclear imaging test 07/2017    Negative for ischemia or infarction. EF > 70%.     Cardiomyopathy secondary     ischemic +/- stress induced    Dyslipidemia     GERD (gastroesophageal reflux disease)     Hx of colonoscopy 07/12/2016    Normal. Dr. Tyree Carver Hypertension     Parkinson's disease     Syncope     s/p ILD implantation       Past Surgical History:   Procedure Laterality Date    COLONOSCOPY N/A 7/12/2016    COLONOSCOPY performed by Jeanne Castano MD at [address] HX BUNIONECTOMY      dorsal    HX CATARACT REMOVAL  11/2012    left    HX CATARACT REMOVAL  10/2012    right    HX CORONARY STENT PLACEMENT      HX HEART CATHETERIZATION      HX HEMORRHOIDECTOMY      HX HYSTERECTOMY  1996    partial    HX IMPLANTABLE LOOP RECORDER  [id number]    HX TONSILLECTOMY      HX TOTAL ABDOMINAL HYSTERECTOMY  1996    partial       Current Outpatient Medications   Medication Sig Dispense Refill    rosuvastatin (CRESTOR) 10 mg tablet Take 1 Tab by mouth nightly. (Patient taking differently: Take 5 mg by mouth nightly.) 90 Tab 3    metoprolol succinate (TOPROL-XL) 50 mg XL tablet Take 1 Tab by mouth daily. 90 Tab 3    lisinopril (PRINIVIL, ZESTRIL) 10 mg tablet Take 1 Tab by mouth daily. 90 Tab 3    potassium chloride (KLOR-CON) 10 mEq tablet Take 1 Tab by mouth every other day. 45 Tab 3    magnesium hydroxide (NEGRO MILK OF MAGNESIA) 400 mg/5 mL suspension Take 30 mL by mouth daily as needed for Constipation.  carbidopa-levodopa CR (SINEMET CR)  mg per tablet Take 1 Tab by mouth four (4) times daily.  nitroglycerin (NITROSTAT) 0.4 mg SL tablet 1 sublingual every 5 minutes for chest pain for no more than 3 doses 25 tablet 3    co-enzyme Q-10 (CO Q-10) 100 mg capsule Take 200 mg by mouth daily.  aspirin 81 mg tablet Take 1 Tab by mouth daily. 1 Tab 0    MULTIVITAMIN PO Take 1 Tab by mouth daily.  DOCUSATE CALCIUM (STOOL SOFTENER PO) Take 1 Cap by mouth.  magnesium 250 mg Tab Take 250 mg by mouth two (2) times a day.  acetaminophen (TYLENOL) 650 mg CR tablet Take 650 mg by mouth every six (6) hours as needed for Pain.  fluticasone (FLONASE) 50 mcg/actuation nasal spray 2 Sprays by Both Nostrils route daily.  1 Bottle 2       Allergies   Allergen Reactions    Keflex [Cephalexin] Other (comments)     Yeast infection    Pcn [Penicillins] Other (comments)         PE:     Left Wrist: There is edema and ecchymosis about the ulnar aspect of the wrist.  There is mild tenderness to palpation diffusely about the wrist.  Range of motion is near full only limited due to swelling and pain. Imaging:     Plain films of the left wrist does not show any acute fracture or dislocation. There is some mild soft tissue swelling noted about the ulnar aspect of the wrist.  There is substantial end-stage degenerative changes about the thumb carpometacarpal joint Eaton stage VI approximately. ICD-10-CM ICD-9-CM    1. Contusion of left wrist, initial encounter S60.212A 923.21 AMB POC XRAY, WRIST; COMPLETE, 3+ VIE      AMB SUPPLY ORDER       Plan:     Left wrist brace to be worn at all times for 4 weeks followed by slow decreasing wear.     Follow-up PRN    Plan was reviewed with patient, who verbalized agreement and understanding of the plan

## 2019-09-17 ENCOUNTER — CLINICAL SUPPORT (OUTPATIENT)
Dept: INTERNAL MEDICINE CLINIC | Age: 79
End: 2019-09-17

## 2019-09-17 DIAGNOSIS — Z23 ENCOUNTER FOR IMMUNIZATION: ICD-10-CM

## 2019-09-17 NOTE — PROGRESS NOTES
Chief Complaint   Patient presents with    Immunization/Injection     FLUAD injection     Patient given influenza vaccine, FLUAD, in right deltoid, per verbal order from Dr. Ben Rivers with read back. Instructed patient to sit and wait 10-20 minutes before leaving the premises so that we can watch for any complications or adverse reactions. Patient given vaccine information statement handout before vaccine was given. Patient tolerated well without adverse reactions or complications.

## 2019-09-17 NOTE — PATIENT INSTRUCTIONS
Vaccine Information Statement    Influenza (Flu) Vaccine (Inactivated or Recombinant): What You Need to Know    Many Vaccine Information Statements are available in Pashto and other languages. See www.immunize.org/vis  Hojas de información sobre vacunas están disponibles en español y en muchos otros idiomas. Visite www.immunize.org/vis    1. Why get vaccinated? Influenza vaccine can prevent influenza (flu). Flu is a contagious disease that spreads around the [facility] every year, usually between October and May. Anyone can get the flu, but it is more dangerous for some people. Infants and young children, people 72years of age and older, pregnant women, and people with certain health conditions or a weakened immune system are at greatest risk of flu complications. Pneumonia, bronchitis, sinus infections and ear infections are examples of flu-related complications. If you have a medical condition, such as heart disease, cancer or diabetes, flu can make it worse. Flu can cause fever and chills, sore throat, muscle aches, fatigue, cough, headache, and runny or stuffy nose. Some people may have vomiting and diarrhea, though this is more common in children than adults. Each year thousands of people in the [facility] die from flu, and many more are hospitalized. Flu vaccine prevents millions of illnesses and flu-related visits to the doctor each year. 2. Influenza vaccines     CDC recommends everyone 10months of age and older get vaccinated every flu season. Children 6 months through 6years of age may need 2 doses during a single flu season. Everyone else needs only 1 dose each flu season. It takes about 2 weeks for protection to develop after vaccination. There are many flu viruses, and they are always changing. Each year a new flu vaccine is made to protect against three or four viruses that are likely to cause disease in the upcoming flu season.  Even when the vaccine doesnt exactly match these viruses, it may still provide some protection. Influenza vaccine does not cause flu. Influenza vaccine may be given at the same time as other vaccines. 3. Talk with your health care provider    Tell your vaccine provider if the person getting the vaccine:   Has had an allergic reaction after a previous dose of influenza vaccine, or has any severe, life-threatening allergies.  Has ever had Guillain-Barré Syndrome (also called GBS). In some cases, your health care provider may decide to postpone influenza vaccination to a future visit. People with minor illnesses, such as a cold, may be vaccinated. People who are moderately or severely ill should usually wait until they recover before getting influenza vaccine. Your health care provider can give you more information. 4. Risks of a reaction     Soreness, redness, and swelling where shot is given, fever, muscle aches, and headache can happen after influenza vaccine.  There may be a very small increased risk of Guillain-Barré Syndrome (GBS) after inactivated influenza vaccine (the flu shot). [facility] children who get the flu shot along with pneumococcal vaccine (PCV13), and/or DTaP vaccine at the same time might be slightly more likely to have a seizure caused by fever. Tell your health care provider if a child who is getting flu vaccine has ever had a seizure. People sometimes faint after medical procedures, including vaccination. Tell your provider if you feel dizzy or have vision changes or ringing in the ears. As with any medicine, there is a very remote chance of a vaccine causing a severe allergic reaction, other serious injury, or death. 5. What if there is a serious problem? An allergic reaction could occur after the vaccinated person leaves the clinic.  If you see signs of a severe allergic reaction (hives, swelling of the face and throat, difficulty breathing, a fast heartbeat, dizziness, or weakness), call 9-1-1 and get the person to the nearest hospital.    For other signs that concern you, call your health care provider. Adverse reactions should be reported to the Vaccine Adverse Event Reporting System (VAERS). Your health care provider will usually file this report, or you can do it yourself. Visit the VAERS website at www.vaers. [facility].gov or call 0-999.473.7112. VAERS is only for reporting reactions, and VAERS staff do not give medical advice. 6. The National Vaccine Injury Compensation Program    The [facility] Vaccine Injury Compensation Program (VICP) is a federal program that was created to compensate people who may have been injured by certain vaccines. Visit the VICP website at www.[facility].gov/vaccinecompensation or call 6-233.156.4427 to learn about the program and about filing a claim. There is a time limit to file a claim for compensation. 7. How can I learn more?  Ask your health care provider.  Call your local or state health department.  Contact the Centers for Disease Control and Prevention (CDC):  - Call 1-966.750.9253 (3-057-QVV-INFO) or  - Visit CDCs influenza website at www.cdc.gov/flu    Vaccine Information Statement (Interim)  Inactivated Influenza Vaccine   8/15/2019  42 ERNESTO Angel [id number]   Department of Health and Human Services  Centers for Disease Control and Prevention    Office Use Only

## 2019-10-10 ENCOUNTER — HOSPITAL ENCOUNTER (OUTPATIENT)
Dept: LAB | Age: 79
Discharge: HOME | End: 2019-10-10
Payer: MEDICARE

## 2019-10-10 ENCOUNTER — APPOINTMENT (OUTPATIENT)
Dept: INTERNAL MEDICINE CLINIC | Age: 79
End: 2019-10-10

## 2019-10-10 DIAGNOSIS — M51.36 DDD (DEGENERATIVE DISC DISEASE), LUMBAR: ICD-10-CM

## 2019-10-10 DIAGNOSIS — G20 PARKINSON DISEASE (HCC): ICD-10-CM

## 2019-10-10 DIAGNOSIS — Z98.61 CAD S/P PERCUTANEOUS CORONARY ANGIOPLASTY: ICD-10-CM

## 2019-10-10 DIAGNOSIS — M47.816 LUMBAR SPONDYLOSIS: ICD-10-CM

## 2019-10-10 DIAGNOSIS — I25.10 CAD S/P PERCUTANEOUS CORONARY ANGIOPLASTY: ICD-10-CM

## 2019-10-10 DIAGNOSIS — M47.27 OSTEOARTHRITIS OF SPINE WITH RADICULOPATHY, LUMBOSACRAL REGION: ICD-10-CM

## 2019-10-10 DIAGNOSIS — K21.9 LARYNGOPHARYNGEAL REFLUX DISEASE: ICD-10-CM

## 2019-10-10 DIAGNOSIS — E78.5 DYSLIPIDEMIA: ICD-10-CM

## 2019-10-10 DIAGNOSIS — Z12.31 SCREENING MAMMOGRAM, ENCOUNTER FOR: ICD-10-CM

## 2019-10-10 DIAGNOSIS — I10 ESSENTIAL HYPERTENSION: ICD-10-CM

## 2019-10-10 DIAGNOSIS — K21.9 GASTROESOPHAGEAL REFLUX DISEASE, ESOPHAGITIS PRESENCE NOT SPECIFIED: ICD-10-CM

## 2019-10-10 DIAGNOSIS — M85.89 OSTEOPENIA OF MULTIPLE SITES: ICD-10-CM

## 2019-10-10 DIAGNOSIS — M71.38 SYNOVIAL CYST OF LUMBAR SPINE: ICD-10-CM

## 2019-10-10 LAB
ANION GAP SERPL CALC-SCNC: 4 MMOL/L (ref 3–18)
BUN SERPL-MCNC: 15 MG/DL (ref 7–18)
BUN/CREAT SERPL: 23 (ref 12–20)
CALCIUM SERPL-MCNC: 9 MG/DL (ref 8.5–10.1)
CHLORIDE SERPL-SCNC: 107 MMOL/L (ref 100–111)
CO2 SERPL-SCNC: 30 MMOL/L (ref 21–32)
CREAT SERPL-MCNC: 0.64 MG/DL (ref 0.6–1.3)
GLUCOSE SERPL-MCNC: 90 MG/DL (ref 74–99)
POTASSIUM SERPL-SCNC: 4 MMOL/L (ref 3.5–5.5)
SODIUM SERPL-SCNC: 141 MMOL/L (ref 136–145)

## 2019-10-10 PROCEDURE — 80048 BASIC METABOLIC PNL TOTAL CA: CPT

## 2019-10-10 PROCEDURE — 80061 LIPID PANEL: CPT

## 2019-10-10 PROCEDURE — 36415 COLL VENOUS BLD VENIPUNCTURE: CPT

## 2019-10-11 LAB
CHOLEST SERPL-MCNC: 144 MG/DL
HDLC SERPL-MCNC: 59 MG/DL (ref 40–60)
HDLC SERPL: 2.4 {RATIO} (ref 0–5)
LDLC SERPL CALC-MCNC: 68.6 MG/DL (ref 0–100)
LIPID PROFILE,FLP: NORMAL
TRIGL SERPL-MCNC: 82 MG/DL (ref ?–150)
VLDLC SERPL CALC-MCNC: 16.4 MG/DL

## 2019-10-30 ENCOUNTER — OFFICE VISIT (OUTPATIENT)
Dept: INTERNAL MEDICINE CLINIC | Age: 79
End: 2019-10-30

## 2019-10-30 VITALS
SYSTOLIC BLOOD PRESSURE: 136 MMHG | TEMPERATURE: 98 F | DIASTOLIC BLOOD PRESSURE: 78 MMHG | RESPIRATION RATE: 16 BRPM | HEIGHT: 65 IN | OXYGEN SATURATION: 98 % | HEART RATE: 66 BPM | BODY MASS INDEX: 20.66 KG/M2 | WEIGHT: 124 LBS

## 2019-10-30 DIAGNOSIS — Z23 ENCOUNTER FOR IMMUNIZATION: ICD-10-CM

## 2019-10-30 DIAGNOSIS — M71.38 SYNOVIAL CYST OF LUMBAR SPINE: ICD-10-CM

## 2019-10-30 DIAGNOSIS — M51.36 DDD (DEGENERATIVE DISC DISEASE), LUMBAR: ICD-10-CM

## 2019-10-30 DIAGNOSIS — Z98.61 CAD S/P PERCUTANEOUS CORONARY ANGIOPLASTY: ICD-10-CM

## 2019-10-30 DIAGNOSIS — M85.89 OSTEOPENIA OF MULTIPLE SITES: ICD-10-CM

## 2019-10-30 DIAGNOSIS — R55 VASOVAGAL SYNCOPE: ICD-10-CM

## 2019-10-30 DIAGNOSIS — M15.9 PRIMARY OSTEOARTHRITIS INVOLVING MULTIPLE JOINTS: ICD-10-CM

## 2019-10-30 DIAGNOSIS — M85.9 DISORDER OF BONE DENSITY AND STRUCTURE, UNSPECIFIED: ICD-10-CM

## 2019-10-30 DIAGNOSIS — Z71.89 ACP (ADVANCE CARE PLANNING): ICD-10-CM

## 2019-10-30 DIAGNOSIS — I10 ESSENTIAL HYPERTENSION: Primary | ICD-10-CM

## 2019-10-30 DIAGNOSIS — I25.10 CAD S/P PERCUTANEOUS CORONARY ANGIOPLASTY: ICD-10-CM

## 2019-10-30 DIAGNOSIS — M47.816 LUMBAR SPONDYLOSIS: ICD-10-CM

## 2019-10-30 DIAGNOSIS — K21.9 GASTROESOPHAGEAL REFLUX DISEASE, ESOPHAGITIS PRESENCE NOT SPECIFIED: ICD-10-CM

## 2019-10-30 DIAGNOSIS — G20 PARKINSON DISEASE (HCC): ICD-10-CM

## 2019-10-30 DIAGNOSIS — L40.9 PSORIASIS OF SCALP: ICD-10-CM

## 2019-10-30 DIAGNOSIS — E78.5 DYSLIPIDEMIA: ICD-10-CM

## 2019-10-30 DIAGNOSIS — H35.30 MACULAR DEGENERATION OF LEFT EYE, UNSPECIFIED TYPE: ICD-10-CM

## 2019-10-30 DIAGNOSIS — Z00.00 MEDICARE ANNUAL WELLNESS VISIT, SUBSEQUENT: ICD-10-CM

## 2019-10-30 DIAGNOSIS — K21.9 LARYNGOPHARYNGEAL REFLUX DISEASE: ICD-10-CM

## 2019-10-30 NOTE — PATIENT INSTRUCTIONS
DASH Diet: Care Instructions Your Care Instructions The DASH diet is an eating plan that can help lower your blood pressure. DASH stands for Dietary Approaches to Stop Hypertension. Hypertension is high blood pressure. The DASH diet focuses on eating foods that are high in calcium, potassium, and magnesium. These nutrients can lower blood pressure. The foods that are highest in these nutrients are fruits, vegetables, low-fat dairy products, nuts, seeds, and legumes. But taking calcium, potassium, and magnesium supplements instead of eating foods that are high in those nutrients does not have the same effect. The DASH diet also includes whole grains, fish, and poultry. The DASH diet is one of several lifestyle changes your doctor may recommend to lower your high blood pressure. Your doctor may also want you to decrease the amount of sodium in your diet. Lowering sodium while following the DASH diet can lower blood pressure even further than just the DASH diet alone. Follow-up care is a key part of your treatment and safety. Be sure to make and go to all appointments, and call your doctor if you are having problems. It's also a good idea to know your test results and keep a list of the medicines you take. How can you care for yourself at home? Following the DASH diet · Eat 4 to 5 servings of fruit each day. A serving is 1 medium-sized piece of fruit, ½ cup chopped or canned fruit, 1/4 cup dried fruit, or 4 ounces (½ cup) of fruit juice. Choose fruit more often than fruit juice. · Eat 4 to 5 servings of vegetables each day. A serving is 1 cup of lettuce or raw leafy vegetables, ½ cup of chopped or cooked vegetables, or 4 ounces (½ cup) of vegetable juice. Choose vegetables more often than vegetable juice. · Get 2 to 3 servings of low-fat and fat-free dairy each day. A serving is 8 ounces of milk, 1 cup of yogurt, or 1 ½ ounces of cheese. · Eat 6 to 8 servings of grains each day. A serving is 1 slice of bread, 1 ounce of dry cereal, or ½ cup of cooked rice, pasta, or cooked cereal. Try to choose whole-grain products as much as possible. · Limit lean meat, poultry, and fish to 2 servings each day. A serving is 3 ounces, about the size of a deck of cards. · Eat 4 to 5 servings of nuts, seeds, and legumes (cooked dried beans, lentils, and split peas) each week. A serving is 1/3 cup of nuts, 2 tablespoons of seeds, or ½ cup of cooked beans or peas. · Limit fats and oils to 2 to 3 servings each day. A serving is 1 teaspoon of vegetable oil or 2 tablespoons of salad dressing. · Limit sweets and added sugars to 5 servings or less a week. A serving is 1 tablespoon jelly or jam, ½ cup sorbet, or 1 cup of lemonade. · Eat less than 2,300 milligrams (mg) of sodium a day. If you limit your sodium to 1,500 mg a day, you can lower your blood pressure even more. Tips for success · Start small. Do not try to make dramatic changes to your diet all at once. You might feel that you are missing out on your favorite foods and then be more likely to not follow the plan. Make small changes, and stick with them. Once those changes become habit, add a few more changes. · Try some of the following: ? Make it a goal to eat a fruit or vegetable at every meal and at snacks. This will make it easy to get the recommended amount of fruits and vegetables each day. ? Try yogurt topped with fruit and nuts for a snack or healthy dessert. ? Add lettuce, tomato, cucumber, and onion to sandwiches. ? Combine a ready-made pizza crust with low-fat mozzarella cheese and lots of vegetable toppings. Try using tomatoes, squash, spinach, broccoli, carrots, cauliflower, and onions. ? Have a variety of cut-up vegetables with a low-fat dip as an appetizer instead of chips and dip. ? Sprinkle sunflower seeds or chopped almonds over salads.  Or try adding chopped walnuts or almonds to cooked vegetables. ? Try some vegetarian meals using beans and peas. Add garbanzo or kidney beans to salads. Make burritos and tacos with mashed isbell beans or black beans. Where can you learn more? Go to http://torin-atif.info/. Enter U542 in the search box to learn more about \"DASH Diet: Care Instructions. \" Current as of: April 9, 2019 Content Version: 12.2 © 4607-9748 [employer]. Care instructions adapted under license by [employer] (which disclaims liability or warranty for this information). If you have questions about a medical condition or this instruction, always ask your healthcare professional. [address] any warranty or liability for your use of this information. Medicare Wellness Visit, Female The best way to improve and maintain good health is to have a healthy lifestyle by eating a well-balanced diet, exercising regularly, limiting alcohol and stopping smoking. Regular visits with your physician or non-physician health care provider also support your good health. Preventive screening tests can find health problems before they become diseases or illnesses. Preventive services such as immunizations prevent serious infections. All people over age 72 should have a Pneumovax and a Prevnar-13 shot to prevent potentially life threatening infections with the pneumococcus bacteria, a common cause of pneumonia. These are once in a lifetime unless you and your provider decide differently. All people over 65 should have a yearly influenza vaccine or \"flu\" shot. This does not prevent infection with cold viruses but has been proven to prevent hospitalization and death from influenza. Although Medicare part B \"regular Medicare\" currently only covers tetanus vaccination in the context of an injury, a tetanus vaccine (Tdap or Td) is recommended every 10 years. A shingles vaccine is recommended once in a lifetime after age 61. The Shingles vaccine is also not covered by Medicare part B. Note, however, that both the Shingles vaccine and Tdap/Td are generally covered by secondary carriers. Please check your coverage and out of pocket expenses. Consider contacting your local health department because it may stock these vaccines for a reasonable charge. We currently have documentation of the following immunization history for you: 
Immunization History Administered Date(s) Administered  (RETIRED) Pneumococcal Vaccine (Unspecified Type) 01/01/2003  Influenza High Dose Vaccine PF 09/29/2014, 10/05/2015, 11/01/2016, 08/30/2017  Influenza Vaccine (Tri) Adjuvanted 10/09/2018, 09/17/2019  Influenza Vaccine Whole 09/07/2010, 09/03/2011, 10/04/2012  Pneumococcal Conjugate (PCV-13) 10/05/2015  Pneumococcal Polysaccharide (PPSV-23) 01/01/2003  TD Vaccine 01/01/2003  Tdap 09/25/2013  Zoster 01/01/2007  Zoster Recombinant 09/18/2018, 11/23/2018 Screening for infection with Hepatitis C is recommended for anyone born between [birth year] through [address]. The table at the bottom of this document indicates the status of this and other preventive services. A bone mass density test (DEXA) to screen for osteoporosis or thinning of the bones should be done at least once after age 72 and may be done up to every 2 years as determined by you and your health care provider. The most recent DEXA we have on file for you is: DEXA Results (most recent): 
Results from [facility] Encounter encounter on 05/24/18 DEXA BONE DENSITY STUDY AXIAL Narrative DEXA BONE DENSITOMETRY, CENTRAL 
 
CPT CODE: 19989 INDICATION: Postmenopausal. History of osteopenia. Parkinson's. Hypertension. Family history of osteoporosis. Taking calcium and vitamin D. TECHNIQUE: Using GE LUNAR Prodigy densitometer, bone density measurement was performed in the lumbar spine in addition to the proximal left and right femora 
and the right forearm. T score refers to standard deviations above or below 
average compared to a young adult of the same sex. Z score refers to standard 
deviations above or below average compared to a patient of the same sex, age, 
race and weight. COMPARISON: The prior studies of 18 August 2009 and 23 May 2016 are no longer 
available on the bone densitometry unit for comparison trending. The images are 
available on PACS. The data from the previous study of 23 May 2016 has been 
included on this study. FINDINGS:  
 
Lumbar Spine Levels: L1, L2, and L4 Mean Bone Mineral Density (BMD):  1.086 g/cm2  (1.083 BMD on previous study) T Score: -0.8       (-0.8 T score on previous). Z Score: 1.2 On PA image of the lumbar spine obtained for localization of vertebral levels (not a diagnostic quality radiograph), it is noted that there is apparent 
increased density at L3. The density is not well characterized on the basis of 
this image, and may be due to underlying degenerative changes, prior surgery, 
trauma, or other osseous process. Since this density spuriously increases 
measured bone mineral density, this level has been excluded. Distal 1/3 Radius:  0.850 g/cm2  ( 0.817 BMD on previous study) T Score: -0.4       ( -0.8 T score on previous). Z Score: 2.2 Left Total Proximal Femur BMD: 0.887  g/cm2  (0.918 BMD on previous study) T Score: -1.0    ( -0.7 T score on previous). Z Score: 1.2 Right Total Proximal Femur BMD: 0.911 g/cm2 (0.941 BMD on previous study) T Score: -0.8       (-0.5 T score on previous). Z Score: 1.3 Left Femoral Neck BMD: 0.872  g/cm2 T Score: -1.2 Z Score: 1.1 Right Femoral Neck BMD: 0.819  g/cm2 T Score: -1.6 Z Score: 0.7 Impression IMPRESSION: 
 
1. BMD measures consistent with osteopenia. 2.  This will serve as the new baseline study at this institution. Based upon current ISCD guidelines, the patient's overall diagnostic category, 
selected using WHO criteria in postmenopausal women and males aged 48 and above, 
is selected based upon the lowest T score from among the lumbar spine, total 
femur, femoral neck, (or distal third radius if measured). WHO Definition of Osteoporosis and Osteopenia on DXA (specified for post 
menopausal  females): 
 
  Normal:                     T Score at or above -1 SD Osteopenia:               T Score between -1 and -2.5 SD Osteoporosis:             T Score at or below -2.5 SD The risk of fracture approximately doubles for each 1 SD decrease in T score. It is important to consider other factors in assessing a patient's risk of 
fracture, including age, risk of falling/injury, history of fragility fracture, 
family history of osteoporosis, smoking, low weight. Various fracture risk tools have been developed for adult patients and are 
available online. For example, the FRAX tool developed by [facility] is widely used. Reference www.iscd.org. It is also important to note that DXA measures bone density but does not 
distinguish among causes of decreased bone density, which include primary vs. 
secondary osteoporosis (such as metabolic bone disorders or possible effects of 
medications) and also other conditions (such as osteomalacia). Clinical 
considerations should determine what additional evaluation may be warranted to 
exclude secondary conditions in a patient with low bone density. It is 
suggested that secondary causes of low bone density be considered in patients 
with Z score lower than -2.0, and patients who are not responding to therapy for 
osteoporosis on followup DXA despite compliance with medications. Please note that reliable, valid comparisons can not be made between studies which have been performed on different densitometers. If clinically warranted, 
follow up study performed at this site would best permit assessment of trend for 
possible change in bone mineral density over time in comparison to this study. Thank you for this referral. 
  
 
 
Screening for diabetes mellitus with a blood sugar test (glucose) should be done at least every 3 years until age 79. You and your health care provider may decide whether to continue screening after age 79. The most recent blood glucose we have on file for you is:  
Lab Results Component Value Date/Time Glucose 90 10/10/2019 08:02 AM  
 Glucose, POC 91 07/12/2016 08:20 AM  
 
 
 
Glaucoma is a disease of the eye due to increased ocular pressure that can lead to blindness. People with risk factors for glaucoma ( race, diabetes, family history) should be screened at least every 2 years by an eye professional.  
 
Cardiovascular screening tests that check for elevated lipids or cholesterol (fatty part of blood) which can lead to heart disease and strokes should be done every 4-6 years through age 79. You and your health care provider may decide whether to continue screening after age 79. The most recent lipid panel we have on file for you is:  
Lab Results Component Value Date/Time Cholesterol, total 144 10/10/2019 08:02 AM  
 HDL Cholesterol 59 10/10/2019 08:02 AM  
 LDL, calculated 68.6 10/10/2019 08:02 AM  
 VLDL, calculated 16.4 10/10/2019 08:02 AM  
 Triglyceride 82 10/10/2019 08:02 AM  
 CHOL/HDL Ratio 2.4 10/10/2019 08:02 AM  
 
 
Colorectal cancer screening that evaluates for blood or polyps in your colon for people with average risk should be done yearly as a stool test, every five years as a flexible sigmoidoscope or every 10 years as a colonoscopy up to age 76. You and your health care provider may decide whether to continue screening after age 76. Breast cancer screening with a mammogram is recommended at least once every 2 years  for women age 54-69. You and your health care provider may decide whether to continue screening after age 76. The most recent mammogram we have on file for you is: [facility] Results (most recent): 
Results from Hospital Encounter encounter on 07/09/19 [facility] MAMMO BI SCREENING INCL CAD Narrative Digital Screening Mammogram  
 
History: Screening. Comparison: [id number] Technique: Digital mammography (2D) with CAD was performed. Routine views 
obtained. Findings:  
No masses, architectural distortion, or suspicious calcifications are seen. Impression Impression: No evidence of malignancy. Recommend continued screening with annual mammography. BIRADS 1 : Negative The breasts are heterogenously dense, which may obscure small masses. Thank you for this referral.   
 
 
Screening for cervical cancer with a pap smear is recommended for all women with a cervix until age 72. The frequency of this test is based on the details of her prior pap smear testing. You and your health care provider may decide whether to continue screening after age 72. People who have smoked the equivalent of 1 pack per day for 30 years or more may benefit from screening for lung cancer with a yearly low dose CT scan until they have been non smokers for 15 years or competing health conditions render this unlikely to be beneficial. Our records show: N/A Your Medicare Wellness Exam is recommended annually. Here is a list of your current Health Maintenance items with a due date: 
Health Maintenance Topic Date Due  Pneumococcal 65+ years (2 of 2 - PPSV23) 10/05/2016  GLAUCOMA SCREENING Q2Y  04/03/2020  MEDICARE YEARLY EXAM  10/30/2020  
 DTaP/Tdap/Td series (2 - Td) 09/25/2023  Bone Densitometry (Dexa) Screening  Completed  Shingrix Vaccine Age 50>  Completed  Influenza Age 5 to Adult  Completed

## 2019-10-30 NOTE — PROGRESS NOTES
This is the Subsequent Medicare Annual Wellness Exam, performed 12 months or more after the Initial AWV or the last Subsequent AWV    I have reviewed the patient's medical history in detail and updated the computerized patient record. History     Patient Active Problem List   Diagnosis Code    Hypertension I10    CAD S/P percutaneous coronary angioplasty I25.10, Z98.61    Dyslipidemia E78.5    Syncope R55    Parkinson disease ([address] G20    GERD (gastroesophageal reflux disease) K21.9    Macular degeneration of left eye H35.30    Laryngopharyngeal reflux disease K21.9    Osteopenia M85.80    Psoriasis of scalp L40.9    Lumbar spondylosis M47.816    Synovial cyst of lumbar spine M71.38    DDD (degenerative disc disease), lumbar M51.36    Primary osteoarthritis involving multiple joints M15.0     Past Medical History:   Diagnosis Date    Basal cell cancer     CAD (coronary artery disease), native coronary artery 03/21/2011    s/p PCI with with CARLITO (Xience 2.75x12, 2.5x12) mLAD and mild disease in rest of coronaries. Midly depressed LV syst fct     Cardiac cath 03/21/2011    mLAD 90% (2.75 x 12 mm Xience stent). Otherwise patent coronaries. LVEDP 10 mmHg. EF 40-45%. Anteroapical hypk. Renal arteries patent.  Cardiac echocardiogram 04/17/2014    EF 60-65%. No WMA. Gr 1 DDfx. Mild MR. Similar to study of 9/14/11.  Cardiac nuclear imaging test 07/2017    Negative for ischemia or infarction. EF > 70%.     Cardiomyopathy secondary     ischemic +/- stress induced    Dyslipidemia     GERD (gastroesophageal reflux disease)     Hx of colonoscopy 07/12/2016    Normal. Dr. Leonardo Muniz Hypertension     Parkinson's disease     Syncope     s/p ILD implantation      Past Surgical History:   Procedure Laterality Date    COLONOSCOPY N/A 7/12/2016    COLONOSCOPY performed by Magnolia Aviles MD at [address] HX BUNIONECTOMY      dorsal    HX CATARACT REMOVAL  11/2012    left    HX CATARACT REMOVAL  10/2012    right    HX CORONARY STENT PLACEMENT      HX HEART CATHETERIZATION      HX HEMORRHOIDECTOMY      HX HYSTERECTOMY  1996    partial    HX IMPLANTABLE LOOP RECORDER  8439-5912    HX TONSILLECTOMY      HX TOTAL ABDOMINAL HYSTERECTOMY  1996    partial     Current Outpatient Medications   Medication Sig Dispense Refill    rosuvastatin (CRESTOR) 10 mg tablet Take 0.5 Tabs by mouth every other day. 1 Tab 0    calcium carbonate (CALCIUM 500 PO) Take  by mouth.  metoprolol succinate (TOPROL-XL) 50 mg XL tablet Take 1 Tab by mouth daily. 90 Tab 3    lisinopril (PRINIVIL, ZESTRIL) 10 mg tablet Take 1 Tab by mouth daily. 90 Tab 3    potassium chloride (KLOR-CON) 10 mEq tablet Take 1 Tab by mouth every other day. 45 Tab 3    magnesium hydroxide (NEGRO MILK OF MAGNESIA) 400 mg/5 mL suspension Take 30 mL by mouth daily as needed for Constipation.  acetaminophen (TYLENOL) 650 mg CR tablet Take 650 mg by mouth every six (6) hours as needed for Pain.  co-enzyme Q-10 (CO Q-10) 100 mg capsule Take 200 mg by mouth daily.  aspirin 81 mg tablet Take 1 Tab by mouth daily. 1 Tab 0    MULTIVITAMIN PO Take 1 Tab by mouth daily.  DOCUSATE CALCIUM (STOOL SOFTENER PO) Take 1 Cap by mouth.  magnesium 250 mg Tab Take 250 mg by mouth two (2) times a day.  carbidopa 25mg-levodopa 100mg-entacapone 200mg (STALEVO 100) tab tablet Take 1 Tab by mouth four (4) times daily. 0    nitroglycerin (NITROSTAT) 0.4 mg SL tablet 1 sublingual every 5 minutes for chest pain for no more than 3 doses 25 tablet 3    fluticasone (FLONASE) 50 mcg/actuation nasal spray 2 Sprays by Both Nostrils route daily.  1 Bottle 2     Allergies   Allergen Reactions    Keflex [Cephalexin] Other (comments)     Yeast infection    Pcn [Penicillins] Other (comments)       Family History   Problem Relation Age of Onset    Heart Attack Father 80    Heart Disease Father     Cancer Mother         pancreatic  Hypertension Brother      Social History     Tobacco Use    Smoking status: Never Smoker    Smokeless tobacco: Never Used   Substance Use Topics    Alcohol use: Yes     Comment: glass of wine occasionally. Depression Risk Factor Screening:     3 most recent PHQ Screens 10/30/2019   PHQ Not Done -   Little interest or pleasure in doing things Not at all   Feeling down, depressed, irritable, or hopeless Not at all   Total Score PHQ 2 0       Alcohol Risk Factor Screening:   Do you average 1 drink per night or more than 7 drinks a week:  No    On any one occasion in the past three months have you have had more than 3 drinks containing alcohol:  No      Functional Ability and Level of Safety:   Hearing: Hearing is good. Activities of Daily Living: The home contains: no safety equipment. Patient does total self care    Ambulation: with no difficulty    Fall Risk:  Fall Risk Assessment, last 12 mths 10/30/2019   Able to walk? Yes   Fall in past 12 months?  No   Fall with injury? -   Number of falls in past 12 months -   Fall Risk Score -       Abuse Screen:  Patient is not abused    Cognitive Screening   Has your family/caregiver stated any concerns about your memory: no  Cognitive Screening: Normal    Patient Care Team   Patient Care Team:  Ra Huffman MD as PCP - General (Internal Medicine)  Ra Huffman MD as PCP - REHABILITATION [facility] Provider  Booker Rivera MD as Physician (Neurology)  Radha Naqvi MD (Otolaryngology)  Susan Woodall MD (Ophthalmology)  Vishal Lyn MD (Gastroenterology)  Brittany Epps MD (Ophthalmology)  Rajiv Bolden MD (Orthopedic Surgery)  Dorene Saint, MD (Cardiology)  Erika Morales MD (Orthopedic Surgery)  Tobias Johnson MD (Physical Medicine and Rehab)  Jason Lazo DPM (Podiatry)    Assessment/Plan   Education and counseling provided:  Are appropriate based on today's review and evaluation  End-of-Life planning (with patient's consent)  Pneumococcal Vaccine  Influenza Vaccine  Screening Mammography  Colorectal cancer screening tests  Cardiovascular screening blood test  Bone mass measurement (DEXA)  Screening for glaucoma  Diabetes screening test    Diagnoses and all orders for this visit:    1. Essential hypertension  -     CBC WITH AUTOMATED DIFF; Future  -     LIPID PANEL; Future  -     MAGNESIUM; Future  -     METABOLIC PANEL, COMPREHENSIVE; Future  -     TSH 3RD GENERATION; Future  -     URINALYSIS W/MICROSCOPIC; Future  -     VITAMIN D, 25 HYDROXY; Future    2. CAD S/P percutaneous coronary angioplasty  -     CBC WITH AUTOMATED DIFF; Future  -     LIPID PANEL; Future  -     MAGNESIUM; Future  -     METABOLIC PANEL, COMPREHENSIVE; Future  -     TSH 3RD GENERATION; Future  -     URINALYSIS W/MICROSCOPIC; Future  -     VITAMIN D, 25 HYDROXY; Future    3. Vasovagal syncope  -     CBC WITH AUTOMATED DIFF; Future  -     LIPID PANEL; Future  -     MAGNESIUM; Future  -     METABOLIC PANEL, COMPREHENSIVE; Future  -     TSH 3RD GENERATION; Future  -     URINALYSIS W/MICROSCOPIC; Future  -     VITAMIN D, 25 HYDROXY; Future    4. Dyslipidemia  -     CBC WITH AUTOMATED DIFF; Future  -     LIPID PANEL; Future  -     MAGNESIUM; Future  -     METABOLIC PANEL, COMPREHENSIVE; Future  -     TSH 3RD GENERATION; Future  -     URINALYSIS W/MICROSCOPIC; Future  -     VITAMIN D, 25 HYDROXY; Future    5. Parkinson disease ([facility] [address]  -     CBC WITH AUTOMATED DIFF; Future  -     LIPID PANEL; Future  -     MAGNESIUM; Future  -     METABOLIC PANEL, COMPREHENSIVE; Future  -     TSH 3RD GENERATION; Future  -     URINALYSIS W/MICROSCOPIC; Future  -     VITAMIN D, 25 HYDROXY; Future    6. Psoriasis of scalp  -     CBC WITH AUTOMATED DIFF; Future  -     LIPID PANEL; Future  -     MAGNESIUM; Future  -     METABOLIC PANEL, COMPREHENSIVE; Future  -     TSH 3RD GENERATION; Future  -     URINALYSIS W/MICROSCOPIC; Future  -     VITAMIN D, 25 HYDROXY; Future    7. Lumbar spondylosis  -     CBC WITH AUTOMATED DIFF; Future  -     LIPID PANEL; Future  -     MAGNESIUM; Future  -     METABOLIC PANEL, COMPREHENSIVE; Future  -     TSH 3RD GENERATION; Future  -     URINALYSIS W/MICROSCOPIC; Future  -     VITAMIN D, 25 HYDROXY; Future    8. Synovial cyst of lumbar spine  -     CBC WITH AUTOMATED DIFF; Future  -     LIPID PANEL; Future  -     MAGNESIUM; Future  -     METABOLIC PANEL, COMPREHENSIVE; Future  -     TSH 3RD GENERATION; Future  -     URINALYSIS W/MICROSCOPIC; Future  -     VITAMIN D, 25 HYDROXY; Future    9. DDD (degenerative disc disease), lumbar  -     CBC WITH AUTOMATED DIFF; Future  -     LIPID PANEL; Future  -     MAGNESIUM; Future  -     METABOLIC PANEL, COMPREHENSIVE; Future  -     TSH 3RD GENERATION; Future  -     URINALYSIS W/MICROSCOPIC; Future  -     VITAMIN D, 25 HYDROXY; Future    10. Osteopenia of multiple sites  -     CBC WITH AUTOMATED DIFF; Future  -     LIPID PANEL; Future  -     MAGNESIUM; Future  -     METABOLIC PANEL, COMPREHENSIVE; Future  -     TSH 3RD GENERATION; Future  -     URINALYSIS W/MICROSCOPIC; Future  -     VITAMIN D, 25 HYDROXY; Future    11. Encounter for immunization  -     PNEUMOCOCCAL POLYSACCHARIDE VACCINE, 23-VALENT, ADULT OR IMMUNOSUPPRESSED PT DOSE,  -     [place] IMMUNIZ ADMIN,1 SINGLE/COMB VAC/TOXOID  -     CBC WITH AUTOMATED DIFF; Future  -     LIPID PANEL; Future  -     MAGNESIUM; Future  -     METABOLIC PANEL, COMPREHENSIVE; Future  -     TSH 3RD GENERATION; Future  -     URINALYSIS W/MICROSCOPIC; Future  -     VITAMIN D, 25 HYDROXY; Future    12. Macular degeneration of left eye, unspecified type  -     CBC WITH AUTOMATED DIFF; Future  -     LIPID PANEL; Future  -     MAGNESIUM; Future  -     METABOLIC PANEL, COMPREHENSIVE; Future  -     TSH 3RD GENERATION; Future  -     URINALYSIS W/MICROSCOPIC; Future  -     VITAMIN D, 25 HYDROXY; Future    13.  Primary osteoarthritis involving multiple joints  -     CBC WITH AUTOMATED DIFF; Future  -     LIPID PANEL; Future  -     MAGNESIUM; Future  -     METABOLIC PANEL, COMPREHENSIVE; Future  -     TSH 3RD GENERATION; Future  -     URINALYSIS W/MICROSCOPIC; Future  -     VITAMIN D, 25 HYDROXY; Future    14. Laryngopharyngeal reflux disease  -     CBC WITH AUTOMATED DIFF; Future  -     LIPID PANEL; Future  -     MAGNESIUM; Future  -     METABOLIC PANEL, COMPREHENSIVE; Future  -     TSH 3RD GENERATION; Future  -     URINALYSIS W/MICROSCOPIC; Future  -     VITAMIN D, 25 HYDROXY; Future    15. Gastroesophageal reflux disease, esophagitis presence not specified  -     CBC WITH AUTOMATED DIFF; Future  -     LIPID PANEL; Future  -     MAGNESIUM; Future  -     METABOLIC PANEL, COMPREHENSIVE; Future  -     TSH 3RD GENERATION; Future  -     URINALYSIS W/MICROSCOPIC; Future  -     VITAMIN D, 25 HYDROXY; Future    16. Disorder of bone density and structure, unspecified   -     VITAMIN D, 25 HYDROXY; Future    17. Medicare annual wellness visit, subsequent    18. ACP (advance care planning)    Other orders  -     rosuvastatin (CRESTOR) 10 mg tablet; Take 0.5 Tabs by mouth every other day. There are no preventive care reminders to display for this patient.

## 2019-10-30 NOTE — ACP (ADVANCE CARE PLANNING)
Advance Care Planning (ACP) Provider Note - Comprehensive     Date of ACP Conversation: 10/30/19  Persons included in Conversation:  patient  Length of ACP Conversation in minutes:  16 minutes    Authorized Decision Maker (if patient is incapable of making informed decisions): son and daughter in law  This person is:  Healthcare Agent/Medical Power of  under Advance Directive          General ACP for ALL Patients with Decision Making Capacity:   Importance of advance care planning, including choosing a healthcare agent to communicate patient's healthcare decisions if patient lost the ability to make decisions, such as after a sudden illness or accident  Understanding of the healthcare agent role was assessed and information provided  Exploration of values, goals, and preferences if recovery is not expected, even with continued medical treatment in the event of: Imminent death  Severe, permanent brain injury  \"In these circumstances, what matters most to you? \"  Care focused more on comfort or quality of life. Review of Existing Advance Directive:  Patient has an existing advance directive on file, signed 4/11/2019. It designates her son and daughter in law as her healthcare agents and expresses that she does not wish life prolonging procedures for end of life care. It was reviewed with her and still reflects her wishes.        For Serious or Chronic Illness:  Understanding of medical condition      Interventions Provided:  Reviewed existing Advance Directive   Recommended review of completed ACP document annually or upon change in health status

## 2019-11-03 PROBLEM — M15.9 PRIMARY OSTEOARTHRITIS INVOLVING MULTIPLE JOINTS: Status: ACTIVE | Noted: 2019-04-15

## 2019-11-03 RX ORDER — ROSUVASTATIN CALCIUM 5 MG/1
10 TABLET, COATED ORAL EVERY OTHER DAY
Qty: 1 TAB | Refills: 0
Start: 2019-11-03 | End: 2019-11-03

## 2019-11-03 RX ORDER — ROSUVASTATIN CALCIUM 10 MG/1
5 TABLET, COATED ORAL EVERY OTHER DAY
Qty: 1 TAB | Refills: 0
Start: 2019-11-03 | End: 2020-04-14 | Stop reason: SDUPTHER

## 2019-11-03 RX ORDER — CARBIDOPA, LEVODOPA AND ENTACAPONE 25; 200; 100 MG/1; MG/1; MG/1
1 TABLET, FILM COATED ORAL 3 TIMES DAILY
Refills: 0 | COMMUNITY
Start: 2019-09-10

## 2019-11-03 NOTE — PROGRESS NOTES
HPI:   Christine Martinez is a [de-identified]y.o. year old female who presents today for a physical exam and for evaluation of hypertension, ASCVD, hyperlipidemia, syncope, Parkinson's disease, lumbar degenerative disease, GERD, and macular degeneration. She reports that she is doing reasonably well and is currently without new complaints. In 8/2018, she was evaluated by Dr. Fitz Michelle for right hip pain and was diagnosed with right piriformis syndrome. She was treated with a cortisone injection and physical therapy with some improvement, but subsequently developed right buttocks pain and pain and paresthesias down her right leg. She was treated with prednisone 50 mg for five days without improvement. She was seen on 10/9/2018, which was 10 days after completing the course of prednisone, and reported persistent severe right leg pain which was interfering with her activities and with sleep. She was taking Tylenol ES without relief, and was started on gabapentin. Lumbar spine x-ray was obtained (10/9/2018) showing grade 1 anterolisthesis of L4 on L5, either new or increased since 4/2015, and multilevel degenerative spondylosis/disc disease. She was referred to physical therapy, but due to continued persistent symptoms, she had a lumbar MRI (10/17/2018) which showed multilevel degenerative findings causing various degrees of neuroforaminal  narrowing and lateral recess narrowing; also a right L4-L5 neuroforamen cystic lesion measuring approximately 10 x 6 mm was noted. She had a lumbar MRI with contrast (11/16/2018) which confirmed an extruding synovial cyst from the right L4-5 advanced facet arthropathy; significant right foraminal stenosis and nerve impingement persists. She continued physical therapy and reported that her pain has significantly improved. She was evaluated by Dr. Yadira Villagomez on 1/3/2019 but her pain had already resolved. She is no longer taking gabapentin or Tylenol.        She has a history of hypertension, hyperlipidemia, ASCVD, and syncope. In 3/2011, she had three syncopal episodes while donating blood. Over the subsequent four days, she developed exertional substernal chest tightness with left arm pain and dyspnea. She was evaluated and EKG revealed new T wave inversions in leads V2 and V3. Troponins were negative. She underwent cardiac catheterization (3/21/2011) which showed a 90% mid LAD and mild disease in the rest of the coronaries. She underwent PCI and placement of two drug-eluting stents for the mid LAD lesion; LV function was mildly diminished (EF 40-45%) with anteroapical hypokinesis. Follow-up echocardiogram (9/2011) showed return to normal LV function (EF 60%) and no RWMA. She has a history of multiple syncopal episodes, usually related to stressful situations, and thought to be vasovagal in origin. An implantable loop recorder was placed in 3/2011 and remained until 4/2013, and interrogation was negative for any significant arrhythmia. In 4/2014, she was hospitalized following another syncopal episode, which occurred after she had taken her morning medications. Afterward, she became nauseated and flushed, and called EMS. When they arrived, they found her on the floor and reportedly without a pulse. They began CPR and within 10-15 seconds, she recovered. Evaluation included EKG/troponins, which were negative, and an echocardiogram showing mild MR and normal LV function (EF 60-65%). She was found to have hypokalemia and hypomagnesemia and hydrochlorothiazide was discontinued. It was thought that this event also represented a vasovagal reaction given her prodromal symptoms. She has had no further events since that time. She had a repeat pharmacologic nuclear stress test (7/14/2017) which was a normal, low risk study, negative for evidence of ischemia or prior infarction, and with normal LV size and function (EF 70%). Her current regimen includes metoprolol, lisinopril, aspirin, and rosuvastatin.  She is being followed by Dr. Myra Mejia. She is usually very active line dancing 3x/week, doing yardwork and riding her bicycle. She denies any chest pain, shortness of breath at rest or with exertion, palpitations, lightheadedness, or edema. In 11/2017, she reported bilateral thigh aching pain, which increased with ambulation particularly when walking up stairs. She stated that the discomfort was similar to that which developed previously with use of simvastatin and atorvastatin. She had been on pravastatin since 10/2014 and did not note any difficulty previously. She discontinued pravastatin and had resolution of her symptoms. She was subsequently started on rosuvastatin in 12/2017, and did well until 7/2018 when she again developed myalgias. Her dose was decreased to 5 mg every other day. She reports today that she continues to have aching discomfort in her bilateral thighs on the days which she takes rosuvastatin, but reports that it is tolerable so she will continue. She has a history of idiopathic Parkinson's disease, predominantly left-sided. She was being treated with Sinemet, but changed to Stalevo in 10/2019, and reports improved control of symptoms. She has difficulty with writing at times, particularly towards the end of the day, and also describes increasing tremors midday. She is followed by Dr. Priscila Chung. She has a history of laryngopharyngeal reflux disease, treated previously with dexlansoprazole, but ha successfully weaned from therapy. She is now receiving immunotherapy with Dr. Oriana Allen to address her allergies and hoarseness. In 5/2017 she had multiple episodes of epistaxis prompting an ED visit. She subsequently underwent silver nitrate cautery of anterior vessels in her right nares by Dr. Oriana Allen, and has had no recurrence. In 3/2010, she underwent evaluation for iron deficiency anemia.  She had previously had a negative colonoscopy and air contrast barium enema in 2009 by Dr. Kaelyn Toledo, and she had an upper endoscopy by Dr. Ruperto Rios which was normal. She was treated with iron with improvement. She had a repeat screening colonoscopy in 7/2016 by Dr. Ruperto Rios which was normal. No further follow-up was recommended given her age. She denies any abdominal pain, nausea, vomiting, melena, hematochezia, or change in bowel movements. She has a history of osteopenia with last bone density study in 5/2018 showing T-scores:  femoral neck left -1.2  /right -1.6 and lumbar -0.8 . She continues to take calcium and Vitamin D supplements. She has no history of pathologic fractures. She has a recent history of abnormal mammograms since 10/2014 with microcalcifcations in the right breast. She has been undergoing surveillance mammograms every six months, which have been unchanged. She had a follow-up mammogram in 5/2016 which was negative, and routine annual screening was recommended. She has a history of bilateral knee pain secondary to osteoarthritis. She was evaluated by Dr. Jimi Adame in 3/2018 and received a cortisone injection with improvement. She also has difficutly with left ankle pain which increase with ambulation. She received a cortisone injection for this as well from Dr. Jimi Adame in 3/2018, but did not experience significant improvement. She was evaluated by Dr. Hadley Cook on 3/27/2018 and diagnosed with left peroneus brevis tendinitis. An orthotic was recommended with improvement. She is now being followed by Dr. Julian Packer for left ankle pain, and CT scan left ankle (7/2019) showed evidence of chronic ATFL sprain or complete rupture, likely prior sprain of the calcaneofibular ligament, and advanced posterior subtalar and tarsometatarsal osteoarthrosis. Past Medical History:   Diagnosis Date    Basal cell cancer     CAD (coronary artery disease), native coronary artery 03/21/2011    s/p PCI with with CARLITO (Xience 2.75x12, 2.5x12) mLAD and mild disease in rest of coronaries.  Midly depressed LV syst fct     Cardiac cath 03/21/2011    mLAD 90% (2.75 x 12 mm Xience stent). Otherwise patent coronaries. LVEDP 10 mmHg. EF 40-45%. Anteroapical hypk. Renal arteries patent.  Cardiac echocardiogram 04/17/2014    EF 60-65%. No WMA. Gr 1 DDfx. Mild MR. Similar to study of 9/14/11.  Cardiac nuclear imaging test 07/2017    Negative for ischemia or infarction. EF > 70%.  Cardiomyopathy secondary     ischemic +/- stress induced    Dyslipidemia     GERD (gastroesophageal reflux disease)     Hx of colonoscopy 07/12/2016    Normal. Dr. Tracy Brown Hypertension     Parkinson's disease     Syncope     s/p ILD implantation     Past Surgical History:   Procedure Laterality Date    COLONOSCOPY N/A 7/12/2016    COLONOSCOPY performed by Viky Vo MD at [address] HX BUNIONECTOMY      dorsal    HX CATARACT REMOVAL  11/2012    left    HX CATARACT REMOVAL  10/2012    right    HX CORONARY STENT PLACEMENT      HX HEART CATHETERIZATION      HX HEMORRHOIDECTOMY      HX HYSTERECTOMY  1996    partial    HX IMPLANTABLE LOOP RECORDER  [id number]    HX TONSILLECTOMY      HX TOTAL ABDOMINAL HYSTERECTOMY  1996    partial     Current Outpatient Medications   Medication Sig    rosuvastatin (CRESTOR) 10 mg tablet Take 0.5 Tabs by mouth every other day.  calcium carbonate (CALCIUM 500 PO) Take  by mouth.  metoprolol succinate (TOPROL-XL) 50 mg XL tablet Take 1 Tab by mouth daily.  lisinopril (PRINIVIL, ZESTRIL) 10 mg tablet Take 1 Tab by mouth daily.  potassium chloride (KLOR-CON) 10 mEq tablet Take 1 Tab by mouth every other day.  magnesium hydroxide (NEGRO MILK OF MAGNESIA) 400 mg/5 mL suspension Take 30 mL by mouth daily as needed for Constipation.  acetaminophen (TYLENOL) 650 mg CR tablet Take 650 mg by mouth every six (6) hours as needed for Pain.  co-enzyme Q-10 (CO Q-10) 100 mg capsule Take 200 mg by mouth daily.  aspirin 81 mg tablet Take 1 Tab by mouth daily.     MULTIVITAMIN PO Take 1 Tab by mouth daily.  DOCUSATE CALCIUM (STOOL SOFTENER PO) Take 1 Cap by mouth.  magnesium 250 mg Tab Take 250 mg by mouth two (2) times a day.  carbidopa 25mg-levodopa 100mg-entacapone 200mg (STALEVO 100) tab tablet Take 1 Tab by mouth four (4) times daily.  nitroglycerin (NITROSTAT) 0.4 mg SL tablet 1 sublingual every 5 minutes for chest pain for no more than 3 doses    fluticasone (FLONASE) 50 mcg/actuation nasal spray 2 Sprays by Both Nostrils route daily. No current facility-administered medications for this visit. Allergies and Intolerances: Allergies   Allergen Reactions    Keflex [Cephalexin] Other (comments)     Yeast infection    Pcn [Penicillins] Other (comments)     Family History: She has no family history of colon or breast cancer. Family History   Problem Relation Age of Onset    Heart Attack Father 80    Heart Disease Father     Cancer Mother         pancreatic    Hypertension Brother      Social History:   She  reports that she has never smoked. She has never used smokeless tobacco. She lives with her , who is having difficulty with mild cognitive impairment. She states that they sold their RV and now stay at home for the winter. She is a retired x-ray technician. Social History     Substance and Sexual Activity   Alcohol Use Yes    Comment: glass of wine occasionally.      Immunization History:  Immunization History   Administered Date(s) Administered    (RETIRED) Pneumococcal Vaccine (Unspecified Type) 01/01/2003    Influenza High Dose Vaccine PF 09/29/2014, 10/05/2015, 11/01/2016, 08/30/2017    Influenza Vaccine (Tri) Adjuvanted 10/09/2018, 09/17/2019    Influenza Vaccine Whole 09/07/2010, 09/03/2011, 10/04/2012    Pneumococcal Conjugate (PCV-13) 10/05/2015    Pneumococcal Polysaccharide (PPSV-23) 01/01/2003, 10/30/2019    TD Vaccine 01/01/2003    Tdap 09/25/2013    Zoster 01/01/2007    Zoster Recombinant 09/18/2018, 11/23/2018       Review of Systems:   As above included in HPI. Otherwise 11 point review of systems negative including constitutional, skin, HENT, eyes, respiratory, cardiovascular, gastrointestinal, genitourinary, musculoskeletal, endo/heme/aller, neurological.    Physical:   Vitals:   BP: 136/78  HR: 66  WT: 124 lb (56.2 kg)  BMI:  20.73 kg/m2    Exam:     Patient appears in no apparent distress. Affect is appropriate. HEENT: PERRLA, anicteric, oropharynx clear, no JVD, adenopathy or thyromegaly. No carotid bruits or radiated murmur. Lungs: clear to auscultation, no wheezes, rhonchi, or rales. Heart: regular rate and rhythm. No murmur, rubs, gallops  Abdomen: soft, nontender, nondistended, normal bowel sounds, no hepatosplenomegaly or masses. Extremities: without edema. Pulses 1-2+ bilaterally.     Review of Data:  Labs:  Hospital Outpatient Visit on 10/10/2019   Component Date Value Ref Range Status    LIPID PROFILE 10/10/2019        Final    Cholesterol, total 10/10/2019 144  <200 MG/DL Final    Triglyceride 10/10/2019 82  <150 MG/DL Final    HDL Cholesterol 10/10/2019 59  40 - 60 MG/DL Final    LDL, calculated 10/10/2019 68.6  0 - 100 MG/DL Final    VLDL, calculated 10/10/2019 16.4  MG/DL Final    CHOL/HDL Ratio 10/10/2019 2.4  0 - 5.0   Final    Sodium 10/10/2019 141  136 - 145 mmol/L Final    Potassium 10/10/2019 4.0  3.5 - 5.5 mmol/L Final    Chloride 10/10/2019 107  100 - 111 mmol/L Final    CO2 10/10/2019 30  21 - 32 mmol/L Final    Anion gap 10/10/2019 4  3.0 - 18 mmol/L Final    Glucose 10/10/2019 90  74 - 99 mg/dL Final    BUN 10/10/2019 15  7.0 - 18 MG/DL Final    Creatinine 10/10/2019 0.64  0.6 - 1.3 MG/DL Final    BUN/Creatinine ratio 10/10/2019 23* 12 - 20   Final    GFR est AA 10/10/2019 >60  >60 ml/min/1.73m2 Final    GFR est non-AA 10/10/2019 >60  >60 ml/min/1.73m2 Final    Calcium 10/10/2019 9.0  8.5 - 10.1 MG/DL Final     Health Maintenance:  Screening:    Mammogram: negative (7/2019)   PAP smear: s/p ROHINI. No further screening. Colorectal: colonoscopy (7/2016) normal. Dr. Opal Arteaga. No further screening. Depression: none   DM (HbA1c/FPG): FPG 90 (10/2019)   Hepatitis C: N/A   Falls: none   DEXA: osteopenia (5/2018)   Glaucoma: regular eye exams with Dr. Cristiane Muniz (last 3/2019) and Dr. Stanton Stewart for left macular degeneration. 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 Hyperlipidemia. Previously on low intensity dose pravastatin, but developed severe bilateral thigh pain, worse with ambulation and was discontinued in 11/2018 with improvement. Reported similar symptoms in past with statin (Zocor until 2012, and then Lipitor until 9/2014). Started on rosuvastatin 10 mg daily in 12/2018 and tolerated without difficulty until 7/2018 when developed recurrent myalgias and dose decreased to 5 mg every other day with improvement. Lipid panel today with LDL 68 and HDL 59, indicative of excellent control. Emphasized importance of lifestyle modifications, including diet and exercise. Would not recommend weight loss given BMI. Continue to follow. 2. Hypertension. Blood pressure well controlled on lisinopril 10 mg daily and metoprolol succinate 50 mg daily. Renal function remains normal with creatinine 0.64 / eGFR >60. Continue to follow. 3. ASCVD.  Remains asymptomatic on current regimen of aspirin, metoprolol succinate, and rosuvastatin. Resolution of cardiomyopathy with last echocardiogram revealing normal LV function. Negative pharmacologic nuclear stress test in 7/2017. Plan for repeat stress test next year. Being followed by Dr. Grace Ward. 4. H/O syncope. No further episodes since 2014. Most likely secondary to vasovagal reaction. Follow. 5. Parkinson's disease. Therapy changed to Stalevo with significant improvement in control of tremors. Being followed by Dr. Bipin Moreau.  6. Osteopenia. Last bone density scan 5/2018. Using femoral neck T-scores, calculated FRAX score estimates her 10 year risk of a major osteoporetic fracture at 12 % and hip fracture at 3.1 %, which are an indication for biphosphonate treatment based on hip fracture risk of >3%. However, no significant change from 5/2018. Continue calcium and vitamin D supplements. Encouraged exercise, particularly weight bearing activities. Will follow. Fall precautions stressed. 7. Lumbar spondylosis. Patient with symptoms of right buttock pain since 8/2018 and diagnosed with right piriformis syndrome. Completed physical therapy and received cortisone injection with some improvement, but subsequently developed right leg paresthesias and pain consistent with right sided sciatica. Treated with five day course of prednisone 50 mg without improvement. LS spine x-rays and lumbar MRI with degenerative changes, although MRI showed a right L4-L5 neuroforamen synovial cyst from the right L4-5 advanced facet arthropathy with significant right foraminal stenosis and nerve impingement. Referred for physical therapy and treated with gabapentin 100 mg tid with resolution of the sciatica symptoms. Evaluated by Dr. Emmanuel Anne, but symptoms had already resolved. No longer taking gabapentin. Follow. 8. Osteoarthritis. Difficulty with bilateral knee pain L>R and receiving intermittent cortisone injections from Dr. Bob Posey.  Left ankle pain being addressed by podiatrist, Dr. Dilshad Ngo. 9. Macular degeneration, left. On Preservision. Being followed by Dr. Rhina Osborn. 10. Laryngopharyngeal reflux. Doing well. Discontinued Dexilant without an increase in symptoms. Now receiving immunotherapy to address hoarseness and allergies. Followed by Dr. Trinidad Perry. 11. Psoriasis. Using clobetasol solution for scalp psoriasis. 12. Health maintenance. Already received influenza vaccine. Received 2/2 doses of Shingrix vaccine. Will give Pneumovax 23 today as did not receive dose over age 72. Other immunizations up to date. No further colorectal cancer screening needed. Mammogram up to date. Continue regular eye exams with Ankur Bill and Rhina Osborn. Vitamin D level normal. Continue maintenance dose supplement. In addition, an annual Medicare wellness visit was done today. Patient understands recommendations and agrees with plan.   Follow-up in 6 months for physical.

## 2019-12-02 RX ORDER — NITROGLYCERIN 0.4 MG/1
TABLET SUBLINGUAL
Qty: 25 TAB | Refills: 3 | Status: SHIPPED | OUTPATIENT
Start: 2019-12-02 | End: 2022-06-14

## 2019-12-02 NOTE — TELEPHONE ENCOUNTER
Last Visit: 10/30/19 with MD Lexis Curry Next Appointment: 20 with MD Lexis Curry Previous Refill Encounter(s): 14 #25 with 3 refills Requested Prescriptions Pending Prescriptions Disp Refills  nitroglycerin (NITROSTAT) 0.4 mg SL tablet 25 Tab 3 Si sublingual every 5 minutes for chest pain for no more than 3 doses

## 2019-12-10 RX ORDER — METOPROLOL SUCCINATE 50 MG/1
50 TABLET, EXTENDED RELEASE ORAL DAILY
Qty: 90 TAB | Refills: 3 | Status: SHIPPED | OUTPATIENT
Start: 2019-12-10 | End: 2020-12-21 | Stop reason: SDUPTHER

## 2019-12-10 NOTE — TELEPHONE ENCOUNTER
Last Visit: 10/30/19 with MD Mike Patel Next Appointment: 5/6/20 with MD Mike Patel Previous Refill Encounter(s): 12/17/18 #90 with 3 refills Requested Prescriptions Pending Prescriptions Disp Refills  metoprolol succinate (TOPROL-XL) 50 mg XL tablet 90 Tab 3 Sig: Take 1 Tab by mouth daily.

## 2019-12-23 RX ORDER — POTASSIUM CHLORIDE 750 MG/1
10 TABLET, EXTENDED RELEASE ORAL EVERY OTHER DAY
Qty: 45 TAB | Refills: 3 | Status: SHIPPED | OUTPATIENT
Start: 2019-12-23 | End: 2020-06-03 | Stop reason: SDUPTHER

## 2019-12-23 NOTE — TELEPHONE ENCOUNTER
Last Visit: 10/30/19 with MD Bethanie Rosario Next Appointment: 5/6/20 with MD Bethanie Rosario Previous Refill Encounter(s): 2/7/18 #45 with 3 refills Requested Prescriptions Pending Prescriptions Disp Refills  potassium chloride (KLOR-CON) 10 mEq tablet 45 Tab 3 Sig: Take 1 Tab by mouth every other day.

## 2020-01-13 RX ORDER — LISINOPRIL 10 MG/1
10 TABLET ORAL DAILY
Qty: 90 TAB | Refills: 3 | Status: SHIPPED | OUTPATIENT
Start: 2020-01-13 | End: 2021-02-16 | Stop reason: SDUPTHER

## 2020-01-13 NOTE — TELEPHONE ENCOUNTER
Last Visit: 10/30/19 with MD Bosie Aase  Next Appointment: 5/6/20 with MD Wang  Previous Refill Encounter(s): 12/17/18 #90 with 3 refills    Requested Prescriptions     Pending Prescriptions Disp Refills    lisinopril (PRINIVIL, ZESTRIL) 10 mg tablet 90 Tab 3     Sig: Take 1 Tab by mouth daily.

## 2020-04-14 RX ORDER — ROSUVASTATIN CALCIUM 10 MG/1
5 TABLET, COATED ORAL EVERY OTHER DAY
Qty: 30 TAB | Refills: 3 | Status: SHIPPED | OUTPATIENT
Start: 2020-04-14 | End: 2021-08-09 | Stop reason: SDUPTHER

## 2020-04-14 NOTE — TELEPHONE ENCOUNTER
Last Visit: 10/30/19 with MD Eyad Ugarte Next Appointment: 5/6/20 with MD Eyad Ugarte Previous Refill Encounter(s): 4/11/19 #90 with 3 refills Requested Prescriptions Pending Prescriptions Disp Refills  rosuvastatin (CRESTOR) 10 mg tablet 30 Tab 3 Sig: Take 0.5 Tabs by mouth every other day.

## 2020-04-29 ENCOUNTER — APPOINTMENT (OUTPATIENT)
Dept: INTERNAL MEDICINE CLINIC | Age: 80
End: 2020-04-29

## 2020-04-29 ENCOUNTER — HOSPITAL ENCOUNTER (OUTPATIENT)
Dept: LAB | Age: 80
Discharge: HOME | End: 2020-04-29
Payer: MEDICARE

## 2020-04-29 DIAGNOSIS — Z98.61 CAD S/P PERCUTANEOUS CORONARY ANGIOPLASTY: ICD-10-CM

## 2020-04-29 DIAGNOSIS — E78.5 DYSLIPIDEMIA: ICD-10-CM

## 2020-04-29 DIAGNOSIS — M15.9 PRIMARY OSTEOARTHRITIS INVOLVING MULTIPLE JOINTS: ICD-10-CM

## 2020-04-29 DIAGNOSIS — I25.10 CAD S/P PERCUTANEOUS CORONARY ANGIOPLASTY: ICD-10-CM

## 2020-04-29 DIAGNOSIS — R55 VASOVAGAL SYNCOPE: ICD-10-CM

## 2020-04-29 DIAGNOSIS — M85.89 OSTEOPENIA OF MULTIPLE SITES: ICD-10-CM

## 2020-04-29 DIAGNOSIS — I10 ESSENTIAL HYPERTENSION: ICD-10-CM

## 2020-04-29 DIAGNOSIS — Z23 ENCOUNTER FOR IMMUNIZATION: ICD-10-CM

## 2020-04-29 DIAGNOSIS — M47.816 LUMBAR SPONDYLOSIS: ICD-10-CM

## 2020-04-29 DIAGNOSIS — M71.38 SYNOVIAL CYST OF LUMBAR SPINE: ICD-10-CM

## 2020-04-29 DIAGNOSIS — L40.9 PSORIASIS OF SCALP: ICD-10-CM

## 2020-04-29 DIAGNOSIS — M51.36 DDD (DEGENERATIVE DISC DISEASE), LUMBAR: ICD-10-CM

## 2020-04-29 DIAGNOSIS — G20 PARKINSON DISEASE (HCC): ICD-10-CM

## 2020-04-29 DIAGNOSIS — K21.9 LARYNGOPHARYNGEAL REFLUX DISEASE: ICD-10-CM

## 2020-04-29 DIAGNOSIS — M85.9 DISORDER OF BONE DENSITY AND STRUCTURE, UNSPECIFIED: ICD-10-CM

## 2020-04-29 DIAGNOSIS — K21.9 GASTROESOPHAGEAL REFLUX DISEASE, ESOPHAGITIS PRESENCE NOT SPECIFIED: ICD-10-CM

## 2020-04-29 DIAGNOSIS — H35.30 MACULAR DEGENERATION OF LEFT EYE, UNSPECIFIED TYPE: ICD-10-CM

## 2020-04-29 LAB
25(OH)D3 SERPL-MCNC: 39.3 NG/ML (ref 30–100)
ALBUMIN SERPL-MCNC: 3.4 G/DL (ref 3.4–5)
ALBUMIN/GLOB SERPL: 1.1 {RATIO} (ref 0.8–1.7)
ALP SERPL-CCNC: 96 U/L (ref 45–117)
ALT SERPL-CCNC: 20 U/L (ref 13–56)
ANION GAP SERPL CALC-SCNC: 3 MMOL/L (ref 3–18)
APPEARANCE UR: CLEAR
AST SERPL-CCNC: 20 U/L (ref 10–38)
BACTERIA URNS QL MICRO: ABNORMAL /HPF
BASOPHILS # BLD: 0 K/UL (ref 0–0.1)
BASOPHILS NFR BLD: 0 % (ref 0–2)
BILIRUB SERPL-MCNC: 0.7 MG/DL (ref 0.2–1)
BILIRUB UR QL: NEGATIVE
BUN SERPL-MCNC: 12 MG/DL (ref 7–18)
BUN/CREAT SERPL: 17 (ref 12–20)
CALCIUM SERPL-MCNC: 8.6 MG/DL (ref 8.5–10.1)
CHLORIDE SERPL-SCNC: 106 MMOL/L (ref 100–111)
CHOLEST SERPL-MCNC: 132 MG/DL
CO2 SERPL-SCNC: 31 MMOL/L (ref 21–32)
COLOR UR: ABNORMAL
CREAT SERPL-MCNC: 0.7 MG/DL (ref 0.6–1.3)
DIFFERENTIAL METHOD BLD: ABNORMAL
EOSINOPHIL # BLD: 0.1 K/UL (ref 0–0.4)
EOSINOPHIL NFR BLD: 3 % (ref 0–5)
EPITH CASTS URNS QL MICRO: ABNORMAL /LPF (ref 0–5)
ERYTHROCYTE [DISTWIDTH] IN BLOOD BY AUTOMATED COUNT: 13.4 % (ref 11.6–14.5)
GLOBULIN SER CALC-MCNC: 3.1 G/DL (ref 2–4)
GLUCOSE SERPL-MCNC: 85 MG/DL (ref 74–99)
GLUCOSE UR STRIP.AUTO-MCNC: NEGATIVE MG/DL
HCT VFR BLD AUTO: 38.7 % (ref 35–45)
HDLC SERPL-MCNC: 62 MG/DL (ref 40–60)
HDLC SERPL: 2.1 {RATIO} (ref 0–5)
HGB BLD-MCNC: 12.6 G/DL (ref 12–16)
HGB UR QL STRIP: NEGATIVE
KETONES UR QL STRIP.AUTO: ABNORMAL MG/DL
LDLC SERPL CALC-MCNC: 55 MG/DL (ref 0–100)
LEUKOCYTE ESTERASE UR QL STRIP.AUTO: ABNORMAL
LIPID PROFILE,FLP: ABNORMAL
LYMPHOCYTES # BLD: 1.3 K/UL (ref 0.9–3.6)
LYMPHOCYTES NFR BLD: 28 % (ref 21–52)
MAGNESIUM SERPL-MCNC: 2.6 MG/DL (ref 1.6–2.6)
MCH RBC QN AUTO: 31 PG (ref 24–34)
MCHC RBC AUTO-ENTMCNC: 32.6 G/DL (ref 31–37)
MCV RBC AUTO: 95.3 FL (ref 74–97)
MONOCYTES # BLD: 0.5 K/UL (ref 0.05–1.2)
MONOCYTES NFR BLD: 10 % (ref 3–10)
NEUTS SEG # BLD: 2.7 K/UL (ref 1.8–8)
NEUTS SEG NFR BLD: 59 % (ref 40–73)
NITRITE UR QL STRIP.AUTO: NEGATIVE
PH UR STRIP: 7.5 [PH] (ref 5–8)
PLATELET # BLD AUTO: 225 K/UL (ref 135–420)
PMV BLD AUTO: 10.3 FL (ref 9.2–11.8)
POTASSIUM SERPL-SCNC: 4 MMOL/L (ref 3.5–5.5)
PROT SERPL-MCNC: 6.5 G/DL (ref 6.4–8.2)
PROT UR STRIP-MCNC: NEGATIVE MG/DL
RBC # BLD AUTO: 4.06 M/UL (ref 4.2–5.3)
RBC #/AREA URNS HPF: ABNORMAL /HPF (ref 0–5)
SODIUM SERPL-SCNC: 140 MMOL/L (ref 136–145)
SP GR UR REFRACTOMETRY: 1.02 (ref 1–1.03)
TRI-PHOS CRY URNS QL MICRO: ABNORMAL
TRIGL SERPL-MCNC: 75 MG/DL (ref ?–150)
TSH SERPL DL<=0.05 MIU/L-ACNC: 2.78 UIU/ML (ref 0.36–3.74)
UROBILINOGEN UR QL STRIP.AUTO: 0.2 EU/DL (ref 0.2–1)
VLDLC SERPL CALC-MCNC: 15 MG/DL
WBC # BLD AUTO: 4.6 K/UL (ref 4.6–13.2)
WBC URNS QL MICRO: ABNORMAL /HPF (ref 0–4)

## 2020-04-29 PROCEDURE — 83735 ASSAY OF MAGNESIUM: CPT

## 2020-04-29 PROCEDURE — 36415 COLL VENOUS BLD VENIPUNCTURE: CPT

## 2020-04-29 PROCEDURE — 80053 COMPREHEN METABOLIC PANEL: CPT

## 2020-04-29 PROCEDURE — 80061 LIPID PANEL: CPT

## 2020-04-29 PROCEDURE — 81001 URINALYSIS AUTO W/SCOPE: CPT

## 2020-04-29 PROCEDURE — 84443 ASSAY THYROID STIM HORMONE: CPT

## 2020-04-29 PROCEDURE — 82306 VITAMIN D 25 HYDROXY: CPT

## 2020-04-29 PROCEDURE — 85025 COMPLETE CBC W/AUTO DIFF WBC: CPT

## 2020-05-06 ENCOUNTER — VIRTUAL VISIT (OUTPATIENT)
Dept: INTERNAL MEDICINE CLINIC | Age: 80
End: 2020-05-06

## 2020-05-06 ENCOUNTER — TELEPHONE (OUTPATIENT)
Dept: INTERNAL MEDICINE CLINIC | Age: 80
End: 2020-05-06

## 2020-05-06 DIAGNOSIS — M47.816 LUMBAR SPONDYLOSIS: ICD-10-CM

## 2020-05-06 DIAGNOSIS — M51.36 DDD (DEGENERATIVE DISC DISEASE), LUMBAR: ICD-10-CM

## 2020-05-06 DIAGNOSIS — I25.10 CAD S/P PERCUTANEOUS CORONARY ANGIOPLASTY: ICD-10-CM

## 2020-05-06 DIAGNOSIS — K21.9 GASTROESOPHAGEAL REFLUX DISEASE, ESOPHAGITIS PRESENCE NOT SPECIFIED: ICD-10-CM

## 2020-05-06 DIAGNOSIS — M85.89 OSTEOPENIA OF MULTIPLE SITES: ICD-10-CM

## 2020-05-06 DIAGNOSIS — Z98.61 CAD S/P PERCUTANEOUS CORONARY ANGIOPLASTY: ICD-10-CM

## 2020-05-06 DIAGNOSIS — L40.9 PSORIASIS OF SCALP: ICD-10-CM

## 2020-05-06 DIAGNOSIS — K21.9 LARYNGOPHARYNGEAL REFLUX DISEASE: ICD-10-CM

## 2020-05-06 DIAGNOSIS — J30.89 NON-SEASONAL ALLERGIC RHINITIS, UNSPECIFIED TRIGGER: ICD-10-CM

## 2020-05-06 DIAGNOSIS — I10 ESSENTIAL HYPERTENSION: Primary | ICD-10-CM

## 2020-05-06 DIAGNOSIS — G20 PARKINSON DISEASE (HCC): ICD-10-CM

## 2020-05-06 DIAGNOSIS — E78.5 DYSLIPIDEMIA: ICD-10-CM

## 2020-05-06 PROBLEM — J30.9 ALLERGIC RHINITIS: Status: ACTIVE | Noted: 2020-05-06

## 2020-05-06 NOTE — PROGRESS NOTES
James Styles is a 78 y.o. female evaluated via telephone on 5/6/2020. Consent:  She and/or health care decision maker is aware that she may receive a bill for this telephone service, depending on her insurance coverage, and has provided verbal consent to proceed: Yes      Documentation:  I communicated with the patient and/or health care decision maker about her current medical status and lab results. She reports that she is doing well. She has been following social distancing guidelines and is staying active at home making masks and doing yard work. She states that her blood pressure remains controlled at 120-130/ 70-80 and her weight is decreased slightly to 120 pounds which she attributes to increased activity. She states that her parkinson's disease is reasonably stable, but continues to describe times of the day with worsening tremor. She states that this interferes with some delicate activities such as threading the needle of her sewing machine. She reports no changes in her medication and continues to tolerate rosuvastatin at 5 mg every other day dose. She reports that she has a routine stress test scheduled for 6/25/2020 and denies any current symptoms of angina. She also reports that she is now receiving immunotherapy from Dr. Ronny Serna. She states that her back pain and sciatica have been relatively quiescent. Discussed lab results and she was informed of the following:  CBC normal without evidence of anemia. Renal and liver function are normal. Magnesium at high end of normal at 2.6. Thyroid function is normal and Vitamin D level is normal.   Urinalysis without evidence of protein or blood. Lipid panel with total chol 132, HDL 62, and LDL 55, which is well controlled on current dose of rosuvastatin. Details of this discussion including any medical advice provided: Advised:  1. Continue to monitor blood pressure.  Continue lisinopril 10 mg daily and metoprolol succinate 50 mg daily.  2. Decrease dose of magnesium to 250 mg once daily. 3. Counseled patient regarding strict mitigation measures for COVID-19, including social distancing and diligent hand washing, as recommended by the CDC. Patient understands recommendations and agrees with plan. Follow-up in 6 months. I affirm this is a Patient Initiated Episode with a Patient who has not had a related appointment within my department in the past 7 days or scheduled within the next 24 hours.     Total Time: minutes: 11-20 minutes    Note: not billable if this call serves to triage the patient into an appointment for the relevant concern      Gonzalez Agarwal MD

## 2020-06-02 NOTE — TELEPHONE ENCOUNTER
Pt calling, says the base is out of the Potassium chloride 10 mg. Says they do have the 20. Wants to know if you can give her an rx for the 20 and put that she has to cut in half. She wants to  at the [facility] hand.

## 2020-06-03 RX ORDER — POTASSIUM CHLORIDE 20 MEQ/1
10 TABLET, EXTENDED RELEASE ORAL EVERY OTHER DAY
Qty: 25 TAB | Refills: 3 | Status: SHIPPED | OUTPATIENT
Start: 2020-06-03 | End: 2021-06-28 | Stop reason: SDUPTHER

## 2020-06-03 NOTE — TELEPHONE ENCOUNTER
Pended new Rx with changed dosage. Please sign if appropriate. Requested Prescriptions Pending Prescriptions Disp Refills  potassium chloride (K-DUR, KLOR-CON) 20 mEq tablet 23 Tab 3 Sig: Take 0.5 Tabs by mouth every other day.

## 2020-06-25 ENCOUNTER — HOSPITAL ENCOUNTER (OUTPATIENT)
Dept: NON INVASIVE DIAGNOSTICS | Age: 80
Discharge: HOME | End: 2020-06-25
Attending: INTERNAL MEDICINE
Payer: MEDICARE

## 2020-06-25 VITALS
HEIGHT: 65 IN | DIASTOLIC BLOOD PRESSURE: 80 MMHG | SYSTOLIC BLOOD PRESSURE: 140 MMHG | WEIGHT: 124 LBS | BODY MASS INDEX: 20.66 KG/M2

## 2020-06-25 DIAGNOSIS — I25.10 CAD S/P PERCUTANEOUS CORONARY ANGIOPLASTY: ICD-10-CM

## 2020-06-25 DIAGNOSIS — Z98.61 CAD S/P PERCUTANEOUS CORONARY ANGIOPLASTY: ICD-10-CM

## 2020-06-25 LAB
STRESS BASELINE DIAS BP: 80 MMHG
STRESS BASELINE HR: 56 BPM
STRESS BASELINE SYS BP: 140 MMHG
STRESS ESTIMATED WORKLOAD: 1 METS
STRESS EXERCISE DUR MIN: NORMAL
STRESS PEAK DIAS BP: 80 MMHG
STRESS PEAK SYS BP: 140 MMHG
STRESS PERCENT HR ACHIEVED: 63 %
STRESS POST PEAK HR: 88 BPM
STRESS RATE PRESSURE PRODUCT: NORMAL BPM*MMHG
STRESS ST DEPRESSION: 0 MM
STRESS ST ELEVATION: 0 MM
STRESS TARGET HR: 139 BPM

## 2020-06-25 PROCEDURE — 74011250636 HC RX REV CODE- 250/636: Performed by: INTERNAL MEDICINE

## 2020-06-25 PROCEDURE — 93017 CV STRESS TEST TRACING ONLY: CPT

## 2020-06-25 RX ORDER — SODIUM CHLORIDE 9 MG/ML
250 INJECTION, SOLUTION INTRAVENOUS ONCE
Status: COMPLETED | OUTPATIENT
Start: 2020-06-25 | End: 2020-06-25

## 2020-06-25 RX ADMIN — REGADENOSON 0.4 MG: 0.08 INJECTION, SOLUTION INTRAVENOUS at 08:50

## 2020-06-25 RX ADMIN — SODIUM CHLORIDE 250 ML/HR: 900 INJECTION, SOLUTION INTRAVENOUS at 08:50

## 2020-06-25 NOTE — PROGRESS NOTES
Patient was injected with 44.1 millicuries 52LSG Sestamibi on 6/25/20 at 0730. Patient was injected with 41.1 millicuries 59MCC Sestamibi on 6/25/20 at 0850. Patient's armbands were removed and placed in shred-it box.     Patient had a Nuclear Lexiscan Stress Test.

## 2020-07-01 ENCOUNTER — TELEPHONE (OUTPATIENT)
Dept: CARDIOLOGY CLINIC | Age: 80
End: 2020-07-01

## 2020-07-01 NOTE — TELEPHONE ENCOUNTER
----- Message from Katelyn Lorenzo MD sent at 6/25/2020  3:19 PM EDT ----- Please let the patient know that her nuclear stress test was normal. 
----- Message ----- From: Denis Wisdom MD 
Sent: 6/25/2020   3:06 PM EDT To: Katelyn Lorenzo MD

## 2020-07-14 ENCOUNTER — OFFICE VISIT (OUTPATIENT)
Dept: CARDIOLOGY CLINIC | Age: 80
End: 2020-07-14

## 2020-07-14 VITALS
DIASTOLIC BLOOD PRESSURE: 78 MMHG | HEART RATE: 61 BPM | SYSTOLIC BLOOD PRESSURE: 130 MMHG | WEIGHT: 124 LBS | HEIGHT: 65 IN | OXYGEN SATURATION: 98 % | BODY MASS INDEX: 20.66 KG/M2

## 2020-07-14 DIAGNOSIS — I25.10 CAD S/P PERCUTANEOUS CORONARY ANGIOPLASTY: Primary | ICD-10-CM

## 2020-07-14 DIAGNOSIS — G20 PARKINSON DISEASE (HCC): ICD-10-CM

## 2020-07-14 DIAGNOSIS — E78.5 DYSLIPIDEMIA: ICD-10-CM

## 2020-07-14 DIAGNOSIS — Z98.61 CAD S/P PERCUTANEOUS CORONARY ANGIOPLASTY: Primary | ICD-10-CM

## 2020-07-14 DIAGNOSIS — I10 ESSENTIAL HYPERTENSION: ICD-10-CM

## 2020-07-14 NOTE — PROGRESS NOTES
HISTORY OF PRESENT ILLNESS  Calvin Aponte is a 78 y.o. female. Follow-up   Pertinent negatives include no chest pain, no abdominal pain, no headaches and no shortness of breath. Dizziness   Pertinent negatives include no chest pain, no abdominal pain, no headaches and no shortness of breath. Patient presents for scheduled follow-up office visit. She has a past medical history significant for single-vessel coronary artery disease status post PCI to her mid LAD with a total of 2 drug-eluting stents in 2011. Patient presents with symptoms of unstable angina at that time. It should be noted that she did have classic exertional anginal symptoms leading up to her PCI that were likely unrecognized. She reports symptoms of substernal chest pain radiating to her left arm and jaw prior to her PCI. Since her procedure, she has had no recurrent symptoms. The patient recently underwent a pharmacologic nuclear stress test in June 2020 which was a normal low risk study. No evidence of ischemia or infarction, EF greater than 70%. She was last seen in the office 1 year ago. Since last visit, she continues to feel well. Patient denies any recurrent chest pain, shortness of breath, orthopnea, PND or leg swelling. No major change in her activity level. She will have occasional dizzy spells when she stands up typically but no tejal syncope. The symptoms are very short-lived. Past Medical History:   Diagnosis Date    Basal cell cancer     CAD (coronary artery disease), native coronary artery 03/21/2011    s/p PCI with with CARLITO (Xience 2.75x12, 2.5x12) mLAD and mild disease in rest of coronaries. Midly depressed LV syst fct     Cardiac cath 03/21/2011    mLAD 90% (2.75 x 12 mm Xience stent). Otherwise patent coronaries. LVEDP 10 mmHg. EF 40-45%. Anteroapical hypk. Renal arteries patent.  Cardiac echocardiogram 04/17/2014    EF 60-65%. No WMA. Gr 1 DDfx. Mild MR.   Similar to study of 9/14/11.  Cardiac nuclear imaging test 07/2017    Negative for ischemia or infarction. EF > 70%.  Cardiomyopathy secondary     ischemic +/- stress induced    Dyslipidemia     GERD (gastroesophageal reflux disease)     Hx of colonoscopy 07/12/2016    Normal. Dr. Bella Loving Hypertension     Parkinson's disease     Syncope     s/p ILD implantation     Current Outpatient Medications   Medication Sig Dispense Refill    potassium chloride (K-DUR, KLOR-CON) 20 mEq tablet Take 0.5 Tabs by mouth every other day. 25 Tab 3    rosuvastatin (CRESTOR) 10 mg tablet Take 0.5 Tabs by mouth every other day. 30 Tab 3    lisinopril (PRINIVIL, ZESTRIL) 10 mg tablet Take 1 Tab by mouth daily. 90 Tab 3    metoprolol succinate (TOPROL-XL) 50 mg XL tablet Take 1 Tab by mouth daily. 90 Tab 3    nitroglycerin (NITROSTAT) 0.4 mg SL tablet 1 sublingual every 5 minutes for chest pain for no more than 3 doses 25 Tab 3    carbidopa 25mg-levodopa 100mg-entacapone 200mg (STALEVO 100) tab tablet Take 1 Tab by mouth four (4) times daily. 0    calcium carbonate (CALCIUM 500 PO) Take  by mouth.  magnesium hydroxide (NEGRO MILK OF MAGNESIA) 400 mg/5 mL suspension Take 30 mL by mouth daily as needed for Constipation.  acetaminophen (TYLENOL) 650 mg CR tablet Take 650 mg by mouth every six (6) hours as needed for Pain.  co-enzyme Q-10 (CO Q-10) 100 mg capsule Take 200 mg by mouth daily.  aspirin 81 mg tablet Take 1 Tab by mouth daily. 1 Tab 0    DOCUSATE CALCIUM (STOOL SOFTENER PO) Take 1 Cap by mouth.  magnesium 250 mg Tab Take 250 mg by mouth two (2) times a day. Allergies   Allergen Reactions    Keflex [Cephalexin] Other (comments)     Yeast infection    Pcn [Penicillins] Other (comments)      Social History     Tobacco Use    Smoking status: Never Smoker    Smokeless tobacco: Never Used   Substance Use Topics    Alcohol use: Yes     Comment: glass of wine occasionally.  Drug use:  No Review of Systems   Constitutional: Negative for chills, fever and weight loss. HENT: Negative for nosebleeds. Eyes: Negative for blurred vision and double vision. Respiratory: Negative for cough, shortness of breath and wheezing. Cardiovascular: Negative for chest pain, palpitations, orthopnea, claudication, leg swelling and PND. Gastrointestinal: Negative for abdominal pain, heartburn, nausea and vomiting. Genitourinary: Negative for dysuria and hematuria. Musculoskeletal: Negative for falls and myalgias. Skin: Negative for rash. Neurological: Positive for dizziness. Negative for focal weakness and headaches. Endo/Heme/Allergies: Does not bruise/bleed easily. Psychiatric/Behavioral: Negative for substance abuse. Visit Vitals  /78 (BP 1 Location: Left arm, BP Patient Position: Sitting)   Pulse 61   Ht 5' 5\" (1.651 m)   Wt 56.2 kg (124 lb)   SpO2 98%   BMI 20.63 kg/m²       Physical Exam   Constitutional: She is oriented to person, place, and time. She appears well-developed and well-nourished. HENT:   Head: Normocephalic and atraumatic. Eyes: Conjunctivae are normal.   Neck: Neck supple. No JVD present. Carotid bruit is not present. Cardiovascular: Normal rate, regular rhythm, S1 normal, S2 normal and normal pulses. Exam reveals no gallop. Murmur heard. Midsystolic murmur is present with a grade of 2/6. Pulmonary/Chest: Effort normal and breath sounds normal. She has no wheezes. She has no rales. Abdominal: Soft. Bowel sounds are normal. There is no abdominal tenderness. Musculoskeletal:         General: No edema. Neurological: She is alert and oriented to person, place, and time. Skin: Skin is warm and dry. EKG: Normal sinus rhythm, normal axis, normal QTc interval, no ST or T-wave abnormalities concerning for ischemia. Normal tracing. No change compared to the previous EKG. ASSESSMENT and PLAN    Coronary artery disease.   Single-vessel disease, status post PCI to her mid LAD in 2011 using 2 drug-eluting stents (Xience 2.75 x 12, 2.5 x 12). No recurrent anginal symptoms since that time. Patient is now taking an aspirin, beta-blocker low-dose statin. Patient underwent a low risk pharmacologic nuclear stress test in June 2020. No new symptoms concerning for angina. I would continue her current medical regimen. Dyslipidemia. Patient is now tolerating Crestor 5 mg daily. Is being followed closely by her PCP. Her most recent lipid panel from April 2020 was excellent: Total cholesterol 132, HDL 62, LDL 55. I would continue this regimen with a goal LDL less than 70. Essential hypertension. Patient's blood pressure looks very reasonable on her current regimen which includes metoprolol and lisinopril.     Follow-up in 12 months, sooner if needed

## 2020-07-14 NOTE — PROGRESS NOTES
Drea Giraldo presents today for   Chief Complaint   Patient presents with    Follow-up     1 year follow up    Dizziness     getting up too fast       Drea Giraldo preferred language for health care discussion is english/other. Is someone accompanying this pt? no    Is the patient using any DME equipment during [address]? no    Depression Screening:  3 most recent PHQ Screens 7/14/2020   PHQ Not Done -   Little interest or pleasure in doing things Not at all   Feeling down, depressed, irritable, or hopeless Not at all   Total Score PHQ 2 0       Learning Assessment:  Learning Assessment 7/24/2019   PRIMARY LEARNER Patient   HIGHEST LEVEL OF EDUCATION - PRIMARY LEARNER  -   BARRIERS PRIMARY LEARNER -   CO-LEARNER CAREGIVER -   PRIMARY LANGUAGE ENGLISH   LEARNER PREFERENCE PRIMARY DEMONSTRATION   ANSWERED BY Patient   RELATIONSHIP SELF       Abuse Screening:  Abuse Screening Questionnaire 7/14/2020   Do you ever feel afraid of your partner? N   Are you in a relationship with someone who physically or mentally threatens you? N   Is it safe for you to go home? Y       Fall Risk  Fall Risk Assessment, last 12 mths 7/14/2020   Able to walk? Yes   Fall in past 12 months? No   Fall with injury? -   Number of falls in past 12 months -   Fall Risk Score -       Pt currently taking Anticoagulant therapy? ASA 81 mg once a day    Coordination of Care:  1. Have you been to the ER, urgent care clinic since your last visit? Hospitalized since your last visit? no    2. Have you seen or consulted any other health care providers outside of the [address] [place] [address] since your last visit? Include any pap smears or colon screening.  no

## 2020-07-28 ENCOUNTER — HOSPITAL ENCOUNTER (OUTPATIENT)
Dept: MAMMOGRAPHY | Age: 80
Discharge: HOME | End: 2020-07-28
Attending: INTERNAL MEDICINE
Payer: MEDICARE

## 2020-07-28 DIAGNOSIS — Z12.31 VISIT FOR SCREENING MAMMOGRAM: ICD-10-CM

## 2020-07-28 PROCEDURE — 77063 BREAST TOMOSYNTHESIS BI: CPT

## 2020-09-29 NOTE — TELEPHONE ENCOUNTER
lmtcb Graft Cartilage Fenestration Text: The cartilage was fenestrated with a 2mm punch biopsy to help facilitate graft survival and healing.

## 2020-11-03 ENCOUNTER — APPOINTMENT (OUTPATIENT)
Dept: INTERNAL MEDICINE CLINIC | Age: 80
End: 2020-11-03

## 2020-11-03 ENCOUNTER — HOSPITAL ENCOUNTER (OUTPATIENT)
Dept: LAB | Age: 80
Discharge: HOME | End: 2020-11-03
Payer: MEDICARE

## 2020-11-03 DIAGNOSIS — G20 PARKINSON DISEASE (HCC): ICD-10-CM

## 2020-11-03 DIAGNOSIS — I25.10 CAD S/P PERCUTANEOUS CORONARY ANGIOPLASTY: ICD-10-CM

## 2020-11-03 DIAGNOSIS — L40.9 PSORIASIS OF SCALP: ICD-10-CM

## 2020-11-03 DIAGNOSIS — K21.9 LARYNGOPHARYNGEAL REFLUX DISEASE: ICD-10-CM

## 2020-11-03 DIAGNOSIS — E78.5 DYSLIPIDEMIA: ICD-10-CM

## 2020-11-03 DIAGNOSIS — M51.36 DDD (DEGENERATIVE DISC DISEASE), LUMBAR: ICD-10-CM

## 2020-11-03 DIAGNOSIS — Z98.61 CAD S/P PERCUTANEOUS CORONARY ANGIOPLASTY: ICD-10-CM

## 2020-11-03 DIAGNOSIS — M47.816 LUMBAR SPONDYLOSIS: ICD-10-CM

## 2020-11-03 DIAGNOSIS — J30.89 NON-SEASONAL ALLERGIC RHINITIS, UNSPECIFIED TRIGGER: ICD-10-CM

## 2020-11-03 DIAGNOSIS — I10 ESSENTIAL HYPERTENSION: ICD-10-CM

## 2020-11-03 DIAGNOSIS — K21.9 GASTROESOPHAGEAL REFLUX DISEASE: ICD-10-CM

## 2020-11-03 DIAGNOSIS — M85.89 OSTEOPENIA OF MULTIPLE SITES: ICD-10-CM

## 2020-11-03 LAB
ALBUMIN SERPL-MCNC: 3.6 G/DL (ref 3.4–5)
ALBUMIN/GLOB SERPL: 1.3 {RATIO} (ref 0.8–1.7)
ALP SERPL-CCNC: 98 U/L (ref 45–117)
ALT SERPL-CCNC: 8 U/L (ref 13–56)
ANION GAP SERPL CALC-SCNC: 5 MMOL/L (ref 3–18)
AST SERPL-CCNC: 18 U/L (ref 10–38)
BILIRUB SERPL-MCNC: 0.5 MG/DL (ref 0.2–1)
BUN SERPL-MCNC: 14 MG/DL (ref 7–18)
BUN/CREAT SERPL: 21 (ref 12–20)
CALCIUM SERPL-MCNC: 9.6 MG/DL (ref 8.5–10.1)
CHLORIDE SERPL-SCNC: 106 MMOL/L (ref 100–111)
CHOLEST SERPL-MCNC: 149 MG/DL
CO2 SERPL-SCNC: 30 MMOL/L (ref 21–32)
CREAT SERPL-MCNC: 0.67 MG/DL (ref 0.6–1.3)
GLOBULIN SER CALC-MCNC: 2.8 G/DL (ref 2–4)
GLUCOSE SERPL-MCNC: 89 MG/DL (ref 74–99)
HDLC SERPL-MCNC: 66 MG/DL (ref 40–60)
HDLC SERPL: 2.3 {RATIO} (ref 0–5)
LDLC SERPL CALC-MCNC: 69.4 MG/DL (ref 0–100)
LIPID PROFILE,FLP: ABNORMAL
MAGNESIUM SERPL-MCNC: 2.2 MG/DL (ref 1.6–2.6)
POTASSIUM SERPL-SCNC: 4.6 MMOL/L (ref 3.5–5.5)
PROT SERPL-MCNC: 6.4 G/DL (ref 6.4–8.2)
SODIUM SERPL-SCNC: 141 MMOL/L (ref 136–145)
TRIGL SERPL-MCNC: 68 MG/DL (ref ?–150)
VLDLC SERPL CALC-MCNC: 13.6 MG/DL

## 2020-11-03 PROCEDURE — 80053 COMPREHEN METABOLIC PANEL: CPT

## 2020-11-03 PROCEDURE — 83735 ASSAY OF MAGNESIUM: CPT

## 2020-11-03 PROCEDURE — 80061 LIPID PANEL: CPT

## 2020-11-03 PROCEDURE — 36415 COLL VENOUS BLD VENIPUNCTURE: CPT

## 2020-11-09 ENCOUNTER — TELEPHONE (OUTPATIENT)
Dept: CARDIOLOGY CLINIC | Age: 80
End: 2020-11-09

## 2020-11-09 NOTE — TELEPHONE ENCOUNTER
Patient calling needs clearance for oral surgery with  [facility]- Fax number is [phone] Verbal order and read back per Rachell Borges MD 
Low risk for surgery Letter faxed

## 2020-11-09 NOTE — LETTER
11/9/2020 3:17 PM 
 
Ms. Jose Francisco Ramirez [address] [facility] [place] [facility] [id number] Jose Francisco Ramirez was seen in our office on 07/14/2020 for cardiac evaluation. From a cardiac standpoint she is low risk for oral surgery. Please feel free to contact our office if you have any questions regarding this patient. Sincerely, Charlie Garcia MD

## 2020-11-10 ENCOUNTER — OFFICE VISIT (OUTPATIENT)
Dept: INTERNAL MEDICINE CLINIC | Age: 80
End: 2020-11-10
Payer: MEDICARE

## 2020-11-10 ENCOUNTER — TELEPHONE (OUTPATIENT)
Dept: INTERNAL MEDICINE CLINIC | Age: 80
End: 2020-11-10

## 2020-11-10 VITALS
OXYGEN SATURATION: 99 % | WEIGHT: 121.6 LBS | DIASTOLIC BLOOD PRESSURE: 66 MMHG | SYSTOLIC BLOOD PRESSURE: 124 MMHG | BODY MASS INDEX: 20.26 KG/M2 | TEMPERATURE: 97.7 F | RESPIRATION RATE: 16 BRPM | HEIGHT: 65 IN | HEART RATE: 63 BPM

## 2020-11-10 DIAGNOSIS — K21.9 LARYNGOPHARYNGEAL REFLUX DISEASE: ICD-10-CM

## 2020-11-10 DIAGNOSIS — L40.9 PSORIASIS OF SCALP: ICD-10-CM

## 2020-11-10 DIAGNOSIS — Z13.31 SCREENING FOR DEPRESSION: ICD-10-CM

## 2020-11-10 DIAGNOSIS — E78.5 DYSLIPIDEMIA: ICD-10-CM

## 2020-11-10 DIAGNOSIS — G20 PARKINSON DISEASE (HCC): ICD-10-CM

## 2020-11-10 DIAGNOSIS — Z00.00 MEDICARE ANNUAL WELLNESS VISIT, SUBSEQUENT: ICD-10-CM

## 2020-11-10 DIAGNOSIS — Z71.89 ADVANCED DIRECTIVES, COUNSELING/DISCUSSION: ICD-10-CM

## 2020-11-10 DIAGNOSIS — H35.30 MACULAR DEGENERATION OF LEFT EYE, UNSPECIFIED TYPE: ICD-10-CM

## 2020-11-10 DIAGNOSIS — M47.816 LUMBAR SPONDYLOSIS: ICD-10-CM

## 2020-11-10 DIAGNOSIS — Z98.61 CAD S/P PERCUTANEOUS CORONARY ANGIOPLASTY: ICD-10-CM

## 2020-11-10 DIAGNOSIS — I10 ESSENTIAL HYPERTENSION: Primary | ICD-10-CM

## 2020-11-10 DIAGNOSIS — M85.89 OSTEOPENIA OF MULTIPLE SITES: ICD-10-CM

## 2020-11-10 DIAGNOSIS — K21.9 GASTROESOPHAGEAL REFLUX DISEASE, UNSPECIFIED WHETHER ESOPHAGITIS PRESENT: ICD-10-CM

## 2020-11-10 DIAGNOSIS — M51.36 DDD (DEGENERATIVE DISC DISEASE), LUMBAR: ICD-10-CM

## 2020-11-10 DIAGNOSIS — M15.9 PRIMARY OSTEOARTHRITIS INVOLVING MULTIPLE JOINTS: ICD-10-CM

## 2020-11-10 DIAGNOSIS — J30.89 NON-SEASONAL ALLERGIC RHINITIS, UNSPECIFIED TRIGGER: ICD-10-CM

## 2020-11-10 DIAGNOSIS — M85.9 DISORDER OF BONE DENSITY AND STRUCTURE, UNSPECIFIED: ICD-10-CM

## 2020-11-10 DIAGNOSIS — I25.10 CAD S/P PERCUTANEOUS CORONARY ANGIOPLASTY: ICD-10-CM

## 2020-11-10 PROCEDURE — 99497 ADVNCD CARE PLAN 30 MIN: CPT | Performed by: INTERNAL MEDICINE

## 2020-11-10 PROCEDURE — G8510 SCR DEP NEG, NO PLAN REQD: HCPCS | Performed by: INTERNAL MEDICINE

## 2020-11-10 PROCEDURE — G8536 NO DOC ELDER MAL SCRN: HCPCS | Performed by: INTERNAL MEDICINE

## 2020-11-10 PROCEDURE — G8427 DOCREV CUR MEDS BY ELIG CLIN: HCPCS | Performed by: INTERNAL MEDICINE

## 2020-11-10 PROCEDURE — G8399 PT W/DXA RESULTS DOCUMENT: HCPCS | Performed by: INTERNAL MEDICINE

## 2020-11-10 PROCEDURE — G8754 DIAS BP LESS 90: HCPCS | Performed by: INTERNAL MEDICINE

## 2020-11-10 PROCEDURE — G0439 PPPS, SUBSEQ VISIT: HCPCS | Performed by: INTERNAL MEDICINE

## 2020-11-10 PROCEDURE — 99215 OFFICE O/P EST HI 40 MIN: CPT | Performed by: INTERNAL MEDICINE

## 2020-11-10 PROCEDURE — G0463 HOSPITAL OUTPT CLINIC VISIT: HCPCS | Performed by: INTERNAL MEDICINE

## 2020-11-10 PROCEDURE — 1090F PRES/ABSN URINE INCON ASSESS: CPT | Performed by: INTERNAL MEDICINE

## 2020-11-10 PROCEDURE — 1101F PT FALLS ASSESS-DOCD LE1/YR: CPT | Performed by: INTERNAL MEDICINE

## 2020-11-10 PROCEDURE — G8752 SYS BP LESS 140: HCPCS | Performed by: INTERNAL MEDICINE

## 2020-11-10 PROCEDURE — G8420 CALC BMI NORM PARAMETERS: HCPCS | Performed by: INTERNAL MEDICINE

## 2020-11-10 NOTE — TELEPHONE ENCOUNTER
Pt calling to let Dr. Yandel Myrick know her oral surgery has been moved up to 11/17/2020. Says she was here today to be cleared.

## 2020-11-10 NOTE — PATIENT INSTRUCTIONS
Medicare Wellness Visit, Female The best way to improve and maintain good health is to have a healthy lifestyle by eating a well-balanced diet, exercising regularly, limiting alcohol and stopping smoking. Regular visits with your physician or non-physician health care provider also support your good health. Preventive screening tests can find health problems before they become diseases or illnesses. Preventive services such as immunizations prevent serious infections. All people over age 72 should have a Pneumovax and a Prevnar-13 shot to prevent potentially life threatening infections with the pneumococcus bacteria, a common cause of pneumonia. These are once in a lifetime unless you and your provider decide differently. All people over 65 should have a yearly influenza vaccine or \"flu\" shot. This does not prevent infection with cold viruses but has been proven to prevent hospitalization and death from influenza. Although Medicare part B \"regular Medicare\" currently only covers tetanus vaccination in the context of an injury, a tetanus vaccine (Tdap or Td) is recommended every 10 years. A shingles vaccine is recommended once in a lifetime after age 61. The Shingles vaccine is also not covered by Medicare part B. Note, however, that both the Shingles vaccine and Tdap/Td are generally covered by secondary carriers. Please check your coverage and out of pocket expenses. Consider contacting your local health department because it may stock these vaccines for a reasonable charge. We currently have documentation of the following immunization history for you: 
Immunization History Administered Date(s) Administered  (RETIRED) Pneumococcal Vaccine (Unspecified Type) 01/01/2003  Influenza High Dose Vaccine PF 09/29/2014, 10/05/2015, 11/01/2016, 08/30/2017  Influenza Vaccine (Tri) Adjuvanted (>65 Yrs FLUAD TRI [id number]) 10/09/2018, 09/17/2019  Influenza Vaccine Whole 09/07/2010, 09/03/2011, 10/04/2012  Influenza, Quadrivalent, Adjuvanted (>65 Yrs FLUAD QUAD [id number]) 09/11/2020  Pneumococcal Conjugate (PCV-13) 10/05/2015  Pneumococcal Polysaccharide (PPSV-23) 01/01/2003, 10/30/2019  TD Vaccine 01/01/2003  Tdap 09/25/2013  Zoster 01/01/2007  Zoster Recombinant 09/18/2018, 11/23/2018 Screening for infection with Hepatitis C is recommended for anyone born between [birth year] through [address]. The table at the bottom of this document indicates the status of this and other preventive services. A bone mass density test (DEXA) to screen for osteoporosis or thinning of the bones should be done at least once after age 72 and may be done up to every 2 years as determined by you and your health care provider. The most recent DEXA we have on file for you is: DEXA Results (most recent): 
[facility]24/18 DEXA BONE DENSITY STUDY AXIAL Narrative DEXA BONE DENSITOMETRY, CENTRAL 
 
CPT CODE: 53256 INDICATION: Postmenopausal. History of osteopenia. Parkinson's. Hypertension. Family history of osteoporosis. Taking calcium and vitamin D. TECHNIQUE: Using GE LUNAR Prodigy densitometer, bone density measurement was 
performed in the lumbar spine in addition to the proximal left and right femora 
and the right forearm. T score refers to standard deviations above or below 
average compared to a young adult of the same sex. Z score refers to standard 
deviations above or below average compared to a patient of the same sex, age, 
race and weight. COMPARISON: The prior studies of 18 August 2009 and 23 May 2016 are no longer 
available on the bone densitometry unit for comparison trending. The images are 
available on PACS. The data from the previous study of 23 May 2016 has been 
included on this study.  
 
FINDINGS:  
 
Lumbar Spine Levels: L1, L2, and L4 
 Mean Bone Mineral Density (BMD):  1.086 g/cm2  (1.083 BMD on previous study) T Score: -0.8       (-0.8 T score on previous). Z Score: 1.2 On PA image of the lumbar spine obtained for localization of vertebral levels (not a diagnostic quality radiograph), it is noted that there is apparent 
increased density at L3. The density is not well characterized on the basis of 
this image, and may be due to underlying degenerative changes, prior surgery, 
trauma, or other osseous process. Since this density spuriously increases 
measured bone mineral density, this level has been excluded. Distal 1/3 Radius:  0.850 g/cm2  ( 0.817 BMD on previous study) T Score: -0.4       ( -0.8 T score on previous). Z Score: 2.2 Left Total Proximal Femur BMD: 0.887  g/cm2  (0.918 BMD on previous study) T Score: -1.0    ( -0.7 T score on previous). Z Score: 1.2 Right Total Proximal Femur BMD: 0.911 g/cm2 (0.941 BMD on previous study) T Score: -0.8       (-0.5 T score on previous). Z Score: 1.3 Left Femoral Neck BMD: 0.872  g/cm2 T Score: -1.2 Z Score: 1.1 Right Femoral Neck BMD: 0.819  g/cm2 T Score: -1.6 Z Score: 0.7 Impression IMPRESSION: 
 
1. BMD measures consistent with osteopenia. 2.  This will serve as the new baseline study at this institution. Based upon current ISCD guidelines, the patient's overall diagnostic category, 
selected using WHO criteria in postmenopausal women and males aged 48 and above, 
is selected based upon the lowest T score from among the lumbar spine, total 
femur, femoral neck, (or distal third radius if measured). WHO Definition of Osteoporosis and Osteopenia on DXA (specified for post 
menopausal  females): 
 
  Normal:                     T Score at or above -1 SD Osteopenia:               T Score between -1 and -2.5 SD   Osteoporosis:             T Score at or below -2.5 SD 
 
 The risk of fracture approximately doubles for each 1 SD decrease in T score. It is important to consider other factors in assessing a patient's risk of 
fracture, including age, risk of falling/injury, history of fragility fracture, 
family history of osteoporosis, smoking, low weight. Various fracture risk tools have been developed for adult patients and are 
available online. For example, the FRAX tool developed by [facility] is widely used. Reference www.iscd.org. It is also important to note that DXA measures bone density but does not 
distinguish among causes of decreased bone density, which include primary vs. 
secondary osteoporosis (such as metabolic bone disorders or possible effects of 
medications) and also other conditions (such as osteomalacia). Clinical 
considerations should determine what additional evaluation may be warranted to 
exclude secondary conditions in a patient with low bone density. It is 
suggested that secondary causes of low bone density be considered in patients 
with Z score lower than -2.0, and patients who are not responding to therapy for 
osteoporosis on followup DXA despite compliance with medications. Please note that reliable, valid comparisons can not be made between studies 
which have been performed on different densitometers. If clinically warranted, 
follow up study performed at this site would best permit assessment of trend for 
possible change in bone mineral density over time in comparison to this study. Thank you for this referral. 
  
 
 
Screening for diabetes mellitus with a blood sugar test (glucose) should be done at least every 3 years until age 79. You and your health care provider may decide whether to continue screening after age 79. The most recent blood glucose we have on file for you is:  
Lab Results Component Value Date/Time  Glucose 89 11/03/2020 08:25 AM  
 Glucose, POC 91 07/12/2016 08:20 AM  
 
 
 
 Glaucoma is a disease of the eye due to increased ocular pressure that can lead to blindness. People with risk factors for glaucoma ( race, diabetes, family history) should be screened at least every 2 years by an eye professional.  
 
Cardiovascular screening tests that check for elevated lipids or cholesterol (fatty part of blood) which can lead to heart disease and strokes should be done every 4-6 years through age 79. You and your health care provider may decide whether to continue screening after age 79. The most recent lipid panel we have on file for you is:  
Lab Results Component Value Date/Time Cholesterol, total 149 11/03/2020 08:25 AM  
 HDL Cholesterol 66 (H) 11/03/2020 08:25 AM  
 LDL, calculated 69.4 11/03/2020 08:25 AM  
 VLDL, calculated 13.6 11/03/2020 08:25 AM  
 Triglyceride 68 11/03/2020 08:25 AM  
 CHOL/HDL Ratio 2.3 11/03/2020 08:25 AM  
 
 
Colorectal cancer screening that evaluates for blood or polyps in your colon for people with average risk should be done yearly as a stool test, every five years as a flexible sigmoidoscope or every 10 years as a colonoscopy up to age 76. You and your health care provider may decide whether to continue screening after age 76. Breast cancer screening with a mammogram is recommended at least once every 2 years  for women age 54-69. You and your health care provider may decide whether to continue screening after age 76. The most recent mammogram we have on file for you is: [facility] Results (most recent): 
Results from Hospital Encounter encounter on 07/28/20 YAJAIRA 3D EMILIA W MAMMO BI SCREENING INCL CAD Narrative Exam: Screening mammogram  
 
CLINICAL INDICATION: Screening mammogram 
 
Personal History of breast cancer: None Personal History of biopsy proven benign breast disease: None Personal History of reduction mammoplasty: None Family History of breast cancer: None Date of Comparison Study: 7/9/2019, 5/24/2018, and 5/24/2017 PROCEDURE: Digital screening mammogram of both breasts performed in the CC and 
MLO views. CAD was utilized to further enhance the diagnostic ability of the 
digital mammogram. 
3D Tomosynthesis was utilized. PARENCHYMAL RADIODENSITY: The breast density is heterogeneously dense, which may 
obscure small masses. FINDINGS: There are no suspicious masses or microcalcifications. No suspicious 
asymmetry or architectural distortion appreciated. Benign calcifications are 
present. Impression IMPRESSION: 
1. No suspicious findings Recommendations:  Resume annual screening mammogram. These results are being 
provided to the patient by letter. BI-RADS category 2 - Benign . The lack of mammographic evidence of malignancy should not deter further work-up 
of a clinically significant palpable finding. Radiodense breast tissue may 
obscure an early malignancy or a palpable finding. 10-15% of breast cancers are 
not detected by mammography. Screening for cervical cancer with a pap smear is recommended for all women with a cervix until age 72. The frequency of this test is based on the details of her prior pap smear testing. You and your health care provider may decide whether to continue screening after age 72. People who have smoked the equivalent of 1 pack per day for 30 years or more may benefit from screening for lung cancer with a yearly low dose CT scan until they have been non smokers for 15 years or competing health conditions render this unlikely to be beneficial. Our records show: n/a Your Medicare Wellness Exam is recommended annually. Here is a list of your current Health Maintenance items with a due date: 
Health Maintenance Topic Date Due  GLAUCOMA SCREENING Q2Y  04/03/2020  Lipid Screen  11/03/2021  Medicare Yearly Exam  11/11/2021  
 DTaP/Tdap/Td series (2 - Td) 09/25/2023  Bone Densitometry (Dexa) Screening  Completed  Shingrix Vaccine Age 50>  Completed  Flu Vaccine  Completed  Pneumococcal 65+ years  Completed High-Calorie and High-Protein Diet: Care Instructions Your Care Instructions A high-calorie, high-protein diet gives you more energy and extra nutrition to help your body heal. Your doctor and dietitian can help you design a diet based on your health and what you prefer to eat. Always talk with your doctor or dietitian before you make changes in your diet. Follow-up care is a key part of your treatment and safety. Be sure to make and go to all appointments, and call your doctor if you are having problems. It's also a good idea to know your test results and keep a list of the medicines you take. How can you care for yourself at home? · Eat three meals a day, plus snacks in between and at bedtime. · Include favorite foods in your meals. This will help make meals more pleasant. · Drink your beverage at the end of the meal, because drinking before or during the meal can fill you up. · Eat high-protein foods, such as: ? Meat, fish, and poultry. ? Milk and milk products. Add powdered milk to other foods (such as pudding or soups) to boost the protein. ? Eggs. ? Cooked dried beans and peas. ? Peanut butter, nuts, and seeds. ? Tofu. 
? Cheeses. ? Protein bars. · Eat high-calorie foods, such as: 
? Butter, honey, and brown sugar, added to foods to make them taste better. ? Oils, sauces, and gravies. ? Peanut butter. ? Whole milk, yogurt, mayonnaise, and sour cream. 
? Granola cereal with fruit and granola bars. ? Muffins, pancakes, waffles, and other breads. ? Milkshakes, puddings, and custard. · Try a liquid meal replacement, such as Ensure, Boost, or instant breakfast drinks, if you have trouble eating solid foods. They will give you both calories and protein. Soups and smoothies also are good sources of nutrition. · Keep snacks around that are easy to eat, such as pudding, energy bars, ice cream, and flavored ice pops. · If you can, take a walk before you eat, to make you hungrier. · Drink plenty of fluids. If you have kidney, heart, or liver disease and have to limit fluids, talk with your doctor before you increase the amount of fluids you drink. · Try to avoid eating foods that don't give you much nutrition, such as potato chips, candy bars, and soft drinks. Where can you learn more? Go to http://www.gray.com/ Enter E697 in the search box to learn more about \"High-Calorie and High-Protein Diet: Care Instructions. \" Current as of: August 22, 2019               Content Version: 12.6 © 5072-2214 [employer]. Care instructions adapted under license by [employer] (which disclaims liability or warranty for this information). If you have questions about a medical condition or this instruction, always ask your healthcare professional. [place] [address] any warranty or liability for your use of this information. Learning About High-Protein Foods What foods are high in protein? The foods you eat contain nutrients, such as vitamins and minerals. Protein is a nutrient. Your body needs the right amount to stay healthy and work as it should. You can use the list below to help you make choices about which foods to eat. Here are some examples of foods that are high in protein. Dairy and dairy alternatives · Cheese · Milk · Soy milk · Yogurt (especially [place]) Meat · Beef · Chicken · Ham 
· [place] · Lunch meat · Pork · Sausage · [place] Other protein foods · Beans (black, garbanzo, kidney, lima) · Eggs · Hummus · Lentils · Nuts · Peanut butter and other nut butters · Peas · Soybeans · Tofu · Veggie or soy deidre (Check the nutrition label for the amount of protein in each serving.) Seafood · Anchovies · [address] · Crab 
· [address] · Kristen Lobstein · Sardines · Shrimp · Tilapia · Trout · [place] Protein supplements · Bars (Check the nutrition label for the amount of protein in each serving.) · Drinks · Powders Work with your doctor to find out how much of this nutrient you need. Depending on your health, you may need more or less of it in your diet. Where can you learn more? Go to http://www.gray.com/ Enter [id number] in the search box to learn more about \"Learning About High-Protein Foods. \" Current as of: August 22, 2019               Content Version: 12.6 © 8940-0464 [employer]. Care instructions adapted under license by [employer] (which disclaims liability or warranty for this information). If you have questions about a medical condition or this instruction, always ask your healthcare professional. [address] any warranty or liability for your use of this information. Learning About Getting More Protein How does your body use protein? Protein is an essential part of our diets. It is made up of chemicals called amino acids. Your body needs protein to help build and repair muscle, skin, and other body tissues. Protein also helps fight infection, balance body fluids, and carry oxygen through the body. How much protein do you need? How much protein you need each day depends on your age, sex, and how active you are. It's recommended that most adults eat 5 to 7 ounces of protein foods a day. Sometimes you may need to eat more protein. Your doctor may advise you on the right amount of protein you need. What foods contain protein? Protein is found in a variety of foods. High-protein foods include lean meat, poultry, and fish. A serving of these foods is 2 to 3 ounces. (3 ounces is about the size and thickness of a deck of cards.) Protein isn't just found in meat.  If you are looking for other types of protein, the following foods are equal to about 1 ounce of meat: · ¼ cup of cooked beans, peas, or lentils · ¼ cup of tofu (about 2 ounces) · 2 Tbsp of hummus · ½ ounce of nuts or seeds (for example, 12 almonds or 7 walnut halves) · 1 egg · 1 Tbsp of peanut butter or other nut or seed butter Other sources of protein include cheese, milk, and other milk products. You can also buy protein bars, drinks, and powders. Check the nutrition label for the amount of protein in each serving. What are some tips for getting more protein? You can get more protein in your food by adding high-protein ingredients. For example, you can: · Add powdered milk to other foods, such as pudding or soups. · Add powdered protein to fruit smoothies and cooked cereal. 
· Add beans to soup and chili. · Add nuts, seeds, or wheat germ to yogurt. You can also: 
· Spread peanut butter onto a banana. · Mix cottage cheese into noodle dishes or casseroles. · Sprinkle hard-boiled eggs on a salad. · Grate cheese over vegetables and soups. Where can you learn more? Go to http://www.gray.com/ Lora Branham in the search box to learn more about \"Learning About Getting More Protein. \" Current as of: August 22, 2019               Content Version: 12.6 © 3124-8943 [employer]. Care instructions adapted under license by [employer] (which disclaims liability or warranty for this information). If you have questions about a medical condition or this instruction, always ask your healthcare professional. [place] [address] any warranty or liability for your use of this information.

## 2020-11-10 NOTE — TELEPHONE ENCOUNTER
Please request recent eye exam from Dr. Earnest Duggan . Patient reports being seen in every 3 months . Thank you.

## 2020-11-10 NOTE — PROGRESS NOTES
This is the Subsequent Medicare Annual Wellness Exam, performed 12 months or more after the Initial AWV or the last Subsequent AWV    I have reviewed the patient's medical history in detail and updated the computerized patient record. History     Patient Active Problem List   Diagnosis Code    Hypertension I10    CAD S/P percutaneous coronary angioplasty I25.10, Z98.61    Dyslipidemia E78.5    Syncope R55    Parkinson disease ([address] G20    GERD (gastroesophageal reflux disease) K21.9    Macular degeneration of left eye H35.30    Laryngopharyngeal reflux disease K21.9    Osteopenia M85.80    Psoriasis of scalp L40.9    Lumbar spondylosis M47.816    Synovial cyst of lumbar spine M71.38    DDD (degenerative disc disease), lumbar M51.36    Primary osteoarthritis involving multiple joints M89.49    Allergic rhinitis J30.9     Past Medical History:   Diagnosis Date    Basal cell cancer     CAD (coronary artery disease), native coronary artery 03/21/2011    s/p PCI with with CARLITO (Xience 2.75x12, 2.5x12) mLAD and mild disease in rest of coronaries. Midly depressed LV syst fct     Cardiac cath 03/21/2011    mLAD 90% (2.75 x 12 mm Xience stent). Otherwise patent coronaries. LVEDP 10 mmHg. EF 40-45%. Anteroapical hypk. Renal arteries patent.  Cardiac echocardiogram 04/17/2014    EF 60-65%. No WMA. Gr 1 DDfx. Mild MR. Similar to study of 9/14/11.  Cardiac nuclear imaging test 07/2017    Negative for ischemia or infarction. EF > 70%.     Cardiomyopathy secondary     ischemic +/- stress induced    Dyslipidemia     GERD (gastroesophageal reflux disease)     Hx of colonoscopy 07/12/2016    Normal. Dr. Jourdan Rodriguez Hypertension     Parkinson's disease     Syncope     s/p ILD implantation      Past Surgical History:   Procedure Laterality Date    COLONOSCOPY N/A 7/12/2016    COLONOSCOPY performed by Saniya Baldwin MD at [address] HX BUNIONECTOMY      dorsal    HX CATARACT REMOVAL 11/2012    left    HX CATARACT REMOVAL  10/2012    right    HX CORONARY STENT PLACEMENT      HX HEART CATHETERIZATION      HX HEMORRHOIDECTOMY      HX HYSTERECTOMY  1996    partial    HX IMPLANTABLE LOOP RECORDER  [id number]    HX TONSILLECTOMY      HX TOTAL ABDOMINAL HYSTERECTOMY  1996    partial     Current Outpatient Medications   Medication Sig Dispense Refill    psyllium husk (METAMUCIL PO) Take  by mouth.  potassium chloride (K-DUR, KLOR-CON) 20 mEq tablet Take 0.5 Tabs by mouth every other day. 25 Tab 3    rosuvastatin (CRESTOR) 10 mg tablet Take 0.5 Tabs by mouth every other day. 30 Tab 3    lisinopril (PRINIVIL, ZESTRIL) 10 mg tablet Take 1 Tab by mouth daily. 90 Tab 3    metoprolol succinate (TOPROL-XL) 50 mg XL tablet Take 1 Tab by mouth daily. 90 Tab 3    nitroglycerin (NITROSTAT) 0.4 mg SL tablet 1 sublingual every 5 minutes for chest pain for no more than 3 doses 25 Tab 3    carbidopa 25mg-levodopa 100mg-entacapone 200mg (STALEVO 100) tab tablet Take 1 Tab by mouth four (4) times daily. 0    calcium carbonate (CALCIUM 500 PO) Take  by mouth.  magnesium hydroxide (NEGRO MILK OF MAGNESIA) 400 mg/5 mL suspension Take 30 mL by mouth daily as needed for Constipation.  acetaminophen (TYLENOL) 650 mg CR tablet Take 650 mg by mouth every six (6) hours as needed for Pain.  co-enzyme Q-10 (CO Q-10) 100 mg capsule Take 200 mg by mouth daily.  aspirin 81 mg tablet Take 1 Tab by mouth daily. 1 Tab 0    DOCUSATE CALCIUM (STOOL SOFTENER PO) Take 1 Cap by mouth.  magnesium 250 mg Tab Take 250 mg by mouth two (2) times a day.        Allergies   Allergen Reactions    Keflex [Cephalexin] Other (comments)     Yeast infection    Pcn [Penicillins] Other (comments)       Family History   Problem Relation Age of Onset    Heart Attack Father 80    Heart Disease Father     Cancer Mother         pancreatic    Hypertension Brother      Social History     Tobacco Use    Smoking status: Never Smoker    Smokeless tobacco: Never Used   Substance Use Topics    Alcohol use: Yes     Comment: glass of wine occasionally. Depression Risk Factor Screening:     3 most recent PHQ Screens 11/10/2020   PHQ Not Done -   Little interest or pleasure in doing things Not at all   Feeling down, depressed, irritable, or hopeless Not at all   Total Score PHQ 2 0       Alcohol Risk Screen   Do you average more than 1 drink per night or more than 7 drinks a week:  No    On any one occasion in the past three months have you have had more than 3 drinks containing alcohol:  No        Functional Ability and Level of Safety:   Hearing: Hearing is good. Activities of Daily Living: The home contains: no safety equipment. Patient does total self care     Ambulation: with no difficulty     Fall Risk:  Fall Risk Assessment, last 12 mths 11/10/2020   Able to walk? Yes   Fall in past 12 months?  No   Fall with injury? -   Number of falls in past 12 months -   Fall Risk Score -     Abuse Screen:  Patient is not abused       Cognitive Screening   Has your family/caregiver stated any concerns about your memory: no     Cognitive Screening: none performed    Patient Care Team   Patient Care Team:  Sallie Lundborg, MD as PCP - General (Internal Medicine)  Sallie Lundborg, MD as PCP - REHABILITATION [facility] Empaneled Provider  Jillian Carlos MD as Physician (Neurology)  Therese Burroughs MD (Otolaryngology)  Ailsia Phillips MD (Ophthalmology)  Elisha Noel MD (Gastroenterology)  Bryant Brown MD (Ophthalmology)  Kailee Low MD (Orthopedic Surgery)  Dieudonne Sosa MD (Cardiology)  Odalis Vu MD (Orthopedic Surgery)  Divina Juarez MD (Physical Medicine and Rehabilitation)  Dixie Almazan DPM (Podiatry)    Assessment/Plan   Education and counseling provided:  Are appropriate based on today's review and evaluation  End-of-Life planning (with patient's consent)  Pneumococcal Vaccine  Influenza Vaccine  Screening Mammography  Colorectal cancer screening tests  Cardiovascular screening blood test  Bone mass measurement (DEXA)  Screening for glaucoma  Diabetes screening test    Diagnoses and all orders for this visit:    1. Essential hypertension  -     CBC WITH AUTOMATED DIFF; Future  -     LIPID PANEL; Future  -     MAGNESIUM; Future  -     METABOLIC PANEL, COMPREHENSIVE; Future  -     TSH 3RD GENERATION; Future  -     URINALYSIS W/MICROSCOPIC; Future  -     VITAMIN D, 25 HYDROXY; Future    2. CAD S/P percutaneous coronary angioplasty  -     CBC WITH AUTOMATED DIFF; Future  -     LIPID PANEL; Future  -     MAGNESIUM; Future  -     METABOLIC PANEL, COMPREHENSIVE; Future  -     TSH 3RD GENERATION; Future  -     URINALYSIS W/MICROSCOPIC; Future  -     VITAMIN D, 25 HYDROXY; Future    3. Dyslipidemia  -     CBC WITH AUTOMATED DIFF; Future  -     LIPID PANEL; Future  -     MAGNESIUM; Future  -     METABOLIC PANEL, COMPREHENSIVE; Future  -     TSH 3RD GENERATION; Future  -     URINALYSIS W/MICROSCOPIC; Future  -     VITAMIN D, 25 HYDROXY; Future    4. Parkinson disease ([facility] [address]  -     CBC WITH AUTOMATED DIFF; Future  -     LIPID PANEL; Future  -     MAGNESIUM; Future  -     METABOLIC PANEL, COMPREHENSIVE; Future  -     TSH 3RD GENERATION; Future  -     URINALYSIS W/MICROSCOPIC; Future  -     VITAMIN D, 25 HYDROXY; Future    5. Gastroesophageal reflux disease, unspecified whether esophagitis present  -     CBC WITH AUTOMATED DIFF; Future  -     LIPID PANEL; Future  -     MAGNESIUM; Future  -     METABOLIC PANEL, COMPREHENSIVE; Future  -     TSH 3RD GENERATION; Future  -     URINALYSIS W/MICROSCOPIC; Future  -     VITAMIN D, 25 HYDROXY; Future    6. Laryngopharyngeal reflux disease  -     CBC WITH AUTOMATED DIFF; Future  -     LIPID PANEL; Future  -     MAGNESIUM; Future  -     METABOLIC PANEL, COMPREHENSIVE; Future  -     TSH 3RD GENERATION; Future  -     URINALYSIS W/MICROSCOPIC;  Future  - VITAMIN D, 25 HYDROXY; Future    7. Primary osteoarthritis involving multiple joints  -     CBC WITH AUTOMATED DIFF; Future  -     LIPID PANEL; Future  -     MAGNESIUM; Future  -     METABOLIC PANEL, COMPREHENSIVE; Future  -     TSH 3RD GENERATION; Future  -     URINALYSIS W/MICROSCOPIC; Future  -     VITAMIN D, 25 HYDROXY; Future    8. Lumbar spondylosis  -     CBC WITH AUTOMATED DIFF; Future  -     LIPID PANEL; Future  -     MAGNESIUM; Future  -     METABOLIC PANEL, COMPREHENSIVE; Future  -     TSH 3RD GENERATION; Future  -     URINALYSIS W/MICROSCOPIC; Future  -     VITAMIN D, 25 HYDROXY; Future    9. DDD (degenerative disc disease), lumbar  -     CBC WITH AUTOMATED DIFF; Future  -     LIPID PANEL; Future  -     MAGNESIUM; Future  -     METABOLIC PANEL, COMPREHENSIVE; Future  -     TSH 3RD GENERATION; Future  -     URINALYSIS W/MICROSCOPIC; Future  -     VITAMIN D, 25 HYDROXY; Future    10. Osteopenia of multiple sites  -     CBC WITH AUTOMATED DIFF; Future  -     LIPID PANEL; Future  -     MAGNESIUM; Future  -     METABOLIC PANEL, COMPREHENSIVE; Future  -     TSH 3RD GENERATION; Future  -     URINALYSIS W/MICROSCOPIC; Future  -     VITAMIN D, 25 HYDROXY; Future    11. Macular degeneration of left eye, unspecified type  -     CBC WITH AUTOMATED DIFF; Future  -     LIPID PANEL; Future  -     MAGNESIUM; Future  -     METABOLIC PANEL, COMPREHENSIVE; Future  -     TSH 3RD GENERATION; Future  -     URINALYSIS W/MICROSCOPIC; Future  -     VITAMIN D, 25 HYDROXY; Future    12. Medicare annual wellness visit, subsequent  -     ADVANCE CARE PLANNING FIRST [address]    13. Advanced directives, counseling/discussion  -     ADVANCE CARE PLANNING FIRST 27 MINS    15. Screening for depression  -     [place] [id number]    15. Non-seasonal allergic rhinitis, unspecified trigger  -     CBC WITH AUTOMATED DIFF; Future  -     LIPID PANEL; Future  -     MAGNESIUM;  Future  -     METABOLIC PANEL, COMPREHENSIVE; Future  -     TSH 3RD GENERATION; Future  -     URINALYSIS W/MICROSCOPIC; Future  -     VITAMIN D, 25 HYDROXY; Future    16. Psoriasis of scalp  -     CBC WITH AUTOMATED DIFF; Future  -     LIPID PANEL; Future  -     MAGNESIUM; Future  -     METABOLIC PANEL, COMPREHENSIVE; Future  -     TSH 3RD GENERATION; Future  -     URINALYSIS W/MICROSCOPIC; Future  -     VITAMIN D, 25 HYDROXY; Future    17. Disorder of bone density and structure, unspecified   -     VITAMIN D, 25 HYDROXY; Future        There are no preventive care reminders to display for this patient.

## 2020-11-12 NOTE — PROGRESS NOTES
HPI:   Bakari Andujar is a 78y.o. year old female who presents today for a routine visit and preoperative evaluation. She has a history of hypertension, ASCVD, hyperlipidemia, syncope, Parkinson's disease, lumbar degenerative disease, GERD, and macular degeneration. She reports that she is doing reasonably well. She reports that she is scheduled on 11/17/2020 to have extraction of a left upper molar due to an abscess, and she states that she will subsequently have placement of an implant. She reports that she is otherwise doing well and is without new complaints. In 8/2018, she was evaluated by Dr. Carlos Bush for right hip pain and was diagnosed with right piriformis syndrome. She was treated with a cortisone injection and physical therapy with some improvement, but subsequently developed right buttocks pain and pain and paresthesias down her right leg. She was treated with prednisone 50 mg for five days without improvement. She was seen on 10/9/2018, which was 10 days after completing the course of prednisone, and reported persistent severe right leg pain which was interfering with her activities and with sleep. She was taking Tylenol ES without relief, and was started on gabapentin. Lumbar spine x-ray was obtained (10/9/2018) showing grade 1 anterolisthesis of L4 on L5, either new or increased since 4/2015, and multilevel degenerative spondylosis/disc disease. She was referred to physical therapy, but due to continued persistent symptoms, she had a lumbar MRI (10/17/2018) which showed multilevel degenerative findings causing various degrees of neuroforaminal  narrowing and lateral recess narrowing; also a right L4-L5 neuroforamen cystic lesion measuring approximately 10 x 6 mm was noted. She had a lumbar MRI with contrast (11/16/2018) which confirmed an extruding synovial cyst from the right L4-5 advanced facet arthropathy; significant right foraminal stenosis and nerve impingement persists.  She continued physical therapy and reported that her pain has significantly improved. She was evaluated by Dr. Loy Almeida on 1/3/2019 but her pain had already resolved. She is no longer taking gabapentin or Tylenol. She has a history of hypertension, hyperlipidemia, ASCVD, and syncope. In 3/2011, she had three syncopal episodes while donating blood. Over the subsequent four days, she developed exertional substernal chest tightness with left arm pain and dyspnea. She was evaluated and EKG revealed new T wave inversions in leads V2 and V3. Troponins were negative. She underwent cardiac catheterization (3/21/2011) which showed a 90% mid LAD and mild disease in the rest of the coronaries. She underwent PCI and placement of two drug-eluting stents for the mid LAD lesion; LV function was mildly diminished (EF 40-45%) with anteroapical hypokinesis. Follow-up echocardiogram (9/2011) showed return to normal LV function (EF 60%) and no RWMA. She has a history of multiple syncopal episodes, usually related to stressful situations, and thought to be vasovagal in origin. An implantable loop recorder was placed in 3/2011 and remained until 4/2013, and interrogation was negative for any significant arrhythmia. In 4/2014, she was hospitalized following another syncopal episode, which occurred after she had taken her morning medications. Afterward, she became nauseated and flushed, and called EMS. When they arrived, they found her on the floor and reportedly without a pulse. They began CPR and within 10-15 seconds, she recovered. Evaluation included EKG/troponins, which were negative, and an echocardiogram showing mild MR and normal LV function (EF 60-65%). She was found to have hypokalemia and hypomagnesemia and hydrochlorothiazide was discontinued. It was thought that this event also represented a vasovagal reaction given her prodromal symptoms. She has had no further events since that time.  Her most recent pharmacologic nuclear stress test (6/25/2020) was a normal, low risk study, negative for evidence of ischemia or prior infarction, and with normal LV size and function (EF 84%). Her current regimen includes metoprolol, lisinopril, aspirin, and rosuvastatin. She is being followed by Dr. Alonso Carbajal. She is usually very active line dancing 3x/week, doing yardwork and riding her bicycle. She denies any chest pain, shortness of breath at rest or with exertion, palpitations, lightheadedness, or edema. In 11/2017, she reported bilateral thigh aching pain, which increased with ambulation particularly when walking up stairs. She stated that the discomfort was similar to that which developed previously with use of simvastatin and atorvastatin. She had been on pravastatin since 10/2014 and did not note any difficulty previously. She discontinued pravastatin and had resolution of her symptoms. She was subsequently started on rosuvastatin in 12/2017, and did well until 7/2018 when she again developed myalgias. Her dose was decreased to 5 mg every other day. She reports today that she continues to have aching discomfort in her bilateral thighs on the days which she takes rosuvastatin, but reports that it is tolerable so she will continue. She has a history of idiopathic Parkinson's disease, predominantly left-sided. She was being treated with Sinemet, but changed to Stalevo in 10/2019, and reports improved control of symptoms. She has difficulty with writing at times, particularly towards the end of the day, and also describes increasing tremors midday. She is followed by Dr. Vanessa Preciado. She has a history of laryngopharyngeal reflux disease, treated previously with dexlansoprazole, but ha successfully weaned from therapy. She is now receiving immunotherapy with Dr. Prince Bowles to address her allergies and hoarseness. In 5/2017 she had multiple episodes of epistaxis prompting an ED visit.  She subsequently underwent silver nitrate cautery of anterior vessels in her right nares by Dr. Bernadine Ram, and has had no recurrence. In 3/2010, she underwent evaluation for iron deficiency anemia. She had previously had a negative colonoscopy and air contrast barium enema in 2009 by Dr. Flex Douglas, and she had an upper endoscopy by Dr. Jen Lanes which was normal. She was treated with iron with improvement. She had a repeat screening colonoscopy in 7/2016 by Dr. Jen Lanes which was normal. No further follow-up was recommended given her age. She denies any abdominal pain, nausea, vomiting, melena, hematochezia, or change in bowel movements. She has a history of osteopenia with last bone density study in 5/2018 showing T-scores:  femoral neck left -1.2  /right -1.6 and lumbar -0.8 . She continues to take calcium and Vitamin D supplements. She has no history of pathologic fractures. She has a recent history of abnormal mammograms since 10/2014 with microcalcifcations in the right breast. She has been undergoing surveillance mammograms every six months, which have been unchanged. She had a follow-up mammogram in 5/2016 which was negative, and routine annual screening was recommended. She has a history of bilateral knee pain secondary to osteoarthritis. She was evaluated by Dr. Hoang Lozano in 3/2018 and received a cortisone injection with improvement. She also has difficutly with left ankle pain which increase with ambulation. She received a cortisone injection for this as well from Dr. Hoang Lozano in 3/2018, but did not experience significant improvement. She was evaluated by Dr. Benedict Brian on 3/27/2018 and diagnosed with left peroneus brevis tendinitis. An orthotic was recommended with improvement. She is now being followed by Dr. Arun Hoffman for left ankle pain, and CT scan left ankle (7/2019) showed evidence of chronic ATFL sprain or complete rupture, likely prior sprain of the calcaneofibular ligament, and advanced posterior subtalar and tarsometatarsal osteoarthrosis.          Past Medical History:   Diagnosis Date    Basal cell cancer     CAD (coronary artery disease), native coronary artery 03/21/2011    s/p PCI with with CARLITO (Xience 2.75x12, 2.5x12) mLAD and mild disease in rest of coronaries. Midly depressed LV syst fct     Cardiac cath 03/21/2011    mLAD 90% (2.75 x 12 mm Xience stent). Otherwise patent coronaries. LVEDP 10 mmHg. EF 40-45%. Anteroapical hypk. Renal arteries patent.  Cardiac echocardiogram 04/17/2014    EF 60-65%. No WMA. Gr 1 DDfx. Mild MR. Similar to study of 9/14/11.  Cardiac nuclear imaging test 07/2017    Negative for ischemia or infarction. EF > 70%.  Cardiomyopathy secondary     ischemic +/- stress induced    Dyslipidemia     GERD (gastroesophageal reflux disease)     Hx of colonoscopy 07/12/2016    Normal. Dr. Jourdan Rodriguez Hypertension     Parkinson's disease     Syncope     s/p ILD implantation     Past Surgical History:   Procedure Laterality Date    COLONOSCOPY N/A 7/12/2016    COLONOSCOPY performed by Saniya Baldwin MD at [address] HX BUNIONECTOMY      dorsal    HX CATARACT REMOVAL  11/2012    left    HX CATARACT REMOVAL  10/2012    right    HX CORONARY STENT PLACEMENT      HX HEART CATHETERIZATION      HX HEMORRHOIDECTOMY      HX HYSTERECTOMY  1996    partial    HX IMPLANTABLE LOOP RECORDER  [id number]    HX TONSILLECTOMY      HX TOTAL ABDOMINAL HYSTERECTOMY  1996    partial     Current Outpatient Medications   Medication Sig    psyllium husk (METAMUCIL PO) Take  by mouth.  potassium chloride (K-DUR, KLOR-CON) 20 mEq tablet Take 0.5 Tabs by mouth every other day.  rosuvastatin (CRESTOR) 10 mg tablet Take 0.5 Tabs by mouth every other day.  lisinopril (PRINIVIL, ZESTRIL) 10 mg tablet Take 1 Tab by mouth daily.  metoprolol succinate (TOPROL-XL) 50 mg XL tablet Take 1 Tab by mouth daily.     nitroglycerin (NITROSTAT) 0.4 mg SL tablet 1 sublingual every 5 minutes for chest pain for no more than 3 doses    carbidopa 25mg-levodopa 100mg-entacapone 200mg (STALEVO 100) tab tablet Take 1 Tab by mouth four (4) times daily.  calcium carbonate (CALCIUM 500 PO) Take  by mouth.  magnesium hydroxide (NEGRO MILK OF MAGNESIA) 400 mg/5 mL suspension Take 30 mL by mouth daily as needed for Constipation.  acetaminophen (TYLENOL) 650 mg CR tablet Take 650 mg by mouth every six (6) hours as needed for Pain.  co-enzyme Q-10 (CO Q-10) 100 mg capsule Take 200 mg by mouth daily.  aspirin 81 mg tablet Take 1 Tab by mouth daily.  DOCUSATE CALCIUM (STOOL SOFTENER PO) Take 1 Cap by mouth.  magnesium 250 mg Tab Take 250 mg by mouth two (2) times a day. No current facility-administered medications for this visit. Allergies and Intolerances: Allergies   Allergen Reactions    Keflex [Cephalexin] Other (comments)     Yeast infection    Pcn [Penicillins] Other (comments)     Family History: She has no family history of colon or breast cancer. Family History   Problem Relation Age of Onset    Heart Attack Father 80    Heart Disease Father     Cancer Mother         pancreatic    Hypertension Brother      Social History:   She  reports that she has never smoked. She has never used smokeless tobacco. She lives with her , who is having difficulty with mild cognitive impairment. She states that they sold their RV and now stay at home for the winter. She is a retired x-ray technician. Social History     Substance and Sexual Activity   Alcohol Use Yes    Comment: glass of wine occasionally.      Immunization History:  Immunization History   Administered Date(s) Administered    (RETIRED) Pneumococcal Vaccine (Unspecified Type) 01/01/2003    Influenza High Dose Vaccine PF 09/29/2014, 10/05/2015, 11/01/2016, 08/30/2017    Influenza Vaccine (Tri) Adjuvanted (>65 Yrs FLUAD TRI [id number]) 10/09/2018, 09/17/2019    Influenza Vaccine Whole 09/07/2010, 09/03/2011, 10/04/2012    Influenza, Quadrivalent, Adjuvanted (>65 Yrs FLUAD QUAD [id number]) 09/11/2020    Pneumococcal Conjugate (PCV-13) 10/05/2015    Pneumococcal Polysaccharide (PPSV-23) 01/01/2003, 10/30/2019    TD Vaccine 01/01/2003    Tdap 09/25/2013    Zoster 01/01/2007    Zoster Recombinant 09/18/2018, 11/23/2018       Review of Systems:   As above included in HPI. Otherwise 11 point review of systems negative including constitutional, skin, HENT, eyes, respiratory, cardiovascular, gastrointestinal, genitourinary, musculoskeletal, endo/heme/aller, neurological.    Physical:   Vitals:   BP: 124/66  HR: 63  WT: 121 lb 9.6 oz (55.2 kg)  BMI:  20.24 kg/m2    Exam:     Patient appears in no apparent distress. Affect is appropriate. HEENT: PERRLA, anicteric, oropharynx clear, no JVD, adenopathy or thyromegaly. No carotid bruits or radiated murmur. Lungs: clear to auscultation, no wheezes, rhonchi, or rales. Heart: regular rate and rhythm. No murmur, rubs, gallops  Abdomen: soft, nontender, nondistended, normal bowel sounds, no hepatosplenomegaly or masses. Extremities: without edema.         Review of Data:  Labs:  Hospital Outpatient Visit on 11/03/2020   Component Date Value Ref Range Status    LIPID PROFILE 11/03/2020        Final    Cholesterol, total 11/03/2020 149  <200 MG/DL Final    Triglyceride 11/03/2020 68  <150 MG/DL Final    HDL Cholesterol 11/03/2020 66* 40 - 60 MG/DL Final    LDL, calculated 11/03/2020 69.4  0 - 100 MG/DL Final    VLDL, calculated 11/03/2020 13.6  MG/DL Final    CHOL/HDL Ratio 11/03/2020 2.3  0 - 5.0   Final    Magnesium 11/03/2020 2.2  1.6 - 2.6 mg/dL Final    Sodium 11/03/2020 141  136 - 145 mmol/L Final    Potassium 11/03/2020 4.6  3.5 - 5.5 mmol/L Final    Chloride 11/03/2020 106  100 - 111 mmol/L Final    CO2 11/03/2020 30  21 - 32 mmol/L Final    Anion gap 11/03/2020 5  3.0 - 18 mmol/L Final    Glucose 11/03/2020 89  74 - 99 mg/dL Final    BUN 11/03/2020 14  7.0 - 18 MG/DL Final    Creatinine 11/03/2020 0.67  0.6 - 1.3 MG/DL Final    BUN/Creatinine ratio 11/03/2020 21* 12 - 20   Final    GFR est AA 11/03/2020 >60  >60 ml/min/1.73m2 Final    GFR est non-AA 11/03/2020 >60  >60 ml/min/1.73m2 Final    Calcium 11/03/2020 9.6  8.5 - 10.1 MG/DL Final    Bilirubin, total 11/03/2020 0.5  0.2 - 1.0 MG/DL Final    ALT (SGPT) 11/03/2020 8* 13 - 56 U/L Final    AST (SGOT) 11/03/2020 18  10 - 38 U/L Final    Alk. phosphatase 11/03/2020 98  45 - 117 U/L Final    Protein, total 11/03/2020 6.4  6.4 - 8.2 g/dL Final    Albumin 11/03/2020 3.6  3.4 - 5.0 g/dL Final    Globulin 11/03/2020 2.8  2.0 - 4.0 g/dL Final    A-G Ratio 11/03/2020 1.3  0.8 - 1.7   Final       Health Maintenance:  Screening:    Mammogram: negative (7/2020)   PAP smear: s/p ROHINI. No further screening. Colorectal: colonoscopy (7/2016) normal. Dr. Rupesh Ramirez. No further screening. Depression: none   DM (HbA1c/FPG): FPG 89 (11/2020)   Hepatitis C: N/A   Falls: none   DEXA: osteopenia (5/2018)   Glaucoma: regular eye exams with Dr. Asia Fried (last 3/2019) and Dr. Carolyn Caicedo (10/2020) four times per year for left macular degeneration. Smoking: none   Vitamin D: 52.9 (4/2019)   Medicare Wellness: today      Impression:  Patient Active Problem List   Diagnosis Code    Hypertension I10    CAD S/P percutaneous coronary angioplasty I25.10, Z98.61    Dyslipidemia E78.5    Syncope R55    Parkinson disease ([address] G20    GERD (gastroesophageal reflux disease) K21.9    Macular degeneration of left eye H35.30    Laryngopharyngeal reflux disease K21.9    Osteopenia M85.80    Psoriasis of scalp L40.9    Lumbar spondylosis M47.816    Synovial cyst of lumbar spine M71.38    DDD (degenerative disc disease), lumbar M51.36    Primary osteoarthritis involving multiple joints M89.49    Allergic rhinitis J30.9       Plan:  1. Hyperlipidemia.  Previously on low intensity dose pravastatin, but developed severe bilateral thigh pain, worse with ambulation and was discontinued in 11/2018 with improvement. Reported similar symptoms in past with statin (Zocor until 2012, and then Lipitor until 9/2014). Started on rosuvastatin 10 mg daily in 12/2018 and tolerated without difficulty until 7/2018 when developed recurrent myalgias and dose decreased to 5 mg every other day with improvement. Lipid panel today with LDL 69 and HDL 66, indicative of excellent control. Emphasized importance of lifestyle modifications, including diet and exercise. Would not recommend weight loss given BMI. Continue to follow. 2. Hypertension. Blood pressure well controlled on lisinopril 10 mg daily and metoprolol succinate 50 mg daily. Renal function remains normal with creatinine 0.67 / eGFR >60. Continue to follow. 3. ASCVD. Remains asymptomatic on current regimen of aspirin, metoprolol succinate, and rosuvastatin. Resolution of cardiomyopathy with last echocardiogram revealing normal LV function. Negative pharmacologic nuclear stress test in 6/2020. Being followed by Dr. Radha Gastelum. 4. H/O syncope. No further episodes since 2014. Most likely secondary to vasovagal reaction. Follow. 5. Parkinson's disease. Therapy with addition of Stalevo to Sinemet with significant improvement in control of tremors. Being followed by Dr. Duke Fothergill.  6. Osteopenia. Last bone density scan 5/2018. Using femoral neck T-scores, calculated FRAX score estimates her 10 year risk of a major osteoporetic fracture at 12 % and hip fracture at 3.1 %, which are an indication for biphosphonate treatment based on hip fracture risk of >3%. However, no significant change from 5/2018. Continue calcium and vitamin D supplements. Encouraged exercise, particularly weight bearing activities. Will follow. Fall precautions stressed. Will reassess with next mammogram.   7. Lumbar spondylosis. Patient with symptoms of right buttock pain since 8/2018 and diagnosed with right piriformis syndrome.  Completed physical therapy and received cortisone injection with some improvement, but subsequently developed right leg paresthesias and pain consistent with right sided sciatica. Treated with five day course of prednisone 50 mg without improvement. LS spine x-rays and lumbar MRI with degenerative changes, although MRI showed a right L4-L5 neuroforamen synovial cyst from the right L4-5 advanced facet arthropathy with significant right foraminal stenosis and nerve impingement. Referred for physical therapy and treated with gabapentin 100 mg tid with resolution of the sciatica symptoms. Evaluated by Dr. Louann Blizzard, but symptoms had already resolved. No longer taking gabapentin. Follow. 8. Osteoarthritis. Difficulty with bilateral knee pain L>R and receiving intermittent cortisone injections from Dr. Viviane Maradiaga. Left ankle pain being addressed by podiatrist, Dr. Ann Marie Christiansen. 9. Macular degeneration, left. On Preservision. Being followed by Dr. Candido Cuellar every 3 months. 10. Laryngopharyngeal reflux. Doing well. Discontinued Dexilant without an increase in symptoms. Now receiving immunotherapy to address hoarseness and allergies. Followed by Dr. Anton David. 11. Psoriasis. Using clobetasol solution for scalp psoriasis. 12. Health maintenance. Already received influenza vaccine. Received 2/2 doses of Shingrix vaccine. Completed pneumococcal series. Other immunizations up to date. No further colorectal cancer screening needed. Mammogram up to date. Will get repeat bone density study with next mammogram. Continue regular eye exams with Ankur Mansfield and Candido Cuellar. Vitamin D level normal. Continue maintenance dose supplement. Preoperative risk stratification:  Patient scheduled for tooth extraction on 11/17/2020. Surgery to be performed under local anesthesia with possible IV sedation. Scheduled surgery is a low risk procedure and her functional status is good. Patient is clinically stable and without absolute medical contraindication for surgery. She is low risk for a vasu-procedural cardiac event. Surgery may proceed as planned at the discretion of Dr. Jose Rodríguez. In addition, an annual Medicare wellness visit was done today. Patient understands recommendations and agrees with plan. Follow-up in 6 months.

## 2020-11-12 NOTE — TELEPHONE ENCOUNTER
Please fax note to Dr. Hector Díaz for clearance for oral surgery on 11/17/2020. Fax# [phone] Thanks.

## 2020-11-23 ENCOUNTER — TELEPHONE (OUTPATIENT)
Dept: CARDIOLOGY CLINIC | Age: 80
End: 2020-11-23

## 2020-11-23 DIAGNOSIS — F41.9 ANXIETY DUE TO INVASIVE PROCEDURE: Primary | ICD-10-CM

## 2020-11-23 NOTE — TELEPHONE ENCOUNTER
Patient stating she had made another appt to have her tooth extracted on 12/8/20. She wants to know if you have any thoughts.

## 2020-11-23 NOTE — TELEPHONE ENCOUNTER
Received VM message regarding cancelled dental extraction d/t to elevated BP of 190/78. Left message requesting a return call.

## 2020-11-24 RX ORDER — LORAZEPAM 1 MG/1
TABLET ORAL
Qty: 2 TAB | Refills: 0 | Status: SHIPPED | OUTPATIENT
Start: 2020-11-24 | End: 2021-05-11 | Stop reason: ALTCHOICE

## 2020-11-24 NOTE — TELEPHONE ENCOUNTER
Spoke with patient. Will prescribe a small amount of Ativan to take 30 minutes prior to dental procedure to help with anxiety.

## 2020-12-21 RX ORDER — METOPROLOL SUCCINATE 50 MG/1
50 TABLET, EXTENDED RELEASE ORAL DAILY
Qty: 90 TAB | Refills: 3 | Status: SHIPPED | OUTPATIENT
Start: 2020-12-21 | End: 2021-12-20 | Stop reason: SDUPTHER

## 2020-12-21 RX ORDER — METOPROLOL SUCCINATE 50 MG/1
50 TABLET, EXTENDED RELEASE ORAL DAILY
Qty: 90 TAB | Refills: 3 | Status: CANCELLED | OUTPATIENT
Start: 2020-12-21

## 2020-12-21 NOTE — TELEPHONE ENCOUNTER
Last Visit: 11/10/20 with MD Hal Guerra Next Appointment: 5/11/21 with MD Hal Guerra Previous Refill Encounter(s): 12/10/19 #90 with 3 refills Requested Prescriptions Pending Prescriptions Disp Refills  metoprolol succinate (TOPROL-XL) 50 mg XL tablet 90 Tab 3 Sig: Take 1 Tab by mouth daily.

## 2021-01-12 ENCOUNTER — OFFICE VISIT (OUTPATIENT)
Dept: CARDIOLOGY CLINIC | Age: 81
End: 2021-01-12
Payer: MEDICARE

## 2021-01-12 VITALS
SYSTOLIC BLOOD PRESSURE: 124 MMHG | HEIGHT: 65 IN | OXYGEN SATURATION: 99 % | BODY MASS INDEX: 20.99 KG/M2 | WEIGHT: 126 LBS | DIASTOLIC BLOOD PRESSURE: 76 MMHG | HEART RATE: 60 BPM

## 2021-01-12 DIAGNOSIS — Z98.61 CAD S/P PERCUTANEOUS CORONARY ANGIOPLASTY: Primary | ICD-10-CM

## 2021-01-12 DIAGNOSIS — I10 ESSENTIAL HYPERTENSION: ICD-10-CM

## 2021-01-12 DIAGNOSIS — I25.10 CAD S/P PERCUTANEOUS CORONARY ANGIOPLASTY: Primary | ICD-10-CM

## 2021-01-12 DIAGNOSIS — E78.5 DYSLIPIDEMIA: ICD-10-CM

## 2021-01-12 PROCEDURE — 1101F PT FALLS ASSESS-DOCD LE1/YR: CPT | Performed by: INTERNAL MEDICINE

## 2021-01-12 PROCEDURE — 99214 OFFICE O/P EST MOD 30 MIN: CPT | Performed by: INTERNAL MEDICINE

## 2021-01-12 PROCEDURE — G8752 SYS BP LESS 140: HCPCS | Performed by: INTERNAL MEDICINE

## 2021-01-12 PROCEDURE — G8510 SCR DEP NEG, NO PLAN REQD: HCPCS | Performed by: INTERNAL MEDICINE

## 2021-01-12 PROCEDURE — G8420 CALC BMI NORM PARAMETERS: HCPCS | Performed by: INTERNAL MEDICINE

## 2021-01-12 PROCEDURE — G8754 DIAS BP LESS 90: HCPCS | Performed by: INTERNAL MEDICINE

## 2021-01-12 PROCEDURE — 1090F PRES/ABSN URINE INCON ASSESS: CPT | Performed by: INTERNAL MEDICINE

## 2021-01-12 PROCEDURE — G8427 DOCREV CUR MEDS BY ELIG CLIN: HCPCS | Performed by: INTERNAL MEDICINE

## 2021-01-12 PROCEDURE — G8536 NO DOC ELDER MAL SCRN: HCPCS | Performed by: INTERNAL MEDICINE

## 2021-01-12 PROCEDURE — G8399 PT W/DXA RESULTS DOCUMENT: HCPCS | Performed by: INTERNAL MEDICINE

## 2021-01-12 PROCEDURE — 93000 ELECTROCARDIOGRAM COMPLETE: CPT | Performed by: INTERNAL MEDICINE

## 2021-01-12 NOTE — PROGRESS NOTES
Nima Ahumada presents today for   Chief Complaint   Patient presents with    Follow-up     6 month follow up       Nima Ahumada preferred language for health care discussion is english/other. Is someone accompanying this pt? no    Is the patient using any DME equipment during [address]? no    Depression Screening:  3 most recent PHQ Screens 1/12/2021   PHQ Not Done -   Little interest or pleasure in doing things Not at all   Feeling down, depressed, irritable, or hopeless Not at all   Total Score PHQ 2 0       Learning Assessment:  Learning Assessment 1/12/2021   PRIMARY LEARNER Patient   HIGHEST LEVEL OF EDUCATION - PRIMARY LEARNER  -   BARRIERS PRIMARY LEARNER -   CO-LEARNER CAREGIVER -   PRIMARY LANGUAGE ENGLISH   LEARNER PREFERENCE PRIMARY DEMONSTRATION   ANSWERED BY patient   RELATIONSHIP SELF       Abuse Screening:  Abuse Screening Questionnaire 1/12/2021   Do you ever feel afraid of your partner? N   Are you in a relationship with someone who physically or mentally threatens you? N   Is it safe for you to go home? Y       Fall Risk  Fall Risk Assessment, last 12 mths 1/12/2021   Able to walk? Yes   Fall in past 12 months? 0   Do you feel unsteady? 0   Are you worried about falling 0   Number of falls in past 12 months -   Fall with injury? -       Pt currently taking Anticoagulant therapy? no    Coordination of Care:  1. Have you been to the ER, urgent care clinic since your last visit? Hospitalized since your last visit? no    2. Have you seen or consulted any other health care providers outside of the [address] [place] [address] since your last visit? Include any pap smears or colon screening.  no

## 2021-01-12 NOTE — PROGRESS NOTES
HISTORY OF PRESENT ILLNESS  Cierra López is a 78 y.o. female. Follow-up  Pertinent negatives include no chest pain, no abdominal pain, no headaches and no shortness of breath. Patient presents for scheduled follow-up office visit. She has a past medical history significant for single-vessel coronary artery disease status post PCI to her mid LAD with a total of 2 drug-eluting stents in 2011. Patient presents with symptoms of unstable angina at that time. It should be noted that she did have classic exertional anginal symptoms leading up to her PCI that were likely unrecognized. She reports symptoms of substernal chest pain radiating to her left arm and jaw prior to her PCI. Since her procedure, she has had no recurrent symptoms. The patient last underwent a pharmacologic nuclear stress test in June 2020 which was a normal low risk study. There was no evidence of ischemia or infarction, EF greater than 70%. She was last seen in the office approximately 6 months ago. Since last visit she has been feeling quite well. Her only complaint is vision changes due to macular degeneration. She denies any new chest pain, shortness of breath, leg swelling, orthopnea, or PND. No heart palpitations, dizziness, nor syncope. No major change in her activity tolerance or capabilities. Past Medical History:   Diagnosis Date    Basal cell cancer     CAD (coronary artery disease), native coronary artery 03/21/2011    s/p PCI with with CARLITO (Xience 2.75x12, 2.5x12) mLAD and mild disease in rest of coronaries. Midly depressed LV syst fct     Cardiac cath 03/21/2011    mLAD 90% (2.75 x 12 mm Xience stent). Otherwise patent coronaries. LVEDP 10 mmHg. EF 40-45%. Anteroapical hypk. Renal arteries patent.  Cardiac echocardiogram 04/17/2014    EF 60-65%. No WMA. Gr 1 DDfx. Mild MR. Similar to study of 9/14/11.  Cardiac nuclear imaging test 07/2017    Negative for ischemia or infarction.   EF > 70%.    Cardiomyopathy secondary     ischemic +/- stress induced    Dyslipidemia     GERD (gastroesophageal reflux disease)     Hx of colonoscopy 07/12/2016    Normal. Dr. Keira Oliveros Hypertension     Parkinson's disease     Syncope     s/p ILD implantation     Current Outpatient Medications   Medication Sig Dispense Refill    metoprolol succinate (TOPROL-XL) 50 mg XL tablet Take 1 Tab by mouth daily. 90 Tab 3    LORazepam (ATIVAN) 1 mg tablet Take 1 tablet 15-30 minutes prior to tooth extraction. May repeat x 1 for anxiety. Indications: anxious, pre-procedure anxiety 2 Tab 0    psyllium husk (METAMUCIL PO) Take  by mouth.  potassium chloride (K-DUR, KLOR-CON) 20 mEq tablet Take 0.5 Tabs by mouth every other day. 25 Tab 3    rosuvastatin (CRESTOR) 10 mg tablet Take 0.5 Tabs by mouth every other day. 30 Tab 3    lisinopril (PRINIVIL, ZESTRIL) 10 mg tablet Take 1 Tab by mouth daily. 90 Tab 3    nitroglycerin (NITROSTAT) 0.4 mg SL tablet 1 sublingual every 5 minutes for chest pain for no more than 3 doses 25 Tab 3    carbidopa 25mg-levodopa 100mg-entacapone 200mg (STALEVO 100) tab tablet Take 1 Tab by mouth four (4) times daily. 0    calcium carbonate (CALCIUM 500 PO) Take  by mouth.  magnesium hydroxide (NEGRO MILK OF MAGNESIA) 400 mg/5 mL suspension Take 30 mL by mouth daily as needed for Constipation.  acetaminophen (TYLENOL) 650 mg CR tablet Take 650 mg by mouth every six (6) hours as needed for Pain.  co-enzyme Q-10 (CO Q-10) 100 mg capsule Take 200 mg by mouth daily.  aspirin 81 mg tablet Take 1 Tab by mouth daily. 1 Tab 0    DOCUSATE CALCIUM (STOOL SOFTENER PO) Take 1 Cap by mouth.  magnesium 250 mg Tab Take 250 mg by mouth two (2) times a day.        Allergies   Allergen Reactions    Keflex [Cephalexin] Other (comments)     Yeast infection    Pcn [Penicillins] Other (comments)      Social History     Tobacco Use    Smoking status: Never Smoker    Smokeless tobacco: Never Used   Substance Use Topics    Alcohol use: Yes     Comment: glass of wine occasionally.  Drug use: No       Review of Systems   Constitutional: Negative for chills, fever and weight loss. HENT: Negative for nosebleeds. Eyes: Negative for blurred vision and double vision. Respiratory: Negative for cough, shortness of breath and wheezing. Cardiovascular: Negative for chest pain, palpitations, orthopnea, claudication, leg swelling and PND. Gastrointestinal: Negative for abdominal pain, heartburn, nausea and vomiting. Genitourinary: Negative for dysuria and hematuria. Musculoskeletal: Negative for falls and myalgias. Skin: Negative for rash. Neurological: Negative for dizziness, focal weakness and headaches. Endo/Heme/Allergies: Does not bruise/bleed easily. Psychiatric/Behavioral: Negative for substance abuse. Visit Vitals  /76 (BP 1 Location: Left arm, BP Patient Position: Sitting)   Pulse 60   Ht 5' 5\" (1.651 m)   Wt 57.2 kg (126 lb)   SpO2 99%   BMI 20.97 kg/m²       Physical Exam   Constitutional: She is oriented to person, place, and time. She appears well-developed and well-nourished. HENT:   Head: Normocephalic and atraumatic. Eyes: Conjunctivae are normal.   Neck: Neck supple. No JVD present. Carotid bruit is not present. Cardiovascular: Normal rate, regular rhythm, S1 normal, S2 normal and normal pulses. Exam reveals no gallop. Murmur heard. Midsystolic murmur is present with a grade of 2/6. Pulmonary/Chest: Effort normal and breath sounds normal. She has no wheezes. She has no rales. Abdominal: Soft. Bowel sounds are normal. There is no abdominal tenderness. Musculoskeletal:         General: No edema. Neurological: She is alert and oriented to person, place, and time. Skin: Skin is warm and dry. EKG: Normal sinus rhythm, normal axis, normal QTc interval, no ST or T-wave abnormalities concerning for ischemia. Normal tracing.   No change compared to the previous EKG. ASSESSMENT and PLAN    Coronary artery disease. Single-vessel disease, status post PCI to her mid LAD in 2011 using 2 drug-eluting stents (Xience 2.75 x 12, 2.5 x 12). No recurrent anginal symptoms since that time. Patient is now taking an aspirin, beta-blocker low-dose statin. Patient underwent a low risk pharmacologic nuclear stress test in June 2020. No  symptoms concerning for angina. I would continue all of her medications. Dyslipidemia. Patient is now tolerating Crestor 5 mg daily. Is being followed closely by her PCP. Her most recent lipid panel from April 2020 was excellent: Total cholesterol 132, HDL 62, LDL 55. I would continue this regimen with a goal LDL less than 70. Essential hypertension. Patient's blood pressure looks very reasonable on her current regimen which includes metoprolol and lisinopril.     Follow-up annually, sooner if needed

## 2021-02-16 RX ORDER — LISINOPRIL 10 MG/1
10 TABLET ORAL DAILY
Qty: 90 TAB | Refills: 3 | Status: SHIPPED | OUTPATIENT
Start: 2021-02-16 | End: 2021-12-02 | Stop reason: SDUPTHER

## 2021-02-16 NOTE — TELEPHONE ENCOUNTER
Requested Prescriptions Pending Prescriptions Disp Refills  lisinopriL (PRINIVIL, ZESTRIL) 10 mg tablet 90 Tab 3 Sig: Take 1 Tab by mouth daily. Send to [id number] [facility]

## 2021-02-16 NOTE — TELEPHONE ENCOUNTER
Last Visit: 11/10/20 with MD Adarsh Melissa Next Appointment: 5/11/21 with MD Adarsh Melissa Previous Refill Encounter(s): 1/13/20 #90 with 3 refills Requested Prescriptions Pending Prescriptions Disp Refills  lisinopriL (PRINIVIL, ZESTRIL) 10 mg tablet 90 Tab 3 Sig: Take 1 Tab by mouth daily.

## 2021-02-17 ENCOUNTER — TELEPHONE (OUTPATIENT)
Dept: INTERNAL MEDICINE CLINIC | Age: 81
End: 2021-02-17

## 2021-02-17 RX ORDER — LISINOPRIL 10 MG/1
10 TABLET ORAL DAILY
Qty: 4 TAB | Refills: 0 | Status: SHIPPED | OUTPATIENT
Start: 2021-02-17 | End: 2021-05-11 | Stop reason: SDUPTHER

## 2021-02-17 NOTE — TELEPHONE ENCOUNTER
Pt states [facility] pharmacy told her she can't  her Lisinopril until Monday but yet she couldn't explain why really other than they have a 3 day policy to fill meds (???).    Said she will out of medication by Friday so is [facility] [place] [facility] [address] [facility]

## 2021-05-04 ENCOUNTER — APPOINTMENT (OUTPATIENT)
Dept: INTERNAL MEDICINE CLINIC | Age: 81
End: 2021-05-04

## 2021-05-04 ENCOUNTER — HOSPITAL ENCOUNTER (OUTPATIENT)
Dept: LAB | Age: 81
Discharge: HOME | End: 2021-05-04
Payer: MEDICARE

## 2021-05-04 DIAGNOSIS — I10 ESSENTIAL HYPERTENSION: ICD-10-CM

## 2021-05-04 DIAGNOSIS — M85.9 DISORDER OF BONE DENSITY AND STRUCTURE, UNSPECIFIED: ICD-10-CM

## 2021-05-04 DIAGNOSIS — H35.30 MACULAR DEGENERATION OF LEFT EYE, UNSPECIFIED TYPE: ICD-10-CM

## 2021-05-04 DIAGNOSIS — L40.9 PSORIASIS OF SCALP: ICD-10-CM

## 2021-05-04 DIAGNOSIS — K21.9 LARYNGOPHARYNGEAL REFLUX DISEASE: ICD-10-CM

## 2021-05-04 DIAGNOSIS — I25.10 CAD S/P PERCUTANEOUS CORONARY ANGIOPLASTY: ICD-10-CM

## 2021-05-04 DIAGNOSIS — G20 PARKINSON DISEASE (HCC): ICD-10-CM

## 2021-05-04 DIAGNOSIS — J30.89 NON-SEASONAL ALLERGIC RHINITIS, UNSPECIFIED TRIGGER: ICD-10-CM

## 2021-05-04 DIAGNOSIS — K21.9 GASTROESOPHAGEAL REFLUX DISEASE, UNSPECIFIED WHETHER ESOPHAGITIS PRESENT: ICD-10-CM

## 2021-05-04 DIAGNOSIS — E78.5 DYSLIPIDEMIA: ICD-10-CM

## 2021-05-04 DIAGNOSIS — M85.89 OSTEOPENIA OF MULTIPLE SITES: ICD-10-CM

## 2021-05-04 DIAGNOSIS — Z98.61 CAD S/P PERCUTANEOUS CORONARY ANGIOPLASTY: ICD-10-CM

## 2021-05-04 DIAGNOSIS — M47.816 LUMBAR SPONDYLOSIS: ICD-10-CM

## 2021-05-04 DIAGNOSIS — M51.36 DDD (DEGENERATIVE DISC DISEASE), LUMBAR: ICD-10-CM

## 2021-05-04 DIAGNOSIS — M15.9 PRIMARY OSTEOARTHRITIS INVOLVING MULTIPLE JOINTS: ICD-10-CM

## 2021-05-04 LAB
25(OH)D3 SERPL-MCNC: 33.6 NG/ML (ref 30–100)
ALBUMIN SERPL-MCNC: 3.7 G/DL (ref 3.4–5)
ALBUMIN/GLOB SERPL: 1.3 {RATIO} (ref 0.8–1.7)
ALP SERPL-CCNC: 96 U/L (ref 45–117)
ALT SERPL-CCNC: 11 U/L (ref 13–56)
ANION GAP SERPL CALC-SCNC: 4 MMOL/L (ref 3–18)
APPEARANCE UR: CLEAR
AST SERPL-CCNC: 19 U/L (ref 10–38)
BACTERIA URNS QL MICRO: ABNORMAL /HPF
BASOPHILS # BLD: 0 K/UL (ref 0–0.1)
BASOPHILS NFR BLD: 1 % (ref 0–2)
BILIRUB SERPL-MCNC: 0.5 MG/DL (ref 0.2–1)
BILIRUB UR QL: NEGATIVE
BUN SERPL-MCNC: 15 MG/DL (ref 7–18)
BUN/CREAT SERPL: 22 (ref 12–20)
CALCIUM SERPL-MCNC: 9.2 MG/DL (ref 8.5–10.1)
CHLORIDE SERPL-SCNC: 108 MMOL/L (ref 100–111)
CHOLEST SERPL-MCNC: 147 MG/DL
CO2 SERPL-SCNC: 30 MMOL/L (ref 21–32)
COLOR UR: ABNORMAL
CREAT SERPL-MCNC: 0.67 MG/DL (ref 0.6–1.3)
DIFFERENTIAL METHOD BLD: ABNORMAL
EOSINOPHIL # BLD: 0.2 K/UL (ref 0–0.4)
EOSINOPHIL NFR BLD: 4 % (ref 0–5)
EPITH CASTS URNS QL MICRO: ABNORMAL /LPF (ref 0–5)
ERYTHROCYTE [DISTWIDTH] IN BLOOD BY AUTOMATED COUNT: 12.9 % (ref 11.6–14.5)
GLOBULIN SER CALC-MCNC: 2.8 G/DL (ref 2–4)
GLUCOSE SERPL-MCNC: 87 MG/DL (ref 74–99)
GLUCOSE UR STRIP.AUTO-MCNC: NEGATIVE MG/DL
HCT VFR BLD AUTO: 39.7 % (ref 35–45)
HDLC SERPL-MCNC: 61 MG/DL (ref 40–60)
HDLC SERPL: 2.4 {RATIO} (ref 0–5)
HGB BLD-MCNC: 12.6 G/DL (ref 12–16)
HGB UR QL STRIP: NEGATIVE
KETONES UR QL STRIP.AUTO: ABNORMAL MG/DL
LDLC SERPL CALC-MCNC: 69.6 MG/DL (ref 0–100)
LEUKOCYTE ESTERASE UR QL STRIP.AUTO: ABNORMAL
LIPID PROFILE,FLP: ABNORMAL
LYMPHOCYTES # BLD: 1.4 K/UL (ref 0.9–3.6)
LYMPHOCYTES NFR BLD: 25 % (ref 21–52)
MAGNESIUM SERPL-MCNC: 2.3 MG/DL (ref 1.6–2.6)
MCH RBC QN AUTO: 30.6 PG (ref 24–34)
MCHC RBC AUTO-ENTMCNC: 31.7 G/DL (ref 31–37)
MCV RBC AUTO: 96.4 FL (ref 74–97)
MONOCYTES # BLD: 0.5 K/UL (ref 0.05–1.2)
MONOCYTES NFR BLD: 10 % (ref 3–10)
NEUTS SEG # BLD: 3.3 K/UL (ref 1.8–8)
NEUTS SEG NFR BLD: 60 % (ref 40–73)
NITRITE UR QL STRIP.AUTO: NEGATIVE
PH UR STRIP: 6.5 [PH] (ref 5–8)
PLATELET # BLD AUTO: 221 K/UL (ref 135–420)
PMV BLD AUTO: 10 FL (ref 9.2–11.8)
POTASSIUM SERPL-SCNC: 4.1 MMOL/L (ref 3.5–5.5)
PROT SERPL-MCNC: 6.5 G/DL (ref 6.4–8.2)
PROT UR STRIP-MCNC: NEGATIVE MG/DL
RBC # BLD AUTO: 4.12 M/UL (ref 4.2–5.3)
RBC #/AREA URNS HPF: NEGATIVE /HPF (ref 0–5)
SODIUM SERPL-SCNC: 142 MMOL/L (ref 136–145)
SP GR UR REFRACTOMETRY: 1.02 (ref 1–1.03)
TRIGL SERPL-MCNC: 82 MG/DL (ref ?–150)
TSH SERPL DL<=0.05 MIU/L-ACNC: 2.22 UIU/ML (ref 0.36–3.74)
UROBILINOGEN UR QL STRIP.AUTO: 0.2 EU/DL (ref 0.2–1)
VLDLC SERPL CALC-MCNC: 16.4 MG/DL
WBC # BLD AUTO: 5.5 K/UL (ref 4.6–13.2)
WBC URNS QL MICRO: ABNORMAL /HPF (ref 0–5)

## 2021-05-04 PROCEDURE — 85025 COMPLETE CBC W/AUTO DIFF WBC: CPT

## 2021-05-04 PROCEDURE — 84443 ASSAY THYROID STIM HORMONE: CPT

## 2021-05-04 PROCEDURE — 82306 VITAMIN D 25 HYDROXY: CPT

## 2021-05-04 PROCEDURE — 83735 ASSAY OF MAGNESIUM: CPT

## 2021-05-04 PROCEDURE — 81001 URINALYSIS AUTO W/SCOPE: CPT

## 2021-05-04 PROCEDURE — 80061 LIPID PANEL: CPT

## 2021-05-04 PROCEDURE — 80053 COMPREHEN METABOLIC PANEL: CPT

## 2021-05-09 ENCOUNTER — HOSPITAL ENCOUNTER (EMERGENCY)
Age: 81
Discharge: HOME | End: 2021-05-09
Attending: EMERGENCY MEDICINE
Payer: MEDICARE

## 2021-05-09 VITALS
RESPIRATION RATE: 18 BRPM | HEART RATE: 65 BPM | OXYGEN SATURATION: 100 % | BODY MASS INDEX: 19.99 KG/M2 | SYSTOLIC BLOOD PRESSURE: 144 MMHG | DIASTOLIC BLOOD PRESSURE: 66 MMHG | HEIGHT: 65 IN | WEIGHT: 120 LBS | TEMPERATURE: 98.4 F

## 2021-05-09 DIAGNOSIS — L03.114 CELLULITIS OF LEFT WRIST: ICD-10-CM
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
## 2021-05-09 PROCEDURE — 99282 EMERGENCY DEPT VISIT SF MDM: CPT

## 2021-05-09 RX ORDER — CLINDAMYCIN HYDROCHLORIDE 300 MG/1
300 CAPSULE ORAL 4 TIMES DAILY
Qty: 40 CAP | Refills: 0 | Status: SHIPPED | OUTPATIENT
Start: 2021-05-09 | End: 2021-05-17 | Stop reason: SDUPTHER

## 2021-05-09 RX ORDER — CLINDAMYCIN HYDROCHLORIDE 300 MG/1
300 CAPSULE ORAL 4 TIMES DAILY
Qty: 40 CAP | Refills: 0 | Status: SHIPPED | OUTPATIENT
Start: 2021-05-09 | End: 2021-05-09 | Stop reason: SDUPTHER

## 2021-05-09 RX ORDER — CARBIDOPA AND LEVODOPA 25; 100 MG/1; MG/1
1 TABLET, EXTENDED RELEASE ORAL
COMMUNITY

## 2021-05-09 NOTE — ED PROVIDER NOTES
HPI patient is a 33-year-old female who was bitten by her cat on her left wrist yesterday. Since then her wrist appears to have.   gotten infected. Patient states her left wrist is red and swollen. She denies having a fever, headache, chills or having palpitation. Patient states her cat immunization shots are up to date. Past Medical History:   Diagnosis Date    Basal cell cancer     CAD (coronary artery disease), native coronary artery 03/21/2011    s/p PCI with with CARLITO (Xience 2.75x12, 2.5x12) mLAD and mild disease in rest of coronaries. Midly depressed LV syst fct     Cardiac cath 03/21/2011    mLAD 90% (2.75 x 12 mm Xience stent). Otherwise patent coronaries. LVEDP 10 mmHg. EF 40-45%. Anteroapical hypk. Renal arteries patent.  Cardiac echocardiogram 04/17/2014    EF 60-65%. No WMA. Gr 1 DDfx. Mild MR. Similar to study of 9/14/11.  Cardiac nuclear imaging test 07/2017    Negative for ischemia or infarction. EF > 70%.     Cardiomyopathy secondary     ischemic +/- stress induced    Dyslipidemia     GERD (gastroesophageal reflux disease)     Hx of colonoscopy 07/12/2016    Normal. Dr. Francisco [facility] Hypertension     Parkinson's disease     Syncope     s/p ILD implantation       Past Surgical History:   Procedure Laterality Date    COLONOSCOPY N/A 7/12/2016    COLONOSCOPY performed by Dae Lima MD at [address] HX BUNIONECTOMY      dorsal    HX CATARACT REMOVAL  11/2012    left    HX CATARACT REMOVAL  10/2012    right    HX CORONARY STENT PLACEMENT      HX HEART CATHETERIZATION      HX HEMORRHOIDECTOMY      HX HYSTERECTOMY  1996    partial    HX IMPLANTABLE LOOP RECORDER  [id number]    HX TONSILLECTOMY      HX TOTAL ABDOMINAL HYSTERECTOMY  1996    partial         Family History:   Problem Relation Age of Onset    Heart Attack Father 80    Heart Disease Father     Cancer Mother         pancreatic    Hypertension Brother        Social History     Socioeconomic History    Marital status:      Spouse name: Not on file    Number of children: Not on file    Years of education: Not on file    Highest education level: Not on file   Occupational History    Not on file   Social Needs    Financial resource strain: Not on file    Food insecurity     Worry: Not on file     Inability: Not on file    Transportation needs     Medical: Not on file     Non-medical: Not on file   Tobacco Use    Smoking status: Never Smoker    Smokeless tobacco: Never Used   Substance and Sexual Activity    Alcohol use: Yes     Comment: glass of wine occasionally.  Drug use: No    Sexual activity: Not Currently     Partners: Male   Lifestyle    Physical activity     Days per week: Not on file     Minutes per session: Not on file    Stress: Not on file   Relationships    Social connections     Talks on phone: Not on file     Gets together: Not on file     Attends Islam service: Not on file     Active member of club or organization: Not on file     Attends meetings of clubs or organizations: Not on file     Relationship status: Not on file    Intimate partner violence     Fear of current or ex partner: Not on file     Emotionally abused: Not on file     Physically abused: Not on file     Forced sexual activity: Not on file   Other Topics Concern    Not on file   Social History Narrative    Not on file         ALLERGIES: Keflex [cephalexin] and Pcn [penicillins]    Review of Systems   Constitutional: Negative. HENT: Negative. Eyes: Negative. Respiratory: Negative. Cardiovascular: Negative. Gastrointestinal: Negative. Endocrine: Negative. Genitourinary: Negative. Musculoskeletal: Negative. Skin: Positive for wound. Left wrist   Allergic/Immunologic: Negative. Neurological: Negative. Hematological: Negative. Psychiatric/Behavioral: Negative. All other systems reviewed and are negative.       Vitals:    05/09/21 0706   BP: (!) 144/66 Pulse: 65   Resp: 18   Temp: 98.4 °F (36.9 °C)   SpO2: 100%   Weight: 54.4 kg (120 lb)   Height: 5' 5\" (1.651 m)            Physical Exam  Vitals signs and nursing note reviewed. Constitutional:       General: She is not in acute distress. Appearance: She is well-developed. She is not diaphoretic. HENT:      Head: Normocephalic. Right Ear: External ear normal.      Left Ear: External ear normal.      Mouth/Throat:      Pharynx: No oropharyngeal exudate. Eyes:      General: No scleral icterus. Right eye: No discharge. Left eye: No discharge. Conjunctiva/sclera: Conjunctivae normal.      Pupils: Pupils are equal, round, and reactive to light. Neck:      Musculoskeletal: Normal range of motion and neck supple. Thyroid: No thyromegaly. Vascular: No JVD. Trachea: No tracheal deviation. Cardiovascular:      Rate and Rhythm: Normal rate and regular rhythm. Heart sounds: Normal heart sounds. No murmur. No friction rub. No gallop. Pulmonary:      Effort: Pulmonary effort is normal. No respiratory distress. Breath sounds: Normal breath sounds. No stridor. No wheezing or rales. Chest:      Chest wall: No tenderness. Abdominal:      General: Bowel sounds are normal. There is no distension. Palpations: Abdomen is soft. There is no mass. Tenderness: There is no abdominal tenderness. There is no guarding or rebound. Musculoskeletal: Normal range of motion. General: No tenderness. Comments: Left wrist: (+) 5 cm area of erythema, induration and mild edema and tenderness over distal radius. Normal pulses, sensory, ROM and strenght. Lymphadenopathy:      Cervical: No cervical adenopathy. Skin:     General: Skin is warm and dry. Coloration: Skin is not pale. Findings: No erythema or rash. Neurological:      Mental Status: She is alert and oriented to person, place, and time. Cranial Nerves: No cranial nerve deficit. Motor: No abnormal muscle tone. Coordination: Coordination normal.      Deep Tendon Reflexes: Reflexes normal.        MDM  Number of Diagnoses or Management Options  Risk of Complications, Morbidity, and/or Mortality  Presenting problems: moderate  Diagnostic procedures: moderate  Management options: moderate    Patient Progress  Patient progress: improved      Dif Dx: cat bite, cellulitis, contusion    Procedures      Dx: cat bite with secondary infection    Disp: F/U PMD in 24 hours for wound check. Clindamycin 300 mg po tid x 10 days. Return to ER prn. Dictation disclaimer:  Please note that this dictation was completed with [employer], the [employer] voice recognition software. Quite often unanticipated grammatical, syntax, homophones, and other interpretive errors are inadvertently transcribed by the computer software. Please disregard these errors. Please excuse any errors that have escaped final proofreading.

## 2021-05-09 NOTE — ED TRIAGE NOTES
Pt reports being bit by her cat yesterday and left forearm, cleaned with soap and water after incident.  Redness and swelling noted to left wrist.

## 2021-05-10 ENCOUNTER — HOSPITAL ENCOUNTER (EMERGENCY)
Age: 81
Discharge: HOME | End: 2021-05-10
Attending: STUDENT IN AN ORGANIZED HEALTH CARE EDUCATION/TRAINING PROGRAM
Payer: MEDICARE

## 2021-05-10 VITALS
SYSTOLIC BLOOD PRESSURE: 151 MMHG | WEIGHT: 120 LBS | BODY MASS INDEX: 19.99 KG/M2 | RESPIRATION RATE: 20 BRPM | HEIGHT: 65 IN | OXYGEN SATURATION: 100 % | DIASTOLIC BLOOD PRESSURE: 74 MMHG | TEMPERATURE: 98.3 F | HEART RATE: 66 BPM

## 2021-05-10 DIAGNOSIS — S61.552D: Primary | ICD-10-CM
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
## 2021-05-10 DIAGNOSIS — L03.114 CELLULITIS OF LEFT UPPER EXTREMITY: ICD-10-CM

## 2021-05-10 PROCEDURE — 74011250636 HC RX REV CODE- 250/636: Performed by: STUDENT IN AN ORGANIZED HEALTH CARE EDUCATION/TRAINING PROGRAM

## 2021-05-10 PROCEDURE — 90471 IMMUNIZATION ADMIN: CPT

## 2021-05-10 PROCEDURE — 90715 TDAP VACCINE 7 YRS/> IM: CPT | Performed by: STUDENT IN AN ORGANIZED HEALTH CARE EDUCATION/TRAINING PROGRAM

## 2021-05-10 PROCEDURE — 99282 EMERGENCY DEPT VISIT SF MDM: CPT

## 2021-05-10 RX ADMIN — TETANUS TOXOID, REDUCED DIPHTHERIA TOXOID AND ACELLULAR PERTUSSIS VACCINE, ADSORBED 0.5 ML: 5; 2.5; 8; 8; 2.5 SUSPENSION INTRAMUSCULAR at 07:53

## 2021-05-10 NOTE — ED PROVIDER NOTES
39-year-old female with past medical history significant for hypertension, CAD and remainder of past medical history reviewed as documented below presents to the ED for a wound check. She notes that 2 days ago she was bitten on her left wrist by her cat and was seen here yesterday. She was prescribed clindamycin which she has been taking as prescribed but was told to come here today for repeat evaluation. She endorses a constant, burning sensation over the bite site which is worse with movement and improved with rest. She has been taking tylenol and performing warm soaks which help with the pain. She denies any fever/chills or paresthesias in the left upper extremity. She denies any smoking, drinking or recreational drug use. Family history reviewed and non-contributory. Past Medical History:   Diagnosis Date    Basal cell cancer     CAD (coronary artery disease), native coronary artery 03/21/2011    s/p PCI with with CARLITO (Xience 2.75x12, 2.5x12) mLAD and mild disease in rest of coronaries. Midly depressed LV syst fct     Cardiac cath 03/21/2011    mLAD 90% (2.75 x 12 mm Xience stent). Otherwise patent coronaries. LVEDP 10 mmHg. EF 40-45%. Anteroapical hypk. Renal arteries patent.  Cardiac echocardiogram 04/17/2014    EF 60-65%. No WMA. Gr 1 DDfx. Mild MR. Similar to study of 9/14/11.  Cardiac nuclear imaging test 07/2017    Negative for ischemia or infarction. EF > 70%.     Cardiomyopathy secondary     ischemic +/- stress induced    Dyslipidemia     GERD (gastroesophageal reflux disease)     Hx of colonoscopy 07/12/2016    Normal. Dr. Francisco [place] Hypertension     Parkinson's disease     Syncope     s/p ILD implantation       Past Surgical History:   Procedure Laterality Date    COLONOSCOPY N/A 7/12/2016    COLONOSCOPY performed by Dae Lima MD at [address]      dorsal    HX CATARACT REMOVAL  11/2012    left    HX CATARACT REMOVAL  10/2012 right    HX CORONARY STENT PLACEMENT      HX HEART CATHETERIZATION      HX HEMORRHOIDECTOMY      HX HYSTERECTOMY  1996    partial    HX IMPLANTABLE LOOP RECORDER  [id number]    HX TONSILLECTOMY      HX TOTAL ABDOMINAL HYSTERECTOMY  1996    partial         Family History:   Problem Relation Age of Onset    Heart Attack Father 80    Heart Disease Father     Cancer Mother         pancreatic    Hypertension Brother        Social History     Socioeconomic History    Marital status:      Spouse name: Not on file    Number of children: Not on file    Years of education: Not on file    Highest education level: Not on file   Occupational History    Not on file   Social Needs    Financial resource strain: Not on file    Food insecurity     Worry: Not on file     Inability: Not on file    Transportation needs     Medical: Not on file     Non-medical: Not on file   Tobacco Use    Smoking status: Never Smoker    Smokeless tobacco: Never Used   Substance and Sexual Activity    Alcohol use: Yes     Comment: glass of wine occasionally.     Drug use: No    Sexual activity: Not Currently     Partners: Male   Lifestyle    Physical activity     Days per week: Not on file     Minutes per session: Not on file    Stress: Not on file   Relationships    Social connections     Talks on phone: Not on file     Gets together: Not on file     Attends Advent service: Not on file     Active member of club or organization: Not on file     Attends meetings of clubs or organizations: Not on file     Relationship status: Not on file    Intimate partner violence     Fear of current or ex partner: Not on file     Emotionally abused: Not on file     Physically abused: Not on file     Forced sexual activity: Not on file   Other Topics Concern    Not on file   Social History Narrative    Not on file         ALLERGIES: Keflex [cephalexin] and Pcn [penicillins]    Review of Systems   Constitutional: Negative for activity change and appetite change. HENT: Negative for drooling and facial swelling. Eyes: Negative for pain and discharge. Respiratory: Negative for apnea and choking. Cardiovascular: Negative for palpitations and leg swelling. Gastrointestinal: Negative for blood in stool and rectal pain. Endocrine: Negative for polydipsia and polyphagia. Genitourinary: Negative for genital sores and hematuria. Musculoskeletal: Negative for gait problem and neck stiffness. Left wrist pain   Skin: Negative for color change and rash. Allergic/Immunologic: Negative for environmental allergies. Neurological: Negative for tremors. Hematological: Negative for adenopathy. Psychiatric/Behavioral: Negative for agitation and behavioral problems. Vitals:    05/10/21 0719   BP: (!) 151/74   Pulse: 66   Resp: 20   Temp: 98.3 °F (36.8 °C)   SpO2: 100%   Weight: 54.4 kg (120 lb)   Height: 5' 5\" (1.651 m)            Physical Exam  Vitals signs and nursing note reviewed. Constitutional:       Appearance: Normal appearance. HENT:      Head: Normocephalic and atraumatic. Eyes:      Extraocular Movements: Extraocular movements intact. Pupils: Pupils are equal, round, and reactive to light. Cardiovascular:      Rate and Rhythm: Normal rate and regular rhythm. Heart sounds: Normal heart sounds. No murmur. No friction rub. Pulmonary:      Effort: Pulmonary effort is normal.      Breath sounds: Normal breath sounds. Abdominal:      Palpations: Abdomen is soft. Musculoskeletal: Normal range of motion. Skin:     General: Skin is warm and dry. Capillary Refill: Capillary refill takes less than 2 seconds. Comments: 2 punctate bite marks over the medial surface of the left wrist. Mild surrounding cellulitis. +TTP. No fluctuance or purulent discharge. 2+ radial pulse, neurovascularly intact   Neurological:      General: No focal deficit present.       Mental Status: She is alert and oriented to person, place, and time. Psychiatric:         Mood and Affect: Mood normal.          MDM  Number of Diagnoses or Management Options  Diagnosis management comments: 78year-old female presents s/p cat bite 2 days ago here for wound check. She was prescribed clindamycin yesterday which she has been taking and notes improvement in the pain and cellulitis of her left wrist. She was not given a tetanus immunization during her visit yesterday and does not remember the last time she had a tetanus shot. Will order tetanus booster today. She has a primary doctor that she is planning to follow-up with tomorrow. Patient instructed to continue taking her clindamycin as prescribed. She is stable for discharge home. Pt has been reexamined. Patient has no new complaints, changes, or physical findings. Care plan outlined and precautions discussed. Results were reviewed with the patient. All medications were reviewed with the patient; will d/c home with PMD f/u. All of pt's questions and concerns were addressed. Patient was instructed and agrees to follow up with PMD, as well as to return to the ED upon further deterioration. Patient is ready to go home. This note was dictated utilizing voice recognition software which may lead to typographical errors.  I apologize in advance if the situation occurs.  If questions arise please do not hesitate to contact me or call our department.     Arley Ponce, DO           Procedures

## 2021-05-11 ENCOUNTER — OFFICE VISIT (OUTPATIENT)
Dept: INTERNAL MEDICINE CLINIC | Age: 81
End: 2021-05-11
Payer: MEDICARE

## 2021-05-11 VITALS
RESPIRATION RATE: 16 BRPM | TEMPERATURE: 97.9 F | WEIGHT: 125 LBS | HEIGHT: 65 IN | OXYGEN SATURATION: 100 % | HEART RATE: 69 BPM | SYSTOLIC BLOOD PRESSURE: 124 MMHG | DIASTOLIC BLOOD PRESSURE: 72 MMHG | BODY MASS INDEX: 20.83 KG/M2

## 2021-05-11 DIAGNOSIS — K21.9 GASTROESOPHAGEAL REFLUX DISEASE, UNSPECIFIED WHETHER ESOPHAGITIS PRESENT: ICD-10-CM
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
## 2021-05-11 DIAGNOSIS — M15.9 PRIMARY OSTEOARTHRITIS INVOLVING MULTIPLE JOINTS: ICD-10-CM

## 2021-05-11 DIAGNOSIS — H35.3131 EARLY DRY STAGE NONEXUDATIVE AGE-RELATED MACULAR DEGENERATION OF BOTH EYES: ICD-10-CM

## 2021-05-11 DIAGNOSIS — L40.9 PSORIASIS OF SCALP: ICD-10-CM

## 2021-05-11 DIAGNOSIS — Z12.31 SCREENING MAMMOGRAM, ENCOUNTER FOR: ICD-10-CM

## 2021-05-11 DIAGNOSIS — L03.114 CELLULITIS OF LEFT WRIST: ICD-10-CM

## 2021-05-11 DIAGNOSIS — Z98.61 CAD S/P PERCUTANEOUS CORONARY ANGIOPLASTY: ICD-10-CM

## 2021-05-11 DIAGNOSIS — G20 PARKINSON DISEASE (HCC): ICD-10-CM

## 2021-05-11 DIAGNOSIS — I10 ESSENTIAL HYPERTENSION: ICD-10-CM

## 2021-05-11 DIAGNOSIS — M85.89 OSTEOPENIA OF MULTIPLE SITES: ICD-10-CM

## 2021-05-11 DIAGNOSIS — M47.816 LUMBAR SPONDYLOSIS: ICD-10-CM

## 2021-05-11 DIAGNOSIS — E78.5 DYSLIPIDEMIA: ICD-10-CM

## 2021-05-11 DIAGNOSIS — I25.10 CAD S/P PERCUTANEOUS CORONARY ANGIOPLASTY: ICD-10-CM

## 2021-05-11 PROCEDURE — G8752 SYS BP LESS 140: HCPCS | Performed by: INTERNAL MEDICINE

## 2021-05-11 PROCEDURE — G8427 DOCREV CUR MEDS BY ELIG CLIN: HCPCS | Performed by: INTERNAL MEDICINE

## 2021-05-11 PROCEDURE — G8536 NO DOC ELDER MAL SCRN: HCPCS | Performed by: INTERNAL MEDICINE

## 2021-05-11 PROCEDURE — G8510 SCR DEP NEG, NO PLAN REQD: HCPCS | Performed by: INTERNAL MEDICINE

## 2021-05-11 PROCEDURE — 1101F PT FALLS ASSESS-DOCD LE1/YR: CPT | Performed by: INTERNAL MEDICINE

## 2021-05-11 PROCEDURE — G0463 HOSPITAL OUTPT CLINIC VISIT: HCPCS | Performed by: INTERNAL MEDICINE

## 2021-05-11 PROCEDURE — G8420 CALC BMI NORM PARAMETERS: HCPCS | Performed by: INTERNAL MEDICINE

## 2021-05-11 PROCEDURE — 1090F PRES/ABSN URINE INCON ASSESS: CPT | Performed by: INTERNAL MEDICINE

## 2021-05-11 PROCEDURE — G8754 DIAS BP LESS 90: HCPCS | Performed by: INTERNAL MEDICINE

## 2021-05-11 PROCEDURE — G8399 PT W/DXA RESULTS DOCUMENT: HCPCS | Performed by: INTERNAL MEDICINE

## 2021-05-11 PROCEDURE — 99214 OFFICE O/P EST MOD 30 MIN: CPT | Performed by: INTERNAL MEDICINE

## 2021-05-11 NOTE — PROGRESS NOTES
1. Have you been to the ER, urgent care clinic or hospitalized since your last visit? YES on file     2. Have you seen or consulted any other health care providers outside of the [address] [place] [address] since your last visit (Include any pap smears or colon screening)?  NO

## 2021-05-11 NOTE — PATIENT INSTRUCTIONS
Heart-Healthy Diet: Care Instructions Your Care Instructions A heart-healthy diet has lots of vegetables, fruits, nuts, beans, and whole grains, and is low in salt. It limits foods that are high in saturated fat, such as meats, cheeses, and fried foods. It may be hard to change your diet, but even small changes can lower your risk of heart attack and heart disease. Follow-up care is a key part of your treatment and safety. Be sure to make and go to all appointments, and call your doctor if you are having problems. It's also a good idea to know your test results and keep a list of the medicines you take. How can you care for yourself at home? Watch your portions · Learn what a serving is. A \"serving\" and a \"portion\" are not always the same thing. Make sure that you are not eating larger portions than are recommended. For example, a serving of pasta is ½ cup. A serving size of meat is 2 to 3 ounces. A 3-ounce serving is about the size of a deck of cards. Measure serving sizes until you are good at [place]" them. Keep in mind that restaurants often serve portions that are 2 or 3 times the size of one serving. · To keep your energy level up and keep you from feeling hungry, eat often but in smaller portions. · Eat only the number of calories you need to stay at a healthy weight. If you need to lose weight, eat fewer calories than your body burns (through exercise and other physical activity). Eat more fruits and vegetables · Eat a variety of fruit and vegetables every day. Dark green, deep orange, red, or yellow fruits and vegetables are especially good for you. Examples include spinach, carrots, peaches, and berries. · Keep carrots, celery, and other veggies handy for snacks. Buy fruit that is in season and store it where you can see it so that you will be tempted to eat it. · Cook dishes that have a lot of veggies in them, such as stir-fries and soups. Limit saturated and trans fat · Read food labels, and try to avoid saturated and trans fats. They increase your risk of heart disease. · Use olive or canola oil when you cook. · Bake, broil, grill, or steam foods instead of frying them. · Choose lean meats instead of high-fat meats such as hot dogs and sausages. Cut off all visible fat when you prepare meat. · Eat fish, skinless poultry, and meat alternatives such as soy products instead of high-fat meats. Soy products, such as tofu, may be especially good for your heart. · Choose low-fat or fat-free milk and dairy products. Eat foods high in fiber · Eat a variety of grain products every day. Include whole-grain foods that have lots of fiber and nutrients. Examples of whole-grain foods include oats, whole wheat bread, and brown rice. · Buy whole-grain breads and cereals, instead of white bread or pastries. Limit salt and sodium · Limit how much salt and sodium you eat to help lower your blood pressure. · Taste food before you salt it. Add only a little salt when you think you need it. With time, your taste buds will adjust to less salt. · Eat fewer snack items, fast foods, and other high-salt, processed foods. Check food labels for the amount of sodium in packaged foods. · Choose low-sodium versions of canned goods (such as soups, vegetables, and beans). Limit sugar · Limit drinks and foods with added sugar. These include candy, desserts, and soda pop. Limit alcohol · Limit alcohol to no more than 2 drinks a day for men and 1 drink a day for women. Too much alcohol can cause health problems. When should you call for help? Watch closely for changes in your health, and be sure to contact your doctor if: 
  · You would like help planning heart-healthy meals. Where can you learn more? Go to http://www.[employer].com/ Enter V137 in the search box to learn more about \"Heart-Healthy Diet: Care Instructions. \" Current as of: August 22, 2019               Content Version: 12.6 © 0532-4165 [employer]. Care instructions adapted under license by [employer] (which disclaims liability or warranty for this information). If you have questions about a medical condition or this instruction, always ask your healthcare professional. [address] any warranty or liability for your use of this information. High Cholesterol: Care Instructions Your Care Instructions Cholesterol is a type of fat in your blood. It is needed for many body functions, such as making new cells. Cholesterol is made by your body. It also comes from food you eat. High cholesterol means that you have too much of the fat in your blood. This raises your risk of a heart attack and stroke. LDL and HDL are part of your total cholesterol. LDL is the \"bad\" cholesterol. High LDL can raise your risk for heart disease, heart attack, and stroke. HDL is the \"good\" cholesterol. It helps clear bad cholesterol from the body. High HDL is linked with a lower risk of heart disease, heart attack, and stroke. Your cholesterol levels help your doctor find out your risk for having a heart attack or stroke. You and your doctor can talk about whether you need to lower your risk and what treatment is best for you. A heart-healthy lifestyle along with medicines can help lower your cholesterol and your risk. The way you choose to lower your risk will depend on how high your risk is for heart attack and stroke. It will also depend on how you feel about taking medicines. Follow-up care is a key part of your treatment and safety. Be sure to make and go to all appointments, and call your doctor if you are having problems. It's also a good idea to know your test results and keep a list of the medicines you take. How can you care for yourself at home? · Eat a variety of foods every day.  Good choices include fruits, vegetables, whole grains (like oatmeal), dried beans and peas, nuts and seeds, soy products (like tofu), and fat-free or low-fat dairy products. · Replace butter, margarine, and hydrogenated or partially hydrogenated oils with olive and canola oils. (Canola oil margarine without trans fat is fine.) · Replace red meat with fish, poultry, and soy protein (like tofu). · Limit processed and packaged foods like chips, crackers, and cookies. · Bake, broil, or steam foods. Don't gray them. · Be physically active. Get at least 30 minutes of exercise on most days of the week. Walking is a good choice. You also may want to do other activities, such as running, swimming, cycling, or playing tennis or team sports. · Stay at a healthy weight or lose weight by making the changes in eating and physical activity listed above. Losing just a small amount of weight, even 5 to 10 pounds, can reduce your risk for having a heart attack or stroke. · Do not smoke. When should you call for help? Watch closely for changes in your health, and be sure to contact your doctor if: 
  · You need help making lifestyle changes. · You have questions about your medicine. Where can you learn more? Go to http://www.[employer].com/ Enter M219 in the search box to learn more about \"High Cholesterol: Care Instructions. \" Current as of: December 16, 2019               Content Version: 12.6 © 6903-4137 [employer]. Care instructions adapted under license by [employer] (which disclaims liability or warranty for this information). If you have questions about a medical condition or this instruction, always ask your healthcare professional. [address] any warranty or liability for your use of this information.

## 2021-05-15 NOTE — PROGRESS NOTES
HPI:   Elodia Mckeon is a 78y.o. year old female who presents today for a routine visit and post-ED follow-up. She has a history of hypertension, ASCVD, hyperlipidemia, syncope, Parkinson's disease, lumbar degenerative disease, GERD, and macular degeneration. She reports that she is doing reasonably well. She completed the 505 [employer] 19 vaccine series. On 5/9/2021, she presented to the [facility] ED after being bitten on her left wrist the day prior by her indoor/outdoor cat. She reports that she developed swelling and redness of her wrist and forearm, and was diagnosed with cellulitis. She was prescribed clindamycin 300 mg qid x 10 days and returned to the ED on 5/10/2021 for a repeat check and was felt to have no change. She reports today that it is appearing to be slowly improving although with continued redness and swelling. She denies any fever or chills. She reports that she is otherwise doing well and is without new complaints. In 8/2018, she was evaluated by Dr. Kenroy Toro for right hip pain and was diagnosed with right piriformis syndrome. She was treated with a cortisone injection and physical therapy with some improvement, but subsequently developed right buttocks pain and pain and paresthesias down her right leg. She was treated with prednisone 50 mg for five days without improvement. She was seen on 10/9/2018, which was 10 days after completing the course of prednisone, and reported persistent severe right leg pain which was interfering with her activities and with sleep. She was taking Tylenol ES without relief, and was started on gabapentin. Lumbar spine x-ray was obtained (10/9/2018) showing grade 1 anterolisthesis of L4 on L5, either new or increased since 4/2015, and multilevel degenerative spondylosis/disc disease.  She was referred to physical therapy, but due to continued persistent symptoms, she had a lumbar MRI (10/17/2018) which showed multilevel degenerative findings causing various degrees of neuroforaminal  narrowing and lateral recess narrowing; also a right L4-L5 neuroforamen cystic lesion measuring approximately 10 x 6 mm was noted. She had a lumbar MRI with contrast (11/16/2018) which confirmed an extruding synovial cyst from the right L4-5 advanced facet arthropathy; significant right foraminal stenosis and nerve impingement persists. She continued physical therapy and reported that her pain has significantly improved. She was evaluated by Dr. Lila Ordonez on 1/3/2019 but her pain had already resolved. She is no longer taking gabapentin or Tylenol. She has a history of hypertension, hyperlipidemia, ASCVD, and syncope. In 3/2011, she had three syncopal episodes while donating blood. Over the subsequent four days, she developed exertional substernal chest tightness with left arm pain and dyspnea. She was evaluated and EKG revealed new T wave inversions in leads V2 and V3. Troponins were negative. She underwent cardiac catheterization (3/21/2011) which showed a 90% mid LAD and mild disease in the rest of the coronaries. She underwent PCI and placement of two drug-eluting stents for the mid LAD lesion; LV function was mildly diminished (EF 40-45%) with anteroapical hypokinesis. Follow-up echocardiogram (9/2011) showed return to normal LV function (EF 60%) and no RWMA. She has a history of multiple syncopal episodes, usually related to stressful situations, and thought to be vasovagal in origin. An implantable loop recorder was placed in 3/2011 and remained until 4/2013, and interrogation was negative for any significant arrhythmia. In 4/2014, she was hospitalized following another syncopal episode, which occurred after she had taken her morning medications. Afterward, she became nauseated and flushed, and called EMS. When they arrived, they found her on the floor and reportedly without a pulse. They began CPR and within 10-15 seconds, she recovered.  Evaluation included EKG/troponins, which were negative, and an echocardiogram showing mild MR and normal LV function (EF 60-65%). She was found to have hypokalemia and hypomagnesemia and hydrochlorothiazide was discontinued. It was thought that this event also represented a vasovagal reaction given her prodromal symptoms. She has had no further events since that time. Her most recent pharmacologic nuclear stress test (6/25/2020) was a normal, low risk study, negative for evidence of ischemia or prior infarction, and with normal LV size and function (EF 84%). Her current regimen includes metoprolol, lisinopril, aspirin, and rosuvastatin. She is being followed by Dr. Cooper Faulkner. She is usually very active line dancing 3x/week, doing yardwork and riding her bicycle. She denies any chest pain, shortness of breath at rest or with exertion, palpitations, lightheadedness, or edema. In 11/2017, she reported bilateral thigh aching pain, which increased with ambulation particularly when walking up stairs. She stated that the discomfort was similar to that which developed previously with use of simvastatin and atorvastatin. She had been on pravastatin since 10/2014 and did not note any difficulty previously. She discontinued pravastatin and had resolution of her symptoms. She was subsequently started on rosuvastatin in 12/2017, and did well until 7/2018 when she again developed myalgias. Her dose was decreased to 5 mg every other day. She reports today that she continues to have aching discomfort in her bilateral thighs on the days which she takes rosuvastatin, but reports that it is tolerable so she will continue. She has a history of idiopathic Parkinson's disease, predominantly left-sided. She was being treated with Sinemet, but changed to Stalevo in 10/2019, and reports improved control of symptoms. She has difficulty with writing at times, particularly towards the end of the day, and also describes increasing tremors midday.  She is followed by  Marina Guerrero.    She has a history of laryngopharyngeal reflux disease, treated previously with dexlansoprazole, but ha successfully weaned from therapy. She is now receiving immunotherapy with Dr. Ghassan Mckeon to address her allergies and hoarseness. In 5/2017 she had multiple episodes of epistaxis prompting an ED visit. She subsequently underwent silver nitrate cautery of anterior vessels in her right nares by Dr. Ghassan Mckeon, and has had no recurrence. In 3/2010, she underwent evaluation for iron deficiency anemia. She had previously had a negative colonoscopy and air contrast barium enema in 2009 by Dr. Britney Villar, and she had an upper endoscopy by Dr. Willem Zee which was normal. She was treated with iron with improvement. She had a repeat screening colonoscopy in 7/2016 by Dr. Willem Zee which was normal. No further follow-up was recommended given her age. She denies any abdominal pain, nausea, vomiting, melena, hematochezia, or change in bowel movements. She has a history of osteopenia with last bone density study in 5/2018 showing T-scores:  femoral neck left -1.2  /right -1.6 and lumbar -0.8 . She continues to take calcium and Vitamin D supplements. She has no history of pathologic fractures. She has a recent history of abnormal mammograms since 10/2014 with microcalcifcations in the right breast. She has been undergoing surveillance mammograms every six months, which have been unchanged. She had a follow-up mammogram in 5/2016 which was negative, and routine annual screening was recommended. She has a history of bilateral knee pain secondary to osteoarthritis. She was evaluated by Dr. Laurel Aj in 3/2018 and received a cortisone injection with improvement. She also has difficutly with left ankle pain which increase with ambulation. She received a cortisone injection for this as well from Dr. Laurel Aj in 3/2018, but did not experience significant improvement.  She was evaluated by Dr. Verona Nascimento on 3/27/2018 and diagnosed with left peroneus brevis tendinitis. An orthotic was recommended with improvement. She is now being followed by Dr. Shaw Garnica for left ankle pain, and CT scan left ankle (7/2019) showed evidence of chronic ATFL sprain or complete rupture, likely prior sprain of the calcaneofibular ligament, and advanced posterior subtalar and tarsometatarsal osteoarthrosis. Past Medical History:   Diagnosis Date    Basal cell cancer     CAD (coronary artery disease), native coronary artery 03/21/2011    s/p PCI with with CARLITO (Xience 2.75x12, 2.5x12) mLAD and mild disease in rest of coronaries. Midly depressed LV syst fct     Cardiac cath 03/21/2011    mLAD 90% (2.75 x 12 mm Xience stent). Otherwise patent coronaries. LVEDP 10 mmHg. EF 40-45%. Anteroapical hypk. Renal arteries patent.  Cardiac echocardiogram 04/17/2014    EF 60-65%. No WMA. Gr 1 DDfx. Mild MR. Similar to study of 9/14/11.  Cardiac nuclear imaging test 07/2017    Negative for ischemia or infarction. EF > 70%.  Cardiomyopathy secondary     ischemic +/- stress induced    Dyslipidemia     GERD (gastroesophageal reflux disease)     Hx of colonoscopy 07/12/2016    Normal. Dr. Carolina Osorio Hypertension     Parkinson's disease     Syncope     s/p ILD implantation     Past Surgical History:   Procedure Laterality Date    COLONOSCOPY N/A 7/12/2016    COLONOSCOPY performed by Jenifer Chatman MD at [address] HX BUNIONECTOMY      dorsal    HX CATARACT REMOVAL  11/2012    left    HX CATARACT REMOVAL  10/2012    right    HX CORONARY STENT PLACEMENT      HX HEART CATHETERIZATION      HX HEMORRHOIDECTOMY      HX HYSTERECTOMY  1996    partial    HX IMPLANTABLE LOOP RECORDER  [id number]    HX TONSILLECTOMY      HX TOTAL ABDOMINAL HYSTERECTOMY  1996    partial     Current Outpatient Medications   Medication Sig    carbidopa-levodopa ER (SINEMET CR)  mg per tablet Take 1 Tab by mouth nightly.     clindamycin (CLEOCIN) 300 mg capsule Take 1 Cap by mouth four (4) times daily for 10 days.  lisinopriL (PRINIVIL, ZESTRIL) 10 mg tablet Take 1 Tab by mouth daily.  metoprolol succinate (TOPROL-XL) 50 mg XL tablet Take 1 Tab by mouth daily.  psyllium husk (METAMUCIL PO) Take  by mouth.  potassium chloride (K-DUR, KLOR-CON) 20 mEq tablet Take 0.5 Tabs by mouth every other day.  rosuvastatin (CRESTOR) 10 mg tablet Take 0.5 Tabs by mouth every other day.  nitroglycerin (NITROSTAT) 0.4 mg SL tablet 1 sublingual every 5 minutes for chest pain for no more than 3 doses    carbidopa 25mg-levodopa 100mg-entacapone 200mg (STALEVO 100) tab tablet Take 1 Tab by mouth four (4) times daily.  calcium carbonate (CALCIUM 500 PO) Take  by mouth.  magnesium hydroxide (NEGRO MILK OF MAGNESIA) 400 mg/5 mL suspension Take 30 mL by mouth daily as needed for Constipation.  acetaminophen (TYLENOL) 650 mg CR tablet Take 650 mg by mouth every six (6) hours as needed for Pain.  co-enzyme Q-10 (CO Q-10) 100 mg capsule Take 200 mg by mouth daily.  aspirin 81 mg tablet Take 1 Tab by mouth daily.  DOCUSATE CALCIUM (STOOL SOFTENER PO) Take 1 Cap by mouth.  magnesium 250 mg Tab Take 250 mg by mouth two (2) times a day. No current facility-administered medications for this visit. Allergies and Intolerances: Allergies   Allergen Reactions    Keflex [Cephalexin] Other (comments)     Yeast infection    Pcn [Penicillins] Other (comments)     Family History: She has no family history of colon or breast cancer. Family History   Problem Relation Age of Onset    Heart Attack Father 80    Heart Disease Father     Cancer Mother         pancreatic    Hypertension Brother      Social History:   She  reports that she has never smoked. She has never used smokeless tobacco. She lives with her , who is having difficulty with mild cognitive impairment. She states that they sold their RV and now stay at home for the winter. She is a retired x-ray technician. Social History     Substance and Sexual Activity   Alcohol Use Yes    Comment: glass of wine occasionally. Immunization History:  Immunization History   Administered Date(s) Administered    (RETIRED) Pneumococcal Vaccine (Unspecified Type) 01/01/2003    COVID-19, PFIZER, MRNA, LNP-S, PF, 30MCG/0.3ML DOSE 03/09/2021, 03/30/2021    Influenza High Dose Vaccine PF 09/29/2014, 10/05/2015, 11/01/2016, 08/30/2017    Influenza Vaccine (Tri) Adjuvanted (>65 Yrs FLUAD TRI 53126) 10/09/2018, 09/17/2019    Influenza Vaccine Whole 09/07/2010, 09/03/2011, 10/04/2012    Influenza, Quadrivalent, Adjuvanted (>65 Yrs FLUAD QUAD 89309) 09/11/2020    Pneumococcal Conjugate (PCV-13) 10/05/2015    Pneumococcal Polysaccharide (PPSV-23) 01/01/2003, 10/30/2019    TD Vaccine 01/01/2003    Tdap 09/25/2013, 05/10/2021    Zoster 01/01/2007    Zoster Recombinant 09/18/2018, 11/23/2018       Review of Systems:   As above included in HPI. Otherwise 11 point review of systems negative including constitutional, skin, HENT, eyes, respiratory, cardiovascular, gastrointestinal, genitourinary, musculoskeletal, endo/heme/aller, neurological.    Physical:   Visit Vitals  /72 (BP 1 Location: Left arm, BP Patient Position: Sitting)   Pulse 69   Temp 97.9 °F (36.6 °C) (Temporal)   Resp 16   Ht 5' 5\" (1.651 m)   Wt 125 lb (56.7 kg)   SpO2 100%   BMI 20.80 kg/m²       Exam:     Patient appears in no apparent distress. Affect is appropriate. HEENT --Anicteric sclerae, tympanic membranes normal,  ear canals normal.  PERRL, EOMI, conjunctiva and lids normal.  No cervical lymphadenopathy. No thyromegaly, JVD, or bruits. Carotid pulses 2+ with normal upstroke. Lungs --Clear to auscultation. No wheezing or rales. Heart --Regular rate and rhythm, no murmurs, rubs, gallops, or clicks. Abdomen -- Soft and nontender, no hepatosplenomegaly or masses. Extremities -- Without cyanosis, clubbing, edema. Normal looking digits, ROM intact  Neuro -- CN 2-12 intact, strength 5/5 with intact soft touch in all extremities  Derm  left wrist with erythema and swelling extending approximately 2/3 up forearm. No purulence noted. Review of Data:  Labs:  Hospital Outpatient Visit on 05/04/2021   Component Date Value Ref Range Status    WBC 05/04/2021 5.5  4.6 - 13.2 K/uL Final    RBC 05/04/2021 4.12* 4.20 - 5.30 M/uL Final    HGB 05/04/2021 12.6  12.0 - 16.0 g/dL Final    HCT 05/04/2021 39.7  35.0 - 45.0 % Final    MCV 05/04/2021 96.4  74.0 - 97.0 FL Final    MCH 05/04/2021 30.6  24.0 - 34.0 PG Final    MCHC 05/04/2021 31.7  31.0 - 37.0 g/dL Final    RDW 05/04/2021 12.9  11.6 - 14.5 % Final    PLATELET 32/62/0464 960  135 - 420 K/uL Final    MPV 05/04/2021 10.0  9.2 - 11.8 FL Final    NEUTROPHILS 05/04/2021 60  40 - 73 % Final    LYMPHOCYTES 05/04/2021 25  21 - 52 % Final    MONOCYTES 05/04/2021 10  3 - 10 % Final    EOSINOPHILS 05/04/2021 4  0 - 5 % Final    BASOPHILS 05/04/2021 1  0 - 2 % Final    ABS. NEUTROPHILS 05/04/2021 3.3  1.8 - 8.0 K/UL Final    ABS. LYMPHOCYTES 05/04/2021 1.4  0.9 - 3.6 K/UL Final    ABS. MONOCYTES 05/04/2021 0.5  0.05 - 1.2 K/UL Final    ABS. EOSINOPHILS 05/04/2021 0.2  0.0 - 0.4 K/UL Final    ABS.  BASOPHILS 05/04/2021 0.0  0.0 - 0.1 K/UL Final    DF 05/04/2021 AUTOMATED    Final    LIPID PROFILE 05/04/2021        Final    Cholesterol, total 05/04/2021 147  <200 MG/DL Final    Triglyceride 05/04/2021 82  <150 MG/DL Final    HDL Cholesterol 05/04/2021 61* 40 - 60 MG/DL Final    LDL, calculated 05/04/2021 69.6  0 - 100 MG/DL Final    VLDL, calculated 05/04/2021 16.4  MG/DL Final    CHOL/HDL Ratio 05/04/2021 2.4  0 - 5.0   Final    Magnesium 05/04/2021 2.3  1.6 - 2.6 mg/dL Final    Sodium 05/04/2021 142  136 - 145 mmol/L Final    Potassium 05/04/2021 4.1  3.5 - 5.5 mmol/L Final    Chloride 05/04/2021 108  100 - 111 mmol/L Final    CO2 05/04/2021 30  21 - 32 mmol/L Final    Anion gap 05/04/2021 4  3.0 - 18 mmol/L Final    Glucose 05/04/2021 87  74 - 99 mg/dL Final    BUN 05/04/2021 15  7.0 - 18 MG/DL Final    Creatinine 05/04/2021 0.67  0.6 - 1.3 MG/DL Final    BUN/Creatinine ratio 05/04/2021 22* 12 - 20   Final    GFR est AA 05/04/2021 >60  >60 ml/min/1.73m2 Final    GFR est non-AA 05/04/2021 >60  >60 ml/min/1.73m2 Final    Calcium 05/04/2021 9.2  8.5 - 10.1 MG/DL Final    Bilirubin, total 05/04/2021 0.5  0.2 - 1.0 MG/DL Final    ALT (SGPT) 05/04/2021 11* 13 - 56 U/L Final    AST (SGOT) 05/04/2021 19  10 - 38 U/L Final    Alk. phosphatase 05/04/2021 96  45 - 117 U/L Final    Protein, total 05/04/2021 6.5  6.4 - 8.2 g/dL Final    Albumin 05/04/2021 3.7  3.4 - 5.0 g/dL Final    Globulin 05/04/2021 2.8  2.0 - 4.0 g/dL Final    A-G Ratio 05/04/2021 1.3  0.8 - 1.7   Final    TSH 05/04/2021 2.22  0.36 - 3.74 uIU/mL Final    Vitamin D 25-Hydroxy 05/04/2021 33.6  30 - 100 ng/mL Final    Color 05/04/2021 DARK YELLOW    Final    Appearance 05/04/2021 CLEAR    Final    Specific gravity 05/04/2021 1.022  1.005 - 1.030   Final    pH (UA) 05/04/2021 6.5  5.0 - 8.0   Final    Protein 05/04/2021 Negative  NEG mg/dL Final    Glucose 05/04/2021 Negative  NEG mg/dL Final    Ketone 05/04/2021 TRACE* NEG mg/dL Final    Bilirubin 05/04/2021 Negative  NEG   Final    Blood 05/04/2021 Negative  NEG   Final    Urobilinogen 05/04/2021 0.2  0.2 - 1.0 EU/dL Final    Nitrites 05/04/2021 Negative  NEG   Final    Leukocyte Esterase 05/04/2021 TRACE* NEG   Final    WBC 05/04/2021 1 to 3  0 - 5 /hpf Final    RBC 05/04/2021 Negative  0 - 5 /hpf Final    Epithelial cells 05/04/2021 FEW  0 - 5 /lpf Final    Bacteria 05/04/2021 FEW* NEG /hpf Final       Health Maintenance:  Screening:    Mammogram: negative (7/2020)   PAP smear: s/p ROHINI. No further screening. Colorectal: colonoscopy (7/2016) normal. Dr. Addi Simmons. No further screening.    Depression: none   DM (HbA1c/FPG): FPG 87 (5/2021)   Hepatitis C: unknown   Falls: none   DEXA: osteopenia (5/2018)   Glaucoma: regular eye exams with Dr. David Ordonez. Kehinde Booth (3/2021) for macular degeneration. 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 Patient evaluated in ED on 5/9/2021 after being bitten on her left wrist the day prior by her indoor/outdoor cat. Developed swelling and redness consistent with cellulitis, and started on treatment with clindamycin 300 mg qid x 10 days (day 2/10). Reports slowly improving although with continued redness and swelling today. Advised to monitor closely for signs of progression, fever, or chills, and advised to return to ED if worsens. Received Tdap in ED. Rut Richard Routine issues:  1. Hyperlipidemia. Previously on low intensity dose pravastatin, but developed severe bilateral thigh pain, worse with ambulation and was discontinued in 11/2018 with improvement. Reported similar symptoms in past with statin (Zocor until 2012, and then Lipitor until 9/2014). Started on rosuvastatin 10 mg daily in 12/2018 and tolerated without difficulty until 7/2018 when developed recurrent myalgias and dose decreased to 5 mg every other day with improvement.  Lipid panel today with LDL 69 and HDL 61, indicative of excellent control. Emphasized importance of lifestyle modifications, including diet and exercise. Would not recommend weight loss given normal BMI. Continue to follow. 2. Hypertension. Blood pressure well controlled on lisinopril 10 mg daily and metoprolol succinate 50 mg daily. Renal function remains normal with creatinine 0.67 / eGFR >60. Continue to follow. 3. ASCVD. Remains asymptomatic on current regimen of aspirin, metoprolol succinate, and rosuvastatin. Resolution of cardiomyopathy with last echocardiogram in 2014 revealing normal LV function. Negative pharmacologic nuclear stress test in 6/2020. Being followed by Dr. Lauren Bal. 4. H/O syncope. No further episodes since 2014. Most likely secondary to vasovagal reaction. Follow. 5. Parkinson's disease. Therapy with addition of Stalevo to Sinemet with significant improvement in control of tremors. Being followed by Dr. Marifer Forbes.  6. Osteopenia. Last bone density scan 5/2018. Using femoral neck T-scores, calculated FRAX score estimates her 10 year risk of a major osteoporetic fracture at 12 % and hip fracture at 3.1 %, which are an indication for biphosphonate treatment based on hip fracture risk of >3%. However, no significant change from 5/2018. Continue calcium and vitamin D supplements. Encouraged exercise, particularly weight bearing activities. Will follow. Fall precautions stressed. Will reassess with next mammogram.   7. Lumbar spondylosis. Patient with symptoms of right buttock pain since 8/2018 and diagnosed with right piriformis syndrome. Completed physical therapy and received cortisone injection with some improvement, but subsequently developed right leg paresthesias and pain consistent with right sided sciatica. Treated with five day course of prednisone 50 mg without improvement.  LS spine x-rays and lumbar MRI with degenerative changes, although MRI showed a right L4-L5 neuroforamen synovial cyst from the right L4-5 advanced facet arthropathy with significant right foraminal stenosis and nerve impingement. Referred for physical therapy and treated with gabapentin 100 mg tid with resolution of symptoms. Evaluated by Dr. Richy Ahumada, but symptoms had already resolved. No longer taking gabapentin. Appears to be stable and advised to continue with back stretches. 8. Osteoarthritis. Difficulty with bilateral knee pain L>R and receiving intermittent cortisone injections from Dr. Jennifer Hernandez. Left ankle pain being addressed by podiatrist, Dr. Aby Morel. Stable today. 9. Macular degeneration, R>L. On Preservision. Being followed by Dr. Vel Chisholm every 8 weeks. 10. Laryngopharyngeal reflux. Doing well. Discontinued Dexilant without an increase in symptoms. Now receiving immunotherapy to address hoarseness and allergies. Followed by Dr. Aurea Brannon. 11. Psoriasis. Using clobetasol solution for scalp psoriasis. 12. Health maintenance. Completed Pfizer COVID 19 vaccine series. Received 2/2 doses of Shingrix vaccine. Completed pneumococcal series. Other immunizations up to date. No further colorectal cancer screening needed. Mammogram up to date. Will get repeat bone density study with next mammogram. Continue regular eye exams with Drs. Ulysses Petit and Vel Chisholm. Vitamin D level normal. Continue maintenance dose supplement. Medicare wellness up to date. Patient understands recommendations and agrees with plan. Follow-up in 6 months.

## 2021-05-17 ENCOUNTER — TELEPHONE (OUTPATIENT)
Dept: INTERNAL MEDICINE CLINIC | Age: 81
End: 2021-05-17
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
## 2021-05-17 DIAGNOSIS — L03.114 CELLULITIS OF LEFT WRIST: Primary | ICD-10-CM

## 2021-05-17 RX ORDER — CLINDAMYCIN HYDROCHLORIDE 300 MG/1
300 CAPSULE ORAL 4 TIMES DAILY
Qty: 28 CAP | Refills: 0 | Status: SHIPPED | OUTPATIENT
Start: 2021-05-17 | End: 2021-05-24

## 2021-05-17 NOTE — TELEPHONE ENCOUNTER
Patient stated that she was bit by a cat, and she is about to run out of her clindamycin. She said the bite spot is still red, and she wants to know if this rx should be renewed. Please advise.

## 2021-05-17 NOTE — TELEPHONE ENCOUNTER
Called and spoke with patient. Reports that left wrist/ forearm cellulitis improving with decreased swelling improving, but not yet completely resolved. Has completed 9/10 days of clindamycin. Advised that would recommend continuing therapy for 7 more days. Refill for clindamycin sent to [facility], and advised to call if not resolved after completion.

## 2021-05-25 ENCOUNTER — TELEPHONE (OUTPATIENT)
Dept: INTERNAL MEDICINE CLINIC | Age: 81
End: 2021-05-25

## 2021-05-25 NOTE — TELEPHONE ENCOUNTER
Pt came into office ,said she was bit by a cat and prescribed clindamycin , she said she finished it and thinkis bite is looking better asking if she should get it refilled .  Please advise

## 2021-05-26 ENCOUNTER — OFFICE VISIT (OUTPATIENT)
Dept: INTERNAL MEDICINE CLINIC | Age: 81
End: 2021-05-26
Payer: MEDICARE

## 2021-05-26 VITALS
HEIGHT: 65 IN | HEART RATE: 72 BPM | OXYGEN SATURATION: 98 % | BODY MASS INDEX: 20.73 KG/M2 | TEMPERATURE: 97.7 F | SYSTOLIC BLOOD PRESSURE: 136 MMHG | DIASTOLIC BLOOD PRESSURE: 69 MMHG | RESPIRATION RATE: 12 BRPM | WEIGHT: 124.4 LBS

## 2021-05-26 DIAGNOSIS — I25.10 CAD S/P PERCUTANEOUS CORONARY ANGIOPLASTY: ICD-10-CM
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
## 2021-05-26 DIAGNOSIS — Z98.61 CAD S/P PERCUTANEOUS CORONARY ANGIOPLASTY: ICD-10-CM

## 2021-05-26 DIAGNOSIS — L03.114 CELLULITIS OF LEFT WRIST: Primary | ICD-10-CM

## 2021-05-26 DIAGNOSIS — I10 ESSENTIAL HYPERTENSION: ICD-10-CM

## 2021-05-26 DIAGNOSIS — G20 PARKINSON DISEASE (HCC): ICD-10-CM

## 2021-05-26 PROCEDURE — G8399 PT W/DXA RESULTS DOCUMENT: HCPCS | Performed by: INTERNAL MEDICINE

## 2021-05-26 PROCEDURE — G8432 DEP SCR NOT DOC, RNG: HCPCS | Performed by: INTERNAL MEDICINE

## 2021-05-26 PROCEDURE — 1090F PRES/ABSN URINE INCON ASSESS: CPT | Performed by: INTERNAL MEDICINE

## 2021-05-26 PROCEDURE — G8427 DOCREV CUR MEDS BY ELIG CLIN: HCPCS | Performed by: INTERNAL MEDICINE

## 2021-05-26 PROCEDURE — G8752 SYS BP LESS 140: HCPCS | Performed by: INTERNAL MEDICINE

## 2021-05-26 PROCEDURE — G8754 DIAS BP LESS 90: HCPCS | Performed by: INTERNAL MEDICINE

## 2021-05-26 PROCEDURE — 99213 OFFICE O/P EST LOW 20 MIN: CPT | Performed by: INTERNAL MEDICINE

## 2021-05-26 PROCEDURE — 1101F PT FALLS ASSESS-DOCD LE1/YR: CPT | Performed by: INTERNAL MEDICINE

## 2021-05-26 PROCEDURE — G8420 CALC BMI NORM PARAMETERS: HCPCS | Performed by: INTERNAL MEDICINE

## 2021-05-26 PROCEDURE — G0463 HOSPITAL OUTPT CLINIC VISIT: HCPCS | Performed by: INTERNAL MEDICINE

## 2021-05-26 PROCEDURE — G8536 NO DOC ELDER MAL SCRN: HCPCS | Performed by: INTERNAL MEDICINE

## 2021-05-26 NOTE — PATIENT INSTRUCTIONS
Animal Bites: Care Instructions Your Care Instructions After an animal bite, the biggest concern is infection. The chance of infection depends on the type of animal that bit you, where on your body you were bitten, and your general health. Many animal bites are not closed with stitches, because this can increase the chance of infection. Your bite may take as little as 7 days or as long as several months to heal, depending on how bad it is. Taking good care of your wound at home will help it heal and reduce your chance of infection. The doctor has checked you carefully, but problems can develop later. If you notice any problems or new symptoms, get medical treatment right away. Follow-up care is a key part of your treatment and safety. Be sure to make and go to all appointments, and call your doctor if you are having problems. It's also a good idea to know your test results and keep a list of the medicines you take. How can you care for yourself at home? · If your doctor told you how to care for your wound, follow your doctor's instructions. If you did not get instructions, follow this general advice: ? After 24 to 48 hours, gently wash the wound with clean water 2 times a day. Do not scrub or soak the wound. Don't use hydrogen peroxide or alcohol, which can slow healing. ? You may cover the wound with a thin layer of petroleum jelly, such as Vaseline, and a nonstick bandage. ? Apply more petroleum jelly and replace the bandage as needed. · After you shower, gently dry the wound with a clean towel. · If your doctor has closed the wound, cover the bandage with a plastic bag before you take a shower. · A small amount of skin redness and swelling around the wound edges and the stitches or staples is normal. Your wound may itch or feel irritated. Do not scratch or rub the wound.  
· Ask your doctor if you can take an over-the-counter pain medicine, such as acetaminophen (Tylenol), ibuprofen (Advil, Motrin), or naproxen (Aleve). Read and follow all instructions on the label. · Do not take two or more pain medicines at the same time unless the doctor told you to. Many pain medicines have acetaminophen, which is Tylenol. Too much acetaminophen (Tylenol) can be harmful. · If your bite puts you at risk for rabies, you will get a series of shots over the next few weeks to prevent rabies. Your doctor will tell you when to get the shots. It is very important that you get the full cycle of shots. Follow your doctor's instructions exactly. · You may need a tetanus shot if you have not received one in the last 5 years. · If your doctor prescribed antibiotics, take them as directed. Do not stop taking them just because you feel better. You need to take the full course of antibiotics. When should you call for help? Call your doctor now or seek immediate medical care if: 
  · The skin near the bite turns cold or pale or it changes color.  
  · You lose feeling in the area near the bite, or it feels numb or tingly.  
  · You have trouble moving a limb near the bite.  
  · You have symptoms of infection, such as: 
? Increased pain, swelling, warmth, or redness near the wound. ? Red streaks leading from the wound. ? Pus draining from the wound. ? A fever.  
  · Blood soaks through the bandage. Oozing small amounts of blood is normal.  
  · Your pain is getting worse. Watch closely for changes in your health, and be sure to contact your doctor if you are not getting better as expected. Where can you learn more? Go to http://www.gray.com/ Enter S547 in the search box to learn more about \"Animal Bites: Care Instructions. \" Current as of: February 26, 2020               Content Version: 12.8 © 9560-3986 [employer]. Care instructions adapted under license by [employer] (which disclaims liability or warranty for this information).  If you have questions about a medical condition or this instruction, always ask your healthcare professional. [place] [address] any warranty or liability for your use of this information.

## 2021-05-30 NOTE — PROGRESS NOTES
HPI:   Lizette Oviedo is a 78y.o. year old female who presents today for an acute visit. She has a history of hypertension, ASCVD, hyperlipidemia, syncope, Parkinson's disease, lumbar degenerative disease, GERD, and macular degeneration. On 5/9/2021, she presented to the [facility] ED after being bitten on her left wrist the day prior by her indoor/outdoor cat. She reports that she developed swelling and redness of her wrist and forearm, and was diagnosed with cellulitis. She was prescribed clindamycin 300 mg qid x 10 days and returned to the ED on 5/10/2021 for a repeat check and was felt to have no change. She was last seen on 5/11/2021 and was noted to have persistent redness and swelling on day 2/10 of clindamycin. She contacted the office on 5/17/2021 after near completion of clindamycin and reported improvement but not complete resolution of redness and swelling. She was prescribed an additional 7 days of clindamycin. She presents today for reevaluation and reports significant improvement with swelling resolved and minimal redness around area of initial bite. She denies any pain with movement, although will note mild pain on palpation over bite area. She denies any fever or chills. She is otherwise without new complaints and feeling well. Summary of prior hospitalizations and medical history:  In 8/2018, she was evaluated by Dr. Robby Barlow for right hip pain and was diagnosed with right piriformis syndrome. She was treated with a cortisone injection and physical therapy with some improvement, but subsequently developed right buttocks pain and pain and paresthesias down her right leg. She was treated with prednisone 50 mg for five days without improvement. She was seen on 10/9/2018, which was 10 days after completing the course of prednisone, and reported persistent severe right leg pain which was interfering with her activities and with sleep.  She was taking Tylenol ES without relief, and was started on gabapentin. Lumbar spine x-ray was obtained (10/9/2018) showing grade 1 anterolisthesis of L4 on L5, either new or increased since 4/2015, and multilevel degenerative spondylosis/disc disease. She was referred to physical therapy, but due to continued persistent symptoms, she had a lumbar MRI (10/17/2018) which showed multilevel degenerative findings causing various degrees of neuroforaminal  narrowing and lateral recess narrowing; also a right L4-L5 neuroforamen cystic lesion measuring approximately 10 x 6 mm was noted. She had a lumbar MRI with contrast (11/16/2018) which confirmed an extruding synovial cyst from the right L4-5 advanced facet arthropathy; significant right foraminal stenosis and nerve impingement persists. She continued physical therapy and reported that her pain has significantly improved. She was evaluated by Dr. Vickie James on 1/3/2019 but her pain had already resolved. She is no longer taking gabapentin or Tylenol. She has a history of hypertension, hyperlipidemia, ASCVD, and syncope. In 3/2011, she had three syncopal episodes while donating blood. Over the subsequent four days, she developed exertional substernal chest tightness with left arm pain and dyspnea. She was evaluated and EKG revealed new T wave inversions in leads V2 and V3. Troponins were negative. She underwent cardiac catheterization (3/21/2011) which showed a 90% mid LAD and mild disease in the rest of the coronaries. She underwent PCI and placement of two drug-eluting stents for the mid LAD lesion; LV function was mildly diminished (EF 40-45%) with anteroapical hypokinesis. Follow-up echocardiogram (9/2011) showed return to normal LV function (EF 60%) and no RWMA. She has a history of multiple syncopal episodes, usually related to stressful situations, and thought to be vasovagal in origin.  An implantable loop recorder was placed in 3/2011 and remained until 4/2013, and interrogation was negative for any significant arrhythmia. In 4/2014, she was hospitalized following another syncopal episode, which occurred after she had taken her morning medications. Afterward, she became nauseated and flushed, and called EMS. When they arrived, they found her on the floor and reportedly without a pulse. They began CPR and within 10-15 seconds, she recovered. Evaluation included EKG/troponins, which were negative, and an echocardiogram showing mild MR and normal LV function (EF 60-65%). She was found to have hypokalemia and hypomagnesemia and hydrochlorothiazide was discontinued. It was thought that this event also represented a vasovagal reaction given her prodromal symptoms. She has had no further events since that time. Her most recent pharmacologic nuclear stress test (6/25/2020) was a normal, low risk study, negative for evidence of ischemia or prior infarction, and with normal LV size and function (EF 84%). Her current regimen includes metoprolol, lisinopril, aspirin, and rosuvastatin. She is being followed by Dr. Yana Kerr. In 11/2017, she reported bilateral thigh aching pain, which increased with ambulation particularly when walking up stairs. She stated that the discomfort was similar to that which developed previously with use of simvastatin and atorvastatin. She had been on pravastatin since 10/2014 and did not note any difficulty previously. She discontinued pravastatin and had resolution of her symptoms. She was subsequently started on rosuvastatin in 12/2017, and did well until 7/2018 when she again developed myalgias. Her dose was decreased to 5 mg every other day. She reports today that she continues to have aching discomfort in her bilateral thighs on the days which she takes rosuvastatin, but reports that it is tolerable so she will continue. She has a history of idiopathic Parkinson's disease, predominantly left-sided.  She was being treated with Sinemet, but changed to Stalevo in 10/2019, and reports improved control of symptoms. She has difficulty with writing at times, particularly towards the end of the day, and also describes increasing tremors midday. She is followed by Dr. Chrissie Jiménez. She has a history of laryngopharyngeal reflux disease, treated previously with dexlansoprazole, but ha successfully weaned from therapy. She is now receiving immunotherapy with Dr. Colletta Cinnamon to address her allergies and hoarseness. In 5/2017 she had multiple episodes of epistaxis prompting an ED visit. She subsequently underwent silver nitrate cautery of anterior vessels in her right nares by Dr. Colletta Cinnamon, and has had no recurrence. In 3/2010, she underwent evaluation for iron deficiency anemia. She had previously had a negative colonoscopy and air contrast barium enema in 2009 by Dr. Marci Smith, and she had an upper endoscopy by Dr. Eliceo Mayberry which was normal. She was treated with iron with improvement. She had a repeat screening colonoscopy in 7/2016 by Dr. Eliceo Mayberry which was normal. No further follow-up was recommended given her age. She denies any abdominal pain, nausea, vomiting, melena, hematochezia, or change in bowel movements. She has a history of osteopenia with last bone density study in 5/2018 showing T-scores:  femoral neck left -1.2  /right -1.6 and lumbar -0.8 . She continues to take calcium and Vitamin D supplements. She has no history of pathologic fractures. She has a recent history of abnormal mammograms since 10/2014 with microcalcifcations in the right breast. She has been undergoing surveillance mammograms every six months, which have been unchanged. She had a follow-up mammogram in 5/2016 which was negative, and routine annual screening was recommended. She has a history of bilateral knee pain secondary to osteoarthritis. She was evaluated by Dr. Bridgette Salazar in 3/2018 and received a cortisone injection with improvement. She also has difficutly with left ankle pain which increase with ambulation.  She received a cortisone injection for this as well from Dr. Neil Putnam in 3/2018, but did not experience significant improvement. She was evaluated by Dr. Reese Arnett on 3/27/2018 and diagnosed with left peroneus brevis tendinitis. An orthotic was recommended with improvement. She is now being followed by Dr. Drake Qureshi for left ankle pain, and CT scan left ankle (7/2019) showed evidence of chronic ATFL sprain or complete rupture, likely prior sprain of the calcaneofibular ligament, and advanced posterior subtalar and tarsometatarsal osteoarthrosis. Past Medical History:   Diagnosis Date    Basal cell cancer     CAD (coronary artery disease), native coronary artery 03/21/2011    s/p PCI with with CARLITO (Xience 2.75x12, 2.5x12) mLAD and mild disease in rest of coronaries. Midly depressed LV syst fct     Cardiac cath 03/21/2011    mLAD 90% (2.75 x 12 mm Xience stent). Otherwise patent coronaries. LVEDP 10 mmHg. EF 40-45%. Anteroapical hypk. Renal arteries patent.  Cardiac echocardiogram 04/17/2014    EF 60-65%. No WMA. Gr 1 DDfx. Mild MR. Similar to study of 9/14/11.  Cardiac nuclear imaging test 07/2017    Negative for ischemia or infarction. EF > 70%.     Cardiomyopathy secondary     ischemic +/- stress induced    Dyslipidemia     GERD (gastroesophageal reflux disease)     Hx of colonoscopy 07/12/2016    Normal. Dr. Adele Baumann Hypertension     Parkinson's disease     Syncope     s/p ILD implantation     Past Surgical History:   Procedure Laterality Date    COLONOSCOPY N/A 7/12/2016    COLONOSCOPY performed by Myra Mckenna MD at [address]      dorsal    HX CATARACT REMOVAL  11/2012    left    HX CATARACT REMOVAL  10/2012    right    HX CORONARY STENT PLACEMENT      HX HEART CATHETERIZATION      HX HEMORRHOIDECTOMY      HX HYSTERECTOMY  1996    partial    HX IMPLANTABLE LOOP RECORDER  [id number]    HX TONSILLECTOMY      HX TOTAL ABDOMINAL HYSTERECTOMY  1996    partial Current Outpatient Medications   Medication Sig    carbidopa-levodopa ER (SINEMET CR)  mg per tablet Take 1 Tab by mouth nightly.  lisinopriL (PRINIVIL, ZESTRIL) 10 mg tablet Take 1 Tab by mouth daily.  metoprolol succinate (TOPROL-XL) 50 mg XL tablet Take 1 Tab by mouth daily.  psyllium husk (METAMUCIL PO) Take  by mouth.  potassium chloride (K-DUR, KLOR-CON) 20 mEq tablet Take 0.5 Tabs by mouth every other day.  rosuvastatin (CRESTOR) 10 mg tablet Take 0.5 Tabs by mouth every other day.  nitroglycerin (NITROSTAT) 0.4 mg SL tablet 1 sublingual every 5 minutes for chest pain for no more than 3 doses    carbidopa 25mg-levodopa 100mg-entacapone 200mg (STALEVO 100) tab tablet Take 1 Tab by mouth four (4) times daily.  calcium carbonate (CALCIUM 500 PO) Take  by mouth.  magnesium hydroxide (NEGRO MILK OF MAGNESIA) 400 mg/5 mL suspension Take 30 mL by mouth daily as needed for Constipation.  acetaminophen (TYLENOL) 650 mg CR tablet Take 650 mg by mouth every six (6) hours as needed for Pain.  co-enzyme Q-10 (CO Q-10) 100 mg capsule Take 200 mg by mouth daily.  aspirin 81 mg tablet Take 1 Tab by mouth daily.  DOCUSATE CALCIUM (STOOL SOFTENER PO) Take 1 Cap by mouth.  magnesium 250 mg Tab Take 250 mg by mouth two (2) times a day. No current facility-administered medications for this visit. Allergies and Intolerances: Allergies   Allergen Reactions    Keflex [Cephalexin] Other (comments)     Yeast infection    Pcn [Penicillins] Other (comments)     Family History: She has no family history of colon or breast cancer. Family History   Problem Relation Age of Onset    Heart Attack Father 80    Heart Disease Father     Cancer Mother         pancreatic    Hypertension Brother      Social History:   She  reports that she has never smoked. She has never used smokeless tobacco. She lives with her , who is having difficulty with mild cognitive impairment. She states that they sold their RV and now stay at home for the winter. She is a retired x-ray technician. Social History     Substance and Sexual Activity   Alcohol Use Yes    Comment: glass of wine occasionally. Immunization History:  Immunization History   Administered Date(s) Administered    (RETIRED) Pneumococcal Vaccine (Unspecified Type) 01/01/2003    COVID-19, PFIZER, MRNA, LNP-S, PF, 30MCG/0.3ML DOSE 03/09/2021, 03/30/2021    Influenza High Dose Vaccine PF 09/29/2014, 10/05/2015, 11/01/2016, 08/30/2017    Influenza Vaccine (Tri) Adjuvanted (>65 Yrs FLUAD TRI 88391) 10/09/2018, 09/17/2019    Influenza Vaccine Whole 09/07/2010, 09/03/2011, 10/04/2012    Influenza, Quadrivalent, Adjuvanted (>65 Yrs FLUAD QUAD 15995) 09/11/2020    Pneumococcal Conjugate (PCV-13) 10/05/2015    Pneumococcal Polysaccharide (PPSV-23) 01/01/2003, 10/30/2019    TD Vaccine 01/01/2003    Tdap 09/25/2013, 05/10/2021    Zoster 01/01/2007    Zoster Recombinant 09/18/2018, 11/23/2018       Review of Systems:   As above included in HPI. Otherwise 11 point review of systems negative including constitutional, skin, HENT, eyes, respiratory, cardiovascular, gastrointestinal, genitourinary, musculoskeletal, endo/heme/aller, neurological.    Physical:   Visit Vitals  /69 (BP 1 Location: Right upper arm, BP Patient Position: Sitting)   Pulse 72   Temp 97.7 °F (36.5 °C) (Temporal)   Resp 12   Ht 5' 5\" (1.651 m)   Wt 124 lb 6.4 oz (56.4 kg)   SpO2 98%   BMI 20.70 kg/m²       Exam:     Patient appears in no apparent distress. Affect is appropriate. HEENT: PERRLA, anicteric, oropharynx clear, no JVD, adenopathy or thyromegaly. No carotid bruits or radiated murmur. Lungs: clear to auscultation, no wheezes, rhonchi, or rales. Heart: regular rate and rhythm. No murmur, rubs, gallops  Abdomen: soft, nontender, nondistended, normal bowel sounds, no hepatosplenomegaly or masses. Extremities: without edema.   Left wrist with minimal post-inflammatory discoloration over medial aspect in area of puncture. No swelling of forearm remaining. No remaining erythema. Mild tenderness on palpation over medial area, but no restriction of movement. Review of Data:  Labs:  Hospital Outpatient Visit on 05/04/2021   Component Date Value Ref Range Status    WBC 05/04/2021 5.5  4.6 - 13.2 K/uL Final    RBC 05/04/2021 4.12* 4.20 - 5.30 M/uL Final    HGB 05/04/2021 12.6  12.0 - 16.0 g/dL Final    HCT 05/04/2021 39.7  35.0 - 45.0 % Final    MCV 05/04/2021 96.4  74.0 - 97.0 FL Final    MCH 05/04/2021 30.6  24.0 - 34.0 PG Final    MCHC 05/04/2021 31.7  31.0 - 37.0 g/dL Final    RDW 05/04/2021 12.9  11.6 - 14.5 % Final    PLATELET 30/05/1318 697  135 - 420 K/uL Final    MPV 05/04/2021 10.0  9.2 - 11.8 FL Final    NEUTROPHILS 05/04/2021 60  40 - 73 % Final    LYMPHOCYTES 05/04/2021 25  21 - 52 % Final    MONOCYTES 05/04/2021 10  3 - 10 % Final    EOSINOPHILS 05/04/2021 4  0 - 5 % Final    BASOPHILS 05/04/2021 1  0 - 2 % Final    ABS. NEUTROPHILS 05/04/2021 3.3  1.8 - 8.0 K/UL Final    ABS. LYMPHOCYTES 05/04/2021 1.4  0.9 - 3.6 K/UL Final    ABS. MONOCYTES 05/04/2021 0.5  0.05 - 1.2 K/UL Final    ABS. EOSINOPHILS 05/04/2021 0.2  0.0 - 0.4 K/UL Final    ABS.  BASOPHILS 05/04/2021 0.0  0.0 - 0.1 K/UL Final    DF 05/04/2021 AUTOMATED    Final    LIPID PROFILE 05/04/2021        Final    Cholesterol, total 05/04/2021 147  <200 MG/DL Final    Triglyceride 05/04/2021 82  <150 MG/DL Final    HDL Cholesterol 05/04/2021 61* 40 - 60 MG/DL Final    LDL, calculated 05/04/2021 69.6  0 - 100 MG/DL Final    VLDL, calculated 05/04/2021 16.4  MG/DL Final    CHOL/HDL Ratio 05/04/2021 2.4  0 - 5.0   Final    Magnesium 05/04/2021 2.3  1.6 - 2.6 mg/dL Final    Sodium 05/04/2021 142  136 - 145 mmol/L Final    Potassium 05/04/2021 4.1  3.5 - 5.5 mmol/L Final    Chloride 05/04/2021 108  100 - 111 mmol/L Final    CO2 05/04/2021 30  21 - 32 mmol/L Final    Anion gap 05/04/2021 4  3.0 - 18 mmol/L Final    Glucose 05/04/2021 87  74 - 99 mg/dL Final    BUN 05/04/2021 15  7.0 - 18 MG/DL Final    Creatinine 05/04/2021 0.67  0.6 - 1.3 MG/DL Final    BUN/Creatinine ratio 05/04/2021 22* 12 - 20   Final    GFR est AA 05/04/2021 >60  >60 ml/min/1.73m2 Final    GFR est non-AA 05/04/2021 >60  >60 ml/min/1.73m2 Final    Calcium 05/04/2021 9.2  8.5 - 10.1 MG/DL Final    Bilirubin, total 05/04/2021 0.5  0.2 - 1.0 MG/DL Final    ALT (SGPT) 05/04/2021 11* 13 - 56 U/L Final    AST (SGOT) 05/04/2021 19  10 - 38 U/L Final    Alk. phosphatase 05/04/2021 96  45 - 117 U/L Final    Protein, total 05/04/2021 6.5  6.4 - 8.2 g/dL Final    Albumin 05/04/2021 3.7  3.4 - 5.0 g/dL Final    Globulin 05/04/2021 2.8  2.0 - 4.0 g/dL Final    A-G Ratio 05/04/2021 1.3  0.8 - 1.7   Final    TSH 05/04/2021 2.22  0.36 - 3.74 uIU/mL Final    Vitamin D 25-Hydroxy 05/04/2021 33.6  30 - 100 ng/mL Final    Color 05/04/2021 DARK YELLOW    Final    Appearance 05/04/2021 CLEAR    Final    Specific gravity 05/04/2021 1.022  1.005 - 1.030   Final    pH (UA) 05/04/2021 6.5  5.0 - 8.0   Final    Protein 05/04/2021 Negative  NEG mg/dL Final    Glucose 05/04/2021 Negative  NEG mg/dL Final    Ketone 05/04/2021 TRACE* NEG mg/dL Final    Bilirubin 05/04/2021 Negative  NEG   Final    Blood 05/04/2021 Negative  NEG   Final    Urobilinogen 05/04/2021 0.2  0.2 - 1.0 EU/dL Final    Nitrites 05/04/2021 Negative  NEG   Final    Leukocyte Esterase 05/04/2021 TRACE* NEG   Final    WBC 05/04/2021 1 to 3  0 - 5 /hpf Final    RBC 05/04/2021 Negative  0 - 5 /hpf Final    Epithelial cells 05/04/2021 FEW  0 - 5 /lpf Final    Bacteria 05/04/2021 FEW* NEG /hpf Final       Health Maintenance:  Screening:    Mammogram: negative (7/2020)   PAP smear: s/p ROHINI. No further screening. Colorectal: colonoscopy (7/2016) normal. Dr. Katie Smith. No further screening.    Depression: none   DM (HbA1c/FPG): FPG 87 (5/2021)   Hepatitis C: unknown   Falls: none   DEXA: osteopenia (5/2018)   Glaucoma: regular eye exams with Dr. John Tabares. Allean Record (3/2021) for macular degeneration. 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 Patient evaluated in ED on 5/9/2021 after being bitten on her left wrist by her indoor/outdoor cat. Developed swelling and redness consistent with cellulitis, and started on treatment with clindamycin 300 mg qid x 10 days Additional 7 days of therapy added on 5/17/2021 due to persistence of redness and swelling, and now completed 17 days of therapy. Evidence of infection appears resolved today with only minimal purplish discoloration around area of initial puncture wound indicative of post-inflammatory change. No further antibiotics indicated at this time and advised to continue to monitor closely for any evidence of recurrence. Received Tdap in ED. Chronic issues:  1. Hyperlipidemia. Previously on low intensity dose pravastatin, but developed severe bilateral thigh pain, worse with ambulation and was discontinued in 11/2018 with improvement. Reported similar symptoms in past with statin (Zocor until 2012, and then Lipitor until 9/2014).  Started on rosuvastatin 10 mg daily in 12/2018 and tolerated without difficulty until 7/2018 when developed recurrent myalgias and dose decreased to 5 mg every other day with improvement. Lipid panel (5/2021) with LDL 69 and HDL 61, indicative of excellent control. Emphasized importance of lifestyle modifications, including diet and exercise. Would not recommend weight loss given normal BMI. Continue to follow. 2. Hypertension. Blood pressure well controlled on lisinopril 10 mg daily and metoprolol succinate 50 mg daily. Renal function remains normal with creatinine 0.67 / eGFR >60. Continue to follow. 3. ASCVD. Remains asymptomatic on current regimen of aspirin, metoprolol succinate, and rosuvastatin. Resolution of cardiomyopathy with last echocardiogram in 2014 revealing normal LV function. Negative pharmacologic nuclear stress test in 6/2020. Being followed by Dr. Lucio Guerra. 4. H/O syncope. No further episodes since 2014. Most likely secondary to vasovagal reaction. Follow. 5. Parkinson's disease. Therapy with addition of Stalevo to Sinemet with significant improvement in control of tremors. Being followed by Dr. Mireya Johns.  6. Osteopenia. Last bone density scan 5/2018. Using femoral neck T-scores, calculated FRAX score estimates her 10 year risk of a major osteoporetic fracture at 12 % and hip fracture at 3.1 %, which are an indication for biphosphonate treatment based on hip fracture risk of >3%. However, no significant change from 5/2018. Continue calcium and vitamin D supplements. Encouraged exercise, particularly weight bearing activities. Will follow. Fall precautions stressed. Will reassess with next mammogram.   7. Lumbar spondylosis. Patient with symptoms of right buttock pain since 8/2018 and diagnosed with right piriformis syndrome. Completed physical therapy and received cortisone injection with some improvement, but subsequently developed right leg paresthesias and pain consistent with right sided sciatica. Treated with five day course of prednisone 50 mg without improvement. LS spine x-rays and lumbar MRI with degenerative changes, although MRI showed a right L4-L5 neuroforamen synovial cyst from the right L4-5 advanced facet arthropathy with significant right foraminal stenosis and nerve impingement. Referred for physical therapy and treated with gabapentin 100 mg tid with resolution of symptoms. Evaluated by Dr. Rosalva Fox, but symptoms had already resolved. No longer taking gabapentin. Appears to be stable and advised to continue with back stretches. 8. Osteoarthritis. Difficulty with bilateral knee pain L>R and receiving intermittent cortisone injections from Dr. Melodie Chicas. Left ankle pain being addressed by podiatrist, Dr. Anisa Plascencia. Stable today. 9. Macular degeneration, R>L. On Preservision. Being followed by Dr. Jesús Fernandez every 8 weeks. 10. Laryngopharyngeal reflux. Doing well. Discontinued Dexilant without an increase in symptoms. Now receiving immunotherapy to address hoarseness and allergies. Followed by Dr. Jorge Jiménez. 11. Psoriasis. Using clobetasol solution for scalp psoriasis. 12. Health maintenance. Completed Pfizer COVID 19 vaccine series. Received 2/2 doses of Shingrix vaccine. Completed pneumococcal series. Other immunizations up to date. No further colorectal cancer screening needed. Mammogram up to date. Will get repeat bone density study with next mammogram. Continue regular eye exams with Ankur Skelton and Jesús Fernandez. Vitamin D level normal. Continue maintenance dose supplement. Medicare wellness up to date. Patient understands recommendations and agrees with plan. Follow-up as previously scheduled.

## 2021-06-10 ENCOUNTER — TELEPHONE (OUTPATIENT)
Dept: INTERNAL MEDICINE CLINIC | Age: 81
End: 2021-06-10

## 2021-06-10 NOTE — TELEPHONE ENCOUNTER
Called and spoke with patient. Reports was working in the yard over the last 2 days and last night noted to very small ticks on her bilateral thighs. She states they were embedded but not engorged. They were removed and there is a small area of redness around each of the sites of attachment. She denies any fevers or chills. Given that tics were discovered in likely a 24-hour period after attachment and were not engorged, she does not meet criteria for antibiotic prophylaxis. Discussed that redness around areas of bite most likely represents hypersensitivity reaction. Advised to continue monitoring the area and call for development of any fever, chills, joint pain, or rash (particularly erythema migrans). Answered all questions. Lily Lew

## 2021-06-10 NOTE — TELEPHONE ENCOUNTER
Pt calling, says she had 2 small seed ticks on her. one was on her right thigh and on on left. She has removed them but the area is red. Do you think she needs and antibiotic?

## 2021-06-28 RX ORDER — POTASSIUM CHLORIDE 20 MEQ/1
10 TABLET, EXTENDED RELEASE ORAL EVERY OTHER DAY
Qty: 25 TABLET | Refills: 3 | Status: SHIPPED | OUTPATIENT
Start: 2021-06-28 | End: 2021-12-02 | Stop reason: SDUPTHER

## 2021-06-28 NOTE — TELEPHONE ENCOUNTER
Last Visit: 5/26/21 with MD Shiloh Eugene  Next Appointment: 11/17/21 with MD Wang  Previous Refill Encounter(s): 6/3/20 #25 with 3 refills    Requested Prescriptions     Pending Prescriptions Disp Refills    potassium chloride (K-DUR, KLOR-CON) 20 mEq tablet 25 Tablet 3     Sig: Take 0.5 Tablets by mouth every other day.

## 2021-06-28 NOTE — TELEPHONE ENCOUNTER
Requested Prescriptions     Pending Prescriptions Disp Refills    potassium chloride (K-DUR, KLOR-CON) 20 mEq tablet 25 Tablet 3     Sig: Take 0.5 Tablets by mouth every other day.

## 2021-07-29 DIAGNOSIS — M85.89 OSTEOPENIA OF MULTIPLE SITES: ICD-10-CM

## 2021-07-29 DIAGNOSIS — Z12.31 SCREENING MAMMOGRAM, ENCOUNTER FOR: ICD-10-CM

## 2021-08-02 ENCOUNTER — HOSPITAL ENCOUNTER (OUTPATIENT)
Dept: MAMMOGRAPHY | Age: 81
Discharge: HOME | End: 2021-08-02
Attending: INTERNAL MEDICINE
Payer: MEDICARE

## 2021-08-02 ENCOUNTER — HOSPITAL ENCOUNTER (OUTPATIENT)
Dept: BONE DENSITY | Age: 81
Discharge: HOME | End: 2021-08-02
Attending: INTERNAL MEDICINE
Payer: MEDICARE

## 2021-08-02 DIAGNOSIS — Z12.31 SCREENING MAMMOGRAM, ENCOUNTER FOR: ICD-10-CM

## 2021-08-02 PROCEDURE — 77063 BREAST TOMOSYNTHESIS BI: CPT

## 2021-08-02 PROCEDURE — 77080 DXA BONE DENSITY AXIAL: CPT

## 2021-08-09 RX ORDER — ROSUVASTATIN CALCIUM 10 MG/1
5 TABLET, COATED ORAL EVERY OTHER DAY
Qty: 30 TABLET | Refills: 3 | Status: SHIPPED | OUTPATIENT
Start: 2021-08-09 | End: 2022-02-21 | Stop reason: SDUPTHER

## 2021-08-23 ENCOUNTER — TELEPHONE (OUTPATIENT)
Dept: INTERNAL MEDICINE CLINIC | Age: 81
End: 2021-08-23

## 2021-08-24 NOTE — TELEPHONE ENCOUNTER
Reviewed bone density study from 8/2/2021 showing T-scores:  femoral neck left -1.7/right -2.1 and lumbar -0.9. Calculated FRAX score estimates her 10 year risk of a major osteoporetic fracture at 14 % and hip fracture at 4.8 %, which is an indication for biphosphonate treatment based on hip fracture risk of greater than 3%. .     Please let the patient know that her bone density study continues to show evidence of osteopenia (low bone density). There has been some progression since her prior study in 5/2018. Please recommend that she continue taking calcium and Vitamin D. Also, please encourage her to exercise regularly, particularly weight bearing activities, which may help to prevent progression. Will discuss possible treatments at her next visit given progression .

## 2021-11-10 ENCOUNTER — HOSPITAL ENCOUNTER (OUTPATIENT)
Dept: LAB | Age: 81
Discharge: HOME | End: 2021-11-10
Payer: MEDICARE

## 2021-11-10 ENCOUNTER — APPOINTMENT (OUTPATIENT)
Dept: INTERNAL MEDICINE CLINIC | Age: 81
End: 2021-11-10

## 2021-11-10 DIAGNOSIS — M85.89 OSTEOPENIA OF MULTIPLE SITES: ICD-10-CM

## 2021-11-10 DIAGNOSIS — M47.816 LUMBAR SPONDYLOSIS: ICD-10-CM

## 2021-11-10 DIAGNOSIS — K21.9 GASTROESOPHAGEAL REFLUX DISEASE, UNSPECIFIED WHETHER ESOPHAGITIS PRESENT: ICD-10-CM

## 2021-11-10 DIAGNOSIS — I10 ESSENTIAL HYPERTENSION: ICD-10-CM

## 2021-11-10 DIAGNOSIS — I25.10 CAD S/P PERCUTANEOUS CORONARY ANGIOPLASTY: ICD-10-CM

## 2021-11-10 DIAGNOSIS — L03.114 CELLULITIS OF LEFT WRIST: ICD-10-CM

## 2021-11-10 DIAGNOSIS — M15.9 PRIMARY OSTEOARTHRITIS INVOLVING MULTIPLE JOINTS: ICD-10-CM

## 2021-11-10 DIAGNOSIS — E78.5 DYSLIPIDEMIA: ICD-10-CM

## 2021-11-10 DIAGNOSIS — L40.9 PSORIASIS OF SCALP: ICD-10-CM
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
## 2021-11-10 DIAGNOSIS — G20 PARKINSON DISEASE (HCC): ICD-10-CM

## 2021-11-10 DIAGNOSIS — H35.3131 EARLY DRY STAGE NONEXUDATIVE AGE-RELATED MACULAR DEGENERATION OF BOTH EYES: ICD-10-CM

## 2021-11-10 DIAGNOSIS — Z98.61 CAD S/P PERCUTANEOUS CORONARY ANGIOPLASTY: ICD-10-CM

## 2021-11-10 LAB
ALBUMIN SERPL-MCNC: 3.3 G/DL (ref 3.4–5)
ALBUMIN/GLOB SERPL: 1.2 {RATIO} (ref 0.8–1.7)
ALP SERPL-CCNC: 90 U/L (ref 45–117)
ALT SERPL-CCNC: 18 U/L (ref 13–56)
ANION GAP SERPL CALC-SCNC: 1 MMOL/L (ref 3–18)
AST SERPL-CCNC: 21 U/L (ref 10–38)
BASOPHILS # BLD: 0.1 K/UL (ref 0–0.1)
BASOPHILS NFR BLD: 1 % (ref 0–2)
BILIRUB SERPL-MCNC: 0.4 MG/DL (ref 0.2–1)
BUN SERPL-MCNC: 14 MG/DL (ref 7–18)
BUN/CREAT SERPL: 21 (ref 12–20)
CALCIUM SERPL-MCNC: 9.1 MG/DL (ref 8.5–10.1)
CHLORIDE SERPL-SCNC: 109 MMOL/L (ref 100–111)
CHOLEST SERPL-MCNC: 135 MG/DL
CO2 SERPL-SCNC: 31 MMOL/L (ref 21–32)
CREAT SERPL-MCNC: 0.68 MG/DL (ref 0.6–1.3)
DIFFERENTIAL METHOD BLD: ABNORMAL
EOSINOPHIL # BLD: 0.2 K/UL (ref 0–0.4)
EOSINOPHIL NFR BLD: 6 % (ref 0–5)
ERYTHROCYTE [DISTWIDTH] IN BLOOD BY AUTOMATED COUNT: 13.2 % (ref 11.6–14.5)
GLOBULIN SER CALC-MCNC: 2.7 G/DL (ref 2–4)
GLUCOSE SERPL-MCNC: 87 MG/DL (ref 74–99)
HCT VFR BLD AUTO: 41.1 % (ref 35–45)
HDLC SERPL-MCNC: 64 MG/DL (ref 40–60)
HDLC SERPL: 2.1 {RATIO} (ref 0–5)
HGB BLD-MCNC: 12.4 G/DL (ref 12–16)
LDLC SERPL CALC-MCNC: 55.6 MG/DL (ref 0–100)
LIPID PROFILE,FLP: ABNORMAL
LYMPHOCYTES # BLD: 1.4 K/UL (ref 0.9–3.6)
LYMPHOCYTES NFR BLD: 33 % (ref 21–52)
MAGNESIUM SERPL-MCNC: 2.2 MG/DL (ref 1.6–2.6)
MCH RBC QN AUTO: 29.1 PG (ref 24–34)
MCHC RBC AUTO-ENTMCNC: 30.2 G/DL (ref 31–37)
MCV RBC AUTO: 96.5 FL (ref 78–100)
MONOCYTES # BLD: 0.5 K/UL (ref 0.05–1.2)
MONOCYTES NFR BLD: 11 % (ref 3–10)
NEUTS SEG # BLD: 2.1 K/UL (ref 1.8–8)
NEUTS SEG NFR BLD: 49 % (ref 40–73)
PLATELET # BLD AUTO: 205 K/UL (ref 135–420)
PMV BLD AUTO: 9.5 FL (ref 9.2–11.8)
POTASSIUM SERPL-SCNC: 3.7 MMOL/L (ref 3.5–5.5)
PROT SERPL-MCNC: 6 G/DL (ref 6.4–8.2)
RBC # BLD AUTO: 4.26 M/UL (ref 4.2–5.3)
SODIUM SERPL-SCNC: 141 MMOL/L (ref 136–145)
TRIGL SERPL-MCNC: 77 MG/DL (ref ?–150)
VLDLC SERPL CALC-MCNC: 15.4 MG/DL
WBC # BLD AUTO: 4.2 K/UL (ref 4.6–13.2)

## 2021-11-10 PROCEDURE — 83735 ASSAY OF MAGNESIUM: CPT

## 2021-11-10 PROCEDURE — 85025 COMPLETE CBC W/AUTO DIFF WBC: CPT

## 2021-11-10 PROCEDURE — 80061 LIPID PANEL: CPT

## 2021-11-10 PROCEDURE — 80053 COMPREHEN METABOLIC PANEL: CPT

## 2021-11-10 PROCEDURE — 36415 COLL VENOUS BLD VENIPUNCTURE: CPT

## 2021-11-17 ENCOUNTER — HOSPITAL ENCOUNTER (OUTPATIENT)
Dept: LAB | Age: 81
Discharge: HOME | End: 2021-11-17
Payer: MEDICARE

## 2021-11-17 ENCOUNTER — OFFICE VISIT (OUTPATIENT)
Dept: INTERNAL MEDICINE CLINIC | Age: 81
End: 2021-11-17
Payer: MEDICARE

## 2021-11-17 VITALS
TEMPERATURE: 97.9 F | DIASTOLIC BLOOD PRESSURE: 68 MMHG | RESPIRATION RATE: 16 BRPM | HEART RATE: 72 BPM | OXYGEN SATURATION: 96 % | BODY MASS INDEX: 20.33 KG/M2 | HEIGHT: 65 IN | WEIGHT: 122 LBS | SYSTOLIC BLOOD PRESSURE: 132 MMHG

## 2021-11-17 DIAGNOSIS — H35.3131 EARLY DRY STAGE NONEXUDATIVE AGE-RELATED MACULAR DEGENERATION OF BOTH EYES: ICD-10-CM

## 2021-11-17 DIAGNOSIS — Z00.00 MEDICARE ANNUAL WELLNESS VISIT, SUBSEQUENT: ICD-10-CM

## 2021-11-17 DIAGNOSIS — M85.9 DISORDER OF BONE DENSITY AND STRUCTURE, UNSPECIFIED: ICD-10-CM

## 2021-11-17 DIAGNOSIS — M15.9 PRIMARY OSTEOARTHRITIS INVOLVING MULTIPLE JOINTS: ICD-10-CM

## 2021-11-17 DIAGNOSIS — M85.89 OSTEOPENIA OF MULTIPLE SITES: ICD-10-CM

## 2021-11-17 DIAGNOSIS — G20 PARKINSON DISEASE (HCC): ICD-10-CM

## 2021-11-17 DIAGNOSIS — J30.89 NON-SEASONAL ALLERGIC RHINITIS, UNSPECIFIED TRIGGER: ICD-10-CM

## 2021-11-17 DIAGNOSIS — M47.816 LUMBAR SPONDYLOSIS: ICD-10-CM

## 2021-11-17 DIAGNOSIS — Z71.89 ADVANCED DIRECTIVES, COUNSELING/DISCUSSION: ICD-10-CM

## 2021-11-17 DIAGNOSIS — I25.10 CAD S/P PERCUTANEOUS CORONARY ANGIOPLASTY: ICD-10-CM

## 2021-11-17 DIAGNOSIS — Z98.61 CAD S/P PERCUTANEOUS CORONARY ANGIOPLASTY: ICD-10-CM

## 2021-11-17 DIAGNOSIS — I10 PRIMARY HYPERTENSION: Primary | ICD-10-CM

## 2021-11-17 DIAGNOSIS — Z13.31 SCREENING FOR DEPRESSION: ICD-10-CM

## 2021-11-17 DIAGNOSIS — E78.5 DYSLIPIDEMIA: ICD-10-CM

## 2021-11-17 LAB
25(OH)D3 SERPL-MCNC: 30.7 NG/ML (ref 30–100)
CALCIUM SERPL-MCNC: 9.4 MG/DL (ref 8.5–10.1)
PHOSPHATE SERPL-MCNC: 3.5 MG/DL (ref 2.5–4.9)
PTH-INTACT SERPL-MCNC: 51.6 PG/ML (ref 18.4–88)

## 2021-11-17 PROCEDURE — 84166 PROTEIN E-PHORESIS/URINE/CSF: CPT

## 2021-11-17 PROCEDURE — 82784 ASSAY IGA/IGD/IGG/IGM EACH: CPT

## 2021-11-17 PROCEDURE — 83970 ASSAY OF PARATHORMONE: CPT

## 2021-11-17 PROCEDURE — G0439 PPPS, SUBSEQ VISIT: HCPCS | Performed by: INTERNAL MEDICINE

## 2021-11-17 PROCEDURE — G8536 NO DOC ELDER MAL SCRN: HCPCS | Performed by: INTERNAL MEDICINE

## 2021-11-17 PROCEDURE — G8752 SYS BP LESS 140: HCPCS | Performed by: INTERNAL MEDICINE

## 2021-11-17 PROCEDURE — G8754 DIAS BP LESS 90: HCPCS | Performed by: INTERNAL MEDICINE

## 2021-11-17 PROCEDURE — G8420 CALC BMI NORM PARAMETERS: HCPCS | Performed by: INTERNAL MEDICINE

## 2021-11-17 PROCEDURE — 1101F PT FALLS ASSESS-DOCD LE1/YR: CPT | Performed by: INTERNAL MEDICINE

## 2021-11-17 PROCEDURE — 84100 ASSAY OF PHOSPHORUS: CPT

## 2021-11-17 PROCEDURE — 99497 ADVNCD CARE PLAN 30 MIN: CPT | Performed by: INTERNAL MEDICINE

## 2021-11-17 PROCEDURE — 82306 VITAMIN D 25 HYDROXY: CPT

## 2021-11-17 PROCEDURE — G0463 HOSPITAL OUTPT CLINIC VISIT: HCPCS | Performed by: INTERNAL MEDICINE

## 2021-11-17 PROCEDURE — G8427 DOCREV CUR MEDS BY ELIG CLIN: HCPCS | Performed by: INTERNAL MEDICINE

## 2021-11-17 PROCEDURE — G8399 PT W/DXA RESULTS DOCUMENT: HCPCS | Performed by: INTERNAL MEDICINE

## 2021-11-17 PROCEDURE — G8510 SCR DEP NEG, NO PLAN REQD: HCPCS | Performed by: INTERNAL MEDICINE

## 2021-11-17 PROCEDURE — 1090F PRES/ABSN URINE INCON ASSESS: CPT | Performed by: INTERNAL MEDICINE

## 2021-11-17 PROCEDURE — 99214 OFFICE O/P EST MOD 30 MIN: CPT | Performed by: INTERNAL MEDICINE

## 2021-11-17 NOTE — PROGRESS NOTES
Chief Complaint   Patient presents with    Annual Wellness Visit    Hypertension     6 month follow up        1. Have you been to the ER, urgent care clinic or hospitalized since your last visit? NO.     2. Have you seen or consulted any other health care providers outside of the [address] [place] [address] since your last visit (Include any pap smears or colon screening)?  NO

## 2021-11-17 NOTE — PATIENT INSTRUCTIONS
Heart-Healthy Diet: Care Instructions  Your Care Instructions     A heart-healthy diet has lots of vegetables, fruits, nuts, beans, and whole grains, and is low in salt. It limits foods that are high in saturated fat, such as meats, cheeses, and fried foods. It may be hard to change your diet, but even small changes can lower your risk of heart attack and heart disease. Follow-up care is a key part of your treatment and safety. Be sure to make and go to all appointments, and call your doctor if you are having problems. It's also a good idea to know your test results and keep a list of the medicines you take. How can you care for yourself at home? Watch your portions  · Learn what a serving is. A \"serving\" and a \"portion\" are not always the same thing. Make sure that you are not eating larger portions than are recommended. For example, a serving of pasta is ½ cup. A serving size of meat is 2 to 3 ounces. A 3-ounce serving is about the size of a deck of cards. Measure serving sizes until you are good at [place]" them. Keep in mind that restaurants often serve portions that are 2 or 3 times the size of one serving. · To keep your energy level up and keep you from feeling hungry, eat often but in smaller portions. · Eat only the number of calories you need to stay at a healthy weight. If you need to lose weight, eat fewer calories than your body burns (through exercise and other physical activity). Eat more fruits and vegetables  · Eat a variety of fruit and vegetables every day. Dark green, deep orange, red, or yellow fruits and vegetables are especially good for you. Examples include spinach, carrots, peaches, and berries. · Keep carrots, celery, and other veggies handy for snacks. Buy fruit that is in season and store it where you can see it so that you will be tempted to eat it. · Cook dishes that have a lot of veggies in them, such as stir-fries and soups.   Limit saturated and trans fat  · Read food labels, and try to avoid saturated and trans fats. They increase your risk of heart disease. · Use olive or canola oil when you cook. · Bake, broil, grill, or steam foods instead of frying them. · Choose lean meats instead of high-fat meats such as hot dogs and sausages. Cut off all visible fat when you prepare meat. · Eat fish, skinless poultry, and meat alternatives such as soy products instead of high-fat meats. Soy products, such as tofu, may be especially good for your heart. · Choose low-fat or fat-free milk and dairy products. Eat foods high in fiber  · Eat a variety of grain products every day. Include whole-grain foods that have lots of fiber and nutrients. Examples of whole-grain foods include oats, whole wheat bread, and brown rice. · Buy whole-grain breads and cereals, instead of white bread or pastries. Limit salt and sodium  · Limit how much salt and sodium you eat to help lower your blood pressure. · Taste food before you salt it. Add only a little salt when you think you need it. With time, your taste buds will adjust to less salt. · Eat fewer snack items, fast foods, and other high-salt, processed foods. Check food labels for the amount of sodium in packaged foods. · Choose low-sodium versions of canned goods (such as soups, vegetables, and beans). Limit sugar  · Limit drinks and foods with added sugar. These include candy, desserts, and soda pop. Limit alcohol  · Limit alcohol to no more than 2 drinks a day for men and 1 drink a day for women. Too much alcohol can cause health problems. When should you call for help? Watch closely for changes in your health, and be sure to contact your doctor if:    · You would like help planning heart-healthy meals. Where can you learn more? Go to http://[email or web address].com/  Enter V137 in the search box to learn more about \"Heart-Healthy Diet: Care Instructions. \"  Current as of: August 22, 2019               Content Version: 12.6  © 4889-8449 [employer]. Care instructions adapted under license by [email or web address] (which disclaims liability or warranty for this information). If you have questions about a medical condition or this instruction, always ask your healthcare professional. [address] any warranty or liability for your use of this information. Learning About Low-Sodium Foods  What foods are low in sodium? The foods you eat contain nutrients, such as vitamins and minerals. Sodium is a nutrient. Your body needs the right amount to stay healthy and work as it should. You can use the list below to help you make choices about which foods to eat. Fruits  · Fresh, frozen, canned, or dried fruit  Vegetables  · Fresh or frozen vegetables, with no added salt  · Canned vegetables, low-sodium or with no added salt  Grains  · Bagels without salted tops  · Cereal with no added salt  · Corn tortillas  · Crackers with no added salt  · Oatmeal, cooked without salt  · Popcorn with no salt  · Pasta and noodles, cooked without salt  · Rice, cooked without salt  · Unsalted pretzels  Dairy and dairy alternatives  · Butter, unsalted  · Cream cheese  · Ice cream  · Milk  · Soy milk  Meats and other protein foods  · Beans and peas, canned with no salt  · Eggs  · Fresh fish (not smoked)  · Fresh meats (not smoked or cured)  · Nuts and nut butter, prepared without salt  · Poultry, not packaged with sodium solution  · Tofu, unseasoned  · Tuna, canned without salt  Seasonings  · Garlic  · Herbs and spices  · Lemon juice  · Mustard  · Olive oil  · Salt-free seasoning mixes  · Vinegar  Work with your doctor to find out how much of this nutrient you need. Depending on your health, you may need more or less of it in your diet. Where can you learn more?   Go to http://www.gray.com/  Enter S460 in the search box to learn more about \"Learning About Low-Sodium Foods.\"  Current as of: August 22, 2019               Content Version: 12.6  © 2350-5775 [employer]. Care instructions adapted under license by [email or web address] (which disclaims liability or warranty for this information). If you have questions about a medical condition or this instruction, always ask your healthcare professional. [address] any warranty or liability for your use of this information. Low Sodium Diet (2,000 Milligram): Care Instructions  Your Care Instructions     Too much sodium causes your body to hold on to extra water. This can raise your blood pressure and force your heart and kidneys to work harder. In very serious cases, this could cause you to be put in the hospital. It might even be life-threatening. By limiting sodium, you will feel better and lower your risk of serious problems. The most common source of sodium is salt. People get most of the salt in their diet from canned, prepared, and packaged foods. Fast food and restaurant meals also are very high in sodium. Your doctor will probably limit your sodium to less than 2,000 milligrams (mg) a day. This limit counts all the sodium in prepared and packaged foods and any salt you add to your food. Follow-up care is a key part of your treatment and safety. Be sure to make and go to all appointments, and call your doctor if you are having problems. It's also a good idea to know your test results and keep a list of the medicines you take. How can you care for yourself at home? Read food labels  · Read labels on cans and food packages. The labels tell you how much sodium is in each serving. Make sure that you look at the serving size. If you eat more than the serving size, you have eaten more sodium. · Food labels also tell you the Percent Daily Value for sodium. Choose products with low Percent Daily Values for sodium.   · Be aware that sodium can come in forms other than salt, including monosodium glutamate (MSG), sodium citrate, and sodium bicarbonate (baking soda). MSG is often added to Asian food. When you eat out, you can sometimes ask for food without MSG or added salt. Buy low-sodium foods  · Buy foods that are labeled \"unsalted\" (no salt added), \"sodium-free\" (less than 5 mg of sodium per serving), or \"low-sodium\" (less than 140 mg of sodium per serving). Foods labeled \"reduced-sodium\" and \"light sodium\" may still have too much sodium. Be sure to read the label to see how much sodium you are getting. · Buy fresh vegetables, or frozen vegetables without added sauces. Buy low-sodium versions of canned vegetables, soups, and other canned goods. Prepare low-sodium meals  · Cut back on the amount of salt you use in cooking. This will help you adjust to the taste. Do not add salt after cooking. One teaspoon of salt has about 2,300 mg of sodium. · Take the salt shaker off the table. · Flavor your food with garlic, lemon juice, onion, vinegar, herbs, and spices. Do not use soy sauce, lite soy sauce, steak sauce, onion salt, garlic salt, celery salt, mustard, or ketchup on your food. · Use low-sodium salad dressings, sauces, and ketchup. Or make your own salad dressings and sauces without adding salt. · Use less salt (or none) when recipes call for it. You can often use half the salt a recipe calls for without losing flavor. Other foods such as rice, pasta, and grains do not need added salt. · Rinse canned vegetables, and cook them in fresh water. This removes somebut not allof the salt. · Avoid water that is naturally high in sodium or that has been treated with water softeners, which add sodium. Call your local water company to find out the sodium content of your water supply. If you buy bottled water, read the label and choose a sodium-free brand. Avoid high-sodium foods  · Avoid eating:  ? Smoked, cured, salted, and canned meat, fish, and poultry.   ? Ham, gregory, hot dogs, and luncheon meats.  ? Regular, hard, and processed cheese and regular peanut butter. ? Crackers with salted tops, and other salted snack foods such as pretzels, chips, and salted popcorn. ? Frozen prepared meals, unless labeled low-sodium. ? Canned and dried soups, broths, and bouillon, unless labeled sodium-free or low-sodium. ? Canned vegetables, unless labeled sodium-free or low-sodium. ? [place] fries, pizza, tacos, and other fast foods. ? Pickles, olives, ketchup, and other condiments, especially soy sauce, unless labeled sodium-free or low-sodium. Where can you learn more? Go to http://www.gray.com/  Enter V843 in the search box to learn more about \"Low Sodium Diet (2,000 Milligram): Care Instructions. \"  Current as of: August 22, 2019               Content Version: 12.6  © 7729-2046 [employer]. Care instructions adapted under license by [employer] (which disclaims liability or warranty for this information). If you have questions about a medical condition or this instruction, always ask your healthcare professional. [place] [address] any warranty or liability for your use of this information. Medicare Wellness Visit, Female    The best way to improve and maintain good health is to have a healthy lifestyle by eating a well-balanced diet, exercising regularly, limiting alcohol and stopping smoking. Regular visits with your physician or non-physician health care provider also support your good health. Preventive screening tests can find health problems before they become diseases or illnesses. Preventive services such as immunizations prevent serious infections. All people over age 72 should have a Pneumovax and a Prevnar-13 shot to prevent potentially life threatening infections with the pneumococcus bacteria, a common cause of pneumonia. These are once in a lifetime unless you and your provider decide differently.     All people over 65 should have a yearly influenza vaccine or \"flu\" shot. This does not prevent infection with cold viruses but has been proven to prevent hospitalization and death from influenza. Although Medicare part B \"regular Medicare\" currently only covers tetanus vaccination in the context of an injury, a tetanus vaccine (Tdap or Td) is recommended every 10 years. A shingles vaccine is recommended once in a lifetime after age 61. The Shingles vaccine is also not covered by Medicare part B. Note, however, that both the Shingles vaccine and Tdap/Td are generally covered by secondary carriers. Please check your coverage and out of pocket expenses. Consider contacting your local health department because it may stock these vaccines for a reasonable charge. We currently have documentation of the following immunization history for you:  Immunization History   Administered Date(s) Administered    (RETIRED) Pneumococcal Vaccine (Unspecified Type) 01/01/2003    COVID-19, PFIZER, MRNA, LNP-S, PF, 30MCG/0.3ML DOSE 03/09/2021, 03/30/2021, 10/02/2021    Influenza High Dose Vaccine PF 09/29/2014, 10/05/2015, 11/01/2016, 08/30/2017    Influenza Vaccine (Tri) Adjuvanted (>65 Yrs FLUAD TRI 51782) 10/09/2018, 09/17/2019    Influenza Vaccine Whole 09/07/2010, 09/03/2011, 10/04/2012    Influenza, High-dose, Quadrivalent (>65 Yrs Fluzone High Dose Quad 51752) 10/03/2021    Influenza, Quadrivalent, Adjuvanted (>65 Yrs FLUAD QUAD [id number]) 09/11/2020    Pneumococcal Conjugate (PCV-13) 10/05/2015    Pneumococcal Polysaccharide (PPSV-23) 01/01/2003, 10/30/2019    TD Vaccine 01/01/2003    Tdap 09/25/2013, 05/10/2021    Zoster 01/01/2007    Zoster Recombinant 09/18/2018, 11/23/2018       Screening for infection with Hepatitis C is recommended for anyone born between [birth year] through [address]. The table at the bottom of this document indicates the status of this and other preventive services.     A bone mass density test (DEXA) to screen for osteoporosis or thinning of the bones should be done at least once after age 72 and may be done up to every 2 years as determined by you and your health care provider. The most recent DEXA we have on file for you is:  DEXA Results (most recent):  Results from Orders Only encounter on 07/29/21    DEXA BONE DENSITY STUDY AXIAL    Narrative  DEXA BONE DENSITOMETRY, CENTRAL    CPT CODE: 63200    INDICATION: Postmenopausal. Osteopenia. Parkinson's disease. Calcium supplement. TECHNIQUE: Using GE LUNAR Prodigy densitometer, bone density measurement was  performed in the lumbar spine the proximal left and right femora and the left  forearm. T Score refers to standard deviations above or below average compared  to a young adult of the same sex. Z Score refers to standard deviations above or  below average compared to a patient of the same sex, age, race and weight. COMPARISON: 5/24/2018. FINDINGS:    Lumbar Spine Levels: L1, L2, L4  Mean Bone Mineral Density (BMD):  1.075 g/cm2  T Score: -0.9  Z Score: +1.0  BMD decreased 1.0%, which is not statistically significant within a 95 percent  confidence interval compared to preceding study. Distal1/3 Radius BMD:  0.769 g/cm2  T Score: -1.2  Z Score: +1.7  Baseline study at the forearm. Left Total Proximal Femur BMD: 0.859 g/cm2  T Score:  -1.2  Z Score:  +1.0  BMD decreased 3.2%, which is not statistically significant within a 95 percent  confidence interval compared to preceding study. Right Total Proximal Femur BMD: 0.876 g/cm2  T Score:  -1.0  Z Score:  +1.1  BMD decreased 3.8%, which is statistically significant within a 95 percent  confidence interval compared to preceding study. Left Femoral Neck BMD:  0.808 g/cm2  T Score:  -1.7  Z Score:  +0.6    Right Femoral Neck BMD:  0.748 g/cm2  T Score:  -2.1  Z Score:  +0.2    Impression  1. BMD measures consistent with osteopenia.     2.  Compared to the preceding study, BMD has decreased. Based upon current ISCD guidelines, the patient's overall diagnostic category,  selected using WHO criteria in postmenopausal women and males aged 48 and above,  is selected based upon the lowest T Score from among the lumbar spine, total  femur, femoral neck, (or distal third radius if measured). WHO Definition of Osteoporosis and Osteopenia on DXA (specified for  post-menopausal  females):    Normal:                     T Score at or above -1 SD  Osteopenia:              T Score between -1 and -2.5 SD  Osteoporosis:           T Score at or below -2.5 SD    The risk of fracture approximately doubles for each 1 SD decrease in T Score. It is important to consider other factors in assessing a patient's risk of  fracture, including age, risk of falling/injury, history of fragility fracture,  family history of osteoporosis, smoking, low weight. Various fracture risk tools have been developed for adult patients and are  available online. For example, the FRAX tool developed by [facility] is widely used. Reference www.iscd.org. It is also important to note that DXA measures bone density but does not  distinguish among causes of decreased bone density, which include primary versus  secondary osteoporosis (such as metabolic bone disorders or possible effects of  medications) and also other conditions (such as osteomalacia). Clinical  considerations should determine what additional evaluation may be warranted to  exclude secondary conditions in a patient with low bone density. Please note that reliable, valid comparisons can not be made between studies  which have been performed on different densitometers. If clinically warranted,  follow up study performed at this site would best permit assessment of trend for  possible change in bone mineral density over time in comparison to this study.     Thank you for this referral.      Screening for diabetes mellitus with a blood sugar test (glucose) should be done at least every 3 years until age 79. You and your health care provider may decide whether to continue screening after age 79. The most recent blood glucose we have on file for you is:   Lab Results   Component Value Date/Time    Glucose 87 11/10/2021 07:57 AM    Glucose, POC 91 07/12/2016 08:20 AM         Glaucoma is a disease of the eye due to increased ocular pressure that can lead to blindness. People with risk factors for glaucoma ( race, diabetes, family history) should be screened at least every 2 years by an eye professional.     Cardiovascular screening tests that check for elevated lipids or cholesterol (fatty part of blood) which can lead to heart disease and strokes should be done every 4-6 years through age 79. You and your health care provider may decide whether to continue screening after age 79. The most recent lipid panel we have on file for you is:   Lab Results   Component Value Date/Time    Cholesterol, total 135 11/10/2021 07:57 AM    HDL Cholesterol 64 (H) 11/10/2021 07:57 AM    LDL, calculated 55.6 11/10/2021 07:57 AM    VLDL, calculated 15.4 11/10/2021 07:57 AM    Triglyceride 77 11/10/2021 07:57 AM    CHOL/HDL Ratio 2.1 11/10/2021 07:57 AM       Colorectal cancer screening that evaluates for blood or polyps in your colon for people with average risk should be done yearly as a stool test, every five years as a flexible sigmoidoscope or every 10 years as a colonoscopy up to age 76. You and your health care provider may decide whether to continue screening after age 76. Breast cancer screening with a mammogram is recommended at least once every 2 years  for women age 54-69. You and your health care provider may decide whether to continue screening after age 76.  The most recent mammogram we have on file for you is:   YAJAIRA Results (most recent):  Results from Hospital Encounter encounter on 08/02/21    YAJAIRA 3D EMILIA W MAMMO BI SCREENING INCL CAD    Narrative  DIGITAL SCREENING  BILATERAL MAMMOGRAM WITH 3D TOMOSYNTHESIS    HISTORY: Screening. COMPARISON: 2020-2017    TECHNIQUE: Digital mammography (2-D and 3-D) was performed with CAD. Routine  views were performed. FINDINGS:  No suspicious mass or suspicious calcifications are seen . Impression  No evidence for malignancy. Recommend routine annual follow-up. BIRADS 1:  Negative  Breast Density C: The breasts are heterogeneously dense, which may obscure small  masses. Screening for cervical cancer with a pap smear is recommended for all women with a cervix until age 72. The frequency of this test is based on the details of her prior pap smear testing. You and your health care provider may decide whether to continue screening after age 72. People who have smoked the equivalent of 1 pack per day for 30 years or more may benefit from screening for lung cancer with a yearly low dose CT scan until they have been non smokers for 15 years or competing health conditions render this unlikely to be beneficial. Our records show: n/a    Your Medicare Wellness Exam is recommended annually. Here is a list of your current Health Maintenance items with a due date:  Health Maintenance   Topic Date Due    Lipid Screen  11/10/2022    Medicare Yearly Exam  11/18/2022    DTaP/Tdap/Td series (3 - Td or Tdap) 05/10/2031    Bone Densitometry (Dexa) Screening  Completed    Shingrix Vaccine Age 50>  Completed    Flu Vaccine  Completed    COVID-19 Vaccine  Completed    Pneumococcal 65+ years  Completed     Learning About High-Protein Foods  What foods are high in protein? The foods you eat contain nutrients, such as vitamins and minerals. Protein is a nutrient. Your body needs the right amount to stay healthy and work as it should. You can use the list below to help you make choices about which foods to eat. Here are some examples of foods that are high in protein.   Dairy and dairy alternatives  · Cheese  · Milk  · Soy milk  · Yogurt (especially [place])  Meat  · Beef  · Chicken  · Ham  · Lamb  · Lunch meat  · Pork  · Sausage  · [place]  Other protein foods  · Beans (black, garbanzo, kidney, lima)  · Eggs  · Hummus  · Lentils  · Nuts  · Peanut butter and other nut butters  · Peas  · Soybeans  · Tofu  · Veggie or soy deidre (Check the nutrition label for the amount of protein in each serving.)  Seafood  · Anchovies  · Cod  · Crab  · Halibut  · [place]  · Sardines  · Shrimp  · Tilapia  · Trout  · Tuna  Protein supplements  · Bars (Check the nutrition label for the amount of protein in each serving.)  · Drinks  · Powders  Work with your doctor to find out how much of this nutrient you need. Depending on your health, you may need more or less of it in your diet. Where can you learn more? Go to http://www.gray.com/  Enter P335 in the search box to learn more about \"Learning About High-Protein Foods. \"  Current as of: August 22, 2019               Content Version: 12.6  © 3328-6717 [employer]. Care instructions adapted under license by [employer] (which disclaims liability or warranty for this information). If you have questions about a medical condition or this instruction, always ask your healthcare professional. [address] any warranty or liability for your use of this information. Learning About Getting More Protein  How does your body use protein? Protein is an essential part of our diets. It is made up of chemicals called amino acids. Your body needs protein to help build and repair muscle, skin, and other body tissues. Protein also helps fight infection, balance body fluids, and carry oxygen through the body. How much protein do you need? How much protein you need each day depends on your age, sex, and how active you are. It's recommended that most adults eat 5 to 7 ounces of protein foods a day. Sometimes you may need to eat more protein.  Your doctor may advise you on the right amount of protein you need. What foods contain protein? Protein is found in a variety of foods. High-protein foods include lean meat, poultry, and fish. A serving of these foods is 2 to 3 ounces. (3 ounces is about the size and thickness of a deck of cards.)  Protein isn't just found in meat. If you are looking for other types of protein, the following foods are equal to about 1 ounce of meat:  · ¼ cup of cooked beans, peas, or lentils  · ¼ cup of tofu (about 2 ounces)  · 2 Tbsp of hummus  · ½ ounce of nuts or seeds (for example, 12 almonds or 7 walnut halves)  · 1 egg  · 1 Tbsp of peanut butter or other nut or seed butter  Other sources of protein include cheese, milk, and other milk products. You can also buy protein bars, drinks, and powders. Check the nutrition label for the amount of protein in each serving. What are some tips for getting more protein? You can get more protein in your food by adding high-protein ingredients. For example, you can:  · Add powdered milk to other foods, such as pudding or soups. · Add powdered protein to fruit smoothies and cooked cereal.  · Add beans to soup and chili. · Add nuts, seeds, or wheat germ to yogurt. You can also:  · Spread peanut butter onto a banana. · Mix cottage cheese into noodle dishes or casseroles. · Sprinkle hard-boiled eggs on a salad. · Grate cheese over vegetables and soups. Where can you learn more? Go to http://www.gray.com/  Enter X375 in the search box to learn more about \"Learning About Getting More Protein. \"  Current as of: August 22, 2019               Content Version: 12.6  © 4869-2829 [employer]. Care instructions adapted under license by [employer] (which disclaims liability or warranty for this information).  If you have questions about a medical condition or this instruction, always ask your healthcare professional. Chyna Davila disclaims any warranty or liability for your use of this information.

## 2021-11-17 NOTE — ACP (ADVANCE CARE PLANNING)
Advance Care Planning     Advance Care Planning (ACP) Physician/NP/PA Conversation      Date of Conversation: 11/17/2021  Conducted with: Patient with Decision Making Capacity    Healthcare Decision Maker:     Primary Decision Maker: Jie Ma - Child - [phone]    Primary Decision Maker: Kai Newsome Son - [phone]    Primary Decision Maker: Sally Cole - Daughter-in-Law - [phone]    Supplemental (Other) Decision Maker: Anuradha Valencia - Spouse - [phone]  Click here to complete [address] including selection of the Healthcare Decision Maker Relationship (ie \"Primary\")        Today we documented Decision Maker(s) consistent with Legal Next of Kin hierarchy. Care Preferences:    Hospitalization: \"If your health worsens and it becomes clear that your chance of recovery is unlikely, what would be your preference regarding hospitalization? \"  The patient would prefer hospitalization. Ventilation: \"If you were unable to breathe on your own and your chance of recovery was unlikely, what would be your preference about the use of a ventilator (breathing machine) if it was available to you? \"   The patient would desire the use of a ventilator. Resuscitation: \"In the event your heart stopped as a result of an underlying serious health condition, would you want attempts to be made to restart your heart, or would you prefer a natural death? \"   Yes, attempt to resuscitate. Additional topics discussed: treatment goals, benefit/burden of treatment options, ventilation preferences, hospitalization preferences, resuscitation preferences and end of life care preferences (vegetative state/imminent death)    Conversation Outcomes / Follow-Up Plan:   ACP in process - completing/providing documents . Advance care planning document on file, but patient wishes to update her healthcare decision makers to include her oldest son, Chiquis Nails.   Provided with new paperwork to complete.   Reviewed DNR/DNI and patient elects Full Code (Attempt Resuscitation)     Length of Voluntary ACP Conversation in minutes:  16 minutes    Dexter Montiel MD

## 2021-11-17 NOTE — PROGRESS NOTES
This is the Subsequent Medicare Annual Wellness Exam, performed 12 months or more after the Initial AWV or the last Subsequent AWV    I have reviewed the patient's medical history in detail and updated the computerized patient record. Assessment/Plan   Education and counseling provided:  Are appropriate based on today's review and evaluation  End-of-Life planning (with patient's consent)  Influenza Vaccine  Screening Mammography  Cardiovascular screening blood test  Bone mass measurement (DEXA)  Screening for glaucoma  Diabetes screening test    1. Primary hypertension  -     CBC WITH AUTOMATED DIFF; Future  -     METABOLIC PANEL, COMPREHENSIVE; Future  -     TSH 3RD GENERATION; Future  -     URINALYSIS W/MICROSCOPIC; Future  2. Dyslipidemia  -     LIPID PANEL; Future  -     MAGNESIUM; Future  -     METABOLIC PANEL, COMPREHENSIVE; Future  3. CAD S/P percutaneous coronary angioplasty  -     MAGNESIUM; Future  -     METABOLIC PANEL, COMPREHENSIVE; Future  4. Parkinson disease ([facility] [address]  -     METABOLIC PANEL, COMPREHENSIVE; Future  5. Osteopenia of multiple sites  -     GAMMOPATHY EVAL, SPEP/JESS, IG QT/FLC; Future  -     PROTEIN ELECT & JESS, UR, RANDOM; Future  -     PTH INTACT; Future  -     VITAMIN D, 25 HYDROXY; Future  -     PHOSPHORUS; Future  -     MAGNESIUM; Future  -     METABOLIC PANEL, COMPREHENSIVE; Future  -     VITAMIN D, 25 HYDROXY; Future  -     TSH 3RD GENERATION; Future  6. Disorder of bone density and structure, unspecified   -     VITAMIN D, 25 HYDROXY; Future  -     VITAMIN D, 25 HYDROXY; Future  -     TSH 3RD GENERATION; Future  7. Lumbar spondylosis  8. Primary osteoarthritis involving multiple joints  9. Early dry stage nonexudative age-related macular degeneration of both eyes  10. Non-seasonal allergic rhinitis, unspecified trigger  11. Medicare annual wellness visit, subsequent  12. Advanced directives, counseling/discussion  13.  Screening for depression       Depression Risk Factor Screening     3 most recent PHQ Screens 11/17/2021   PHQ Not Done -   Little interest or pleasure in doing things Not at all   Feeling down, depressed, irritable, or hopeless Not at all   Total Score PHQ 2 0       Alcohol Risk Screen    Do you average more than 1 drink per night or more than 7 drinks a week:  No    On any one occasion in the past three months have you have had more than 3 drinks containing alcohol:  No        Functional Ability and Level of Safety    Hearing: Hearing is good. Activities of Daily Living: The home contains: no safety equipment. Patient does total self care      Ambulation: with mild difficulty     Fall Risk:  Fall Risk Assessment, last 12 mths 11/17/2021   Able to walk? Yes   Fall in past 12 months? 0   Do you feel unsteady? 0   Are you worried about falling 0   Number of falls in past 12 months -   Fall with injury? -      Abuse Screen:  Patient is not abused       Cognitive Screening    Has your family/caregiver stated any concerns about your memory: no     Cognitive Screening: None performed today. Health Maintenance Due   There are no preventive care reminders to display for this patient.     Patient Care Team   Patient Care Team:  Danya Arias MD as PCP - General (Internal Medicine)  Danya Arias MD as PCP - [facility]  Malissa Enriquez MD as Physician (Neurology)  Ranjeet Vaughn MD (Otolaryngology)  Kourtney Adler MD (Ophthalmology)  Grzegorz Guzman MD (Gastroenterology)  Joe Henry MD (Ophthalmology)  Emanuel Hernández MD (Orthopedic Surgery)  Jovanny Colón MD (Cardiology)  Bradford Junior MD (Orthopedic Surgery)  Ruben Gaston MD (Physical Medicine and Rehabilitation)  Nadira Osullivan DPM (Podiatry)    History     Patient Active Problem List   Diagnosis Code    Hypertension I10    CAD S/P percutaneous coronary angioplasty I25.10, Z98.61    Dyslipidemia E78.5    History of syncope Z87.898    Parkinson disease ([facility] [address] G20    Early dry stage nonexudative age-related macular degeneration of both eyes H35.3131    Laryngopharyngeal reflux disease K21.9    Osteopenia M85.80    Psoriasis of scalp L40.9    Lumbar spondylosis M47.816    Synovial cyst of lumbar spine M71.38    DDD (degenerative disc disease), lumbar M51.36    Primary osteoarthritis involving multiple joints M89.49    Allergic rhinitis J30.9     Past Medical History:   Diagnosis Date    Basal cell cancer     CAD (coronary artery disease), native coronary artery 03/21/2011    s/p PCI with with CARLITO (Xience 2.75x12, 2.5x12) mLAD and mild disease in rest of coronaries. Midly depressed LV syst fct     Cardiac cath 03/21/2011    mLAD 90% (2.75 x 12 mm Xience stent). Otherwise patent coronaries. LVEDP 10 mmHg. EF 40-45%. Anteroapical hypk. Renal arteries patent.  Cardiac echocardiogram 04/17/2014    EF 60-65%. No WMA. Gr 1 DDfx. Mild MR. Similar to study of 9/14/11.  Cardiac nuclear imaging test 07/2017    Negative for ischemia or infarction. EF > 70%.     Cardiomyopathy secondary     ischemic +/- stress induced    Dyslipidemia     GERD (gastroesophageal reflux disease)     Hx of colonoscopy 07/12/2016    Normal. Dr. Andressa Whiting Hypertension     Parkinson's disease     Syncope     s/p ILD implantation      Past Surgical History:   Procedure Laterality Date    COLONOSCOPY N/A 7/12/2016    COLONOSCOPY performed by Sia Adams MD at [address] HX BUNIONECTOMY      dorsal    HX CATARACT REMOVAL  11/2012    left    HX CATARACT REMOVAL  10/2012    right    HX CORONARY STENT PLACEMENT      HX HEART CATHETERIZATION      HX HEMORRHOIDECTOMY      HX HYSTERECTOMY  1996    partial    HX IMPLANTABLE LOOP RECORDER  2905-1103    HX TONSILLECTOMY      HX TOTAL ABDOMINAL HYSTERECTOMY  1996    partial     Current Outpatient Medications   Medication Sig Dispense Refill    rosuvastatin (CRESTOR) 10 mg tablet Take 0.5 Tablets by mouth every other day. 30 Tablet 3    potassium chloride (K-DUR, KLOR-CON) 20 mEq tablet Take 0.5 Tablets by mouth every other day. 25 Tablet 3    carbidopa-levodopa ER (SINEMET CR)  mg per tablet Take 1 Tab by mouth nightly.  lisinopriL (PRINIVIL, ZESTRIL) 10 mg tablet Take 1 Tab by mouth daily. 90 Tab 3    metoprolol succinate (TOPROL-XL) 50 mg XL tablet Take 1 Tab by mouth daily. 90 Tab 3    psyllium husk (METAMUCIL PO) Take  by mouth.  nitroglycerin (NITROSTAT) 0.4 mg SL tablet 1 sublingual every 5 minutes for chest pain for no more than 3 doses 25 Tab 3    carbidopa 25mg-levodopa 100mg-entacapone 200mg (STALEVO 100) tab tablet Take 1 Tablet by mouth three (3) times daily. 0    calcium carbonate (CALCIUM 500 PO) Take  by mouth.  magnesium hydroxide (NEGRO MILK OF MAGNESIA) 400 mg/5 mL suspension Take 30 mL by mouth daily as needed for Constipation.  acetaminophen (TYLENOL) 650 mg CR tablet Take 650 mg by mouth every six (6) hours as needed for Pain.  co-enzyme Q-10 (CO Q-10) 100 mg capsule Take 200 mg by mouth daily.  aspirin 81 mg tablet Take 1 Tab by mouth daily. 1 Tab 0    DOCUSATE CALCIUM (STOOL SOFTENER PO) Take 1 Cap by mouth.  magnesium 250 mg Tab Take 250 mg by mouth two (2) times a day. Allergies   Allergen Reactions    Keflex [Cephalexin] Other (comments)     Yeast infection    Pcn [Penicillins] Other (comments)       Family History   Problem Relation Age of Onset    Heart Attack Father 80    Heart Disease Father     Cancer Mother         pancreatic    Hypertension Brother      Social History     Tobacco Use    Smoking status: Never Smoker    Smokeless tobacco: Never Used   Substance Use Topics    Alcohol use: Yes     Comment: glass of wine occasionally.          Hope Novoa MD

## 2021-11-20 NOTE — PROGRESS NOTES
HPI:   Jermain Villagomez is a 78y.o. year old female who presents today for a routine visit. She has a history of hypertension, ASCVD, hyperlipidemia, syncope, Parkinson's disease, lumbar degenerative disease, GERD, and macular degeneration. She has completed the [employer] COVID-19 vaccine series and received [employer] booster dose. She reports that she is doing well. She states that she received a cortisone injection by Dr. Keenan Barnett for her left knee pain with improvement. She is also being seen by Dr. Rafael Jennings for left ankle pain and was advised to perform stretches. She is otherwise without complaints and overall feeling well. Summary of prior hospitalizations and medical history:  In 8/2018, she was evaluated by Dr. Keenan Barnett for right hip pain and was diagnosed with right piriformis syndrome. She was treated with a cortisone injection and physical therapy with some improvement, but subsequently developed right buttocks pain and pain and paresthesias down her right leg. She was treated with prednisone 50 mg for five days without improvement. She was seen on 10/9/2018, which was 10 days after completing the course of prednisone, and reported persistent severe right leg pain which was interfering with her activities and with sleep. She was taking Tylenol ES without relief, and was started on gabapentin. Lumbar spine x-ray was obtained (10/9/2018) showing grade 1 anterolisthesis of L4 on L5, either new or increased since 4/2015, and multilevel degenerative spondylosis/disc disease. She was referred to physical therapy, but due to continued persistent symptoms, she had a lumbar MRI (10/17/2018) which showed multilevel degenerative findings causing various degrees of neuroforaminal  narrowing and lateral recess narrowing; also a right L4-L5 neuroforamen cystic lesion measuring approximately 10 x 6 mm was noted.  She had a lumbar MRI with contrast (11/16/2018) which confirmed an extruding synovial cyst from the right L4-5 advanced facet arthropathy; significant right foraminal stenosis and nerve impingement persists. She continued physical therapy and reported that her pain has significantly improved. She was evaluated by Dr. Mikey Mccartney on 1/3/2019 but her pain had already resolved. She is no longer taking gabapentin or Tylenol. She has a history of hypertension, hyperlipidemia, ASCVD, and syncope. In 3/2011, she had three syncopal episodes while donating blood. Over the subsequent four days, she developed exertional substernal chest tightness with left arm pain and dyspnea. She was evaluated and EKG revealed new T wave inversions in leads V2 and V3. Troponins were negative. She underwent cardiac catheterization (3/21/2011) which showed a 90% mid LAD and mild disease in the rest of the coronaries. She underwent PCI and placement of two drug-eluting stents for the mid LAD lesion; LV function was mildly diminished (EF 40-45%) with anteroapical hypokinesis. Follow-up echocardiogram (9/2011) showed return to normal LV function (EF 60%) and no RWMA. She has a history of multiple syncopal episodes, usually related to stressful situations, and thought to be vasovagal in origin. An implantable loop recorder was placed in 3/2011 and remained until 4/2013, and interrogation was negative for any significant arrhythmia. In 4/2014, she was hospitalized following another syncopal episode, which occurred after she had taken her morning medications. Afterward, she became nauseated and flushed, and called EMS. When they arrived, they found her on the floor and reportedly without a pulse. They began CPR and within 10-15 seconds, she recovered. Evaluation included EKG/troponins, which were negative, and an echocardiogram showing mild MR and normal LV function (EF 60-65%). She was found to have hypokalemia and hypomagnesemia and hydrochlorothiazide was discontinued.  It was thought that this event also represented a vasovagal reaction given her prodromal symptoms. She has had no further events since that time. Her most recent pharmacologic nuclear stress test (6/25/2020) was a normal, low risk study, negative for evidence of ischemia or prior infarction, and with normal LV size and function (EF 84%). Her current regimen includes metoprolol, lisinopril, aspirin, and rosuvastatin. She is being followed by Dr. Cata Bhandari. In 11/2017, she reported bilateral thigh aching pain, which increased with ambulation particularly when walking up stairs. She stated that the discomfort was similar to that which developed previously with use of simvastatin and atorvastatin. She had been on pravastatin since 10/2014 and did not note any difficulty previously. She discontinued pravastatin and had resolution of her symptoms. She was subsequently started on rosuvastatin in 12/2017, and did well until 7/2018 when she again developed myalgias. Her dose was decreased to 5 mg every other day. She reports today that she continues to have aching discomfort in her bilateral thighs on the days which she takes rosuvastatin, but reports that it is tolerable so she will continue. She has a history of idiopathic Parkinson's disease, predominantly left-sided. She was being treated with Sinemet, but changed to Stalevo in 10/2019, and reports improved control of symptoms. She has difficulty with writing at times, particularly towards the end of the day, and also describes increasing tremors midday. She is followed by Dr. Nanci Barclay. She has a history of laryngopharyngeal reflux disease, treated previously with dexlansoprazole, but ha successfully weaned from therapy. She is now receiving immunotherapy with Dr. Ansley James to address her allergies and hoarseness. In 5/2017 she had multiple episodes of epistaxis prompting an ED visit. She subsequently underwent silver nitrate cautery of anterior vessels in her right nares by Dr. Ansley James, and has had no recurrence.   In 3/2010, she underwent evaluation for iron deficiency anemia. She had previously had a negative colonoscopy and air contrast barium enema in 2009 by Dr. Corrina Farr, and she had an upper endoscopy by Dr. Deann Rinaldi which was normal. She was treated with iron with improvement. She had a repeat screening colonoscopy in 7/2016 by Dr. Deann Rinaldi which was normal. No further follow-up was recommended given her age. She denies any abdominal pain, nausea, vomiting, melena, hematochezia, or change in bowel movements. She has a history of osteopenia with bone density study in 5/2018 showing T-scores:  femoral neck left -1.2  /right -1.6 and lumbar -0.8 . She underwent repeat bone density study on 8/2/2021 showing T-scores: femoral neck left -1.7/right -2.1 and lumbar -0.9. She continues to take calcium and Vitamin D supplements. She has no history of pathologic fractures. She has a recent history of abnormal mammograms since 10/2014 with microcalcifcations in the right breast. She has been undergoing surveillance mammograms every six months, which have been unchanged. She had a follow-up mammogram in 5/2016 which was negative, and routine annual screening was recommended. She has a history of bilateral knee pain secondary to osteoarthritis. She was evaluated by Dr. Corinne Belts in 3/2018 and received a cortisone injection with improvement. She also has difficutly with left ankle pain which increase with ambulation. She received a cortisone injection for this as well from Dr. Corinne Belts in 3/2018, but did not experience significant improvement. She was evaluated by Dr. Darell Yarbrough on 3/27/2018 and diagnosed with left peroneus brevis tendinitis. An orthotic was recommended with improvement.  She is now being followed by Dr. Marisol Mcbride for left ankle pain, and CT scan left ankle (7/2019) showed evidence of chronic ATFL sprain or complete rupture, likely prior sprain of the calcaneofibular ligament, and advanced posterior subtalar and tarsometatarsal osteoarthrosis. On 5/9/2021, she presented to the [facility] ED after being bitten on her left wrist the day prior by her indoor/outdoor cat. She reports that she developed swelling and redness of her wrist and forearm, and was diagnosed with cellulitis. She was prescribed clindamycin 300 mg qid with eventual resolution. Past Medical History:   Diagnosis Date    Basal cell cancer     CAD (coronary artery disease), native coronary artery 03/21/2011    s/p PCI with with CARLITO (Xience 2.75x12, 2.5x12) mLAD and mild disease in rest of coronaries. Midly depressed LV syst fct     Cardiac cath 03/21/2011    mLAD 90% (2.75 x 12 mm Xience stent). Otherwise patent coronaries. LVEDP 10 mmHg. EF 40-45%. Anteroapical hypk. Renal arteries patent.  Cardiac echocardiogram 04/17/2014    EF 60-65%. No WMA. Gr 1 DDfx. Mild MR. Similar to study of 9/14/11.  Cardiac nuclear imaging test 07/2017    Negative for ischemia or infarction. EF > 70%.  Cardiomyopathy secondary     ischemic +/- stress induced    Dyslipidemia     GERD (gastroesophageal reflux disease)     Hx of colonoscopy 07/12/2016    Normal. Dr. Neil Lipscomb Hypertension     Parkinson's disease     Syncope     s/p ILD implantation     Past Surgical History:   Procedure Laterality Date    COLONOSCOPY N/A 7/12/2016    COLONOSCOPY performed by Dea Fiedl MD at [address] HX BUNIONECTOMY      dorsal    HX CATARACT REMOVAL  11/2012    left    HX CATARACT REMOVAL  10/2012    right    HX CORONARY STENT PLACEMENT      HX HEART CATHETERIZATION      HX HEMORRHOIDECTOMY      HX HYSTERECTOMY  1996    partial    HX IMPLANTABLE LOOP RECORDER  3089-2972    HX TONSILLECTOMY      HX TOTAL ABDOMINAL HYSTERECTOMY  1996    partial     Current Outpatient Medications   Medication Sig    rosuvastatin (CRESTOR) 10 mg tablet Take 0.5 Tablets by mouth every other day.     potassium chloride (K-DUR, KLOR-CON) 20 mEq tablet Take 0.5 Tablets by mouth every other day.  carbidopa-levodopa ER (SINEMET CR)  mg per tablet Take 1 Tab by mouth nightly.  lisinopriL (PRINIVIL, ZESTRIL) 10 mg tablet Take 1 Tab by mouth daily.  metoprolol succinate (TOPROL-XL) 50 mg XL tablet Take 1 Tab by mouth daily.  psyllium husk (METAMUCIL PO) Take  by mouth.  nitroglycerin (NITROSTAT) 0.4 mg SL tablet 1 sublingual every 5 minutes for chest pain for no more than 3 doses    carbidopa 25mg-levodopa 100mg-entacapone 200mg (STALEVO 100) tab tablet Take 1 Tablet by mouth three (3) times daily.  calcium carbonate (CALCIUM 500 PO) Take  by mouth.  magnesium hydroxide (NEGRO MILK OF MAGNESIA) 400 mg/5 mL suspension Take 30 mL by mouth daily as needed for Constipation.  acetaminophen (TYLENOL) 650 mg CR tablet Take 650 mg by mouth every six (6) hours as needed for Pain.  co-enzyme Q-10 (CO Q-10) 100 mg capsule Take 200 mg by mouth daily.  aspirin 81 mg tablet Take 1 Tab by mouth daily.  DOCUSATE CALCIUM (STOOL SOFTENER PO) Take 1 Cap by mouth.  magnesium 250 mg Tab Take 250 mg by mouth two (2) times a day. No current facility-administered medications for this visit. Allergies and Intolerances: Allergies   Allergen Reactions    Keflex [Cephalexin] Other (comments)     Yeast infection    Pcn [Penicillins] Other (comments)     Family History: She has no family history of colon or breast cancer. Family History   Problem Relation Age of Onset    Heart Attack Father 80    Heart Disease Father     Cancer Mother         pancreatic    Hypertension Brother      Social History:   She  reports that she has never smoked. She has never used smokeless tobacco. She lives with her , who is having difficulty with mild cognitive impairment. She states that they sold their RV and now stay at home for the winter. She is a retired x-ray technician.    Social History     Substance and Sexual Activity   Alcohol Use Yes    Comment: glass of wine occasionally. Immunization History:  Immunization History   Administered Date(s) Administered    (RETIRED) Pneumococcal Vaccine (Unspecified Type) 01/01/2003    COVID-19, PFIZER, MRNA, LNP-S, PF, 30MCG/0.3ML DOSE 03/09/2021, 03/30/2021, 10/02/2021    Influenza High Dose Vaccine PF 09/29/2014, 10/05/2015, 11/01/2016, 08/30/2017    Influenza Vaccine (Tri) Adjuvanted (>65 Yrs FLUAD TRI 79166) 10/09/2018, 09/17/2019    Influenza Vaccine Whole 09/07/2010, 09/03/2011, 10/04/2012    Influenza, High-dose, Quadrivalent (>65 Yrs Fluzone High Dose Quad 30567) 10/03/2021    Influenza, Quadrivalent, Adjuvanted (>65 Yrs FLUAD QUAD [id number]) 09/11/2020    Pneumococcal Conjugate (PCV-13) 10/05/2015    Pneumococcal Polysaccharide (PPSV-23) 01/01/2003, 10/30/2019    TD Vaccine 01/01/2003    Tdap 09/25/2013, 05/10/2021    Zoster 01/01/2007    Zoster Recombinant 09/18/2018, 11/23/2018       Review of Systems:   As above included in HPI. Otherwise 11 point review of systems negative including constitutional, skin, HENT, eyes, respiratory, cardiovascular, gastrointestinal, genitourinary, musculoskeletal, endo/heme/aller, neurological.    Physical:   Visit Vitals  /68   Pulse 72   Temp 97.9 °F (36.6 °C) (Temporal)   Resp 16   Ht 5' 5\" (1.651 m)   Wt 122 lb (55.3 kg)   SpO2 96%   BMI 20.30 kg/m²       Exam:     Patient appears in no apparent distress. Affect is appropriate. HEENT: PERRLA, anicteric, no JVD, adenopathy or thyromegaly. No carotid bruits or radiated murmur. Lungs: clear to auscultation, no wheezes, rhonchi, or rales. Heart: regular rate and rhythm. No murmur, rubs, gallops  Abdomen: soft, nontender, nondistended, normal bowel sounds, no hepatosplenomegaly or masses. Extremities: without edema.         Review of Data:  Labs:  Hospital Outpatient Visit on 11/10/2021   Component Date Value Ref Range Status    WBC 11/10/2021 4.2* 4.6 - 13.2 K/uL Final    RBC 11/10/2021 4.26  4.20 - 5.30 M/uL Final    HGB 11/10/2021 12.4  12.0 - 16.0 g/dL Final    HCT 11/10/2021 41.1  35.0 - 45.0 % Final    MCV 11/10/2021 96.5  78.0 - 100.0 FL Final    MCH 11/10/2021 29.1  24.0 - 34.0 PG Final    MCHC 11/10/2021 30.2* 31.0 - 37.0 g/dL Final    RDW 11/10/2021 13.2  11.6 - 14.5 % Final    PLATELET 39/54/9000 343  135 - 420 K/uL Final    MPV 11/10/2021 9.5  9.2 - 11.8 FL Final    NEUTROPHILS 11/10/2021 49  40 - 73 % Final    LYMPHOCYTES 11/10/2021 33  21 - 52 % Final    MONOCYTES 11/10/2021 11* 3 - 10 % Final    EOSINOPHILS 11/10/2021 6* 0 - 5 % Final    BASOPHILS 11/10/2021 1  0 - 2 % Final    ABS. NEUTROPHILS 11/10/2021 2.1  1.8 - 8.0 K/UL Final    ABS. LYMPHOCYTES 11/10/2021 1.4  0.9 - 3.6 K/UL Final    ABS. MONOCYTES 11/10/2021 0.5  0.05 - 1.2 K/UL Final    ABS. EOSINOPHILS 11/10/2021 0.2  0.0 - 0.4 K/UL Final    ABS.  BASOPHILS 11/10/2021 0.1  0.0 - 0.1 K/UL Final    DF 11/10/2021 AUTOMATED    Final    LIPID PROFILE 11/10/2021        Final    Cholesterol, total 11/10/2021 135  <200 MG/DL Final    Triglyceride 11/10/2021 77  <150 MG/DL Final    HDL Cholesterol 11/10/2021 64* 40 - 60 MG/DL Final    LDL, calculated 11/10/2021 55.6  0 - 100 MG/DL Final    VLDL, calculated 11/10/2021 15.4  MG/DL Final    CHOL/HDL Ratio 11/10/2021 2.1  0 - 5.0   Final    Magnesium 11/10/2021 2.2  1.6 - 2.6 mg/dL Final    Sodium 11/10/2021 141  136 - 145 mmol/L Final    Potassium 11/10/2021 3.7  3.5 - 5.5 mmol/L Final    Chloride 11/10/2021 109  100 - 111 mmol/L Final    CO2 11/10/2021 31  21 - 32 mmol/L Final    Anion gap 11/10/2021 1* 3.0 - 18 mmol/L Final    Glucose 11/10/2021 87  74 - 99 mg/dL Final    BUN 11/10/2021 14  7.0 - 18 MG/DL Final    Creatinine 11/10/2021 0.68  0.6 - 1.3 MG/DL Final    BUN/Creatinine ratio 11/10/2021 21* 12 - 20   Final    GFR est AA 11/10/2021 >60  >60 ml/min/1.73m2 Final    GFR est non-AA 11/10/2021 >60  >60 ml/min/1.73m2 Final    Calcium 11/10/2021 9.1  8.5 - 10.1 MG/DL Final    Bilirubin, total 11/10/2021 0.4  0.2 - 1.0 MG/DL Final    ALT (SGPT) 11/10/2021 18  13 - 56 U/L Final    AST (SGOT) 11/10/2021 21  10 - 38 U/L Final    Alk. phosphatase 11/10/2021 90  45 - 117 U/L Final    Protein, total 11/10/2021 6.0* 6.4 - 8.2 g/dL Final    Albumin 11/10/2021 3.3* 3.4 - 5.0 g/dL Final    Globulin 11/10/2021 2.7  2.0 - 4.0 g/dL Final    A-G Ratio 11/10/2021 1.2  0.8 - 1.7   Final       Health Maintenance:  Screening:    Mammogram: negative (8/2021)   PAP smear: s/p ROHINI. No further screening. Colorectal: colonoscopy (7/2016) normal. Dr. Sanchez Reich. No further screening. Depression: none   DM (HbA1c/FPG): FPG 87 (11/2021)   Hepatitis C: unknown   Falls: none   DEXA: osteopenia (8/2021)   Glaucoma: regular eye exams with Dr. Shane Brown. Sandhya Babcock (9/2021) for macular degeneration. Smoking: none   Vitamin D: 33.6 (5/2021)   Medicare Wellness: today      Impression:  Patient Active Problem List   Diagnosis Code    Hypertension I10    CAD S/P percutaneous coronary angioplasty I25.10, Z98.61    Dyslipidemia E78.5    History of syncope Z87.898    Parkinson disease ([facility] [address] G20    Early dry stage nonexudative age-related macular degeneration of both eyes H35.3131    Laryngopharyngeal reflux disease K21.9    Osteopenia M85.80    Psoriasis of scalp L40.9    Lumbar spondylosis M47.816    Synovial cyst of lumbar spine M71.38    DDD (degenerative disc disease), lumbar M51.36    Primary osteoarthritis involving multiple joints M89.49    Allergic rhinitis J30.9       Plan:  1. Hyperlipidemia. Previously on low intensity dose pravastatin, but developed severe bilateral thigh pain, worse with ambulation, and was discontinued in 11/2018 with improvement. Reported similar symptoms in past with statin (Zocor until 2012, and then Lipitor until 9/2014).  Started on rosuvastatin 10 mg daily in 12/2018 and tolerated without difficulty until 7/2018 when developed recurrent myalgias and dose decreased to 5 mg every other day with improvement. Lipid panel today with LDL 55 and HDL 64, indicative of excellent control. Emphasized importance of lifestyle modifications, including diet and exercise. Would not recommend weight loss given normal BMI of 20. Continue to follow. 2. Hypertension. Blood pressure well controlled on lisinopril 10 mg daily and metoprolol succinate 50 mg daily. Renal function remains normal with creatinine 0.68/ eGFR >60. Continue to follow. 3. ASCVD. Remains asymptomatic on current regimen of aspirin, metoprolol succinate, and rosuvastatin. Resolution of cardiomyopathy with last echocardiogram in 2014 revealing normal LV function. Negative pharmacologic nuclear stress test in 6/2020. Being followed by Dr. Clotilde Lilly annually, and next scheduled for 1/2022.   4.  History of syncope. No further episodes since 2014. Most likely secondary to vasovagal reaction. Follow. 5. Parkinson's disease. Therapy with addition of Stalevo to Sinemet with significant improvement in control of tremors. Being followed by Dr. Yamel Srivastava.  6. Osteopenia. Repeat bone density scan on 8/2021. Using femoral neck T-scores, calculated FRAX score estimates her 10 year risk of a major osteoporetic fracture at 14 % and hip fracture at 4.8 %, which are an indication for biphosphonate treatment based on hip fracture risk of >3%. Also with evidence of progression from prior study in 5/2018. Has never received treatment in the past.  Discussed treatment today and patient willing to initiate treatment with biphosphonate. Reports has regular dental exams every 6 months, and last seen in 11/2021 without any problems noted. Will obtain screening labs for secondary osteoporosis today including intact PTH, vitamin D, phosphorus, and gammopathy panel. If all normal, will begin Fosamax 35 mg weekly as osteoporosis prevention. Advised to continue calcium and vitamin D supplements.  Encouraged exercise, particularly weight bearing activities. Fall precautions stressed. 7. Lumbar spondylosis. Patient with symptoms of right buttock pain since 8/2018 and diagnosed with right piriformis syndrome. Completed physical therapy and received cortisone injection with some improvement, but subsequently developed right leg paresthesias and pain consistent with right sided sciatica. Treated with five day course of prednisone 50 mg without improvement. LS spine x-rays and lumbar MRI with degenerative changes, although MRI showed a right L4-L5 neuroforamen synovial cyst from the right L4-5 advanced facet arthropathy with significant right foraminal stenosis and nerve impingement. Referred for physical therapy and treated with gabapentin 100 mg tid with resolution of symptoms. Evaluated by Dr. Consuelo Thurman, but symptoms had already resolved. No longer taking gabapentin. Appears to be stable and advised to continue with back stretches. 8. Osteoarthritis. Difficulty with bilateral knee pain L>R and receiving intermittent cortisone injections from Dr. Keenan Barnett. Left ankle pain being addressed by podiatrist, Dr. Rafael Jennings. Stable today. 9. Macular degeneration, R>L. On Preservision. Being followed by Dr. Susy Jimenez every 12 weeks for injections. 10. Laryngopharyngeal reflux. Doing well. Discontinued Dexilant without an increase in symptoms. Now receiving immunotherapy to address hoarseness and allergies. Followed by Dr. Alexei Hurtado. 11. Psoriasis. Using clobetasol solution for scalp psoriasis with reasonable control. 12. Health maintenance. Completed [employer] COVID 19 vaccine series and received [employer] booster dose. Received 2/2 doses of [employer] vaccine. Already received the influenza vaccine. Other immunizations up to date. No further colorectal cancer screening needed. Mammogram up to date. Continue regular eye exams with Ankur Santiago and Susy Jimenez. Vitamin D level has been normal on maintenance dose supplement.     In addition, an annual Medicare wellness visit was done today. Patient understands recommendations and agrees with plan. Follow-up in 6 months.

## 2021-11-22 LAB
ALBUMIN 24H MFR UR ELPH: 31.1 %
ALBUMIN SERPL ELPH-MCNC: 3.2 G/DL (ref 2.9–4.4)
ALBUMIN/GLOB SERPL: 1.3 {RATIO} (ref 0.7–1.7)
ALPHA1 GLOB 24H MFR UR ELPH: 7 %
ALPHA1 GLOB SERPL ELPH-MCNC: 0.2 G/DL (ref 0–0.4)
ALPHA2 GLOB 24H MFR UR ELPH: 14.5 %
ALPHA2 GLOB SERPL ELPH-MCNC: 0.8 G/DL (ref 0.4–1)
B-GLOBULIN MFR UR ELPH: 33.4 %
B-GLOBULIN SERPL ELPH-MCNC: 0.8 G/DL (ref 0.7–1.3)
GAMMA GLOB 24H MFR UR ELPH: 14 %
GAMMA GLOB SERPL ELPH-MCNC: 0.8 G/DL (ref 0.4–1.8)
GLOBULIN SER-MCNC: 2.6 G/DL (ref 2.2–3.9)
IGA SERPL-MCNC: 175 MG/DL (ref 64–422)
IGG SERPL-MCNC: 788 MG/DL (ref 586–1602)
IGM SERPL-MCNC: 84 MG/DL (ref 26–217)
INTERPRETATION SERPL IEP-IMP: ABNORMAL
INTERPRETATION UR IFE-IMP: NORMAL
KAPPA LC FREE SER-MCNC: 16.5 MG/L (ref 3.3–19.4)
KAPPA LC FREE/LAMBDA FREE SER: 0.82 {RATIO} (ref 0.26–1.65)
LAMBDA LC FREE SERPL-MCNC: 20.1 MG/L (ref 5.7–26.3)
M PROTEIN 24H MFR UR ELPH: NORMAL %
M PROTEIN SERPL ELPH-MCNC: ABNORMAL G/DL
NOTE, 149533: NORMAL
PROT SERPL-MCNC: 5.8 G/DL (ref 6–8.5)
PROT UR-MCNC: 41.5 MG/DL

## 2021-11-26 ENCOUNTER — TELEPHONE (OUTPATIENT)
Dept: INTERNAL MEDICINE CLINIC | Age: 81
End: 2021-11-26

## 2021-11-26 RX ORDER — ALENDRONATE SODIUM 35 MG/1
35 TABLET ORAL
Qty: 12 TABLET | Refills: 3 | Status: SHIPPED | OUTPATIENT
Start: 2021-11-26 | End: 2022-09-29

## 2021-11-26 NOTE — TELEPHONE ENCOUNTER
No visits with results within 2 Day(s) from this visit. Latest known visit with results is:   Hospital Outpatient Visit on 11/17/2021   Component Date Value Ref Range Status    Immunoglobulin G, Qt. 11/17/2021 788  586 - 1,602 mg/dL Final    Immunoglobulin A, Qt. 11/17/2021 175  64 - 422 mg/dL Final    Immunoglobulin M, Qt. 11/17/2021 84  26 - 217 mg/dL Final    Protein, total 11/17/2021 5.8* 6.0 - 8.5 g/dL Final    Albumin 11/17/2021 3.2  2.9 - 4.4 g/dL Final    Alpha-1-Globulin 11/17/2021 0.2  0.0 - 0.4 g/dL Final    Alpha-2-Globulin 11/17/2021 0.8  0.4 - 1.0 g/dL Final    Beta Globulin 11/17/2021 0.8  0.7 - 1.3 g/dL Final    Gamma Globulin 11/17/2021 0.8  0.4 - 1.8 g/dL Final    M-Favian 11/17/2021 Not Observed  Not Observed g/dL Final    Globulin, total 11/17/2021 2.6  2.2 - 3.9 g/dL Final    A/G ratio 11/17/2021 1.3  0.7 - 1.7   Final    Immunofixation Result 11/17/2021 Comment    Final    Free Kappa Lt Chains, serum 11/17/2021 16.5  3.3 - 19.4 mg/L Final    Free Lambda Lt Chains, serum 11/17/2021 20.1  5.7 - 26.3 mg/L Final    Kappa/Lambda ratio, serum 11/17/2021 0.82  0.26 - 1.65   Final    Protein, urine random 11/17/2021 41.5  Not Estab. mg/dL Final    Albumin, urine 11/17/2021 31.1  % Final    Alpha-1-Globulin, urine 11/17/2021 7.0  % Final    Alpha-2-Globulin, urine 11/17/2021 14.5  % Final    Beta-globulin, urine 11/17/2021 33.4  % Final    Gamma Globulin, urine 11/17/2021 14.0  % Final    M-Favian, % 11/17/2021 Not Observed  Not Observed % Final    Immunofixation Result, urine 11/17/2021 Comment    Final    Note 11/17/2021 Comment    Final    Calcium 11/17/2021 9.4  8.5 - 10.1 MG/DL Final    PTH, Intact 11/17/2021 51.6  18.4 - 88.0 pg/mL Final    Vitamin D 25-Hydroxy 11/17/2021 30.7  30 - 100 ng/mL Final    Phosphorus 11/17/2021 3.5  2.5 - 4.9 MG/DL Final       Reviewed labs. No evidence of any secondary cause for osteopenia.  Will begin alendronate 35 mg weekly to prevent progression to osteoporosis. Called and discussed with patient. Willing to begin alendronate and given instructions on method of taking. Prescription sent to [facility].

## 2021-12-02 RX ORDER — POTASSIUM CHLORIDE 20 MEQ/1
10 TABLET, EXTENDED RELEASE ORAL EVERY OTHER DAY
Qty: 25 TABLET | Refills: 3 | Status: SHIPPED | OUTPATIENT
Start: 2021-12-02 | End: 2022-11-02

## 2021-12-02 RX ORDER — LISINOPRIL 10 MG/1
10 TABLET ORAL DAILY
Qty: 90 TABLET | Refills: 3 | Status: SHIPPED | OUTPATIENT
Start: 2021-12-02

## 2021-12-02 NOTE — TELEPHONE ENCOUNTER
Steffany Chu is requesting a 90 day supply  Previous Rx was sent to [facility]    Last Visit: 11/17/21 with MD Saloni Rodriguez  Next Appointment: 5/18/22 with MD Saloni Rodriguez    Requested Prescriptions     Pending Prescriptions Disp Refills    lisinopriL (PRINIVIL, ZESTRIL) 10 mg tablet 90 Tablet 3     Sig: Take 1 Tablet by mouth daily.  potassium chloride (K-DUR, KLOR-CON M20) 20 mEq tablet 25 Tablet 3     Sig: Take 0.5 Tablets by mouth every other day.

## 2021-12-20 NOTE — TELEPHONE ENCOUNTER
Pt left a voice message requesting a refill. Last Visit: 11/17/21 with MD Camille Lares  Next Appointment: 5/18/22 with MD Camille Lares  Previous refill encounter(s)   12/21/2020 Toprol-XL #90 with 3 refills     Requested Prescriptions     Pending Prescriptions Disp Refills    metoprolol succinate (TOPROL-XL) 50 mg XL tablet 90 Tablet 0     Sig: Take 1 Tablet by mouth daily.

## 2021-12-21 RX ORDER — METOPROLOL SUCCINATE 50 MG/1
50 TABLET, EXTENDED RELEASE ORAL DAILY
Qty: 90 TABLET | Refills: 0 | Status: SHIPPED | OUTPATIENT
Start: 2021-12-21 | End: 2022-03-13

## 2022-01-11 ENCOUNTER — OFFICE VISIT (OUTPATIENT)
Dept: CARDIOLOGY CLINIC | Age: 82
End: 2022-01-11
Payer: MEDICARE

## 2022-01-11 VITALS
WEIGHT: 125 LBS | SYSTOLIC BLOOD PRESSURE: 136 MMHG | HEART RATE: 61 BPM | HEIGHT: 65 IN | DIASTOLIC BLOOD PRESSURE: 78 MMHG | BODY MASS INDEX: 20.83 KG/M2 | OXYGEN SATURATION: 100 %

## 2022-01-11 DIAGNOSIS — Z98.61 CAD S/P PERCUTANEOUS CORONARY ANGIOPLASTY: Primary | ICD-10-CM

## 2022-01-11 DIAGNOSIS — I10 ESSENTIAL HYPERTENSION: ICD-10-CM

## 2022-01-11 DIAGNOSIS — E78.5 DYSLIPIDEMIA: ICD-10-CM

## 2022-01-11 DIAGNOSIS — I25.10 CAD S/P PERCUTANEOUS CORONARY ANGIOPLASTY: Primary | ICD-10-CM

## 2022-01-11 PROCEDURE — G8754 DIAS BP LESS 90: HCPCS | Performed by: INTERNAL MEDICINE

## 2022-01-11 PROCEDURE — G8427 DOCREV CUR MEDS BY ELIG CLIN: HCPCS | Performed by: INTERNAL MEDICINE

## 2022-01-11 PROCEDURE — 99214 OFFICE O/P EST MOD 30 MIN: CPT | Performed by: INTERNAL MEDICINE

## 2022-01-11 PROCEDURE — G8399 PT W/DXA RESULTS DOCUMENT: HCPCS | Performed by: INTERNAL MEDICINE

## 2022-01-11 PROCEDURE — G8420 CALC BMI NORM PARAMETERS: HCPCS | Performed by: INTERNAL MEDICINE

## 2022-01-11 PROCEDURE — G8752 SYS BP LESS 140: HCPCS | Performed by: INTERNAL MEDICINE

## 2022-01-11 PROCEDURE — G8510 SCR DEP NEG, NO PLAN REQD: HCPCS | Performed by: INTERNAL MEDICINE

## 2022-01-11 PROCEDURE — G8536 NO DOC ELDER MAL SCRN: HCPCS | Performed by: INTERNAL MEDICINE

## 2022-01-11 PROCEDURE — 93000 ELECTROCARDIOGRAM COMPLETE: CPT | Performed by: INTERNAL MEDICINE

## 2022-01-11 PROCEDURE — 1090F PRES/ABSN URINE INCON ASSESS: CPT | Performed by: INTERNAL MEDICINE

## 2022-01-11 PROCEDURE — 1101F PT FALLS ASSESS-DOCD LE1/YR: CPT | Performed by: INTERNAL MEDICINE

## 2022-01-11 RX ORDER — AZELASTINE 1 MG/ML
1 SPRAY, METERED NASAL 2 TIMES DAILY
COMMUNITY

## 2022-01-11 NOTE — PROGRESS NOTES
Alessandro Dubois presents today for   Chief Complaint   Patient presents with    Follow-up     1 year follow up       Alessandroelis Dubois preferred language for health care discussion is english/other. Is someone accompanying this pt? no    Is the patient using any DME equipment during [address]? no    Depression Screening:  3 most recent PHQ Screens 1/11/2022   PHQ Not Done -   Little interest or pleasure in doing things Not at all   Feeling down, depressed, irritable, or hopeless Not at all   Total Score PHQ 2 0       Learning Assessment:  Learning Assessment 1/11/2022   PRIMARY LEARNER Patient   HIGHEST LEVEL OF EDUCATION - PRIMARY LEARNER  -   BARRIERS PRIMARY LEARNER -   CO-LEARNER CAREGIVER -   PRIMARY LANGUAGE ENGLISH   LEARNER PREFERENCE PRIMARY DEMONSTRATION   ANSWERED BY patient   RELATIONSHIP SELF       Abuse Screening:  Abuse Screening Questionnaire 1/11/2022   Do you ever feel afraid of your partner? N   Are you in a relationship with someone who physically or mentally threatens you? N   Is it safe for you to go home? Y       Fall Risk  Fall Risk Assessment, last 12 mths 1/11/2022   Able to walk? Yes   Fall in past 12 months? 0   Do you feel unsteady? 0   Are you worried about falling 0   Number of falls in past 12 months -   Fall with injury? -           Pt currently taking Anticoagulant therapy? no    Pt currently taking Antiplatelet therapy ? ASA 81 mg once a day      Coordination of Care:  1. Have you been to the ER, urgent care clinic since your last visit? Hospitalized since your last visit? no    2. Have you seen or consulted any other health care providers outside of the [address] [place] [address] since your last visit? Include any pap smears or colon screening.  no

## 2022-01-11 NOTE — PROGRESS NOTES
HISTORY OF PRESENT ILLNESS  Finn Dewitt is a 78 y.o. female. Follow-up  Pertinent negatives include no chest pain, no abdominal pain, no headaches and no shortness of breath. Patient presents for scheduled follow-up office visit. She has a past medical history significant for single-vessel coronary artery disease status post PCI to her mid LAD with a total of 2 drug-eluting stents in 2011. Patient presents with symptoms of unstable angina at that time. It should be noted that she did have classic exertional anginal symptoms leading up to her PCI that were likely unrecognized. She reports symptoms of substernal chest pain radiating to her left arm and jaw prior to her PCI. Since her procedure, she has had no recurrent symptoms. The patient last underwent a pharmacologic nuclear stress test in June 2020 which was a normal low risk study. There was no evidence of ischemia or infarction, EF greater than 70%. The patient was last seen in our office approximately a year ago. Since last visit she reported she has been feeling well. Still complains of some daily fatigue with physical activity but states that has not changed over the past several years. He denies any exertional chest pain or shortness of breath. She also complains of some leg swelling in her left foot and ankle by the end of the day but this will resolve overnight. This also has not significantly changed. Past Medical History:   Diagnosis Date    Basal cell cancer     CAD (coronary artery disease), native coronary artery 03/21/2011    s/p PCI with with CARLITO (Xience 2.75x12, 2.5x12) mLAD and mild disease in rest of coronaries. Midly depressed LV syst fct     Cardiac cath 03/21/2011    mLAD 90% (2.75 x 12 mm Xience stent). Otherwise patent coronaries. LVEDP 10 mmHg. EF 40-45%. Anteroapical hypk. Renal arteries patent.  Cardiac echocardiogram 04/17/2014    EF 60-65%. No WMA. Gr 1 DDfx. Mild MR.   Similar to study of 9/14/11.  Cardiac nuclear imaging test 07/2017    Negative for ischemia or infarction. EF > 70%.  Cardiomyopathy secondary     ischemic +/- stress induced    Dyslipidemia     GERD (gastroesophageal reflux disease)     Hx of colonoscopy 07/12/2016    Normal. Dr. Mark Castellon Hypertension     Parkinson's disease     Syncope     s/p ILD implantation     Current Outpatient Medications   Medication Sig Dispense Refill    vit A/vit C/vit E/zinc/copper (PRESERVISION AREDS PO) Take 2 Tablets by mouth two (2) times a day.  calcium carbonate/vitamin D3 (CALCIUM 600 + D,3, PO) Take 1 Tablet by mouth daily.  azelastine (ASTELIN) 137 mcg (0.1 %) nasal spray 1 [place] by Both Nostrils route two (2) times a day. Use in each nostril as directed      metoprolol succinate (TOPROL-XL) 50 mg XL tablet Take 1 Tablet by mouth daily. 90 Tablet 0    lisinopriL (PRINIVIL, ZESTRIL) 10 mg tablet Take 1 Tablet by mouth daily. 90 Tablet 3    potassium chloride (K-DUR, KLOR-CON M20) 20 mEq tablet Take 0.5 Tablets by mouth every other day. 25 Tablet 3    alendronate (FOSAMAX) 35 mg tablet Take 1 Tablet by mouth every seven (7) days. Take with 8 ounces of water and sit or stand upright for at least 30 minutes after administration. Indications: post-menopausal osteoporosis prevention 12 Tablet 3    rosuvastatin (CRESTOR) 10 mg tablet Take 0.5 Tablets by mouth every other day. 30 Tablet 3    carbidopa-levodopa ER (SINEMET CR)  mg per tablet Take 1 Tab by mouth nightly.  psyllium husk (METAMUCIL PO) Take  by mouth daily.  nitroglycerin (NITROSTAT) 0.4 mg SL tablet 1 sublingual every 5 minutes for chest pain for no more than 3 doses 25 Tab 3    carbidopa 25mg-levodopa 100mg-entacapone 200mg (STALEVO 100) tab tablet Take 1 Tablet by mouth three (3) times daily. 0    magnesium hydroxide (NEGRO MILK OF MAGNESIA) 400 mg/5 mL suspension Take 30 mL by mouth daily as needed for Constipation.       acetaminophen (TYLENOL) 650 mg CR tablet Take 650 mg by mouth every six (6) hours as needed for Pain.  co-enzyme Q-10 (CO Q-10) 100 mg capsule Take 200 mg by mouth daily.  aspirin 81 mg tablet Take 1 Tab by mouth daily. 1 Tab 0    DOCUSATE CALCIUM (STOOL SOFTENER PO) Take 1 Cap by mouth. Allergies   Allergen Reactions    Keflex [Cephalexin] Other (comments)     Yeast infection    Pcn [Penicillins] Other (comments)      Social History     Tobacco Use    Smoking status: Never Smoker    Smokeless tobacco: Never Used   Substance Use Topics    Alcohol use: Yes     Comment: glass of wine occasionally.  Drug use: No       Review of Systems   Constitutional: Negative for chills, fever and weight loss. HENT: Negative for nosebleeds. Eyes: Negative for blurred vision and double vision. Respiratory: Negative for cough, shortness of breath and wheezing. Cardiovascular: Negative for chest pain, palpitations, orthopnea, claudication, leg swelling and PND. Gastrointestinal: Negative for abdominal pain, heartburn, nausea and vomiting. Genitourinary: Negative for dysuria and hematuria. Musculoskeletal: Negative for falls and myalgias. Skin: Negative for rash. Neurological: Negative for dizziness, focal weakness and headaches. Endo/Heme/Allergies: Does not bruise/bleed easily. Psychiatric/Behavioral: Negative for substance abuse. Visit Vitals  /78 (BP 1 Location: Left upper arm, BP Patient Position: Sitting, BP Cuff Size: Small adult)   Pulse 61   Ht 5' 5\" (1.651 m)   Wt 56.7 kg (125 lb)   SpO2 100%   BMI 20.80 kg/m²       Physical Exam  Constitutional:       Appearance: She is well-developed. HENT:      Head: Normocephalic and atraumatic. Eyes:      Conjunctiva/sclera: Conjunctivae normal.   Neck:      Vascular: No carotid bruit or JVD. Cardiovascular:      Rate and Rhythm: Normal rate and regular rhythm. Pulses: Normal pulses.       Heart sounds: S1 normal and S2 normal. Murmur heard. Midsystolic murmur is present with a grade of 2/6. No gallop. Pulmonary:      Effort: Pulmonary effort is normal.      Breath sounds: Normal breath sounds. No wheezing or rales. Abdominal:      General: Bowel sounds are normal.      Palpations: Abdomen is soft. Tenderness: There is no abdominal tenderness. Musculoskeletal:      Cervical back: Neck supple. Skin:     General: Skin is warm and dry. Neurological:      Mental Status: She is alert and oriented to person, place, and time. EKG: Normal sinus rhythm, normal axis, normal QTc interval, no ST or T-wave abnormalities concerning for ischemia. Normal tracing. No change compared to the previous EKG. ASSESSMENT and PLAN    Coronary artery disease. Single-vessel disease, status post PCI to her mid LAD in 2011 using 2 drug-eluting stents (Xience 2.75 x 12, 2.5 x 12). No recurrent anginal symptoms since that time. Patient remains on an aspirin, beta-blocker low-dose statin. Patient last underwent a low risk pharmacologic nuclear stress test in June 2020. No  symptoms concerning for angina. I would continue all of her medications. I have recommended a repeat stress test next year prior to her next follow-up office visit. Dyslipidemia. Patient is now tolerating Crestor 5 mg daily. This is followed closely by her PCP. Her most recent lipid panel from November 2021 continued to be at goal: Total cholesterol 135, HDL 64 LDL 56. I would continue this regimen with a goal LDL less than 70. Essential hypertension. Patient's blood pressure remains reasonable on her current regimen which includes metoprolol and lisinopril. Follow-up in 12 months, sooner if needed.

## 2022-02-08 ENCOUNTER — HOSPITAL ENCOUNTER (EMERGENCY)
Age: 82
Discharge: HOME | End: 2022-02-08
Attending: EMERGENCY MEDICINE
Payer: MEDICARE

## 2022-02-08 VITALS
OXYGEN SATURATION: 100 % | DIASTOLIC BLOOD PRESSURE: 91 MMHG | BODY MASS INDEX: 20.83 KG/M2 | HEART RATE: 69 BPM | TEMPERATURE: 97.9 F | HEIGHT: 65 IN | SYSTOLIC BLOOD PRESSURE: 180 MMHG | RESPIRATION RATE: 18 BRPM | WEIGHT: 125 LBS
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
## 2022-02-08 PROCEDURE — 99281 EMR DPT VST MAYX REQ PHY/QHP: CPT

## 2022-02-08 RX ORDER — DOXYCYCLINE 100 MG/1
100 CAPSULE ORAL 2 TIMES DAILY
Qty: 20 CAPSULE | Refills: 0 | Status: SHIPPED | OUTPATIENT
Start: 2022-02-08 | End: 2022-02-18

## 2022-02-08 RX ORDER — CLINDAMYCIN HYDROCHLORIDE 300 MG/1
300 CAPSULE ORAL 4 TIMES DAILY
Qty: 40 CAPSULE | Refills: 0 | Status: SHIPPED | OUTPATIENT
Start: 2022-02-08 | End: 2022-02-18

## 2022-02-08 NOTE — ED TRIAGE NOTES
Pt states she was bitten by her cat on her right hand this morning when she cat was trying to wake her up.

## 2022-02-08 NOTE — ED PROVIDER NOTES
EMERGENCY DEPARTMENT HISTORY AND PHYSICAL EXAM    Date: 2/8/2022  Patient Name: Abhilash Howard    History of Presenting Illness     Chief Complaint   Patient presents with    Cat bite    Hand Pain     right          History Provided By: Patient      Additional History (Context): Abhilash Howard is a 78 y.o. female with Parkinson's disease, CAD who presents with c/o cat bite to her right hand. Was asleep and her own pet cat bit her hand to wake her. Happened once previously; tetanus up to date. Rabies utd. PCP: Jonathan London MD    Current Outpatient Medications   Medication Sig Dispense Refill    doxycycline (MONODOX) 100 mg capsule Take 1 Capsule by mouth two (2) times a day for 10 days. 20 Capsule 0    clindamycin (CLEOCIN) 300 mg capsule Take 1 Capsule by mouth four (4) times daily for 10 days. 40 Capsule 0    vit A/vit C/vit E/zinc/copper (PRESERVISION AREDS PO) Take 2 Tablets by mouth two (2) times a day.  calcium carbonate/vitamin D3 (CALCIUM 600 + D,3, PO) Take 1 Tablet by mouth daily.  azelastine (ASTELIN) 137 mcg (0.1 %) nasal spray 1 [place] by Both Nostrils route two (2) times a day. Use in each nostril as directed      metoprolol succinate (TOPROL-XL) 50 mg XL tablet Take 1 Tablet by mouth daily. 90 Tablet 0    lisinopriL (PRINIVIL, ZESTRIL) 10 mg tablet Take 1 Tablet by mouth daily. 90 Tablet 3    potassium chloride (K-DUR, KLOR-CON M20) 20 mEq tablet Take 0.5 Tablets by mouth every other day. 25 Tablet 3    alendronate (FOSAMAX) 35 mg tablet Take 1 Tablet by mouth every seven (7) days. Take with 8 ounces of water and sit or stand upright for at least 30 minutes after administration. Indications: post-menopausal osteoporosis prevention 12 Tablet 3    rosuvastatin (CRESTOR) 10 mg tablet Take 0.5 Tablets by mouth every other day. 30 Tablet 3    carbidopa-levodopa ER (SINEMET CR)  mg per tablet Take 1 Tab by mouth nightly.       psyllium husk (METAMUCIL PO) Take  by mouth daily.  nitroglycerin (NITROSTAT) 0.4 mg SL tablet 1 sublingual every 5 minutes for chest pain for no more than 3 doses 25 Tab 3    carbidopa 25mg-levodopa 100mg-entacapone 200mg (STALEVO 100) tab tablet Take 1 Tablet by mouth three (3) times daily. 0    magnesium hydroxide (NEGRO MILK OF MAGNESIA) 400 mg/5 mL suspension Take 30 mL by mouth daily as needed for Constipation.  acetaminophen (TYLENOL) 650 mg CR tablet Take 650 mg by mouth every six (6) hours as needed for Pain.  co-enzyme Q-10 (CO Q-10) 100 mg capsule Take 200 mg by mouth daily.  aspirin 81 mg tablet Take 1 Tab by mouth daily. 1 Tab 0    DOCUSATE CALCIUM (STOOL SOFTENER PO) Take 1 Cap by mouth. Past History     Past Medical History:  Past Medical History:   Diagnosis Date    Basal cell cancer     CAD (coronary artery disease), native coronary artery 03/21/2011    s/p PCI with with CARLITO (Xience 2.75x12, 2.5x12) mLAD and mild disease in rest of coronaries. Midly depressed LV syst fct     Cardiac cath 03/21/2011    mLAD 90% (2.75 x 12 mm Xience stent). Otherwise patent coronaries. LVEDP 10 mmHg. EF 40-45%. Anteroapical hypk. Renal arteries patent.  Cardiac echocardiogram 04/17/2014    EF 60-65%. No WMA. Gr 1 DDfx. Mild MR. Similar to study of 9/14/11.  Cardiac nuclear imaging test 07/2017    Negative for ischemia or infarction. EF > 70%.     Cardiomyopathy secondary     ischemic +/- stress induced    Dyslipidemia     GERD (gastroesophageal reflux disease)     Hx of colonoscopy 07/12/2016    Normal. Dr. Jordana Ramirez Hypertension     Parkinson's disease     Syncope     s/p ILD implantation       Past Surgical History:  Past Surgical History:   Procedure Laterality Date    COLONOSCOPY N/A 7/12/2016    COLONOSCOPY performed by Terry Horn MD at [address]      dorsal    HX CATARACT REMOVAL  11/2012    left    HX CATARACT REMOVAL  10/2012    right    HX CORONARY STENT PLACEMENT      HX HEART CATHETERIZATION      HX HEMORRHOIDECTOMY      HX HYSTERECTOMY  1996    partial    HX IMPLANTABLE LOOP RECORDER  [id number]    HX TONSILLECTOMY      HX TOTAL ABDOMINAL HYSTERECTOMY  1996    partial       Family History:  Family History   Problem Relation Age of Onset    Heart Attack Father 80    Heart Disease Father     Cancer Mother         pancreatic    Hypertension Brother        Social History:  Social History     Tobacco Use    Smoking status: Never Smoker    Smokeless tobacco: Never Used   Substance Use Topics    Alcohol use: Yes     Comment: glass of wine occasionally.  Drug use: No       Allergies: Allergies   Allergen Reactions    Keflex [Cephalexin] Other (comments)     Yeast infection    Pcn [Penicillins] Other (comments)         Review of Systems   Review of Systems   Skin: Positive for color change and wound. Neurological: Negative for weakness and numbness. All Other Systems Negative  Physical Exam     Vitals:    02/08/22 1738   BP: (!) 180/91   Pulse: 69   Resp: 18   Temp: 97.9 °F (36.6 °C)   SpO2: 100%   Weight: 56.7 kg (125 lb)   Height: 5' 5\" (1.651 m)     Physical Exam  Vitals and nursing note reviewed. Constitutional:       Appearance: She is well-developed. HENT:      Head: Normocephalic and atraumatic. Eyes:      Pupils: Pupils are equal, round, and reactive to light. Neck:      Thyroid: No thyromegaly. Vascular: No JVD. Trachea: No tracheal deviation. Cardiovascular:      Rate and Rhythm: Normal rate and regular rhythm. Heart sounds: Normal heart sounds. No murmur heard. No friction rub. No gallop. Pulmonary:      Effort: Pulmonary effort is normal. No respiratory distress. Breath sounds: Normal breath sounds. No stridor. No wheezing or rales. Chest:      Chest wall: No tenderness. Abdominal:      General: There is no distension. Palpations: Abdomen is soft. There is no mass. Tenderness: There is no abdominal tenderness. There is no guarding or rebound. Musculoskeletal:         General: No tenderness. Lymphadenopathy:      Cervical: No cervical adenopathy. Skin:     General: Skin is warm and dry. Coloration: Skin is not pale. Findings: Erythema and lesion present. No rash. Comments: Small puncture wound with halo of erythema on right dorsal hand extending to approx 4cm. No streaking. NT.  FROM of all digits. Cap refill <2 sec. Neurological:      Mental Status: She is alert and oriented to person, place, and time. Psychiatric:         Behavior: Behavior normal.         Thought Content: Thought content normal.            Diagnostic Study Results     Labs -   No results found for this or any previous visit (from the past 12 hour(s)). Radiologic Studies -   No orders to display     CT Results  (Last 48 hours)    None        CXR Results  (Last 48 hours)    None            Medical Decision Making   I am the first provider for this patient. I reviewed the vital signs, available nursing notes, past medical history, past surgical history, family history and social history. Vital Signs-Reviewed the patient's vital signs. Records Reviewed: Nursing Notes    Procedures:  Procedures    Provider Notes (Medical Decision Making): small puncture wound, minimal cellulitis. PCN allergy; Rx for doxycycline PLUS clindamycin prescribed. MED RECONCILIATION:  No current facility-administered medications for this encounter. Current Outpatient Medications   Medication Sig    doxycycline (MONODOX) 100 mg capsule Take 1 Capsule by mouth two (2) times a day for 10 days.  clindamycin (CLEOCIN) 300 mg capsule Take 1 Capsule by mouth four (4) times daily for 10 days.  vit A/vit C/vit E/zinc/copper (PRESERVISION AREDS PO) Take 2 Tablets by mouth two (2) times a day.  calcium carbonate/vitamin D3 (CALCIUM 600 + D,3, PO) Take 1 Tablet by mouth daily.     azelastine (ASTELIN) 137 mcg (0.1 %) nasal spray 1 [place] by Both Nostrils route two (2) times a day. Use in each nostril as directed    metoprolol succinate (TOPROL-XL) 50 mg XL tablet Take 1 Tablet by mouth daily.  lisinopriL (PRINIVIL, ZESTRIL) 10 mg tablet Take 1 Tablet by mouth daily.  potassium chloride (K-DUR, KLOR-CON M20) 20 mEq tablet Take 0.5 Tablets by mouth every other day.  alendronate (FOSAMAX) 35 mg tablet Take 1 Tablet by mouth every seven (7) days. Take with 8 ounces of water and sit or stand upright for at least 30 minutes after administration. Indications: post-menopausal osteoporosis prevention    rosuvastatin (CRESTOR) 10 mg tablet Take 0.5 Tablets by mouth every other day.  carbidopa-levodopa ER (SINEMET CR)  mg per tablet Take 1 Tab by mouth nightly.  psyllium husk (METAMUCIL PO) Take  by mouth daily.  nitroglycerin (NITROSTAT) 0.4 mg SL tablet 1 sublingual every 5 minutes for chest pain for no more than 3 doses    carbidopa 25mg-levodopa 100mg-entacapone 200mg (STALEVO 100) tab tablet Take 1 Tablet by mouth three (3) times daily.  magnesium hydroxide (NEGRO MILK OF MAGNESIA) 400 mg/5 mL suspension Take 30 mL by mouth daily as needed for Constipation.  acetaminophen (TYLENOL) 650 mg CR tablet Take 650 mg by mouth every six (6) hours as needed for Pain.  co-enzyme Q-10 (CO Q-10) 100 mg capsule Take 200 mg by mouth daily.  aspirin 81 mg tablet Take 1 Tab by mouth daily.  DOCUSATE CALCIUM (STOOL SOFTENER PO) Take 1 Cap by mouth. Disposition:  home    DISCHARGE NOTE:   5:58 PM    Pt has been reexamined. Patient has no new complaints, changes, or physical findings. Care plan outlined and precautions discussed. Results of exam were reviewed with the patient. All medications were reviewed with the patient; will d/c home with clindamycin, doxycycline. All of pt's questions and concerns were addressed.  Patient was instructed and agrees to follow up with PCP, as well as to return to the ED upon further deterioration. Patient is ready to go home. Follow-up Information     Follow up With Specialties Details Why Contact Info    Ar Owens MD Internal Medicine Schedule an appointment as soon as possible for a visit in 2 days For wound re-check [id number] [place] [address]  [phone]      [facility] EMERGENCY DEPT Emergency Medicine  If symptoms worsen return immediately [address]  [phone]          Current Discharge Medication List      START taking these medications    Details   doxycycline (MONODOX) 100 mg capsule Take 1 Capsule by mouth two (2) times a day for 10 days. Qty: 20 Capsule, Refills: 0  Start date: 2/8/2022, End date: 2/18/2022      clindamycin (CLEOCIN) 300 mg capsule Take 1 Capsule by mouth four (4) times daily for 10 days. Qty: 40 Capsule, Refills: 0  Start date: 2/8/2022, End date: 2/18/2022               Diagnosis     Clinical Impression:   1.  Cat bite, initial encounter

## 2022-02-09 ENCOUNTER — TELEPHONE (OUTPATIENT)
Dept: INTERNAL MEDICINE CLINIC | Age: 82
End: 2022-02-09

## 2022-02-09 NOTE — TELEPHONE ENCOUNTER
Pt calling, says she was seen in [facility] for a cat bite. She was told to see you in 2 days. Wants to know what you would recommend. Can you see her tomorrow or wait til Monday?

## 2022-02-10 ENCOUNTER — OFFICE VISIT (OUTPATIENT)
Dept: INTERNAL MEDICINE CLINIC | Age: 82
End: 2022-02-10
Payer: MEDICARE

## 2022-02-10 VITALS
HEART RATE: 66 BPM | HEIGHT: 65 IN | BODY MASS INDEX: 20.79 KG/M2 | TEMPERATURE: 97.6 F | RESPIRATION RATE: 16 BRPM | OXYGEN SATURATION: 98 % | WEIGHT: 124.8 LBS | SYSTOLIC BLOOD PRESSURE: 128 MMHG | DIASTOLIC BLOOD PRESSURE: 68 MMHG

## 2022-02-10 DIAGNOSIS — G20 PARKINSON DISEASE (HCC): ICD-10-CM

## 2022-02-10 DIAGNOSIS — M85.89 OSTEOPENIA OF MULTIPLE SITES: ICD-10-CM

## 2022-02-10 DIAGNOSIS — I10 PRIMARY HYPERTENSION: ICD-10-CM
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
## 2022-02-10 DIAGNOSIS — L03.113 CELLULITIS OF HAND, RIGHT: ICD-10-CM

## 2022-02-10 PROCEDURE — G8420 CALC BMI NORM PARAMETERS: HCPCS | Performed by: INTERNAL MEDICINE

## 2022-02-10 PROCEDURE — G8536 NO DOC ELDER MAL SCRN: HCPCS | Performed by: INTERNAL MEDICINE

## 2022-02-10 PROCEDURE — 1090F PRES/ABSN URINE INCON ASSESS: CPT | Performed by: INTERNAL MEDICINE

## 2022-02-10 PROCEDURE — 1101F PT FALLS ASSESS-DOCD LE1/YR: CPT | Performed by: INTERNAL MEDICINE

## 2022-02-10 PROCEDURE — G8754 DIAS BP LESS 90: HCPCS | Performed by: INTERNAL MEDICINE

## 2022-02-10 PROCEDURE — G8427 DOCREV CUR MEDS BY ELIG CLIN: HCPCS | Performed by: INTERNAL MEDICINE

## 2022-02-10 PROCEDURE — G8752 SYS BP LESS 140: HCPCS | Performed by: INTERNAL MEDICINE

## 2022-02-10 PROCEDURE — 99213 OFFICE O/P EST LOW 20 MIN: CPT | Performed by: INTERNAL MEDICINE

## 2022-02-10 PROCEDURE — G0463 HOSPITAL OUTPT CLINIC VISIT: HCPCS | Performed by: INTERNAL MEDICINE

## 2022-02-10 PROCEDURE — G8399 PT W/DXA RESULTS DOCUMENT: HCPCS | Performed by: INTERNAL MEDICINE

## 2022-02-10 PROCEDURE — G8510 SCR DEP NEG, NO PLAN REQD: HCPCS | Performed by: INTERNAL MEDICINE

## 2022-02-10 NOTE — PATIENT INSTRUCTIONS
Animal Bites: Care Instructions  Overview  After an animal bite, the biggest concern is infection. The chance of infection depends on the type of animal that bit you, where on your body you were bitten, and your general health. Many animal bites are not closed with stitches, because this can increase the chance of infection. Your bite may take as little as 7 days or as long as several months to heal, depending on how bad it is. Taking good care of your wound at home will help it heal and reduce your chance of infection. The doctor has checked you carefully, but problems can develop later. If you notice any problems or new symptoms, get medical treatment right away. Follow-up care is a key part of your treatment and safety. Be sure to make and go to all appointments, and call your doctor if you are having problems. It's also a good idea to know your test results and keep a list of the medicines you take. How can you care for yourself at home? · If your doctor told you how to care for your wound, follow your doctor's instructions. If you did not get instructions, follow this general advice:  ? After 24 to 48 hours, gently wash the wound with clean water 2 times a day. Do not scrub or soak the wound. Don't use hydrogen peroxide or alcohol, which can slow healing. ? You may cover the wound with a thin layer of petroleum jelly, such as Vaseline, and a nonstick bandage. ? Apply more petroleum jelly and replace the bandage as needed. · After you shower, gently dry the wound with a clean towel. · If your doctor has closed the wound, cover the bandage with a plastic bag before you take a shower. · A small amount of skin redness and swelling around the wound edges and the stitches or staples is normal. Your wound may itch or feel irritated. Do not scratch or rub the wound.   · Ask your doctor if you can take an over-the-counter pain medicine, such as acetaminophen (Tylenol), ibuprofen (Advil, Motrin), or naproxen (Aleve). Read and follow all instructions on the label. · Do not take two or more pain medicines at the same time unless the doctor told you to. Many pain medicines have acetaminophen, which is Tylenol. Too much acetaminophen (Tylenol) can be harmful. · If your bite puts you at risk for rabies, you will get a series of shots over the next few weeks to prevent rabies. Your doctor will tell you when to get the shots. It is very important that you get the full cycle of shots. Follow your doctor's instructions exactly. · You may need a tetanus shot if you have not received one in the last 5 years. · If your doctor prescribed antibiotics, take them as directed. Do not stop taking them just because you feel better. You need to take the full course of antibiotics. When should you call for help? Call your doctor now or seek immediate medical care if:    · The skin near the bite turns cold or pale or it changes color.     · You lose feeling in the area near the bite, or it feels numb or tingly.     · You have trouble moving a limb near the bite.     · You have symptoms of infection, such as:  ? Increased pain, swelling, warmth, or redness near the wound. ? Red streaks leading from the wound. ? Pus draining from the wound. ? A fever.     · Blood soaks through the bandage. Oozing small amounts of blood is normal.     · Your pain is getting worse. Watch closely for changes in your health, and be sure to contact your doctor if you are not getting better as expected. Where can you learn more? Go to http://www.gray.com/  Enter U186 in the search box to learn more about \"Animal Bites: Care Instructions. \"  Current as of: July 1, 2021               Content Version: 13.0  © 5768-2573 [employer]. Care instructions adapted under license by [employer] (which disclaims liability or warranty for this information).  If you have questions about a medical condition or this instruction, always ask your healthcare professional. [place] [address] any warranty or liability for your use of this information.

## 2022-02-10 NOTE — PROGRESS NOTES
1. \"Have you been to the ER, urgent care clinic since your last visit? Hospitalized since your last visit? \" [facility] 2/8/22 cat bite    2. \"Have you seen or consulted any other health care providers outside of the [address] [place] [address] since your last visit? \" No     3. For patients aged 39-70: Has the patient had a colonoscopy / FIT/ Cologuard? NA - based on age      If the patient is female:    4. For patients aged 41-77: Has the patient had a mammogram within the past 2 years? NA - based on age or sex      11. For patients aged 21-65: Has the patient had a pap smear?  NA - based on age or sex

## 2022-02-14 NOTE — PROGRESS NOTES
HPI:   Shiloh Rick is a 78y.o. year old female who presents today for an acute visit. She has a history of hypertension, ASCVD, hyperlipidemia, syncope, Parkinson's disease, lumbar degenerative disease, GERD, and macular degeneration. She has completed the [employer] COVID-19 vaccine series and received [employer] booster dose. On 02/08/2022, she presented to the [facility] ED for evaluation of a cat bite. She states that while she was asleep in bed, her pet cat bit her on the dorsum of her right hand in order to awaken her since it was hungry. She noted some increased redness surrounding the puncture wound prompting ED presentation. She denied any fever, chills, or hand pain. No laboratory testing was performed, and she was prescribed doxycycline and clindamycin for 10 days. She reports today that she has noted some improvement since initiating with decreased swelling and redness. Last Tdap on 05/10/2021 following a similar injury from her cat. She is otherwise without complaints and overall feeling well. Summary of prior hospitalizations and medical history:  In 8/2018, she was evaluated by Dr. Emi Du for right hip pain and was diagnosed with right piriformis syndrome. She was treated with a cortisone injection and physical therapy with some improvement, but subsequently developed right buttocks pain and pain and paresthesias down her right leg. She was treated with prednisone 50 mg for five days without improvement. She was seen on 10/9/2018, which was 10 days after completing the course of prednisone, and reported persistent severe right leg pain which was interfering with her activities and with sleep. She was taking Tylenol ES without relief, and was started on gabapentin. Lumbar spine x-ray was obtained (10/9/2018) showing grade 1 anterolisthesis of L4 on L5, either new or increased since 4/2015, and multilevel degenerative spondylosis/disc disease.  She was referred to physical therapy, but due to continued persistent symptoms, she had a lumbar MRI (10/17/2018) which showed multilevel degenerative findings causing various degrees of neuroforaminal  narrowing and lateral recess narrowing; also a right L4-L5 neuroforamen cystic lesion measuring approximately 10 x 6 mm was noted. She had a lumbar MRI with contrast (11/16/2018) which confirmed an extruding synovial cyst from the right L4-5 advanced facet arthropathy; significant right foraminal stenosis and nerve impingement persists. She continued physical therapy and reported that her pain has significantly improved. She was evaluated by Dr. Ramon Terrell on 1/3/2019 but her pain had already resolved. She is no longer taking gabapentin or Tylenol. She has a history of hypertension, hyperlipidemia, ASCVD, and syncope. In 3/2011, she had three syncopal episodes while donating blood. Over the subsequent four days, she developed exertional substernal chest tightness with left arm pain and dyspnea. She was evaluated and EKG revealed new T wave inversions in leads V2 and V3. Troponins were negative. She underwent cardiac catheterization (3/21/2011) which showed a 90% mid LAD and mild disease in the rest of the coronaries. She underwent PCI and placement of two drug-eluting stents for the mid LAD lesion; LV function was mildly diminished (EF 40-45%) with anteroapical hypokinesis. Follow-up echocardiogram (9/2011) showed return to normal LV function (EF 60%) and no RWMA. She has a history of multiple syncopal episodes, usually related to stressful situations, and thought to be vasovagal in origin. An implantable loop recorder was placed in 3/2011 and remained until 4/2013, and interrogation was negative for any significant arrhythmia. In 4/2014, she was hospitalized following another syncopal episode, which occurred after she had taken her morning medications. Afterward, she became nauseated and flushed, and called EMS.  When they arrived, they found her on the floor and reportedly without a pulse. They began CPR and within 10-15 seconds, she recovered. Evaluation included EKG/troponins, which were negative, and an echocardiogram showing mild MR and normal LV function (EF 60-65%). She was found to have hypokalemia and hypomagnesemia and hydrochlorothiazide was discontinued. It was thought that this event also represented a vasovagal reaction given her prodromal symptoms. She has had no further events since that time. Her most recent pharmacologic nuclear stress test (6/25/2020) was a normal, low risk study, negative for evidence of ischemia or prior infarction, and with normal LV size and function (EF 84%). Her current regimen includes metoprolol, lisinopril, aspirin, and rosuvastatin. She is being followed by Dr. Luc Orr. In 11/2017, she reported bilateral thigh aching pain, which increased with ambulation particularly when walking up stairs. She stated that the discomfort was similar to that which developed previously with use of simvastatin and atorvastatin. She had been on pravastatin since 10/2014 and did not note any difficulty previously. She discontinued pravastatin and had resolution of her symptoms. She was subsequently started on rosuvastatin in 12/2017, and did well until 7/2018 when she again developed myalgias. Her dose was decreased to 5 mg every other day. She reports today that she continues to have aching discomfort in her bilateral thighs on the days which she takes rosuvastatin, but reports that it is tolerable so she will continue. She has a history of idiopathic Parkinson's disease, predominantly left-sided. She was being treated with Sinemet, but changed to Stalevo in 10/2019, and reports improved control of symptoms. She has difficulty with writing at times, particularly towards the end of the day, and also describes increasing tremors midday. She is followed by Dr. Nora Pressley.     She has a history of laryngopharyngeal reflux disease, treated previously with dexlansoprazole, but ha successfully weaned from therapy. She is now receiving immunotherapy with Dr. Abel Luna to address her allergies and hoarseness. In 5/2017 she had multiple episodes of epistaxis prompting an ED visit. She subsequently underwent silver nitrate cautery of anterior vessels in her right nares by Dr. Abel Luna, and has had no recurrence. In 3/2010, she underwent evaluation for iron deficiency anemia. She had previously had a negative colonoscopy and air contrast barium enema in 2009 by Dr. Kymberly Douglas, and she had an upper endoscopy by Dr. Alda Bain which was normal. She was treated with iron with improvement. She had a repeat screening colonoscopy in 7/2016 by Dr. Alda Bain which was normal. No further follow-up was recommended given her age. She denies any abdominal pain, nausea, vomiting, melena, hematochezia, or change in bowel movements. She has a history of osteopenia with bone density study in 5/2018 showing T-scores:  femoral neck left -1.2  /right -1.6 and lumbar -0.8 . She underwent repeat bone density study on 8/2/2021 showing T-scores: femoral neck left -1.7/right -2.1 and lumbar -0.9. Due to progression in her bilateral hips, she was started on Fosamax in 11/2021 after lab testing for secondary osteoporosis was negative. She continues to take calcium and Vitamin D supplements. She has no history of pathologic fractures. She has a recent history of abnormal mammograms since 10/2014 with microcalcifcations in the right breast. She has been undergoing surveillance mammograms every six months, which have been unchanged. She had a follow-up mammogram in 5/2016 which was negative, and routine annual screening was recommended. She has a history of bilateral knee pain secondary to osteoarthritis. She was evaluated by Dr. Nanci Cowden in 3/2018 and received a cortisone injection with improvement. She also has difficutly with left ankle pain which increase with ambulation. She received a cortisone injection for this as well from Dr. Tammy Chapa in 3/2018, but did not experience significant improvement. She was evaluated by Dr. Irineo Kaiser on 3/27/2018 and diagnosed with left peroneus brevis tendinitis. An orthotic was recommended with improvement. She is now being followed by Dr. Richard Stone for left ankle pain, and CT scan left ankle (7/2019) showed evidence of chronic ATFL sprain or complete rupture, likely prior sprain of the calcaneofibular ligament, and advanced posterior subtalar and tarsometatarsal osteoarthrosis. On 5/9/2021, she presented to the [facility] ED after being bitten on her left wrist the day prior by her indoor/outdoor cat. She reports that she developed swelling and redness of her wrist and forearm, and was diagnosed with cellulitis. She was prescribed clindamycin 300 mg qid with eventual resolution. Past Medical History:   Diagnosis Date    Basal cell cancer     CAD (coronary artery disease), native coronary artery 03/21/2011    s/p PCI with with CARLITO (Xience 2.75x12, 2.5x12) mLAD and mild disease in rest of coronaries. Midly depressed LV syst fct     Cardiac cath 03/21/2011    mLAD 90% (2.75 x 12 mm Xience stent). Otherwise patent coronaries. LVEDP 10 mmHg. EF 40-45%. Anteroapical hypk. Renal arteries patent.  Cardiac echocardiogram 04/17/2014    EF 60-65%. No WMA. Gr 1 DDfx. Mild MR. Similar to study of 9/14/11.  Cardiac nuclear imaging test 07/2017    Negative for ischemia or infarction. EF > 70%.     Cardiomyopathy secondary     ischemic +/- stress induced    Dyslipidemia     GERD (gastroesophageal reflux disease)     Hx of colonoscopy 07/12/2016    Normal. Dr. Chintan Soni Hypertension     Parkinson's disease     Syncope     s/p ILD implantation     Past Surgical History:   Procedure Laterality Date    COLONOSCOPY N/A 7/12/2016    COLONOSCOPY performed by León De MD at [address] HX BUNIONECTOMY      dorsal    HX CATARACT REMOVAL  11/2012    left    HX CATARACT REMOVAL  10/2012    right    HX CORONARY STENT PLACEMENT      HX HEART CATHETERIZATION      HX HEMORRHOIDECTOMY      HX HYSTERECTOMY  1996    partial    HX IMPLANTABLE LOOP RECORDER  [id number]    HX TONSILLECTOMY      HX TOTAL ABDOMINAL HYSTERECTOMY  1996    partial     Current Outpatient Medications   Medication Sig    doxycycline (MONODOX) 100 mg capsule Take 1 Capsule by mouth two (2) times a day for 10 days.  clindamycin (CLEOCIN) 300 mg capsule Take 1 Capsule by mouth four (4) times daily for 10 days.  vit A/vit C/vit E/zinc/copper (PRESERVISION AREDS PO) Take 2 Tablets by mouth two (2) times a day.  azelastine (ASTELIN) 137 mcg (0.1 %) nasal spray 1 [place] by Both Nostrils route two (2) times a day. Use in each nostril as directed    metoprolol succinate (TOPROL-XL) 50 mg XL tablet Take 1 Tablet by mouth daily.  lisinopriL (PRINIVIL, ZESTRIL) 10 mg tablet Take 1 Tablet by mouth daily.  potassium chloride (K-DUR, KLOR-CON M20) 20 mEq tablet Take 0.5 Tablets by mouth every other day.  alendronate (FOSAMAX) 35 mg tablet Take 1 Tablet by mouth every seven (7) days. Take with 8 ounces of water and sit or stand upright for at least 30 minutes after administration. Indications: post-menopausal osteoporosis prevention    rosuvastatin (CRESTOR) 10 mg tablet Take 0.5 Tablets by mouth every other day.  carbidopa-levodopa ER (SINEMET CR)  mg per tablet Take 1 Tab by mouth nightly.  psyllium husk (METAMUCIL PO) Take  by mouth daily.  nitroglycerin (NITROSTAT) 0.4 mg SL tablet 1 sublingual every 5 minutes for chest pain for no more than 3 doses    carbidopa 25mg-levodopa 100mg-entacapone 200mg (STALEVO 100) tab tablet Take 1 Tablet by mouth three (3) times daily.  magnesium hydroxide (NEGRO MILK OF MAGNESIA) 400 mg/5 mL suspension Take 30 mL by mouth daily as needed for Constipation.     acetaminophen (TYLENOL) 650 mg CR tablet Take 650 mg by mouth every six (6) hours as needed for Pain.  co-enzyme Q-10 (CO Q-10) 100 mg capsule Take 200 mg by mouth daily.  aspirin 81 mg tablet Take 1 Tab by mouth daily.  DOCUSATE CALCIUM (STOOL SOFTENER PO) Take 1 Cap by mouth.  calcium carbonate/vitamin D3 (CALCIUM 600 + D,3, PO) Take 1 Tablet by mouth daily. (Patient not taking: Reported on 2/10/2022)     No current facility-administered medications for this visit. Allergies and Intolerances: Allergies   Allergen Reactions    Keflex [Cephalexin] Other (comments)     Yeast infection    Pcn [Penicillins] Other (comments)     Family History: She has no family history of colon or breast cancer. Family History   Problem Relation Age of Onset    Heart Attack Father 80    Heart Disease Father     Cancer Mother         pancreatic    Hypertension Brother      Social History:   She  reports that she has never smoked. She has never used smokeless tobacco. She lives with her , who is having difficulty with mild cognitive impairment. She states that they sold their RV and now stay at home for the winter. She is a retired x-ray technician. Social History     Substance and Sexual Activity   Alcohol Use Yes    Comment: glass of wine occasionally.      Immunization History:  Immunization History   Administered Date(s) Administered    (RETIRED) Pneumococcal Vaccine (Unspecified Type) 01/01/2003    COVID-19, Pfizer Purple top, DILUTE for use, 12+ yrs, 30mcg/0.3mL dose 03/09/2021, 03/30/2021, 10/02/2021    Influenza High Dose Vaccine PF 09/29/2014, 10/05/2015, 11/01/2016, 08/30/2017    Influenza Vaccine (Tri) Adjuvanted (>65 Yrs FLUAD TRI 74503) 10/09/2018, 09/17/2019    Influenza Vaccine Whole 09/07/2010, 09/03/2011, 10/04/2012    Influenza, High-dose, Quadrivalent (>65 Yrs Fluzone High Dose Quad [id number]) 10/03/2021    Influenza, Quadrivalent, Adjuvanted (>65 Yrs FLUAD QUAD [id number]) 09/11/2020    Pneumococcal Conjugate (PCV-13) 10/05/2015    Pneumococcal Polysaccharide (PPSV-23) 01/01/2003, 10/30/2019    TD Vaccine 01/01/2003    Tdap 09/25/2013, 05/10/2021    Zoster 01/01/2007    Zoster Recombinant 09/18/2018, 11/23/2018       Review of Systems:   As above included in HPI. Otherwise 11 point review of systems negative including constitutional, skin, HENT, eyes, respiratory, cardiovascular, gastrointestinal, genitourinary, musculoskeletal, endo/heme/aller, neurological.    Physical:   Visit Vitals  /68 (BP 1 Location: Right arm, BP Patient Position: Sitting)   Pulse 66   Temp 97.6 °F (36.4 °C) (Temporal)   Resp 16   Ht 5' 5\" (1.651 m)   Wt 124 lb 12.8 oz (56.6 kg)   SpO2 98%   BMI 20.77 kg/m²       Exam:     Patient appears in no apparent distress. Affect is appropriate. HEENT: PERRLA, anicteric, no JVD, adenopathy or thyromegaly. No carotid bruits or radiated murmur. Lungs: clear to auscultation, no wheezes, rhonchi, or rales. Heart: regular rate and rhythm. No murmur, rubs, gallops  Abdomen: soft, nontender, nondistended, normal bowel sounds, no hepatosplenomegaly or masses. Extremities: without edema. Right hand with small puncture wound on the dorsal aspect with minimal erythema circumferentially extending from wound approximately 2 cm; no streaking from wound or extension into hand with range of motion intact for all fingers. Review of Data:  Labs:  No visits with results within 1 Month(s) from this visit.    Latest known visit with results is:   Hospital Outpatient Visit on 11/17/2021   Component Date Value Ref Range Status    Immunoglobulin G, Qt. 11/17/2021 788  586 - 1,602 mg/dL Final    Immunoglobulin A, Qt. 11/17/2021 175  64 - 422 mg/dL Final    Immunoglobulin M, Qt. 11/17/2021 84  26 - 217 mg/dL Final    Protein, total 11/17/2021 5.8* 6.0 - 8.5 g/dL Final    Albumin 11/17/2021 3.2  2.9 - 4.4 g/dL Final    Alpha-1-Globulin 11/17/2021 0.2  0.0 - 0.4 g/dL Final    Alpha-2-Globulin 11/17/2021 0.8  0.4 - 1.0 g/dL Final    Beta Globulin 11/17/2021 0.8  0.7 - 1.3 g/dL Final    Gamma Globulin 11/17/2021 0.8  0.4 - 1.8 g/dL Final    M-Favian 11/17/2021 Not Observed  Not Observed g/dL Final    Globulin, total 11/17/2021 2.6  2.2 - 3.9 g/dL Final    A/G ratio 11/17/2021 1.3  0.7 - 1.7   Final    Immunofixation Result 11/17/2021 Comment    Final    Free Kappa Lt Chains, serum 11/17/2021 16.5  3.3 - 19.4 mg/L Final    Free Lambda Lt Chains, serum 11/17/2021 20.1  5.7 - 26.3 mg/L Final    Kappa/Lambda ratio, serum 11/17/2021 0.82  0.26 - 1.65   Final    Protein, urine random 11/17/2021 41.5  Not Estab. mg/dL Final    Albumin, urine 11/17/2021 31.1  % Final    Alpha-1-Globulin, urine 11/17/2021 7.0  % Final    Alpha-2-Globulin, urine 11/17/2021 14.5  % Final    Beta-globulin, urine 11/17/2021 33.4  % Final    Gamma Globulin, urine 11/17/2021 14.0  % Final    M-Favian, % 11/17/2021 Not Observed  Not Observed % Final    Immunofixation Result, urine 11/17/2021 Comment    Final    Note 11/17/2021 Comment    Final    Calcium 11/17/2021 9.4  8.5 - 10.1 MG/DL Final    PTH, Intact 11/17/2021 51.6  18.4 - 88.0 pg/mL Final    Vitamin D 25-Hydroxy 11/17/2021 30.7  30 - 100 ng/mL Final    Phosphorus 11/17/2021 3.5  2.5 - 4.9 MG/DL Final       Health Maintenance:  Screening:    Mammogram: negative (8/2021)   PAP smear: s/p ROHINI. No further screening. Colorectal: colonoscopy (7/2016) normal. Dr. Mode Batista. No further screening. Depression: none   DM (HbA1c/FPG): FPG 87 (11/2021)   Hepatitis C: unknown   Falls: none   DEXA: osteopenia (8/2021)   Glaucoma: regular eye exams with Dr. Sharon Morrison (9/2021) for macular degeneration.    Smoking: none   Vitamin D: 33.6 (5/2021)   Medicare Wellness: 11/17/2021      Impression:  Patient Active Problem List   Diagnosis Code    Hypertension I10    CAD S/P percutaneous coronary angioplasty I25.10, Z98.61    Dyslipidemia E78.5    History of syncope Z87.898    Parkinson disease ([facility] [address] G20    Early dry stage nonexudative age-related macular degeneration of both eyes H35.3131    Laryngopharyngeal reflux disease K21.9    Osteopenia M85.80    Psoriasis of scalp L40.9    Lumbar spondylosis M47.816    Synovial cyst of lumbar spine M71.38    DDD (degenerative disc disease), lumbar M51.36    Primary osteoarthritis involving multiple joints M89.49    Allergic rhinitis J30.9       Plan:  Cat bite. Evaluated in the [facility] ED on 02/08/2022 after her own pet cat bit her on her hand while she was asleep. Noting small puncture wound with mild erythema in the area of the bite, but does not appear to have extension of cellulitis to the rest of her hand. Started on doxycycline and clindamycin for 10 days. Last tetanus in 05/2021. Advised today to begin a probiotic given broad-spectrum antibiotic treatment. Advised to call or return to ED for any worsening of infection. Other chronic issues:  1. Hyperlipidemia. Previously on low intensity dose pravastatin, but developed severe bilateral thigh pain, worse with ambulation, and was discontinued in 11/2018 with improvement. Reported similar symptoms in past with statin (Zocor until 2012, and then Lipitor until 9/2014). Started on rosuvastatin 10 mg daily in 12/2018 and tolerated without difficulty until 7/2018 when developed recurrent myalgias and dose decreased to 5 mg every other day with improvement. Lipid panel today with LDL 55 and HDL 64, indicative of excellent control. Emphasized importance of lifestyle modifications, including diet and exercise. Would not recommend weight loss given normal BMI of 20. Continue to follow. 2. Hypertension. Blood pressure well controlled on lisinopril 10 mg daily and metoprolol succinate 50 mg daily. Renal function has been normal with creatinine 0.68/ eGFR >60. Continue to follow. 3. ASCVD. Remains asymptomatic on current regimen of aspirin, metoprolol succinate, and rosuvastatin.  Resolution of cardiomyopathy with last echocardiogram in 2014 revealing normal LV function. Negative pharmacologic nuclear stress test in 6/2020. Being followed by Dr. Gay Hunt annually, with last visit in 1/2022. Planning to obtain a repeat pharmacologic nuclear stress test prior to her visit in 01/2023.  4.  History of syncope. No further episodes since 2014. Most likely secondary to vasovagal reaction. Follow. 5. Parkinson's disease. Therapy with addition of Stalevo to Sinemet with significant improvement in control of tremors. Being followed by Dr. Pravin Loco.  6. Osteopenia. Repeat bone density scan on 8/2021. Using femoral neck T-scores, calculated FRAX score estimates her 10 year risk of a major osteoporetic fracture at 14 % and hip fracture at 4.8 %, which are an indication for biphosphonate treatment based on hip fracture risk of >3%. Also with evidence of progression from prior study in 5/2018. Has never received treatment in the past.  Discussed treatment today and patient willing to initiate treatment with biphosphonate. Reports has regular dental exams every 6 months, and last seen in 11/2021 without any problems noted. Completed screening labs for secondary osteoporosis in 11/2021, including intact PTH, vitamin D, phosphorus, and gammopathy panel, and all normal.  She was subsequently started on Fosamax 35 mg weekly for osteoporosis prevention (prescribed 11/26/2021). Reports that she is tolerating it well. Advised to continue calcium and vitamin D supplements. Encouraged exercise, particularly weight bearing activities. Fall precautions stressed. 7. Lumbar spondylosis. Patient with symptoms of right buttock pain since 8/2018 and diagnosed with right piriformis syndrome. Completed physical therapy and received cortisone injection with some improvement, but subsequently developed right leg paresthesias and pain consistent with right sided sciatica.  Treated with five day course of prednisone 50 mg without improvement. LS spine x-rays and lumbar MRI with degenerative changes, although MRI showed a right L4-L5 neuroforamen synovial cyst from the right L4-5 advanced facet arthropathy with significant right foraminal stenosis and nerve impingement. Referred for physical therapy and treated with gabapentin 100 mg tid with resolution of symptoms. Evaluated by Dr. Brigido Cesar, but symptoms had already resolved. No longer taking gabapentin. Appears to be stable and advised to continue with back stretches. 8. Osteoarthritis. Difficulty with bilateral knee pain L>R and receiving intermittent cortisone injections from Dr. Terrance Mei. Left ankle pain being addressed by podiatrist, Dr. Jorge Agarwal. Stable today. 9. Macular degeneration, R>L. On Preservision. Being followed by Dr. Shiva Duncan every 12 weeks for injections. 10. Laryngopharyngeal reflux. Doing well. Discontinued Dexilant without an increase in symptoms. Now receiving immunotherapy to address hoarseness and allergies. Followed by Dr. Ramonita Hunt. 11. Psoriasis. Using clobetasol solution for scalp psoriasis with reasonable control. 12. Health maintenance. Completed Pfizer COVID 19 vaccine series and received [employer] booster dose. Received 2/2 doses of Shingrix vaccine. Already received the influenza vaccine. Other immunizations up to date. No further colorectal cancer screening needed. Mammogram up to date. Continue regular eye exams with Ankur Shetty and Shiva Duncan. Vitamin D level has been normal on maintenance dose supplement. Medicare wellness visit up-to-date. Patient understands recommendations and agrees with plan. Follow-up as previously scheduled.

## 2022-02-21 RX ORDER — ROSUVASTATIN CALCIUM 10 MG/1
5 TABLET, COATED ORAL EVERY OTHER DAY
Qty: 30 TABLET | Refills: 3 | Status: SHIPPED | OUTPATIENT
Start: 2022-02-21

## 2022-02-21 NOTE — TELEPHONE ENCOUNTER
Previous Rx was sent to the [facility]    Last Visit: 2/10/22 with MD Renee Amor  Next Appointment: 5/18/22 with MD Wang  Previous Refill Encounter(s): 8/9/21 #30 with 3 refills    Requested Prescriptions     Pending Prescriptions Disp Refills    rosuvastatin (CRESTOR) 10 mg tablet 30 Tablet 3     Sig: Take 0.5 Tablets by mouth every other day.

## 2022-03-09 NOTE — TELEPHONE ENCOUNTER
Confirmed with patient she still has plenty on hand to hold her over until  is back in the office. She is aware her RX will be sent in next week.

## 2022-03-13 RX ORDER — METOPROLOL SUCCINATE 50 MG/1
TABLET, EXTENDED RELEASE ORAL
Qty: 90 TABLET | Refills: 3 | Status: SHIPPED | OUTPATIENT
Start: 2022-03-13

## 2022-03-18 PROBLEM — M71.38 SYNOVIAL CYST OF LUMBAR SPINE: Status: ACTIVE | Noted: 2018-11-12

## 2022-03-18 PROBLEM — M47.816 LUMBAR SPONDYLOSIS: Status: ACTIVE | Noted: 2018-10-12

## 2022-03-19 PROBLEM — M51.369 DDD (DEGENERATIVE DISC DISEASE), LUMBAR: Status: ACTIVE | Noted: 2019-04-15

## 2022-03-19 PROBLEM — M15.0 PRIMARY OSTEOARTHRITIS INVOLVING MULTIPLE JOINTS: Status: ACTIVE | Noted: 2019-04-15

## 2022-03-19 PROBLEM — M15.9 PRIMARY OSTEOARTHRITIS INVOLVING MULTIPLE JOINTS: Status: ACTIVE | Noted: 2019-04-15

## 2022-03-19 PROBLEM — M51.36 DDD (DEGENERATIVE DISC DISEASE), LUMBAR: Status: ACTIVE | Noted: 2019-04-15

## 2022-03-19 PROBLEM — L40.9 PSORIASIS OF SCALP: Status: ACTIVE | Noted: 2017-11-30

## 2022-03-19 PROBLEM — J30.9 ALLERGIC RHINITIS: Status: ACTIVE | Noted: 2020-05-06

## 2022-05-11 ENCOUNTER — APPOINTMENT (OUTPATIENT)
Dept: INTERNAL MEDICINE CLINIC | Age: 82
End: 2022-05-11

## 2022-05-11 ENCOUNTER — HOSPITAL ENCOUNTER (OUTPATIENT)
Dept: LAB | Age: 82
Discharge: HOME | End: 2022-05-11
Payer: MEDICARE

## 2022-05-11 DIAGNOSIS — I10 PRIMARY HYPERTENSION: ICD-10-CM

## 2022-05-11 DIAGNOSIS — G20 PARKINSON DISEASE (HCC): ICD-10-CM

## 2022-05-11 DIAGNOSIS — M85.89 OSTEOPENIA OF MULTIPLE SITES: ICD-10-CM

## 2022-05-11 DIAGNOSIS — Z98.61 CAD S/P PERCUTANEOUS CORONARY ANGIOPLASTY: ICD-10-CM

## 2022-05-11 DIAGNOSIS — I25.10 CAD S/P PERCUTANEOUS CORONARY ANGIOPLASTY: ICD-10-CM

## 2022-05-11 DIAGNOSIS — M85.9 DISORDER OF BONE DENSITY AND STRUCTURE, UNSPECIFIED: ICD-10-CM

## 2022-05-11 DIAGNOSIS — E78.5 DYSLIPIDEMIA: ICD-10-CM

## 2022-05-11 LAB
25(OH)D3 SERPL-MCNC: 28.3 NG/ML (ref 30–100)
ALBUMIN SERPL-MCNC: 3.5 G/DL (ref 3.4–5)
ALBUMIN/GLOB SERPL: 1.2 {RATIO} (ref 0.8–1.7)
ALP SERPL-CCNC: 95 U/L (ref 45–117)
ALT SERPL-CCNC: 20 U/L (ref 13–56)
ANION GAP SERPL CALC-SCNC: 3 MMOL/L (ref 3–18)
APPEARANCE UR: CLEAR
AST SERPL-CCNC: 16 U/L (ref 10–38)
BACTERIA URNS QL MICRO: NEGATIVE /HPF
BASOPHILS # BLD: 0 K/UL (ref 0–0.1)
BASOPHILS NFR BLD: 1 % (ref 0–2)
BILIRUB SERPL-MCNC: 0.4 MG/DL (ref 0.2–1)
BILIRUB UR QL: NEGATIVE
BUN SERPL-MCNC: 15 MG/DL (ref 7–18)
BUN/CREAT SERPL: 25 (ref 12–20)
CALCIUM SERPL-MCNC: 9 MG/DL (ref 8.5–10.1)
CHLORIDE SERPL-SCNC: 108 MMOL/L (ref 100–111)
CHOLEST SERPL-MCNC: 124 MG/DL
CO2 SERPL-SCNC: 32 MMOL/L (ref 21–32)
COLOR UR: YELLOW
CREAT SERPL-MCNC: 0.6 MG/DL (ref 0.6–1.3)
DIFFERENTIAL METHOD BLD: ABNORMAL
EOSINOPHIL # BLD: 0.1 K/UL (ref 0–0.4)
EOSINOPHIL NFR BLD: 1 % (ref 0–5)
EPITH CASTS URNS QL MICRO: NORMAL /LPF (ref 0–5)
ERYTHROCYTE [DISTWIDTH] IN BLOOD BY AUTOMATED COUNT: 13.2 % (ref 11.6–14.5)
GLOBULIN SER CALC-MCNC: 3 G/DL (ref 2–4)
GLUCOSE SERPL-MCNC: 85 MG/DL (ref 74–99)
GLUCOSE UR STRIP.AUTO-MCNC: NEGATIVE MG/DL
HCT VFR BLD AUTO: 40.2 % (ref 35–45)
HDLC SERPL-MCNC: 62 MG/DL (ref 40–60)
HDLC SERPL: 2 {RATIO} (ref 0–5)
HGB BLD-MCNC: 12.6 G/DL (ref 12–16)
HGB UR QL STRIP: NEGATIVE
IMM GRANULOCYTES # BLD AUTO: 0 K/UL (ref 0–0.04)
IMM GRANULOCYTES NFR BLD AUTO: 1 % (ref 0–0.5)
KETONES UR QL STRIP.AUTO: NEGATIVE MG/DL
LDLC SERPL CALC-MCNC: 46.6 MG/DL (ref 0–100)
LEUKOCYTE ESTERASE UR QL STRIP.AUTO: NEGATIVE
LIPID PROFILE,FLP: ABNORMAL
LYMPHOCYTES # BLD: 1.3 K/UL (ref 0.9–3.6)
LYMPHOCYTES NFR BLD: 20 % (ref 21–52)
MAGNESIUM SERPL-MCNC: 2.8 MG/DL (ref 1.6–2.6)
MCH RBC QN AUTO: 30 PG (ref 24–34)
MCHC RBC AUTO-ENTMCNC: 31.3 G/DL (ref 31–37)
MCV RBC AUTO: 95.7 FL (ref 78–100)
MONOCYTES # BLD: 0.5 K/UL (ref 0.05–1.2)
MONOCYTES NFR BLD: 8 % (ref 3–10)
NEUTS SEG # BLD: 4.4 K/UL (ref 1.8–8)
NEUTS SEG NFR BLD: 70 % (ref 40–73)
NITRITE UR QL STRIP.AUTO: NEGATIVE
NRBC # BLD: 0 K/UL (ref 0–0.01)
NRBC BLD-RTO: 0 PER 100 WBC
PH UR STRIP: 8 [PH] (ref 5–8)
PLATELET # BLD AUTO: 302 K/UL (ref 135–420)
PMV BLD AUTO: 9.9 FL (ref 9.2–11.8)
POTASSIUM SERPL-SCNC: 4 MMOL/L (ref 3.5–5.5)
PROT SERPL-MCNC: 6.5 G/DL (ref 6.4–8.2)
PROT UR STRIP-MCNC: NEGATIVE MG/DL
RBC # BLD AUTO: 4.2 M/UL (ref 4.2–5.3)
RBC #/AREA URNS HPF: NEGATIVE /HPF (ref 0–5)
SODIUM SERPL-SCNC: 143 MMOL/L (ref 136–145)
SP GR UR REFRACTOMETRY: 1.01 (ref 1–1.03)
TRIGL SERPL-MCNC: 77 MG/DL (ref ?–150)
TSH SERPL DL<=0.05 MIU/L-ACNC: 2.61 UIU/ML (ref 0.36–3.74)
UROBILINOGEN UR QL STRIP.AUTO: 0.2 EU/DL (ref 0.2–1)
VLDLC SERPL CALC-MCNC: 15.4 MG/DL
WBC # BLD AUTO: 6.4 K/UL (ref 4.6–13.2)
WBC URNS QL MICRO: NEGATIVE /HPF (ref 0–4)

## 2022-05-11 PROCEDURE — 85025 COMPLETE CBC W/AUTO DIFF WBC: CPT

## 2022-05-11 PROCEDURE — 82306 VITAMIN D 25 HYDROXY: CPT

## 2022-05-11 PROCEDURE — 83735 ASSAY OF MAGNESIUM: CPT

## 2022-05-11 PROCEDURE — 84443 ASSAY THYROID STIM HORMONE: CPT

## 2022-05-11 PROCEDURE — 80061 LIPID PANEL: CPT

## 2022-05-11 PROCEDURE — 81001 URINALYSIS AUTO W/SCOPE: CPT

## 2022-05-11 PROCEDURE — 36415 COLL VENOUS BLD VENIPUNCTURE: CPT

## 2022-05-11 PROCEDURE — 80053 COMPREHEN METABOLIC PANEL: CPT

## 2022-05-16 NOTE — PROGRESS NOTES
1. \"Have you been to the ER, urgent care clinic since your last visit? Hospitalized since your last visit? \" No    2. \"Have you seen or consulted any other health care providers outside of the [address] [place] [address] since your last visit? \" No     3. For patients aged 39-70: Has the patient had a colonoscopy / FIT/ Cologuard? NA - based on age      If the patient is female:    4. For patients aged 41-77: Has the patient had a mammogram within the past 2 years? NA - based on age or sex      11. For patients aged 21-65: Has the patient had a pap smear?  NA - based on age or sex

## 2022-05-18 ENCOUNTER — OFFICE VISIT (OUTPATIENT)
Dept: INTERNAL MEDICINE CLINIC | Age: 82
End: 2022-05-18
Payer: MEDICARE

## 2022-05-18 VITALS
BODY MASS INDEX: 20.62 KG/M2 | DIASTOLIC BLOOD PRESSURE: 60 MMHG | SYSTOLIC BLOOD PRESSURE: 122 MMHG | HEART RATE: 74 BPM | RESPIRATION RATE: 16 BRPM | WEIGHT: 123.8 LBS | TEMPERATURE: 97.5 F | OXYGEN SATURATION: 97 % | HEIGHT: 65 IN

## 2022-05-18 DIAGNOSIS — M15.9 PRIMARY OSTEOARTHRITIS INVOLVING MULTIPLE JOINTS: ICD-10-CM

## 2022-05-18 DIAGNOSIS — M51.36 DDD (DEGENERATIVE DISC DISEASE), LUMBAR: ICD-10-CM

## 2022-05-18 DIAGNOSIS — M85.89 OSTEOPENIA OF MULTIPLE SITES: ICD-10-CM

## 2022-05-18 DIAGNOSIS — E78.5 DYSLIPIDEMIA: ICD-10-CM

## 2022-05-18 DIAGNOSIS — I10 PRIMARY HYPERTENSION: Primary | ICD-10-CM

## 2022-05-18 DIAGNOSIS — E55.9 VITAMIN D DEFICIENCY: ICD-10-CM

## 2022-05-18 DIAGNOSIS — G20 PARKINSON DISEASE (HCC): ICD-10-CM

## 2022-05-18 DIAGNOSIS — M47.816 LUMBAR SPONDYLOSIS: ICD-10-CM

## 2022-05-18 PROCEDURE — G8399 PT W/DXA RESULTS DOCUMENT: HCPCS | Performed by: INTERNAL MEDICINE

## 2022-05-18 PROCEDURE — 99214 OFFICE O/P EST MOD 30 MIN: CPT | Performed by: INTERNAL MEDICINE

## 2022-05-18 PROCEDURE — G8427 DOCREV CUR MEDS BY ELIG CLIN: HCPCS | Performed by: INTERNAL MEDICINE

## 2022-05-18 PROCEDURE — G8752 SYS BP LESS 140: HCPCS | Performed by: INTERNAL MEDICINE

## 2022-05-18 PROCEDURE — 1101F PT FALLS ASSESS-DOCD LE1/YR: CPT | Performed by: INTERNAL MEDICINE

## 2022-05-18 PROCEDURE — G8536 NO DOC ELDER MAL SCRN: HCPCS | Performed by: INTERNAL MEDICINE

## 2022-05-18 PROCEDURE — G8754 DIAS BP LESS 90: HCPCS | Performed by: INTERNAL MEDICINE

## 2022-05-18 PROCEDURE — G8510 SCR DEP NEG, NO PLAN REQD: HCPCS | Performed by: INTERNAL MEDICINE

## 2022-05-18 PROCEDURE — G8420 CALC BMI NORM PARAMETERS: HCPCS | Performed by: INTERNAL MEDICINE

## 2022-05-18 PROCEDURE — 1090F PRES/ABSN URINE INCON ASSESS: CPT | Performed by: INTERNAL MEDICINE

## 2022-05-18 PROCEDURE — G0463 HOSPITAL OUTPT CLINIC VISIT: HCPCS | Performed by: INTERNAL MEDICINE

## 2022-05-18 RX ORDER — CHOLECALCIFEROL TAB 125 MCG (5000 UNIT) 125 MCG
5000 TAB ORAL DAILY
COMMUNITY

## 2022-05-18 RX ORDER — CARBIDOPA, LEVODOPA AND ENTACAPONE 12.5; 200; 5 MG/1; MG/1; MG/1
TABLET, FILM COATED ORAL
COMMUNITY
Start: 2022-05-09 | End: 2022-06-14

## 2022-05-18 RX ORDER — CARBIDOPA, LEVODOPA AND ENTACAPONE 31.25; 200; 125 MG/1; MG/1; MG/1
1 TABLET, FILM COATED ORAL 3 TIMES DAILY
COMMUNITY
Start: 2022-05-09 | End: 2022-05-18 | Stop reason: SDUPTHER

## 2022-05-18 NOTE — PATIENT INSTRUCTIONS
Heart-Healthy Diet: Care Instructions  Your Care Instructions     A heart-healthy diet has lots of vegetables, fruits, nuts, beans, and whole grains, and is low in salt. It limits foods that are high in saturated fat, such as meats, cheeses, and fried foods. It may be hard to change your diet, but even small changes can lower your risk of heart attack and heart disease. Follow-up care is a key part of your treatment and safety. Be sure to make and go to all appointments, and call your doctor if you are having problems. It's also a good idea to know your test results and keep a list of the medicines you take. How can you care for yourself at home? Watch your portions  · Learn what a serving is. A \"serving\" and a \"portion\" are not always the same thing. Make sure that you are not eating larger portions than are recommended. For example, a serving of pasta is ½ cup. A serving size of meat is 2 to 3 ounces. A 3-ounce serving is about the size of a deck of cards. Measure serving sizes until you are good at [place]" them. Keep in mind that restaurants often serve portions that are 2 or 3 times the size of one serving. · To keep your energy level up and keep you from feeling hungry, eat often but in smaller portions. · Eat only the number of calories you need to stay at a healthy weight. If you need to lose weight, eat fewer calories than your body burns (through exercise and other physical activity). Eat more fruits and vegetables  · Eat a variety of fruit and vegetables every day. Dark green, deep orange, red, or yellow fruits and vegetables are especially good for you. Examples include spinach, carrots, peaches, and berries. · Keep carrots, celery, and other veggies handy for snacks. Buy fruit that is in season and store it where you can see it so that you will be tempted to eat it. · Cook dishes that have a lot of veggies in them, such as stir-fries and soups.   Limit saturated and trans fat  · Read food labels, and try to avoid saturated and trans fats. They increase your risk of heart disease. · Use olive or canola oil when you cook. · Bake, broil, grill, or steam foods instead of frying them. · Choose lean meats instead of high-fat meats such as hot dogs and sausages. Cut off all visible fat when you prepare meat. · Eat fish, skinless poultry, and meat alternatives such as soy products instead of high-fat meats. Soy products, such as tofu, may be especially good for your heart. · Choose low-fat or fat-free milk and dairy products. Eat foods high in fiber  · Eat a variety of grain products every day. Include whole-grain foods that have lots of fiber and nutrients. Examples of whole-grain foods include oats, whole wheat bread, and brown rice. · Buy whole-grain breads and cereals, instead of white bread or pastries. Limit salt and sodium  · Limit how much salt and sodium you eat to help lower your blood pressure. · Taste food before you salt it. Add only a little salt when you think you need it. With time, your taste buds will adjust to less salt. · Eat fewer snack items, fast foods, and other high-salt, processed foods. Check food labels for the amount of sodium in packaged foods. · Choose low-sodium versions of canned goods (such as soups, vegetables, and beans). Limit sugar  · Limit drinks and foods with added sugar. These include candy, desserts, and soda pop. Limit alcohol  · Limit alcohol to no more than 2 drinks a day for men and 1 drink a day for women. Too much alcohol can cause health problems. When should you call for help? Watch closely for changes in your health, and be sure to contact your doctor if:    · You would like help planning heart-healthy meals. Where can you learn more? Go to http://www.[employer].com/  Enter V137 in the search box to learn more about \"Heart-Healthy Diet: Care Instructions. \"  Current as of: August 22, 2019               Content Version: 12.6  © 9298-1124 [employer]. Care instructions adapted under license by [email or web address] (which disclaims liability or warranty for this information). If you have questions about a medical condition or this instruction, always ask your healthcare professional. [address] any warranty or liability for your use of this information. Learning About Low-Sodium Foods  What foods are low in sodium? The foods you eat contain nutrients, such as vitamins and minerals. Sodium is a nutrient. Your body needs the right amount to stay healthy and work as it should. You can use the list below to help you make choices about which foods to eat. Fruits  · Fresh, frozen, canned, or dried fruit  Vegetables  · Fresh or frozen vegetables, with no added salt  · Canned vegetables, low-sodium or with no added salt  Grains  · Bagels without salted tops  · Cereal with no added salt  · Corn tortillas  · Crackers with no added salt  · Oatmeal, cooked without salt  · Popcorn with no salt  · Pasta and noodles, cooked without salt  · Rice, cooked without salt  · Unsalted pretzels  Dairy and dairy alternatives  · Butter, unsalted  · Cream cheese  · Ice cream  · Milk  · Soy milk  Meats and other protein foods  · Beans and peas, canned with no salt  · Eggs  · Fresh fish (not smoked)  · Fresh meats (not smoked or cured)  · Nuts and nut butter, prepared without salt  · Poultry, not packaged with sodium solution  · Tofu, unseasoned  · Tuna, canned without salt  Seasonings  · Garlic  · Herbs and spices  · Lemon juice  · Mustard  · Olive oil  · Salt-free seasoning mixes  · Vinegar  Work with your doctor to find out how much of this nutrient you need. Depending on your health, you may need more or less of it in your diet. Where can you learn more?   Go to http://www.gray.com/  Enter S460 in the search box to learn more about \"Learning About Low-Sodium Foods.\"  Current as of: August 22, 2019               Content Version: 12.6  © 5097-5985 [employer]. Care instructions adapted under license by [employer] (which disclaims liability or warranty for this information). If you have questions about a medical condition or this instruction, always ask your healthcare professional. [address] any warranty or liability for your use of this information. Low Sodium Diet (2,000 Milligram): Care Instructions  Your Care Instructions     Too much sodium causes your body to hold on to extra water. This can raise your blood pressure and force your heart and kidneys to work harder. In very serious cases, this could cause you to be put in the hospital. It might even be life-threatening. By limiting sodium, you will feel better and lower your risk of serious problems. The most common source of sodium is salt. People get most of the salt in their diet from canned, prepared, and packaged foods. Fast food and restaurant meals also are very high in sodium. Your doctor will probably limit your sodium to less than 2,000 milligrams (mg) a day. This limit counts all the sodium in prepared and packaged foods and any salt you add to your food. Follow-up care is a key part of your treatment and safety. Be sure to make and go to all appointments, and call your doctor if you are having problems. It's also a good idea to know your test results and keep a list of the medicines you take. How can you care for yourself at home? Read food labels  · Read labels on cans and food packages. The labels tell you how much sodium is in each serving. Make sure that you look at the serving size. If you eat more than the serving size, you have eaten more sodium. · Food labels also tell you the Percent Daily Value for sodium. Choose products with low Percent Daily Values for sodium.   · Be aware that sodium can come in forms other than salt, including monosodium glutamate (MSG), sodium citrate, and sodium bicarbonate (baking soda). MSG is often added to Asian food. When you eat out, you can sometimes ask for food without MSG or added salt. Buy low-sodium foods  · Buy foods that are labeled \"unsalted\" (no salt added), \"sodium-free\" (less than 5 mg of sodium per serving), or \"low-sodium\" (less than 140 mg of sodium per serving). Foods labeled \"reduced-sodium\" and \"light sodium\" may still have too much sodium. Be sure to read the label to see how much sodium you are getting. · Buy fresh vegetables, or frozen vegetables without added sauces. Buy low-sodium versions of canned vegetables, soups, and other canned goods. Prepare low-sodium meals  · Cut back on the amount of salt you use in cooking. This will help you adjust to the taste. Do not add salt after cooking. One teaspoon of salt has about 2,300 mg of sodium. · Take the salt shaker off the table. · Flavor your food with garlic, lemon juice, onion, vinegar, herbs, and spices. Do not use soy sauce, lite soy sauce, steak sauce, onion salt, garlic salt, celery salt, mustard, or ketchup on your food. · Use low-sodium salad dressings, sauces, and ketchup. Or make your own salad dressings and sauces without adding salt. · Use less salt (or none) when recipes call for it. You can often use half the salt a recipe calls for without losing flavor. Other foods such as rice, pasta, and grains do not need added salt. · Rinse canned vegetables, and cook them in fresh water. This removes somebut not allof the salt. · Avoid water that is naturally high in sodium or that has been treated with water softeners, which add sodium. Call your local water company to find out the sodium content of your water supply. If you buy bottled water, read the label and choose a sodium-free brand. Avoid high-sodium foods  · Avoid eating:  ? Smoked, cured, salted, and canned meat, fish, and poultry.   ? Ham, gregory, hot dogs, and luncheon meats.  ? Regular, hard, and processed cheese and regular peanut butter. ? Crackers with salted tops, and other salted snack foods such as pretzels, chips, and salted popcorn. ? Frozen prepared meals, unless labeled low-sodium. ? Canned and dried soups, broths, and bouillon, unless labeled sodium-free or low-sodium. ? Canned vegetables, unless labeled sodium-free or low-sodium. ? [place] fries, pizza, tacos, and other fast foods. ? Pickles, olives, ketchup, and other condiments, especially soy sauce, unless labeled sodium-free or low-sodium. Where can you learn more? Go to http://www.gray.com/  Enter V843 in the search box to learn more about \"Low Sodium Diet (2,000 Milligram): Care Instructions. \"  Current as of: August 22, 2019               Content Version: 12.6  © 4233-7221 [employer]. Care instructions adapted under license by [employer] (which disclaims liability or warranty for this information). If you have questions about a medical condition or this instruction, always ask your healthcare professional. [place] [address] any warranty or liability for your use of this information. Back Stretches: Exercises  Introduction  Here are some examples of exercises for stretching your back. Start each exercise slowly. Ease off the exercise if you start to have pain. Your doctor or physical therapist will tell you when you can start these exercises and which ones will work best for you. How to do the exercises  Overhead stretch    1. Stand comfortably with your feet shoulder-width apart. 2. Looking straight ahead, raise both arms over your head and reach toward the ceiling. Do not allow your head to tilt back. 3. Hold for 15 to 30 seconds, then lower your arms to your sides. 4. Repeat 2 to 4 times. Side stretch    1. Stand comfortably with your feet shoulder-width apart.   2. Raise one arm over your head, and then lean to the other side. 3. Slide your hand down your leg as you let the weight of your arm gently stretch your side muscles. Hold for 15 to 30 seconds. 4. Repeat 2 to 4 times on each side. Press-up    1. Lie on your stomach, supporting your body with your forearms. 2. Press your elbows down into the floor to raise your upper back. As you do this, relax your stomach muscles and allow your back to arch without using your back muscles. As your press up, do not let your hips or pelvis come off the floor. 3. Hold for 15 to 30 seconds, then relax. 4. Repeat 2 to 4 times. Relax and rest    1. Lie on your back with a rolled towel under your neck and a pillow under your knees. Extend your arms comfortably to your sides. 2. Relax and breathe normally. 3. Remain in this position for about 10 minutes. 4. If you can, do this 2 or 3 times each day. Follow-up care is a key part of your treatment and safety. Be sure to make and go to all appointments, and call your doctor if you are having problems. It's also a good idea to know your test results and keep a list of the medicines you take. Where can you learn more? Go to http://www.[employer].com/  Enter Y090 in the search box to learn more about \"Back Stretches: Exercises. \"  Current as of: July 1, 2021               Content Version: 13.2  © 8500-5980 [employer]. Care instructions adapted under license by [employer] (which disclaims liability or warranty for this information). If you have questions about a medical condition or this instruction, always ask your healthcare professional. [address] any warranty or liability for your use of this information.

## 2022-05-23 NOTE — PROGRESS NOTES
HPI:   Emir Foote is a 78y.o. year old female who presents today for a routine visit. She has a history of hypertension, ASCVD, hyperlipidemia, syncope, Parkinson's disease, lumbar degenerative disease, GERD, and macular degeneration. She has completed the [employer] COVID-19 vaccine series and received [employer] booster dose. She reports that she is doing reasonably well. She does complain of a 2-month history of mid back pain upon awakening in the morning with mid to lower abdominal discomfort. She states that her symptoms resolved when she gets out of bed and begins to move around. She denies any abdominal pain, nausea, vomiting, melena, hematochezia, or change in bowel movements. She is otherwise without complaints and overall feeling well. Summary of prior hospitalizations and medical history:  In 8/2018, she was evaluated by Dr. Angela Pride for right hip pain and was diagnosed with right piriformis syndrome. She was treated with a cortisone injection and physical therapy with some improvement, but subsequently developed right buttocks pain and pain and paresthesias down her right leg. She was treated with prednisone 50 mg for five days without improvement. She was seen on 10/9/2018, which was 10 days after completing the course of prednisone, and reported persistent severe right leg pain which was interfering with her activities and with sleep. She was taking Tylenol ES without relief, and was started on gabapentin. Lumbar spine x-ray was obtained (10/9/2018) showing grade 1 anterolisthesis of L4 on L5, either new or increased since 4/2015, and multilevel degenerative spondylosis/disc disease.  She was referred to physical therapy, but due to continued persistent symptoms, she had a lumbar MRI (10/17/2018) which showed multilevel degenerative findings causing various degrees of neuroforaminal  narrowing and lateral recess narrowing; also a right L4-L5 neuroforamen cystic lesion measuring approximately 10 x 6 mm was noted. She had a lumbar MRI with contrast (11/16/2018) which confirmed an extruding synovial cyst from the right L4-5 advanced facet arthropathy; significant right foraminal stenosis and nerve impingement persists. She continued physical therapy and reported that her pain has significantly improved. She was evaluated by Dr. Celine Jean on 1/3/2019 but her pain had already resolved. She is no longer taking gabapentin or Tylenol. She has a history of hypertension, hyperlipidemia, ASCVD, and syncope. In 3/2011, she had three syncopal episodes while donating blood. Over the subsequent four days, she developed exertional substernal chest tightness with left arm pain and dyspnea. She was evaluated and EKG revealed new T wave inversions in leads V2 and V3. Troponins were negative. She underwent cardiac catheterization (3/21/2011) which showed a 90% mid LAD and mild disease in the rest of the coronaries. She underwent PCI and placement of two drug-eluting stents for the mid LAD lesion; LV function was mildly diminished (EF 40-45%) with anteroapical hypokinesis. Follow-up echocardiogram (9/2011) showed return to normal LV function (EF 60%) and no RWMA. She has a history of multiple syncopal episodes, usually related to stressful situations, and thought to be vasovagal in origin. An implantable loop recorder was placed in 3/2011 and remained until 4/2013, and interrogation was negative for any significant arrhythmia. In 4/2014, she was hospitalized following another syncopal episode, which occurred after she had taken her morning medications. Afterward, she became nauseated and flushed, and called EMS. When they arrived, they found her on the floor and reportedly without a pulse. They began CPR and within 10-15 seconds, she recovered. Evaluation included EKG/troponins, which were negative, and an echocardiogram showing mild MR and normal LV function (EF 60-65%).  She was found to have hypokalemia and hypomagnesemia and hydrochlorothiazide was discontinued. It was thought that this event also represented a vasovagal reaction given her prodromal symptoms. She has had no further events since that time. Her most recent pharmacologic nuclear stress test (6/25/2020) was a normal, low risk study, negative for evidence of ischemia or prior infarction, and with normal LV size and function (EF 84%). Her current regimen includes metoprolol, lisinopril, aspirin, and rosuvastatin. She is being followed by Dr. Shraddha Patel. In 11/2017, she reported bilateral thigh aching pain, which increased with ambulation particularly when walking up stairs. She stated that the discomfort was similar to that which developed previously with use of simvastatin and atorvastatin. She had been on pravastatin since 10/2014 and did not note any difficulty previously. She discontinued pravastatin and had resolution of her symptoms. She was subsequently started on rosuvastatin in 12/2017, and did well until 7/2018 when she again developed myalgias. Her dose was decreased to 5 mg every other day. She reports today that she continues to have aching discomfort in her bilateral thighs on the days which she takes rosuvastatin, but reports that it is tolerable so she will continue. She has a history of idiopathic Parkinson's disease, predominantly left-sided. She was being treated with Sinemet, but changed to Stalevo in 10/2019, and reports improved control of symptoms. She has difficulty with writing at times, particularly towards the end of the day, and also describes increasing tremors midday. She is followed by Dr. Sudhakar Richardson. She has a history of laryngopharyngeal reflux disease, treated previously with dexlansoprazole, but ha successfully weaned from therapy. She is now receiving immunotherapy with Dr. Sonia Ann to address her allergies and hoarseness. In 5/2017 she had multiple episodes of epistaxis prompting an ED visit.  She subsequently underwent silver nitrate cautery of anterior vessels in her right nares by Dr. Fidelia Estevez, and has had no recurrence. In 3/2010, she underwent evaluation for iron deficiency anemia. She had previously had a negative colonoscopy and air contrast barium enema in 2009 by Dr. Haseeb Kaur, and she had an upper endoscopy by Dr. Sheila Merrill which was normal. She was treated with iron with improvement. She had a repeat screening colonoscopy in 7/2016 by Dr. Sheila Merrill which was normal. No further follow-up was recommended given her age. She denies any abdominal pain, nausea, vomiting, melena, hematochezia, or change in bowel movements. She has a history of osteopenia with bone density study in 5/2018 showing T-scores:  femoral neck left -1.2  /right -1.6 and lumbar -0.8 . She underwent repeat bone density study on 8/2/2021 showing T-scores: femoral neck left -1.7/right -2.1 and lumbar -0.9. Due to progression in her bilateral hips, she was started on Fosamax in 11/2021 after lab testing for secondary osteoporosis was negative. She continues to take calcium and Vitamin D supplements. She has no history of pathologic fractures. She has a recent history of abnormal mammograms since 10/2014 with microcalcifcations in the right breast. She has been undergoing surveillance mammograms every six months, which have been unchanged. She had a follow-up mammogram in 5/2016 which was negative, and routine annual screening was recommended. She has a history of bilateral knee pain secondary to osteoarthritis. She was evaluated by Dr. Yamil Garcia in 3/2018 and received a cortisone injection with improvement. She also has difficutly with left ankle pain which increase with ambulation. She received a cortisone injection for this as well from Dr. Yamil Garcia in 3/2018, but did not experience significant improvement. She was evaluated by Dr. Rebeca Fitzpatrick on 3/27/2018 and diagnosed with left peroneus brevis tendinitis.   An orthotic was recommended with improvement. She is now being followed by Dr. Claude Skene for left ankle pain, and CT scan left ankle (7/2019) showed evidence of chronic ATFL sprain or complete rupture, likely prior sprain of the calcaneofibular ligament, and advanced posterior subtalar and tarsometatarsal osteoarthrosis. On 5/9/2021, she presented to the [facility] ED after being bitten on her left wrist the day prior by her indoor/outdoor cat. She reports that she developed swelling and redness of her wrist and forearm, and was diagnosed with cellulitis. She was prescribed clindamycin 300 mg qid with eventual resolution. On 02/08/2022, she presented again to the [facility] ED for evaluation of a cat bite. She states that while she was asleep in bed, her pet cat bit her on the dorsum of her right hand in order to awaken her since it was hungry. She noted some increased redness surrounding the puncture wound prompting ED presentation. She denied any fever, chills, or hand pain. No laboratory testing was performed, and she was prescribed doxycycline and clindamycin for 10 days. Past Medical History:   Diagnosis Date    Basal cell cancer     CAD (coronary artery disease), native coronary artery 03/21/2011    s/p PCI with with CARLITO (Xience 2.75x12, 2.5x12) mLAD and mild disease in rest of coronaries. Midly depressed LV syst fct     Cardiac cath 03/21/2011    mLAD 90% (2.75 x 12 mm Xience stent). Otherwise patent coronaries. LVEDP 10 mmHg. EF 40-45%. Anteroapical hypk. Renal arteries patent.  Cardiac echocardiogram 04/17/2014    EF 60-65%. No WMA. Gr 1 DDfx. Mild MR. Similar to study of 9/14/11.  Cardiac nuclear imaging test 07/2017    Negative for ischemia or infarction. EF > 70%.     Cardiomyopathy secondary     ischemic +/- stress induced    Dyslipidemia     GERD (gastroesophageal reflux disease)     Hx of colonoscopy 07/12/2016    Normal. Dr. Jame Snow    Hypertension     Parkinson's disease     Syncope s/p ILD implantation     Past Surgical History:   Procedure Laterality Date    COLONOSCOPY N/A 7/12/2016    COLONOSCOPY performed by Feliciano Faustin MD at [address] HX BUNIONECTOMY      dorsal    HX CATARACT REMOVAL  11/2012    left    HX CATARACT REMOVAL  10/2012    right    HX CORONARY STENT PLACEMENT      HX HEART CATHETERIZATION      HX HEMORRHOIDECTOMY      HX HYSTERECTOMY  1996    partial    HX IMPLANTABLE LOOP RECORDER  [id number]    HX TONSILLECTOMY      HX TOTAL ABDOMINAL HYSTERECTOMY  1996    partial     Current Outpatient Medications   Medication Sig    carbidopa-levodopa-entacapone (STALEVO) tab TAKE 1 TABLET BY MOUTH THREE TIMES DAILY ALONG WITH STALEVO 100    cholecalciferol (VITAMIN D3) (5000 Units/125 mcg) tab tablet Take 5,000 Units by mouth daily.  metoprolol succinate (TOPROL-XL) 50 mg XL tablet TAKE 1 TABLET DAILY    rosuvastatin (CRESTOR) 10 mg tablet Take 0.5 Tablets by mouth every other day.  vit A/vit C/vit E/zinc/copper (PRESERVISION AREDS PO) Take 2 Tablets by mouth two (2) times a day.  azelastine (ASTELIN) 137 mcg (0.1 %) nasal spray 1 [place] by Both Nostrils route two (2) times a day. Use in each nostril as directed    lisinopriL (PRINIVIL, ZESTRIL) 10 mg tablet Take 1 Tablet by mouth daily.  potassium chloride (K-DUR, KLOR-CON M20) 20 mEq tablet Take 0.5 Tablets by mouth every other day.  alendronate (FOSAMAX) 35 mg tablet Take 1 Tablet by mouth every seven (7) days. Take with 8 ounces of water and sit or stand upright for at least 30 minutes after administration. Indications: post-menopausal osteoporosis prevention    carbidopa-levodopa ER (SINEMET CR)  mg per tablet Take 1 Tab by mouth nightly.  psyllium husk (METAMUCIL PO) Take  by mouth daily.     nitroglycerin (NITROSTAT) 0.4 mg SL tablet 1 sublingual every 5 minutes for chest pain for no more than 3 doses    carbidopa 25mg-levodopa 100mg-entacapone 200mg (STALEVO 100) tab tablet Take 1 Tablet by mouth three (3) times daily.  magnesium hydroxide (NEGRO MILK OF MAGNESIA) 400 mg/5 mL suspension Take 30 mL by mouth daily as needed for Constipation.  acetaminophen (TYLENOL) 650 mg CR tablet Take 650 mg by mouth every six (6) hours as needed for Pain.  co-enzyme Q-10 (CO Q-10) 100 mg capsule Take 200 mg by mouth daily.  aspirin 81 mg tablet Take 1 Tab by mouth daily.  DOCUSATE CALCIUM (STOOL SOFTENER PO) Take 1 Cap by mouth. No current facility-administered medications for this visit. Allergies and Intolerances: Allergies   Allergen Reactions    Keflex [Cephalexin] Other (comments)     Yeast infection    Pcn [Penicillins] Other (comments)     Family History: She has no family history of colon or breast cancer. Family History   Problem Relation Age of Onset    Heart Attack Father 80    Heart Disease Father     Cancer Mother         pancreatic    Hypertension Brother      Social History:   She  reports that she has never smoked. She has never used smokeless tobacco. She lives with her , who is having difficulty with mild cognitive impairment. She states that they sold their RV and now stay at home for the winter. She is a retired x-ray technician. Social History     Substance and Sexual Activity   Alcohol Use Yes    Comment: glass of wine occasionally.      Immunization History:  Immunization History   Administered Date(s) Administered    (RETIRED) Pneumococcal Vaccine (Unspecified Type) 01/01/2003    COVID-19, Pfizer Purple top, DILUTE for use, 12+ yrs, 30mcg/0.3mL dose 03/09/2021, 03/30/2021, 10/02/2021    Influenza High Dose Vaccine PF 09/29/2014, 10/05/2015, 11/01/2016, 08/30/2017    Influenza Vaccine (Tri) Adjuvanted (>65 Yrs FLUAD TRI 77040) 10/09/2018, 09/17/2019    Influenza Vaccine Whole 09/07/2010, 09/03/2011, 10/04/2012    Influenza, High-dose, Quadrivalent (>65 Yrs Fluzone High Dose Quad 40677) 10/03/2021    Influenza, Quadrivalent, Adjuvanted (>65 Yrs FLUAD QUAD [id number]) 09/11/2020    Pneumococcal Conjugate (PCV-13) 10/05/2015    Pneumococcal Polysaccharide (PPSV-23) 01/01/2003, 10/30/2019    TD Vaccine 01/01/2003    Tdap 09/25/2013, 05/10/2021    Zoster 01/01/2007    Zoster Recombinant 09/18/2018, 11/23/2018       Review of Systems:   As above included in HPI. Otherwise 11 point review of systems negative including constitutional, skin, HENT, eyes, respiratory, cardiovascular, gastrointestinal, genitourinary, musculoskeletal, endo/heme/aller, neurological.    Physical:   Visit Vitals  /60   Pulse 74   Temp 97.5 °F (36.4 °C) (Temporal)   Resp 16   Ht 5' 5\" (1.651 m)   Wt 123 lb 12.8 oz (56.2 kg)   SpO2 97%   BMI 20.60 kg/m²       Exam:     Patient appears in no apparent distress. Affect is appropriate. HEENT: PERRLA, anicteric, no JVD, adenopathy or thyromegaly. No carotid bruits or radiated murmur. Lungs: clear to auscultation, no wheezes, rhonchi, or rales. Heart: regular rate and rhythm. II/VI FUNMILAYO throughout precordium with normal S2; no rubs or gallops  Abdomen: soft, nontender, nondistended, normal bowel sounds, no hepatosplenomegaly or masses. Extremities: without edema. Review of Data:  Labs:  Hospital Outpatient Visit on 05/11/2022   Component Date Value Ref Range Status    WBC 05/11/2022 6.4  4.6 - 13.2 K/uL Final    RBC 05/11/2022 4.20  4. 20 - 5.30 M/uL Final    HGB 05/11/2022 12.6  12.0 - 16.0 g/dL Final    HCT 05/11/2022 40.2  35.0 - 45.0 % Final    MCV 05/11/2022 95.7  78.0 - 100.0 FL Final    MCH 05/11/2022 30.0  24.0 - 34.0 PG Final    MCHC 05/11/2022 31.3  31.0 - 37.0 g/dL Final    RDW 05/11/2022 13.2  11.6 - 14.5 % Final    PLATELET 16/24/9360 670  135 - 420 K/uL Final    MPV 05/11/2022 9.9  9.2 - 11.8 FL Final    NRBC 05/11/2022 0.0  0  WBC Final    ABSOLUTE NRBC 05/11/2022 0.00  0.00 - 0.01 K/uL Final    NEUTROPHILS 05/11/2022 70  40 - 73 % Final    LYMPHOCYTES 05/11/2022 20* 21 - 52 % Final    MONOCYTES 05/11/2022 8  3 - 10 % Final    EOSINOPHILS 05/11/2022 1  0 - 5 % Final    BASOPHILS 05/11/2022 1  0 - 2 % Final    IMMATURE GRANULOCYTES 05/11/2022 1* 0.0 - 0.5 % Final    ABS. NEUTROPHILS 05/11/2022 4.4  1.8 - 8.0 K/UL Final    ABS. LYMPHOCYTES 05/11/2022 1.3  0.9 - 3.6 K/UL Final    ABS. MONOCYTES 05/11/2022 0.5  0.05 - 1.2 K/UL Final    ABS. EOSINOPHILS 05/11/2022 0.1  0.0 - 0.4 K/UL Final    ABS. BASOPHILS 05/11/2022 0.0  0.0 - 0.1 K/UL Final    ABS. IMM. GRANS. 05/11/2022 0.0  0.00 - 0.04 K/UL Final    DF 05/11/2022 AUTOMATED    Final    LIPID PROFILE 05/11/2022        Final    Cholesterol, total 05/11/2022 124  <200 MG/DL Final    Triglyceride 05/11/2022 77  <150 MG/DL Final    HDL Cholesterol 05/11/2022 62* 40 - 60 MG/DL Final    LDL, calculated 05/11/2022 46.6  0 - 100 MG/DL Final    VLDL, calculated 05/11/2022 15.4  MG/DL Final    CHOL/HDL Ratio 05/11/2022 2.0  0 - 5.0   Final    Magnesium 05/11/2022 2.8* 1.6 - 2.6 mg/dL Final    Sodium 05/11/2022 143  136 - 145 mmol/L Final    Potassium 05/11/2022 4.0  3.5 - 5.5 mmol/L Final    Chloride 05/11/2022 108  100 - 111 mmol/L Final    CO2 05/11/2022 32  21 - 32 mmol/L Final    Anion gap 05/11/2022 3  3.0 - 18 mmol/L Final    Glucose 05/11/2022 85  74 - 99 mg/dL Final    BUN 05/11/2022 15  7.0 - 18 MG/DL Final    Creatinine 05/11/2022 0.60  0.6 - 1.3 MG/DL Final    BUN/Creatinine ratio 05/11/2022 25* 12 - 20   Final    GFR est AA 05/11/2022 >60  >60 ml/min/1.73m2 Final    GFR est non-AA 05/11/2022 >60  >60 ml/min/1.73m2 Final    Calcium 05/11/2022 9.0  8.5 - 10.1 MG/DL Final    Bilirubin, total 05/11/2022 0.4  0.2 - 1.0 MG/DL Final    ALT (SGPT) 05/11/2022 20  13 - 56 U/L Final    AST (SGOT) 05/11/2022 16  10 - 38 U/L Final    Alk.  phosphatase 05/11/2022 95  45 - 117 U/L Final    Protein, total 05/11/2022 6.5  6.4 - 8.2 g/dL Final    Albumin 05/11/2022 3.5  3.4 - 5.0 g/dL Final    Globulin 05/11/2022 3.0  2.0 - 4.0 g/dL Final    A-G Ratio 05/11/2022 1.2  0.8 - 1.7   Final    Vitamin D 25-Hydroxy 05/11/2022 28.3* 30 - 100 ng/mL Final    TSH 05/11/2022 2.61  0.36 - 3.74 uIU/mL Final    Color 05/11/2022 YELLOW    Final    Appearance 05/11/2022 CLEAR    Final    Specific gravity 05/11/2022 1.007  1.005 - 1.030   Final    pH (UA) 05/11/2022 8.0  5.0 - 8.0   Final    Protein 05/11/2022 Negative  NEG mg/dL Final    Glucose 05/11/2022 Negative  NEG mg/dL Final    Ketone 05/11/2022 Negative  NEG mg/dL Final    Bilirubin 05/11/2022 Negative  NEG   Final    Blood 05/11/2022 Negative  NEG   Final    Urobilinogen 05/11/2022 0.2  0.2 - 1.0 EU/dL Final    Nitrites 05/11/2022 Negative  NEG   Final    Leukocyte Esterase 05/11/2022 Negative  NEG   Final    WBC 05/11/2022 Negative  0 - 4 /hpf Final    RBC 05/11/2022 Negative  0 - 5 /hpf Final    Epithelial cells 05/11/2022 FEW  0 - 5 /lpf Final    Bacteria 05/11/2022 Negative  NEG /hpf Final       Health Maintenance:  Screening:    Mammogram: negative (8/2021)   PAP smear: s/p ROHINI. No further screening. Colorectal: colonoscopy (7/2016) normal. Dr. Jonathan Hook No further screening. Depression: none   DM (HbA1c/FPG): FPG 85 (5/2022)   Hepatitis C: unknown   Falls: none   DEXA: osteopenia (8/2021)   Glaucoma: regular eye exams with Dr. Claudean Flair. Tildon Sauers (9/2021) for macular degeneration.    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 Hyperlipidemia. Previously on low intensity dose pravastatin but developed severe bilateral thigh pain, worse with ambulation, and was discontinued in 11/2018 with improvement. Reported similar symptoms in past with statin (simvastatin until 2012, and then atorvastatin until 9/2014). Started on rosuvastatin 10 mg daily in 12/2018 and tolerated without difficulty until 7/2018 when developed recurrent myalgias and dose decreased to 5 mg every other day with improvement. Lipid panel today with LDL 46 and HDL 62, indicative of excellent control. Emphasized importance of lifestyle modifications, including heart healthy diet and regular exercise. Would not recommend weight loss given normal BMI of 20. Will continue to follow. 2. Hypertension. Blood pressure well controlled on lisinopril 10 mg daily and metoprolol succinate 50 mg daily. Renal function remains normal with creatinine 0.60/ eGFR >60. Advised to increase fluid intake given elevated BUN/creatinine ratio of 25. Continue to follow. 3. ASCVD. Remains asymptomatic on current regimen of aspirin, metoprolol succinate, and rosuvastatin. Resolution of cardiomyopathy with last echocardiogram in 2014 revealing normal LV function. Negative pharmacologic nuclear stress test in 6/2020. Being followed by Dr. Henna Mills annually, with last visit in 1/2022. Planning to obtain a repeat pharmacologic nuclear stress test prior to her next visit in 01/2023.  4.  History of syncope. No further episodes since 2014. Most likely secondary to vasovagal reaction. Follow. 5. Parkinson's disease. Therapy with addition of Stalevo to Sinemet with significant improvement in control of tremors. Reports dose of Stalevo increased with marked benefit. Being followed by Dr. Kellie Parr.  6. Osteopenia. Repeat bone density scan on 8/2021.  Using femoral neck T-scores, calculated FRAX score estimates her 10 year risk of a major osteoporetic fracture at 14 % and hip fracture at 4.8 %, which are an indication for biphosphonate treatment based on hip fracture risk of >3%. Also with evidence of progression from prior study in 5/2018. Has never received treatment in the past.  Discussed treatment today and patient willing to initiate treatment with biphosphonate. Reports has regular dental exams every 6 months, and last seen in 11/2021 without any problems noted. Completed screening labs for secondary osteoporosis in 11/2021, including intact PTH, vitamin D, phosphorus, and gammopathy panel, and all normal.  She was subsequently started on Fosamax 35 mg weekly for osteoporosis prevention (prescribed 11/26/2021). Reports that she is tolerating it well. Advised to continue calcium and vitamin D supplements. Encouraged exercise, particularly weight bearing activities. Fall precautions stressed. 7. Lumbar spondylosis. Patient with symptoms of right buttock pain since 8/2018 and diagnosed with right piriformis syndrome. Completed physical therapy and received cortisone injection with some improvement, but subsequently developed right leg paresthesias and pain consistent with right sided sciatica. Treated with five day course of prednisone 50 mg without improvement. LS spine x-rays and lumbar MRI with degenerative changes, although MRI showed a right L4-L5 neuroforamen synovial cyst from the right L4-5 advanced facet arthropathy with significant right foraminal stenosis and nerve impingement. Referred for physical therapy and treated with gabapentin 100 mg tid with resolution of symptoms. Evaluated by Dr. Liz Melgar, but symptoms had already resolved. No longer taking gabapentin. Describing some mid back pain/mid-lower abdominal discomfort upon awakening in the morning which resolves when getting out of bed.   Noted to have right thoracic paraspinal tenderness on exam.  Advised to perform back stretches and call for any worsening. 8. Osteoarthritis. Difficulty with bilateral knee pain L>R and receiving intermittent cortisone injections from Dr. Denise Perez. Left ankle pain being addressed by podiatrist, Dr. Leticia Mello. Stable today. 9. Macular degeneration, R>L. On Preservision. Being followed by Dr. Pat Ocasio every 12 weeks for injections. 10. Laryngopharyngeal reflux. Doing well. Discontinued Dexilant without an increase in symptoms. Now receiving immunotherapy to address hoarseness and allergies. Followed by Dr. Robert Novoa. 11. Psoriasis. Using clobetasol solution for scalp psoriasis with reasonable control. 12. Health maintenance. Completed Pfizer COVID 19 vaccine series and received [employer] booster dose. Advised to proceed with a second booster dose given new CDC recommendations. Received 2/2 doses of Shingrix vaccine. Other immunizations up to date. No further colorectal cancer screening needed. Mammogram up to date. Continue regular eye exams with Ankur Diaz and Pat Ocasio. Vitamin D level low today on maintenance dose supplement of 2000 units daily and advised to increase to 5000 units daily. Will reassess at next visit. Medicare wellness visit up-to-date. Patient understands recommendations and agrees with plan. Follow-up in 6 months.

## 2022-05-26 ENCOUNTER — OFFICE VISIT (OUTPATIENT)
Dept: ORTHOPEDIC SURGERY | Age: 82
End: 2022-05-26
Payer: MEDICARE

## 2022-05-26 VITALS
SYSTOLIC BLOOD PRESSURE: 122 MMHG | TEMPERATURE: 96.9 F | BODY MASS INDEX: 20.8 KG/M2 | WEIGHT: 125 LBS | DIASTOLIC BLOOD PRESSURE: 69 MMHG | OXYGEN SATURATION: 99 % | HEART RATE: 70 BPM

## 2022-05-26 DIAGNOSIS — M89.8X1 CHRONIC SCAPULAR PAIN: ICD-10-CM

## 2022-05-26 DIAGNOSIS — M54.6 TRIGGER POINT OF THORACIC REGION: Primary | ICD-10-CM

## 2022-05-26 DIAGNOSIS — M25.511 TRIGGER POINT OF RIGHT SHOULDER REGION: ICD-10-CM

## 2022-05-26 DIAGNOSIS — M47.812 CERVICAL SPONDYLOSIS: ICD-10-CM

## 2022-05-26 DIAGNOSIS — G89.29 CHRONIC SCAPULAR PAIN: ICD-10-CM

## 2022-05-26 PROCEDURE — G8432 DEP SCR NOT DOC, RNG: HCPCS | Performed by: PHYSICAL MEDICINE & REHABILITATION

## 2022-05-26 PROCEDURE — 1101F PT FALLS ASSESS-DOCD LE1/YR: CPT | Performed by: PHYSICAL MEDICINE & REHABILITATION

## 2022-05-26 PROCEDURE — G8399 PT W/DXA RESULTS DOCUMENT: HCPCS | Performed by: PHYSICAL MEDICINE & REHABILITATION

## 2022-05-26 PROCEDURE — G8420 CALC BMI NORM PARAMETERS: HCPCS | Performed by: PHYSICAL MEDICINE & REHABILITATION

## 2022-05-26 PROCEDURE — G8752 SYS BP LESS 140: HCPCS | Performed by: PHYSICAL MEDICINE & REHABILITATION

## 2022-05-26 PROCEDURE — G8754 DIAS BP LESS 90: HCPCS | Performed by: PHYSICAL MEDICINE & REHABILITATION

## 2022-05-26 PROCEDURE — G8427 DOCREV CUR MEDS BY ELIG CLIN: HCPCS | Performed by: PHYSICAL MEDICINE & REHABILITATION

## 2022-05-26 PROCEDURE — 72070 X-RAY EXAM THORAC SPINE 2VWS: CPT | Performed by: PHYSICAL MEDICINE & REHABILITATION

## 2022-05-26 PROCEDURE — G8536 NO DOC ELDER MAL SCRN: HCPCS | Performed by: PHYSICAL MEDICINE & REHABILITATION

## 2022-05-26 PROCEDURE — 99203 OFFICE O/P NEW LOW 30 MIN: CPT | Performed by: PHYSICAL MEDICINE & REHABILITATION

## 2022-05-26 PROCEDURE — 1090F PRES/ABSN URINE INCON ASSESS: CPT | Performed by: PHYSICAL MEDICINE & REHABILITATION

## 2022-05-26 PROCEDURE — 1123F ACP DISCUSS/DSCN MKR DOCD: CPT | Performed by: PHYSICAL MEDICINE & REHABILITATION

## 2022-05-26 PROCEDURE — 72040 X-RAY EXAM NECK SPINE 2-3 VW: CPT | Performed by: PHYSICAL MEDICINE & REHABILITATION
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
## 2022-05-26 NOTE — PATIENT INSTRUCTIONS
Shoulder Blade: Exercises  Introduction  Here are some examples of exercises for you to try. The exercises may be suggested for a condition or for rehabilitation. Start each exercise slowly. Ease off the exercises if you start to have pain. You will be told when to start these exercises and which ones will work best for you. How to do the exercises  Shoulder roll    1. Stand tall with your chin slightly tucked. Imagine that a string at the top of your head is pulling you straight up. 2. Keep your arms relaxed. All motion will be in your shoulders. 3. Shrug your shoulders up toward your ears, then up and back. [place]in your shoulders down and back, like you're sliding your hands down into your back pants pockets. 4. Repeat the circles at least 2 to 4 times. 5. This exercise is also helpful anytime you want to relax. Lower neck and upper back stretch    1. With your arms about shoulder height, clasp your hands in front of you. 2. Drop your chin toward your chest.  3. Reach straight forward so you are rounding your upper back. Think about pulling your shoulder blades apart. [place] feel a stretch across your upper back and shoulders. Hold for at least 6 seconds. 4. Repeat 2 to 4 times. Triceps stretch    1. Reach your arm straight up. 2. Keeping your elbow in place, bend your arm and reach your hand down behind your back. 3. With your other hand, apply gentle pressure to the bent elbow. [place] Lung feel a stretch at the back of your upper arm and shoulder. Hold about 6 seconds. 4. Repeat 2 to 4 times with each arm. Shoulder stretch    1. Relax your shoulders. 2. Raise one arm to shoulder height, and reach it across your chest.  3. Pull the arm slightly toward you with your other arm. This will help you get a gentle stretch. Hold for about 6 seconds. 4. Repeat 2 to 4 times. Shoulder blade squeeze    1. Sit or stand up tall with your arms at your sides.   2. Keep your shoulders relaxed and down, not shrugged. 3. Squeeze your shoulder blades together. Hold for 6 seconds, then relax. 4. Repeat 8 to 12 times. Straight-arm shoulder blade squeeze    1. Sit or stand tall. Relax your shoulders. 2. With palms down, hold your elastic tubing or band straight out in front of you. 3. Start with slight tension in the tubing or band, with your hands about shoulder-width apart. 4. Slowly pull straight out to the sides, squeezing your shoulder blades together. Keep your arms straight and at shoulder height. Slowly release. 5. Repeat 8 to 12 times. Rowing    1. [place] your elastic tubing or band at about waist height. Take one end in each hand. 2. Sit or stand with your feet hip-width apart. 3. Hold your arms straight in front of you. Adjust your distance to create slight tension in the tubing or band. 4. Slightly tuck your chin. Relax your shoulders. 5. Without shrugging your shoulders, pull straight back. Your elbows will pass alongside your waist.  Pull-downs    1. [place] your elastic tubing or band in the top of a closed door. Take one end in each hand. 2. Either sit or stand, depending on what is more comfortable. If you feel unsteady, sit on a chair. 3. Start with your arms up and comfortably apart, elbows straight. There should be a slight tension in the tubing or band. 4. Slightly tuck your chin, and look straight ahead. 5. Keeping your back straight, slowly pull down and back, bending your elbows. 6. Stop where your hands are level with your chin, in a \"goalpost\" position. 7. Repeat 8 to 12 times. Chest T stretch    1. Lie on your back. Raise your knees so they are bent. Plant your feet on the floor, hip-width apart. 2. Tuck your chin, and relax your shoulders. 3. Reach your arms straight out to the sides. If you don't feel a mild stretch in your shoulders and across your chest, use a foam roll or a tightly rolled blanket under your spine, from your tailbone to your head.   4. Relax in this position for at least 15 to 30 seconds while you breathe normally. Repeat 2 to 4 times. 5. As you get used to this stretch, keep adding a little more time until you are able relax in this position for 2 or 3 minutes. When you can relax for at least 2 minutes, you only need to do the exercise 1 time per session. Chest goalpost stretch    1. Lie on your back. Raise your knees so they are bent. Plant your feet on the floor, hip-width apart. 2. Tuck your chin, and relax your shoulders. 3. Reach your arms straight out to the sides. 4. Bend your arms at the elbows, with your hands pointed toward the top of your head. Your arms should make an L on either side of your head. Your palms should be facing up. 5. If you don't feel a mild stretch in your shoulders and across your chest, use a foam roll or tightly rolled blanket under your spine, from your tailbone to your head. 6. Relax in this position for at least 15 to 30 seconds while you breathe normally. Repeat 2 to 4 times. 7. Each day you do this exercise, add a little more time until you can relax in this position for 2 or 3 minutes. When you can relax for at least 2 minutes, you only need to do the exercise 1 time per session. Follow-up care is a key part of your treatment and safety. Be sure to make and go to all appointments, and call your doctor if you are having problems. It's also a good idea to know your test results and keep a list of the medicines you take. Where can you learn more? Go to http://www.gray.com/  Enter H745 in the search box to learn more about \"Shoulder Blade: Exercises. \"  Current as of: July 1, 2021               Content Version: 13.2  © 1293-5120 [employer]. Care instructions adapted under license by [employer] (which disclaims liability or warranty for this information).  If you have questions about a medical condition or this instruction, always ask your healthcare professional. [employer] disclaims any warranty or liability for your use of this information.

## 2022-05-26 NOTE — LETTER
5/27/2022    Patient: Heber Love   YOB: 1940   Date of Visit: 5/26/2022     Mark Gastelum, [address]  [address]    Dear Mark Gastelum MD,      Thank you for referring Ms. Petar Reynolds to [address] [place] [address] [facility] for evaluation. My notes for this consultation are attached. If you have questions, please do not hesitate to call me. I look forward to following your patient along with you.       Sincerely,    Calvin Cordoba MD

## 2022-06-03 ENCOUNTER — HOSPITAL ENCOUNTER (OUTPATIENT)
Dept: PHYSICAL THERAPY | Age: 82
Discharge: HOME | End: 2022-06-03
Payer: MEDICARE

## 2022-06-03 PROCEDURE — 97110 THERAPEUTIC EXERCISES: CPT

## 2022-06-03 PROCEDURE — 97161 PT EVAL LOW COMPLEX 20 MIN: CPT

## 2022-06-03 PROCEDURE — 97140 MANUAL THERAPY 1/> REGIONS: CPT

## 2022-06-03 NOTE — PROGRESS NOTES
[facility] [place]  [address]  [phone] fax  Plan of Care/ Statement of Necessity for Physical Therapy Services    Patient name: Kevin Chakraborty Start of Care: 6/3/2022   Referral source: Zuri Vega MD : 1940    Medical Diagnosis: Neck pain [M54.2]  Spondylosis without myelopathy or radiculopathy, cervical region [M47.812]  Pain in right shoulder [M25.511]  Payor: [place] MEDICARE / Plan: VA MEDICARE PART A & B / Product Type: Medicare /  Onset Date:  \"a little over two weeks ago\"    Treatment Diagnosis: neck pain   Prior Hospitalization: see medical history Provider#: [id number]   Medications: Verified on Patient summary List    Comorbidities: Parkinson's, HBP, stent placement, heart disease, osteoporosis, arthritis   Prior Level of Function: Prior to over two weeks ago- some neck pain- progressively getting worse. Reside with spouse. Functionally independent. The Plan of Care and following information is based on the information from the initial evaluation. Assessment / key information:  Patient is a 78 y.o. yo female who presents to [facility] [place] with diagnosis of Neck pain [M54.2]  Spondylosis without myelopathy or radiculopathy, cervical region [M47.812]  Pain in right shoulder [M25.511]. Patient reports chief complaint of neck pain that commenced a little over 2 weeks ago. Mechanism of Injury: progressively getting worse. Pain level is a 2(C) and ranges from 2/10 to 10/10 which increases with turning head. Denies radicular symptoms down UE's. As per patient X-rays performed last MD visit: \"arthritis\". Upon objective evaluation patient demonstrates impaired and painful C/s AROM, postural deviations of Rounded shoulders, forward head, increased thoracic kyphosis, impaired flexibility of upper trap and TTP upper traps, levator, parascapular musculature (Right>Left). .  Patient scored 39 on FOTO indicating severe decreased function and quality of life. Functional limitations include pain/difficulty to turn head when driving/parking car in driveway. Functional and objective measures:   Postural deviations Rounded shoulders, forward head, increased thoracic kyphosis. C/S AROM (degrees): flexion 40, extension 35, right side bend 37, left side bend 20, right rotation 36, left rotation 30. Manual muscle test (out of 5): Right UT 5, shoulder IR 5, shoulder ER 5, shoulder flexion 5,  Biceps 5  Left UT 5, shoulder IR 5, shoulder ER 5, shoulder flexion 5,  Biceps 5  Middle trap strength: 3+/5 bilat     Special Tests : NI  Deep neck flexor endurance: NT  Flexibility: Soft tissue restrictions along UT, levator and parascapular musculature (right > left). Palpation TTP upper traps, levator, parascapular musculature (Right>Left),. Shoulder AROM: WNL. Headaches: yes, how often: daily, location: right side, relieving factors: Tylenol    Justification for Eval Code Complexity:  Patient History : high  Examination see exam   Clinical Presentation: stable  Clinical Decision Making : FOTO : 45 /100     Patient can benefit from Skilled PT to improve ROM, improve muscle extensibility, decrease pain/discomfort, to facilitate ADLs & overall functional status.      Evaluation Complexity History HIGH Complexity :3+ comorbidities / personal factors will impact the outcome/ POC ; Examination MEDIUM Complexity : 3 Standardized tests and measures addressing body structure, function, activity limitation and / or participation in recreation  ;Presentation LOW Complexity : Stable, uncomplicated  ;Clinical Decision Making MEDIUM Complexity : FOTO score of 26-74  Overall Complexity Rating: LOW   Problem List: pain affecting function, decrease ROM, decrease ADL/ functional abilitiies, decrease activity tolerance and decrease flexibility/ joint mobility   Treatment Plan may include any combination of the following: Therapeutic exercise, Therapeutic activities, Neuromuscular re-education, Physical agent/modality, Manual therapy, Aquatic therapy, Patient education, Self Care training, Functional mobility training and Home safety training  Patient / Family readiness to learn indicated by: asking questions, trying to perform skills and interest  Persons(s) to be included in education: patient (P)  Barriers to Learning/Limitations: None  Patient Goal (s): less pain and ability to turn head  Patient Self Reported Health Status: good  Rehabilitation Potential: good       Short Term Goals: To be accomplished in  1  weeks:  1) Pt will be independent with initial HEP  Status at last certification/assessment: established and reviewed with patient   Long Term Goals: To be accomplished in  5  weeks:  1) Patient will be independent with comprehensive and updated HEP to maintain functional gains made in PT  Status at last certification/assessment: established and reviewed initial HEP  2) Increase FOTO score to 62 indicating improved function and quality of life. Status at last certification/assessment: 45  3) Patient to Improve C/S AROM rotation by at least 10 degrees to facilitate turning head to park vehicle in driveway. Status at last certification/assessment: *right rotation 36, left rotation 30.  4 )  Patient will report 75% improvement in symptoms with turning head to park car. .   Status at last certification/assessment: n/a     Frequency / Duration: Patient to be seen 1 times per week for 6 weeks.  All LTG as above will be assessed and updated every 10 visits or 30 days and progressed as needed     Patient/ Caregiver education and instruction: Diagnosis, prognosis, exercises (reviewed initial HEP, hot pack application), PT POC, PT assessment findings    [x]  Plan of care has been reviewed with PTA    Certification Period: 6/3/22 - 07/02/22  Lore Hearn, PT 6/3/2022  8:59 AM _____________________________________________________________________  I certify that the above Therapy Services are being furnished while the patient is under my care. I agree with the treatment plan and certify that this therapy is necessary.     [de-identified] Signature:____________Date:_________TIME:________     Michelle Tucker MD  ** Signature, Date and Time must be completed for valid certification **    Please sign and return to [facility] - [address]  [phone] fax

## 2022-06-03 NOTE — PROGRESS NOTES
PT DAILY TREATMENT NOTE/ EVAL    Patient Name: Carolina Nunez  Date:6/3/2022  : 1940  [x]  Patient  Verified  Payor: Natty Alfaro / Plan: [place] / Product Type: Medicare /    In time:0804 am  Out time:0850 am  Total Treatment Time (min): 55  Visit #: 1 of 6    Medicare/BCBS Only   Total Timed Codes (min):  25 1:1 Treatment Time:  46     Treatment Area: Neck pain [M54.2]  Spondylosis without myelopathy or radiculopathy, cervical region [M47.812]  Pain in right shoulder [M25.511]    SUBJECTIVE  Pain Level (0-10 scale): 2  []constant []intermittent []improving []worsening []no change since onset    Any medication changes, allergies to medications, adverse drug reactions, diagnosis change, or new procedure performed?: See summary sheet. Subjective functional status/changes:     SEE POC    OBJECTIVE/EXAMINATION  SEE POC      21 min [x]Eval                  []Re-Eval       10 min Therapeutic Exercise:  [x] See flow sheet: with therex: Patient/ Caregiver education and instruction: Diagnosis, prognosis, exercises (reviewed initial HEP, hot pack application), PT POC, PT assessment findings    Rationale: increase ROM and increase strength to improve the patients ability to perform driving and ADL's with decreased discomfort     15 min Manual Therapy: In supine: TrP release to bilat UT/levator scap musculature; STM to bilat cervical paraspinals; in left side-lying: STM to right scapular musculature    Rationale: decrease pain, increase ROM and increase tissue extensibility to perform ADL's and driving with greater ease   The manual therapy interventions were performed at a separate and distinct time from the therapeutic activities interventions.       With   [x] TE   [] TA   [] neuro   [] other: Patient Education: [] Review HEP    [] Progressed/Changed HEP based on:   [] positioning   [] body mechanics   [] transfers   [] heat/ice application    [x] other: See above      Other Objective/Functional Measures: SEE POC      Other tests/comments: SEE POC       Pain Level (0-10 scale) post treatment: 2/10    ASSESSMENT/Changes in Function: Pt was instructed in initial HEP. Pt was given hand out detailing exercises and instructed in modification of exercises to tolerance, and in performing exercises safely. Pt verbalized understanding. Patient required demonstration, skilled verbal cues for all exercises to perform correctly. SEE POC    Patient will benefit from skilled PT services to modify and progress therapeutic interventions, address functional mobility deficits, address ROM deficits, address strength deficits, analyze and address soft tissue restrictions, analyze and cue movement patterns, analyze and modify body mechanics/ergonomics, assess and modify postural abnormalities, address imbalance/dizziness and instruct in home and community integration to attain goals and maximize pt's functional status. [x]  See Plan of Care  []  See progress note/recertification  []  See Discharge Summary         Progress towards goals / Updated goals:  Goals established at time of evaluation     PLAN  []  Upgrade activities as tolerated     [x]  Continue plan of care  []  Update interventions per flow sheet       []  Discharge due to:_  [x]  Other: Pt will benefit from skilled OP PT 1 x a week for 6 weeks in order to address impairments and maximize functional status.      Augusta Foote PT 6/3/2022  9:52 AM      Future Appointments   Date Time Provider Kristofer Chow   6/8/2022  7:30 AM Bibiana Sands, PT [facility]   6/15/2022  7:30 AM Bibiana Sands, PT [facility]   6/24/2022  7:30 AM Bibiana Sands, PT [facility]   6/29/2022  7:30 AM Bibiana Sands, PT [facility]   12/2/2022  8:15 AM IOC LAB VISIT [facility] BS AMB   12/6/2022  7:30 AM CSI NM ROOM [facility] BS AMB   12/8/2022  8:00 AM Olivia Streeter MD [facility] BS AMB   1/10/2023  8:00 AM Allan Romero MD [facility] BS AMB

## 2022-06-08 ENCOUNTER — HOSPITAL ENCOUNTER (OUTPATIENT)
Dept: PHYSICAL THERAPY | Age: 82
Discharge: HOME | End: 2022-06-08
Payer: MEDICARE

## 2022-06-08 PROCEDURE — 97110 THERAPEUTIC EXERCISES: CPT

## 2022-06-08 PROCEDURE — 97140 MANUAL THERAPY 1/> REGIONS: CPT

## 2022-06-08 NOTE — PROGRESS NOTES
PT DAILY TREATMENT NOTE     Patient Name: Ignacia Franco  Date:2022  : 1940  [x]  Patient  Verified  Payor: [place] MEDICARE / Plan: [place] MEDICARE PART A & B / Product Type: Medicare /    In time:7:31  Out time:8:25  Total Treatment Time (min): 47  Visit #: 2 of 6    Medicare/BCBS Only   Total Timed Codes (min):  44 1:1 Treatment Time:  44       Treatment Area: Neck pain [M54.2]  Spondylosis without myelopathy or radiculopathy, cervical region [M47.812]  Pain in right shoulder [M25.511]    SUBJECTIVE  Pain Level (0-10 scale): 8/10  Any medication changes, allergies to medications, adverse drug reactions, diagnosis change, or new procedure performed?: [x] No    [] Yes (see summary sheet for update)  Subjective functional status/changes:   [] No changes reported  \"I hurt. It started on Monday. I didn't sleep at all on Monday night. I did the HEP but either I didn't do it correctly or I overdid it because nothing helped. It's hard to move my neck. \"    OBJECTIVE    Modality rationale: decrease pain and increase tissue extensibility to improve the patients ability to increase cervical mobility   Min Type Additional Details    [] Estim:  []Unatt       []IFC  []Premod                        []Other:  []w/ice   []w/heat  Position:  Location:    [] Estim: []Att    []TENS instruct  []NMES                    []Other:  []w/US   []w/ice   []w/heat  Position:  Location:    []  Traction: [] Cervical       []Lumbar                       [] Prone          []Supine                       []Intermittent   []Continuous Lbs:  [] before manual  [] after manual    []  Ultrasound: []Continuous   [] Pulsed                           []1MHz   []3MHz W/cm2:  Location:    []  Iontophoresis with dexamethasone         Location: [] Take home patch   [] In clinic   10 []  Ice     [x]  heat  []  Ice massage  []  Laser   []  Anodyne Position: seated  Location: neck    []  Laser with stim  []  Other:  Position:  Location:    [] Vasopneumatic Device    []  Right     []  Left  Pre-treatment girth:  Post-treatment girth:  Measured at (location):  Pressure:       [] lo [] med [] hi   Temperature: [] lo [] med [] hi   [] Skin assessment post-treatment:  []intact []redness- no adverse reaction    []redness - adverse reaction:     29 min Therapeutic Exercise:  [] See flow sheet :   Rationale: increase ROM and increase strength to improve the patients ability to improve postural positioning, increase cervical mobility with less pain    15 min Manual Therapy:  Supine gentle SOR, STM to B upper trapezius, cervical paraspinals, SCM, scalenes   The manual therapy interventions were performed at a separate and distinct time from the therapeutic activities interventions. Rationale: increase ROM, increase tissue extensibility and decrease trigger points to improve cervical mobility with less pain for ease of ADLs          With   [] TE   [] TA   [] neuro   [] other: Patient Education: [x] Review HEP    [] Progressed/Changed HEP based on:   [] positioning   [] body mechanics   [] transfers   [] heat/ice application    [] other:      Other Objective/Functional Measures: Initiated treatment program per flow sheet with skilled verbal cueing for proper technique. Pain Level (0-10 scale) post treatment: 5/10    ASSESSMENT/Changes in Function: Pt was instructed in first treatment session since initial evaluation to address neck pain. Pt was in increased pain today, with minimal tolerance to exercises, especially increased cervical movements. Pt able to report mild decrease in symptoms with manual interventions. Reviewed HEP with Pt to ensure proper technique at home and provided modification of performing snags in supine versus sitting for increased cervical support. Applied modalities to address pain and Pt was able to report reduction in symptoms post session.     Patient will continue to benefit from skilled PT services to address functional mobility deficits, address ROM deficits, address strength deficits, analyze and address soft tissue restrictions, analyze and cue movement patterns, analyze and modify body mechanics/ergonomics, assess and modify postural abnormalities and instruct in home and community integration to attain remaining goals. []  See Plan of Care  []  See progress note/recertification  []  See Discharge Summary         Progress towards goals / Updated goals:   · Short Term Goals: To be accomplished in  1  weeks:  1) Pt will be independent with initial HEP  Status at last certification/assessment: established and reviewed with patient  Current: met - Pt reports compliance (6/8/22)  · Long Term Goals: To be accomplished in  5  weeks:  1) Patient will be independent with comprehensive and updated HEP to maintain functional gains made in PT  Status at last certification/assessment: established and reviewed initial HEP  2) Increase FOTO score to 62 indicating improved function and quality of life. Status at last certification/assessment: 45  3) Patient to Improve C/S AROM rotation by at least 10 degrees to facilitate turning head to park vehicle in driveway. Status at last certification/assessment: *right rotation 36, left rotation 30.  4 )  Patient will report 75% improvement in symptoms with turning head to park car.  .   Status at last certification/assessment: n/a     PLAN  []  Upgrade activities as tolerated     [x]  Continue plan of care  []  Update interventions per flow sheet       []  Discharge due to:_  []  Other:_      Homero Sands PT 6/8/2022  8:05 AM    Future Appointments   Date Time Provider Kristofer Chow   6/15/2022  7:30 AM Homero Sands PT [facility]   6/24/2022  7:30 AM Homero Sands [facility]   6/29/2022  7:30 AM [facility]   12/2/2022  8:15 AM IOC LAB VISIT [facility] BS AMB   12/6/2022  7:30 AM CSI NM ROOM [facility] AMB   12/8/2022  8:00 AM Jolie Wang MD [facility] BS AMB   1/10/2023  8:00 AM Arely Garsia MD [facility] BS AMB

## 2022-06-14 ENCOUNTER — HOSPITAL ENCOUNTER (EMERGENCY)
Age: 82
Discharge: HOME | End: 2022-06-14
Attending: STUDENT IN AN ORGANIZED HEALTH CARE EDUCATION/TRAINING PROGRAM
Payer: MEDICARE

## 2022-06-14 ENCOUNTER — TELEPHONE (OUTPATIENT)
Dept: INTERNAL MEDICINE CLINIC | Age: 82
End: 2022-06-14

## 2022-06-14 VITALS
TEMPERATURE: 98.5 F | OXYGEN SATURATION: 98 % | WEIGHT: 122 LBS | HEART RATE: 80 BPM | HEIGHT: 65 IN | DIASTOLIC BLOOD PRESSURE: 82 MMHG | RESPIRATION RATE: 18 BRPM | SYSTOLIC BLOOD PRESSURE: 146 MMHG | BODY MASS INDEX: 20.33 KG/M2

## 2022-06-14 DIAGNOSIS — U07.1 COVID: Primary | ICD-10-CM

## 2022-06-14 PROCEDURE — M0222 HC BEBTELOVIMAB INJECTION: HCPCS

## 2022-06-14 PROCEDURE — 99284 EMERGENCY DEPT VISIT MOD MDM: CPT

## 2022-06-14 PROCEDURE — 96374 THER/PROPH/DIAG INJ IV PUSH: CPT

## 2022-06-14 PROCEDURE — 74011250636 HC RX REV CODE- 250/636: Performed by: STUDENT IN AN ORGANIZED HEALTH CARE EDUCATION/TRAINING PROGRAM

## 2022-06-14 RX ORDER — BEBTELOVIMAB 87.5 MG/ML
175 INJECTION, SOLUTION INTRAVENOUS ONCE
Status: COMPLETED | OUTPATIENT
Start: 2022-06-14 | End: 2022-06-14

## 2022-06-14 RX ADMIN — BEBTELOVIMAB 175 MG: 87.5 INJECTION, SOLUTION INTRAVENOUS at 12:15

## 2022-06-14 NOTE — ED NOTES
Patient states \"that she feels a lot better than when she came in she is not as achy\". Patient resting comfortably on the stretcher with no signs or symptoms of acute distress noted. Provider made aware.

## 2022-06-14 NOTE — TELEPHONE ENCOUNTER
Called and spoke with patient. Reports exposed to son/ granddaughter over the weekend who tested positive, and patient tested positive for COVID-19 yesterday on 6/13/2022 by home rapid antigen test.  She reports currently experiencing low-grade fever, myalgias, fatigue, and cough. Denies any shortness of breath. Given age and underlying comorbidities including Parkinson's disease and ASCVD, feel patient would benefit from monoclonal antibody therapy. Patient has completed the Marito Sadler COVID-19 vaccine series. Called and spoke with charge nurse at her [facility] ED. Monoclonal antibody available. Patient aware to present to ED for therapy.

## 2022-06-14 NOTE — ED PROVIDER NOTES
EMERGENCY DEPARTMENT HISTORY AND PHYSICAL EXAM    11:48 AM      Date: 6/14/2022  Patient Name: Lucien Day    History of Presenting Illness     Chief Complaint   Patient presents with    Positive For Covid-19    IV Med         History Provided By: Patient  Location/Duration/Severity/Modifying factors   Patient is a 78-year-old female with a history of coronary artery disease with stents, Parkinson's disease presenting due to myalgias and positive COVID test.  Patient states that she started to have myalgias/muscle aches yesterday with low-grade fevers. Her son and granddaughter both are positive for COVID so she took a test at home which was positive. After speaking with her primary care physician she was told to come to the emergency department for monoclonal antibodies since she is at higher risk of complication due to her age and medical history. PCP: Mark Serna MD    Current Outpatient Medications   Medication Sig Dispense Refill    cholecalciferol (VITAMIN D3) (5000 Units/125 mcg) tab tablet Take 5,000 Units by mouth daily.  metoprolol succinate (TOPROL-XL) 50 mg XL tablet TAKE 1 TABLET DAILY 90 Tablet 3    rosuvastatin (CRESTOR) 10 mg tablet Take 0.5 Tablets by mouth every other day. 30 Tablet 3    vit A/vit C/vit E/zinc/copper (PRESERVISION AREDS PO) Take 2 Tablets by mouth two (2) times a day.  azelastine (ASTELIN) 137 mcg (0.1 %) nasal spray 1 [place] by Both Nostrils route two (2) times a day. Use in each nostril as directed      lisinopriL (PRINIVIL, ZESTRIL) 10 mg tablet Take 1 Tablet by mouth daily. 90 Tablet 3    potassium chloride (K-DUR, KLOR-CON M20) 20 mEq tablet Take 0.5 Tablets by mouth every other day. 25 Tablet 3    alendronate (FOSAMAX) 35 mg tablet Take 1 Tablet by mouth every seven (7) days. Take with 8 ounces of water and sit or stand upright for at least 30 minutes after administration.   Indications: post-menopausal osteoporosis prevention 12 Tablet 3    carbidopa-levodopa ER (SINEMET CR)  mg per tablet Take 1 Tab by mouth nightly.  psyllium husk (METAMUCIL PO) Take  by mouth daily.  carbidopa 25mg-levodopa 100mg-entacapone 200mg (STALEVO 100) tab tablet Take 1 Tablet by mouth three (3) times daily. 0    magnesium hydroxide (NEGRO MILK OF MAGNESIA) 400 mg/5 mL suspension Take 30 mL by mouth daily as needed for Constipation.  acetaminophen (TYLENOL) 650 mg CR tablet Take 650 mg by mouth every six (6) hours as needed for Pain.  co-enzyme Q-10 (CO Q-10) 100 mg capsule Take 200 mg by mouth daily.  aspirin 81 mg tablet Take 1 Tab by mouth daily. 1 Tab 0    DOCUSATE CALCIUM (STOOL SOFTENER PO) Take 1 Cap by mouth. Past History     Past Medical History:  Past Medical History:   Diagnosis Date    Basal cell cancer     CAD (coronary artery disease), native coronary artery 03/21/2011    s/p PCI with with CARLITO (Xience 2.75x12, 2.5x12) mLAD and mild disease in rest of coronaries. Midly depressed LV syst fct     Cardiac cath 03/21/2011    mLAD 90% (2.75 x 12 mm Xience stent). Otherwise patent coronaries. LVEDP 10 mmHg. EF 40-45%. Anteroapical hypk. Renal arteries patent.  Cardiac echocardiogram 04/17/2014    EF 60-65%. No WMA. Gr 1 DDfx. Mild MR. Similar to study of 9/14/11.  Cardiac nuclear imaging test 07/2017    Negative for ischemia or infarction. EF > 70%.     Cardiomyopathy secondary     ischemic +/- stress induced    Dyslipidemia     GERD (gastroesophageal reflux disease)     Hx of colonoscopy 07/12/2016    Normal. Dr. Karmen Dallas Hypertension     Parkinson's disease     Syncope     s/p ILD implantation       Past Surgical History:  Past Surgical History:   Procedure Laterality Date    COLONOSCOPY N/A 7/12/2016    COLONOSCOPY performed by Maximiliano Pappas MD at [address] HX BUNIONECTOMY      dorsal    HX CATARACT REMOVAL  11/2012    left    HX CATARACT REMOVAL  10/2012    right    HX CORONARY STENT PLACEMENT      HX HEART CATHETERIZATION      HX HEMORRHOIDECTOMY      HX HYSTERECTOMY  1996    partial    HX IMPLANTABLE LOOP RECORDER  [id number]    HX TONSILLECTOMY      HX TOTAL ABDOMINAL HYSTERECTOMY  1996    partial       Family History:  Family History   Problem Relation Age of Onset    Heart Attack Father 80    Heart Disease Father     Cancer Mother         pancreatic    Hypertension Brother        Social History:  Social History     Tobacco Use    Smoking status: Never Smoker    Smokeless tobacco: Never Used   Substance Use Topics    Alcohol use: Yes     Comment: glass of wine occasionally.  Drug use: No       Allergies: Allergies   Allergen Reactions    Keflex [Cephalexin] Other (comments)     Yeast infection    Pcn [Penicillins] Other (comments)         Review of Systems       Review of Systems   Constitutional: Positive for fever. Negative for activity change. Low-grade fever   Respiratory: Negative for chest tightness and shortness of breath. Cardiovascular: Negative for chest pain. Gastrointestinal: Negative for nausea and vomiting. Genitourinary: Negative for difficulty urinating and dysuria. Musculoskeletal: Positive for myalgias. Skin: Negative. Neurological: Negative for weakness. Physical Exam     Visit Vitals  BP (!) 157/80 (BP 1 Location: Left upper arm, BP Patient Position: At rest)   Pulse 72   Temp 99.2 °F (37.3 °C)   Resp 16   Ht 5' 5\" (1.651 m)   Wt 55.3 kg (122 lb)   SpO2 99%   BMI 20.30 kg/m²         Physical Exam  Vitals and nursing note reviewed. Constitutional:       General: She is not in acute distress. Appearance: She is well-developed. She is not ill-appearing. HENT:      Head: Normocephalic and atraumatic. Nose: Nose normal.   Eyes:      Extraocular Movements: Extraocular movements intact. Neck:      Thyroid: No thyromegaly. Vascular: No JVD. Trachea: No tracheal deviation.    Cardiovascular: Rate and Rhythm: Normal rate and regular rhythm. Heart sounds: Normal heart sounds. No murmur heard. No gallop. Pulmonary:      Effort: Pulmonary effort is normal.      Breath sounds: Normal breath sounds. Chest:      Chest wall: No tenderness. Abdominal:      General: Bowel sounds are normal. There is no distension. Palpations: Abdomen is soft. Tenderness: There is no abdominal tenderness. There is no guarding. Musculoskeletal:         General: Normal range of motion. Cervical back: Normal range of motion and neck supple. Skin:     General: Skin is warm and dry. Neurological:      General: No focal deficit present. Mental Status: She is alert and oriented to person, place, and time. Sensory: No sensory deficit. Motor: No weakness. Psychiatric:         Behavior: Behavior normal.         Thought Content: Thought content normal.         Judgment: Judgment normal.           Diagnostic Study Results     Labs -  No results found for this or any previous visit (from the past 12 hour(s)). Radiologic Studies -   No orders to display         Medical Decision Making   I am the first provider for this patient. I reviewed the vital signs, available nursing notes, past medical history, past surgical history, family history and social history. Vital Signs-Reviewed the patient's vital signs. EKG:     Records Reviewed: Nursing Notes (Time of Review: 11:48 AM)    ED Course: Progress Notes, Reevaluation, and Consults:         Provider Notes (Medical Decision Making):   MDM  Number of Diagnoses or Management Options  Diagnosis management comments: Patient is a 68-year-old female with a history of coronary artery disease with stents, Parkinson's disease presenting due to myalgias and positive COVID test.  Patient clinically appears well with normal vitals and exam in the emergency department.   Due to age and comorbidities patient is at high risk for complications secondary to COVID so we will give monoclonal antibodies. Patient has been observed for adverse effects we will plan to discharge patient to follow-up with her primary care doctor as needed. Procedures    Critical Care Time:       Diagnosis     Clinical Impression: No diagnosis found. Disposition: Home    Follow-up Information    None          Patient's Medications   Start Taking    No medications on file   Continue Taking    ACETAMINOPHEN (TYLENOL) 650 MG CR TABLET    Take 650 mg by mouth every six (6) hours as needed for Pain. ALENDRONATE (FOSAMAX) 35 MG TABLET    Take 1 Tablet by mouth every seven (7) days. Take with 8 ounces of water and sit or stand upright for at least 30 minutes after administration. Indications: post-menopausal osteoporosis prevention    ASPIRIN 81 MG TABLET    Take 1 Tab by mouth daily. AZELASTINE (ASTELIN) 137 MCG (0.1 %) NASAL SPRAY    1 [place] by Both Nostrils route two (2) times a day. Use in each nostril as directed    CARBIDOPA 25MG-LEVODOPA 100MG-ENTACAPONE 200MG (STALEVO 100) TAB TABLET    Take 1 Tablet by mouth three (3) times daily. CARBIDOPA-LEVODOPA ER (SINEMET CR)  MG PER TABLET    Take 1 Tab by mouth nightly. CHOLECALCIFEROL (VITAMIN D3) (5000 UNITS/125 MCG) TAB TABLET    Take 5,000 Units by mouth daily. CO-ENZYME Q-10 (CO Q-10) 100 MG CAPSULE    Take 200 mg by mouth daily. DOCUSATE CALCIUM (STOOL SOFTENER PO)    Take 1 Cap by mouth. LISINOPRIL (PRINIVIL, ZESTRIL) 10 MG TABLET    Take 1 Tablet by mouth daily. MAGNESIUM HYDROXIDE (NEGRO MILK OF MAGNESIA) 400 MG/5 ML SUSPENSION    Take 30 mL by mouth daily as needed for Constipation. METOPROLOL SUCCINATE (TOPROL-XL) 50 MG XL TABLET    TAKE 1 TABLET DAILY    POTASSIUM CHLORIDE (K-DUR, KLOR-CON M20) 20 MEQ TABLET    Take 0.5 Tablets by mouth every other day. PSYLLIUM HUSK (METAMUCIL PO)    Take  by mouth daily.     ROSUVASTATIN (CRESTOR) 10 MG TABLET    Take 0.5 Tablets by mouth every other day. VIT A/VIT C/VIT E/ZINC/COPPER (PRESERVISION AREDS PO)    Take 2 Tablets by mouth two (2) times a day. These Medications have changed    No medications on file   Stop Taking    CARBIDOPA-LEVODOPA-ENTACAPONE (STALEVO) TAB    TAKE 1 TABLET BY MOUTH THREE TIMES DAILY ALONG WITH STALEVO 100    NITROGLYCERIN (NITROSTAT) 0.4 MG SL TABLET    1 sublingual every 5 minutes for chest pain for no more than 3 doses     Disclaimer: Sections of this note are dictated using utilizing voice recognition software. Minor typographical errors may be present. If questions arise, please do not hesitate to contact me or call our department.

## 2022-06-14 NOTE — ED TRIAGE NOTES
Patient presents for IV MoAB administration. She states being positive for covid 19.   Patient advises that she tested positive for covid yesterday with home antigen test.

## 2022-06-14 NOTE — ED NOTES
Patient given monoclonal antibody for COVID 19. One hour monitoring starts now. Provider made aware.

## 2022-06-14 NOTE — TELEPHONE ENCOUNTER
Pt was exposed to someone who tested positive for covid, she took home test on 06/12 it was positive on 06/13 she started having  runny nose with bodyaches  Temp is  99.4 pt is asking if  can prescribe Paxlovid

## 2022-06-15 ENCOUNTER — APPOINTMENT (OUTPATIENT)
Dept: PHYSICAL THERAPY | Age: 82
End: 2022-06-15
Payer: MEDICARE

## 2022-06-15 ENCOUNTER — PATIENT OUTREACH (OUTPATIENT)
Dept: CASE MANAGEMENT | Age: 82
End: 2022-06-15

## 2022-06-15 NOTE — PROGRESS NOTES
Patient contacted regarding COVID-19 diagnosis. Discussed COVID-19 related testing which was available at this time. Test results were positive. Patient informed of results, if available? Patient received positive results from home test.     Outreach made within 2 business days of discharge: Yes     contacted the patient by telephone to perform post discharge call. Verified name and  with patient as identifiers. Provided introduction to self, and reason for call due to viral symptoms of infection and/or exposure to COVID-19. Advance Care Planning:   Does patient have an Advance Directive: currently not on file; education provided     Patient presented to emergency department/flu clinic with complaints of viral symptoms/exposure to COVID. Patient reports symptoms are improving. Due to no new or worsening symptoms the RN CTN/ACM was not notified for escalation. This author reviewed discharge instructions, medical action plan and red flags such as increased shortness of breath, increasing fever, worsening cough or chest pain with patient who verbalized understanding. Discussed exposure protocols and quarantine with CDC Guidelines What To Do If You Are Sick    Patient who was given an opportunity for questions and concerns. The patient agrees to contact their health care provider for questions related to their healthcare. Author provided contact information for future reference. Plan for follow-up call in 3-5 days based on severity of symptoms and risk factors.

## 2022-06-20 ENCOUNTER — TELEPHONE (OUTPATIENT)
Dept: INTERNAL MEDICINE CLINIC | Age: 82
End: 2022-06-20

## 2022-06-20 NOTE — TELEPHONE ENCOUNTER
Pt states she got the antimicrobial antibody treatment last week and feels a lot better. She thinks she needs to do a Covid test however. She wants to know if she does need it and is home test sufficient?     Please advise her at [phone]

## 2022-06-20 NOTE — TELEPHONE ENCOUNTER
In order to discontinue isolation, it is recommended that she perform a repeat home rapid antigen COVID 19 test after day 5 of diagnosis. If negative, may discontinue isolation but should continue wearing a mask when in public for at least 10 days.

## 2022-06-22 ENCOUNTER — PATIENT OUTREACH (OUTPATIENT)
Dept: CASE MANAGEMENT | Age: 82
End: 2022-06-22

## 2022-06-22 ENCOUNTER — VIRTUAL VISIT (OUTPATIENT)
Dept: INTERNAL MEDICINE CLINIC | Age: 82
End: 2022-06-22
Payer: MEDICARE

## 2022-06-22 DIAGNOSIS — M54.2 NECK PAIN: ICD-10-CM

## 2022-06-22 DIAGNOSIS — U07.1 COVID-19 VIRUS INFECTION: Primary | ICD-10-CM

## 2022-06-22 DIAGNOSIS — Z98.61 CAD S/P PERCUTANEOUS CORONARY ANGIOPLASTY: ICD-10-CM

## 2022-06-22 DIAGNOSIS — M54.6 ACUTE RIGHT-SIDED THORACIC BACK PAIN: ICD-10-CM

## 2022-06-22 DIAGNOSIS — I10 PRIMARY HYPERTENSION: ICD-10-CM

## 2022-06-22 DIAGNOSIS — I25.10 CAD S/P PERCUTANEOUS CORONARY ANGIOPLASTY: ICD-10-CM

## 2022-06-22 DIAGNOSIS — G20 PARKINSON DISEASE (HCC): ICD-10-CM

## 2022-06-22 PROCEDURE — 99443 PR PHYS/QHP TELEPHONE EVALUATION 21-30 MIN: CPT | Performed by: INTERNAL MEDICINE

## 2022-06-22 NOTE — PROGRESS NOTES
Unable to reach (Follow up call)    Attempt made to reach the patient by telephone. Left message with information to return call. Episode will be resolved. No further follow up will be required at this time.

## 2022-06-22 NOTE — PROGRESS NOTES
1. \"Have you been to the ER, urgent care clinic since your last visit? Hospitalized since your last visit? \" 6/14/22 COVID    2. \"Have you seen or consulted any other health care providers outside of the [address] [place] [address] since your last visit? \" No     3. For patients aged 39-70: Has the patient had a colonoscopy / FIT/ Cologuard? NA - based on age      If the patient is female:    4. For patients aged 41-77: Has the patient had a mammogram within the past 2 years? NA - based on age or sex      11. For patients aged 21-65: Has the patient had a pap smear?  NA - based on age or sex

## 2022-06-22 NOTE — PATIENT INSTRUCTIONS
Heart-Healthy Diet: Care Instructions  Your Care Instructions     A heart-healthy diet has lots of vegetables, fruits, nuts, beans, and whole grains, and is low in salt. It limits foods that are high in saturated fat, such as meats, cheeses, and fried foods. It may be hard to change your diet, but even small changes can lower your risk of heart attack and heart disease. Follow-up care is a key part of your treatment and safety. Be sure to make and go to all appointments, and call your doctor if you are having problems. It's also a good idea to know your test results and keep a list of the medicines you take. How can you care for yourself at home? Watch your portions  · Learn what a serving is. A \"serving\" and a \"portion\" are not always the same thing. Make sure that you are not eating larger portions than are recommended. For example, a serving of pasta is ½ cup. A serving size of meat is 2 to 3 ounces. A 3-ounce serving is about the size of a deck of cards. Measure serving sizes until you are good at [employer]" them. Keep in mind that restaurants often serve portions that are 2 or 3 times the size of one serving. · To keep your energy level up and keep you from feeling hungry, eat often but in smaller portions. · Eat only the number of calories you need to stay at a healthy weight. If you need to lose weight, eat fewer calories than your body burns (through exercise and other physical activity). Eat more fruits and vegetables  · Eat a variety of fruit and vegetables every day. Dark green, deep orange, red, or yellow fruits and vegetables are especially good for you. Examples include spinach, carrots, peaches, and berries. · Keep carrots, celery, and other veggies handy for snacks. Buy fruit that is in season and store it where you can see it so that you will be tempted to eat it. · Cook dishes that have a lot of veggies in them, such as stir-fries and soups.   Limit saturated and trans fat  · Read food labels, and try to avoid saturated and trans fats. They increase your risk of heart disease. · Use olive or canola oil when you cook. · Bake, broil, grill, or steam foods instead of frying them. · Choose lean meats instead of high-fat meats such as hot dogs and sausages. Cut off all visible fat when you prepare meat. · Eat fish, skinless poultry, and meat alternatives such as soy products instead of high-fat meats. Soy products, such as tofu, may be especially good for your heart. · Choose low-fat or fat-free milk and dairy products. Eat foods high in fiber  · Eat a variety of grain products every day. Include whole-grain foods that have lots of fiber and nutrients. Examples of whole-grain foods include oats, whole wheat bread, and brown rice. · Buy whole-grain breads and cereals, instead of white bread or pastries. Limit salt and sodium  · Limit how much salt and sodium you eat to help lower your blood pressure. · Taste food before you salt it. Add only a little salt when you think you need it. With time, your taste buds will adjust to less salt. · Eat fewer snack items, fast foods, and other high-salt, processed foods. Check food labels for the amount of sodium in packaged foods. · Choose low-sodium versions of canned goods (such as soups, vegetables, and beans). Limit sugar  · Limit drinks and foods with added sugar. These include candy, desserts, and soda pop. Limit alcohol  · Limit alcohol to no more than 2 drinks a day for men and 1 drink a day for women. Too much alcohol can cause health problems. When should you call for help? Watch closely for changes in your health, and be sure to contact your doctor if:    · You would like help planning heart-healthy meals. Where can you learn more? Go to http://www.[employer].com/  Enter V137 in the search box to learn more about \"Heart-Healthy Diet: Care Instructions. \"  Current as of: August 22, 2019               Content Version: 12.6  © 1892-2060 [employer]. Care instructions adapted under license by [employer] (which disclaims liability or warranty for this information). If you have questions about a medical condition or this instruction, always ask your healthcare professional. [address] any warranty or liability for your use of this information.

## 2022-06-24 ENCOUNTER — APPOINTMENT (OUTPATIENT)
Dept: PHYSICAL THERAPY | Age: 82
End: 2022-06-24
Payer: MEDICARE

## 2022-06-26 NOTE — PROGRESS NOTES
Tiffanie Pratt is a 78 y.o. female, evaluated via audio-only technology on 6/22/2022 for Follow-up (COVID follow up)      HPI:   Tiffanie Pratt is a 78y.o. year old female who presents today for an acute visit. She has a history of hypertension, ASCVD, hyperlipidemia, syncope, Parkinson's disease, lumbar degenerative disease, GERD, and macular degeneration. She has completed the [email or web address] COVID-19 vaccine series and received a Pfizer booster dose. On 6/14/2022, she contacted the office and reported that she had tested positive for COVID-19 using a home rapid antigen test on 6/13/2022 after she was notified that an attendee at a family gathering had tested positive. She states that she was experiencing low-grade fever, myalgias, fatigue, nasal congestion, postnasal drainage, and cough. Given that she was high risk for severe disease due to underlying Parkinson's disease and coronary artery disease, she was referred to the [facility] ED for monoclonal antibody infusion and received treatment with bebtelovimab. She presents today for follow-up and reports that she is continuing to feel poorly. She states that she did a repeat rapid antigen test yesterday which was still positive (day 8 after diagnosis). She states that she is still experiencing nasal congestion, cough, and fatigue. She states that she is eating well and has no further fever. She reports that she had just started physical therapy for neck and thoracic back pain and had only completed 2 sessions prior to her COVID-19 diagnosis. She states that she is anxious to resume. She is otherwise without new complaints. Summary of prior hospitalizations and medical history:  In 8/2018, she was evaluated by Dr. Humera Cuba for right hip pain and was diagnosed with right piriformis syndrome.  She was treated with a cortisone injection and physical therapy with some improvement, but subsequently developed right buttocks pain and pain and paresthesias down her right leg. She was treated with prednisone 50 mg for five days without improvement. She was seen on 10/9/2018, which was 10 days after completing the course of prednisone, and reported persistent severe right leg pain which was interfering with her activities and with sleep. She was taking Tylenol ES without relief, and was started on gabapentin. Lumbar spine x-ray was obtained (10/9/2018) showing grade 1 anterolisthesis of L4 on L5, either new or increased since 4/2015, and multilevel degenerative spondylosis/disc disease. She was referred to physical therapy, but due to continued persistent symptoms, she had a lumbar MRI (10/17/2018) which showed multilevel degenerative findings causing various degrees of neuroforaminal  narrowing and lateral recess narrowing; also a right L4-L5 neuroforamen cystic lesion measuring approximately 10 x 6 mm was noted. She had a lumbar MRI with contrast (11/16/2018) which confirmed an extruding synovial cyst from the right L4-5 advanced facet arthropathy; significant right foraminal stenosis and nerve impingement persists. She continued physical therapy and reported that her pain has significantly improved. She was evaluated by Dr. iSlva Gunter on 1/3/2019 but her pain had already resolved. She is no longer taking gabapentin or Tylenol. She has a history of hypertension, hyperlipidemia, ASCVD, and syncope. In 3/2011, she had three syncopal episodes while donating blood. Over the subsequent four days, she developed exertional substernal chest tightness with left arm pain and dyspnea. She was evaluated and EKG revealed new T wave inversions in leads V2 and V3. Troponins were negative. She underwent cardiac catheterization (3/21/2011) which showed a 90% mid LAD and mild disease in the rest of the coronaries. She underwent PCI and placement of two drug-eluting stents for the mid LAD lesion; LV function was mildly diminished (EF 40-45%) with anteroapical hypokinesis.  Follow-up echocardiogram (9/2011) showed return to normal LV function (EF 60%) and no RWMA. She has a history of multiple syncopal episodes, usually related to stressful situations, and thought to be vasovagal in origin. An implantable loop recorder was placed in 3/2011 and remained until 4/2013, and interrogation was negative for any significant arrhythmia. In 4/2014, she was hospitalized following another syncopal episode, which occurred after she had taken her morning medications. Afterward, she became nauseated and flushed, and called EMS. When they arrived, they found her on the floor and reportedly without a pulse. They began CPR and within 10-15 seconds, she recovered. Evaluation included EKG/troponins, which were negative, and an echocardiogram showing mild MR and normal LV function (EF 60-65%). She was found to have hypokalemia and hypomagnesemia and hydrochlorothiazide was discontinued. It was thought that this event also represented a vasovagal reaction given her prodromal symptoms. She has had no further events since that time. Her most recent pharmacologic nuclear stress test (6/25/2020) was a normal, low risk study, negative for evidence of ischemia or prior infarction, and with normal LV size and function (EF 84%). Her current regimen includes metoprolol, lisinopril, aspirin, and rosuvastatin. She is being followed by Dr. Maria C Owusu. In 11/2017, she reported bilateral thigh aching pain, which increased with ambulation particularly when walking up stairs. She stated that the discomfort was similar to that which developed previously with use of simvastatin and atorvastatin. She had been on pravastatin since 10/2014 and did not note any difficulty previously. She discontinued pravastatin and had resolution of her symptoms. She was subsequently started on rosuvastatin in 12/2017, and did well until 7/2018 when she again developed myalgias. Her dose was decreased to 5 mg every other day.  She reports today that she continues to have aching discomfort in her bilateral thighs on the days which she takes rosuvastatin, but reports that it is tolerable so she will continue. She has a history of idiopathic Parkinson's disease, predominantly left-sided. She was being treated with Sinemet, but changed to Stalevo in 10/2019, and reports improved control of symptoms. She has difficulty with writing at times, particularly towards the end of the day, and also describes increasing tremors midday. She is followed by Dr. Zara Mcgee. She has a history of laryngopharyngeal reflux disease, treated previously with dexlansoprazole, but ha successfully weaned from therapy. She is now receiving immunotherapy with Dr. Sofie Bence to address her allergies and hoarseness. In 5/2017 she had multiple episodes of epistaxis prompting an ED visit. She subsequently underwent silver nitrate cautery of anterior vessels in her right nares by Dr. Sofie Bence, and has had no recurrence. In 3/2010, she underwent evaluation for iron deficiency anemia. She had previously had a negative colonoscopy and air contrast barium enema in 2009 by Dr. Fabiola Spicer, and she had an upper endoscopy by Dr. Zach Hogan which was normal. She was treated with iron with improvement. She had a repeat screening colonoscopy in 7/2016 by Dr. Zach Hogan which was normal. No further follow-up was recommended given her age. She denies any abdominal pain, nausea, vomiting, melena, hematochezia, or change in bowel movements. She has a history of osteopenia with bone density study in 5/2018 showing T-scores:  femoral neck left -1.2  /right -1.6 and lumbar -0.8 . She underwent repeat bone density study on 8/2/2021 showing T-scores: femoral neck left -1.7/right -2.1 and lumbar -0.9. Due to progression in her bilateral hips, she was started on Fosamax in 11/2021 after lab testing for secondary osteoporosis was negative. She continues to take calcium and Vitamin D supplements.  She has no history of pathologic fractures. She has a recent history of abnormal mammograms since 10/2014 with microcalcifcations in the right breast. She has been undergoing surveillance mammograms every six months, which have been unchanged. She had a follow-up mammogram in 5/2016 which was negative, and routine annual screening was recommended. She has a history of bilateral knee pain secondary to osteoarthritis. She was evaluated by Dr. Valli Goodell in 3/2018 and received a cortisone injection with improvement. She also has difficutly with left ankle pain which increase with ambulation. She received a cortisone injection for this as well from Dr. Valli Goodell in 3/2018, but did not experience significant improvement. She was evaluated by Dr. Alex Ambrose on 3/27/2018 and diagnosed with left peroneus brevis tendinitis. An orthotic was recommended with improvement. She is now being followed by Dr. Corinna Mccoy for left ankle pain, and CT scan left ankle (7/2019) showed evidence of chronic ATFL sprain or complete rupture, likely prior sprain of the calcaneofibular ligament, and advanced posterior subtalar and tarsometatarsal osteoarthrosis. On 5/9/2021, she presented to the [facility] ED after being bitten on her left wrist the day prior by her indoor/outdoor cat. She reports that she developed swelling and redness of her wrist and forearm, and was diagnosed with cellulitis. She was prescribed clindamycin 300 mg qid with eventual resolution. On 02/08/2022, she presented again to the [facility] ED for evaluation of a cat bite. She states that while she was asleep in bed, her pet cat bit her on the dorsum of her right hand in order to awaken her since it was hungry. She noted some increased redness surrounding the puncture wound prompting ED presentation. She denied any fever, chills, or hand pain. No laboratory testing was performed, and she was prescribed doxycycline and clindamycin for 10 days.          Past Medical History:   Diagnosis Date    Basal cell cancer     CAD (coronary artery disease), native coronary artery 03/21/2011    s/p PCI with with CARLITO (Xience 2.75x12, 2.5x12) mLAD and mild disease in rest of coronaries. Midly depressed LV syst fct     Cardiac cath 03/21/2011    mLAD 90% (2.75 x 12 mm Xience stent). Otherwise patent coronaries. LVEDP 10 mmHg. EF 40-45%. Anteroapical hypk. Renal arteries patent.  Cardiac echocardiogram 04/17/2014    EF 60-65%. No WMA. Gr 1 DDfx. Mild MR. Similar to study of 9/14/11.  Cardiac nuclear imaging test 07/2017    Negative for ischemia or infarction. EF > 70%.  Cardiomyopathy secondary     ischemic +/- stress induced    Dyslipidemia     GERD (gastroesophageal reflux disease)     Hx of colonoscopy 07/12/2016    Normal. Dr. Nguyễn Ashley Hypertension     Parkinson's disease     Syncope     s/p ILD implantation     Past Surgical History:   Procedure Laterality Date    COLONOSCOPY N/A 7/12/2016    COLONOSCOPY performed by Ivis Anderson MD at [address] HX BUNIONECTOMY      dorsal    HX CATARACT REMOVAL  11/2012    left    HX CATARACT REMOVAL  10/2012    right    HX CORONARY STENT PLACEMENT      HX HEART CATHETERIZATION      HX HEMORRHOIDECTOMY      HX HYSTERECTOMY  1996    partial    HX IMPLANTABLE LOOP RECORDER  [id number]    HX TONSILLECTOMY      HX TOTAL ABDOMINAL HYSTERECTOMY  1996    partial     Current Outpatient Medications   Medication Sig    cholecalciferol (VITAMIN D3) (5000 Units/125 mcg) tab tablet Take 5,000 Units by mouth daily.  metoprolol succinate (TOPROL-XL) 50 mg XL tablet TAKE 1 TABLET DAILY    rosuvastatin (CRESTOR) 10 mg tablet Take 0.5 Tablets by mouth every other day.  vit A/vit C/vit E/zinc/copper (PRESERVISION AREDS PO) Take 2 Tablets by mouth two (2) times a day.  azelastine (ASTELIN) 137 mcg (0.1 %) nasal spray 1 [place] by Both Nostrils route two (2) times a day.  Use in each nostril as directed    lisinopriL (Herb Lie) 10 mg tablet Take 1 Tablet by mouth daily.  potassium chloride (K-DUR, KLOR-CON M20) 20 mEq tablet Take 0.5 Tablets by mouth every other day.  alendronate (FOSAMAX) 35 mg tablet Take 1 Tablet by mouth every seven (7) days. Take with 8 ounces of water and sit or stand upright for at least 30 minutes after administration. Indications: post-menopausal osteoporosis prevention    carbidopa-levodopa ER (SINEMET CR)  mg per tablet Take 1 Tab by mouth nightly.  psyllium husk (METAMUCIL PO) Take  by mouth daily.  carbidopa 25mg-levodopa 100mg-entacapone 200mg (STALEVO 100) tab tablet Take 1 Tablet by mouth three (3) times daily.  magnesium hydroxide (NEGRO MILK OF MAGNESIA) 400 mg/5 mL suspension Take 30 mL by mouth daily as needed for Constipation.  acetaminophen (TYLENOL) 650 mg CR tablet Take 650 mg by mouth every six (6) hours as needed for Pain.  co-enzyme Q-10 (CO Q-10) 100 mg capsule Take 200 mg by mouth daily.  aspirin 81 mg tablet Take 1 Tab by mouth daily.  DOCUSATE CALCIUM (STOOL SOFTENER PO) Take 1 Cap by mouth. No current facility-administered medications for this visit. Allergies and Intolerances: Allergies   Allergen Reactions    Keflex [Cephalexin] Other (comments)     Yeast infection    Pcn [Penicillins] Other (comments)     Family History: She has no family history of colon or breast cancer. Family History   Problem Relation Age of Onset    Heart Attack Father 80    Heart Disease Father     Cancer Mother         pancreatic    Hypertension Brother      Social History:   She  reports that she has never smoked. She has never used smokeless tobacco. She lives with her , who is having difficulty with mild cognitive impairment. She states that they sold their RV and now stay at home for the winter. She is a retired x-ray technician. Social History     Substance and Sexual Activity   Alcohol Use Yes    Comment: glass of wine occasionally. Immunization History:  Immunization History   Administered Date(s) Administered    (RETIRED) Pneumococcal Vaccine (Unspecified Type) 01/01/2003    COVID-19, Pfizer Purple top, DILUTE for use, 12+ yrs, 30mcg/0.3mL dose 03/09/2021, 03/30/2021, 10/02/2021    Influenza High Dose Vaccine PF 09/29/2014, 10/05/2015, 11/01/2016, 08/30/2017    Influenza Vaccine (Tri) Adjuvanted (>65 Yrs FLUAD TRI [id number]) 10/09/2018, 09/17/2019    Influenza Vaccine Whole 09/07/2010, 09/03/2011, 10/04/2012    Influenza, High-dose, Quadrivalent (>65 Yrs Fluzone High Dose Quad [id number]) 10/03/2021    Influenza, Quadrivalent, Adjuvanted (>65 Yrs FLUAD QUAD [id number]) 09/11/2020    Pneumococcal Conjugate (PCV-13) 10/05/2015    Pneumococcal Polysaccharide (PPSV-23) 01/01/2003, 10/30/2019    TD Vaccine 01/01/2003    Tdap 09/25/2013, 05/10/2021    Zoster 01/01/2007    Zoster Recombinant 09/18/2018, 11/23/2018       Review of Systems:   As above included in HPI. Otherwise 11 point review of systems negative including constitutional, skin, HENT, eyes, respiratory, cardiovascular, gastrointestinal, genitourinary, musculoskeletal, endo/heme/aller, neurological.    Physical:   There were no vitals taken for this visit. Exam:     Patient appears in no apparent distress. Affect is appropriate. HEENT: PERRLA, anicteric, no JVD, adenopathy or thyromegaly. No carotid bruits or radiated murmur. Lungs: clear to auscultation, no wheezes, rhonchi, or rales. Heart: regular rate and rhythm. II/VI FUNMILAYO throughout precordium with normal S2; no rubs or gallops  Abdomen: soft, nontender, nondistended, normal bowel sounds, no hepatosplenomegaly or masses. Extremities: without edema. Review of Data:  Labs:  No visits with results within 1 Month(s) from this visit.    Latest known visit with results is:   Hospital Outpatient Visit on 05/11/2022   Component Date Value Ref Range Status    WBC 05/11/2022 6.4  4.6 - 13.2 K/uL Final    RBC 05/11/2022 4. 20  4. 20 - 5.30 M/uL Final    HGB 05/11/2022 12.6  12.0 - 16.0 g/dL Final    HCT 05/11/2022 40.2  35.0 - 45.0 % Final    MCV 05/11/2022 95.7  78.0 - 100.0 FL Final    MCH 05/11/2022 30.0  24.0 - 34.0 PG Final    MCHC 05/11/2022 31.3  31.0 - 37.0 g/dL Final    RDW 05/11/2022 13.2  11.6 - 14.5 % Final    PLATELET 49/53/4207 539  135 - 420 K/uL Final    MPV 05/11/2022 9.9  9.2 - 11.8 FL Final    NRBC 05/11/2022 0.0  0  WBC Final    ABSOLUTE NRBC 05/11/2022 0.00  0.00 - 0.01 K/uL Final    NEUTROPHILS 05/11/2022 70  40 - 73 % Final    LYMPHOCYTES 05/11/2022 20* 21 - 52 % Final    MONOCYTES 05/11/2022 8  3 - 10 % Final    EOSINOPHILS 05/11/2022 1  0 - 5 % Final    BASOPHILS 05/11/2022 1  0 - 2 % Final    IMMATURE GRANULOCYTES 05/11/2022 1* 0.0 - 0.5 % Final    ABS. NEUTROPHILS 05/11/2022 4.4  1.8 - 8.0 K/UL Final    ABS. LYMPHOCYTES 05/11/2022 1.3  0.9 - 3.6 K/UL Final    ABS. MONOCYTES 05/11/2022 0.5  0.05 - 1.2 K/UL Final    ABS. EOSINOPHILS 05/11/2022 0.1  0.0 - 0.4 K/UL Final    ABS. BASOPHILS 05/11/2022 0.0  0.0 - 0.1 K/UL Final    ABS. IMM.  GRANS. 05/11/2022 0.0  0.00 - 0.04 K/UL Final    DF 05/11/2022 AUTOMATED    Final    LIPID PROFILE 05/11/2022        Final    Cholesterol, total 05/11/2022 124  <200 MG/DL Final    Triglyceride 05/11/2022 77  <150 MG/DL Final    HDL Cholesterol 05/11/2022 62* 40 - 60 MG/DL Final    LDL, calculated 05/11/2022 46.6  0 - 100 MG/DL Final    VLDL, calculated 05/11/2022 15.4  MG/DL Final    CHOL/HDL Ratio 05/11/2022 2.0  0 - 5.0   Final    Magnesium 05/11/2022 2.8* 1.6 - 2.6 mg/dL Final    Sodium 05/11/2022 143  136 - 145 mmol/L Final    Potassium 05/11/2022 4.0  3.5 - 5.5 mmol/L Final    Chloride 05/11/2022 108  100 - 111 mmol/L Final    CO2 05/11/2022 32  21 - 32 mmol/L Final    Anion gap 05/11/2022 3  3.0 - 18 mmol/L Final    Glucose 05/11/2022 85  74 - 99 mg/dL Final    BUN 05/11/2022 15  7.0 - 18 MG/DL Final    Creatinine 05/11/2022 0. 60  0.6 - 1.3 MG/DL Final    BUN/Creatinine ratio 05/11/2022 25* 12 - 20   Final    GFR est AA 05/11/2022 >60  >60 ml/min/1.73m2 Final    GFR est non-AA 05/11/2022 >60  >60 ml/min/1.73m2 Final    Calcium 05/11/2022 9.0  8.5 - 10.1 MG/DL Final    Bilirubin, total 05/11/2022 0.4  0.2 - 1.0 MG/DL Final    ALT (SGPT) 05/11/2022 20  13 - 56 U/L Final    AST (SGOT) 05/11/2022 16  10 - 38 U/L Final    Alk. phosphatase 05/11/2022 95  45 - 117 U/L Final    Protein, total 05/11/2022 6.5  6.4 - 8.2 g/dL Final    Albumin 05/11/2022 3.5  3.4 - 5.0 g/dL Final    Globulin 05/11/2022 3.0  2.0 - 4.0 g/dL Final    A-G Ratio 05/11/2022 1.2  0.8 - 1.7   Final    Vitamin D 25-Hydroxy 05/11/2022 28.3* 30 - 100 ng/mL Final    TSH 05/11/2022 2.61  0.36 - 3.74 uIU/mL Final    Color 05/11/2022 YELLOW    Final    Appearance 05/11/2022 CLEAR    Final    Specific gravity 05/11/2022 1.007  1.005 - 1.030   Final    pH (UA) 05/11/2022 8.0  5.0 - 8.0   Final    Protein 05/11/2022 Negative  NEG mg/dL Final    Glucose 05/11/2022 Negative  NEG mg/dL Final    Ketone 05/11/2022 Negative  NEG mg/dL Final    Bilirubin 05/11/2022 Negative  NEG   Final    Blood 05/11/2022 Negative  NEG   Final    Urobilinogen 05/11/2022 0.2  0.2 - 1.0 EU/dL Final    Nitrites 05/11/2022 Negative  NEG   Final    Leukocyte Esterase 05/11/2022 Negative  NEG   Final    WBC 05/11/2022 Negative  0 - 4 /hpf Final    RBC 05/11/2022 Negative  0 - 5 /hpf Final    Epithelial cells 05/11/2022 FEW  0 - 5 /lpf Final    Bacteria 05/11/2022 Negative  NEG /hpf Final       Health Maintenance:  Screening:    Mammogram: negative (8/2021)   PAP smear: s/p ROHINI. No further screening. Colorectal: colonoscopy (7/2016) normal. Dr. Pia Rivera. No further screening.    Depression: none   DM (HbA1c/FPG): FPG 85 (5/2022)   Hepatitis C: unknown   Falls: none   DEXA: osteopenia (8/2021)   Glaucoma: regular eye exams with Dr. Jordon Clement. Melida Hernandez (9/2021) for macular degeneration. 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 Tested positive for COVID-19 on 6/13/2022 by home rapid antigen testing. Experiencing symptoms of low-grade fever, nasal congestion, postnasal drainage, cough, myalgias, and fatigue. Referred to the [facility] ED on 6/14/2022 and received monoclonal antibody therapy with bebtolivimab. Reports performed a repeat COVID 19 home rapid antigen test on 6/21/2022 and reports that she is still positive. Continuing to experience symptoms of nasal congestion, cough, and severe fatigue. Advised to use Mucinex DM for symptoms and Tylenol as needed for myalgias. Recommended continued isolation given persistent symptoms and persistent positive home rapid antigen test until symptoms improve. She has completed the [employer] COVID-19 vaccine series and a Pfizer booster dose. Advised that she should not proceed with a second booster dose for 90 days given treatment with monoclonal antibody. Recommended that she wait for the new booster in the fall given recent infection and treatment with monoclonal antibody. Other chronic issues:  1. Hyperlipidemia.  Previously on low intensity dose pravastatin but developed severe bilateral thigh pain, worse with ambulation, and was discontinued in 11/2018 with improvement. Reported similar symptoms in past with statin (simvastatin until 2012, and then atorvastatin until 9/2014). Started on rosuvastatin 10 mg daily in 12/2018 and tolerated without difficulty until 7/2018 when developed recurrent myalgias and dose decreased to 5 mg every other day with improvement. Lipid panel today with LDL 46 and HDL 62, indicative of excellent control. Emphasized importance of lifestyle modifications, including heart healthy diet and regular exercise. Would not recommend weight loss given normal BMI of 20. Will continue to follow. 2. Hypertension. Blood pressure remains well controlled on lisinopril 10 mg daily and metoprolol succinate 50 mg daily. Renal function remains normal with creatinine 0.60/ eGFR >60 (5/2022). Advised to increase fluid intake given elevated BUN/creatinine ratio of 25. Continue to follow. 3. ASCVD. Remains asymptomatic on current regimen of aspirin, metoprolol succinate, and rosuvastatin. Resolution of cardiomyopathy with last echocardiogram in 2014 revealing normal LV function. Negative pharmacologic nuclear stress test in 6/2020. Being followed by Dr. Flex Neville annually, with last visit in 1/2022. Planning to obtain a repeat pharmacologic nuclear stress test prior to her next visit in 01/2023.  4. History of syncope. No further episodes since 2014. Most likely secondary to vasovagal reaction. Follow. 5. Parkinson's disease. Therapy with addition of Stalevo to Sinemet with significant improvement in control of tremors. Reports dose of Stalevo increased with marked benefit. Reports no worsening of symptoms since with current COVID-19 infection. Being followed by Dr. Dominic Medrano.  6. Osteopenia. Repeat bone density scan on 8/2021. Using femoral neck T-scores, calculated FRAX score estimates her 10 year risk of a major osteoporetic fracture at 14 % and hip fracture at 4.8 %, which are an indication for biphosphonate treatment based on hip fracture risk of >3%. Also with evidence of progression from prior study in 5/2018. Has never received treatment in the past.  Discussed treatment today and patient willing to initiate treatment with biphosphonate. Reports has regular dental exams every 6 months, and last seen in 11/2021 without any problems noted. Completed screening labs for secondary osteoporosis in 11/2021, including intact PTH, vitamin D, phosphorus, and gammopathy panel, and all normal.  She was subsequently started on Fosamax 35 mg weekly for osteoporosis prevention (prescribed 11/26/2021). Reports that she is tolerating it well. Advised to continue calcium and vitamin D supplements. Encouraged exercise, particularly weight bearing activities. Fall precautions stressed. 7. Lumbar spondylosis. Patient with symptoms of right buttock pain since 8/2018 and diagnosed with right piriformis syndrome. Completed physical therapy and received cortisone injection with some improvement, but subsequently developed right leg paresthesias and pain consistent with right sided sciatica. Treated with five day course of prednisone 50 mg without improvement. LS spine x-rays and lumbar MRI with degenerative changes, although MRI showed a right L4-L5 neuroforamen synovial cyst from the right L4-5 advanced facet arthropathy with significant right foraminal stenosis and nerve impingement. Referred for physical therapy and treated with gabapentin 100 mg tid with resolution of symptoms. Evaluated by Dr. Valorie Diamond, but symptoms had already resolved. No longer taking gabapentin. Describing some mid back pain/mid-lower abdominal discomfort upon awakening in the morning which resolves when getting out of bed. Noted to have right thoracic paraspinal tenderness on exam.  Referred by Dr. Margarita Macario for physical therapy with dry needling given associated with increasing neck pain. Currently on hold given current COVID-19 infection. 8. Osteoarthritis.  Difficulty with bilateral knee pain L>R and receiving intermittent cortisone injections from Dr. Sarah Chadwick. Left ankle pain being addressed by podiatrist, Dr. Dionicia Galeazzi. Stable today. 9. Macular degeneration, R>L. On Preservision. Being followed by Dr. Anurag Estrada every 12 weeks for injections. 10. Laryngopharyngeal reflux. Doing well. Discontinued Dexilant without an increase in symptoms. Now receiving immunotherapy to address hoarseness and allergies. Followed by Dr. Francheska Anders. 11. Psoriasis. Using clobetasol solution for scalp psoriasis with reasonable control. 12. Health maintenance. Completed Pfizer COVID 19 vaccine series and received [employer] booster dose. Received 2/2 doses of Shingrix vaccine. Other immunizations up to date. No further colorectal cancer screening needed. Mammogram up to date. Continue regular eye exams with Ankur Reyes and Anurag Estrada. Vitamin D level low today on maintenance dose supplement of 2000 units daily and advised to increase to 5000 units daily. Will reassess at next visit. Medicare wellness visit up-to-date. Patient understands recommendations and agrees with plan. Follow-up as previously scheduled. Ignacia Franco was evaluated through a patient-initiated, synchronous (real-time) audio only encounter. She (or guardian if applicable) is aware that it is a billable service, which includes applicable co-pays, with coverage as determined by her insurance carrier. This visit was conducted with the patient's (and/or Lance Heredia guardian's) verbal consent. She has not had a related appointment within my department in the past 7 days or scheduled within the next 24 hours. The patient was located in a state where the provider was licensed to provide care. The patient was located at: Home: [address] [place] [address]  The provider was located at:  Facility (Appt Department): [address] [place] [address]  [address]  [facility] [id number]    Total Time: minutes: 21-30 minutes    Maritza Devlin MD

## 2022-06-29 ENCOUNTER — HOSPITAL ENCOUNTER (OUTPATIENT)
Dept: PHYSICAL THERAPY | Age: 82
Discharge: HOME | End: 2022-06-29
Payer: MEDICARE

## 2022-06-29 PROCEDURE — 97140 MANUAL THERAPY 1/> REGIONS: CPT

## 2022-06-29 PROCEDURE — 97112 NEUROMUSCULAR REEDUCATION: CPT

## 2022-06-29 NOTE — PROGRESS NOTES
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 Reside with spouse. Functionally independent. Visits from Start of Care: 3    Missed Visits: 0    The Plan of Care and following information is based on the patient's current status:    · Short Term Goals: To be accomplished in  1  weeks:  1) Pt will be independent with initial HEP  Status at last certification/assessment: established and reviewed with patient  Current: met - Pt reports compliance (22)  · Long Term Goals: To be accomplished in  5  weeks:  1) Patient will be independent with comprehensive and updated HEP to maintain functional gains made in PT  Status at last certification/assessment: established and reviewed initial HEP  Current: not yet met - will give at time of discharge (22)  2) Increase FOTO score to 62 indicating improved function and quality of life.    Status at last certification/assessment: 45  Current: progressing - FOTO 61 pts (22)  3) Patient to Improve C/S AROM rotation by at least 10 degrees to facilitate turning head to park vehicle in driveway.   Status at last certification/assessment: *right rotation 36, left rotation 30. Current: not met - Rot right 26 deg, Rot left 30 deg (6/29/22)  4 )  Patient will report 75% improvement in symptoms with turning head to park car. .   Status at last certification/assessment: n/a   Current: progressing - 50% improved since start of care (6/29/22)    Key functional changes:  Pt has just returned to skilled therapy after a long absence - last appointment on 6/8/22 - due to illness. Pt reports reduction in neck discomfort, noting 50% improvement in pain, increased flexibility in cervical AROM, and is more aware of limitations. Pt still has difficulty and pain with turning head to the right. Problems/ barriers to goal attainment: None     Problem List: pain affecting function, decrease ROM, decrease strength, edema affecting function, impaired gait/ balance, decrease ADL/ functional abilitiies, decrease activity tolerance, decrease flexibility/ joint mobility and decrease transfer abilities    Treatment Plan: Therapeutic exercise, Therapeutic activities, Neuromuscular re-education, Physical agent/modality, Gait/balance training, Manual therapy, Aquatic therapy, Patient education, Functional mobility training    Patient Goal (s) has been updated and includes: \"Be able to turn my head to the right. \"     Goals for this certification period to be accomplished in 5 weeks:  1) Patient will be independent with comprehensive and updated HEP to maintain functional gains made in PT  Status at last certification/assessment: will give at time of discharge (6/29/22)  Current:   2) Increase FOTO score to 62 indicating improved function and quality of life.    Status at last certification/assessment: FOTO 61 pts (6/29/22)  Current:   3) Patient to Improve C/S AROM rotation by at least 10 degrees to facilitate turning head to park vehicle in driveway.   Status at last certification/assessment: Rot right 26 deg, Rot left 30 deg (6/29/22)  Current:   4 )  Patient will report 75% improvement in symptoms with turning head to park car. .   Status at last certification/assessment: 50% improved since start of care (6/29/22)  Current:     Frequency / Duration: Patient to be seen 1 times per week for 5 weeks:    Assessment / Recommendations: Pt would benefit from continued skilled therapy to address ROM and pain deficits to improve ADL function. Certification Period: 7/1/22 - 7/30/22    Rosanne Sands, PT 6/29/2022 8:14 AM    ________________________________________________________________________  I certify that the above Therapy Services are being furnished while the patient is under my care. I agree with the treatment plan and certify that this therapy is necessary. [] I have read the above and request that my patient continue as recommended.   [] I have read the above report and request that my patient continue therapy with the following changes/special instructions: ______________________________________  [] I have read the above report and request that my patient be discharged from therapy    Physician's Signature:____________Date:_________TIME:________    ** Signature, Date and Time must be completed for valid certification **    Please sign and return to [facility] - [address]  [phone] fax

## 2022-06-29 NOTE — PROGRESS NOTES
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 I've still been doing the HEP and I think the neck is getting better. \"    OBJECTIVE    32 min Neuromuscular Re-education:  [] See flow sheet :   Rationale: increase ROM and increase strength to improve the patients ability to improve postural positioning, increase cervical mobility with less pain    15 min Manual Therapy:  Supine gentle SOR, STM to B upper trapezius, cervical paraspinals, SCM, scalenes   The manual therapy interventions were performed at a separate and distinct time from the therapeutic activities interventions. Rationale: increase ROM, increase tissue extensibility and decrease trigger points to improve cervical mobility with less pain for ease of ADLs          With   [] TE   [] TA   [] neuro   [] other: Patient Education: [x] Review HEP    [] Progressed/Changed HEP based on:   [] positioning   [] body mechanics   [] transfers   [] heat/ice application    [] other:      Other Objective/Functional Measures: Continued treatment program per flow sheet with skilled verbal cueing for proper technique.      Pain Level (0-10 scale) post treatment: 0/10    ASSESSMENT/Changes in Function: Pt has just returned to skilled therapy after a long absence - last appointment on 6/8/22 - due to illness. Pt reports reduction in neck discomfort, noting 50% improvement in pain, increased flexibility in cervical AROM, and is more aware of limitations. Pt still has difficulty and pain with turning head to the right. Pt would benefit from continued skilled therapy to address ROM and pain deficits to improve ADL function. Patient will continue to benefit from skilled PT services to address functional mobility deficits, address ROM deficits, address strength deficits, analyze and address soft tissue restrictions, analyze and cue movement patterns, analyze and modify body mechanics/ergonomics, assess and modify postural abnormalities and instruct in home and community integration to attain remaining goals. []  See Plan of Care  [x]  See progress note/recertification  []  See Discharge Summary         Progress towards goals / Updated goals:   · Short Term Goals: To be accomplished in  1  weeks:  1) Pt will be independent with initial HEP  Status at last certification/assessment: established and reviewed with patient  Current: met - Pt reports compliance (6/8/22)  · Long Term Goals: To be accomplished in  5  weeks:  1) Patient will be independent with comprehensive and updated HEP to maintain functional gains made in PT  Status at last certification/assessment: established and reviewed initial HEP  Current: not yet met - will give at time of discharge (6/29/22)  2) Increase FOTO score to 62 indicating improved function and quality of life. Status at last certification/assessment: 45  Current: progressing - FOTO 61 pts (6/29/22)  3) Patient to Improve C/S AROM rotation by at least 10 degrees to facilitate turning head to park vehicle in driveway. Status at last certification/assessment: *right rotation 36, left rotation 30.   Current: not met - Rot right 26 deg, Rot left 30 deg (6/29/22)  4 ) Patient will report 75% improvement in symptoms with turning head to park car.  .   Status at last certification/assessment: n/a   Current: progressing - 50% improved since start of care (6/29/22)    PLAN  [x]  Upgrade activities as tolerated     [x]  Continue plan of care  []  Update interventions per flow sheet       []  Discharge due to:_  []  Other:_      Jason Sands, PT 6/29/2022  8:05 AM    Future Appointments   Date Time Provider Kristofer Chow   12/2/2022  8:15 AM [facility] LAB VISIT [facility] BS AMB   12/6/2022  7:30 AM [facility] NM ROOM [facility] BS AMB   12/8/2022  8:00 AM Juvencio Jones MD [facility] BS AMB   1/10/2023  8:00 AM Radha Perez MD [facility] BS AMB

## 2022-07-07 ENCOUNTER — HOSPITAL ENCOUNTER (OUTPATIENT)
Dept: PHYSICAL THERAPY | Age: 82
Discharge: HOME | End: 2022-07-07
Payer: MEDICARE

## 2022-07-07 PROCEDURE — 97140 MANUAL THERAPY 1/> REGIONS: CPT

## 2022-07-07 PROCEDURE — 97110 THERAPEUTIC EXERCISES: CPT

## 2022-07-07 PROCEDURE — 97112 NEUROMUSCULAR REEDUCATION: CPT

## 2022-07-07 NOTE — PROGRESS NOTES
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 OBJECTIVE    20 min Therapeutic Exercise:  [] See flow sheet :   Rationale: increase ROM and increase strength to improve the patients ability to turn her head in traffic. 10 min Neuromuscular Re-education:  []  See flow sheet :   Rationale: improve coordination, improve balance and increase proprioception  to improve the patients ability to maintain proper posture while sitting. 15 min Manual Therapy:  STM to bilateral levator scapulae, upper/ lower traps, posterior scalenes. Suboccipital release. The manual therapy interventions were performed at a separate and distinct time from the therapeutic activities interventions. Rationale: decrease pain, increase ROM, increase tissue extensibility and decrease trigger points to      With   [] TE   [] TA   [] neuro   [] other: Patient Education: [x] Review HEP    [] Progressed/Changed HEP based on:   [] positioning   [] body mechanics   [] transfers   [] heat/ice application    [] other:      Other Objective/Functional Measures: exercises per chart.      Pain Level (0-10 scale) post treatment: 0    ASSESSMENT/Changes in Function: Pt reports to skilled therapy session with decreased functional mobility in the neck/ shoulders, and increased pain with ADLs. Pt tolerated the addition of T-band wall clocks to engage the rhomboids and improve sitting posture. Pt was able to progress repetitions with supine t-band Ys and external rotation to improve scapular stability and decrease pain with ADLs. Decreases in pain and increases in ROM were noted following manual therapy. Continued therapy recommended address functional deficits. All post treatment modalities declined. Patient will continue to benefit from skilled PT services to modify and progress therapeutic interventions, address functional mobility deficits, address ROM deficits, address strength deficits, analyze and address soft tissue restrictions, analyze and cue movement patterns, analyze and modify body mechanics/ergonomics and assess and modify postural abnormalities to attain remaining goals. [x]  See Plan of Care  []  See progress note/recertification  []  See Discharge Summary         Progress towards goals / Updated goals:  Goals for this certification period to be accomplished in 5 weeks:  1) Patient will be independent with comprehensive and updated HEP to maintain functional gains made in PT  Status at last certification/assessment: will give at time of discharge (6/29/22)  Current:   2) Increase FOTO score to 62 indicating improved function and quality of life.    Status at last certification/assessment: FOTO 61 pts (6/29/22)  Current:   3) Patient to Improve C/S AROM rotation by at least 10 degrees to facilitate turning head to park vehicle in driveway.   Status at last certification/assessment: Rot right 26 deg, Rot left 30 deg (6/29/22)  Current:   4 )  Patient will report 75% improvement in symptoms with turning head to park car. .   Status at last certification/assessment: 50% improved since start of care (6/29/22)  Current:     PLAN  [x]  Upgrade activities as tolerated     [x]  Continue plan of care  []  Update interventions per flow sheet       []  Discharge due to:_  []  Other:_      Elin Chamberlain, BROCK 7/7/2022  9:46 AM    Future Appointments   Date Time Provider Kristofer Chow   7/12/2022  8:15 AM [facility] MENJIVAR JORDON [facility]   7/19/2022  8:15 AM [facility] OTTO RUVALCABA [facility]   7/26/2022  8:15 AM [place], [address]   12/2/2022  8:15 AM [facility] LAB VISIT [facility] BS AMB   12/6/2022  7:30 AM [facility] NM ROOM [facility] BS AMB   12/8/2022  8:00 AM Juvencio Jones MD [facility] BS AMB   1/10/2023  8:00 AM Radha Perez MD [facility] BS AMB

## 2022-07-11 ENCOUNTER — TRANSCRIBE ORDER (OUTPATIENT)
Dept: SCHEDULING | Age: 82
End: 2022-07-11

## 2022-07-11 DIAGNOSIS — Z12.31 VISIT FOR SCREENING MAMMOGRAM: Primary | ICD-10-CM

## 2022-07-12 ENCOUNTER — HOSPITAL ENCOUNTER (OUTPATIENT)
Dept: PHYSICAL THERAPY | Age: 82
Discharge: HOME | End: 2022-07-12
Payer: MEDICARE

## 2022-07-12 PROCEDURE — 97140 MANUAL THERAPY 1/> REGIONS: CPT

## 2022-07-12 PROCEDURE — 97112 NEUROMUSCULAR REEDUCATION: CPT

## 2022-07-12 PROCEDURE — 97110 THERAPEUTIC EXERCISES: CPT

## 2022-07-12 NOTE — PROGRESS NOTES
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 Since then I have awakened without pain. OBJECTIVE        15 min Therapeutic Exercise:  [] See flow sheet :   Rationale: increase ROM and increase strength to improve the patients ability to improve ease of driving and functional tasks     10 min Neuromuscular Re-education:  []  See flow sheet :   Rationale: increase strength, improve coordination and increase proprioception  to improve the patients ability to stabilize cervical and thoracic spine, posture    15 min Manual Therapy:  STM to bilateral UT, lev scap and suboccipitals. SOR   The manual therapy interventions were performed at a separate and distinct time from the therapeutic activities interventions.   Rationale: decrease pain, increase ROM and increase tissue extensibility to increase cs ROM to scan road while driving         With   [] TE   [] TA   [] neuro   [] other: Patient Education: [x] Review HEP    [] Progressed/Changed HEP based on:   [] positioning   [] body mechanics   [] transfers   [] heat/ice application    [] other:      Other Objective/Functional Measures: added TB rows/extension     Pain Level (0-10 scale) post treatment: 0    ASSESSMENT/Changes in Function: Pt seen today for neck pain and ROM. Demonstrates limited right cs rotation effecting ability to scan road for driving and parking. Palpable tender and taut cervical paraspinals and Ut/Lev scap on right > left. Mildly tender in subocciptitals. Verbal and demonstrative cues to correct posture and scapular mechanics during TB rows/extension. Pt reports no pain in last 2 days upon waking due to decreased pillow height and reduction in cervical flexion. Patient will continue to benefit from skilled PT services to modify and progress therapeutic interventions, address functional mobility deficits, address ROM deficits, address strength deficits, analyze and address soft tissue restrictions, analyze and cue movement patterns, analyze and modify body mechanics/ergonomics, assess and modify postural abnormalities, address imbalance/dizziness and instruct in home and community integration to attain remaining goals. []  See Plan of Care  []  See progress note/recertification  []  See Discharge Summary         Progress towards goals / Updated goals:    1) Patient will be independent with comprehensive and updated HEP to maintain functional gains made in PT  Status at last certification/assessment: will give at time of discharge (6/29/22)  Current:   2) Increase FOTO score to 62 indicating improved function and quality of life.    Status at last certification/assessment: FOTO 61 pts (6/29/22)  Current:   3) Patient to Improve C/S AROM rotation by at least 10 degrees to facilitate turning head to park vehicle in driveway.   Status at last certification/assessment: Rot right 26 deg, Rot left 30 deg (6/29/22)  Current:   4 )  Patient will report 75% improvement in symptoms with turning head to park car. .   Status at last certification/assessment: 50% improved since start of care (6/29/22)  Current:      PLAN  [x]  Upgrade activities as tolerated     [] Continue plan of care  []  Update interventions per flow sheet       []  Discharge due to:_  []  Other:_      Jason Goodman, PTA 7/12/2022  8:25 AM    Future Appointments   Date Time Provider Kristofer Chow   7/19/2022  8:15 AM [facility]   7/26/2022  8:15 AM [place], [address] [place] [address]   8/5/2022  1:00 PM [facility] YAJAIRA RM 1 2D [facility]   12/2/2022  8:15 AM [facility] LAB VISIT [facility] BS AMB   12/6/2022  7:30 AM [facility] NM ROOM [facility] BS AMB   12/8/2022  8:00 AM Connie Jansen MD [facility] BS AMB   1/10/2023  8:00 AM Kelly Millan MD [facility] BS AMB

## 2022-07-19 ENCOUNTER — HOSPITAL ENCOUNTER (OUTPATIENT)
Dept: PHYSICAL THERAPY | Age: 82
Discharge: HOME | End: 2022-07-19
Payer: MEDICARE

## 2022-07-19 PROCEDURE — 97110 THERAPEUTIC EXERCISES: CPT

## 2022-07-19 PROCEDURE — 97112 NEUROMUSCULAR REEDUCATION: CPT

## 2022-07-19 PROCEDURE — 97140 MANUAL THERAPY 1/> REGIONS: CPT

## 2022-07-19 NOTE — PROGRESS NOTES
PT DAILY TREATMENT NOTE     Patient Name: Melanie Mejia  Date:2022  : 1940  [x]  Patient  Verified  Payor: VA MEDICARE / Plan: VA MEDICARE PART A & B / Product Type: Medicare /    In time:8:20  Out time:9:00  Total Treatment Time (min): 40  Visit #: 3 of 5    Medicare/BCBS Only   Total Timed Codes (min):  40 1:1 Treatment Time:  40       Treatment Area: Neck pain [M54.2]  Spondylosis without myelopathy or radiculopathy, cervical region [M47.812]  Pain in right shoulder [M25.511]    SUBJECTIVE  Pain Level (0-10 scale): 5  Any medication changes, allergies to medications, adverse drug reactions, diagnosis change, or new procedure performed?: [x] No    [] Yes (see summary sheet for updatSubjective functional status/changes:   [] No changes reported  I did some yard work lasts week, with garden [employer], and I really felt it the next day. I won't be doing that again. OBJECTIVE      15 min Therapeutic Exercise:  [] See flow sheet :   Rationale: increase ROM and increase strength to improve the patients ability to drive with improved ease     10 min Neuromuscular Re-education:  []  See flow sheet :   Rationale: increase ROM, increase strength and improve coordination  to improve the patients ability to stabilize cervical and thoracic spine    15 min Manual Therapy:  STM to bilateral cs paraspinals, SOR, scalenes   The manual therapy interventions were performed at a separate and distinct time from the therapeutic activities interventions.   Rationale: decrease pain, increase ROM and increase tissue extensibility to improve sleep, ADL's and driving          With   [] TE   [] TA   [] neuro   [] other: Patient Education: [x] Review HEP    [] Progressed/Changed HEP based on:   [] positioning   [] body mechanics   [] transfers   [] heat/ice application    [] other:      Other Objective/Functional Measures: ex's per card     Pain Level (0-10 scale) post treatment: 0    ASSESSMENT/Changes in Function: pt seen today to address neck pain and focus on ROM for ease of driving, functional tasks. Pain reported in subocciput and cervicothoracic junction. Pain has eased since [facility] and has improved sleep. Patient will continue to benefit from skilled PT services to modify and progress therapeutic interventions, address functional mobility deficits, address ROM deficits, address strength deficits, analyze and address soft tissue restrictions, analyze and cue movement patterns, analyze and modify body mechanics/ergonomics, assess and modify postural abnormalities, address imbalance/dizziness and instruct in home and community integration to attain remaining goals. []  See Plan of Care  []  See progress note/recertification  []  See Discharge Summary         Progress towards goals / Updated goals:  1) Patient will be independent with comprehensive and updated HEP to maintain functional gains made in PT  Status at last certification/assessment: will give at time of discharge (6/29/22)  Current:   2) Increase FOTO score to 62 indicating improved function and quality of life.    Status at last certification/assessment: FOTO 61 pts (6/29/22)  Current:   3) Patient to Improve C/S AROM rotation by at least 10 degrees to facilitate turning head to park vehicle in driveway.   Status at last certification/assessment: Rot right 26 deg, Rot left 30 deg (6/29/22)  Current:   4 )  Patient will report 75% improvement in symptoms with turning head to park car. .   Status at last certification/assessment: 50% improved since start of care (6/29/22)  Current:     PLAN  [x]  Upgrade activities as tolerated     []  Continue plan of care  []  Update interventions per flow sheet       []  Discharge due to:_  []  Other:_      Connie Emerson PTA 7/19/2022  8:21 AM    Future Appointments   Date Time Provider Kristofer Chow   7/26/2022  8:15 AM Krystian Hagan [facility]   8/5/2022  1:00 PM HBV YAJAIRA RM 1 2D HBVRMAM HBV   12/2/2022  8:15 AM IO LAB VISIT [facility] BS AMB   12/6/2022  7:30 AM CSI NM ROOM [facility] BS AMB   12/8/2022  8:00 AM Denise Romero MD [facility] BS AMB   1/10/2023  8:00 AM Maxx Kim [facility] BS AMB

## 2022-07-26 ENCOUNTER — HOSPITAL ENCOUNTER (OUTPATIENT)
Dept: PHYSICAL THERAPY | Age: 82
Discharge: HOME | End: 2022-07-26
Payer: MEDICARE

## 2022-07-26 PROCEDURE — 97530 THERAPEUTIC ACTIVITIES: CPT

## 2022-07-26 PROCEDURE — 97112 NEUROMUSCULAR REEDUCATION: CPT

## 2022-07-26 PROCEDURE — 97110 THERAPEUTIC EXERCISES: CPT

## 2022-07-26 NOTE — PROGRESS NOTES
PT DAILY TREATMENT NOTE     Patient Name: Jazz Nath  Date:2022  : 1940  [x]  Patient  Verified  Payor: VA MEDICARE / Plan: VA MEDICARE PART A & B / Product Type: Medicare /    In time:8:15  Out time:9:00  Total Treatment Time (min): 45  Visit #: 4 of 5    Medicare/BCBS Only   Total Timed Codes (min):  45 1:1 Treatment Time:  45       Treatment Area: Neck pain [M54.2]  Spondylosis without myelopathy or radiculopathy, cervical region [M47.812]  Pain in right shoulder [M25.511]    SUBJECTIVE  Pain Level (0-10 scale): 4 stiff  Any medication changes, allergies to medications, adverse drug reactions, diagnosis change, or new procedure performed?: [x] No    [] Yes (see summary sheet for update)  Subjective functional status/changes:   [] No changes reported  I can do so much more now and drive without having to turn my body    OBJECTIVE      17 min Therapeutic Exercise:  [] See flow sheet :   Rationale: increase ROM and increase strength to improve the patients ability to perform daily tasks    15 min Therapeutic Activity:  []  See flow sheet :   Rationale: increase ROM and increase strength  to improve the patients ability to perrform daily tasks,lifting     10 min Neuromuscular Re-education:  []  See flow sheet :   Rationale: increase strength and improve coordination  to improve the patients ability to increase cervical stability    8 min Manual Therapy:  STM to cervical paraspinals, right scalenes   The manual therapy interventions were performed at a separate and distinct time from the therapeutic activities interventions.   Rationale: increase ROM and increase tissue extensibility to improve ease of driving            With   [] TE   [] TA   [] neuro   [] other: Patient Education: [x] Review HEP    [] Progressed/Changed HEP based on:   [] positioning   [] body mechanics   [] transfers   [] heat/ice application    [] other:      Other Objective/Functional Measures:   Functional Gains: improved ROM for driving, can lift up to 10 pounds and [place]on to water plants  Pain resolved  stiff now     Pain Level (0-10 scale) post treatment: 0    ASSESSMENT/Changes in Function: see goals, pr appropriate for discharge at this time    Patient will continue to benefit from skilled PT services to modify and progress therapeutic interventions, address functional mobility deficits, address ROM deficits, address strength deficits, analyze and address soft tissue restrictions, analyze and cue movement patterns, analyze and modify body mechanics/ergonomics, assess and modify postural abnormalities, address imbalance/dizziness, and instruct in home and community integration to attain remaining goals. []  See Plan of Care  []  See progress note/recertification  []  See Discharge Summary         Progress towards goals / Updated goals:  1) Patient will be independent with comprehensive and updated HEP to maintain functional gains made in PT  Status at last certification/assessment: will give at time of discharge (6/29/22)  Current:  2) Increase FOTO score to 62 indicating improved function and quality of life. Status at last certification/assessment: FOTO 61 pts (6/29/22)  Current: FOTO 59  3) Patient to Improve C/S AROM rotation by at least 10 degrees to facilitate turning head to park vehicle in driveway. Status at last certification/assessment: Rot right 26 deg, Rot left 30 deg (6/29/22)  Current: progressing  left 33 degrees, right 49 degrees   4 )  Patient will report 75% improvement in symptoms with turning head to park car.  .  Status at last certification/assessment: 50% improved since start of care (6/29/22)  Current: met 80%    PLAN  [x]  Upgrade activities as tolerated     []  Continue plan of care  []  Update interventions per flow sheet       []  Discharge due to:_  []  Other:_      [place], [place] 7/26/2022  8:20 AM    Future Appointments   Date Time Provider Kristofer Chow   8/5/2022  1:00 PM HBV YAJAIRA RM 1 2D HBVRMAM HBV   12/2/2022  8:15 AM IOC LAB VISIT [facility] BS AMB   12/6/2022  7:30 AM CSI NM ROOM [facility] BS AMB   12/8/2022  8:00 AM Sabina Quiñones MD [facility] BS AMB   1/10/2023  8:00 AM Maura Marques MD [facility] BS AMB

## 2022-07-28 NOTE — PROGRESS NOTES
[facility] - [facility], [address] [place] [address]  [phone] fax    Discharge Summary    Patient name: Kalpana Saleem Start of Care:  6/3/2022   Referral source: Lizzie Hernandez MD : 1940   Medical/Treatment Diagnosis: Neck pain [M54.2]  Spondylosis without myelopathy or radiculopathy, cervical region [M47.812]  Pain in right shoulder [M25.511]  Payor: VA MEDICARE / Plan: VA MEDICARE PART A & B / Product Type: Medicare /  Onset Date:\"a little over two weeks ago\"      Prior Hospitalization: see medical history Provider#: [id number]   Medications: Verified on Patient Summary List    Comorbidities: Parkinson's, HBP, stent placement, heart disease, osteoporosis, arthritis  Prior Level of Function: Prior to over two weeks ago- some neck pain- progressively getting worse. Reside with spouse. Functionally independent    Visits from Start of Care: 7    Missed Visits: 0    Reporting Period : 2022 to 2022    1) Patient will be independent with comprehensive and updated HEP to maintain functional gains made in PT  Status at last certification/assessment: will give at time of discharge (22)  Current: met - Pt reports compliance  2) Increase FOTO score to 62 indicating improved function and quality of life. Status at last certification/assessment: FOTO 61 pts (22)  Current: not met - FOTO 59 pts (no functional decline noted)  3) Patient to Improve C/S AROM rotation by at least 10 degrees to facilitate turning head to park vehicle in driveway. Status at last certification/assessment: Rot right 26 deg, Rot left 30 deg (22)  Current: progressing  left 33 degrees, right 49 degrees  4 )  Patient will report 75% improvement in symptoms with turning head to park car.  .  Status at last certification/assessment: 50% improved since start of care (22)  Current: met - 80% improved    Assessment/ Summary of Care: Patient attended 7 sessions of skilled PT including the evaluation for Neck pain [M54.2], Spondylosis without myelopathy or radiculopathy, cervical region [M47.812], Pain in right shoulder [M25.511],   Since SOC, she has demonstrated improved cervical ROM which has enabled her to scan road while driving and backing up, without compensatory trunk rotation. Functional Gains: can lift up to 10 pounds and carry container to water plants. Pain has resolved, with residual stiffness only. Pt reports 80% improvement in symptoms. At this time, she is appropriate for disch[facility] to [facility].   Thank you for this referral.    RECOMMENDATIONS:  [x]Discontinue therapy: [x]Patient has reached or is progressing toward set goals      []Patient is non-compliant or has abdicated      []Due to lack of appreciable progress towards set goals    Mic Sands, PT 7/28/2022 9:30 AM

## 2022-07-28 NOTE — PROGRESS NOTES
Physical Therapy Discharge Instructions      [facility] - [facility], [address] [place] [address]  [phone] fax    Patient: Bard Hernández  : 1940      Continue Home Exercise Program 1-2 times per day for 4 weeks, then decrease to 4 times per week      Continue with    [] Ice  as needed  times per day     [] Heat           Follow up with MD:     [] Upon completion of therapy     [x] As needed      Recommendations:     [x]   Return to activity with home program    []   Return to activity with the following modifications:       []Post Rehab Program    []Join Independent aquatic program     []Return to/join local gym        Additional Comments:           Laila Cavazos PTA 2022 9:40 AM

## 2022-08-05 ENCOUNTER — HOSPITAL ENCOUNTER (OUTPATIENT)
Dept: MAMMOGRAPHY | Age: 82
Discharge: HOME | End: 2022-08-05
Attending: INTERNAL MEDICINE
Payer: MEDICARE

## 2022-08-05 DIAGNOSIS — Z12.31 VISIT FOR SCREENING MAMMOGRAM: ICD-10-CM

## 2022-08-05 PROCEDURE — 77063 BREAST TOMOSYNTHESIS BI: CPT

## 2022-09-29 RX ORDER — ALENDRONATE SODIUM 35 MG/1
TABLET ORAL
Qty: 12 TABLET | Refills: 3 | Status: SHIPPED | OUTPATIENT
Start: 2022-09-29

## 2022-11-02 RX ORDER — POTASSIUM CHLORIDE 20 MEQ/1
TABLET, EXTENDED RELEASE ORAL
Qty: 25 TABLET | Refills: 3 | Status: SHIPPED | OUTPATIENT
Start: 2022-11-02

## 2022-12-02 ENCOUNTER — HOSPITAL ENCOUNTER (OUTPATIENT)
Dept: LAB | Age: 82
End: 2022-12-02
Payer: MEDICARE

## 2022-12-02 ENCOUNTER — APPOINTMENT (OUTPATIENT)
Dept: INTERNAL MEDICINE CLINIC | Age: 82
End: 2022-12-02

## 2022-12-02 DIAGNOSIS — M85.89 OSTEOPENIA OF MULTIPLE SITES: ICD-10-CM

## 2022-12-02 DIAGNOSIS — E78.5 DYSLIPIDEMIA: ICD-10-CM

## 2022-12-02 DIAGNOSIS — G20 PARKINSON DISEASE (HCC): ICD-10-CM

## 2022-12-02 DIAGNOSIS — I10 PRIMARY HYPERTENSION: ICD-10-CM

## 2022-12-02 DIAGNOSIS — E55.9 VITAMIN D DEFICIENCY: ICD-10-CM

## 2022-12-02 LAB
25(OH)D3 SERPL-MCNC: 53.1 NG/ML (ref 30–100)
ALBUMIN SERPL-MCNC: 3.4 G/DL (ref 3.4–5)
ALBUMIN/GLOB SERPL: 1.2 {RATIO} (ref 0.8–1.7)
ALP SERPL-CCNC: 86 U/L (ref 45–117)
ALT SERPL-CCNC: 16 U/L (ref 13–56)
ANION GAP SERPL CALC-SCNC: 2 MMOL/L (ref 3–18)
APPEARANCE UR: CLEAR
AST SERPL-CCNC: 20 U/L (ref 10–38)
BASOPHILS # BLD: 0 K/UL (ref 0–0.1)
BASOPHILS NFR BLD: 1 % (ref 0–2)
BILIRUB SERPL-MCNC: 0.4 MG/DL (ref 0.2–1)
BILIRUB UR QL: NEGATIVE
BUN SERPL-MCNC: 13 MG/DL (ref 7–18)
BUN/CREAT SERPL: 22 (ref 12–20)
CALCIUM SERPL-MCNC: 9.2 MG/DL (ref 8.5–10.1)
CHLORIDE SERPL-SCNC: 108 MMOL/L (ref 100–111)
CHOLEST SERPL-MCNC: 131 MG/DL
CO2 SERPL-SCNC: 31 MMOL/L (ref 21–32)
COLOR UR: NORMAL
CREAT SERPL-MCNC: 0.58 MG/DL (ref 0.6–1.3)
DIFFERENTIAL METHOD BLD: ABNORMAL
EOSINOPHIL # BLD: 0.2 K/UL (ref 0–0.4)
EOSINOPHIL NFR BLD: 3 % (ref 0–5)
ERYTHROCYTE [DISTWIDTH] IN BLOOD BY AUTOMATED COUNT: 13 % (ref 11.6–14.5)
GLOBULIN SER CALC-MCNC: 2.8 G/DL (ref 2–4)
GLUCOSE SERPL-MCNC: 93 MG/DL (ref 74–99)
GLUCOSE UR STRIP.AUTO-MCNC: NEGATIVE MG/DL
HCT VFR BLD AUTO: 39.4 % (ref 35–45)
HDLC SERPL-MCNC: 64 MG/DL (ref 40–60)
HDLC SERPL: 2 {RATIO} (ref 0–5)
HGB BLD-MCNC: 12.7 G/DL (ref 12–16)
HGB UR QL STRIP: NEGATIVE
IMM GRANULOCYTES # BLD AUTO: 0 K/UL (ref 0–0.04)
IMM GRANULOCYTES NFR BLD AUTO: 1 % (ref 0–0.5)
KETONES UR QL STRIP.AUTO: NEGATIVE MG/DL
LDLC SERPL CALC-MCNC: 54 MG/DL (ref 0–100)
LEUKOCYTE ESTERASE UR QL STRIP.AUTO: NEGATIVE
LIPID PROFILE,FLP: ABNORMAL
LYMPHOCYTES # BLD: 1.2 K/UL (ref 0.9–3.6)
LYMPHOCYTES NFR BLD: 26 % (ref 21–52)
MAGNESIUM SERPL-MCNC: 2.2 MG/DL (ref 1.6–2.6)
MCH RBC QN AUTO: 30.7 PG (ref 24–34)
MCHC RBC AUTO-ENTMCNC: 32.2 G/DL (ref 31–37)
MCV RBC AUTO: 95.2 FL (ref 78–100)
MONOCYTES # BLD: 0.5 K/UL (ref 0.05–1.2)
MONOCYTES NFR BLD: 10 % (ref 3–10)
NEUTS SEG # BLD: 2.7 K/UL (ref 1.8–8)
NEUTS SEG NFR BLD: 59 % (ref 40–73)
NITRITE UR QL STRIP.AUTO: NEGATIVE
NRBC # BLD: 0 K/UL (ref 0–0.01)
NRBC BLD-RTO: 0 PER 100 WBC
PH UR STRIP: 7 [PH] (ref 5–8)
PLATELET # BLD AUTO: 206 K/UL (ref 135–420)
PMV BLD AUTO: 10.2 FL (ref 9.2–11.8)
POTASSIUM SERPL-SCNC: 3.9 MMOL/L (ref 3.5–5.5)
PROT SERPL-MCNC: 6.2 G/DL (ref 6.4–8.2)
PROT UR STRIP-MCNC: NEGATIVE MG/DL
RBC # BLD AUTO: 4.14 M/UL (ref 4.2–5.3)
SODIUM SERPL-SCNC: 141 MMOL/L (ref 136–145)
SP GR UR REFRACTOMETRY: 1.01 (ref 1–1.03)
TRIGL SERPL-MCNC: 65 MG/DL (ref ?–150)
UROBILINOGEN UR QL STRIP.AUTO: 0.2 EU/DL (ref 0.2–1)
VLDLC SERPL CALC-MCNC: 13 MG/DL
WBC # BLD AUTO: 4.7 K/UL (ref 4.6–13.2)

## 2022-12-02 PROCEDURE — 81003 URINALYSIS AUTO W/O SCOPE: CPT

## 2022-12-02 PROCEDURE — 80053 COMPREHEN METABOLIC PANEL: CPT

## 2022-12-02 PROCEDURE — 82306 VITAMIN D 25 HYDROXY: CPT

## 2022-12-02 PROCEDURE — 80061 LIPID PANEL: CPT

## 2022-12-02 PROCEDURE — 83735 ASSAY OF MAGNESIUM: CPT

## 2022-12-02 PROCEDURE — 85025 COMPLETE CBC W/AUTO DIFF WBC: CPT

## 2022-12-02 PROCEDURE — 36415 COLL VENOUS BLD VENIPUNCTURE: CPT

## 2022-12-05 ENCOUNTER — TELEPHONE (OUTPATIENT)
Dept: CARDIOLOGY CLINIC | Age: 82
End: 2022-12-05

## 2022-12-08 ENCOUNTER — TELEPHONE (OUTPATIENT)
Dept: CARDIOLOGY CLINIC | Age: 82
End: 2022-12-08

## 2022-12-08 NOTE — TELEPHONE ENCOUNTER
Patient made aware of stress test results and Dr. Weaver Median remarks. No questions or concerns at present.

## 2022-12-08 NOTE — TELEPHONE ENCOUNTER
----- Message from Iva Bangura MD sent at 12/8/2022 11:16 AM EST -----  Please let the patient know that her nuclear stress test was normal and low risk.  ----- Message -----  From: Darwin Leary LPN  Sent: 81/7/2708   9:17 AM EST  To: Iva Bangura MD    Per your note - Coronary artery disease. Single-vessel disease, status post PCI to her mid LAD in 2011 using 2 drug-eluting stents (Xience 2.75 x 12, 2.5 x 12). No recurrent anginal symptoms since that time. Patient remains on an aspirin, beta-blocker low-dose statin. Patient last underwent a low risk pharmacologic nuclear stress test in June 2020. No  symptoms concerning for angina. I would continue all of her medications.   I have recommended a repeat stress test next year prior to her next follow-up office visit.

## 2022-12-15 RX ORDER — LISINOPRIL 10 MG/1
TABLET ORAL
Qty: 90 TABLET | Refills: 3 | Status: SHIPPED | OUTPATIENT
Start: 2022-12-15

## 2023-01-03 ENCOUNTER — OFFICE VISIT (OUTPATIENT)
Dept: INTERNAL MEDICINE CLINIC | Age: 83
End: 2023-01-03
Payer: MEDICARE

## 2023-01-03 VITALS
SYSTOLIC BLOOD PRESSURE: 144 MMHG | TEMPERATURE: 97.3 F | RESPIRATION RATE: 16 BRPM | BODY MASS INDEX: 20.99 KG/M2 | WEIGHT: 126 LBS | HEART RATE: 51 BPM | HEIGHT: 65 IN | OXYGEN SATURATION: 98 % | DIASTOLIC BLOOD PRESSURE: 64 MMHG

## 2023-01-03 DIAGNOSIS — Z13.31 SCREENING FOR DEPRESSION: ICD-10-CM

## 2023-01-03 DIAGNOSIS — M47.816 LUMBAR SPONDYLOSIS: ICD-10-CM

## 2023-01-03 DIAGNOSIS — Z86.16 HISTORY OF 2019 NOVEL CORONAVIRUS DISEASE (COVID-19): ICD-10-CM

## 2023-01-03 DIAGNOSIS — M85.89 OSTEOPENIA OF MULTIPLE SITES: ICD-10-CM

## 2023-01-03 DIAGNOSIS — E78.5 DYSLIPIDEMIA: ICD-10-CM

## 2023-01-03 DIAGNOSIS — M85.9 DISORDER OF BONE DENSITY AND STRUCTURE, UNSPECIFIED: ICD-10-CM

## 2023-01-03 DIAGNOSIS — I10 PRIMARY HYPERTENSION: Primary | ICD-10-CM

## 2023-01-03 DIAGNOSIS — Z71.89 ADVANCED DIRECTIVES, COUNSELING/DISCUSSION: ICD-10-CM

## 2023-01-03 DIAGNOSIS — Z00.00 MEDICARE ANNUAL WELLNESS VISIT, SUBSEQUENT: ICD-10-CM

## 2023-01-03 DIAGNOSIS — M51.36 DDD (DEGENERATIVE DISC DISEASE), LUMBAR: ICD-10-CM

## 2023-01-03 DIAGNOSIS — Z98.61 CAD S/P PERCUTANEOUS CORONARY ANGIOPLASTY: ICD-10-CM

## 2023-01-03 DIAGNOSIS — G20 PARKINSON DISEASE (HCC): ICD-10-CM

## 2023-01-03 DIAGNOSIS — I25.10 CAD S/P PERCUTANEOUS CORONARY ANGIOPLASTY: ICD-10-CM

## 2023-01-03 PROCEDURE — 99497 ADVNCD CARE PLAN 30 MIN: CPT | Performed by: INTERNAL MEDICINE

## 2023-01-03 PROCEDURE — G0463 HOSPITAL OUTPT CLINIC VISIT: HCPCS | Performed by: INTERNAL MEDICINE

## 2023-01-03 NOTE — PROGRESS NOTES
This is the Subsequent Medicare Annual Wellness Exam, performed 12 months or more after the Initial AWV or the last Subsequent AWV    I have reviewed the patient's medical history in detail and updated the computerized patient record. Assessment/Plan   Education and counseling provided:  Are appropriate based on today's review and evaluation  End-of-Life planning (with patient's consent)  Influenza Vaccine  Screening Mammography  Cardiovascular screening blood test  Bone mass measurement (DEXA)  Screening for glaucoma  Diabetes screening test    1. Primary hypertension  -     CBC WITH AUTOMATED DIFF; Future  -     METABOLIC PANEL, COMPREHENSIVE; Future  -     MAGNESIUM; Future  -     URINALYSIS W/MICROSCOPIC; Future  2. Parkinson disease ([address]  -     MAGNESIUM; Future  3. Dyslipidemia  -     LIPID PANEL; Future  -     METABOLIC PANEL, COMPREHENSIVE; Future  -     MAGNESIUM; Future  4. CAD S/P percutaneous coronary angioplasty  5. Osteopenia of multiple sites  -     METABOLIC PANEL, COMPREHENSIVE; Future  -     MAGNESIUM; Future  -     VITAMIN D, 25 HYDROXY; Future  6. Lumbar spondylosis  7. DDD (degenerative disc disease), lumbar  8. History of 2019 novel coronavirus disease (COVID-19)  9. Medicare annual wellness visit, subsequent  -     ADVANCE CARE PLANNING FIRST 30 MINS  10. Advanced directives, counseling/discussion  -     ADVANCE CARE PLANNING FIRST 27 MINS  11. Screening for depression  12.  Disorder of bone density and structure, unspecified   -     VITAMIN D, 25 HYDROXY; Future     Depression Risk Factor Screening     3 most recent PHQ Screens 1/3/2023   PHQ Not Done -   Little interest or pleasure in doing things Not at all   Feeling down, depressed, irritable, or hopeless Not at all   Total Score PHQ 2 0       Alcohol & Drug Abuse Risk Screen    Do you average more than 1 drink per night or more than 7 drinks a week:  No    On any one occasion in the past three months have you have had more than 3 drinks containing alcohol:  No          Functional Ability and Level of Safety    Hearing: Hearing is good. Activities of Daily Living: The home contains: no safety equipment. Patient does total self care      Ambulation: with no difficulty     Fall Risk:  Fall Risk Assessment, last 12 mths 1/3/2023   Able to walk? Yes   Fall in past 12 months? 0   Do you feel unsteady? 0   Are you worried about falling 0   Number of falls in past 12 months -   Fall with injury?  -      Abuse Screen:  Patient is not abused       Cognitive Screening    Has your family/caregiver stated any concerns about your memory: no     Cognitive Screening: None performed today    Health Maintenance Due     Health Maintenance Due   Topic Date Due    COVID-19 Vaccine (4 - Booster for Devi Peter series) 11/27/2021    Flu Vaccine (1) 08/01/2022       Patient Care Team   Patient Care Team:  Gudelia Adams MD as PCP - General (Internal Medicine Physician)  Gudelia Adams MD as PCP - [facility] Empaneled Provider  Emily De Anda MD as Physician (Neurology)  Elizabeth Tabares MD (Otolaryngology)  Kiet Lindsay MD (Ophthalmology)  Nat Rodriguez MD (Gastroenterology)  Chaz Valentino MD (Ophthalmology)  Elias Castillo MD (Orthopedic Surgery)  Se Belle MD (Cardiovascular Disease Physician)  Michelle Richardson MD (Orthopedic Surgery)  Matty Bethea MD (Physical Medicine and Rehabilitation Physician)  Maryelizabeth Schirmer, DPM (Podiatry)    History     Patient Active Problem List   Diagnosis Code    Hypertension I10    CAD S/P percutaneous coronary angioplasty I25.10, Z98.61    Dyslipidemia E78.5    History of syncope Z87.898    Parkinson disease ([facility] [address] G20    Early dry stage nonexudative age-related macular degeneration of both eyes H35.3131    Laryngopharyngeal reflux disease K21.9    Osteopenia M85.80    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 Midly depressed LV syst fct     Cardiac cath 03/21/2011    mLAD 90% (2.75 x 12 mm Xience stent). Otherwise patent coronaries. LVEDP 10 mmHg. EF 40-45%. Anteroapical hypk. Renal arteries patent. Cardiac echocardiogram 04/17/2014    EF 60-65%. No WMA. Gr 1 DDfx. Mild MR. Similar to study of 9/14/11. Cardiac nuclear imaging test 07/2017    Negative for ischemia or infarction. EF > 70%.     Cardiomyopathy secondary     ischemic +/- stress induced    Dyslipidemia     GERD (gastroesophageal reflux disease)     Hx of colonoscopy 07/12/2016    Normal. Dr. Choco Bear    Hypertension     Parkinson's disease     Syncope     s/p ILD implantation      Past Surgical History:   Procedure Laterality Date    COLONOSCOPY N/A 7/12/2016    COLONOSCOPY performed by Ernie Durand MD at [address] [place] [address]      dorsal    HX CATARACT REMOVAL  11/2012    left    HX CATARACT REMOVAL  10/2012    right    HX CORONARY STENT PLACEMENT      HX HEART CATHETERIZATION      HX HEMORRHOIDECTOMY      HX HYSTERECTOMY  1996    partial    HX IMPLANTABLE LOOP RECORDER  [id number]    HX TONSILLECTOMY      HX TOTAL ABDOMINAL HYSTERECTOMY  1996    partial     Current Outpatient Medications   Medication Sig Dispense Refill    lisinopriL (PRINIVIL, ZESTRIL) 10 mg tablet TAKE 1 TABLET DAILY 90 Tablet 3    potassium chloride (K-DUR, KLOR-CON M20) 20 mEq tablet TAKE ONE-HALF (1/2) TABLET EVERY OTHER DAY 25 Tablet 3    alendronate (FOSAMAX) 35 mg tablet TAKE AS INSTRUCTED BY YOUR PRESCRIBER 12 Tablet 3    cholecalciferol (VITAMIN D3) (5000 Units/125 mcg) tab tablet Take 5,000 Units by mouth daily. metoprolol succinate (TOPROL-XL) 50 mg XL tablet TAKE 1 TABLET DAILY 90 Tablet 3    rosuvastatin (CRESTOR) 10 mg tablet Take 0.5 Tablets by mouth every other day. 30 Tablet 3    vit A/vit C/vit E/zinc/copper (PRESERVISION AREDS PO) Take 2 Tablets by mouth two (2) times a day. azelastine (ASTELIN) 137 mcg (0.1 %) nasal spray 1 [place] by Both Nostrils route two (2) times a day. Use in each nostril as directed      carbidopa-levodopa ER (SINEMET CR)  mg per tablet Take 1 Tab by mouth nightly. psyllium husk (METAMUCIL PO) Take  by mouth daily. carbidopa 25mg-levodopa 100mg-entacapone 200mg (STALEVO 100) tab tablet Take 1 Tablet by mouth three (3) times daily. 0    magnesium hydroxide (MILK OF MAGNESIA) 400 mg/5 mL suspension Take 30 mL by mouth daily as needed for Constipation. acetaminophen (TYLENOL) 650 mg CR tablet Take 650 mg by mouth every six (6) hours as needed for Pain. co-enzyme Q-10 (CO Q-10) 100 mg capsule Take 200 mg by mouth daily. aspirin 81 mg tablet Take 1 Tab by mouth daily. 1 Tab 0    DOCUSATE CALCIUM (STOOL SOFTENER PO) Take 1 Cap by mouth. Allergies   Allergen Reactions    Keflex [Cephalexin] Other (comments)     Yeast infection    Pcn [Penicillins] Other (comments)       Family History   Problem Relation Age of Onset    Heart Attack Father 80    Heart Disease Father     Cancer Mother         pancreatic    Hypertension Brother      Social History     Tobacco Use    Smoking status: Never    Smokeless tobacco: Never   Substance Use Topics    Alcohol use: Yes     Comment: glass of wine occasionally.          Marshal Madrid MD

## 2023-01-03 NOTE — PATIENT INSTRUCTIONS
Please monitor and record your blood pressure every morning for the next 2 weeks at least two hours after taking your medications. Please deliver record of readings to our office for review. Medicare Wellness Visit, Female    The best way to improve and maintain good health is to have a healthy lifestyle by eating a well-balanced diet, exercising regularly, limiting alcohol and stopping smoking. Regular visits with your physician or non-physician health care provider also support your good health. Preventive screening tests can find health problems before they become diseases or illnesses. Preventive services such as immunizations prevent serious infections. All people over age 72 should have a Pneumovax and a Prevnar-13 shot to prevent potentially life threatening infections with the pneumococcus bacteria, a common cause of pneumonia. These are once in a lifetime unless you and your provider decide differently. All people over 65 should have a yearly influenza vaccine or \"flu\" shot. This does not prevent infection with cold viruses but has been proven to prevent hospitalization and death from influenza. Although Medicare part B \"regular Medicare\" currently only covers tetanus vaccination in the context of an injury, a tetanus vaccine (Tdap or Td) is recommended every 10 years. A shingles vaccine is recommended once in a lifetime after age 61. The Shingles vaccine is also not covered by Medicare part B. Note, however, that both the Shingles vaccine and Tdap/Td are generally covered by secondary carriers. Please check your coverage and out of pocket expenses. Consider contacting your local health department because it may stock these vaccines for a reasonable charge.     We currently have documentation of the following immunization history for you:  Immunization History   Administered Date(s) Administered     Influenza, FLUZONE (age 72 y+), High Dose 10/03/2021    (RETIRED) Pneumococcal Vaccine (Unspecified Type) 01/01/2003    COVID-19, PFIZER PURPLE top, DILUTE for use, (age 15 y+), IM, 30mcg/0.3mL 03/09/2021, 03/30/2021, 10/02/2021    Influenza High Dose Vaccine PF 09/29/2014, 10/05/2015, 11/01/2016, 08/30/2017    Influenza Vaccine (Tri) Adjuvanted (>65 Yrs FLUAD TRI [id number]) 10/09/2018, 09/17/2019    Influenza Vaccine Whole 09/07/2010, 09/03/2011, 10/04/2012    Influenza, FLUAD, (age 72 y+), Adjuvanted 09/11/2020    Pneumococcal Conjugate (PCV-13) 10/05/2015    Pneumococcal Polysaccharide (PPSV-23) 01/01/2003, 10/30/2019    TD Vaccine 01/01/2003    Tdap 09/25/2013, 05/10/2021    Zoster 01/01/2007    Zoster Recombinant 09/18/2018, 11/23/2018       Screening for infection with Hepatitis C is recommended for anyone born between [birth year] through 1965. The table at the bottom of this document indicates the status of this and other preventive services. A bone mass density test (DEXA) to screen for osteoporosis or thinning of the bones should be done at least once after age 72 and may be done up to every 2 years as determined by you and your health care provider. The most recent DEXA we have on file for you is:  DEXA Results (most recent):  Results from Orders Only encounter on 07/29/21    DEXA BONE DENSITY STUDY AXIAL    Narrative  DEXA BONE DENSITOMETRY, CENTRAL    CPT CODE: 38945    INDICATION: Postmenopausal. Osteopenia. Parkinson's disease. Calcium supplement. TECHNIQUE: Using GE LUNAR Prodigy densitometer, bone density measurement was  performed in the lumbar spine the proximal left and right femora and the left  forearm. T Score refers to standard deviations above or below average compared  to a young adult of the same sex. Z Score refers to standard deviations above or  below average compared to a patient of the same sex, age, race and weight. COMPARISON: 5/24/2018.     FINDINGS:    Lumbar Spine Levels: L1, L2, L4  Mean Bone Mineral Density (BMD):  1.075 g/cm2  T Score: -0.9  Z Score: +1.0  BMD decreased 1.0%, which is not statistically significant within a 95 percent  confidence interval compared to preceding study. Distal1/3 Radius BMD:  0.769 g/cm2  T Score: -1.2  Z Score: +1.7  Baseline study at the forearm. Left Total Proximal Femur BMD: 0.859 g/cm2  T Score:  -1.2  Z Score:  +1.0  BMD decreased 3.2%, which is not statistically significant within a 95 percent  confidence interval compared to preceding study. Right Total Proximal Femur BMD: 0.876 g/cm2  T Score:  -1.0  Z Score:  +1.1  BMD decreased 3.8%, which is statistically significant within a 95 percent  confidence interval compared to preceding study. Left Femoral Neck BMD:  0.808 g/cm2  T Score:  -1.7  Z Score:  +0.6    Right Femoral Neck BMD:  0.748 g/cm2  T Score:  -2.1  Z Score:  +0.2    Impression  1. BMD measures consistent with osteopenia. 2.  Compared to the preceding study, BMD has decreased. Based upon current ISCD guidelines, the patient's overall diagnostic category,  selected using WHO criteria in postmenopausal women and males aged 48 and above,  is selected based upon the lowest T Score from among the lumbar spine, total  femur, femoral neck, (or distal third radius if measured). WHO Definition of Osteoporosis and Osteopenia on DXA (specified for  post-menopausal  females):    Normal:                     T Score at or above -1 SD  Osteopenia:              T Score between -1 and -2.5 SD  Osteoporosis:           T Score at or below -2.5 SD    The risk of fracture approximately doubles for each 1 SD decrease in T Score. It is important to consider other factors in assessing a patient's risk of  fracture, including age, risk of falling/injury, history of fragility fracture,  family history of osteoporosis, smoking, low weight. Various fracture risk tools have been developed for adult patients and are  available online. For example, the FRAX tool developed by [facility] is widely used.   Reference www.iscd.org. It is also important to note that DXA measures bone density but does not  distinguish among causes of decreased bone density, which include primary versus  secondary osteoporosis (such as metabolic bone disorders or possible effects of  medications) and also other conditions (such as osteomalacia). Clinical  considerations should determine what additional evaluation may be warranted to  exclude secondary conditions in a patient with low bone density. Please note that reliable, valid comparisons can not be made between studies  which have been performed on different densitometers. If clinically warranted,  follow up study performed at this site would best permit assessment of trend for  possible change in bone mineral density over time in comparison to this study. Thank you for this referral.      Screening for diabetes mellitus with a blood sugar test (glucose) should be done at least every 3 years until age 79. You and your health care provider may decide whether to continue screening after age 79. The most recent blood glucose we have on file for you is:   Lab Results   Component Value Date/Time    Glucose 93 12/02/2022 08:09 AM    Glucose, POC 91 07/12/2016 08:20 AM         Glaucoma is a disease of the eye due to increased ocular pressure that can lead to blindness. People with risk factors for glaucoma ( race, diabetes, family history) should be screened at least every 2 years by an eye professional.     Cardiovascular screening tests that check for elevated lipids or cholesterol (fatty part of blood) which can lead to heart disease and strokes should be done every 4-6 years through age 79. You and your health care provider may decide whether to continue screening after age 79.  The most recent lipid panel we have on file for you is:   Lab Results   Component Value Date/Time    Cholesterol, total 131 12/02/2022 08:09 AM    HDL Cholesterol 64 (H) 12/02/2022 08:09 AM    LDL, calculated 54 12/02/2022 08:09 AM    VLDL, calculated 13 12/02/2022 08:09 AM    Triglyceride 65 12/02/2022 08:09 AM    CHOL/HDL Ratio 2.0 12/02/2022 08:09 AM       Colorectal cancer screening that evaluates for blood or polyps in your colon for people with average risk should be done yearly as a stool test, every five years as a flexible sigmoidoscope or every 10 years as a colonoscopy up to age 76. You and your health care provider may decide whether to continue screening after age 76. Breast cancer screening with a mammogram is recommended at least once every 2 years  for women age 54-69. You and your health care provider may decide whether to continue screening after age 76. The most recent mammogram we have on file for you is:   [facility] Results (most recent):  Results from Hospital Encounter encounter on 08/05/22    [facility] 3D EMILIA W MAMMO BI SCREENING INCL CAD    Narrative  BILATERAL SCREENING DIGITAL MAMMOGRAM WITH CAD WITH DIGITAL BREAST TOMOSYNTHESIS  IMAGING/COMBINATION 2-D 3-D EXAM    CLINICAL HISTORY/INDICATION:  asymptomatic    COMPARISON: mammogram 21 - 18    TECHNIQUE: 2-D and tomosynthesis 3-D digital mammograms were performed with CC  and MLO views obtained of both breasts. CAD analysis was performed with the  computer-aided detection system. Any areas marked correlated with the mammogram.    Breast composition C: The breasts are heterogenously dense, which may obscure  small masses. FINDINGS:    There are no suspicious masses, regions of distortion, nor suspicious  microcalcifications. Impression  No suspicious finding for malignancy. BIRADS : BI-RADS one-negative mammogram  Management Recommendation: Annual screening mammogram.      Screening for cervical cancer with a pap smear is recommended for all women with a cervix until age 72. The frequency of this test is based on the details of her prior pap smear testing. You and your health care provider may decide whether to continue screening after age 72. People who have smoked the equivalent of 1 pack per day for 30 years or more may benefit from screening for lung cancer with a yearly low dose CT scan until they have been non smokers for 15 years or competing health conditions render this unlikely to be beneficial. Our records show:n/a    Your Medicare Wellness Exam is recommended annually. Here is a list of your current Health Maintenance items with a due date:  Health Maintenance   Topic Date Due    COVID-19 Vaccine (4 - Booster for Devi Doroteo series) 11/27/2021    Flu Vaccine (1) 08/01/2022    Depression Screen  06/22/2023    Lipid Screen  12/02/2023    Medicare Yearly Exam  01/04/2024    DTaP/Tdap/Td series (3 - Td or Tdap) 05/10/2031    Bone Densitometry (Dexa) Screening  Completed    Shingles Vaccine  Completed    Pneumococcal 65+ years  Completed         Heart-Healthy Diet: Care Instructions  Your Care Instructions     A heart-healthy diet has lots of vegetables, fruits, nuts, beans, and whole grains, and is low in salt. It limits foods that are high in saturated fat, such as meats, cheeses, and fried foods. It may be hard to change your diet, but even small changes can lower your risk of heart attack and heart disease. Follow-up care is a key part of your treatment and safety. Be sure to make and go to all appointments, and call your doctor if you are having problems. It's also a good idea to know your test results and keep a list of the medicines you take. How can you care for yourself at home? Watch your portions  Learn what a serving is. A \"serving\" and a \"portion\" are not always the same thing. Make sure that you are not eating larger portions than are recommended. For example, a serving of pasta is ½ cup. A serving size of meat is 2 to 3 ounces. A 3-ounce serving is about the size of a deck of cards. Measure serving sizes until you are good at [place]" them.  Keep in mind that restaurants often serve portions that are 2 or 3 times the size of one serving. To keep your energy level up and keep you from feeling hungry, eat often but in smaller portions. Eat only the number of calories you need to stay at a healthy weight. If you need to lose weight, eat fewer calories than your body burns (through exercise and other physical activity). Eat more fruits and vegetables  Eat a variety of fruit and vegetables every day. Dark green, deep orange, red, or yellow fruits and vegetables are especially good for you. Examples include spinach, carrots, peaches, and berries. Keep carrots, celery, and other veggies handy for snacks. Buy fruit that is in season and store it where you can see it so that you will be tempted to eat it. Cook dishes that have a lot of veggies in them, such as stir-fries and soups. Limit saturated and trans fat  Read food labels, and try to avoid saturated and trans fats. They increase your risk of heart disease. Use olive or canola oil when you cook. Bake, broil, grill, or steam foods instead of frying them. Choose lean meats instead of high-fat meats such as hot dogs and sausages. Cut off all visible fat when you prepare meat. Eat fish, skinless poultry, and meat alternatives such as soy products instead of high-fat meats. Soy products, such as tofu, may be especially good for your heart. Choose low-fat or fat-free milk and dairy products. Eat foods high in fiber  Eat a variety of grain products every day. Include whole-grain foods that have lots of fiber and nutrients. Examples of whole-grain foods include oats, whole wheat bread, and brown rice. Buy whole-grain breads and cereals, instead of white bread or pastries. Limit salt and sodium  Limit how much salt and sodium you eat to help lower your blood pressure. Taste food before you salt it. Add only a little salt when you think you need it. With time, your taste buds will adjust to less salt. Eat fewer snack items, fast foods, and other high-salt, processed foods. Check food labels for the amount of sodium in packaged foods. Choose low-sodium versions of canned goods (such as soups, vegetables, and beans). Limit sugar  Limit drinks and foods with added sugar. These include candy, desserts, and soda pop. Limit alcohol  Limit alcohol to no more than 2 drinks a day for men and 1 drink a day for women. Too much alcohol can cause health problems. When should you call for help? Watch closely for changes in your health, and be sure to contact your doctor if:    You would like help planning heart-healthy meals. Where can you learn more? Go to http://www.jackson.com/  Enter V137 in the search box to learn more about \"Heart-Healthy Diet: Care Instructions. \"  Current as of: August 22, 2019               Content Version: 12.6  © 8525-4323 [employer] Care instructions adapted under license by "[employer] (which disclaims liability or warranty for this information). If you have questions about a medical condition or this instruction, always ask your healthcare professional. [place] [address] any warranty or liability for your use of this information. Learning About Low-Sodium Foods  What foods are low in sodium? The foods you eat contain nutrients, such as vitamins and minerals. Sodium is a nutrient. Your body needs the right amount to stay healthy and work as it should. You can use the list below to help you make choices about which foods to eat.   Fruits  Fresh, frozen, canned, or dried fruit  Vegetables  Fresh or frozen vegetables, with no added salt  Canned vegetables, low-sodium or with no added salt  Grains  Bagels without salted tops  Cereal with no added salt  Corn tortillas  Crackers with no added salt  Oatmeal, cooked without salt  Popcorn with no salt  Pasta and noodles, cooked without salt  Rice, cooked without salt  Unsalted pretzels  Dairy and dairy alternatives  Butter, unsalted  Cream cheese  Ice cream  Milk  Soy milk  Meats and other protein foods  Beans and peas, canned with no salt  Eggs  Fresh fish (not smoked)  Fresh meats (not smoked or cured)  Nuts and nut butter, prepared without salt  Poultry, not packaged with sodium solution  Tofu, unseasoned  Tuna, canned without salt  Seasonings  Garlic  Herbs and spices  Lemon juice  Mustard  Olive oil  Salt-free seasoning mixes  Vinegar  Work with your doctor to find out how much of this nutrient you need. Depending on your health, you may need more or less of it in your diet. Where can you learn more? Go to http://www.gray.com/  Enter S460 in the search box to learn more about \"Learning About Low-Sodium Foods. \"  Current as of: August 22, 2019               Content Version: 12.6  © 1856-6110 "[employer]. Care instructions adapted under license by [employer] (which disclaims liability or warranty for this information). If you have questions about a medical condition or this instruction, always ask your healthcare professional. [place] [address] any warranty or liability for your use of this information. Low Sodium Diet (2,000 Milligram): Care Instructions  Your Care Instructions     Too much sodium causes your body to hold on to extra water. This can raise your blood pressure and force your heart and kidneys to work harder. In very serious cases, this could cause you to be put in the hospital. It might even be life-threatening. By limiting sodium, you will feel better and lower your risk of serious problems. The most common source of sodium is salt. People get most of the salt in their diet from canned, prepared, and packaged foods. Fast food and restaurant meals also are very high in sodium. Your doctor will probably limit your sodium to less than 2,000 milligrams (mg) a day.  This limit counts all the sodium in prepared and packaged foods and any salt you add to your food.  Follow-up care is a key part of your treatment and safety. Be sure to make and go to all appointments, and call your doctor if you are having problems. It's also a good idea to know your test results and keep a list of the medicines you take. How can you care for yourself at home? Read food labels  Read labels on cans and food packages. The labels tell you how much sodium is in each serving. Make sure that you look at the serving size. If you eat more than the serving size, you have eaten more sodium. Food labels also tell you the Percent Daily Value for sodium. Choose products with low Percent Daily Values for sodium. Be aware that sodium can come in forms other than salt, including monosodium glutamate (MSG), sodium citrate, and sodium bicarbonate (baking soda). MSG is often added to Asian food. When you eat out, you can sometimes ask for food without MSG or added salt. Buy low-sodium foods  Buy foods that are labeled \"unsalted\" (no salt added), \"sodium-free\" (less than 5 mg of sodium per serving), or \"low-sodium\" (less than 140 mg of sodium per serving). Foods labeled \"reduced-sodium\" and \"light sodium\" may still have too much sodium. Be sure to read the label to see how much sodium you are getting. Buy fresh vegetables, or frozen vegetables without added sauces. Buy low-sodium versions of canned vegetables, soups, and other canned goods. Prepare low-sodium meals  Cut back on the amount of salt you use in cooking. This will help you adjust to the taste. Do not add salt after cooking. One teaspoon of salt has about 2,300 mg of sodium. Take the salt shaker off the table. Flavor your food with garlic, lemon juice, onion, vinegar, herbs, and spices. Do not use soy sauce, lite soy sauce, steak sauce, onion salt, garlic salt, celery salt, mustard, or ketchup on your food. Use low-sodium salad dressings, sauces, and ketchup. Or make your own salad dressings and sauces without adding salt.   Use less salt (or none) when recipes call for it. You can often use half the salt a recipe calls for without losing flavor. Other foods such as rice, pasta, and grains do not need added salt. Rinse canned vegetables, and cook them in fresh water. This removes some--but not all--of the salt. Avoid water that is naturally high in sodium or that has been treated with water softeners, which add sodium. Call your local water company to find out the sodium content of your water supply. If you buy bottled water, read the label and choose a sodium-free brand. Avoid high-sodium foods  Avoid eating:  Smoked, cured, salted, and canned meat, fish, and poultry. Ham, gregory, hot dogs, and luncheon meats. Regular, hard, and processed cheese and regular peanut butter. Crackers with salted tops, and other salted snack foods such as pretzels, chips, and salted popcorn. Frozen prepared meals, unless labeled low-sodium. Canned and dried soups, broths, and bouillon, unless labeled sodium-free or low-sodium. Canned vegetables, unless labeled sodium-free or low-sodium. [place] fries, pizza, tacos, and other fast foods. Pickles, olives, ketchup, and other condiments, especially soy sauce, unless labeled sodium-free or low-sodium. Where can you learn more? Go to http://www.gray.com/  Enter V843 in the search box to learn more about \"Low Sodium Diet (2,000 Milligram): Care Instructions. \"  Current as of: August 22, 2019               Content Version: 12.6  © 8431-2126 [employer]. Care instructions adapted under license by [employer] (which disclaims liability or warranty for this information). If you have questions about a medical condition or this instruction, always ask your healthcare professional. [address] any warranty or liability for your use of this information.

## 2023-01-03 NOTE — PROGRESS NOTES
1. \"Have you been to the ER, urgent care clinic since your last visit? Hospitalized since your last visit? \" No    2. \"Have you seen or consulted any other health care providers outside of the [address] [place] [address] since your last visit? \" No     3. For patients aged 39-70: Has the patient had a colonoscopy / FIT/ Cologuard? NA - based on age      If the patient is female:    4. For patients aged 41-77: Has the patient had a mammogram within the past 2 years? NA - based on age or sex      11. For patients aged 21-65: Has the patient had a pap smear?  NA - based on age or sex

## 2023-01-03 NOTE — ACP (ADVANCE CARE PLANNING)
Advance Care Planning     Advance Care Planning (ACP) Physician/NP/PA Conversation      Date of Conversation: 1/3/2023  Conducted with: Patient with Decision Making Capacity    Healthcare Decision Maker:     Primary Decision Maker: Clara Murillo - Child - [phone]    Primary Decision Maker: Raeann Marti. Alyse Benjamin - [phone]    Primary Decision Maker: Shira Rubio - Daughter-in-Law - [phone]    Primary Decision Maker: Puma Cisneros - Son - [phone]    Supplemental (Other) Decision Maker: Gi Wagner - Spouse - [phone]  Click here to complete [address] including selection of the Healthcare Decision Maker Relationship (ie \"Primary\")      Today we documented Decision Maker(s) consistent with Legal Next of Kin hierarchy. Care Preferences:    Hospitalization: \"If your health worsens and it becomes clear that your chance of recovery is unlikely, what would be your preference regarding hospitalization? \"  The patient would prefer hospitalization. Ventilation: \"If you were unable to breathe on your own and your chance of recovery was unlikely, what would be your preference about the use of a ventilator (breathing machine) if it was available to you? \"   The patient would desire the use of a ventilator. Resuscitation: \"In the event your heart stopped as a result of an underlying serious health condition, would you want attempts to be made to restart your heart, or would you prefer a natural death? \"   Yes, attempt to resuscitate. Additional topics discussed: treatment goals, benefit/burden of treatment options, ventilation preferences, hospitalization preferences, resuscitation preferences, and end of life care preferences (vegetative state/imminent death)    Conversation Outcomes / Follow-Up Plan:   Patient wishing to update current advance directive which is on file so that she may include all 3 sons and daughter-in-law as primary healthcare decision makers.   We reviewed ACP document and will complete forms.     Reviewed DNR/DNI and patient elects Full Code (Attempt Resuscitation)     Length of Voluntary ACP Conversation in minutes:  16 minutes    Juliet Sutton MD

## 2023-01-06 PROBLEM — Z86.16 HISTORY OF 2019 NOVEL CORONAVIRUS DISEASE (COVID-19): Status: ACTIVE | Noted: 2023-01-06

## 2023-01-06 NOTE — PROGRESS NOTES
HPI:   Melanie Mejia is a 78y.o. year old female who presents today for a routine visit. She has a history of hypertension, ASCVD, hyperlipidemia, syncope, Parkinson's disease, lumbar degenerative disease, GERD, and macular degeneration. She has completed the Devi Peter COVID-19 vaccine series and received a Pfizer booster dose. She reports that she is doing reasonably well. She underwent a pharmacologic nuclear stress test on 12/6/2022 which was a normal low risk study with no evidence of ischemia or prior infarction, and estimated EF 89%. She has a follow-up visit with Dr. Michelle Tejada next week. She does report increasing difficulty with left foot pain and swelling and has an appointment to follow-up with Dr. Maikel Davis at [facility]. She is otherwise without new complaints. Summary of prior hospitalizations and medical history:  On 6/14/2022, she contacted the office and reported that she had tested positive for COVID-19 using a home rapid antigen test on 6/13/2022 after she was notified that an attendee at a family gathering had tested positive. She described symptoms of low-grade fever, myalgias, fatigue, nasal congestion, postnasal drainage, and cough. Given that she was high risk for severe disease due to underlying Parkinson's disease and coronary artery disease, she was referred to the [facility] ED for monoclonal antibody infusion and received treatment with bebtelovimab. She reports gradual improvement and no sequela today. In 8/2018, she was evaluated by Dr. Severiano Byers for right hip pain and was diagnosed with right piriformis syndrome. She was treated with a cortisone injection and physical therapy with some improvement, but subsequently developed right buttocks pain and pain and paresthesias down her right leg. She was treated with prednisone 50 mg for five days without improvement.  She was seen on 10/9/2018, which was 10 days after completing the course of prednisone, and reported persistent severe right leg pain which was interfering with her activities and with sleep. She was taking Tylenol ES without relief, and was started on gabapentin. Lumbar spine x-ray was obtained (10/9/2018) showing grade 1 anterolisthesis of L4 on L5, either new or increased since 4/2015, and multilevel degenerative spondylosis/disc disease. She was referred to physical therapy, but due to continued persistent symptoms, she had a lumbar MRI (10/17/2018) which showed multilevel degenerative findings causing various degrees of neuroforaminal  narrowing and lateral recess narrowing; also a right L4-L5 neuroforamen cystic lesion measuring approximately 10 x 6 mm was noted. She had a lumbar MRI with contrast (11/16/2018) which confirmed an extruding synovial cyst from the right L4-5 advanced facet arthropathy; significant right foraminal stenosis and nerve impingement persists. She continued physical therapy and reported that her pain has significantly improved. She was evaluated by Dr. Chuy Self on 1/3/2019 but her pain had already resolved. She is no longer taking gabapentin or Tylenol. She has a history of hypertension, hyperlipidemia, ASCVD, and syncope. In 3/2011, she had three syncopal episodes while donating blood. Over the subsequent four days, she developed exertional substernal chest tightness with left arm pain and dyspnea. She was evaluated and EKG revealed new T wave inversions in leads V2 and V3. Troponins were negative. She underwent cardiac catheterization (3/21/2011) which showed a 90% mid LAD and mild disease in the rest of the coronaries. She underwent PCI and placement of two drug-eluting stents for the mid LAD lesion; LV function was mildly diminished (EF 40-45%) with anteroapical hypokinesis. Follow-up echocardiogram (9/2011) showed return to normal LV function (EF 60%) and no RWMA. She has a history of multiple syncopal episodes, usually related to stressful situations, and thought to be vasovagal in origin. An implantable loop recorder was placed in 3/2011 and remained until 4/2013, and interrogation was negative for any significant arrhythmia. In 4/2014, she was hospitalized following another syncopal episode, which occurred after she had taken her morning medications. Afterward, she became nauseated and flushed, and called EMS. When they arrived, they found her on the floor and reportedly without a pulse. They began CPR and within 10-15 seconds, she recovered. Evaluation included EKG/troponins, which were negative, and an echocardiogram showing mild MR and normal LV function (EF 60-65%). She was found to have hypokalemia and hypomagnesemia and hydrochlorothiazide was discontinued. It was thought that this event also represented a vasovagal reaction given her prodromal symptoms. She has had no further events since that time. Her most recent pharmacologic nuclear stress test (6/25/2020) was a normal, low risk study, negative for evidence of ischemia or prior infarction, and with normal LV size and function (EF 84%). Her current regimen includes metoprolol, lisinopril, aspirin, and rosuvastatin. She is being followed by Dr. Elvin Devlin. In 11/2017, she reported bilateral thigh aching pain, which increased with ambulation particularly when walking up stairs. She stated that the discomfort was similar to that which developed previously with use of simvastatin and atorvastatin. She had been on pravastatin since 10/2014 and did not note any difficulty previously. She discontinued pravastatin and had resolution of her symptoms. She was subsequently started on rosuvastatin in 12/2017, and did well until 7/2018 when she again developed myalgias. Her dose was decreased to 5 mg every other day. She reports today that she continues to have aching discomfort in her bilateral thighs on the days which she takes rosuvastatin, but reports that it is tolerable so she will continue.      She has a history of idiopathic Parkinson's disease, predominantly left-sided. She was being treated with Sinemet, but changed to Stalevo in 10/2019, and reports improved control of symptoms. She has difficulty with writing at times, particularly towards the end of the day, and also describes increasing tremors midday. She is followed by Dr. Andrew Chacon. She has a history of laryngopharyngeal reflux disease, treated previously with dexlansoprazole, but ha successfully weaned from therapy. She is now receiving immunotherapy with Dr. Zackary Terry to address her allergies and hoarseness. In 5/2017 she had multiple episodes of epistaxis prompting an ED visit. She subsequently underwent silver nitrate cautery of anterior vessels in her right nares by Dr. Zackary Terry, and has had no recurrence. In 3/2010, she underwent evaluation for iron deficiency anemia. She had previously had a negative colonoscopy and air contrast barium enema in 2009 by Dr. Sven Vidal, and she had an upper endoscopy by Dr. Simon Quzeada which was normal. She was treated with iron with improvement. She had a repeat screening colonoscopy in 7/2016 by Dr. Simon Quezada which was normal. No further follow-up was recommended given her age. She denies any abdominal pain, nausea, vomiting, melena, hematochezia, or change in bowel movements. She has a history of osteopenia with bone density study in 5/2018 showing T-scores:  femoral neck left -1.2  /right -1.6 and lumbar -0.8 . She underwent repeat bone density study on 8/2/2021 showing T-scores: femoral neck left -1.7/right -2.1 and lumbar -0.9. Due to progression in her bilateral hips, she was started on Fosamax in 11/2021 after lab testing for secondary osteoporosis was negative. She continues to take calcium and Vitamin D supplements. She has no history of pathologic fractures.      She has a recent history of abnormal mammograms since 10/2014 with microcalcifcations in the right breast. She has been undergoing surveillance mammograms every six months, which have been unchanged. She had a follow-up mammogram in 5/2016 which was negative, and routine annual screening was recommended. She has a history of bilateral knee pain secondary to osteoarthritis. She was evaluated by Dr. Eliza Torres in 3/2018 and received a cortisone injection with improvement. She also has difficutly with left ankle pain which increase with ambulation. She received a cortisone injection for this as well from Dr. Eliza Torres in 3/2018, but did not experience significant improvement. She was evaluated by Dr. Nidia Joaquin on 3/27/2018 and diagnosed with left peroneus brevis tendinitis. An orthotic was recommended with improvement. She is now being followed by Dr. Steve Hopper for left ankle pain, and CT scan left ankle (7/2019) showed evidence of chronic ATFL sprain or complete rupture, likely prior sprain of the calcaneofibular ligament, and advanced posterior subtalar and tarsometatarsal osteoarthrosis. On 5/9/2021, she presented to the [facility] ED after being bitten on her left wrist the day prior by her indoor/outdoor cat. She reports that she developed swelling and redness of her wrist and forearm, and was diagnosed with cellulitis. She was prescribed clindamycin 300 mg qid with eventual resolution. On 02/08/2022, she presented again to the [facility] ED for evaluation of a cat bite. She states that while she was asleep in bed, her pet cat bit her on the dorsum of her right hand in order to awaken her since it was hungry. She noted some increased redness surrounding the puncture wound prompting ED presentation. She denied any fever, chills, or hand pain. No laboratory testing was performed, and she was prescribed doxycycline and clindamycin for 10 days. Past Medical History:   Diagnosis Date    Basal cell cancer     CAD (coronary artery disease), native coronary artery 03/21/2011    s/p PCI with with CARLITO (Xience 2.75x12, 2.5x12) mLAD and mild disease in rest of coronaries.  Midly depressed LV syst fct     Cardiac cath 03/21/2011    mLAD 90% (2.75 x 12 mm Xience stent). Otherwise patent coronaries. LVEDP 10 mmHg. EF 40-45%. Anteroapical hypk. Renal arteries patent. Cardiac echocardiogram 04/17/2014    EF 60-65%. No WMA. Gr 1 DDfx. Mild MR. Similar to study of 9/14/11. Cardiac nuclear imaging test 07/2017    Negative for ischemia or infarction. EF > 70%. Cardiomyopathy secondary     ischemic +/- stress induced    Dyslipidemia     GERD (gastroesophageal reflux disease)     Hx of colonoscopy 07/12/2016    Normal. Dr. Anthony Pate    Hypertension     Parkinson's disease     Syncope     s/p ILD implantation     Past Surgical History:   Procedure Laterality Date    COLONOSCOPY N/A 7/12/2016    COLONOSCOPY performed by Mya Irvin MD at [address] [place] [address]      dorsal    HX CATARACT REMOVAL  11/2012    left    HX CATARACT REMOVAL  10/2012    right    HX CORONARY STENT PLACEMENT      HX HEART CATHETERIZATION      HX HEMORRHOIDECTOMY      HX HYSTERECTOMY  1996    partial    HX IMPLANTABLE LOOP RECORDER  1210-0716    HX TONSILLECTOMY      HX TOTAL ABDOMINAL HYSTERECTOMY  1996    partial     Current Outpatient Medications   Medication Sig    lisinopriL (PRINIVIL, ZESTRIL) 10 mg tablet TAKE 1 TABLET DAILY    potassium chloride (K-DUR, KLOR-CON M20) 20 mEq tablet TAKE ONE-HALF (1/2) TABLET EVERY OTHER DAY    alendronate (FOSAMAX) 35 mg tablet TAKE AS INSTRUCTED BY YOUR PRESCRIBER    cholecalciferol (VITAMIN D3) (5000 Units/125 mcg) tab tablet Take 5,000 Units by mouth daily. metoprolol succinate (TOPROL-XL) 50 mg XL tablet TAKE 1 TABLET DAILY    rosuvastatin (CRESTOR) 10 mg tablet Take 0.5 Tablets by mouth every other day. vit A/vit C/vit E/zinc/copper (PRESERVISION AREDS PO) Take 2 Tablets by mouth two (2) times a day. azelastine (ASTELIN) 137 mcg (0.1 %) nasal spray 1 [place] by Both Nostrils route two (2) times a day.  Use in each nostril as directed    carbidopa-levodopa ER (SINEMET CR)  mg per tablet Take 1 Tab by mouth nightly. psyllium husk (METAMUCIL PO) Take  by mouth daily. carbidopa 25mg-levodopa 100mg-entacapone 200mg (STALEVO 100) tab tablet Take 1 Tablet by mouth three (3) times daily. magnesium hydroxide (MILK OF MAGNESIA) 400 mg/5 mL suspension Take 30 mL by mouth daily as needed for Constipation. acetaminophen (TYLENOL) 650 mg CR tablet Take 650 mg by mouth every six (6) hours as needed for Pain. co-enzyme Q-10 (CO Q-10) 100 mg capsule Take 200 mg by mouth daily. aspirin 81 mg tablet Take 1 Tab by mouth daily. DOCUSATE CALCIUM (STOOL SOFTENER PO) Take 1 Cap by mouth. No current facility-administered medications for this visit. Allergies and Intolerances: Allergies   Allergen Reactions    Keflex [Cephalexin] Other (comments)     Yeast infection    Pcn [Penicillins] Other (comments)     Family History: She has no family history of colon or breast cancer. Family History   Problem Relation Age of Onset    Heart Attack Father 80    Heart Disease Father     Cancer Mother         pancreatic    Hypertension Brother      Social History:   She  reports that she has never smoked. She has never used smokeless tobacco. She lives with her , who is having difficulty with mild cognitive impairment. She states that they sold their RV and now stay at home for the winter. She is a retired x-ray technician. Social History     Substance and Sexual Activity   Alcohol Use Yes    Comment: glass of wine occasionally.      Immunization History:  Immunization History   Administered Date(s) Administered     Influenza, FLUZONE (age 72 y+), High Dose 10/03/2021    (RETIRED) Pneumococcal Vaccine (Unspecified Type) 01/01/2003    COVID-19, PFIZER PURPLE top, DILUTE for use, (age 15 y+), IM, 30mcg/0.3mL 03/09/2021, 03/30/2021, 10/02/2021    Influenza High Dose Vaccine PF 09/29/2014, 10/05/2015, 11/01/2016, 08/30/2017    Influenza Vaccine (Tri) Adjuvanted (>65 Yrs FLUAD TRI [id number]) 10/09/2018, 09/17/2019    Influenza Vaccine Whole 09/07/2010, 09/03/2011, 10/04/2012    Influenza, FLUAD, (age 72 y+), Adjuvanted 09/11/2020    Pneumococcal Conjugate (PCV-13) 10/05/2015    Pneumococcal Polysaccharide (PPSV-23) 01/01/2003, 10/30/2019    TD Vaccine 01/01/2003    Tdap 09/25/2013, 05/10/2021    Zoster 01/01/2007    Zoster Recombinant 09/18/2018, 11/23/2018       Review of Systems:   As above included in HPI. Otherwise 11 point review of systems negative including constitutional, skin, HENT, eyes, respiratory, cardiovascular, gastrointestinal, genitourinary, musculoskeletal, endo/heme/aller, neurological.    Physical:   Visit Vitals  BP (!) 144/64   Pulse (!) 51   Temp 97.3 °F (36.3 °C) (Temporal)   Resp 16   Ht 5' 5\" (1.651 m)   Wt 126 lb (57.2 kg)   SpO2 98%   BMI 20.97 kg/m²       Exam:     Patient appears in no apparent distress. Affect is appropriate. HEENT: PERRLA, anicteric, no JVD, adenopathy or thyromegaly. No carotid bruits or radiated murmur. Lungs: clear to auscultation, no wheezes, rhonchi, or rales. Heart: regular rate and rhythm. II/VI FUNMILAYO throughout precordium with normal S2; no rubs or gallops  Abdomen: soft, nontender, nondistended, normal bowel sounds, no hepatosplenomegaly or masses. Extremities: without edema.         Review of Data:  Labs:  Lab Results   Component Value Date/Time    WBC 4.7 12/02/2022 08:09 AM    Hemoglobin, POC 12.2 07/12/2016 08:20 AM    HGB 12.7 12/02/2022 08:09 AM    Hematocrit, POC 36 07/12/2016 08:20 AM    HCT 39.4 12/02/2022 08:09 AM    PLATELET 113 61/56/4182 08:09 AM    MCV 95.2 12/02/2022 08:09 AM     Lab Results   Component Value Date/Time    Sodium 141 12/02/2022 08:09 AM    Potassium 3.9 12/02/2022 08:09 AM    Chloride 108 12/02/2022 08:09 AM    CO2 31 12/02/2022 08:09 AM    Anion gap 2 (L) 12/02/2022 08:09 AM    Glucose 93 12/02/2022 08:09 AM    BUN 13 12/02/2022 08:09 AM    Creatinine 0.58 (L) 12/02/2022 08:09 AM    BUN/Creatinine ratio 22 (H) 12/02/2022 08:09 AM    GFR est AA >60 05/11/2022 09:05 AM    GFR est non-AA >60 05/11/2022 09:05 AM    Calcium 9.2 12/02/2022 08:09 AM    Bilirubin, total 0.4 12/02/2022 08:09 AM    Alk. phosphatase 86 12/02/2022 08:09 AM    Protein, total 6.2 (L) 12/02/2022 08:09 AM    Albumin 3.4 12/02/2022 08:09 AM    Globulin 2.8 12/02/2022 08:09 AM    A-G Ratio 1.2 12/02/2022 08:09 AM    ALT (SGPT) 16 12/02/2022 08:09 AM    AST (SGOT) 20 12/02/2022 08:09 AM     Lab Results   Component Value Date/Time    Cholesterol, total 131 12/02/2022 08:09 AM    HDL Cholesterol 64 (H) 12/02/2022 08:09 AM    LDL, calculated 54 12/02/2022 08:09 AM    VLDL, calculated 13 12/02/2022 08:09 AM    Triglyceride 65 12/02/2022 08:09 AM    CHOL/HDL Ratio 2.0 12/02/2022 08:09 AM     Lab Results   Component Value Date/Time    Vitamin D 25-Hydroxy 53.1 12/02/2022 08:09 AM       Magnesium   Date Value Ref Range Status   12/02/2022 2.2 1.6 - 2.6 mg/dL Final   05/11/2022 2.8 (H) 1.6 - 2.6 mg/dL Final   11/10/2021 2.2 1.6 - 2.6 mg/dL Final   05/04/2021 2.3 1.6 - 2.6 mg/dL Final   11/03/2020 2.2 1.6 - 2.6 mg/dL Final         Health Maintenance:  Screening:    Mammogram: negative (8/2022)   PAP smear: s/p ROHINI. No further screening. Colorectal: colonoscopy (7/2016) normal. Dr. Shreya Meneses. No further screening. Depression: none   DM (HbA1c/FPG): FPG 93 (12/2022)   Hepatitis C: unknown   Falls: none   DEXA: osteopenia (8/2021). On Fosamax since 11/2021   Glaucoma: regular eye exams with Dr. Bess Gomez. Silas Skelton (1/2023) for macular degeneration.    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 Hyperlipidemia. Previously on low intensity dose pravastatin but developed severe bilateral thigh pain, worse with ambulation, and was discontinued in 11/2018 with improvement. Reported similar symptoms in past with statin (simvastatin until 2012, and then atorvastatin until 9/2014). Started on rosuvastatin 10 mg daily in 12/2018 and tolerated without difficulty until 7/2018 when developed recurrent myalgias and dose decreased to 5 mg every other day with improvement. Lipid panel today with LDL 54 and HDL 64, indicative of excellent control. Emphasized importance of lifestyle modifications, including heart healthy diet and regular exercise. Would not recommend weight loss given normal BMI of 20. Will continue to follow. 2. Hypertension. Blood pressure previously well controlled on lisinopril 10 mg daily and metoprolol succinate 50 mg daily. Elevated today and patient reports that she is not measuring it at home. Instructed to monitor and record her blood pressure every morning for the next 2 weeks at least two hours after taking her medications and deliver record of readings to our office for review. If additional therapy needed, will increase lisinopril. Renal function remains normal with creatinine 0.58/ eGFR >60. Advised to increase fluid intake given elevated BUN/creatinine ratio of 22. Continue to follow. 3. ASCVD. Remains asymptomatic on current regimen of aspirin, metoprolol succinate, and rosuvastatin. Resolution of cardiomyopathy with last echocardiogram in 2014 revealing normal LV function. Negative pharmacologic nuclear stress test in 12/2022.  Being followed by Dr. Logan Deluna annually with next visit on 1/10/2023.  4. History of syncope. No further episodes since 2014. Most likely secondary to vasovagal reaction. 5. Parkinson's disease. On Stalevo and Sinemet with reasonable control. Reports no worsening of symptoms following COVID-19 infection. Being followed by Dr. Hugo Julian.  6. History of COVID-19 infection. Tested positive for COVID-19 on 6/13/2022 by home rapid antigen testing. Reported symptoms of low-grade fever, nasal congestion, postnasal drainage, cough, myalgias, and fatigue. Referred to the [facility] ED on 6/14/2022 and received monoclonal antibody therapy with bebtolivimab. Symptoms gradually improved and she reports no sequela today. She has completed the edulio COVID-19 vaccine series, one Pfizer booster dose, and the updated bivalent booster dose. 7. Osteopenia. Repeat bone density scan on 8/2021. Using femoral neck T-scores, calculated FRAX score estimates her 10 year risk of a major osteoporetic fracture at 14 % and hip fracture at 4.8 %, which are an indication for biphosphonate treatment. Also with evidence of progression from prior study in 5/2018. Has never received treatment in the past. Completed screening labs for secondary osteoporosis in 11/2021, including intact PTH, vitamin D, phosphorus, and gammopathy panel, and all normal.  She was started on Fosamax 35 mg weekly 11/26/2021 for osteoporosis prevention. Reports that she is tolerating it well. Advised to continue calcium and vitamin D supplements. Encouraged exercise, particularly weight bearing activities. Fall precautions stressed. 8. Lumbar spondylosis. Patient with symptoms of right buttock pain since 8/2018 and diagnosed with right piriformis syndrome. Completed physical therapy and received cortisone injection with some improvement, but subsequently developed right leg paresthesias and pain consistent with right sided sciatica.  Treated with five day course of prednisone 50 mg without improvement. LS spine x-rays and lumbar MRI with degenerative changes, although MRI showed a right L4-L5 neuroforamen synovial cyst from the right L4-5 advanced facet arthropathy with significant right foraminal stenosis and nerve impingement. Referred for physical therapy and treated with gabapentin 100 mg tid with resolution of symptoms. Evaluated by Dr. Minor Monaco, but symptoms had already resolved. No longer taking gabapentin. Describing some mid back pain/mid-lower abdominal discomfort upon awakening in the morning which resolves when getting out of bed. Noted to have right thoracic paraspinal tenderness on exam.  Referred by Dr. Sam Madrid for physical therapy with dry needling given associated with increasing neck pain. Currently on hold given current COVID-19 infection. 9. Osteoarthritis. Difficulty with bilateral knee pain L>R and receiving intermittent cortisone injections from Dr. Esther Palomo. Left ankle pain being addressed by podiatrist, Dr. Emiliano Pickett, at [facility]. Reports worsening discomfort today in left ankle and has follow-up visit scheduled next week with Dr. Emiliano Pickett. 10. Macular degeneration, R>L. On Preservision. Being followed by Dr. Christopher Castillo every 12 weeks, but reports that she has not required injections over the last 2 months. Reports next visit scheduled on 1/14/2023. 11. Laryngopharyngeal reflux. Doing well. Discontinued Dexilant without an increase in symptoms. Now receiving immunotherapy to address hoarseness and allergies. Followed by Dr. James Fine. 12. Psoriasis. Using clobetasol solution for scalp psoriasis with reasonable control. 13. Health maintenance. Completed Pfizer COVID 19 vaccine series and received one Pfizer booster dose and the updated bivalent booster dose. Already received the influenza vaccine. Advised to provide documentation so that chart can be updated. Received 2/2 doses of Shingrix vaccine. Other immunizations up to date.  No further colorectal cancer screening needed. Mammogram up to date. Continue regular eye exams with Ankur Winkler and [place]. Vitamin D level low at last visit on maintenance dose supplement of 2000 units daily and advised to increase to 5000 units daily. Improved today to normal and advised to continue. In addition, an annual Medicare wellness visit was done today. Patient understands recommendations and agrees with plan. Follow-up in 6 months. Future orders:    ICD-10-CM ICD-9-CM    1. Primary hypertension  I10 401.9 CBC WITH AUTOMATED DIFF      METABOLIC PANEL, COMPREHENSIVE      MAGNESIUM      URINALYSIS W/MICROSCOPIC      2. Parkinson disease ([facility] [address]  G20 332.0 MAGNESIUM      3. Dyslipidemia  E78.5 272.4 LIPID PANEL      METABOLIC PANEL, COMPREHENSIVE      MAGNESIUM      4. CAD S/P percutaneous coronary angioplasty  I25.10 414.01     Z98.61 V45.82       5. Osteopenia of multiple sites  J14.44 880.07 METABOLIC PANEL, COMPREHENSIVE      MAGNESIUM      VITAMIN D, 25 HYDROXY      6. Lumbar spondylosis  M47.816 721.3       7. DDD (degenerative disc disease), lumbar  M51.36 722.52       8. History of 2019 novel coronavirus disease (COVID-19)  Z86.16 V12.09       9. Medicare annual wellness visit, subsequent  Z00.00 V70.0 ADVANCE CARE PLANNING FIRST 30 MINS      10. Advanced directives, counseling/discussion  Z71.89 V65.49 ADVANCE CARE PLANNING FIRST 30 MINS      11. Screening for depression  Z13.31 V79.0       12.  Disorder of bone density and structure, unspecified   M85.9 733.90 VITAMIN D, 25 HYDROXY

## 2023-01-10 ENCOUNTER — OFFICE VISIT (OUTPATIENT)
Dept: CARDIOLOGY CLINIC | Age: 83
End: 2023-01-10
Payer: MEDICARE

## 2023-01-10 VITALS
BODY MASS INDEX: 20.99 KG/M2 | DIASTOLIC BLOOD PRESSURE: 72 MMHG | SYSTOLIC BLOOD PRESSURE: 132 MMHG | HEART RATE: 61 BPM | HEIGHT: 65 IN | OXYGEN SATURATION: 98 % | WEIGHT: 126 LBS

## 2023-01-10 DIAGNOSIS — M79.89 LEG SWELLING: ICD-10-CM

## 2023-01-10 DIAGNOSIS — I25.10 CAD S/P PERCUTANEOUS CORONARY ANGIOPLASTY: Primary | ICD-10-CM

## 2023-01-10 DIAGNOSIS — I10 ESSENTIAL HYPERTENSION: ICD-10-CM

## 2023-01-10 DIAGNOSIS — E78.5 DYSLIPIDEMIA: ICD-10-CM

## 2023-01-10 DIAGNOSIS — Z98.61 CAD S/P PERCUTANEOUS CORONARY ANGIOPLASTY: Primary | ICD-10-CM

## 2023-01-10 NOTE — PROGRESS NOTES
David Hernandez presents today for   Chief Complaint   Patient presents with    Follow-up     1 year    Leg Swelling     Started 3 wks ago: left leg, foot, and ankle        David Hernandez preferred language for health care discussion is english/other. Is someone accompanying this pt? no    Is the patient using any DME equipment during [address]? no    Depression Screening:  3 most recent PHQ Screens 1/10/2023   PHQ Not Done -   Little interest or pleasure in doing things Not at all   Feeling down, depressed, irritable, or hopeless Not at all   Total Score PHQ 2 0       Learning Assessment:  Learning Assessment 1/10/2023   PRIMARY LEARNER Patient   HIGHEST LEVEL OF EDUCATION - PRIMARY LEARNER  -   BARRIERS PRIMARY LEARNER -   CO-LEARNER CAREGIVER -   PRIMARY LANGUAGE ENGLISH   LEARNER PREFERENCE PRIMARY DEMONSTRATION   ANSWERED BY patient   RELATIONSHIP SELF       Abuse Screening:  Abuse Screening Questionnaire 1/10/2023   Do you ever feel afraid of your partner? N   Are you in a relationship with someone who physically or mentally threatens you? N   Is it safe for you to go home? Y       Fall Risk  Fall Risk Assessment, last 12 mths 1/10/2023   Able to walk? Yes   Fall in past 12 months? 0   Do you feel unsteady? 0   Are you worried about falling 0   Number of falls in past 12 months -   Fall with injury? -           Pt currently taking Anticoagulant therapy? no    Pt currently taking Antiplatelet therapy ? Aspirin 81 mg daily      Coordination of Care:  1. Have you been to the ER, urgent care clinic since your last visit? Hospitalized since your last visit? no    2. Have you seen or consulted any other health care providers outside of the [address] [place] [address] since your last visit? Include any pap smears or colon screening.  no

## 2023-01-10 NOTE — PROGRESS NOTES
HISTORY OF PRESENT ILLNESS  Ian Delgado is a 78 y.o. female. Follow-up  Pertinent negatives include no chest pain, no abdominal pain, no headaches and no shortness of breath. Leg Swelling  Pertinent negatives include no chest pain, no abdominal pain, no headaches and no shortness of breath. Patient presents for scheduled follow-up office visit. She has a past medical history significant for single-vessel coronary artery disease status post PCI to her mid LAD with a total of 2 drug-eluting stents in 2011. Patient presents with symptoms of unstable angina at that time. It should be noted that she did have classic exertional anginal symptoms leading up to her PCI that were likely unrecognized. She reports symptoms of substernal chest pain radiating to her left arm and jaw prior to her PCI. Since her procedure, she has had no recurrent symptoms. She underwent a repeat pharmacologic nuclear stress test in December 2022 which was a normal low risk study. There was no evidence of ischemia or infarction, EF greater than 75%. The patient was last seen in our office 1 year ago. Since last visit, she has noted some increased swelling in her feet and ankles primarily at the end of the day. This will improve overnight or when she elevates her legs. She did wear compression stockings for a week and this significantly improved the swelling. However the stockings were too tight and she is in the process of ordering some new ones. She denies any new chest pain, shortness of breath or major change in her activity tolerance. Past Medical History:   Diagnosis Date    Basal cell cancer     CAD (coronary artery disease), native coronary artery 03/21/2011    s/p PCI with with CARLITO (Xience 2.75x12, 2.5x12) mLAD and mild disease in rest of coronaries. Midly depressed LV syst fct     Cardiac cath 03/21/2011    mLAD 90% (2.75 x 12 mm Xience stent). Otherwise patent coronaries. LVEDP 10 mmHg. EF 40-45%. Anteroapical hypk. Renal arteries patent. Cardiac echocardiogram 04/17/2014    EF 60-65%. No WMA. Gr 1 DDfx. Mild MR. Similar to study of 9/14/11. Cardiac nuclear imaging test 07/2017    Negative for ischemia or infarction. EF > 70%. Cardiomyopathy secondary     ischemic +/- stress induced    Dyslipidemia     GERD (gastroesophageal reflux disease)     Hx of colonoscopy 07/12/2016    Normal. Dr. Andreea Rivera    Hypertension     Parkinson's disease     Syncope     s/p ILD implantation     Current Outpatient Medications   Medication Sig Dispense Refill    lisinopriL (PRINIVIL, ZESTRIL) 10 mg tablet TAKE 1 TABLET DAILY 90 Tablet 3    potassium chloride (K-DUR, KLOR-CON M20) 20 mEq tablet TAKE ONE-HALF (1/2) TABLET EVERY OTHER DAY 25 Tablet 3    alendronate (FOSAMAX) 35 mg tablet TAKE AS INSTRUCTED BY YOUR PRESCRIBER 12 Tablet 3    cholecalciferol (VITAMIN D3) (5000 Units/125 mcg) tab tablet Take 5,000 Units by mouth daily. metoprolol succinate (TOPROL-XL) 50 mg XL tablet TAKE 1 TABLET DAILY 90 Tablet 3    rosuvastatin (CRESTOR) 10 mg tablet Take 0.5 Tablets by mouth every other day. 30 Tablet 3    vit A/vit C/vit E/zinc/copper (PRESERVISION AREDS PO) Take 2 Tablets by mouth two (2) times a day. azelastine (ASTELIN) 137 mcg (0.1 %) nasal spray 1 [place] by Both Nostrils route two (2) times a day. Use in each nostril as directed      carbidopa-levodopa ER (SINEMET CR)  mg per tablet Take 1 Tab by mouth nightly. psyllium husk (METAMUCIL PO) Take  by mouth daily. carbidopa 25mg-levodopa 100mg-entacapone 200mg (STALEVO 100) tab tablet Take 1 Tablet by mouth three (3) times daily. 0    magnesium hydroxide (MILK OF MAGNESIA) 400 mg/5 mL suspension Take 30 mL by mouth daily as needed for Constipation. acetaminophen (TYLENOL) 650 mg CR tablet Take 650 mg by mouth every six (6) hours as needed for Pain. co-enzyme Q-10 (CO Q-10) 100 mg capsule Take 200 mg by mouth daily.       aspirin 81 mg tablet Take 1 Tab by mouth daily. 1 Tab 0    DOCUSATE CALCIUM (STOOL SOFTENER PO) Take 1 Cap by mouth. Allergies   Allergen Reactions    Keflex [Cephalexin] Other (comments)     Yeast infection    Pcn [Penicillins] Other (comments)      Social History     Tobacco Use    Smoking status: Never    Smokeless tobacco: Never   Vaping Use    Vaping Use: Never used   Substance Use Topics    Alcohol use: Yes     Comment: glass of wine occasionally. Drug use: No     Family History   Problem Relation Age of Onset    Heart Attack Father 80    Heart Disease Father     Cancer Mother         pancreatic    Hypertension Brother          Review of Systems   Constitutional:  Negative for chills, fever and weight loss. HENT:  Negative for nosebleeds. Eyes:  Negative for blurred vision and double vision. Respiratory:  Negative for cough, shortness of breath and wheezing. Cardiovascular:  Positive for leg swelling. Negative for chest pain, palpitations, orthopnea, claudication and PND. Gastrointestinal:  Negative for abdominal pain, heartburn, nausea and vomiting. Genitourinary:  Negative for dysuria and hematuria. Musculoskeletal:  Negative for falls and myalgias. Skin:  Negative for rash. Neurological:  Negative for dizziness, focal weakness and headaches. Endo/Heme/Allergies:  Does not bruise/bleed easily. Psychiatric/Behavioral:  Negative for substance abuse. Visit Vitals  /72 (BP 1 Location: Left upper arm, BP Patient Position: Sitting, BP Cuff Size: Adult)   Pulse 61   Ht 5' 5\" (1.651 m)   Wt 57.2 kg (126 lb)   SpO2 98%   BMI 20.97 kg/m²       Physical Exam  Constitutional:       Appearance: She is well-developed. HENT:      Head: Normocephalic and atraumatic. Eyes:      Conjunctiva/sclera: Conjunctivae normal.   Neck:      Vascular: No carotid bruit or JVD. Cardiovascular:      Rate and Rhythm: Normal rate and regular rhythm. Pulses: Normal pulses.       Heart sounds: S1 normal and S2 normal. Murmur heard. Midsystolic murmur is present with a grade of 2/6 at the upper right sternal border radiating to the neck. No gallop. Pulmonary:      Effort: Pulmonary effort is normal.      Breath sounds: Normal breath sounds. No wheezing or rales. Abdominal:      General: Bowel sounds are normal.      Palpations: Abdomen is soft. Tenderness: There is no abdominal tenderness. Musculoskeletal:      Cervical back: Neck supple. Skin:     General: Skin is warm and dry. Neurological:      Mental Status: She is alert and oriented to person, place, and time. EKG: Normal sinus rhythm, normal axis, normal QTc interval, no ST or T-wave abnormalities concerning for ischemia. Normal tracing. No change compared to the previous EKG. ASSESSMENT and PLAN    Coronary artery disease. Single-vessel disease, status post PCI to her mid LAD in 2011 using 2 drug-eluting stents (Xience 2.75 x 12, 2.5 x 12). No recurrent anginal symptoms since that time. Patient remains on an aspirin, beta-blocker low-dose statin. Patient recently underwent a low risk pharmacologic nuclear stress test in December 2022. No new symptoms concerning for angina. I would continue all of her current medications. Dyslipidemia. Patient is now tolerating Crestor 5 mg daily. This is followed closely by her PCP. Her most recent lipid panel from December 2022 continued to be at goal: Total cholesterol 131, HDL 64 LDL 54. I would continue this regimen with a goal LDL less than 70. Essential hypertension. Patient's blood pressure remains reasonable on her current regimen which includes metoprolol and lisinopril. Dependent edema. I have advised her to wear compression stockings throughout the day. Follow-up in 12 months, sooner if needed.

## 2023-02-04 DIAGNOSIS — E78.5 DYSLIPIDEMIA: Primary | ICD-10-CM

## 2023-02-05 DIAGNOSIS — M85.89 OSTEOPENIA OF MULTIPLE SITES: ICD-10-CM

## 2023-02-05 DIAGNOSIS — I10 PRIMARY HYPERTENSION: Primary | ICD-10-CM

## 2023-02-05 DIAGNOSIS — E78.5 DYSLIPIDEMIA: ICD-10-CM

## 2023-02-07 DIAGNOSIS — M85.89 OSTEOPENIA OF MULTIPLE SITES: Primary | ICD-10-CM

## 2023-02-07 DIAGNOSIS — I10 PRIMARY HYPERTENSION: Primary | ICD-10-CM

## 2023-02-07 DIAGNOSIS — E78.5 DYSLIPIDEMIA: ICD-10-CM

## 2023-02-07 DIAGNOSIS — G20 PARKINSON DISEASE (HCC): ICD-10-CM

## 2023-02-07 DIAGNOSIS — M85.9 DISORDER OF BONE DENSITY AND STRUCTURE, UNSPECIFIED: ICD-10-CM

## 2023-02-09 RX ORDER — ROSUVASTATIN CALCIUM 10 MG/1
TABLET, COATED ORAL
Qty: 30 TABLET | Refills: 3 | Status: SHIPPED | OUTPATIENT
Start: 2023-02-09

## 2023-02-24 ENCOUNTER — TELEPHONE (OUTPATIENT)
Age: 83
End: 2023-02-24
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
## 2023-03-02 ENCOUNTER — TELEPHONE (OUTPATIENT)
Age: 83
End: 2023-03-02

## 2023-03-02 NOTE — TELEPHONE ENCOUNTER
Reviewed blood pressure readings dropped off by patient. Recorded blood pressure from 1/3 - 2/1/2023. Blood pressure was high initially in the morning but patient clarified that that was prior to her medication. After taking lisinopril 10 mg daily and metoprolol succinate 50 mg daily, blood pressure appeared well controlled ranging 119-139/56-68. Advised that would not recommend increasing her blood pressure medications and advised to continue to monitor.

## 2023-03-27 ENCOUNTER — TELEPHONE (OUTPATIENT)
Age: 83
End: 2023-03-27

## 2023-03-27 RX ORDER — METOPROLOL SUCCINATE 50 MG/1
50 TABLET, EXTENDED RELEASE ORAL DAILY
Qty: 90 TABLET | Refills: 3 | Status: SHIPPED | OUTPATIENT
Start: 2023-03-27

## 2023-03-27 RX ORDER — ALENDRONATE SODIUM 35 MG/1
35 TABLET ORAL
Qty: 12 TABLET | Refills: 3 | Status: SHIPPED | OUTPATIENT
Start: 2023-03-27

## 2023-05-20 ENCOUNTER — HOSPITAL ENCOUNTER (EMERGENCY)
Facility: HOSPITAL | Age: 83
Discharge: HOME | End: 2023-05-20
Attending: EMERGENCY MEDICINE
Payer: MEDICARE

## 2023-05-20 VITALS
DIASTOLIC BLOOD PRESSURE: 73 MMHG | OXYGEN SATURATION: 99 % | TEMPERATURE: 98.1 F | SYSTOLIC BLOOD PRESSURE: 151 MMHG | HEART RATE: 64 BPM | RESPIRATION RATE: 17 BRPM

## 2023-05-20 DIAGNOSIS — R21 RASH AND OTHER NONSPECIFIC SKIN ERUPTION: Primary | ICD-10-CM

## 2023-05-20 PROCEDURE — 99283 EMERGENCY DEPT VISIT LOW MDM: CPT

## 2023-05-20 PROCEDURE — 6370000000 HC RX 637 (ALT 250 FOR IP): Performed by: PHYSICIAN ASSISTANT

## 2023-05-20 RX ORDER — DOXYCYCLINE HYCLATE 100 MG/1
200 CAPSULE ORAL
Status: COMPLETED | OUTPATIENT
Start: 2023-05-20 | End: 2023-05-20

## 2023-05-20 RX ADMIN — DOXYCYCLINE HYCLATE 200 MG: 100 CAPSULE ORAL at 08:21

## 2023-05-20 ASSESSMENT — ENCOUNTER SYMPTOMS
NAUSEA: 0
ABDOMINAL PAIN: 0
SHORTNESS OF BREATH: 0
VOMITING: 0

## 2023-05-20 NOTE — ED TRIAGE NOTES
Pt. Arrives to the ED endorsing a tick bite to the buttocks area she noticed this morning. No tick noted to area. Very red spot noted to the area in question. Pt. States she has been on doxy before for tick bites a few years ago.

## 2023-05-20 NOTE — DISCHARGE INSTRUCTIONS
Take medication as prescribed. Follow-up with your primary care physician within 2 days for reassessment. Bring the results from this visit with you for their review. Return to the ED immediately for any new, worsening, or persistent symptoms, including fever, increased rash, or any other medical concerns.

## 2023-05-20 NOTE — ED PROVIDER NOTES
and neck supple. Skin:     General: Skin is warm. Findings: Rash (small region of erythema R gluteal region without fluctuance, foreign body, streaking, open wound) present. Neurological:      General: No focal deficit present. Mental Status: She is alert and oriented to person, place, and time. Cranial Nerves: No cranial nerve deficit. Sensory: No sensory deficit. Motor: No weakness. Diagnostic Study Results     Labs -  No results found for this or any previous visit (from the past 12 hour(s)). Radiologic Studies -   No orders to display         Medical Decision Making   I am the first provider for this patient. I reviewed the vital signs, available nursing notes, past medical history, past surgical history, family history and social history. Vital Signs-Reviewed the patient's vital signs. Records Reviewed: Nursing Notes and Old Medical Records (Time of Review: 8:25 AM)    ED Course: Progress Notes, Reevaluation, and Consults:  8:20 PM: Reviewed plan with patient. Discussed need for close outpatient follow-up this week for reassessment. Discussed strict return precautions, including fever, increased rash, or any other medical concerns. Pt in agreement with plan. Provider Notes (Medical Decision Making): 78-year-old female who presents to the ED due to concern of tick bite to right gluteal region. Patient is afebrile, nontoxic-appearing, looks well. No evidence of abscess, foreign body, tick, open wound. Due to patient's history, area she lives, and concern for tick bite, will cover with one-time dose of Doxycycline. Will recommend close outpatient follow-up with primary care physician for further assessment. Strict return precautions for any new or worsening symptoms      Diagnosis     Clinical Impression:   1.  Rash and other nonspecific skin eruption        Disposition: home      [facility] EMERGENCY DEPT  [address]

## 2023-05-31 ENCOUNTER — OFFICE VISIT (OUTPATIENT)
Age: 83
End: 2023-05-31

## 2023-05-31 VITALS
TEMPERATURE: 97 F | WEIGHT: 123.8 LBS | HEART RATE: 70 BPM | RESPIRATION RATE: 16 BRPM | BODY MASS INDEX: 20.62 KG/M2 | SYSTOLIC BLOOD PRESSURE: 132 MMHG | DIASTOLIC BLOOD PRESSURE: 68 MMHG | HEIGHT: 65 IN | OXYGEN SATURATION: 98 %

## 2023-05-31 DIAGNOSIS — Z98.61 CAD S/P PERCUTANEOUS CORONARY ANGIOPLASTY: ICD-10-CM

## 2023-05-31 DIAGNOSIS — J30.89 NON-SEASONAL ALLERGIC RHINITIS, UNSPECIFIED TRIGGER: ICD-10-CM

## 2023-05-31 DIAGNOSIS — I10 ESSENTIAL HYPERTENSION: ICD-10-CM

## 2023-05-31 DIAGNOSIS — K59.00 CONSTIPATION, UNSPECIFIED CONSTIPATION TYPE: ICD-10-CM
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
## 2023-05-31 DIAGNOSIS — E78.5 DYSLIPIDEMIA: ICD-10-CM

## 2023-05-31 DIAGNOSIS — M85.89 OSTEOPENIA OF MULTIPLE SITES: ICD-10-CM

## 2023-05-31 DIAGNOSIS — I25.10 CAD S/P PERCUTANEOUS CORONARY ANGIOPLASTY: ICD-10-CM

## 2023-05-31 DIAGNOSIS — S30.860D TICK BITE OF LOWER BACK, SUBSEQUENT ENCOUNTER: Primary | ICD-10-CM

## 2023-05-31 DIAGNOSIS — G20 PARKINSON DISEASE (HCC): ICD-10-CM

## 2023-05-31 SDOH — ECONOMIC STABILITY: FOOD INSECURITY: WITHIN THE PAST 12 MONTHS, YOU WORRIED THAT YOUR FOOD WOULD RUN OUT BEFORE YOU GOT MONEY TO BUY MORE.: NEVER TRUE

## 2023-05-31 SDOH — ECONOMIC STABILITY: FOOD INSECURITY: WITHIN THE PAST 12 MONTHS, THE FOOD YOU BOUGHT JUST DIDN'T LAST AND YOU DIDN'T HAVE MONEY TO GET MORE.: NEVER TRUE

## 2023-05-31 SDOH — ECONOMIC STABILITY: INCOME INSECURITY: HOW HARD IS IT FOR YOU TO PAY FOR THE VERY BASICS LIKE FOOD, HOUSING, MEDICAL CARE, AND HEATING?: NOT HARD AT ALL

## 2023-05-31 SDOH — ECONOMIC STABILITY: HOUSING INSECURITY
IN THE LAST 12 MONTHS, WAS THERE A TIME WHEN YOU DID NOT HAVE A STEADY PLACE TO SLEEP OR SLEPT IN A SHELTER (INCLUDING NOW)?: NO

## 2023-05-31 ASSESSMENT — PATIENT HEALTH QUESTIONNAIRE - PHQ9
SUM OF ALL RESPONSES TO PHQ QUESTIONS 1-9: 0
SUM OF ALL RESPONSES TO PHQ9 QUESTIONS 1 & 2: 0
SUM OF ALL RESPONSES TO PHQ QUESTIONS 1-9: 0
1. LITTLE INTEREST OR PLEASURE IN DOING THINGS: 0
2. FEELING DOWN, DEPRESSED OR HOPELESS: 0

## 2023-05-31 NOTE — PATIENT INSTRUCTIONS
Heart-Healthy Diet: Care Instructions  Your Care Instructions     A heart-healthy diet has lots of vegetables, fruits, nuts, beans, and whole grains, and is low in salt. It limits foods that are high in saturated fat, such as meats, cheeses, and fried foods. It may be hard to change your diet, but even small changes can lower your risk of heart attack and heart disease. Follow-up care is a key part of your treatment and safety. Be sure to make and go to all appointments, and call your doctor if you are having problems. It's also a good idea to know your test results and keep a list of the medicines you take. How can you care for yourself at home? Watch your portions  Learn what a serving is. A \"serving\" and a \"portion\" are not always the same thing. Make sure that you are not eating larger portions than are recommended. For example, a serving of pasta is ½ cup. A serving size of meat is 2 to 3 ounces. A 3-ounce serving is about the size of a deck of cards. Measure serving sizes until you are good at [place]" them. Keep in mind that restaurants often serve portions that are 2 or 3 times the size of one serving. To keep your energy level up and keep you from feeling hungry, eat often but in smaller portions. Eat only the number of calories you need to stay at a healthy weight. If you need to lose weight, eat fewer calories than your body burns (through exercise and other physical activity). Eat more fruits and vegetables  Eat a variety of fruit and vegetables every day. Dark green, deep orange, red, or yellow fruits and vegetables are especially good for you. Examples include spinach, carrots, peaches, and berries. Keep carrots, celery, and other veggies handy for snacks. Buy fruit that is in season and store it where you can see it so that you will be tempted to eat it. Cook dishes that have a lot of veggies in them, such as stir-fries and soups.   Limit saturated and trans fat  Read food labels, and

## 2023-05-31 NOTE — PROGRESS NOTES
Adrianne Castro presents today for   Chief Complaint   Patient presents with    Follow-up                 1. \"Have you been to the ER, urgent care clinic since your last visit? Hospitalized since your last visit? \" yes    2. \"Have you seen or consulted any other health care providers outside of the [address] [place] [address] since your last visit? \" no     3. For patients aged 39-70: Has the patient had a colonoscopy / FIT/ Cologuard? NA - based on age      If the patient is female:    4. For patients aged 41-77: Has the patient had a mammogram within the past 2 years? NA - based on age or sex      11. For patients aged 21-65: Has the patient had a pap smear?  NA - based on age or sex
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
## 2023-06-20 ENCOUNTER — TELEPHONE (OUTPATIENT)
Age: 83
End: 2023-06-20

## 2023-06-20 NOTE — TELEPHONE ENCOUNTER
Notified patient that her DMV form is ready we are unable to fax it until she fills at the front page and signs the release of information. Form placed in front office for patient she will come by the office on 6/21/23 to complete the form.

## 2023-06-21 ENCOUNTER — HOSPITAL ENCOUNTER (OUTPATIENT)
Facility: HOSPITAL | Age: 83
Setting detail: RECURRING SERIES
Discharge: HOME | End: 2023-06-24
Attending: INTERNAL MEDICINE
Payer: MEDICARE

## 2023-06-21 PROCEDURE — 97535 SELF CARE MNGMENT TRAINING: CPT

## 2023-06-21 PROCEDURE — 97110 THERAPEUTIC EXERCISES: CPT

## 2023-06-21 PROCEDURE — 97161 PT EVAL LOW COMPLEX 20 MIN: CPT

## 2023-06-21 NOTE — PROGRESS NOTES
PHYSICAL / OCCUPATIONAL THERAPY - DAILY TREATMENT NOTE (updated )    Patient Name: Joanna Ayala    Date: 2023    : 1940  Insurance: Payor: MEDICARE / Plan: MEDICARE PART A AND B / Product Type: *No Product type* /      Patient  verified Yes     Visit #   Current / Total 1 36   Time   In / Out 8:58 9:32   Pain   In / Out 0 0   Subjective Functional Status/Changes: See POC   Changes to:  Meds, Allergies, Med Hx, Sx Hx? If yes, update Summary List no       TREATMENT AREA =  Imbalance [R26.89]  Gait difficulty [R26.9]  Parkinson disease (HCC) [G20]    OBJECTIVE    10 min   Eval - untimed                      Therapeutic Procedures: Tx Min Billable or 1:1 Min (if diff from Tx Min) Procedure, Rationale, Specifics   12  57343 Therapeutic Exercise (timed):  increase ROM, strength, coordination, balance, and proprioception to improve patient's ability to progress to PLOF and address remaining functional goals. (see flow sheet as applicable)     Details if applicable:  HEP instruction and demonstration     12  91470 Self Care/Home Management (timed):  improve patient knowledge and understanding of pain reducing techniques, positioning, posture/ergonomics, home safety, activity modification, diagnosis/prognosis, and physical therapy expectations, procedures and progression  to improve patient's ability to progress to PLOF and address remaining functional goals.   (see flow sheet as applicable)     Details if applicable:  pt education on relevant anatomy/physiology    24  [facility] BC Totals Reminder: bill using total billable min of TIMED therapeutic procedures (example: do not include dry needle or estim unattended, both untimed codes, in totals to left)  8-22 min = 1 unit; 23-37 min = 2 units; 38-52 min = 3 units; 53-67 min = 4 units; 68-82 min = 5 units   Total Total     TOTAL TREATMENT TIME:        34     [x]  Patient Education billed concurrently with other procedures   [x] Review HEP    []

## 2023-06-21 NOTE — PROGRESS NOTES
[facility] PHYSICAL THERAPY AT [facility]  [address], [address] Phone: [phone] Fax [phone]  Plan of Care / Statement of Necessity for Physical Therapy Services     Patient Name: Nuris Cochran : 1940   Treatment   Diagnosis: R26.89  Abnormalities of gait and mobility and R26.81  Unsteadiness on feet Medical Diagnosis: Imbalance [R26.89]  Gait difficulty [R26.9]  Parkinson disease ([address] [G20]   Onset Date: Chronic, physician order 6/15/2023 Payor Source: Payor: MEDICARE / Plan: MEDICARE PART A AND B / Product Type: *No Product type* /    Referral Source: Lawanda Cancino MD [facility]): 2023   Prior Hospitalization: See medical history Provider #: [id number]   Prior Level of Function: Independent with ADLs, functional, and daily tasks with ongoing symptoms from Parkinson's Disease. Comorbidities: Hx basel cell cancer, CAD, HTN, Parkinson's Disease     Assessment / key information:    Pt is a 78year old female who presents to therapy today with impaired balance/gait s/p Parkinson's disease. Pt states she has noticed she is bumping into doorways/frames and feels her perception is off. She denies any new falls. Pt reports having hesitancy at times with standing after performing a sit to stand transfer. Pt demonstrated decreased strength and impaired balance/mobility. DGI score is 17/24 points which indicates pt is a fall risk. Pt would benefit from physical therapy to improve the above impairments to help the pt return to performing ADLs, functional and recreational activities.      Evaluation Complexity:  History:  HIGH Complexity :3+ comorbidities / personal factors will impact the outcome/ POC ; Examination:  MEDIUM Complexity : 3 Standardized tests and measures addressin body structure, function, activity limitation and / or participation in recreation  ;Presentation:  LOW Complexity : Stable, uncomplicated

## 2023-07-06 ENCOUNTER — APPOINTMENT (OUTPATIENT)
Facility: HOSPITAL | Age: 83
End: 2023-07-06
Attending: INTERNAL MEDICINE
Payer: MEDICARE

## 2023-07-13 ENCOUNTER — HOSPITAL ENCOUNTER (OUTPATIENT)
Facility: HOSPITAL | Age: 83
Setting detail: RECURRING SERIES
Discharge: HOME | End: 2023-07-16
Attending: INTERNAL MEDICINE
Payer: MEDICARE

## 2023-07-13 PROCEDURE — 97530 THERAPEUTIC ACTIVITIES: CPT

## 2023-07-13 PROCEDURE — 97535 SELF CARE MNGMENT TRAINING: CPT

## 2023-07-13 PROCEDURE — 97166 OT EVAL MOD COMPLEX 45 MIN: CPT

## 2023-07-17 ENCOUNTER — HOSPITAL ENCOUNTER (OUTPATIENT)
Facility: HOSPITAL | Age: 83
Setting detail: RECURRING SERIES
Discharge: HOME | End: 2023-07-20
Attending: INTERNAL MEDICINE
Payer: MEDICARE

## 2023-07-17 PROCEDURE — 97535 SELF CARE MNGMENT TRAINING: CPT

## 2023-07-17 PROCEDURE — 97110 THERAPEUTIC EXERCISES: CPT

## 2023-07-17 PROCEDURE — 97530 THERAPEUTIC ACTIVITIES: CPT

## 2023-07-17 NOTE — PROGRESS NOTES
OCCUPATIONAL THERAPY - DAILY TREATMENT NOTE    Patient Name: Kenneth Flor    Date: 2023    : 1940  Insurance: Payor: MEDICARE / Plan: MEDICARE PART A AND B / Product Type: *No Product type* /      Patient  verified Yes     Visit #   Current / Total 2 18   Time   In / Out 920 1000   Total Treatment Time 40   Pain   In / Out 0 0   Subjective Functional Status/Changes: It is hard to put the sugar in my coffee      TREATMENT AREA =  Other lack of coordination [R27.8]    OBJECTIVE    Therapeutic Procedures:   Tx Min Billable or 1:1 Min (if diff from Tx Min) Procedure, Rationale, Specifics   20  83137 Therapeutic Exercise (timed):  increase ROM, strength, coordination, and proprioception to  (see flow sheet as applicable)    Saumya [place]: 3 ways: 15x   Green theraputty (Med) Manipulated with Larry hands, utilizing proximal stabilization, to reveal/remove 1/4 in pegs 10/10  Green (Med) Therputty: \" Food\" HEP     10  46922 Self Care/Home Management (timed):  improve patient knowledge and understanding of positioning, posture/ergonomics, activity modification, and physical therapy expectations, procedures and progression  to improve functional use of Larry hands during home and self care  (see flow sheet as applicable)    Weighted Utensils with demonstration at table with mug   Pt ed on proximal stabilization        10  35610 Therapeutic Activity (timed):  use of dynamic activities replicating functional movements to improve functional use of Larry hands  (see flow sheet as applicable)    Larry overhead quick pumps with pt education on role/purpose  Administered ordering information for [employer]                        40 40 [facility] Totals Reminder: bill using total billable min of TIMED therapeutic procedures (example: do not include dry needle or estim unattended, both untimed codes, in totals to left)  8-22 min = 1 unit; 23-37 min = 2 units; 38-52 min = 3 units; 53-67 min = 4 units; 68-82 min = 5 units

## 2023-07-20 ENCOUNTER — HOSPITAL ENCOUNTER (OUTPATIENT)
Facility: HOSPITAL | Age: 83
Setting detail: RECURRING SERIES
Discharge: HOME | End: 2023-07-23
Attending: INTERNAL MEDICINE
Payer: MEDICARE

## 2023-07-20 PROCEDURE — 97530 THERAPEUTIC ACTIVITIES: CPT

## 2023-07-20 PROCEDURE — 97110 THERAPEUTIC EXERCISES: CPT

## 2023-07-20 PROCEDURE — 97112 NEUROMUSCULAR REEDUCATION: CPT

## 2023-07-20 NOTE — PROGRESS NOTES
PHYSICAL / OCCUPATIONAL THERAPY - DAILY TREATMENT NOTE (updated )    Patient Name: Samuel Millan    Date: 2023    : 1940  Insurance: Payor: MEDICARE / Plan: MEDICARE PART A AND B / Product Type: *No Product type* /      Patient  verified yes   Visit #   Current / Total 2 36   Time   In / Out 12:43P 1:21P   Pain   In / Out 6/10 neck 0/10   Subjective Functional Status/Changes: Patient denies any changes at this time. She denies any falls. She still bumps into things daily. She has  landed into some low branches. Her neurologist told her to not ride her bike anymore; she does walk almost a mile. She has 3 stairs to get in her home and 10-12 in her home. TREATMENT AREA =  Unsteady gait [R26.81]    OBJECTIVE      Therapeutic Procedures: Tx Min Billable or 1:1 Min (if diff from Tx Min) Procedure, Rationale, Specifics   24  16892 Therapeutic Activity (timed):  use of dynamic activities replicating functional movements to increase ROM, strength, coordination, balance, and proprioception in order to improve patient's ability to progress to PLOF and address remaining functional goals. (see flow sheet as applicable)     Details if applicable:  goal assessment     14  91408 Neuromuscular Re-Education (timed):  improve balance, coordination, kinesthetic sense, posture, core stability and proprioception to improve patient's ability to develop conscious control of individual muscles and awareness of position of extremities in order to progress to PLOF and address remaining functional goals.  (see flow sheet as applicable)     Details if applicable:  FGA, standing balance   38  [facility] Totals Reminder: bill using total billable min of TIMED therapeutic procedures (example: do not include dry needle or estim unattended, both untimed codes, in totals to left)  8-22 min = 1 unit; 23-37 min = 2 units; 38-52 min = 3 units; 53-67 min = 4 units; 68-82 min = 5 units   Total Total     [x]  Patient

## 2023-07-20 NOTE — THERAPY RECERTIFICATION
[facility] - [facility]  [place]  [address]  [phone] fax    Continued Plan of Care/ Re-certification for Physical Therapy Services    Patient name: Rena Amin Start of Care: 23   Referral source: Stephen Alexander MD : 1940   Medical/Treatment Diagnosis: Unsteady gait [R26.81]  Payor: MEDICARE / Plan: MEDICARE PART A AND B / Product Type: *No Product type* /  Onset Date:Chronic, physician order 6/15/2023     Prior Hospitalization: see medical history Provider#: [id number]   Comorbidities: Hx basel cell cancer, CAD, HTN, Parkinson's Disease  Prior Level of Function:Independent with ADLs, functional, and daily tasks with ongoing symptoms from Parkinson's Disease. Visits from Start of Care: 2    Missed Visits: 0    The Plan of Care and following information is based on the patient's current status:  Short Term Goals: To be accomplished in 8 treatments   Pt will report compliance and independence to [facility] to help the pt manage their pain and symptoms. Status at last note/certification: established  met  2. Pt will improve 5x sit to stand time to 10 seconds with no UE assist to improve the pt's safety with household mobility. Status at last note/certification: 13 seconds with push off with B Ues  Progressing-5x in 14 sec, 7x in 23 sec; testing ceased at this time due to knee pain     Long Term Goals: To be accomplished in 36 treatments  Pt will improve her DGI score to 20/24 points to improve the pt's fall risk. Status at last note/certification: 24/46 points  Met-  2. Pt will increase MMT B hip flex/ER/IR to 4+/5 to improve ability to tolerate transfers. Status at last note/certification: B hip flex 4-/5, B hip ER 4-/5, B hip IR 4/5  MET-Right hip MMT: flex 4+ ER 4+ IR 4+; Left hip MMT: flex 4+ ER 5 IR 5   3.   Pt will report having 2 instances of bumping into doorways/items or less over the past week to improve the pt's

## 2023-07-20 NOTE — PROGRESS NOTES
OCCUPATIONAL THERAPY - DAILY TREATMENT NOTE    Patient Name: Km Garcia    Date: 2023    : 1940  Insurance: Payor: MEDICARE / Plan: MEDICARE PART A AND B / Product Type: *No Product type* /      Patient  verified Yes     Visit #   Current / Total 3 18   Time   In / Out 120 158   Total Treatment Time 38   Pain   In / Out 0 0   Subjective Functional Status/Changes: I keep forgetting (Proximal stabilization)      TREATMENT AREA =  Other lack of coordination [R27.8]    OBJECTIVE    Therapeutic Procedures:   Tx Min Billable or 1:1 Min (if diff from Tx Min) Procedure, Rationale, Specifics   24  53256 Therapeutic Exercise (timed):  increase ROM, strength, coordination, and proprioception to  (see flow sheet as applicable)    Olena Boss: 3 ways: 15x     Green theraputty (Med) Manipulated with Larry hands, utilizing proximal stabilization, to reveal/remove 1/4 in pegs 10/10    Graded Clothes Pins: 3pt 6#- 15x , lat Pinch 6#,      14  32879 Therapeutic Activity (timed):  use of dynamic activities replicating functional movements to improve functional use of Larry hands  (see flow sheet as applicable)    Larry overhead quick pumps with pt education on role/purpose  Handwriting- weighted pen    Perfection: Right hand- translated palm <> digit for placement/removal, Left hand- translated palm <> digit for placement/removal                           38 45 [facility] Totals Reminder: bill using total billable min of TIMED therapeutic procedures (example: do not include dry needle or estim unattended, both untimed codes, in totals to left)  8-22 min = 1 unit; 23-37 min = 2 units; 38-52 min = 3 units; 53-67 min = 4 units; 68-82 min = 5 units   Total Total           Billed concurrently with other treatments Patient Education:  Reviewed HEP     Objective Information/Functional Measures/Assessment    Legible handwriting with use of weighted pen and overhead pumps     Perfection: Cueing for proper placement

## 2023-07-24 ENCOUNTER — HOSPITAL ENCOUNTER (OUTPATIENT)
Facility: HOSPITAL | Age: 83
Setting detail: RECURRING SERIES
Discharge: HOME | End: 2023-07-27
Attending: INTERNAL MEDICINE
Payer: MEDICARE

## 2023-07-24 PROCEDURE — 97110 THERAPEUTIC EXERCISES: CPT

## 2023-07-24 PROCEDURE — 97530 THERAPEUTIC ACTIVITIES: CPT

## 2023-07-24 NOTE — PROGRESS NOTES
OCCUPATIONAL THERAPY - DAILY TREATMENT NOTE    Patient Name: Dion Nayak    Date: 2023    : 1940  Insurance: Payor: MEDICARE / Plan: MEDICARE PART A AND B / Product Type: *No Product type* /      Patient  verified Yes     Visit #   Current / Total 4 18   Time   In / Out 1200 1238   Total Treatment Time 38   Pain   In / Out 0 0   Subjective Functional Status/Changes: I was able to sew this morning. It was a task for sure. I was able to thread a needle     TREATMENT AREA =  Other lack of coordination [R27.8]    OBJECTIVE    Therapeutic Procedures: Tx Min Billable or 1:1 Min (if diff from Tx Min) Procedure, Rationale, Specifics   24  81111 Therapeutic Exercise (timed):  increase ROM, strength, coordination, and proprioception to , pinch  (see flow sheet as applicable)    Green theraputty: Larry hands using proximal stabilization, to manipulated putty to reveal/remove 1/4 in pegs 10/10    Dexterity Balls: Larry hands - Prox stabilization     Green Therabar: 3 ways: 15x        14  00433 Therapeutic Activity (timed):  use of dynamic activities replicating functional movements to improve functional use (see flow sheet as applicable)    Graded Clothes Pins:  In Stand- Used Right hand (Various pinches) to reach up and place 5 graded clothes pins, then moved to next piece of curtain/graded clothes pins manipulated with Left hand 5x each (1#, 2#, 4#, 6#, 8#)      Grooved Pegs:   -Right hand- Translated palm <> digit for placement/removal, untimed   -Left hand- Translated palm <> digit for placement/removal, untimed                              38 45 [facility] Totals Reminder: bill using total billable min of TIMED therapeutic procedures (example: do not include dry needle or estim unattended, both untimed codes, in totals to left)  8-22 min = 1 unit; 23-37 min = 2 units; 38-52 min = 3 units; 53-67 min = 4 units; 68-82 min = 5 units   Total Total             Billed concurrently with other treatments

## 2023-07-25 ENCOUNTER — HOSPITAL ENCOUNTER (OUTPATIENT)
Facility: HOSPITAL | Age: 83
Setting detail: RECURRING SERIES
Discharge: HOME | End: 2023-07-28
Attending: INTERNAL MEDICINE
Payer: MEDICARE

## 2023-07-25 PROCEDURE — 97112 NEUROMUSCULAR REEDUCATION: CPT

## 2023-07-25 PROCEDURE — 97110 THERAPEUTIC EXERCISES: CPT

## 2023-07-25 NOTE — PROGRESS NOTES
PHYSICAL / OCCUPATIONAL THERAPY - DAILY TREATMENT NOTE (updated )    Patient Name: Sushma Morgan    Date: 2023    : 1940  Insurance: Payor: MEDICARE / Plan: MEDICARE PART A AND B / Product Type: *No Product type* /      Patient  verified Yes     Visit #   Current / Total 1 16   Time   In / Out 1000 1038   Pain   In / Out 0/10 010   Subjective Functional Status/Changes: Pt stated that she has some pain in her upper back     TREATMENT AREA =  Unsteady gait [R26.81]    OBJECTIVE         Therapeutic Procedures: Tx Min Billable or 1:1 Min (if diff from Tx Min) Procedure, Rationale, Specifics   10  55263 Therapeutic Exercise (timed):  increase ROM, strength, coordination, balance, and proprioception to improve patient's ability to progress to PLOF and address remaining functional goals. (see flow sheet as applicable)     Details if applicable:       28  27614 Neuromuscular Re-Education (timed):  improve balance, coordination, kinesthetic sense, posture, core stability and proprioception to improve patient's ability to develop conscious control of individual muscles and awareness of position of extremities in order to progress to PLOF and address remaining functional goals.  (see flow sheet as applicable)     Details if applicable:     45  [facility] Totals Reminder: bill using total billable min of TIMED therapeutic procedures (example: do not include dry needle or estim unattended, both untimed codes, in totals to left)  8-22 min = 1 unit; 23-37 min = 2 units; 38-52 min = 3 units; 53-67 min = 4 units; 68-82 min = 5 units   Total Total     [x]  Patient Education billed concurrently with other procedures   [x] Review HEP    [] Progressed/Changed HEP, detail:    [] Other detail:       Objective Information/Functional Measures/Assessment  Mini squats today instead of TRX due to upper back pain  No LOB with static or dynamic balance  Will make changes next visit per flow sheet    Pt is making slow

## 2023-07-27 ENCOUNTER — APPOINTMENT (OUTPATIENT)
Facility: HOSPITAL | Age: 83
End: 2023-07-27
Attending: INTERNAL MEDICINE
Payer: MEDICARE

## 2023-07-28 ENCOUNTER — HOSPITAL ENCOUNTER (OUTPATIENT)
Facility: HOSPITAL | Age: 83
Setting detail: RECURRING SERIES
Discharge: HOME | End: 2023-07-31
Attending: INTERNAL MEDICINE
Payer: MEDICARE

## 2023-07-28 PROCEDURE — 97110 THERAPEUTIC EXERCISES: CPT

## 2023-07-28 PROCEDURE — 97112 NEUROMUSCULAR REEDUCATION: CPT

## 2023-07-28 NOTE — PROGRESS NOTES
PHYSICAL / OCCUPATIONAL THERAPY - DAILY TREATMENT NOTE (updated )    Patient Name: Bouchra Metzger    Date: 2023    : 1940  Insurance: Payor: MEDICARE / Plan: MEDICARE PART A AND B / Product Type: *No Product type* /      Patient  verified Yes   Visit #   Current / Total 2 16   Time   In / Out 2:03P 2:41P   Pain   In / Out 0/10 0/10   Subjective Functional Status/Changes: Patient reports she is ok today. She has been working with her putty; she lost on of the buttons. She has been doing the leg raises also. She was getting jayden horses doing exercises at home. TREATMENT AREA =  Unsteady gait [R26.81]        OBJECTIVE        Therapeutic Procedures: Tx Min Billable or 1:1 Min (if diff from Tx Min) Procedure, Rationale, Specifics    23 [id number] Neuromuscular Re-Education (timed):  improve balance, coordination, kinesthetic sense, posture, core stability and proprioception to improve patient's ability to develop conscious control of individual muscles and awareness of position of extremities in order to progress to PLOF and address remaining functional goals. (see flow sheet as applicable)     Details if applicable:  standing balance, dynamic gait     15 9 64299 Therapeutic Exercise (timed):  increase ROM, strength, coordination, balance, and proprioception to improve patient's ability to progress to PLOF and address remaining functional goals.  (see flow sheet as applicable)     Details if applicable:     45 32 [facility] Totals Reminder: bill using total billable min of TIMED therapeutic procedures (example: do not include dry needle or estim unattended, both untimed codes, in totals to left)  8-22 min = 1 unit; 23-37 min = 2 units; 38-52 min = 3 units; 53-67 min = 4 units; 68-82 min = 5 units   Total Total     [x]  Patient Education billed concurrently with other procedures   [x] Review HEP    [] Progressed/Changed HEP, detail:    [] Other detail:       Objective Information/Functional

## 2023-07-31 ENCOUNTER — HOSPITAL ENCOUNTER (OUTPATIENT)
Facility: HOSPITAL | Age: 83
Setting detail: RECURRING SERIES
Discharge: HOME | End: 2023-08-03
Attending: INTERNAL MEDICINE
Payer: MEDICARE

## 2023-07-31 PROCEDURE — 97110 THERAPEUTIC EXERCISES: CPT

## 2023-07-31 PROCEDURE — 97112 NEUROMUSCULAR REEDUCATION: CPT

## 2023-07-31 NOTE — PROGRESS NOTES
balance activities    Pt is making good progress toward goals. Static and dynamic balance cont to improve. Pt had some difficulty with amb with head turns, but no LOB. Sit to stands are improving. Patient will continue to benefit from skilled PT / OT services to modify and progress therapeutic interventions, analyze and address functional mobility deficits, analyze and address ROM deficits, analyze and address strength deficits, analyze and cue for proper movement patterns, analyze and modify for postural abnormalities, analyze and address imbalance/dizziness, and instruct in home and community integration to address functional deficits and attain remaining goals. Progress toward goals / Updated goals:  [x]  See Progress Note/Recertification    Patient will improve FOTO score to at least 75/100 points in order to demonstrate functional improvement. Status at last recert/progress note: 34/968     2. Patient will improve FGA score by at least 4 points in order to demonstrate decreased risk of falls. Status at last recert/progress note: 62/34  Progressing-improved ability to perform tandem ambulation and ambulate with turning 7/28/23     3. Patient will be able to maintain SLS for at least 5 sec B in order to improve stability with daily tasks. Status at last recert/progress note: tandem 22 sec B     4. Patient will improve 5x sit to stand test to at least 10 sec in order to demonstrate improved functional mobility.               Status at last recert/progress COXD:3U in 14 sec, 7x in 23 sec; testing ceased at this time due to knee pain    Next PN/ RC due 6/03/86, cert due 0/23/31    PLAN  Yes  Continue plan of care  [x]  Upgrade activities as tolerated  []  Discharge due to :  []  Other:    Yvse Carpenter PTA    7/31/2023    7:06 AM    Future Appointments   Date Time Provider [address]   7/31/2023 11:20 AM Abie, OT [facility]   7/31/2023 12:40 PM

## 2023-08-03 ENCOUNTER — HOSPITAL ENCOUNTER (OUTPATIENT)
Facility: HOSPITAL | Age: 83
Setting detail: RECURRING SERIES
Discharge: HOME | End: 2023-08-06
Attending: INTERNAL MEDICINE
Payer: MEDICARE

## 2023-08-03 PROCEDURE — 97530 THERAPEUTIC ACTIVITIES: CPT

## 2023-08-03 PROCEDURE — 97110 THERAPEUTIC EXERCISES: CPT

## 2023-08-03 PROCEDURE — 97112 NEUROMUSCULAR REEDUCATION: CPT

## 2023-08-03 NOTE — PROGRESS NOTES
OCCUPATIONAL THERAPY - DAILY TREATMENT NOTE    Patient Name: mK Garcia    Date: 8/3/2023    : 1940  Insurance: Payor: MEDICARE / Plan: MEDICARE PART A AND B / Product Type: *No Product type* /      Patient  verified Yes     Visit #   Current / Total 5 18   Time   In / Out 920 1000   Total Treatment Time 40   Pain   In / Out 0/10 0/10   Subjective Functional Status/Changes: \"I've been working hard with my putty, I lost one of the buttons though. 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 \"     TREATMENT AREA =  Other lack of coordination [R27.8]    OBJECTIVE    Therapeutic Procedures:   Tx Min Billable or 1:1 Min (if diff from Tx Min) Procedure, Rationale, Specifics   29  31126 Therapeutic Exercise (timed):  increase ROM, strength, coordination, and proprioception to  (see flow sheet as applicable)    -Theraputty: green putty to reveal 10/10, 1/4 inch pegs with B hand  -Therabar: 15# 3-ways x15 each  -Wrist mazes: x5 each  -Resistance : x2 B hand  -Graded clothespins: 6# clip with 3pt pinch     11  54741 Therapeutic Activity (timed):  use of dynamic activities replicating functional movements to pinch and in hand manipulation (see flow sheet as applicable)    -Grooved pegs: palm<> digit B hand     40 40 [facility] Totals Reminder: bill using total billable min of TIMED therapeutic procedures (example: do not include dry needle or estim unattended, both untimed codes, in totals to left)  8-22 min = 1 unit; 23-37 min = 2 units; 38-52 min = 3 units; 53-67 min = 4 units; 68-82 min = 5 units   Total Total         Billed concurrently with other treatments Patient Education:  Reviewed HEP     Objective Information/Functional Measures/Assessment    -Patient completed dexterity balls ccw in pronation due to min difficulty with hand/digit positioning  -Patient used B hand during thumb claw for an active assist           []  See Progress Note/Re certification

## 2023-08-03 NOTE — PROGRESS NOTES
[address] [id number]   8/7/2023  9:20 AM Chevy Trent, BROOKE [facility]   8/7/2023 10:00 AM Edwinae Mercedesing, PTA [facility]   8/10/2023  9:20 AM Sonya Juan, OT [facility]URS [facility]   8/10/2023 10:00 AM Glee Pilling, PTA [facility]   8/14/2023 10:00 AM Glee Mercedesing, PTA [facility]   8/16/2023  9:30 AM [facility]   8/16/2023 10:00 AM HBV BONE DEXA RM 1 [facility] [place]   8/18/2023 10:00 AM Gosia Restrepo, PT IDECS[facility]   12/11/2023  8:45 AM IOC LAB VISIT [facility] BS AMB   12/19/2023  9:30 AM Debbie Doyle MD IO BS AMB   1/11/2024  8:00 AM Hector Morton MD [facility] BS AMB

## 2023-08-07 ENCOUNTER — HOSPITAL ENCOUNTER (OUTPATIENT)
Facility: HOSPITAL | Age: 83
Setting detail: RECURRING SERIES
Discharge: HOME | End: 2023-08-10
Attending: INTERNAL MEDICINE
Payer: MEDICARE

## 2023-08-07 PROCEDURE — 97112 NEUROMUSCULAR REEDUCATION: CPT

## 2023-08-07 PROCEDURE — 97110 THERAPEUTIC EXERCISES: CPT

## 2023-08-07 PROCEDURE — 97530 THERAPEUTIC ACTIVITIES: CPT

## 2023-08-07 NOTE — PROGRESS NOTES
incorporate proximal stabilization, noted increase in tremors in Right UE as session progressed     Cueing to hold with therabar exercise     Plan for PN next visit            []  See Progress Note/Re certification     Patient will continue to benefit from skilled OT services to modify and progress therapeutic interventions, analyze and address ROM deficits, analyze and address strength deficits, and instruct in home and community integration  to attain remaining goals. Progress toward goals / Updated goals:    Short Term Goals: To be accomplished in 2 weeks  Patient will be independent with HEP for bilateral UE ROM/stretches/strengthening to increase ROM and strength for ADL/IADL tasks. Status at Eval: not initiated   Current Status (8/3/2023): patient using green (medium) putty for HEP, progressing     Long Term Goals: To be accomplished in 4-6 weeks  Patient will demonstrate the ability to print her first name, last name, and date with no more than 4 errors in letter/number formation. Status at Eval: illegible, see note in chart  Current Status (7/24/2023): writing legible with use of weighted pen and overhead pumps      Patient will be independent in demonstrating and performing activity modification strategies to aid in success for work, self-care, meal preparation and home management tasks. Status at Eval: initiated education on adaptive pens  Current Status (7/24/2023): initiated proximal stabilization      Patient will be able to hang and take down clothes off her clothes line with no more than Min A. Status at Eval: Max A  Current Status (8/3/2023): patient reported she was able to take clothes off line with no pain/discomfort, progressing     Patient will be able to complete grooming tasks with no more than min A  Status at eval: mod A      Patient will increase bilateral  strength by 5# or greater to increase ability to grasp onto pots/pans.   Status at eval: right=49#; left= 48#  Current

## 2023-08-07 NOTE — PROGRESS NOTES
PHYSICAL / OCCUPATIONAL THERAPY - DAILY TREATMENT NOTE (updated )    Patient Name: Bouchra Metzger    Date: 2023    : 1940  Insurance: Payor: MEDICARE / Plan: MEDICARE PART A AND B / Product Type: *No Product type* /      Patient  verified Yes     Visit #   Current / Total 5 16   Time   In / Out 1000 1038   Pain   In / Out 0/10 010   Subjective Functional Status/Changes: Pt stated that she is doing ok today     TREATMENT AREA =  Unsteady gait [R26.81]    OBJECTIVE         Therapeutic Procedures: Tx Min Billable or 1:1 Min (if diff from Tx Min) Procedure, Rationale, Specifics   10  28128 Therapeutic Exercise (timed):  increase ROM, strength, coordination, balance, and proprioception to improve patient's ability to progress to PLOF and address remaining functional goals. (see flow sheet as applicable)     Details if applicable:        Neuromuscular Re-Education (timed):  improve balance, coordination, kinesthetic sense, posture, core stability and proprioception to improve patient's ability to develop conscious control of individual muscles and awareness of position of extremities in order to progress to PLOF and address remaining functional goals. (see flow sheet as applicable)     Details if applicable:     45  [facility] Totals Reminder: bill using total billable min of TIMED therapeutic procedures (example: do not include dry needle or estim unattended, both untimed codes, in totals to left)  8-22 min = 1 unit; 23-37 min = 2 units; 38-52 min = 3 units; 53-67 min = 4 units; 68-82 min = 5 units   Total Total     [x]  Patient Education billed concurrently with other procedures   [x] Review HEP    [] Progressed/Changed HEP, detail:    [] Other detail:       Objective Information/Functional Measures/Assessment  Slight LOB with EC static balance  No difficulty with exercises    Pt is making slow progress toward goals. Pt cont with decreased dynamic balance with HT.  Static balance is

## 2023-08-10 ENCOUNTER — HOSPITAL ENCOUNTER (OUTPATIENT)
Facility: HOSPITAL | Age: 83
Setting detail: RECURRING SERIES
Discharge: HOME | End: 2023-08-13
Attending: INTERNAL MEDICINE
Payer: MEDICARE

## 2023-08-10 PROCEDURE — 97530 THERAPEUTIC ACTIVITIES: CPT

## 2023-08-10 PROCEDURE — 97110 THERAPEUTIC EXERCISES: CPT

## 2023-08-10 PROCEDURE — 97112 NEUROMUSCULAR REEDUCATION: CPT

## 2023-08-10 NOTE — PROGRESS NOTES
OCCUPATIONAL THERAPY - DAILY TREATMENT NOTE    Patient Name: Tim Boyle    Date: 8/10/2023    : 1940  Insurance: Payor: MEDICARE / Plan: MEDICARE PART A AND B / Product Type: *No Product type* /      Patient  verified Yes     Visit #   Current / Total 7 18   Time   In / Out 918 1000   Total Treatment Time 42   Pain   In / Out 0 0   Subjective Functional Status/Changes: I have gotten better with my clothes pins      TREATMENT AREA =  Other lack of coordination [R27.8]    OBJECTIVE  Therapeutic Procedures:   Tx Min Billable or 1:1 Min (if diff from Tx Min) Procedure, Rationale, Specifics   27  88421 Therapeutic Exercise (timed):  increase ROM, strength, coordination, and proprioception to  (see flow sheet as applicable)  Recheck for PN   Scapular retraction/protraction in supine hands clasped x10  with brief holds  Scapular retraction/protraction in supine with 1# weights each hand x10 with brief holds  Arm extension with finger flicks (see OTR for demonstration) 2x10 seated  Towel slides with holds x10   Taking out collar and pegs of [place] with 6# clip 3pt pinch     15  26182 Therapeutic Activity (timed):  use of dynamic activities replicating functional movements to improve functional use of Larry hands  (see flow sheet as applicable)    [place] pegs x7 each hand  Larry Overhead \"Pumps\" 4F43   Therabar flick swith 60 BPM metrome, red t bar   Recheck for PN                            43 42 MC BC Totals Reminder: bill using total billable min of TIMED therapeutic procedures (example: do not include dry needle or estim unattended, both untimed codes, in totals to left)  8-22 min = 1 unit; 23-37 min = 2 units; 38-52 min = 3 units; 53-67 min = 4 units; 68-82 min = 5 units   Total Total         Billed concurrently with other treatments Patient Education:  Reviewed HEP     Objective Information/Functional Measures/Assessment    Patient reporting that she is doing better with hanging up/taking down
stabilization   Status at PN (8/10/23): continue to address. Patient will be able to hang and take down clothes off her clothes line with no more than Min A. Status at Eval: Max A  Current Status (8/3/2023): patient reported she was able to take clothes off line with no pain/discomfort, progressing  Status at PN (8/10/23): min A, continue to address for consistency. Patient will be able to complete grooming tasks with no more than min A  Status at eval: mod A   Status at PN (8/10/23): pt educated on electric grooming tools, continue to address. Patient will increase bilateral  strength by 5# or greater to increase ability to grasp onto pots/pans. Status at eval: right=49#; left= 48#  Current Status (7/24/2023): able to tolerate green theraputty and therabar  Status at PN (8/10/23): left= 57#; right= 55#. Goal met. Patient will increase right hand pinch strengths by 2# or more in order to be able to open clips for hanging up clothes  Status at Eval: 3pt= 6#; lateral= 7#; tip= 6#  Status at PN (8/10/23): 3pt= 8#; lateral= 7#; tip= 7#,goal met for 3pt pinch, progressing with tip pinch strength. Medicare, cannot change goals, cannot adjust frequency/duration, no signature required   Reporting Period: (date from last Prog Note/Eval to current Prog Note/Recert)  4/03/05- 3/56/98    RECOMMENDATIONS  Continue therapy per initial Plan of Care or most recent Medicare Recert. If you have any questions/comments please contact us directly. Thank you for allowing us to assist in the care of your patient.     Rasheed [place]       8/10/2023       12:54 PM

## 2023-08-10 NOTE — PROGRESS NOTES
PHYSICAL / OCCUPATIONAL THERAPY - DAILY TREATMENT NOTE (updated )    Patient Name: Margaret Garcia    Date: 8/10/2023    : 1940  Insurance: Payor: MEDICARE / Plan: MEDICARE PART A AND B / Product Type: *No Product type* /      Patient  verified Yes     Visit #   Current / Total 6 16   Time   In / Out 1000 1038   Pain   In / Out 0/10 0/10   Subjective Functional Status/Changes: Pt stated that she is doing pretty good today     TREATMENT AREA =  Unsteady gait [R26.81]    OBJECTIVE         Therapeutic Procedures: Tx Min Billable or 1:1 Min (if diff from Tx Min) Procedure, Rationale, Specifics   13  38829 Therapeutic Exercise (timed):  increase ROM, strength, coordination, balance, and proprioception to improve patient's ability to progress to PLOF and address remaining functional goals. (see flow sheet as applicable)     Details if applicable:       25  61573 Neuromuscular Re-Education (timed):  improve balance, coordination, kinesthetic sense, posture, core stability and proprioception to improve patient's ability to develop conscious control of individual muscles and awareness of position of extremities in order to progress to PLOF and address remaining functional goals.  (see flow sheet as applicable)     Details if applicable:     45  MC BC Totals Reminder: bill using total billable min of TIMED therapeutic procedures (example: do not include dry needle or estim unattended, both untimed codes, in totals to left)  8-22 min = 1 unit; 23-37 min = 2 units; 38-52 min = 3 units; 53-67 min = 4 units; 68-82 min = 5 units   Total Total     [x]  Patient Education billed concurrently with other procedures   [x] Review HEP    [] Progressed/Changed HEP, detail:    [] Other detail:       Objective Information/Functional Measures/Assessment  Made changes to program today per flow sheet  Positive response to changes  Was challenged with BOSU step ups  No LOB with static balance    Pt is making good progress

## 2023-08-14 ENCOUNTER — HOSPITAL ENCOUNTER (OUTPATIENT)
Facility: HOSPITAL | Age: 83
Setting detail: RECURRING SERIES
Discharge: HOME | End: 2023-08-17
Attending: INTERNAL MEDICINE
Payer: MEDICARE

## 2023-08-14 ENCOUNTER — HOSPITAL ENCOUNTER (OUTPATIENT)
Facility: HOSPITAL | Age: 83
Setting detail: RECURRING SERIES
End: 2023-08-14
Attending: INTERNAL MEDICINE
Payer: MEDICARE

## 2023-08-14 PROCEDURE — 97110 THERAPEUTIC EXERCISES: CPT

## 2023-08-14 PROCEDURE — 97112 NEUROMUSCULAR REEDUCATION: CPT

## 2023-08-14 NOTE — PROGRESS NOTES
PHYSICAL / OCCUPATIONAL THERAPY - DAILY TREATMENT NOTE (updated )    Patient Name: Lindsey Johnson    Date: 2023    : 1940  Insurance: Payor: MEDICARE / Plan: MEDICARE PART A AND B / Product Type: *No Product type* /      Patient  verified Yes     Visit #   Current / Total 7 16   Time   In / Out 125 203   Pain   In / Out 0/10 0/10   Subjective Functional Status/Changes: Pt stated that she is doing ok today     TREATMENT AREA =  Unsteady gait [R26.81]    OBJECTIVE         Therapeutic Procedures: Tx Min Billable or 1:1 Min (if diff from Tx Min) Procedure, Rationale, Specifics   23  33773 Therapeutic Exercise (timed):  increase ROM, strength, coordination, balance, and proprioception to improve patient's ability to progress to PLOF and address remaining functional goals. (see flow sheet as applicable)     Details if applicable:       15  71601 Neuromuscular Re-Education (timed):  improve balance, coordination, kinesthetic sense, posture, core stability and proprioception to improve patient's ability to develop conscious control of individual muscles and awareness of position of extremities in order to progress to PLOF and address remaining functional goals. (see flow sheet as applicable)     Details if applicable:     45  MC BC Totals Reminder: bill using total billable min of TIMED therapeutic procedures (example: do not include dry needle or estim unattended, both untimed codes, in totals to left)  8-22 min = 1 unit; 23-37 min = 2 units; 38-52 min = 3 units; 53-67 min = 4 units; 68-82 min = 5 units   Total Total     [x]  Patient Education billed concurrently with other procedures   [x] Review HEP    [] Progressed/Changed HEP, detail:    [] Other detail:       Objective Information/Functional Measures/Assessment  No difficulty with exercises  No LOB with static balance  Initiated SLR x 3 bilaterally  No complaint of pain during session     Pt is making good progress toward goals.  Pt cont

## 2023-08-16 ENCOUNTER — HOSPITAL ENCOUNTER (OUTPATIENT)
Facility: HOSPITAL | Age: 83
Discharge: HOME | End: 2023-08-19
Attending: INTERNAL MEDICINE
Payer: MEDICARE

## 2023-08-16 ENCOUNTER — TELEPHONE (OUTPATIENT)
Age: 83
End: 2023-08-16

## 2023-08-16 DIAGNOSIS — M85.89 OSTEOPENIA OF MULTIPLE SITES: ICD-10-CM

## 2023-08-16 DIAGNOSIS — Z12.31 SCREENING MAMMOGRAM FOR BREAST CANCER: ICD-10-CM

## 2023-08-16 DIAGNOSIS — R92.8 ABNORMALITY OF RIGHT BREAST ON SCREENING MAMMOGRAPHY: Primary | ICD-10-CM

## 2023-08-16 PROCEDURE — 77080 DXA BONE DENSITY AXIAL: CPT

## 2023-08-16 PROCEDURE — 77063 BREAST TOMOSYNTHESIS BI: CPT

## 2023-08-16 NOTE — TELEPHONE ENCOUNTER
Mammogram Result (most recent):  [facility] JIM DIGITAL SCREEN BILATERAL 08/16/2023    Narrative  Digital mammogram bilateral screening with 3-D tomography and CAD    History: Routine screening    Comparison: [id number]    Findings:  2-D and 3-D digital CC and MLO views obtained of the breasts. Questionable grouped calcifications upper outer right breast 10:00 5.6 cm from  the nipple. No suspicious mass, distortion or suspicious microcalcifications in the left  breast.    Interpretation performed in conjunction with computer assisted detection (CAD). Impression  Questionable right breast grouped calcifications. Right diagnostic mammogram to  include CC and ML magnification and mediolateral views recommended. Category 0: Incomplete: Need additional imaging evaluation and/or prior  mammograms for comparison. Density C: The breasts are heterogeneously dense, which may obscure small  masses. Please let the patient know that her screening mammogram showed some questionable calcifications in her right breast.  Recommendation is to proceed with a right diagnostic mammogram and ultrasound to get a better look at the area. Please let her know that the orders were placed and provide her with the number to schedule.

## 2023-08-18 ENCOUNTER — HOSPITAL ENCOUNTER (OUTPATIENT)
Facility: HOSPITAL | Age: 83
Setting detail: RECURRING SERIES
Discharge: HOME | End: 2023-08-21
Attending: INTERNAL MEDICINE
Payer: MEDICARE

## 2023-08-18 ENCOUNTER — TELEPHONE (OUTPATIENT)
Age: 83
End: 2023-08-18

## 2023-08-18 PROCEDURE — 97110 THERAPEUTIC EXERCISES: CPT

## 2023-08-18 PROCEDURE — 97530 THERAPEUTIC ACTIVITIES: CPT

## 2023-08-18 NOTE — PROGRESS NOTES
9/8/2023  9:20 AM Claudia Montano PTA [facility]BDXX[facility]   9/8/2023 10:00 AM BROOKE Mooney MMCPTPB [facility]   9/11/2023  9:20 AM Romina Holloway, OT [facility]   9/11/2023 10:00 AM Claudia Montano PTA [facility]   12/11/2023  8:45 AM [facility] LAB VISIT [facility] BS AMB   12/19/2023  9:30 AM Kiersten Morales MD [facility] BS AMB   1/11/2024  8:00 AM Rogelio Regalado MD [facility] BS AMB

## 2023-08-18 NOTE — THERAPY RECERTIFICATION
[facility] - Baljit Rodriguez  [address]  [phone] fax    Medicare Progress Report    Patient name: Lindsey Johnson Start of Care: 23   Referral source: Mare Mayer MD : 1940   Medical/Treatment Diagnosis: Unsteady gait [R26.81]  Payor: MEDICARE / Plan: MEDICARE PART A AND B / Product Type: *No Product type* /  Onset Date:Chronic, physician order 6/15/2023     Prior Hospitalization: see medical history Provider#: [id number]   Comorbidities: Hx basel cell cancer, CAD, HTN, Parkinson's Disease  Prior Level of Function:Independent with ADLs, functional, and daily tasks with ongoing symptoms from Parkinson's Disease. Visits from Start of Care: 10    Missed Visits: 0    Reporting Period: 23 to 23    Subjective Reports: improved awareness of balance, no falls, reduced frequency of \"bumping into objects\", continued neck pain    Current Status/Treatment Goals:  Patient will improve FOTO score to at least 75/100 points in order to demonstrate functional improvement. Status at last recert/progress note: 86/885  Progressing-to be assessed next follow up visit-23     2. Patient will improve FGA score by at least 4 points in order to demonstrate decreased risk of falls. Status at last recert/progress note: 64/71  Progressing-improved ability to perform tandem ambulation and ambulate with turning 23  Progressing-24/30 23     3. Patient will be able to maintain SLS for at least 5 sec B in order to improve stability with daily tasks. Status at last recert/progress note: tandem 22 sec B  Progressing-SLS: right 14 sec, left 4 sec; Tandem: 30 sec B 23     4. Patient will improve 5x sit to stand test to at least 10 sec in order to demonstrate improved functional mobility.               Status at last recert/progress LMUU:7R in 14 sec, 7x in 23 sec; testing ceased at this time

## 2023-08-18 NOTE — TELEPHONE ENCOUNTER
not  distinguish among causes of decreased bone density, which include primary versus  secondary osteoporosis (such as metabolic bone disorders or possible effects of  medications) and also other conditions (such as osteomalacia). Clinical  considerations should determine what additional evaluation may be warranted to  exclude secondary conditions in a patient with low bone density. Please note that reliable, valid comparisons can not be made between studies  which have been performed on different densitometers. If clinically warranted,  follow up study performed at this site would best permit assessment of trend for  possible change in bone mineral density over time in comparison to this study. Thank you for this referral.      Reviewed bone density study (8/16/2023) showing T-scores:  femoral neck left -1.5/right -2.0 and lumbar -0.8. Calculated FRAX score estimates her 10 year risk of a major osteoporetic fracture at 14 % and hip fracture at 4.5 %. There has been no statistically significant change in her bone mineral density since her prior study in 8/2021. On Fosamax 35 mg weekly since 11/2021. Please let the patient know that her bone density study continues to show evidence of osteopenia (low bone density). There appears to be some mild improvement compared to her prior study in 2021, although it is not statistically significant. Would recommend that she continue taking Fosamax 35 mg weekly as well as calcium and Vitamin D supplementation daily. Also, please encourage her to exercise regularly, particularly weight bearing activities, which may help to prevent progression.

## 2023-08-18 NOTE — PROGRESS NOTES
21/30  Progressing-improved ability to perform tandem ambulation and ambulate with turning 7/28/23  Progressing-24/30 8/18/23     3. Patient will be able to maintain SLS for at least 5 sec B in order to improve stability with daily tasks. Status at last recert/progress note: tandem 22 sec B  Progressing-SLS: right 14 sec, left 4 sec; Tandem: 30 sec B 8/18/23     4. Patient will improve 5x sit to stand test to at least 10 sec in order to demonstrate improved functional mobility.               Status at last recert/progress URGP:8Y in 14 sec, 7x in 23 sec; testing ceased at this time due to knee pain  Progressing-5x in 12 sec, 7x in 17 sec, testing ceased due to knee pain 8/18/23        PLAN  Yes Continue plan of care  [x]  Upgrade activities as tolerated  []  Discharge due to :  []  Other:    Esvin Grace, PT    8/18/2023    8:45 AM    Future Appointments   Date Time Provider [address]   8/18/2023  9:20 AM BROOKE Hernandez [facility]   8/18/2023 10:00 AM Esvin Grace, PT [facility]   8/24/2023 10:40 AM Josiane Tripp OT [facility]   8/24/2023 11:20 AM Dixie Huber, CELENA [facility]   8/25/2023  8:40 AM Josiane Tripp OT [facility]   8/25/2023  9:20 AM Dixie Huber, PTA [facility]   8/28/2023 10:00 AM BROOKE Hernandez [facility]   8/28/2023 10:40 AM Dixie Huber PTA LUSAMGF [facility]   8/31/2023  9:20 AM BROOKE Hernandez [facility]   8/31/2023 10:00 AM Dixie Huber, PTA [facility]CP[facility]   9/6/2023  9:45 AM HBV GIULIANA RM 3 3D [facility] [place]   9/6/2023 10:00 AM HBV GIULIANA US RM 1 [facility]RUS [place]   9/7/2023  8:40 AM Dixie Huber, PTA [facility]   9/7/2023  9:20 AM Josiane Tripp, OT [facility]   9/8/2023  9:20 AM Dixie Huber, PTA [facility]   9/8/2023 10:00 AM Guru Gonzalez BROOKE [facility]CPTP[facility]   9/11/2023  9:20 AM Josiane Tripp, OT [facility]   9/11/2023 10:00 AM Dixie Huber, PTA [facility]   12/11/2023  8:45 AM [facility] LAB

## 2023-08-24 ENCOUNTER — HOSPITAL ENCOUNTER (OUTPATIENT)
Facility: HOSPITAL | Age: 83
Setting detail: RECURRING SERIES
End: 2023-08-24
Attending: INTERNAL MEDICINE
Payer: MEDICARE

## 2023-08-25 ENCOUNTER — APPOINTMENT (OUTPATIENT)
Facility: HOSPITAL | Age: 83
End: 2023-08-25
Attending: INTERNAL MEDICINE
Payer: MEDICARE

## 2023-08-28 ENCOUNTER — HOSPITAL ENCOUNTER (OUTPATIENT)
Facility: HOSPITAL | Age: 83
Setting detail: RECURRING SERIES
Discharge: HOME | End: 2023-08-31
Attending: INTERNAL MEDICINE
Payer: MEDICARE

## 2023-08-28 PROCEDURE — 97112 NEUROMUSCULAR REEDUCATION: CPT

## 2023-08-28 PROCEDURE — 97530 THERAPEUTIC ACTIVITIES: CPT

## 2023-08-28 PROCEDURE — 97110 THERAPEUTIC EXERCISES: CPT

## 2023-08-28 NOTE — PROGRESS NOTES
OCCUPATIONAL THERAPY - DAILY TREATMENT NOTE    Patient Name: Kaylynn Johnson    Date: 2023    : 1940  Insurance: Payor: MEDICARE / Plan: MEDICARE PART A AND B / Product Type: *No Product type* /      Patient  verified Yes     Visit #   Current / Total 9 18   Time   In / Out 1000 1040   Total Treatment Time 40   Pain   In / Out 3- Neck  0   Subjective Functional Status/Changes: I have been doing the ovehead pumps at home. It helps! TREATMENT AREA =  Other lack of coordination [R27.8]    OBJECTIVE      Therapeutic Procedures:   Tx Min Billable or 1:1 Min (if diff from Tx Min) Procedure, Rationale, Specifics   15  65068 Therapeutic Exercise (timed):  increase ROM, strength, coordination, and proprioception to  (see flow sheet as applicable)    Green therabar: 20x 3 ways     Lg Med Soft Theraputty: Flattened using Larry Hands with L Bar- performed in stand to challenge balance and dynamic tasks     25  48870 Therapeutic Activity (timed):  use of dynamic activities replicating functional movements to decrease tremors and improve ability to perform functional tasks  (see flow sheet as applicable)    Connect 4- Larry hands- 6# graded clothes pin with vertical board elevated to simulate - 3pt pinch     Therabar flicks with 60 BPM metrome, red t bar - Larry Hands, 60 seconds each     Overhead Larry Pumps     Oragami- Larry hands/Following directions                              40 40 MC BC Totals Reminder: bill using total billable min of TIMED therapeutic procedures (example: do not include dry needle or estim unattended, both untimed codes, in totals to left)  8-22 min = 1 unit; 23-37 min = 2 units; 38-52 min = 3 units; 53-67 min = 4 units; 68-82 min = 5 units   Total Total         Billed concurrently with other treatments Patient Education:  Reviewed HEP     Objective Information/Functional Measures/Assessment    Difficulty maintaining beat with Right hand with metronome matching     Initial diffiuclty

## 2023-08-31 ENCOUNTER — HOSPITAL ENCOUNTER (OUTPATIENT)
Facility: HOSPITAL | Age: 83
Setting detail: RECURRING SERIES
End: 2023-08-31
Attending: INTERNAL MEDICINE
Payer: MEDICARE

## 2023-08-31 PROCEDURE — 97530 THERAPEUTIC ACTIVITIES: CPT

## 2023-08-31 PROCEDURE — 97110 THERAPEUTIC EXERCISES: CPT

## 2023-08-31 PROCEDURE — 97112 NEUROMUSCULAR REEDUCATION: CPT

## 2023-08-31 NOTE — PROGRESS NOTES
Continue plan of care  [x]  Upgrade activities as tolerated  []  Discharge due to :  []  Other:    Jack Velasquez, PTA    8/31/2023    6:31 AM    Future Appointments   Date Time Provider [address]   8/31/2023  9:20 AM BROOKE Espinal [facility]   8/31/2023 10:00 AM Jack Velasquez, PTA [facility]   9/6/2023  9:45 AM HBV GIULIANA RM 3 3D [facility] [place]   9/6/2023 10:00 AM HBV GIULIANA US RM 1 [facility]US [place]   9/7/2023  8:40 AM Jack Velasquez, PTA [facility]   9/7/2023  9:20 AM Grant Lozada, OT [facility]   9/8/2023  9:20 AM Jack Velasquez, PTA LHPOBSX [facility]   9/8/2023 10:00 AM BROOKE Espinal [facility]   9/11/2023  9:20 AM Grant Lozada, OT [facility]   9/11/2023 10:00 AM Jack Velasquez, PTA [facility]   9/14/2023 12:00 PM Grant Lozada [place] [facility]   9/14/2023 12:40 PM Jack Velasquez, PTA [facility]   12/11/2023  8:45 AM [facility] LAB VISIT [facility] BS AMB   12/19/2023  9:30 AM Joseph Ramirez MD [facility] BS AMB   1/11/2024  8:00 AM Almas Gaytan MD [facility] BS AMB

## 2023-08-31 NOTE — PROGRESS NOTES
ROM/stretches/strengthening to increase ROM and strength for ADL/IADL tasks. Status at Eval: not initiated   Status at PN (8/10/23): goal met. Patient will demonstrate the ability to print her first name, last name, and date with no more than 4 errors in letter/number formation. Status at Eval: illegible, see note in chart  Status at PN (8/10/23): pt progressing, see note in chart. Patient will be independent in demonstrating and performing activity modification strategies to aid in success for work, self-care, meal preparation and home management tasks. Status at Eval: initiated education on adaptive pens  Status at PN (8/10/23): continue to address. Patient will be able to hang and take down clothes off her clothes line with no more than Min A. Status at Eval: Max A  Status at PN (8/10/23): min A, continue to address for consistency. Patient will be able to complete grooming tasks with no more than min A  Status at eval: mod A   Status at PN (8/10/23): pt educated on electric grooming tools, continue to address. Patient will increase bilateral  strength by 5# or greater to increase ability to grasp onto pots/pans. Status at eval: right=49#; left= 48#  Status at PN (8/10/23): left= 57#; right= 55#. Goal met. Patient will increase right hand pinch strengths by 2# or more in order to be able to open clips for hanging up clothes  Status at Eval: 3pt= 6#; lateral= 7#; tip= 6#  Status at PN (8/10/23): 3pt= 8#; lateral= 7#; tip= 7#,goal met for 3pt pinch, progressing with tip pinch strength.    Current Status (8/31/2023): Progressing, able to use 6# graded clothes pin for 3pt bilaterally         PLAN  [x]  Continue Plan of Care  []  Upgrade activities as tolerated    []  Discharge due to :    []  Other:      Therapist: BROOKE Castaneda COTA/AUDI     Date: 8/31/2023 Time: 8:58 AM     Future Appointments   Date Time Provider 4600  [address]   8/31/2023  9:20 Elise Lindsey

## 2023-09-06 ENCOUNTER — HOSPITAL ENCOUNTER (OUTPATIENT)
Facility: HOSPITAL | Age: 83
Discharge: HOME | End: 2023-09-09
Attending: INTERNAL MEDICINE
Payer: MEDICARE

## 2023-09-06 DIAGNOSIS — R92.8 ABNORMALITY OF RIGHT BREAST ON SCREENING MAMMOGRAPHY: ICD-10-CM

## 2023-09-06 PROCEDURE — 77065 DX MAMMO INCL CAD UNI: CPT

## 2023-09-07 ENCOUNTER — HOSPITAL ENCOUNTER (OUTPATIENT)
Facility: HOSPITAL | Age: 83
Setting detail: RECURRING SERIES
Discharge: HOME | End: 2023-09-10
Attending: INTERNAL MEDICINE
Payer: MEDICARE

## 2023-09-07 PROCEDURE — 97535 SELF CARE MNGMENT TRAINING: CPT

## 2023-09-07 PROCEDURE — 97530 THERAPEUTIC ACTIVITIES: CPT

## 2023-09-07 PROCEDURE — 97110 THERAPEUTIC EXERCISES: CPT

## 2023-09-07 PROCEDURE — 97112 NEUROMUSCULAR REEDUCATION: CPT

## 2023-09-07 NOTE — PROGRESS NOTES
PHYSICAL / OCCUPATIONAL THERAPY - DAILY TREATMENT NOTE (updated )    Patient Name: Kaylynn Johnson    Date: 2023    : 1940  Insurance: Payor: MEDICARE / Plan: MEDICARE PART A AND B / Product Type: *No Product type* /      Patient  verified Yes     Visit #   Current / Total 11 16   Time   In / Out 840 918   Pain   In / Out 5/10 0/10   Subjective Functional Status/Changes: Pt stated that her right shoulder still hurts. Pt was instructed to get script for shoulder and at that time she would be discharged for balance and evaluated for shoulder     TREATMENT AREA =  Unsteady gait [R26.81]    OBJECTIVE         Therapeutic Procedures: Tx Min Billable or 1:1 Min (if diff from Tx Min) Procedure, Rationale, Specifics   10  41658 Therapeutic Exercise (timed):  increase ROM, strength, coordination, balance, and proprioception to improve patient's ability to progress to PLOF and address remaining functional goals. (see flow sheet as applicable)     Details if applicable:       18  99949 Neuromuscular Re-Education (timed):  improve balance, coordination, kinesthetic sense, posture, core stability and proprioception to improve patient's ability to develop conscious control of individual muscles and awareness of position of extremities in order to progress to PLOF and address remaining functional goals. (see flow sheet as applicable)     Details if applicable:     10  66438 Therapeutic Activity (timed):  use of dynamic activities replicating functional movements to increase ROM, strength, coordination, balance, and proprioception in order to improve patient's ability to progress to PLOF and address remaining functional goals.   (see flow sheet as applicable)     Details if applicable:       [facility] Totals Reminder: bill using total billable min of TIMED therapeutic procedures (example: do not include dry needle or estim unattended, both untimed codes, in totals to left)  8-22 min = 1 unit; 23-37 min = 2

## 2023-09-07 NOTE — PROGRESS NOTES
OCCUPATIONAL THERAPY - DAILY TREATMENT NOTE    Patient Name: Ulysses Mckenna    Date: 2023    : 1940  Insurance: Payor: MEDICARE / Plan: MEDICARE PART A AND B / Product Type: *No Product type* /      Patient  verified Yes     Visit #   Current / Total 11 18   Time   In / Out 917 1000   Total Treatment Time 43   Pain   In / Out 0 0   Subjective Functional Status/Changes: Are we playing cards today? I am having trouble with my computer  My granddaughter came to see what I do! TREATMENT AREA =  Other lack of coordination [R27.8]    OBJECTIVE      Therapeutic Procedures:   Tx Min Billable or 1:1 Min (if diff from Tx Min) Procedure, Rationale, Specifics   19  02071 Therapeutic Activity (timed):  use of dynamic activities replicating functional movements to decrease tremors and improve ability to perform functional tasks  (see flow sheet as applicable)    True balance right hand with arm stabilized on table, OTR helping to stabilize  Metronome 60 BPM 1 min each   Shuffling cards x5 times  Chop sticks and pom poms   Perfection palm<>digit individually; taking out bilaterally in sync     16  33757 Therapeutic Exercise (timed):  increase ROM, strength, coordination, and proprioception to  increase independence for ADL/IADL tasks (see flow sheet as applicable)    Rebounder 5 minutes with various weighted balls  Therabar 3 ways: blue 20x  Hand helper 6 bands placing 9 with the right, stacking 9 on top with the left; stacking 8 with the right, taking down stack with left       8  86600 Self Care/Home Management (timed):  improve patient knowledge and understanding of activity modification  to increase ability to complete ADLs/IADLs independently  (see flow sheet as applicable)    Education for pt and granddaughter on computer settings that would be beneficial for Parkinson's along with handout                 40 40 [facility] BC Totals Reminder: bill using total billable min of TIMED therapeutic procedures

## 2023-09-08 ENCOUNTER — HOSPITAL ENCOUNTER (OUTPATIENT)
Facility: HOSPITAL | Age: 83
Setting detail: RECURRING SERIES
Discharge: HOME | End: 2023-09-11
Attending: INTERNAL MEDICINE
Payer: MEDICARE

## 2023-09-08 ENCOUNTER — TELEPHONE (OUTPATIENT)
Age: 83
End: 2023-09-08

## 2023-09-08 DIAGNOSIS — M54.2 NECK PAIN: Primary | ICD-10-CM

## 2023-09-08 PROCEDURE — 97530 THERAPEUTIC ACTIVITIES: CPT

## 2023-09-08 PROCEDURE — 97535 SELF CARE MNGMENT TRAINING: CPT

## 2023-09-08 PROCEDURE — 97110 THERAPEUTIC EXERCISES: CPT

## 2023-09-08 PROCEDURE — 97112 NEUROMUSCULAR REEDUCATION: CPT

## 2023-09-08 NOTE — PROGRESS NOTES
PHYSICAL / OCCUPATIONAL THERAPY - DAILY TREATMENT NOTE (updated )    Patient Name: Letitia Wade    Date: 2023    : 1940  Insurance: Payor: MEDICARE / Plan: MEDICARE PART A AND B / Product Type: *No Product type* /      Patient  verified Yes     Visit #   Current / Total 12 16   Time   In / Out 920 958   Pain   In / Out 0/10 0/10   Subjective Functional Status/Changes: Pt stated that the spot on her shoulder still hurts     TREATMENT AREA =  Unsteady gait [R26.81]    OBJECTIVE         Therapeutic Procedures: Tx Min Billable or 1:1 Min (if diff from Tx Min) Procedure, Rationale, Specifics   18  87407 Therapeutic Exercise (timed):  increase ROM, strength, coordination, balance, and proprioception to improve patient's ability to progress to PLOF and address remaining functional goals. (see flow sheet as applicable)     Details if applicable:       10  29358 Neuromuscular Re-Education (timed):  improve balance, coordination, kinesthetic sense, posture, core stability and proprioception to improve patient's ability to develop conscious control of individual muscles and awareness of position of extremities in order to progress to PLOF and address remaining functional goals. (see flow sheet as applicable)     Details if applicable:     10  09708 Therapeutic Activity (timed):  use of dynamic activities replicating functional movements to increase ROM, strength, coordination, balance, and proprioception in order to improve patient's ability to progress to PLOF and address remaining functional goals.   (see flow sheet as applicable)     Details if applicable:     45  [facility] Totals Reminder: bill using total billable min of TIMED therapeutic procedures (example: do not include dry needle or estim unattended, both untimed codes, in totals to left)  8-22 min = 1 unit; 23-37 min = 2 units; 38-52 min = 3 units; 53-67 min = 4 units; 68-82 min = 5 units   Total Total     [x]  Patient Education billed

## 2023-09-08 NOTE — PROGRESS NOTES
Pinch Measurements: Taken with Pinch Gauge, in Lbs         3 pt      Right 6 8 10 (+4)   Left  7                   Lateral      Right 7 7 8 (+1)   Left 10                   Tip      Right 6 7 7   Left 7                           MRMT   9/8   Right 90   Left 84                    [x]  See Progress Note/Re certification     Patient will continue to benefit from skilled OT services to modify and progress therapeutic interventions, analyze and address ROM deficits, analyze and address strength deficits, and instruct in home and community integration  to attain remaining goals. Progress toward goals / Updated goals:    Patient will be independent with HEP for bilateral UE ROM/stretches/strengthening to increase ROM and strength for ADL/IADL tasks. Status at Eval: not initiated   Status at PN (8/10/23): goal met. Patient will demonstrate the ability to print her first name, last name, and date with no more than 4 errors in letter/number formation. Status at Eval: illegible, see note in chart  Status at PN (8/10/23): pt progressing, see note in chart. Patient will be independent in demonstrating and performing activity modification strategies to aid in success for work, self-care, meal preparation and home management tasks. Status at Eval: initiated education on adaptive pens  Status at PN (8/10/23): continue to address. Status at PN (9/8/2023): Progressing, Goal to be modified at Recert      Patient will be able to hang and take down clothes off her clothes line with no more than Min A. Status at Eval: Max A  Status at PN (8/10/23): min A, continue to address for consistency. Status at PN (9/8/2023): Min A, Continue to address      Patient will be able to complete grooming tasks with no more than min A  Status at eval: mod A   Status at PN (8/10/23): pt educated on electric grooming tools, continue to address.    Status at PN (9/8/2023): Goal Met for self care, new goal for donning jewelry to be

## 2023-09-08 NOTE — TELEPHONE ENCOUNTER
Patient is currently doing PT for Parkinson's. She he has a place up between her shoulder and neck that is bothering her. They told her that is Dr. Salome Duarte would put in an order they will treat that as well.

## 2023-09-09 NOTE — PROGRESS NOTES
hand grasp, leg strength strong and equal bilaterally to knee pain    Next PN/ RC due 9/16/23  Auth due 9/16/23    PLAN  Yes  Continue plan of care  [x]  Upgrade activities as tolerated  []  Discharge due to :  []  Other:    Verdis Nickel, PTA    8/28/2023    6:34 AM    Future Appointments   Date Time Provider 4600  46[facility]   8/28/2023 10:00 AM BROOKE Mcmillan [facility]   8/28/2023 10:40 AM Verdis Nickel, PTA [facility]   8/31/2023  9:20 AM BROOKE Mcmillan [facility]   8/31/2023 10:00 AM Verdis Nickel, PTA [facility]   9/6/2023  9:45 AM HBV GIULIANA RM 3 3D [facility] [place]   9/6/2023 10:00 AM HBV GIULIANA US RM 1 [facility] [place]   9/7/2023  8:40 AM Verdis Nickel, PTA [id number] [facility]   9/7/2023  9:20 AM Rob Henderson OT [facility]   9/8/2023  9:20 AM Verdis Nickel, PTA [id number] [facility]   9/8/2023 10:00 AM BROOKE cMmillan [facility]   9/11/2023  9:20 AM Rob Henderson OT [facility]   9/11/2023 10:00 AM Verdis Nickel, PTA [facility]   9/14/2023 12:00 PM Rob Henderson [facility]   9/14/2023 12:40 PM Verdis Nickel, PTA [facility]   12/11/2023  8:45 AM IOC LAB VISIT [facility] BS AMB   12/19/2023  9:30 AM Ned Cochran MD [facility] BS AMB   1/11/2024  8:00 AM Jaun Durand MD [facility] BS AMB

## 2023-09-11 ENCOUNTER — HOSPITAL ENCOUNTER (OUTPATIENT)
Facility: HOSPITAL | Age: 83
Setting detail: RECURRING SERIES
Discharge: HOME | End: 2023-09-14
Attending: INTERNAL MEDICINE
Payer: MEDICARE

## 2023-09-11 PROCEDURE — 97110 THERAPEUTIC EXERCISES: CPT

## 2023-09-11 PROCEDURE — 97112 NEUROMUSCULAR REEDUCATION: CPT

## 2023-09-11 PROCEDURE — 97530 THERAPEUTIC ACTIVITIES: CPT

## 2023-09-11 NOTE — PROGRESS NOTES
PHYSICAL / OCCUPATIONAL THERAPY - DAILY TREATMENT NOTE (updated )    Patient Name: Robin Sample    Date: 2023    : 1940  Insurance: Payor: MEDICARE / Plan: MEDICARE PART A AND B / Product Type: *No Product type* /      Patient  verified Yes     Visit #   Current / Total 13 16   Time   In / Out 1000 1038   Pain   In / Out 0/10 010   Subjective Functional Status/Changes: Pt stated that she is doing well and contacted the MD about a script for her shoulder     TREATMENT AREA =  Unsteady gait [R26.81]    OBJECTIVE         Therapeutic Procedures: Tx Min Billable or 1:1 Min (if diff from Tx Min) Procedure, Rationale, Specifics   10  52549 Therapeutic Exercise (timed):  increase ROM, strength, coordination, balance, and proprioception to improve patient's ability to progress to PLOF and address remaining functional goals. (see flow sheet as applicable)     Details if applicable:       18  91891 Neuromuscular Re-Education (timed):  improve balance, coordination, kinesthetic sense, posture, core stability and proprioception to improve patient's ability to develop conscious control of individual muscles and awareness of position of extremities in order to progress to PLOF and address remaining functional goals. (see flow sheet as applicable)     Details if applicable:     10  62273 Therapeutic Activity (timed):  use of dynamic activities replicating functional movements to increase ROM, strength, coordination, balance, and proprioception in order to improve patient's ability to progress to PLOF and address remaining functional goals.   (see flow sheet as applicable)     Details if applicable:     45  [facility] Totals Reminder: bill using total billable min of TIMED therapeutic procedures (example: do not include dry needle or estim unattended, both untimed codes, in totals to left)  8-22 min = 1 unit; 23-37 min = 2 units; 38-52 min = 3 units; 53-67 min = 4 units; 68-82 min = 5 units   Total Total

## 2023-09-11 NOTE — PROGRESS NOTES
OCCUPATIONAL THERAPY - DAILY TREATMENT NOTE    Patient Name: Jeimy Vasquez    Date: 2023    : 1940  Insurance: Payor: MEDICARE / Plan: MEDICARE PART A AND B / Product Type: *No Product type* /      Patient  verified Yes     Visit #   Current / Total 1 18   Time   In / Out 920 1000    40   Pain   In / Out 3 (neck) \"Naggy\"   Subjective Functional Status/Changes: My hands aren't worth nothing today. TREATMENT AREA =  Other lack of coordination [R27.8]    OBJECTIVE    Therapeutic Procedures:   Tx Min Billable or 1:1 Min (if diff from Tx Min) Procedure, Rationale, Specifics   25  44194 Therapeutic Exercise (timed):  increase ROM, strength, coordination, and proprioception to  (see flow sheet as applicable)    Overhead pumps and forward pumps 2x10 each  Balloon tennis in standing with rackets in both hands then both hands using one racket  Rebounder with 1000 gram ball - 2 mins each hand      15  66244 Therapeutic Activity (timed):  use of dynamic activities replicating functional movements to improve ability to manipulate items  (see flow sheet as applicable)    Chop sticks and pom poms  Tweezers with small beads onto peg board                        40 40 [facility] Totals Reminder: bill using total billable min of TIMED therapeutic procedures (example: do not include dry needle or estim unattended, both untimed codes, in totals to left)  8-22 min = 1 unit; 23-37 min = 2 units; 38-52 min = 3 units; 53-67 min = 4 units; 68-82 min = 5 units   Total Total             Billed concurrently with other treatments Patient Education:  Reviewed HEP     Objective Information/Functional Measures/Assessment    Pt with increased frustration with tweezer task, completed 6 beads with right then discontinued   Pt with attempt to throw with one hand and catch with opposite, intermittent compliance  Min cues for use of left hand only with rebounder           [x]  See Progress Note/Re certification     Patient

## 2023-09-12 ENCOUNTER — HOSPITAL ENCOUNTER (OUTPATIENT)
Facility: HOSPITAL | Age: 83
Discharge: HOME | End: 2023-09-15
Attending: INTERNAL MEDICINE
Payer: MEDICARE

## 2023-09-12 DIAGNOSIS — R92.0 MAMMOGRAPHIC MICROCALCIFICATION: ICD-10-CM

## 2023-09-12 DIAGNOSIS — R92.8 ABNORMAL MAMMOGRAM: ICD-10-CM

## 2023-09-12 PROCEDURE — 88305 TISSUE EXAM BY PATHOLOGIST: CPT

## 2023-09-12 PROCEDURE — 77065 DX MAMMO INCL CAD UNI: CPT

## 2023-09-12 PROCEDURE — 76098 X-RAY EXAM SURGICAL SPECIMEN: CPT

## 2023-09-12 PROCEDURE — 88360 TUMOR IMMUNOHISTOCHEM/MANUAL: CPT

## 2023-09-12 PROCEDURE — 2500000003 HC RX 250 WO HCPCS: Performed by: INTERNAL MEDICINE

## 2023-09-12 PROCEDURE — 19081 BX BREAST 1ST LESION STRTCTC: CPT

## 2023-09-12 RX ORDER — LIDOCAINE HYDROCHLORIDE AND EPINEPHRINE 10; 10 MG/ML; UG/ML
20 INJECTION, SOLUTION INFILTRATION; PERINEURAL ONCE
Status: COMPLETED | OUTPATIENT
Start: 2023-09-12 | End: 2023-09-12

## 2023-09-12 RX ORDER — LIDOCAINE HYDROCHLORIDE 10 MG/ML
3 INJECTION, SOLUTION EPIDURAL; INFILTRATION; INTRACAUDAL; PERINEURAL ONCE
Status: COMPLETED | OUTPATIENT
Start: 2023-09-12 | End: 2023-09-12

## 2023-09-12 RX ADMIN — LIDOCAINE HYDROCHLORIDE AND EPINEPHRINE 20 ML: 10; 10 INJECTION, SOLUTION INFILTRATION; PERINEURAL at 11:07

## 2023-09-12 RX ADMIN — LIDOCAINE HYDROCHLORIDE 3 ML: 10 INJECTION, SOLUTION EPIDURAL; INFILTRATION; INTRACAUDAL; PERINEURAL at 11:05

## 2023-09-14 ENCOUNTER — HOSPITAL ENCOUNTER (OUTPATIENT)
Facility: HOSPITAL | Age: 83
Setting detail: RECURRING SERIES
Discharge: HOME | End: 2023-09-17
Attending: INTERNAL MEDICINE
Payer: MEDICARE

## 2023-09-14 PROCEDURE — 97110 THERAPEUTIC EXERCISES: CPT

## 2023-09-14 PROCEDURE — 97530 THERAPEUTIC ACTIVITIES: CPT

## 2023-09-14 NOTE — PROGRESS NOTES
Physical Therapy Discharge Instructions      [facility]  [address] [place]  [phone] fax    Patient: Primo Lyons  : 1940      Continue Home Exercise Program 2-3 times per day for 4-6 weeks      Continue with    [] Ice       [] Heat           Follow up with MD:     [] Upon completion of therapy     [x] As needed      Recommendations:     [x]   Return to activity with home program    []   Return to activity with the following modifications:       []Post Rehab Program    []Join Independent aquatic program     []Return to/join local gym        Additional Comments:
Blanche Brown PTA [facility]   10/12/2023  9:20 AM Blanche Brown PTA [facility]   10/12/2023 10:00 AM BROOKE Aburto [facility]   12/11/2023  8:45 AM [facility] LAB VISIT [facility] BS AMB   12/19/2023  9:30 AM Rcihard Shaikh MD [facility] BS AMB   1/11/2024  8:00 AM Meredith Andrew MD [facility] BS AMB

## 2023-09-14 NOTE — PROGRESS NOTES
OCCUPATIONAL THERAPY - DAILY TREATMENT NOTE    Patient Name: Laine Lama    Date: 2023    : 1940  Insurance: Payor: MEDICARE / Plan: MEDICARE PART A AND B / Product Type: *No Product type* /      Patient  verified Yes     Visit #   Current / Total 2 18   Time   In / Out 1156 1238    42   Pain   In / Out 0 0   Subjective Functional Status/Changes: I have an excess of 12 peppers and I am going to make pepper relish. I had a breast biopsy done day and I am not supposed to be lifting more than like two or more pounds     TREATMENT AREA =  Other lack of coordination [R27.8]    OBJECTIVE    Therapeutic Procedures:   Tx Min Billable or 1:1 Min (if diff from Tx Min) Procedure, Rationale, Specifics   17  67030 Therapeutic Exercise (timed):  increase ROM, strength, coordination, and proprioception to  (see flow sheet as applicable)    Rubber band and cup  Removing velcro chips while standing  6# clip lateral pinch    25  36603 Therapeutic Activity (timed):  use of dynamic activities replicating functional movements to improve ability to manipulate items  (see flow sheet as applicable)    Dowel and washers  Coins palm>digit into resistive slot  1\" pegs standing sets of two placing and removing with both hands simultaneously  Large nut and bolt                         42 42 [facility] Totals Reminder: bill using total billable min of TIMED therapeutic procedures (example: do not include dry needle or estim unattended, both untimed codes, in totals to left)  8-22 min = 1 unit; 23-37 min = 2 units; 38-52 min = 3 units; 53-67 min = 4 units; 68-82 min = 5 units   Total Total             Billed concurrently with other treatments Patient Education:  Reviewed HEP     Objective Information/Functional Measures/Assessment  Pt completed some of washer dowel tasks using palm>digit  Min cues for sets of two pegs         [x]  See Progress Note/Re certification     Patient will continue to benefit from skilled OT

## 2023-09-14 NOTE — THERAPY DISCHARGE
[facility] - [place]  [address]  [phone] fax    Physical Therapy Discharge Summary    Patient name: Km Garcia Start of Care: 23   Referral source: Ana Chicas MD : 1940   Medical/Treatment Diagnosis: Unsteady gait [R26.81]  Payor: MEDICARE / Plan: MEDICARE PART A AND B / Product Type: *No Product type* /  Onset Date:Chronic, physician order 6/15/2023      Prior Hospitalization: see medical history Provider#: [id number]   Comorbidities: Hx basel cell cancer, CAD, HTN, Parkinson's Disease  Prior Level of Function:Independent with ADLs, functional, and daily tasks with ongoing symptoms from Parkinson's Disease. Visits from Start of Care: 15    Missed Visits: 0    Reporting Period : 23 to 23    Summary of Care:  Patient will improve FOTO score to at least 75/100 points in order to demonstrate functional improvement. Status at last recert/progress note:to be assessed next follow up visit  Goal met. Increased to 88/100     2. Patient will improve FGA score by at least 4 points in order to demonstrate decreased risk of falls. Status at last recert/progress note: 31/ (3 point increase)   goal met. Increased to      3. Patient will be able to maintain SLS for at least 5 sec B in order to improve stability with daily tasks. Status at last recert/progress note: SLS: right 14 sec, left 4 sec; Tandem: 30 sec B   goal met. Right 23 seconds and left was 5 seconds     4. Patient will improve 5x sit to stand test to at least 10 sec in order to demonstrate improved functional mobility. Status at last recert/progress WILLIAN:0F in 12 sec, 7x in 17 sec, testing ceased due to knee pain  Goal met. 6 sit to stands in 10 seconds. 23      ASSESSMENT/RECOMMENDATIONS:patient progressed well toward goals in therapy.  FOTO score increased to 88/100 which indicated a

## 2023-09-20 ENCOUNTER — OFFICE VISIT (OUTPATIENT)
Age: 83
End: 2023-09-20
Payer: MEDICARE

## 2023-09-20 ENCOUNTER — CLINICAL DOCUMENTATION (OUTPATIENT)
Age: 83
End: 2023-09-20

## 2023-09-20 VITALS
RESPIRATION RATE: 18 BRPM | DIASTOLIC BLOOD PRESSURE: 70 MMHG | WEIGHT: 122 LBS | SYSTOLIC BLOOD PRESSURE: 136 MMHG | HEIGHT: 65 IN | BODY MASS INDEX: 20.33 KG/M2 | HEART RATE: 72 BPM | TEMPERATURE: 98.2 F

## 2023-09-20 DIAGNOSIS — D05.11 BREAST NEOPLASM, TIS (DCIS), RIGHT: Primary | ICD-10-CM

## 2023-09-20 PROCEDURE — G8420 CALC BMI NORM PARAMETERS: HCPCS | Performed by: SURGERY

## 2023-09-20 PROCEDURE — G8399 PT W/DXA RESULTS DOCUMENT: HCPCS | Performed by: SURGERY

## 2023-09-20 PROCEDURE — 1036F TOBACCO NON-USER: CPT | Performed by: SURGERY

## 2023-09-20 PROCEDURE — G8427 DOCREV CUR MEDS BY ELIG CLIN: HCPCS | Performed by: SURGERY

## 2023-09-20 PROCEDURE — 3075F SYST BP GE 130 - 139MM HG: CPT | Performed by: SURGERY

## 2023-09-20 PROCEDURE — 99205 OFFICE O/P NEW HI 60 MIN: CPT | Performed by: SURGERY

## 2023-09-20 PROCEDURE — 3078F DIAST BP <80 MM HG: CPT | Performed by: SURGERY

## 2023-09-20 PROCEDURE — 1123F ACP DISCUSS/DSCN MKR DOCD: CPT | Performed by: SURGERY

## 2023-09-20 PROCEDURE — 1090F PRES/ABSN URINE INCON ASSESS: CPT | Performed by: SURGERY

## 2023-09-20 RX ORDER — ASPIRIN 81 MG/1
81 TABLET, CHEWABLE ORAL DAILY
COMMUNITY

## 2023-09-20 ASSESSMENT — ENCOUNTER SYMPTOMS
CHEST TIGHTNESS: 0
SHORTNESS OF BREATH: 0

## 2023-09-20 NOTE — H&P (VIEW-ONLY)
Breast Cancer Consult      Ms. 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 The area of concern was identified on screening mammogram.  She has no palpable mass. She has no nipple discharge. She has no breast pain. There is no family history of breast cancer, colon or ovarian cancer. Her mother  of pancreatic cancer age 80. Her most recent previous mammogram was 2022 was normal.     Menarche age 13. Partial hysterectomy  for fibroids and she did take hormone replacement therapy for several years. She has 3 sons, age first live birth was 63 and she did breast fed. Breast/GYN history:    OB History    No obstetric history on file. Past Medical History:   Diagnosis Date    Basal cell cancer     CAD (coronary artery disease), native coronary artery 2011    s/p PCI with with ART (Xience 2.75x12, 2.5x12) mLAD and mild disease in rest of coronaries. Midly depressed LV syst fct     Dyslipidemia     GERD (gastroesophageal reflux disease)     History of echocardiogram 2014    EF 60-65%. No WMA. Gr 1 DDfx. Mild MR. Similar to study of 11. Hx of colonoscopy 2016    Normal. Dr. Alessandra Carrion    Hypertension     Parkinson's disease [facility])     S/P cardiac cath 2011    mLAD 90% (2.75 x 12 mm Xience stent). Otherwise patent coronaries. LVEDP 10 mmHg. EF 40-45%. Anteroapical hypk. Renal arteries patent.     Syncope     s/p ILD implantation       Past Surgical History:   Procedure Laterality Date    BUNIONECTOMY      dorsal    CARDIAC CATHETERIZATION      stents    CATARACT REMOVAL  2012    left    CATARACT REMOVAL  10/2012    right    COLONOSCOPY N/A 2016    [address] performed by Fortunato Vang MD at [address] (CERVIX STATUS UNKNOWN)      partial    HYSTERECTOMY, TOTAL There is a small group of coarse heterogeneous calcifications in the right upper  outer quadrant posteriorly. These span approximately 6 x 7 x 8 mm. These appear  to be increasing compared to prior exams and are suspicious. Stereotactic biopsy  is recommended. IMPRESSION:     Calcifications in the right breast are suspicious. Recommend stereotactic  biopsy. Findings were discussed with the patient the time of the exam. She will  be contacted by Shahram Payne for further management/scheduling. BIRADS 4: Suspicious abnormality, biopsy should be considered     Pathology:  PRE-OPERATIVE DIAGNOSIS:   Right breast calcifications     TYPE OF SPECIMEN:   A: RIGHT BREAST CALCIFICATIONS       FINAL DIAGNOSIS:     RIGHT BREAST, CORE NEEDLE BIOPSY        DUCTAL CARCINOMA IN SITU, SOLID AND CRIBRIFORM SUBTYPES, NUCLEAR   GRADE 2        WITHOUT NECROSIS. MICROCALCIFICATIONS IDENTIFIED ASSOCIATED WITH DCIS. FOCAL FIBROADENOMATOID CHANGE. ESTROGEN RECEPTOR AND PROGESTERONE RECEPTOR IHC PENDING. GROSS DESCRIPTION:   Received in formalin labeled with patient's identifiers and \"right breast   calcifications\", are multiple cores of yellow and white fibroadipose   tissue ranging from 0.7-2.3 cm in greatest dimension. The specimen is   submitted entirely in cassettes A1-2. Breast Fixation Times:   Out of Body Time and Date: 11:17 AM on 09/12/2023   Time and Date Placed in Formalin: 11:18 AM on 09/12/2023   Time and Date Placed on Processor: 4:30 PM on 9/13/2023     KXS 9/13/2023 09:16 AM     GREG/devin       MICROSCOPIC DESCRIPTION:   Representative sections through the \"right breast calcifications\" show   multiple elongate fragments of breast parenchyma and stroma. The   fragments show areas of ductal carcinoma in situ. The in-situ carcinoma   is present as solid and cribriform subtypes with nuclear grade 2 features. No necrosis is identified.   Multiple microcalcifications are identified

## 2023-09-20 NOTE — PROGRESS NOTES
lumpectomy. We will schedule surgery and referrals to medical and radiation oncology. All questions were answered. She was asked to call with any additional questions or concerns.

## 2023-09-20 NOTE — PROGRESS NOTES
Patient is being referred to Dr. Ignacio Marie @ [address] [facility] for Oncology consult and Dr. Candi Gutierrez for Radiation consultation. Both office will call the patient for an appointment.

## 2023-09-21 ENCOUNTER — HOSPITAL ENCOUNTER (OUTPATIENT)
Facility: HOSPITAL | Age: 83
Setting detail: RECURRING SERIES
Discharge: HOME | End: 2023-09-24
Attending: INTERNAL MEDICINE
Payer: MEDICARE

## 2023-09-21 ENCOUNTER — APPOINTMENT (OUTPATIENT)
Facility: HOSPITAL | Age: 83
End: 2023-09-21
Attending: INTERNAL MEDICINE
Payer: MEDICARE

## 2023-09-21 PROCEDURE — 97110 THERAPEUTIC EXERCISES: CPT

## 2023-09-21 PROCEDURE — 97530 THERAPEUTIC ACTIVITIES: CPT

## 2023-09-21 NOTE — PROGRESS NOTES
OCCUPATIONAL THERAPY - DAILY TREATMENT NOTE    Patient Name: Bert Baumgarten    Date: 2023    : 1940  Insurance: Payor: MEDICARE / Plan: MEDICARE PART A AND B / Product Type: *No Product type* /      Patient  verified Yes     Visit #   Current / Total 3 18   Time   In / Out 1240 118    38   Pain   In / Out 0 0   Subjective Functional Status/Changes: \"An unfortunate update, I might have breast cancer. \"     TREATMENT AREA =  Other lack of coordination [R27.8]    OBJECTIVE    Therapeutic Procedures:   Tx Min Billable or 1:1 Min (if diff from Tx Min) Procedure, Rationale, Specifics   15  69600 Therapeutic Exercise (timed):  increase ROM, strength, coordination, and proprioception to  (see flow sheet as applicable)      6# Graded Clothes Pins: modified Pinch - Right hand   6 band hand helper: Larry hands individually      25  04163 Therapeutic Activity (timed):  use of dynamic activities replicating functional movements to improve ability to manipulate items  (see flow sheet as applicable)    Handwriting with adaptive/modified pens:  -weighted pen  -pen again  -steadywrite  -Ergo Writer  -Distal 1# wrist weight     Larry Overhead Pumps                           38 45 [facility] Totals Reminder: bill using total billable min of TIMED therapeutic procedures (example: do not include dry needle or estim unattended, both untimed codes, in totals to left)  8-22 min = 1 unit; 23-37 min = 2 units; 38-52 min = 3 units; 53-67 min = 4 units; 68-82 min = 5 units   Total Total             Billed concurrently with other treatments Patient Education:  Reviewed HEP     Objective Information/Functional Measures/Assessment    Best performance with handwriting with distal wrist weight and pen again combo            [x]  See Progress Note/Re certification     Patient will continue to benefit from skilled OT services to modify and progress therapeutic interventions, analyze and address ROM deficits, analyze and address

## 2023-09-22 ENCOUNTER — APPOINTMENT (OUTPATIENT)
Facility: HOSPITAL | Age: 83
End: 2023-09-22
Attending: INTERNAL MEDICINE
Payer: MEDICARE

## 2023-09-22 ENCOUNTER — HOSPITAL ENCOUNTER (OUTPATIENT)
Facility: HOSPITAL | Age: 83
Setting detail: RECURRING SERIES
Discharge: HOME | End: 2023-09-25
Attending: INTERNAL MEDICINE
Payer: MEDICARE

## 2023-09-22 ENCOUNTER — HOSPITAL ENCOUNTER (OUTPATIENT)
Facility: HOSPITAL | Age: 83
End: 2023-09-22
Payer: MEDICARE

## 2023-09-22 DIAGNOSIS — E55.9 VITAMIN D DEFICIENCY: ICD-10-CM

## 2023-09-22 DIAGNOSIS — M85.89 OSTEOPENIA OF MULTIPLE SITES: ICD-10-CM

## 2023-09-22 DIAGNOSIS — I10 ESSENTIAL HYPERTENSION: ICD-10-CM

## 2023-09-22 DIAGNOSIS — R79.9 ABNORMAL FINDING OF BLOOD CHEMISTRY, UNSPECIFIED: ICD-10-CM

## 2023-09-22 DIAGNOSIS — E78.5 DYSLIPIDEMIA: ICD-10-CM

## 2023-09-22 LAB
25(OH)D3 SERPL-MCNC: 77.3 NG/ML (ref 30–100)
ALBUMIN SERPL-MCNC: 3.5 G/DL (ref 3.4–5)
ALBUMIN/GLOB SERPL: 1.3 (ref 0.8–1.7)
ALP SERPL-CCNC: 78 U/L (ref 45–117)
ALT SERPL-CCNC: 15 U/L (ref 13–56)
ANION GAP SERPL CALC-SCNC: 1 MMOL/L (ref 3–18)
AST SERPL-CCNC: 21 U/L (ref 10–38)
BASOPHILS # BLD: 0 K/UL (ref 0–0.1)
BASOPHILS NFR BLD: 1 % (ref 0–2)
BILIRUB SERPL-MCNC: 0.5 MG/DL (ref 0.2–1)
BUN SERPL-MCNC: 16 MG/DL (ref 7–18)
BUN/CREAT SERPL: 23 (ref 12–20)
CALCIUM SERPL-MCNC: 9 MG/DL (ref 8.5–10.1)
CHLORIDE SERPL-SCNC: 110 MMOL/L (ref 100–111)
CHOLEST SERPL-MCNC: 142 MG/DL
CO2 SERPL-SCNC: 31 MMOL/L (ref 21–32)
CREAT SERPL-MCNC: 0.71 MG/DL (ref 0.6–1.3)
DIFFERENTIAL METHOD BLD: ABNORMAL
EOSINOPHIL # BLD: 0.3 K/UL (ref 0–0.4)
EOSINOPHIL NFR BLD: 5 % (ref 0–5)
ERYTHROCYTE [DISTWIDTH] IN BLOOD BY AUTOMATED COUNT: 12.7 % (ref 11.6–14.5)
GLOBULIN SER CALC-MCNC: 2.7 G/DL (ref 2–4)
GLUCOSE SERPL-MCNC: 96 MG/DL (ref 74–99)
HCT VFR BLD AUTO: 41 % (ref 35–45)
HDLC SERPL-MCNC: 67 MG/DL (ref 40–60)
HDLC SERPL: 2.1 (ref 0–5)
HGB BLD-MCNC: 13.3 G/DL (ref 12–16)
IMM GRANULOCYTES # BLD AUTO: 0 K/UL (ref 0–0.04)
IMM GRANULOCYTES NFR BLD AUTO: 0 % (ref 0–0.5)
LDLC SERPL CALC-MCNC: 59.4 MG/DL (ref 0–100)
LIPID PANEL: ABNORMAL
LYMPHOCYTES # BLD: 1.1 K/UL (ref 0.9–3.6)
LYMPHOCYTES NFR BLD: 22 % (ref 21–52)
MAGNESIUM SERPL-MCNC: 2.4 MG/DL (ref 1.6–2.6)
MCH RBC QN AUTO: 31.4 PG (ref 24–34)
MCHC RBC AUTO-ENTMCNC: 32.4 G/DL (ref 31–37)
MCV RBC AUTO: 96.7 FL (ref 78–100)
MONOCYTES # BLD: 0.6 K/UL (ref 0.05–1.2)
MONOCYTES NFR BLD: 12 % (ref 3–10)
NEUTS SEG # BLD: 3 K/UL (ref 1.8–8)
NEUTS SEG NFR BLD: 60 % (ref 40–73)
NRBC # BLD: 0 K/UL (ref 0–0.01)
NRBC BLD-RTO: 0 PER 100 WBC
PLATELET # BLD AUTO: 197 K/UL (ref 135–420)
PMV BLD AUTO: 10 FL (ref 9.2–11.8)
POTASSIUM SERPL-SCNC: 3.9 MMOL/L (ref 3.5–5.5)
PROT SERPL-MCNC: 6.2 G/DL (ref 6.4–8.2)
RBC # BLD AUTO: 4.24 M/UL (ref 4.2–5.3)
SODIUM SERPL-SCNC: 142 MMOL/L (ref 136–145)
TRIGL SERPL-MCNC: 78 MG/DL
VLDLC SERPL CALC-MCNC: 15.6 MG/DL
WBC # BLD AUTO: 5 K/UL (ref 4.6–13.2)

## 2023-09-22 PROCEDURE — 82306 VITAMIN D 25 HYDROXY: CPT

## 2023-09-22 PROCEDURE — 83735 ASSAY OF MAGNESIUM: CPT

## 2023-09-22 PROCEDURE — 36415 COLL VENOUS BLD VENIPUNCTURE: CPT

## 2023-09-22 PROCEDURE — 80061 LIPID PANEL: CPT

## 2023-09-22 PROCEDURE — 85025 COMPLETE CBC W/AUTO DIFF WBC: CPT

## 2023-09-22 PROCEDURE — 97110 THERAPEUTIC EXERCISES: CPT

## 2023-09-22 PROCEDURE — 80053 COMPREHEN METABOLIC PANEL: CPT

## 2023-09-22 PROCEDURE — 97530 THERAPEUTIC ACTIVITIES: CPT

## 2023-09-22 NOTE — PROGRESS NOTES
instruct in home and community integration  to attain remaining goals. Progress toward goals / Updated goals:  Patient will demonstrate the ability to print her first name, last name, and date with no more than 4 errors in letter/number formation. Status at Eval: illegible, see note in chart  Status at PN (8/10/23): pt progressing, see note in chart. Patient will be able to hang and take down clothes off her clothes line with no more than Min A. Status at Eval: Max A  Status at PN (8/10/23): min A, continue to address for consistency. Status at PN (9/8/2023): Min A, Continue to address      Patient will increase right hand pinch strengths by 2# or more in order to be able to open clips for hanging up clothes  Status at Eval: 3pt= 6#; lateral= 7#; tip= 6#  Status at PN (8/10/23): 3pt= 8#; lateral= 7#; tip= 7#,goal met for 3pt pinch, progressing with tip pinch strength. Status at PN (9/8/2023):  3pt= 10#; lateral= 8 #; tip= 7#- Goal Met for 3pt pinch only      8. Patient will be familiar with adaptive strategies and devices for improved functional independence with home and self care. Status at PN (9/8/2023): Initiated      9. Using Bilateral UE/Hands, Patient will report improved ability to put earrings with backs on during grooming tasks due to improved proprioception, FM skills and decreased tremors. Status at PN (9/8/2023):  Unable to place backs      PLAN  [x]  Continue Plan of Care  []  Upgrade activities as tolerated    []  Discharge due to :    []  Other:      Therapist: BROOKE Kirk COTA/L     Date: 9/22/2023 Time: 9:55 AM     Future Appointments   Date Time Provider [address]   9/22/2023 12:40 PM BROOKE Kirk [facility]   9/26/2023  1:20 PM BROOKE Kirk [facility]   9/28/2023  2:40 PM BROOKE Kirk [facility]   10/3/2023  2:00 PM Rasheed [address] [facility]   10/5/2023 12:40 PM BROOKE Kirk [facility]   10/10/2023 10:40 AM

## 2023-09-26 ENCOUNTER — APPOINTMENT (OUTPATIENT)
Facility: HOSPITAL | Age: 83
End: 2023-09-26
Attending: INTERNAL MEDICINE
Payer: MEDICARE

## 2023-09-26 ENCOUNTER — HOSPITAL ENCOUNTER (OUTPATIENT)
Facility: HOSPITAL | Age: 83
Setting detail: RECURRING SERIES
Discharge: HOME | End: 2023-09-29
Attending: INTERNAL MEDICINE
Payer: MEDICARE

## 2023-09-26 PROCEDURE — 97530 THERAPEUTIC ACTIVITIES: CPT

## 2023-09-26 PROCEDURE — 97110 THERAPEUTIC EXERCISES: CPT

## 2023-09-26 PROCEDURE — 97535 SELF CARE MNGMENT TRAINING: CPT

## 2023-09-26 NOTE — PROGRESS NOTES
OCCUPATIONAL THERAPY - DAILY TREATMENT NOTE    Patient Name: Katherine Valencia    Date: 2023    : 1940  Insurance: Payor: MEDICARE / Plan: MEDICARE PART A AND B / Product Type: *No Product type* /      Patient  verified Yes     Visit #   Current / Total 5 18   Time   In / Out 120 200   Total Time  40   Pain   In / Out 4 3   Subjective Functional Status/Changes: I raked the ditch yesterday      TREATMENT AREA =  Other lack of coordination [R27.8]    OBJECTIVE    Therapeutic Procedures:   Tx Min Billable or 1:1 Min (if diff from Tx Min) Procedure, Rationale, Specifics   15  10475 Therapeutic Exercise (timed):  increase ROM, strength, coordination, and proprioception to improve  (see flow sheet as applicable)    Lg Med Soft Theraputty: Flatted with Larry hands using L Bar   1 in peg removal with gripper:  - Right 35# 25x each   - Left 35# 25x     15  76142 Therapeutic Activity (timed):  use of dynamic activities replicating functional movements to improve ability to manipulate small items  (see flow sheet as applicable)    1 in peg placement- Translated palm to digit- sets of 3  - Right 25x  - Left 25x    Nuts an Bolts- Extra small     10  [id number] Self Care/Home Management (timed):  improve patient knowledge and understanding of positioning and activity modification  to improve functional independence   (see flow sheet as applicable)    DONOVAN fabricated Adaptive aide out of splint material to assist with putting earring backs on     Pt able to demo use in clinic with table top mirror                          40 40 MC BC Totals Reminder: bill using total billable min of TIMED therapeutic procedures (example: do not include dry needle or estim unattended, both untimed codes, in totals to left)  8-22 min = 1 unit; 23-37 min = 2 units; 38-52 min = 3 units; 53-67 min = 4 units; 68-82 min = 5 units   Total Total               Billed concurrently with other treatments Patient Education:  Reviewed HEP

## 2023-09-27 ENCOUNTER — TELEPHONE (OUTPATIENT)
Age: 83
End: 2023-09-27

## 2023-09-27 NOTE — TELEPHONE ENCOUNTER
.Cardiac clearance faxed to [facility] for her right breast lumpectomy per Dr. Carl Crawford and confirmation was received.

## 2023-09-28 ENCOUNTER — HOSPITAL ENCOUNTER (OUTPATIENT)
Facility: HOSPITAL | Age: 83
Setting detail: RECURRING SERIES
End: 2023-09-28
Attending: INTERNAL MEDICINE
Payer: MEDICARE

## 2023-09-28 ENCOUNTER — APPOINTMENT (OUTPATIENT)
Facility: HOSPITAL | Age: 83
End: 2023-09-28
Attending: INTERNAL MEDICINE
Payer: MEDICARE

## 2023-09-28 PROCEDURE — 97530 THERAPEUTIC ACTIVITIES: CPT

## 2023-09-28 PROCEDURE — 97535 SELF CARE MNGMENT TRAINING: CPT

## 2023-09-28 PROCEDURE — 97110 THERAPEUTIC EXERCISES: CPT

## 2023-09-28 NOTE — PROGRESS NOTES
OCCUPATIONAL THERAPY - DAILY TREATMENT NOTE    Patient Name: Bouchra Metzger    Date: 2023    : 1940  Insurance: Payor: MEDICARE / Plan: MEDICARE PART A AND B / Product Type: *No Product type* /      Patient  verified Yes     Visit #   Current / Total 6 18   Time   In / Out 1440 1520   Total Treatment Time 40   Pain   In / Out 0 0   Subjective Functional Status/Changes: I couldn't use the gadget Yoselin made because I couldn't feel where it was behind my ear. TREATMENT AREA =  Other lack of coordination [R27.8]    OBJECTIVE      Therapeutic Procedures:   Tx Min Billable or 1:1 Min (if diff from Tx Min) Procedure, Rationale, Specifics   11  75727 Therapeutic Activity (timed):  use of dynamic activities replicating functional movements to manipulate small items (see flow sheet as applicable)    1 in peg placement- Translated palm to digit- sets of 3  - Right 25x  - Left 25x  Perfection     15  76711 Therapeutic Exercise (timed):  increase ROM, strength, coordination, and proprioception to improve  (see flow sheet as applicable)       Lg Med Soft Theraputty: Flatted with Larry hands using L Bar   1 in peg removal with gripper:  - Right 35# 25x each   - Left 35# 25x   red thera bar 3 way     14  17403 Self Care/Home Management (timed):  improve patient knowledge and understanding of positioning, posture/ergonomics, and activity modification  to improve IADL skills  (see flow sheet as applicable)    Activities for hand coordination HEP                  40  MC BC Totals Reminder: bill using total billable min of TIMED therapeutic procedures (example: do not include dry needle or estim unattended, both untimed codes, in totals to left)  8-22 min = 1 unit; 23-37 min = 2 units; 38-52 min = 3 units; 53-67 min = 4 units; 68-82 min = 5 units   Total Total         Billed concurrently with other treatments Patient Education:  Reviewed HEP  Activities for hand coordination HEP     Objective Information/Functional Measures/Assessment    Cueing to incorporate proximal stabilization during tasks. Pt to bring back adaptive aid for earrings for next appt. []  See Progress Note/Re certification     Patient will continue to benefit from skilled OT services to modify and progress therapeutic interventions, analyze and address ROM deficits, analyze and address strength deficits, analyze and address soft tissue restrictions, and analyze and cue for proper movement patterns  to attain remaining goals. Progress toward goals / Updated goals:    Patient will demonstrate the ability to print her first name, last name, and date with no more than 4 errors in letter/number formation. Status at Eval: illegible, see note in chart  Status at PN (8/10/23): pt progressing, see note in chart. Patient will be able to hang and take down clothes off her clothes line with no more than Min A. Status at Eval: Max A  Status at PN (8/10/23): min A, continue to address for consistency. Status at PN (9/8/2023): Min A, Continue to address   Current Status (9/26/2023): Goal Met     Patient will increase right hand pinch strengths by 2# or more in order to be able to open clips for hanging up clothes  Status at Eval: 3pt= 6#; lateral= 7#; tip= 6#  Status at PN (8/10/23): 3pt= 8#; lateral= 7#; tip= 7#,goal met for 3pt pinch, progressing with tip pinch strength. Status at PN (9/8/2023):  3pt= 10#; lateral= 8 #; tip= 7#- Goal Met for 3pt pinch only      8. Patient will be familiar with adaptive strategies and devices for improved functional independence with home and self care. Status at PN (9/8/2023): Initiated   Current Status (9/28/2023):Progressing       9. Using Bilateral UE/Hands, Patient will report improved ability to put earrings with backs on during grooming tasks due to improved proprioception, FM skills and decreased tremors. Status at PN (9/8/2023):  Unable to place backs      PLAN  [x]  Continue Plan

## 2023-10-03 ENCOUNTER — HOSPITAL ENCOUNTER (OUTPATIENT)
Facility: HOSPITAL | Age: 83
Setting detail: RECURRING SERIES
Discharge: HOME | End: 2023-10-06
Attending: INTERNAL MEDICINE
Payer: MEDICARE

## 2023-10-03 ENCOUNTER — APPOINTMENT (OUTPATIENT)
Facility: HOSPITAL | Age: 83
End: 2023-10-03
Attending: INTERNAL MEDICINE
Payer: MEDICARE

## 2023-10-03 PROCEDURE — 97110 THERAPEUTIC EXERCISES: CPT

## 2023-10-03 PROCEDURE — 97530 THERAPEUTIC ACTIVITIES: CPT

## 2023-10-03 NOTE — PROGRESS NOTES
OCCUPATIONAL THERAPY - DAILY TREATMENT NOTE    Patient Name: Andrzej Slade    Date: 10/3/2023    : 1940  Insurance: Payor: MEDICARE / Plan: MEDICARE PART A AND B / Product Type: *No Product type* /      Patient  verified Yes     Visit #   Current / Total 6 18   Time   In / Out 200 240   Total Treatment Time 40   Pain   In / Out 0 0   Subjective Functional Status/Changes: Sometimes the thumb is sore. TREATMENT AREA =  Other lack of coordination [R27.8]    OBJECTIVE      Heat (UNBILLED):  location/position: bilateral hands seated . Min Rationale   5 decrease pain to improve patient's ability to progress to PLOF and address remaining functional goals. Skin assessment post-treatment:   Redness, no adverse reactions     Therapeutic Procedures:   Tx Min Billable or 1:1 Min (if diff from Tx Min) Procedure, Rationale, Specifics   15  87921 Therapeutic Activity (timed):  use of dynamic activities replicating functional movements to manipulate small items (see flow sheet as applicable)    2/6\" pegs palm to digit with handful - left  1/4\" pegs digit>palm right  Dexterity balls cw/ccw 2 mins each way, bilateral   20  48312 Therapeutic Exercise (timed):  increase ROM, strength, coordination, and proprioception to improve  (see flow sheet as applicable)  Poker chips on velcro wall taking off chips bilaterally - simultaneously from lateral to medial with 2# wrist weights on bilateral wrists    Wrist well with 2# at end x3  Overhead pumps x20  Forward pumps x20                    35 35 MC BC Totals Reminder: bill using total billable min of TIMED therapeutic procedures (example: do not include dry needle or estim unattended, both untimed codes, in totals to left)  8-22 min = 1 unit; 23-37 min = 2 units; 38-52 min = 3 units; 53-67 min = 4 units; 68-82 min = 5 units   Total Total         Billed concurrently with other treatments      Objective Information/Functional Measures/Assessment    Pt provided

## 2023-10-05 ENCOUNTER — HOSPITAL ENCOUNTER (OUTPATIENT)
Facility: HOSPITAL | Age: 83
Discharge: HOME | End: 2023-10-05
Attending: SURGERY
Payer: MEDICARE

## 2023-10-05 ENCOUNTER — APPOINTMENT (OUTPATIENT)
Facility: HOSPITAL | Age: 83
End: 2023-10-05
Attending: INTERNAL MEDICINE
Payer: MEDICARE

## 2023-10-05 ENCOUNTER — HOSPITAL ENCOUNTER (OUTPATIENT)
Facility: HOSPITAL | Age: 83
Setting detail: RECURRING SERIES
Discharge: HOME | End: 2023-10-08
Attending: INTERNAL MEDICINE
Payer: MEDICARE

## 2023-10-05 DIAGNOSIS — R92.0 MAMMOGRAPHIC MICROCALCIFICATION: ICD-10-CM

## 2023-10-05 DIAGNOSIS — R92.8 ABNORMAL MAMMOGRAM: ICD-10-CM

## 2023-10-05 DIAGNOSIS — D05.11 DUCTAL CARCINOMA IN SITU (DCIS) OF RIGHT BREAST: ICD-10-CM

## 2023-10-05 PROCEDURE — 97535 SELF CARE MNGMENT TRAINING: CPT

## 2023-10-05 PROCEDURE — 2500000003 HC RX 250 WO HCPCS: Performed by: SURGERY

## 2023-10-05 PROCEDURE — 97530 THERAPEUTIC ACTIVITIES: CPT

## 2023-10-05 PROCEDURE — 97110 THERAPEUTIC EXERCISES: CPT

## 2023-10-05 PROCEDURE — 19281 PERQ DEVICE BREAST 1ST IMAG: CPT

## 2023-10-05 RX ORDER — LIDOCAINE HYDROCHLORIDE 10 MG/ML
10 INJECTION, SOLUTION EPIDURAL; INFILTRATION; INTRACAUDAL; PERINEURAL ONCE
Status: COMPLETED | OUTPATIENT
Start: 2023-10-05 | End: 2023-10-05

## 2023-10-05 RX ADMIN — LIDOCAINE HYDROCHLORIDE 10 ML: 10 INJECTION, SOLUTION EPIDURAL; INFILTRATION; INTRACAUDAL; PERINEURAL at 10:00

## 2023-10-06 RX ORDER — CARBIDOPA, LEVODOPA AND ENTACAPONE 37.5; 200; 15 MG/1; MG/1; MG/1
1 TABLET, FILM COATED ORAL 3 TIMES DAILY
COMMUNITY
Start: 2023-09-16

## 2023-10-06 NOTE — PERIOP NOTE
Instructions for your surgery at [address] [place] [address] Date:  10/6/2023      Patient's Name:  Sridhar Fernandez           Surgery Date:  10/13/2023              Please enter the main entrance of the hospital and check-[facility] the  [facility] Once checked in at the , you will take the elevators to the second floor, and report to the waiting room on the left. The room will say Procedure Registration. Do NOT eat or drink anything, including candy, gum, or ice chips after midnight prior to your surgery, unless you have specific instructions from your surgeon or anesthesia provider to do so. Brush your teeth before coming to the hospital. You may swish with water, but do not swallow. No smoking/Vaping/E-Cigarettes 24 hours prior to the day of surgery. No alcohol 24 hours prior to the day of surgery. No recreational drugs for one week prior to the day of surgery. Bring Photo ID, Insurance information, and Co-pay if required on day of surgery. Bring in pertinent legal documents, such as, Medical Power of [place], DNR, Advance Directive, etc.  Leave all valuables, including money/purse, at home. Remove all jewelry, including ALL body piercings, nail polish, acrylic nails, and makeup (including mascara); no lotions, powders, deodorant, or perfume/cologne/after shave on the skin. Follow instruction for Hibiclens washes and CHG wipes from surgeon's office. Glasses and dentures may be worn to the hospital. They must be removed prior to surgery. Please bring case/container for glasses or dentures. Contact lenses should not be worn on day of surgery. Call your doctor's office if symptoms of a cold or illness develop within 24-48 hours prior to your surgery. Call your doctor's office if you have any questions concerning insurance or co-pays. 15. AN ADULT (relative or friend 25 years or older) [address]   16. Please make

## 2023-10-10 ENCOUNTER — APPOINTMENT (OUTPATIENT)
Facility: HOSPITAL | Age: 83
End: 2023-10-10
Attending: INTERNAL MEDICINE
Payer: MEDICARE

## 2023-10-10 ENCOUNTER — HOSPITAL ENCOUNTER (OUTPATIENT)
Facility: HOSPITAL | Age: 83
Setting detail: RECURRING SERIES
Discharge: HOME | End: 2023-10-13
Attending: INTERNAL MEDICINE
Payer: MEDICARE

## 2023-10-10 PROCEDURE — 97530 THERAPEUTIC ACTIVITIES: CPT

## 2023-10-10 PROCEDURE — 97110 THERAPEUTIC EXERCISES: CPT

## 2023-10-10 NOTE — PROGRESS NOTES
increase right hand pinch strengths by 2# or more in order to be able to open clips for hanging up clothes   Status at PN (10/5/2023): 3pt= 10#; lateral= 8 #; tip= 7#- Scores unchanged since last PN     9. Using Bilateral UE/Hands, Patient will report improved ability to put earrings with backs on during grooming tasks due to improved proprioception, FM skills and decreased tremors. Status at PN (10/5/2023):  Improvement noted however task remains      PLAN  [x]  Continue Plan of Care  []  Upgrade activities as tolerated    []  Discharge due to :    []  Other:      Therapist: BROOKE Moseley COTA/L    Date: 10/10/2023 Time: 10:47 AM     Future Appointments   Date Time Provider [address]   10/12/2023 10:00 AM BROOKE Moseley [facility]   10/25/2023  9:15 AM Tiffanie Snowden DO [facility] BS AMB   10/30/2023  8:30 AM Bry Lawrence MD [facility]   12/11/2023  8:45 AM [facility] BS AMB   12/19/2023  9:30 AM Michael Ramirez MD [facility] BS AMB   1/11/2024  8:00 AM Dinesh Chatman MD [facility] BS AMB

## 2023-10-12 ENCOUNTER — ANESTHESIA EVENT (OUTPATIENT)
Facility: HOSPITAL | Age: 83
End: 2023-10-12
Payer: MEDICARE

## 2023-10-12 ENCOUNTER — HOSPITAL ENCOUNTER (OUTPATIENT)
Facility: HOSPITAL | Age: 83
Setting detail: RECURRING SERIES
Discharge: HOME | End: 2023-10-15
Attending: INTERNAL MEDICINE
Payer: MEDICARE

## 2023-10-12 ENCOUNTER — APPOINTMENT (OUTPATIENT)
Facility: HOSPITAL | Age: 83
End: 2023-10-12
Attending: INTERNAL MEDICINE
Payer: MEDICARE

## 2023-10-12 PROCEDURE — 97535 SELF CARE MNGMENT TRAINING: CPT

## 2023-10-12 PROCEDURE — 97110 THERAPEUTIC EXERCISES: CPT

## 2023-10-12 NOTE — PROGRESS NOTES
[facility] [place] [facility]  [address] [facility] [place]  [phone] fax    Occupational Therapy Discharge Summary    Patient name: Kaylynn Johnson Start of Care: 23   Referral source: Arlie Bence, MD : 1940   Medical/Treatment Diagnosis: Other lack of coordination [R27.8]  Payor: MEDICARE / Plan: MEDICARE PART A AND B / Product Type: *No Product type* /  Onset Date::\" Years Ago\"     Prior Hospitalization: see medical history Provider#: [id number]   Comorbidities: HBP, Stens in heart  Prior Level of Function:Knitting, sewing, has a cat, happy birthday on piano, caretaker for  with dementia  Visits from Start of Care:  20                                               Missed Visits: 1    Summary of Care:  Patient will increase right hand pinch strengths by 2# or more in order to be able to open clips for hanging up clothes   Status at PN (10/5/2023): 3pt= 10#; lateral= 8 #; tip= 7#- Scores unchanged since last PN  Status at Discharge: scores unchanged     9. Using Bilateral UE/Hands, Patient will report improved ability to put earrings with backs on during grooming tasks due to improved proprioception, FM skills and decreased tremors. Status at PN (10/5/2023): Improvement noted however task remains   Status at Discharge: Pt progressing, however still having difficulty. ASSESSMENT/Changes in Function: Pt requesting self-discharge d/t having scheduled lumpectomy. Pt advised to return if needed once she is fully recovered. Pt has been provided with adaptive techniques and education on adaptive equipment with trial. Pt's pinches remained unchanged since PN on 10/5/23. Pt made great progress as a result of skilled OT services. Pt provided with putty and resistive clips for home use as well as education on how to complete FM coordination tasks at home with household items. Pt to be discharged at this time.  Thank you for your referral.
Occupational Therapy Discharge Instructions      [facility] [place] [employer]  [address] [facility] [place]  [phone] fax    Patient: Km Garcia  : 1940      Continue Home Exercise Program 2 times per day for 4 weeks, then decrease to 3 times per week      Continue with    [] Ice  as needed 2 times per day     [x] Heat         Follow up with MD:     [] Upon completion of therapy     [] As needed    Recommendations:     [x]   Return to activity with home program    []   Return to activity with the following modifications:       []Post Rehab Program    []Join Independent aquatic program     []Return to/join local gym        Additional Comments:           Nel Woodson OT 10/12/2023 9:45 AM
putty and resistive clips for home use as well as education on how to complete FM coordination tasks at home with household items. Pt to be discharged at this time. Thank you for your referral.                []  See Progress Note/Re certification     Patient will continue to benefit from skilled OT services to modify and progress therapeutic interventions, analyze and address ROM deficits, analyze and address strength deficits, and instruct in home and community integration  to attain remaining goals. Progress toward goals / Updated goals:    Patient will increase right hand pinch strengths by 2# or more in order to be able to open clips for hanging up clothes   Status at PN (10/5/2023): 3pt= 10#; lateral= 8 #; tip= 7#- Scores unchanged since last PN  Status at Discharge: scores unchanged     9. Using Bilateral UE/Hands, Patient will report improved ability to put earrings with backs on during grooming tasks due to improved proprioception, FM skills and decreased tremors. Status at PN (10/5/2023): Improvement noted however task remains   Status at Discharge: Pt progressing, however still having difficulty.       PLAN  [x]  Continue Plan of Care  []  Upgrade activities as tolerated    []  Discharge due to :    []  Other:      Therapist: Khadar Gomez OT    Date: 10/12/2023 Time: 9:29 AM     Future Appointments   Date Time Provider [address]   10/12/2023 10:00 AM BROOKE Russell [facility]   10/25/2023  9:15 AM Annetta Estrada DO [facility] BS AMB   10/30/2023  8:30 AM Daisy Alarcon MD [facility]   12/11/2023  8:45 AM [facility] LAB VISIT [facility] BS AMB   12/19/2023  9:30 AM Misty Ferrell MD [facility] BS AMB   1/11/2024  8:00 AM Laisha Burt MD [facility] BS AMB

## 2023-10-13 ENCOUNTER — HOSPITAL ENCOUNTER (OUTPATIENT)
Facility: HOSPITAL | Age: 83
Setting detail: OUTPATIENT SURGERY
Discharge: HOME | End: 2023-10-13
Attending: SURGERY | Admitting: SURGERY
Payer: MEDICARE

## 2023-10-13 ENCOUNTER — ANESTHESIA (OUTPATIENT)
Facility: HOSPITAL | Age: 83
End: 2023-10-13
Payer: MEDICARE

## 2023-10-13 ENCOUNTER — APPOINTMENT (OUTPATIENT)
Facility: HOSPITAL | Age: 83
End: 2023-10-13
Attending: SURGERY
Payer: MEDICARE

## 2023-10-13 VITALS
DIASTOLIC BLOOD PRESSURE: 83 MMHG | WEIGHT: 125.6 LBS | OXYGEN SATURATION: 95 % | SYSTOLIC BLOOD PRESSURE: 178 MMHG | HEART RATE: 57 BPM | BODY MASS INDEX: 21.44 KG/M2 | RESPIRATION RATE: 17 BRPM | HEIGHT: 64 IN | TEMPERATURE: 98.1 F

## 2023-10-13 DIAGNOSIS — Z98.890 S/P LUMPECTOMY, RIGHT BREAST: Primary | ICD-10-CM

## 2023-10-13 DIAGNOSIS — R92.8 ABNORMAL MAMMOGRAM: ICD-10-CM

## 2023-10-13 DIAGNOSIS — D05.11 DUCTAL CARCINOMA IN SITU (DCIS) OF RIGHT BREAST: ICD-10-CM

## 2023-10-13 PROCEDURE — 7100000001 HC PACU RECOVERY - ADDTL 15 MIN: Performed by: SURGERY

## 2023-10-13 PROCEDURE — 3700000001 HC ADD 15 MINUTES (ANESTHESIA): Performed by: SURGERY

## 2023-10-13 PROCEDURE — 7100000010 HC PHASE II RECOVERY - FIRST 15 MIN: Performed by: SURGERY

## 2023-10-13 PROCEDURE — 6360000002 HC RX W HCPCS: Performed by: ANESTHESIOLOGY

## 2023-10-13 PROCEDURE — 6360000002 HC RX W HCPCS: Performed by: SURGERY

## 2023-10-13 PROCEDURE — 3700000000 HC ANESTHESIA ATTENDED CARE: Performed by: SURGERY

## 2023-10-13 PROCEDURE — 6370000000 HC RX 637 (ALT 250 FOR IP)

## 2023-10-13 PROCEDURE — 7100000000 HC PACU RECOVERY - FIRST 15 MIN: Performed by: SURGERY

## 2023-10-13 PROCEDURE — 7100000011 HC PHASE II RECOVERY - ADDTL 15 MIN: Performed by: SURGERY

## 2023-10-13 PROCEDURE — 3600000002 HC SURGERY LEVEL 2 BASE: Performed by: SURGERY

## 2023-10-13 PROCEDURE — 2500000003 HC RX 250 WO HCPCS: Performed by: SURGERY

## 2023-10-13 PROCEDURE — 6360000002 HC RX W HCPCS: Performed by: NURSE ANESTHETIST, CERTIFIED REGISTERED

## 2023-10-13 PROCEDURE — 88307 TISSUE EXAM BY PATHOLOGIST: CPT

## 2023-10-13 PROCEDURE — 6360000002 HC RX W HCPCS

## 2023-10-13 PROCEDURE — 19301 PARTIAL MASTECTOMY: CPT | Performed by: SURGERY

## 2023-10-13 PROCEDURE — 2580000003 HC RX 258: Performed by: NURSE ANESTHETIST, CERTIFIED REGISTERED

## 2023-10-13 PROCEDURE — 2580000003 HC RX 258: Performed by: SURGERY

## 2023-10-13 PROCEDURE — 2709999900 HC NON-CHARGEABLE SUPPLY: Performed by: SURGERY

## 2023-10-13 PROCEDURE — 2720000010 HC SURG SUPPLY STERILE: Performed by: SURGERY

## 2023-10-13 PROCEDURE — 76098 X-RAY EXAM SURGICAL SPECIMEN: CPT

## 2023-10-13 PROCEDURE — 3600000012 HC SURGERY LEVEL 2 ADDTL 15MIN: Performed by: SURGERY

## 2023-10-13 PROCEDURE — 2500000003 HC RX 250 WO HCPCS

## 2023-10-13 RX ORDER — FAMOTIDINE 20 MG/1
20 TABLET, FILM COATED ORAL ONCE
Status: COMPLETED | OUTPATIENT
Start: 2023-10-14 | End: 2023-10-13

## 2023-10-13 RX ORDER — ONDANSETRON 2 MG/ML
INJECTION INTRAMUSCULAR; INTRAVENOUS PRN
Status: DISCONTINUED | OUTPATIENT
Start: 2023-10-13 | End: 2023-10-13 | Stop reason: SDUPTHER

## 2023-10-13 RX ORDER — SODIUM CHLORIDE, SODIUM LACTATE, POTASSIUM CHLORIDE, CALCIUM CHLORIDE 600; 310; 30; 20 MG/100ML; MG/100ML; MG/100ML; MG/100ML
INJECTION, SOLUTION INTRAVENOUS CONTINUOUS
Status: DISCONTINUED | OUTPATIENT
Start: 2023-10-13 | End: 2023-10-13 | Stop reason: HOSPADM

## 2023-10-13 RX ORDER — PROPOFOL 10 MG/ML
INJECTION, EMULSION INTRAVENOUS PRN
Status: DISCONTINUED | OUTPATIENT
Start: 2023-10-13 | End: 2023-10-13 | Stop reason: SDUPTHER

## 2023-10-13 RX ORDER — LIDOCAINE HYDROCHLORIDE 10 MG/ML
1 INJECTION, SOLUTION EPIDURAL; INFILTRATION; INTRACAUDAL; PERINEURAL
Status: DISCONTINUED | OUTPATIENT
Start: 2023-10-13 | End: 2023-10-13 | Stop reason: HOSPADM

## 2023-10-13 RX ORDER — FENTANYL CITRATE 50 UG/ML
25 INJECTION, SOLUTION INTRAMUSCULAR; INTRAVENOUS EVERY 5 MIN PRN
Status: COMPLETED | OUTPATIENT
Start: 2023-10-13 | End: 2023-10-13

## 2023-10-13 RX ORDER — SODIUM CHLORIDE 9 MG/ML
INJECTION, SOLUTION INTRAVENOUS PRN
Status: DISCONTINUED | OUTPATIENT
Start: 2023-10-13 | End: 2023-10-13 | Stop reason: HOSPADM

## 2023-10-13 RX ORDER — FAMOTIDINE 20 MG/1
TABLET, FILM COATED ORAL
Status: COMPLETED
Start: 2023-10-13 | End: 2023-10-13

## 2023-10-13 RX ORDER — SODIUM CHLORIDE 0.9 % (FLUSH) 0.9 %
5-40 SYRINGE (ML) INJECTION PRN
Status: DISCONTINUED | OUTPATIENT
Start: 2023-10-13 | End: 2023-10-13 | Stop reason: HOSPADM

## 2023-10-13 RX ORDER — FENTANYL CITRATE 50 UG/ML
25 INJECTION, SOLUTION INTRAMUSCULAR; INTRAVENOUS EVERY 5 MIN PRN
Status: DISCONTINUED | OUTPATIENT
Start: 2023-10-13 | End: 2023-10-13 | Stop reason: HOSPADM

## 2023-10-13 RX ORDER — SODIUM CHLORIDE 0.9 % (FLUSH) 0.9 %
5-40 SYRINGE (ML) INJECTION EVERY 12 HOURS SCHEDULED
Status: DISCONTINUED | OUTPATIENT
Start: 2023-10-13 | End: 2023-10-13 | Stop reason: HOSPADM

## 2023-10-13 RX ORDER — HYDROCODONE BITARTRATE AND ACETAMINOPHEN 5; 325 MG/1; MG/1
1 TABLET ORAL EVERY 6 HOURS PRN
Qty: 12 TABLET | Refills: 0 | Status: SHIPPED | OUTPATIENT
Start: 2023-10-13 | End: 2023-10-16

## 2023-10-13 RX ORDER — LIDOCAINE HYDROCHLORIDE 20 MG/ML
INJECTION, SOLUTION EPIDURAL; INFILTRATION; INTRACAUDAL; PERINEURAL PRN
Status: DISCONTINUED | OUTPATIENT
Start: 2023-10-13 | End: 2023-10-13 | Stop reason: SDUPTHER

## 2023-10-13 RX ORDER — DEXAMETHASONE SODIUM PHOSPHATE 4 MG/ML
INJECTION, SOLUTION INTRA-ARTICULAR; INTRALESIONAL; INTRAMUSCULAR; INTRAVENOUS; SOFT TISSUE PRN
Status: DISCONTINUED | OUTPATIENT
Start: 2023-10-13 | End: 2023-10-13 | Stop reason: SDUPTHER

## 2023-10-13 RX ORDER — FENTANYL CITRATE 50 UG/ML
INJECTION, SOLUTION INTRAMUSCULAR; INTRAVENOUS PRN
Status: DISCONTINUED | OUTPATIENT
Start: 2023-10-13 | End: 2023-10-13 | Stop reason: SDUPTHER

## 2023-10-13 RX ORDER — HYDROMORPHONE HYDROCHLORIDE 2 MG/ML
0.5 INJECTION, SOLUTION INTRAMUSCULAR; INTRAVENOUS; SUBCUTANEOUS EVERY 5 MIN PRN
Status: COMPLETED | OUTPATIENT
Start: 2023-10-13 | End: 2023-10-13

## 2023-10-13 RX ORDER — METOCLOPRAMIDE HYDROCHLORIDE 5 MG/ML
10 INJECTION INTRAMUSCULAR; INTRAVENOUS
Status: DISCONTINUED | OUTPATIENT
Start: 2023-10-13 | End: 2023-10-13 | Stop reason: HOSPADM

## 2023-10-13 RX ADMIN — LIDOCAINE HYDROCHLORIDE 60 MG: 20 INJECTION, SOLUTION EPIDURAL; INFILTRATION; INTRACAUDAL; PERINEURAL at 07:30

## 2023-10-13 RX ADMIN — ONDANSETRON 4 MG: 2 INJECTION INTRAMUSCULAR; INTRAVENOUS at 07:40

## 2023-10-13 RX ADMIN — FENTANYL CITRATE 25 MCG: 50 INJECTION INTRAMUSCULAR; INTRAVENOUS at 07:28

## 2023-10-13 RX ADMIN — FENTANYL CITRATE 25 MCG: 50 INJECTION INTRAMUSCULAR; INTRAVENOUS at 07:34

## 2023-10-13 RX ADMIN — FAMOTIDINE 20 MG: 20 TABLET, FILM COATED ORAL at 06:35

## 2023-10-13 RX ADMIN — DEXAMETHASONE SODIUM PHOSPHATE 8 MG: 4 INJECTION, SOLUTION INTRAMUSCULAR; INTRAVENOUS at 07:40

## 2023-10-13 RX ADMIN — PROPOFOL 80 MG: 10 INJECTION, EMULSION INTRAVENOUS at 07:30

## 2023-10-13 RX ADMIN — FENTANYL CITRATE 25 MCG: 50 INJECTION INTRAMUSCULAR; INTRAVENOUS at 08:17

## 2023-10-13 RX ADMIN — FENTANYL CITRATE 25 MCG: 50 INJECTION INTRAMUSCULAR; INTRAVENOUS at 08:22

## 2023-10-13 RX ADMIN — HYDROMORPHONE HYDROCHLORIDE 0.5 MG: 2 INJECTION INTRAMUSCULAR; INTRAVENOUS; SUBCUTANEOUS at 08:43

## 2023-10-13 RX ADMIN — SODIUM CHLORIDE, POTASSIUM CHLORIDE, SODIUM LACTATE AND CALCIUM CHLORIDE: 600; 310; 30; 20 INJECTION, SOLUTION INTRAVENOUS at 06:30

## 2023-10-13 RX ADMIN — HYDROMORPHONE HYDROCHLORIDE 0.5 MG: 2 INJECTION INTRAMUSCULAR; INTRAVENOUS; SUBCUTANEOUS at 08:55

## 2023-10-13 RX ADMIN — VANCOMYCIN HYDROCHLORIDE 1000 MG: 1 INJECTION, POWDER, LYOPHILIZED, FOR SOLUTION INTRAVENOUS at 06:31

## 2023-10-13 ASSESSMENT — PAIN - FUNCTIONAL ASSESSMENT
PAIN_FUNCTIONAL_ASSESSMENT: ACTIVITIES ARE NOT PREVENTED
PAIN_FUNCTIONAL_ASSESSMENT: 0-10
PAIN_FUNCTIONAL_ASSESSMENT: ACTIVITIES ARE NOT PREVENTED

## 2023-10-13 ASSESSMENT — PAIN SCALES - GENERAL
PAINLEVEL_OUTOF10: 7
PAINLEVEL_OUTOF10: 5
PAINLEVEL_OUTOF10: 6
PAINLEVEL_OUTOF10: 6
PAINLEVEL_OUTOF10: 7

## 2023-10-13 ASSESSMENT — PAIN DESCRIPTION - ORIENTATION
ORIENTATION: RIGHT

## 2023-10-13 ASSESSMENT — PAIN DESCRIPTION - PAIN TYPE
TYPE: SURGICAL PAIN

## 2023-10-13 ASSESSMENT — PAIN DESCRIPTION - LOCATION
LOCATION: BREAST

## 2023-10-13 ASSESSMENT — PAIN DESCRIPTION - DESCRIPTORS
DESCRIPTORS: TENDER;SORE
DESCRIPTORS: SORE;TENDER
DESCRIPTORS: TENDER;SORE

## 2023-10-13 ASSESSMENT — PAIN SCALES - WONG BAKER
WONGBAKER_NUMERICALRESPONSE: 0
WONGBAKER_NUMERICALRESPONSE: 0

## 2023-10-13 NOTE — ANESTHESIA POSTPROCEDURE EVALUATION
Department of Anesthesiology  Postprocedure Note    Patient: Samuel Millan  MRN: [id number]  YOB: 1940  Date of evaluation: 10/13/2023      Procedure Summary     Date: 10/13/23 Room / Location: [address] [place] [address] 01 / [facility] MAIN OR    Anesthesia Start: 0721 Anesthesia Stop: 2951    Procedure: RIGHT BREAST LOCALIZED LUMPECTOMY (Right: Breast) Diagnosis:       Breast neoplasm, Tis (DCIS), right      (Breast neoplasm, Tis (DCIS), right [D05.11])    Surgeons: Sharmila Viera DO Responsible Provider: Glenn Hernandez MD    Anesthesia Type: general ASA Status: 3          Anesthesia Type: No value filed.     Magdiel Phase I: Magdiel Score: 9    Magdiel Phase II: Magdiel Score: 10      Anesthesia Post Evaluation    Patient location during evaluation: bedside  Airway patency: patent  Complications: no  Cardiovascular status: hemodynamically stable  Respiratory status: acceptable  Hydration status: stable  Pain management: adequate

## 2023-10-13 NOTE — OP NOTE
Right breast localized lumpectomy    Patient Name: Urszula Kiran    SURGERY DATE: 10/13/23    : 1940    AGE: 78 y.o. Surgeon: Emma Whitt DO    Anesthesiologist: Anesthesiologist: Monica Bosworth., MD  CRNA: Re Leon APRN - CRNA  SRNA: Chavo Mccullough RN    Anesthesia: General    Surgical Assist: Circulator: Khurram Dwyer RN  Surgical Assistant: Lemuel Fu  Scrub Person First: Yun Perales  Circulator Assist: Sara Lacy RN    PreOp DX: Breast neoplasm, Tis (DCIS), right [D05.11]    PostOp DX: same    Procedure Details: After informed consent was obtained the patient was taken to the operating room and placed in the supine position. General anesthesia was administered by the anesthetist to titrate to effect. The right breast was prepped and draped in the usual sterile fashion and a time out procedure was performed. Next the localizer was used to identify the MELODY  in the breast laterally. Local anesthesia was used to anesthetize the skin. Using a 15 blade scalpel a horizontal incision was made on the breast laterally. Using the localizer probe to guide dissection bovie was used to dissect through the breast tissue  to the shortest distance identified by the probe. An lyssa clamped was then used to grasp the breast tissue and probe confirmed the MELODY within the specimen. Circumferential dissection was performed. The specimen was then marked single short superior, single long lateral, double long anterior and sent of the field. Faxitron was used to confirm the MELODY and clip were in the specimen with good margin. The wound was irrigated and suctioned dry and found to be hemostatic. Clips were placed in the bed of the wound. The deep breast tissue was closed with 2-0 vicryl suture. The skin was then re -approximated with 3-0 vicryl in a simple interrupted fashion and skin was closed with 4-0 monocryl in a subcuticular fashion. Dermabond dressing was applied.

## 2023-10-13 NOTE — INTERVAL H&P NOTE
Update History & Physical    The patient's History and Physical of 10/13/2023 was reviewed with the patient and I examined the patient. There was no change. The surgical site was confirmed by the patient and me. Plan: The risks, benefits, expected outcome, and alternative to the recommended procedure have been discussed with the patient. Patient understands and wants to proceed with the procedure.      Electronically signed by Liam Francis DO on 10/13/2023 at 6:39 AM

## 2023-10-13 NOTE — DISCHARGE INSTRUCTIONS
Post Operative Discharge Instructions    No driving for 24 hours after surgery and off of prescription pain medication. Avoid activities that bump or cause jarring movements at the surgical site for 10 days. No lifting more than 15 pounds for 2 weeks after surgery or until cleared for activity at your follow up. Walking is encouraged after surgery. Stairs are ok to climb. DIET:    Diet as tolerated. Start with liquids then advance your diet based on how you fell. No alcoholic beverages for 24 hours after surgery or while on antibiotics or pain mdications. Drink plenty of water. MEDICATIONS:    Use daily stool softners (over the counter such as Colace or Senekot) while on pain medications. Resume pre-operative medications. If you are on any blood thinners see special instructions below. Use prescriptions given or Tylenol, Ibuprofen as needed for pain. Do not use more than 4000mg of Tylenol (acetaminophen) per day. Be aware this may be  in your prescription medication as well. Be aware narcotic prescriptions are tightly controlled in the [facility]. If requiring more than one refill, a follow up appointment will be required. WOUND CARE:      You have skin glue on your incision, you may shower in 24 hours and pat dry. Glue will fall off on it's own. Wear your surgical bra or own supportive bra at all times until cleared at your follow up    Do not tub bathe, swim, or soak incisions until cleared to do so at your follow up. Ice bag to the affected area; 20 minutes on and 20 minutes off if desired. FOLLOW UP CARE:    You should have an appointment scheduled within 14 days after surgery. If this is not yet scheduled, call the office.   Any forms that you need filled out regarding your medical care can be brought to the office at follow up appointment of faxed to: [address] IF:  Temperature is over 101 degrees, a slight fever can be normal 24-48 hour after surgery. Nausea & vomiting that persists more than 24 hours after surgery. Your wound appears very red, hot, painful or swollen. Excessive bleeding occurs form the incision. *Between the hours 9-5 Monday-Friday please call the office at [phone].   If you do not receive a call back the same day, please do not hesitate to call my cell phone at [phone]    *If there is a medical emergency please go to the nearest emergency room immediately and do not hesitate to call my cell phone    [phone], after hours please call my cell phone

## 2023-10-13 NOTE — PERIOP NOTE
Patient Mert Carbajal has been informed that [facility] is not responsible for patient belongings per policy and the signed [facility] Patient Agreement document. Personal items should be sent home or checked in with security. Patient Mert Carbajal selected the following action:                            [x]  Send personal items home with a family member or friend                                                 []  Check in personal items with security, excluding clothing                            []  Maintain personal items at the bedside, against recommendation                                 by [facility]                                   ** If patient Mariely El chooses to maintain personal items at the bedside,                                      Complete the patient belongings inventory in the EMR.

## 2023-10-25 ENCOUNTER — OFFICE VISIT (OUTPATIENT)
Age: 83
End: 2023-10-25

## 2023-10-25 VITALS
TEMPERATURE: 97.3 F | RESPIRATION RATE: 14 BRPM | HEART RATE: 60 BPM | WEIGHT: 127 LBS | DIASTOLIC BLOOD PRESSURE: 71 MMHG | OXYGEN SATURATION: 98 % | HEIGHT: 64 IN | SYSTOLIC BLOOD PRESSURE: 157 MMHG | BODY MASS INDEX: 21.68 KG/M2

## 2023-10-25 DIAGNOSIS — Z98.890 S/P LUMPECTOMY, RIGHT BREAST: ICD-10-CM

## 2023-10-25 DIAGNOSIS — D05.11 DUCTAL CARCINOMA IN SITU (DCIS) OF RIGHT BREAST: Primary | ICD-10-CM

## 2023-10-25 PROCEDURE — 99024 POSTOP FOLLOW-UP VISIT: CPT | Performed by: SURGERY

## 2023-10-25 NOTE — PROGRESS NOTES
Bouchra Metzger is a 78 y.o. female (: 1940)   Chief Complaint   Patient presents with    Post-Op Check     Right breast lumpectomy 10/13/2023       Medication list and allergies have been reviewed with Bouchra Metzger and updated as of today's date. I have gone over all Medical, Surgical and Social History with Bouchra Metzger and updated/added the information accordingly. 1. Have you been to the ER, urgent care clinic since your last visit? Hospitalized since your last visit? No    2. Have you seen or consulted any other health care providers outside of the [address] [place] [address] since your last visit? Include any pap smears or colon screening.  No
intact         I have reviewed the information entered by the clinical staff and/or patient and verified it as accurate or edited where necessary.      Electronically signed by:    Abraham Henry DO, MPH

## 2023-10-30 ENCOUNTER — HOSPITAL ENCOUNTER (OUTPATIENT)
Facility: HOSPITAL | Age: 83
Setting detail: RECURRING SERIES
Discharge: HOME | End: 2023-11-02
Payer: MEDICARE

## 2023-10-30 PROCEDURE — 99205 OFFICE O/P NEW HI 60 MIN: CPT | Performed by: RADIOLOGY

## 2023-10-30 PROCEDURE — 99214 OFFICE O/P EST MOD 30 MIN: CPT

## 2023-11-28 RX ORDER — POTASSIUM CHLORIDE 20 MEQ/1
TABLET, EXTENDED RELEASE ORAL
Qty: 25 TABLET | Refills: 3 | Status: SHIPPED | OUTPATIENT
Start: 2023-11-28

## 2023-11-28 NOTE — TELEPHONE ENCOUNTER
PCP: Alanna Bella MD    LAST REFILL PER CHART:  potassium chloride (K-DUR, KLOR-CON M20) 20 mEq tablet 25 Tablet 3 11/2/2022    Sig: TAKE ONE-HALF (1/2) TABLET EVERY OTHER DAY   Sent to pharmacy as: potassium chloride ER 20 mEq tablet,extended release(part/cryst) (K-DUR, KLOR-CON M20)   E-Prescribing Status: Receipt confirmed by pharmacy (11/2/2022  8:06 AM EDT)    Future Appointments   Date Time Provider [address] [place] [address]   12/11/2023  8:45 AM [facility] LAB VISIT [facility] BS AMB   12/19/2023  9:30 AM Alanna Bella MD [facility] BS AMB   1/11/2024  8:00 AM Florian Turpin MD [facility] BS AMB   1/29/2024  9:00 AM Pérez Cambpell DO [facility] BS AMB

## 2023-12-07 DIAGNOSIS — E55.9 VITAMIN D DEFICIENCY: ICD-10-CM

## 2023-12-07 DIAGNOSIS — I25.10 CAD S/P PERCUTANEOUS CORONARY ANGIOPLASTY: ICD-10-CM

## 2023-12-07 DIAGNOSIS — I10 ESSENTIAL HYPERTENSION: Primary | ICD-10-CM

## 2023-12-07 DIAGNOSIS — Z98.61 CAD S/P PERCUTANEOUS CORONARY ANGIOPLASTY: ICD-10-CM

## 2023-12-07 DIAGNOSIS — E78.5 DYSLIPIDEMIA: ICD-10-CM

## 2023-12-11 ENCOUNTER — HOSPITAL ENCOUNTER (OUTPATIENT)
Facility: HOSPITAL | Age: 83
Setting detail: SPECIMEN
Discharge: HOME | End: 2023-12-14
Payer: MEDICARE

## 2023-12-11 DIAGNOSIS — I10 ESSENTIAL HYPERTENSION: ICD-10-CM

## 2023-12-11 DIAGNOSIS — E55.9 VITAMIN D DEFICIENCY: ICD-10-CM

## 2023-12-11 DIAGNOSIS — E78.5 DYSLIPIDEMIA: ICD-10-CM

## 2023-12-11 LAB
25(OH)D3 SERPL-MCNC: 52.6 NG/ML (ref 30–100)
ALBUMIN SERPL-MCNC: 3.7 G/DL (ref 3.4–5)
ALBUMIN/GLOB SERPL: 1.4 (ref 0.8–1.7)
ALP SERPL-CCNC: 84 U/L (ref 45–117)
ALT SERPL-CCNC: 20 U/L (ref 13–56)
ANION GAP SERPL CALC-SCNC: 0 MMOL/L (ref 3–18)
AST SERPL-CCNC: 22 U/L (ref 10–38)
BASOPHILS # BLD: 0 K/UL (ref 0–0.1)
BASOPHILS NFR BLD: 1 % (ref 0–2)
BILIRUB SERPL-MCNC: 0.4 MG/DL (ref 0.2–1)
BUN SERPL-MCNC: 16 MG/DL (ref 7–18)
BUN/CREAT SERPL: 23 (ref 12–20)
CALCIUM SERPL-MCNC: 9.1 MG/DL (ref 8.5–10.1)
CHLORIDE SERPL-SCNC: 108 MMOL/L (ref 100–111)
CHOLEST SERPL-MCNC: 151 MG/DL
CO2 SERPL-SCNC: 33 MMOL/L (ref 21–32)
CREAT SERPL-MCNC: 0.71 MG/DL (ref 0.6–1.3)
DIFFERENTIAL METHOD BLD: ABNORMAL
EOSINOPHIL # BLD: 0.1 K/UL (ref 0–0.4)
EOSINOPHIL NFR BLD: 2 % (ref 0–5)
ERYTHROCYTE [DISTWIDTH] IN BLOOD BY AUTOMATED COUNT: 12.5 % (ref 11.6–14.5)
GLOBULIN SER CALC-MCNC: 2.7 G/DL (ref 2–4)
GLUCOSE SERPL-MCNC: 98 MG/DL (ref 74–99)
HCT VFR BLD AUTO: 42 % (ref 35–45)
HDLC SERPL-MCNC: 68 MG/DL (ref 40–60)
HDLC SERPL: 2.2 (ref 0–5)
HGB BLD-MCNC: 13.3 G/DL (ref 12–16)
IMM GRANULOCYTES # BLD AUTO: 0.1 K/UL (ref 0–0.04)
IMM GRANULOCYTES NFR BLD AUTO: 1 % (ref 0–0.5)
LDLC SERPL CALC-MCNC: 61.2 MG/DL (ref 0–100)
LIPID PANEL: ABNORMAL
LYMPHOCYTES # BLD: 1.5 K/UL (ref 0.9–3.6)
LYMPHOCYTES NFR BLD: 25 % (ref 21–52)
MCH RBC QN AUTO: 31 PG (ref 24–34)
MCHC RBC AUTO-ENTMCNC: 31.7 G/DL (ref 31–37)
MCV RBC AUTO: 97.9 FL (ref 78–100)
MONOCYTES # BLD: 0.6 K/UL (ref 0.05–1.2)
MONOCYTES NFR BLD: 9 % (ref 3–10)
NEUTS SEG # BLD: 3.6 K/UL (ref 1.8–8)
NEUTS SEG NFR BLD: 61 % (ref 40–73)
NRBC # BLD: 0 K/UL (ref 0–0.01)
NRBC BLD-RTO: 0 PER 100 WBC
PLATELET # BLD AUTO: 233 K/UL (ref 135–420)
PMV BLD AUTO: 10.2 FL (ref 9.2–11.8)
POTASSIUM SERPL-SCNC: 3.8 MMOL/L (ref 3.5–5.5)
PROT SERPL-MCNC: 6.4 G/DL (ref 6.4–8.2)
RBC # BLD AUTO: 4.29 M/UL (ref 4.2–5.3)
SODIUM SERPL-SCNC: 141 MMOL/L (ref 136–145)
TRIGL SERPL-MCNC: 109 MG/DL
VLDLC SERPL CALC-MCNC: 21.8 MG/DL
WBC # BLD AUTO: 5.9 K/UL (ref 4.6–13.2)

## 2023-12-11 PROCEDURE — 36415 COLL VENOUS BLD VENIPUNCTURE: CPT

## 2023-12-11 PROCEDURE — 80061 LIPID PANEL: CPT

## 2023-12-11 PROCEDURE — 82306 VITAMIN D 25 HYDROXY: CPT

## 2023-12-11 PROCEDURE — 85025 COMPLETE CBC W/AUTO DIFF WBC: CPT

## 2023-12-11 PROCEDURE — 80053 COMPREHEN METABOLIC PANEL: CPT

## 2023-12-12 RX ORDER — LISINOPRIL 10 MG/1
10 TABLET ORAL DAILY
Qty: 90 TABLET | Refills: 3 | Status: SHIPPED | OUTPATIENT
Start: 2023-12-12

## 2023-12-12 NOTE — TELEPHONE ENCOUNTER
PCP: Teri Valdes MD    LAST REFILL PER CHART:  Medication:isinopril (PRINIVIL;ZESTRIL) 10 MG tablet   Ordered On:12/15/2022  Instructions:Take 1 tablet by mouth nightly   Dispense:90 tablets  Refills:3      Future Appointments   Date Time Provider Department Center   12/19/2023  9:30 AM Teri Valdes MD [facility] BS AMB   1/11/2024  8:00 AM Kirk Shrestha MD [facility] BS AMB   1/29/2024  9:00 AM Tasha Gould DO [facility] BS AMB

## 2023-12-23 PROBLEM — Z17.0 MALIGNANT NEOPLASM OF RIGHT BREAST IN FEMALE, ESTROGEN RECEPTOR POSITIVE (HCC): Status: ACTIVE | Noted: 2023-12-23

## 2023-12-23 PROBLEM — R01.1 HEART MURMUR, SYSTOLIC: Status: ACTIVE | Noted: 2023-12-23

## 2023-12-23 PROBLEM — C50.411 MALIGNANT NEOPLASM OF UPPER-OUTER QUADRANT OF RIGHT BREAST IN FEMALE, ESTROGEN RECEPTOR POSITIVE (HCC): Status: ACTIVE | Noted: 2023-12-23

## 2023-12-23 PROBLEM — C50.911 MALIGNANT NEOPLASM OF RIGHT BREAST IN FEMALE, ESTROGEN RECEPTOR POSITIVE (HCC): Status: ACTIVE | Noted: 2023-12-23

## 2024-01-11 ENCOUNTER — OFFICE VISIT (OUTPATIENT)
Age: 84
End: 2024-01-11

## 2024-01-11 VITALS
OXYGEN SATURATION: 94 % | HEIGHT: 64 IN | HEART RATE: 57 BPM | BODY MASS INDEX: 21.85 KG/M2 | SYSTOLIC BLOOD PRESSURE: 128 MMHG | WEIGHT: 128 LBS | DIASTOLIC BLOOD PRESSURE: 74 MMHG

## 2024-01-11 DIAGNOSIS — Z98.61 CORONARY ANGIOPLASTY STATUS: ICD-10-CM

## 2024-01-11 DIAGNOSIS — R01.1 MURMUR: ICD-10-CM

## 2024-01-11 DIAGNOSIS — E78.5 HYPERLIPIDEMIA, UNSPECIFIED HYPERLIPIDEMIA TYPE: ICD-10-CM

## 2024-01-11 DIAGNOSIS — I25.10 ATHEROSCLEROSIS OF NATIVE CORONARY ARTERY OF NATIVE HEART WITHOUT ANGINA PECTORIS: Primary | ICD-10-CM

## 2024-01-11 DIAGNOSIS — I10 ESSENTIAL (PRIMARY) HYPERTENSION: ICD-10-CM

## 2024-01-11 PROBLEM — D05.11 INTRADUCTAL CARCINOMA IN SITU OF RIGHT BREAST: Status: ACTIVE | Noted: 2024-01-11

## 2024-01-11 RX ORDER — RASAGILINE 0.5 MG/1
0.5 TABLET ORAL EVERY MORNING
COMMUNITY
Start: 2023-12-26

## 2024-01-11 ASSESSMENT — ANXIETY QUESTIONNAIRES
1. FEELING NERVOUS, ANXIOUS, OR ON EDGE: 0
3. WORRYING TOO MUCH ABOUT DIFFERENT THINGS: 0
5. BEING SO RESTLESS THAT IT IS HARD TO SIT STILL: 0
2. NOT BEING ABLE TO STOP OR CONTROL WORRYING: 0
GAD7 TOTAL SCORE: 0
4. TROUBLE RELAXING: 0
7. FEELING AFRAID AS IF SOMETHING AWFUL MIGHT HAPPEN: 0
6. BECOMING EASILY ANNOYED OR IRRITABLE: 0

## 2024-01-11 ASSESSMENT — ENCOUNTER SYMPTOMS
COUGH: 0
SORE THROAT: 0
NAUSEA: 0
SHORTNESS OF BREATH: 0
VOMITING: 0
ABDOMINAL DISTENTION: 0
ABDOMINAL PAIN: 0

## 2024-01-11 ASSESSMENT — PATIENT HEALTH QUESTIONNAIRE - PHQ9
2. FEELING DOWN, DEPRESSED OR HOPELESS: 0
SUM OF ALL RESPONSES TO PHQ QUESTIONS 1-9: 0
1. LITTLE INTEREST OR PLEASURE IN DOING THINGS: 0
SUM OF ALL RESPONSES TO PHQ QUESTIONS 1-9: 0
SUM OF ALL RESPONSES TO PHQ QUESTIONS 1-9: 0
SUM OF ALL RESPONSES TO PHQ9 QUESTIONS 1 & 2: 0
SUM OF ALL RESPONSES TO PHQ QUESTIONS 1-9: 0

## 2024-01-11 NOTE — PROGRESS NOTES
01/11/24     Lashae Emerson  is a 83 y.o. female     Chief Complaint   Patient presents with    Follow-up     1 year       HPI  Patient presents for scheduled follow-up office visit.  She has a past medical history significant for single-vessel coronary artery disease status post PCI to her mid LAD with a total of 2 drug-eluting stents in 2011.  Patient presents with symptoms of unstable angina at that time.  It should be noted that she did have classic exertional anginal symptoms leading up to her PCI that were likely unrecognized.  She reports symptoms of substernal chest pain radiating to her left arm and jaw prior to her PCI.  Since her procedure, she has had no recurrent symptoms.    She underwent a repeat pharmacologic nuclear stress test in December 2022 which was a normal low risk study.  There was no evidence of ischemia or infarction, EF greater than 75%.    The patient was last seen in our office 1 year ago.  Since last year, she has been feeling well.  No new chest pain, shortness of breath or leg swelling.  No major change in her activity tolerance.    Past Medical History:   Diagnosis Date    Basal cell cancer     CAD (coronary artery disease), native coronary artery 03/21/2011    s/p PCI with with ART (Xience 2.75x12, 2.5x12) mLAD and mild disease in rest of coronaries. Midly depressed LV syst fct     Dyslipidemia     Hypertension     Parkinson's disease      Current Outpatient Medications   Medication Sig Dispense Refill    rasagiline (AZILECT) 0.5 MG TABS Take 1 tablet by mouth every morning      alendronate (FOSAMAX) 35 MG tablet Take 1 tablet by mouth every 7 days 4 tablet 1    lisinopril (PRINIVIL;ZESTRIL) 10 MG tablet TAKE 1 TABLET DAILY 90 tablet 3    potassium chloride (KLOR-CON M) 20 MEQ extended release tablet TAKE ONE-HALF (1/2) TABLET EVERY OTHER DAY 25 tablet 3    carbidopa-levodopa-entacapone (STALEVO 150) 37.5-150-200 MG Take 1 tablet by mouth 3 times daily      metoprolol

## 2024-01-11 NOTE — PROGRESS NOTES
Lashae Rg Emerson presents today for   Chief Complaint   Patient presents with    Follow-up     1 year       Lashaesally Emerson preferred language for health care discussion is english/other.    Is someone accompanying this pt? no    Is the patient using any DME equipment during OV? no    Depression Screening:  Depression: Not at risk (1/11/2024)    PHQ-2     PHQ-2 Score: 0        Learning Assessment:  Who is the primary learner? Patient    What is the preferred language for health care of the primary learner? ENGLISH    How does the primary learner prefer to learn new concepts? DEMONSTRATION    Answered By patient    Relationship to Learner SELF           Pt currently taking Anticoagulant therapy? no    Pt currently taking Antiplatelet therapy ? no      Coordination of Care:  1. Have you been to the ER, urgent care clinic since your last visit? Hospitalized since your last visit? no    2. Have you seen or consulted any other health care providers outside of the [facility] since your last visit? Include any pap smears or colon screening. no

## 2024-01-29 ENCOUNTER — OFFICE VISIT (OUTPATIENT)
Age: 84
End: 2024-01-29
Payer: MEDICARE

## 2024-01-29 VITALS
RESPIRATION RATE: 14 BRPM | DIASTOLIC BLOOD PRESSURE: 70 MMHG | BODY MASS INDEX: 21.75 KG/M2 | OXYGEN SATURATION: 97 % | HEIGHT: 64 IN | HEART RATE: 62 BPM | TEMPERATURE: 97.2 F | SYSTOLIC BLOOD PRESSURE: 110 MMHG | WEIGHT: 127.4 LBS

## 2024-01-29 DIAGNOSIS — D05.11 DUCTAL CARCINOMA IN SITU (DCIS) OF RIGHT BREAST: Primary | ICD-10-CM

## 2024-01-29 DIAGNOSIS — Z98.890 S/P LUMPECTOMY, RIGHT BREAST: ICD-10-CM

## 2024-01-29 PROCEDURE — 3078F DIAST BP <80 MM HG: CPT | Performed by: SURGERY

## 2024-01-29 PROCEDURE — 1123F ACP DISCUSS/DSCN MKR DOCD: CPT | Performed by: SURGERY

## 2024-01-29 PROCEDURE — G8484 FLU IMMUNIZE NO ADMIN: HCPCS | Performed by: SURGERY

## 2024-01-29 PROCEDURE — G8399 PT W/DXA RESULTS DOCUMENT: HCPCS | Performed by: SURGERY

## 2024-01-29 PROCEDURE — 3074F SYST BP LT 130 MM HG: CPT | Performed by: SURGERY

## 2024-01-29 PROCEDURE — 1090F PRES/ABSN URINE INCON ASSESS: CPT | Performed by: SURGERY

## 2024-01-29 PROCEDURE — G8427 DOCREV CUR MEDS BY ELIG CLIN: HCPCS | Performed by: SURGERY

## 2024-01-29 PROCEDURE — 99214 OFFICE O/P EST MOD 30 MIN: CPT | Performed by: SURGERY

## 2024-01-29 PROCEDURE — G8420 CALC BMI NORM PARAMETERS: HCPCS | Performed by: SURGERY

## 2024-01-29 PROCEDURE — 1036F TOBACCO NON-USER: CPT | Performed by: SURGERY

## 2024-01-29 NOTE — PROGRESS NOTES
Lashae Emerson is a 83 y.o. female (: 1940)     Chief Complaint   Patient presents with    Follow-up     Right breast        Medication list and allergies have been reviewed with Lashae Emerson and updated as of today's date.     I have gone over all Medical, Surgical and Social History with Lashae Rg Emerson and updated/added the information accordingly.

## 2024-01-30 ASSESSMENT — ENCOUNTER SYMPTOMS
CHEST TIGHTNESS: 0
SHORTNESS OF BREATH: 0

## 2024-01-30 NOTE — PROGRESS NOTES
CC:   Chief Complaint   Patient presents with    Follow-up     Right breast         Assessment:    ICD-10-CM    1. Ductal carcinoma in situ (DCIS) of right breast  D05.11       2. S/P lumpectomy, right breast  Z98.890           Plan: I encouraged her to continue with self breast exams monthly and notify us of any changes to her breasts including new lumps, skin changes, pain, or nipple discharge. She is due for diagnostic mammogram of the the right 2024 which we will order for her. I would like to see her back after she completes mammogram or sooner should she have any questions or concerns. She agrees with this plan. She will continue to follow up with oncology.         HPI:  Ms. Lashae Emerson is a 83 year old woman with right breast DCIS, ER/NJ positive, grade 2. The area of concern was identified on screening mammogram.  She has no palpable mass. She has no nipple discharge. She has no breast pain. There is no family history of breast cancer, colon or ovarian cancer. Her mother  of pancreatic cancer age 81. On 10/13/2023 she underwent right breast localized lumpectomy with no residual DCIS noted, negative margins. She is doing well today with no complaints. She reports no changes to self breast exams. She met with radiation oncology and they both agreed radiation was not necessary.     Allergies:  Allergies   Allergen Reactions    Cephalexin Other (See Comments)     Yeast infection    Penicillins Other (See Comments)     Yeast infection       Medication Review:  Current Outpatient Medications on File Prior to Visit   Medication Sig Dispense Refill    rasagiline (AZILECT) 0.5 MG TABS Take 1 tablet by mouth every morning      alendronate (FOSAMAX) 35 MG tablet Take 1 tablet by mouth every 7 days 4 tablet 1    lisinopril (PRINIVIL;ZESTRIL) 10 MG tablet TAKE 1 TABLET DAILY 90 tablet 3    potassium chloride (KLOR-CON M) 20 MEQ extended release tablet TAKE ONE-HALF (1/2) TABLET EVERY OTHER DAY 25

## 2024-02-05 RX ORDER — ROSUVASTATIN CALCIUM 10 MG/1
TABLET, COATED ORAL
Qty: 30 TABLET | Refills: 3 | Status: SHIPPED | OUTPATIENT
Start: 2024-02-05

## 2024-02-15 ENCOUNTER — TELEPHONE (OUTPATIENT)
Age: 84
End: 2024-02-15

## 2024-02-15 NOTE — TELEPHONE ENCOUNTER
Called and spoke with patient.  Reports significant rhinorrhea, nasal congestion, postnasal drainage, and cough for 3 days and home rapid antigen COVID-19 testing positive.  Denies any fever or dyspnea.  Will treat with Paxlovid.  Medications reviewed and advised to hold rosuvastatin during the 5 days of treatment.  Normal renal function with last creatinine 0.71/eGFR > 60.  Aware of possible side effects.  Advised to present to the ED for any worsening symptoms.    Prescription for Paxlovid sent to [facility].

## 2024-02-15 NOTE — TELEPHONE ENCOUNTER
Patient called in stating that she has had a head cold since Monday. She took a covid test today and it came back positive. She would like to know if she can have paxlovid sent to the pharmacy.Please Advise

## 2024-03-13 RX ORDER — METOPROLOL SUCCINATE 50 MG/1
50 TABLET, EXTENDED RELEASE ORAL DAILY
Qty: 90 TABLET | Refills: 3 | Status: SHIPPED | OUTPATIENT
Start: 2024-03-13

## 2024-03-20 ENCOUNTER — APPOINTMENT (OUTPATIENT)
Facility: HOSPITAL | Age: 84
End: 2024-03-20
Payer: MEDICARE

## 2024-03-20 ENCOUNTER — HOSPITAL ENCOUNTER (EMERGENCY)
Facility: HOSPITAL | Age: 84
Discharge: HOME | End: 2024-03-20
Attending: EMERGENCY MEDICINE
Payer: MEDICARE

## 2024-03-20 VITALS
DIASTOLIC BLOOD PRESSURE: 115 MMHG | BODY MASS INDEX: 19.66 KG/M2 | HEART RATE: 59 BPM | SYSTOLIC BLOOD PRESSURE: 156 MMHG | WEIGHT: 118 LBS | TEMPERATURE: 98.2 F | HEIGHT: 65 IN | OXYGEN SATURATION: 98 % | RESPIRATION RATE: 16 BRPM

## 2024-03-20 DIAGNOSIS — I10 HYPERTENSION, UNSPECIFIED TYPE: ICD-10-CM

## 2024-03-20 DIAGNOSIS — R42 DIZZINESS: Primary | ICD-10-CM

## 2024-03-20 LAB
ALBUMIN SERPL-MCNC: 3.6 G/DL (ref 3.4–5)
ALBUMIN/GLOB SERPL: 1.1 (ref 0.8–1.7)
ALP SERPL-CCNC: 89 U/L (ref 45–117)
ALT SERPL-CCNC: 17 U/L (ref 13–56)
ANION GAP SERPL CALC-SCNC: 5 MMOL/L (ref 3–18)
APPEARANCE UR: CLEAR
AST SERPL-CCNC: 54 U/L (ref 10–38)
BACTERIA URNS QL MICRO: NEGATIVE /HPF
BASOPHILS # BLD: 0 K/UL (ref 0–0.1)
BASOPHILS NFR BLD: 1 % (ref 0–2)
BILIRUB SERPL-MCNC: 0.4 MG/DL (ref 0.2–1)
BILIRUB UR QL: NEGATIVE
BUN SERPL-MCNC: 20 MG/DL (ref 7–18)
BUN/CREAT SERPL: 25 (ref 12–20)
CALCIUM SERPL-MCNC: 9.2 MG/DL (ref 8.5–10.1)
CHLORIDE SERPL-SCNC: 109 MMOL/L (ref 100–111)
CO2 SERPL-SCNC: 27 MMOL/L (ref 21–32)
COLOR UR: YELLOW
CREAT SERPL-MCNC: 0.79 MG/DL (ref 0.6–1.3)
DIFFERENTIAL METHOD BLD: NORMAL
EKG ATRIAL RATE: 60 BPM
EKG DIAGNOSIS: NORMAL
EKG P AXIS: 63 DEGREES
EKG P-R INTERVAL: 158 MS
EKG Q-T INTERVAL: 458 MS
EKG QRS DURATION: 94 MS
EKG QTC CALCULATION (BAZETT): 458 MS
EKG R AXIS: 14 DEGREES
EKG T AXIS: 21 DEGREES
EKG VENTRICULAR RATE: 60 BPM
EOSINOPHIL # BLD: 0.1 K/UL (ref 0–0.4)
EOSINOPHIL NFR BLD: 2 % (ref 0–5)
EPITH CASTS URNS QL MICRO: NORMAL /LPF (ref 0–5)
ERYTHROCYTE [DISTWIDTH] IN BLOOD BY AUTOMATED COUNT: 12.6 % (ref 11.6–14.5)
GLOBULIN SER CALC-MCNC: 3.3 G/DL (ref 2–4)
GLUCOSE BLD STRIP.AUTO-MCNC: 130 MG/DL (ref 70–110)
GLUCOSE SERPL-MCNC: 123 MG/DL (ref 74–99)
GLUCOSE UR STRIP.AUTO-MCNC: NEGATIVE MG/DL
HCT VFR BLD AUTO: 41.3 % (ref 35–45)
HGB BLD-MCNC: 14 G/DL (ref 12–16)
HGB UR QL STRIP: NEGATIVE
IMM GRANULOCYTES # BLD AUTO: 0 K/UL (ref 0–0.04)
IMM GRANULOCYTES NFR BLD AUTO: 0 % (ref 0–0.5)
KETONES UR QL STRIP.AUTO: NEGATIVE MG/DL
LEUKOCYTE ESTERASE UR QL STRIP.AUTO: ABNORMAL
LYMPHOCYTES # BLD: 1.7 K/UL (ref 0.9–3.6)
LYMPHOCYTES NFR BLD: 24 % (ref 21–52)
MAGNESIUM SERPL-MCNC: 2.2 MG/DL (ref 1.6–2.6)
MCH RBC QN AUTO: 31.9 PG (ref 24–34)
MCHC RBC AUTO-ENTMCNC: 33.9 G/DL (ref 31–37)
MCV RBC AUTO: 94.1 FL (ref 78–100)
MONOCYTES # BLD: 0.6 K/UL (ref 0.05–1.2)
MONOCYTES NFR BLD: 9 % (ref 3–10)
NEUTS SEG # BLD: 4.6 K/UL (ref 1.8–8)
NEUTS SEG NFR BLD: 65 % (ref 40–73)
NITRITE UR QL STRIP.AUTO: NEGATIVE
NRBC # BLD: 0 K/UL (ref 0–0.01)
NRBC BLD-RTO: 0 PER 100 WBC
NT PRO BNP: 409 PG/ML (ref 0–1800)
PH UR STRIP: 7 (ref 5–8)
PLATELET # BLD AUTO: 235 K/UL (ref 135–420)
PMV BLD AUTO: 9.9 FL (ref 9.2–11.8)
POTASSIUM SERPL-SCNC: 5.3 MMOL/L (ref 3.5–5.5)
PROT SERPL-MCNC: 6.9 G/DL (ref 6.4–8.2)
PROT UR STRIP-MCNC: NEGATIVE MG/DL
RBC # BLD AUTO: 4.39 M/UL (ref 4.2–5.3)
RBC #/AREA URNS HPF: NEGATIVE /HPF (ref 0–5)
SODIUM SERPL-SCNC: 141 MMOL/L (ref 136–145)
SP GR UR REFRACTOMETRY: 1.02 (ref 1–1.03)
TROPONIN I SERPL HS-MCNC: 8 NG/L (ref 0–54)
UROBILINOGEN UR QL STRIP.AUTO: 0.2 EU/DL (ref 0.2–1)
WBC # BLD AUTO: 7.1 K/UL (ref 4.6–13.2)
WBC URNS QL MICRO: NORMAL /HPF (ref 0–4)

## 2024-03-20 PROCEDURE — 70450 CT HEAD/BRAIN W/O DYE: CPT

## 2024-03-20 PROCEDURE — 96374 THER/PROPH/DIAG INJ IV PUSH: CPT

## 2024-03-20 PROCEDURE — 6360000002 HC RX W HCPCS: Performed by: EMERGENCY MEDICINE

## 2024-03-20 PROCEDURE — 93005 ELECTROCARDIOGRAM TRACING: CPT | Performed by: EMERGENCY MEDICINE

## 2024-03-20 PROCEDURE — 96361 HYDRATE IV INFUSION ADD-ON: CPT

## 2024-03-20 PROCEDURE — 6370000000 HC RX 637 (ALT 250 FOR IP): Performed by: EMERGENCY MEDICINE

## 2024-03-20 PROCEDURE — 93010 ELECTROCARDIOGRAM REPORT: CPT | Performed by: INTERNAL MEDICINE

## 2024-03-20 PROCEDURE — 84484 ASSAY OF TROPONIN QUANT: CPT

## 2024-03-20 PROCEDURE — 81001 URINALYSIS AUTO W/SCOPE: CPT

## 2024-03-20 PROCEDURE — 83735 ASSAY OF MAGNESIUM: CPT

## 2024-03-20 PROCEDURE — 82962 GLUCOSE BLOOD TEST: CPT

## 2024-03-20 PROCEDURE — 85025 COMPLETE CBC W/AUTO DIFF WBC: CPT

## 2024-03-20 PROCEDURE — 71045 X-RAY EXAM CHEST 1 VIEW: CPT

## 2024-03-20 PROCEDURE — 83880 ASSAY OF NATRIURETIC PEPTIDE: CPT

## 2024-03-20 PROCEDURE — 2580000003 HC RX 258: Performed by: EMERGENCY MEDICINE

## 2024-03-20 PROCEDURE — 6360000004 HC RX CONTRAST MEDICATION: Performed by: EMERGENCY MEDICINE

## 2024-03-20 PROCEDURE — 99285 EMERGENCY DEPT VISIT HI MDM: CPT

## 2024-03-20 PROCEDURE — 70498 CT ANGIOGRAPHY NECK: CPT

## 2024-03-20 PROCEDURE — 80053 COMPREHEN METABOLIC PANEL: CPT

## 2024-03-20 RX ORDER — FERROUS SULFATE 7.5 MG/0.5
15 SYRINGE (EA) ORAL DAILY
COMMUNITY

## 2024-03-20 RX ORDER — ONDANSETRON 2 MG/ML
4 INJECTION INTRAMUSCULAR; INTRAVENOUS ONCE
Status: COMPLETED | OUTPATIENT
Start: 2024-03-20 | End: 2024-03-20

## 2024-03-20 RX ORDER — CLONIDINE HYDROCHLORIDE 0.1 MG/1
0.1 TABLET ORAL
Status: COMPLETED | OUTPATIENT
Start: 2024-03-20 | End: 2024-03-20

## 2024-03-20 RX ORDER — MECLIZINE HYDROCHLORIDE 25 MG/1
25 TABLET ORAL 3 TIMES DAILY PRN
Qty: 15 TABLET | Refills: 0 | Status: SHIPPED | OUTPATIENT
Start: 2024-03-20 | End: 2024-03-25

## 2024-03-20 RX ORDER — MECLIZINE HCL 12.5 MG/1
25 TABLET ORAL
Status: COMPLETED | OUTPATIENT
Start: 2024-03-20 | End: 2024-03-20

## 2024-03-20 RX ORDER — LISINOPRIL 5 MG/1
10 TABLET ORAL
Status: COMPLETED | OUTPATIENT
Start: 2024-03-20 | End: 2024-03-20

## 2024-03-20 RX ORDER — 0.9 % SODIUM CHLORIDE 0.9 %
1000 INTRAVENOUS SOLUTION INTRAVENOUS ONCE
Status: COMPLETED | OUTPATIENT
Start: 2024-03-20 | End: 2024-03-20

## 2024-03-20 RX ADMIN — SODIUM CHLORIDE 1000 ML: 9 INJECTION, SOLUTION INTRAVENOUS at 14:18

## 2024-03-20 RX ADMIN — ONDANSETRON 4 MG: 2 INJECTION INTRAMUSCULAR; INTRAVENOUS at 14:18

## 2024-03-20 RX ADMIN — IOPAMIDOL 80 ML: 755 INJECTION, SOLUTION INTRAVENOUS at 15:37

## 2024-03-20 RX ADMIN — LISINOPRIL 10 MG: 5 TABLET ORAL at 18:08

## 2024-03-20 RX ADMIN — CLONIDINE HYDROCHLORIDE 0.1 MG: 0.1 TABLET ORAL at 18:51

## 2024-03-20 RX ADMIN — MECLIZINE 25 MG: 12.5 TABLET ORAL at 14:18

## 2024-03-20 ASSESSMENT — PAIN - FUNCTIONAL ASSESSMENT: PAIN_FUNCTIONAL_ASSESSMENT: 0-10

## 2024-03-20 ASSESSMENT — PAIN SCALES - GENERAL: PAINLEVEL_OUTOF10: 0

## 2024-03-20 ASSESSMENT — LIFESTYLE VARIABLES
HOW OFTEN DO YOU HAVE A DRINK CONTAINING ALCOHOL: NEVER
HOW MANY STANDARD DRINKS CONTAINING ALCOHOL DO YOU HAVE ON A TYPICAL DAY: PATIENT DOES NOT DRINK

## 2024-03-20 NOTE — DISCHARGE INSTRUCTIONS
You can try the meclizine for mild dizziness, if your symptoms worsen or you cannot walk or you develop severe symptoms or numbness, tingling weakness or loss of coordination in your arms legs or face or any other symptoms, please return promptly.    Call your primary care doctor to discuss further management of your blood pressure over the long-term.         Thank you!  Thank you for allowing me to care for you in the emergency department. It is my goal to provide you with excellent care.  Please fill out the survey that will come to you by mail or email since we listen to your feedback!     Below you will find a list of your tests from today's visit.  Should you have any questions, please do not hesitate to call the emergency department.    Labs  Recent Results (from the past 12 hour(s))   EKG 12 Lead    Collection Time: 03/20/24  1:49 PM   Result Value Ref Range    Ventricular Rate 60 BPM    Atrial Rate 60 BPM    P-R Interval 158 ms    QRS Duration 94 ms    Q-T Interval 458 ms    QTc Calculation (Bazett) 458 ms    P Axis 63 degrees    R Axis 14 degrees    T Axis 21 degrees    Diagnosis       Normal sinus rhythm  Possible Left atrial enlargement  Nonspecific ST abnormality  Abnormal ECG  When compared with ECG of 06-DEC-2022 08:15,  Criteria for Septal infarct are no longer present  Confirmed by Fahad Valles MD, ----- ([id number]) on 3/20/2024 2:45:37 PM     POCT Glucose    Collection Time: 03/20/24  1:56 PM   Result Value Ref Range    POC Glucose 130 (H) 70 - 110 mg/dL   CBC with Auto Differential    Collection Time: 03/20/24  2:00 PM   Result Value Ref Range    WBC 7.1 4.6 - 13.2 K/uL    RBC 4.39 4.20 - 5.30 M/uL    Hemoglobin 14.0 12.0 - 16.0 g/dL    Hematocrit 41.3 35.0 - 45.0 %    MCV 94.1 78.0 - 100.0 FL    MCH 31.9 24.0 - 34.0 PG    MCHC 33.9 31.0 - 37.0 g/dL    RDW 12.6 11.6 - 14.5 %    Platelets 235 135 - 420 K/uL    MPV 9.9 9.2 - 11.8 FL    Nucleated RBCs 0.0 0  WBC    nRBC 0.00 0.00 - 0.01 K/uL

## 2024-03-20 NOTE — ED PROVIDER NOTES
out.  They are advised to immediately return with any new symptoms or worsening of current condition. Any Incidental findings were noted on the patient's discharge paperwork as well as verbally directly to the patient, and the appropriate follow-up was given to the patient as far as instructions on testing needed as well as the timeframe.  All questions have been answered.  Patient is given educational material regarding their diagnoses, including danger symptoms and when to return to the ED.       Diagnosis     Clinical Impression:   1. Dizziness    2. Hypertension, unspecified type        PATIENT REFERRED TO:  Teri Valdes MD  [address]  [address]  [place] [address] [phone]    In 2 days  Call tomorrow to arrange a follow-up visit to discuss her blood pressure control.    [facility] EMERGENCY DEPT  [address] [address] [phone]    As needed, If symptoms worsen      DISCHARGE MEDICATIONS:  Discharge Medication List as of 3/20/2024  7:40 PM        START taking these medications    Details   meclizine (ANTIVERT) 25 MG tablet Take 1 tablet by mouth 3 times daily as needed for Dizziness, Disp-15 tablet, R-0Normal             Attestations:    Oskar Brumfield DO    Please note that this dictation was completed with [employer], the computer voice recognition software.  Quite often unanticipated grammatical, syntax, homophones, and other interpretive errors are inadvertently transcribed by the computer software.  Please disregard these errors.  Please excuse any errors that have escaped final proofreading.  Thank you.         Oskar Brumfield DO  03/21/24 2354

## 2024-03-20 NOTE — ED TRIAGE NOTES
Pt c/o dizziness that started when she woke up this morning. States it feels like she is spinning. Also states her BP was high earlier.  Denies any other symptoms

## 2024-03-21 ENCOUNTER — TELEPHONE (OUTPATIENT)
Age: 84
End: 2024-03-21

## 2024-03-21 NOTE — TELEPHONE ENCOUNTER
----- Message from Destiney Goldberg sent at 3/21/2024  8:51 AM EDT -----  Subject: Appointment Request    Reason for Call: Established Patient Appointment needed: Routine ED Follow   Up Visit    QUESTIONS    Reason for appointment request? No appointments available during search     Additional Information for Provider? high bp   ---------------------------------------------------------------------------  --------------  CALL BACK INFO  [phone]; OK to leave message on voicemail  ---------------------------------------------------------------------------  --------------  SCRIPT ANSWERS

## 2024-03-22 RX ORDER — ALENDRONATE SODIUM 35 MG/1
35 TABLET ORAL
Qty: 12 TABLET | Refills: 3 | Status: SHIPPED | OUTPATIENT
Start: 2024-03-22

## 2024-03-26 ENCOUNTER — OFFICE VISIT (OUTPATIENT)
Age: 84
End: 2024-03-26
Payer: MEDICARE

## 2024-03-26 VITALS
HEIGHT: 65 IN | SYSTOLIC BLOOD PRESSURE: 148 MMHG | TEMPERATURE: 98.8 F | OXYGEN SATURATION: 99 % | BODY MASS INDEX: 20.83 KG/M2 | HEART RATE: 60 BPM | DIASTOLIC BLOOD PRESSURE: 78 MMHG | RESPIRATION RATE: 16 BRPM | WEIGHT: 125 LBS

## 2024-03-26 DIAGNOSIS — I10 ELEVATED BLOOD PRESSURE READING WITH DIAGNOSIS OF HYPERTENSION: Primary | ICD-10-CM

## 2024-03-26 DIAGNOSIS — I35.0 AORTIC STENOSIS, MILD: ICD-10-CM

## 2024-03-26 DIAGNOSIS — Z71.89 ACP (ADVANCE CARE PLANNING): ICD-10-CM

## 2024-03-26 DIAGNOSIS — R74.01 ELEVATED AST (SGOT): ICD-10-CM

## 2024-03-26 DIAGNOSIS — G20.B2 PARKINSON'S DISEASE WITH DYSKINESIA AND FLUCTUATING MANIFESTATIONS (HCC): ICD-10-CM

## 2024-03-26 DIAGNOSIS — C50.411 MALIGNANT NEOPLASM OF UPPER-OUTER QUADRANT OF RIGHT BREAST IN FEMALE, ESTROGEN RECEPTOR POSITIVE (HCC): ICD-10-CM

## 2024-03-26 DIAGNOSIS — J30.89 NON-SEASONAL ALLERGIC RHINITIS, UNSPECIFIED TRIGGER: ICD-10-CM

## 2024-03-26 DIAGNOSIS — E78.00 PURE HYPERCHOLESTEROLEMIA: ICD-10-CM

## 2024-03-26 DIAGNOSIS — Z17.0 MALIGNANT NEOPLASM OF UPPER-OUTER QUADRANT OF RIGHT BREAST IN FEMALE, ESTROGEN RECEPTOR POSITIVE (HCC): ICD-10-CM

## 2024-03-26 DIAGNOSIS — I10 ESSENTIAL HYPERTENSION: ICD-10-CM

## 2024-03-26 DIAGNOSIS — H81.10 BENIGN PAROXYSMAL POSITIONAL VERTIGO, UNSPECIFIED LATERALITY: ICD-10-CM

## 2024-03-26 DIAGNOSIS — D05.11 INTRADUCTAL CARCINOMA IN SITU OF RIGHT BREAST: ICD-10-CM

## 2024-03-26 DIAGNOSIS — Z00.00 MEDICARE ANNUAL WELLNESS VISIT, SUBSEQUENT: ICD-10-CM

## 2024-03-26 PROCEDURE — 99211 OFF/OP EST MAY X REQ PHY/QHP: CPT | Performed by: INTERNAL MEDICINE

## 2024-03-26 RX ORDER — LISINOPRIL 10 MG/1
20 TABLET ORAL DAILY
Qty: 1 TABLET | Refills: 0
Start: 2024-03-26

## 2024-03-26 RX ORDER — MECLIZINE HYDROCHLORIDE 25 MG/1
25 TABLET ORAL 3 TIMES DAILY PRN
COMMUNITY

## 2024-03-26 ASSESSMENT — LIFESTYLE VARIABLES
HOW MANY STANDARD DRINKS CONTAINING ALCOHOL DO YOU HAVE ON A TYPICAL DAY: PATIENT DOES NOT DRINK
HOW OFTEN DO YOU HAVE A DRINK CONTAINING ALCOHOL: NEVER

## 2024-03-26 ASSESSMENT — PATIENT HEALTH QUESTIONNAIRE - PHQ9
SUM OF ALL RESPONSES TO PHQ QUESTIONS 1-9: 0
2. FEELING DOWN, DEPRESSED OR HOPELESS: NOT AT ALL
SUM OF ALL RESPONSES TO PHQ9 QUESTIONS 1 & 2: 0
1. LITTLE INTEREST OR PLEASURE IN DOING THINGS: NOT AT ALL
SUM OF ALL RESPONSES TO PHQ QUESTIONS 1-9: 0

## 2024-03-26 NOTE — PATIENT INSTRUCTIONS
do to protect your heart. It is never too late to quit. Try to avoid secondhand smoke too.     Stay at a weight that's healthy for you. Talk to your doctor if you need help losing weight.     Try to get 7 to 9 hours of sleep each night.     Limit alcohol to 2 drinks a day for men and 1 drink a day for women. Too much alcohol can cause health problems.     Manage other health problems such as diabetes, high blood pressure, and high cholesterol. If you think you may have a problem with alcohol or drug use, talk to your doctor.   Medicines    Take your medicines exactly as prescribed. Call your doctor if you think you are having a problem with your medicine.     If your doctor recommends aspirin, take the amount directed each day. Make sure you take aspirin and not another kind of pain reliever, such as acetaminophen (Tylenol).   When should you call for help?   Call 911 if you have symptoms of a heart attack. These may include:    Chest pain or pressure, or a strange feeling in the chest.     Sweating.     Shortness of breath.     Pain, pressure, or a strange feeling in the back, neck, jaw, or upper belly or in one or both shoulders or arms.     Lightheadedness or sudden weakness.     A fast or irregular heartbeat.   After you call 911, the  may tell you to chew 1 adult-strength or 2 to 4 low-dose aspirin. Wait for an ambulance. Do not try to drive yourself.  Watch closely for changes in your health, and be sure to contact your doctor if you have any problems.  Where can you learn more?  Go to https://www.[employer].net/patientEd and enter F075 to learn more about \"A Healthy Heart: Care Instructions.\"  Current as of: June 24, 2023               Content Version: 14.0  © 4672-0639 [employer].   Care instructions adapted under license by [employer]. If you have questions about a medical condition or this instruction, always ask your healthcare professional. [employer] disclaims any

## 2024-03-27 NOTE — PROGRESS NOTES
Lashae Emerson presents today for   Chief Complaint   Patient presents with    Medicare AWV       \"Have you been to the ER, urgent care clinic since your last visit?  Hospitalized since your last visit?\"    YES, went to [facility] on 3/20/2024 for hypertension    “Have you seen or consulted any other health care providers outside of [facility] since your last visit?”    NO            
difficulty but appears to be compensating with writing things down.  Not wishing any further evaluation.  Followed by Dr. Garza for Parkinson's disease.                            Objective   Vitals:    03/26/24 1327 03/26/24 1352   BP: (!) 171/84 (!) 148/78   Pulse: 60    Resp: 16    Temp: 98.8 °F (37.1 °C)    TempSrc: Temporal    SpO2: 99%    Weight: 56.7 kg (125 lb)    Height: 1.651 m (5' 5\")       Body mass index is 20.8 kg/m².     See office progress note for details.         Allergies   Allergen Reactions    Cephalexin Other (See Comments)     Yeast infection    Penicillins Other (See Comments)     Yeast infection     Prior to Visit Medications    Medication Sig Taking? Authorizing Provider   meclizine (ANTIVERT) 25 MG tablet Take 1 tablet by mouth 3 times daily as needed Yes Luciano Bansal MD   lisinopril (PRINIVIL;ZESTRIL) 10 MG tablet Take 2 tablets by mouth daily Yes Teri Valdes MD   alendronate (FOSAMAX) 35 MG tablet TAKE 1 TABLET EVERY 7 DAYS Yes Teri Valdes MD   ferrous sulfate (SUHAS-IN-SOL) 75 (15 Fe) MG/ML solution Take 1 mL by mouth daily Yes ProviderLuciano MD   metoprolol succinate (TOPROL XL) 50 MG extended release tablet TAKE 1 TABLET DAILY Yes Teri Valdes MD   rosuvastatin (CRESTOR) 10 MG tablet TAKE ONE-HALF (1/2) TABLET EVERY OTHER DAY Yes Teri Valdes MD   rasagiline (AZILECT) 0.5 MG TABS Take 1 tablet by mouth every morning Yes Luciano Bansal MD   potassium chloride (KLOR-CON M) 20 MEQ extended release tablet TAKE ONE-HALF (1/2) TABLET EVERY OTHER DAY Yes Teri Valdes MD   carbidopa-levodopa-entacapone (STALEVO 150) 37.5-150-200 MG Take 1 tablet by mouth 3 times daily Yes Luciano Bansal MD   acetaminophen (TYLENOL) 650 MG extended release tablet Take 1 tablet by mouth every 6 hours as needed Yes Automatic Reconciliation, Ar   carbidopa-levodopa (SINEMET CR)  MG per extended release tablet Take 1 tablet by mouth nightly Yes 
  Time spent in preparing for the visit, including review of history, tests done prior to arrival, additional time reviewing clinical data, imaging, outside records and test results:  5  minutes.  Time spent in counseling with patient and/or family members regarding care plan: 30 minutes.  Time spent on same-day documentation, ordering tests, treatments, and referring patient for further care: 15 minutes.   Time spent on visit does not include time for documentation not completed on day of visit.

## 2024-03-30 PROBLEM — I35.0 AORTIC STENOSIS, MILD: Status: ACTIVE | Noted: 2023-12-23

## 2024-04-04 ENCOUNTER — HOSPITAL ENCOUNTER (OUTPATIENT)
Facility: HOSPITAL | Age: 84
Setting detail: SPECIMEN
Discharge: HOME | End: 2024-04-04
Payer: MEDICARE

## 2024-04-04 DIAGNOSIS — R74.01 ELEVATED AST (SGOT): ICD-10-CM

## 2024-04-04 DIAGNOSIS — I10 ELEVATED BLOOD PRESSURE READING WITH DIAGNOSIS OF HYPERTENSION: ICD-10-CM

## 2024-04-04 LAB
ALBUMIN SERPL-MCNC: 3.3 G/DL (ref 3.4–5)
ALBUMIN/GLOB SERPL: 1.2 (ref 0.8–1.7)
ALP SERPL-CCNC: 81 U/L (ref 45–117)
ALT SERPL-CCNC: 15 U/L (ref 13–56)
ANION GAP SERPL CALC-SCNC: 1 MMOL/L (ref 3–18)
AST SERPL-CCNC: 23 U/L (ref 10–38)
BILIRUB SERPL-MCNC: 0.2 MG/DL (ref 0.2–1)
BUN SERPL-MCNC: 19 MG/DL (ref 7–18)
BUN/CREAT SERPL: 27 (ref 12–20)
CALCIUM SERPL-MCNC: 9.4 MG/DL (ref 8.5–10.1)
CHLORIDE SERPL-SCNC: 109 MMOL/L (ref 100–111)
CO2 SERPL-SCNC: 32 MMOL/L (ref 21–32)
CREAT SERPL-MCNC: 0.71 MG/DL (ref 0.6–1.3)
GLOBULIN SER CALC-MCNC: 2.7 G/DL (ref 2–4)
GLUCOSE SERPL-MCNC: 122 MG/DL (ref 74–99)
POTASSIUM SERPL-SCNC: 3.9 MMOL/L (ref 3.5–5.5)
PROT SERPL-MCNC: 6 G/DL (ref 6.4–8.2)
SODIUM SERPL-SCNC: 142 MMOL/L (ref 136–145)

## 2024-04-04 PROCEDURE — 36415 COLL VENOUS BLD VENIPUNCTURE: CPT

## 2024-04-04 PROCEDURE — 80053 COMPREHEN METABOLIC PANEL: CPT

## 2024-04-29 ENCOUNTER — OFFICE VISIT (OUTPATIENT)
Age: 84
End: 2024-04-29
Payer: MEDICARE

## 2024-04-29 VITALS
OXYGEN SATURATION: 96 % | HEART RATE: 89 BPM | BODY MASS INDEX: 20.96 KG/M2 | TEMPERATURE: 96.9 F | HEIGHT: 65 IN | WEIGHT: 125.8 LBS | SYSTOLIC BLOOD PRESSURE: 130 MMHG | RESPIRATION RATE: 14 BRPM | DIASTOLIC BLOOD PRESSURE: 80 MMHG

## 2024-04-29 DIAGNOSIS — D05.11 DUCTAL CARCINOMA IN SITU (DCIS) OF RIGHT BREAST: Primary | ICD-10-CM

## 2024-04-29 PROCEDURE — G8420 CALC BMI NORM PARAMETERS: HCPCS | Performed by: SURGERY

## 2024-04-29 PROCEDURE — G8399 PT W/DXA RESULTS DOCUMENT: HCPCS | Performed by: SURGERY

## 2024-04-29 PROCEDURE — 99214 OFFICE O/P EST MOD 30 MIN: CPT | Performed by: SURGERY

## 2024-04-29 PROCEDURE — 3079F DIAST BP 80-89 MM HG: CPT | Performed by: SURGERY

## 2024-04-29 PROCEDURE — 1036F TOBACCO NON-USER: CPT | Performed by: SURGERY

## 2024-04-29 PROCEDURE — 1123F ACP DISCUSS/DSCN MKR DOCD: CPT | Performed by: SURGERY

## 2024-04-29 PROCEDURE — 1090F PRES/ABSN URINE INCON ASSESS: CPT | Performed by: SURGERY

## 2024-04-29 PROCEDURE — 3075F SYST BP GE 130 - 139MM HG: CPT | Performed by: SURGERY

## 2024-04-29 PROCEDURE — G8427 DOCREV CUR MEDS BY ELIG CLIN: HCPCS | Performed by: SURGERY

## 2024-04-29 ASSESSMENT — ENCOUNTER SYMPTOMS
SHORTNESS OF BREATH: 0
CHEST TIGHTNESS: 0

## 2024-04-29 NOTE — PROGRESS NOTES
Lashae Emerson is a 83 y.o. female (: 1940)     Chief Complaint   Patient presents with    Follow-up     Right breast        Medication list and allergies have been reviewed with Lashae Diez Ki and updated as of today's date.     I have gone over all Medical, Surgical and Social History with Lashae Emerson and updated/added the information accordingly.      1. Have you been to the ER, urgent care clinic since your last visit?  Hospitalized since your last visit?No    2. Have you seen or consulted any other health care providers outside of the [facility] since your last visit?  Include any pap smears or colon screening. No   
carotid:  -CCA: Patent  -ECA: Patent  -ICA: Patent  0% stenosis of proximal ICA by NASCET criteria.    Right vertebral: Patent and congenitally diminutive. Short segment mild stenosis  at the distal V2/V3 segment.     Left vertebral: Patent and dominant.     CTA BRAIN VASCULAR ANALYSIS:     Right anterior circulation:  -ICA: Patent with mild atherosclerotic disease.  -CARL: Patent   -MCA: Patent    Left anterior circulation:  -ICA: Patent with mild atherosclerotic disease.  -CARL: Patent   -MCA: Patent  -ACOM: Patent    Posterior circulation:  -RVA: Patent and congenitally diminutive  -LVA: Patent  -Basilar: Patent  -Right PCA: Patent  -Left PCA: Patent    Major dural venous sinuses: Grossly normal for non-dedicated exam.  Impression: 1. No LVO or high-grade stenosis.  2. No significant stenosis in cervical carotid or vertebral arteries.    Findings concordant with preliminary report by Dr. Montes and documented at time  the exam.         Physical Exam:  /80   Pulse 89   Temp 96.9 °F (36.1 °C)   Resp 14   Ht 1.651 m (5' 5\")   Wt 57.1 kg (125 lb 12.8 oz)   SpO2 96%   BMI 20.93 kg/m²  BMI: Body mass index is 20.93 kg/m².    Physical Exam  Constitutional:       Appearance: Normal appearance. She is normal weight.   HENT:      Head: Normocephalic and atraumatic.   Eyes:      Extraocular Movements: Extraocular movements intact.      Conjunctiva/sclera: Conjunctivae normal.      Pupils: Pupils are equal, round, and reactive to light.   Cardiovascular:      Rate and Rhythm: Normal rate.   Pulmonary:      Effort: Pulmonary effort is normal.   Chest:   Breasts:     Right: Normal. No swelling, bleeding, inverted nipple, mass, nipple discharge, skin change or tenderness.      Left: Normal. No swelling, bleeding, inverted nipple, mass, nipple discharge, skin change or tenderness.      Comments: Well healed scar right  Lymphadenopathy:      Upper Body:      Right upper body: No supraclavicular, axillary or pectoral

## 2024-05-08 ENCOUNTER — TELEPHONE (OUTPATIENT)
Age: 84
End: 2024-05-08

## 2024-05-08 NOTE — TELEPHONE ENCOUNTER
Pt dropped off a medical report for DMV she is asking for Dr Valdes to fill out , she said she will pick it up at her appt. 06/19

## 2024-05-15 ENCOUNTER — HOSPITAL ENCOUNTER (OUTPATIENT)
Facility: HOSPITAL | Age: 84
Discharge: HOME | End: 2024-05-18
Attending: SURGERY
Payer: MEDICARE

## 2024-05-15 VITALS — HEIGHT: 65 IN | BODY MASS INDEX: 20.93 KG/M2

## 2024-05-15 DIAGNOSIS — D05.11 DUCTAL CARCINOMA IN SITU (DCIS) OF RIGHT BREAST: ICD-10-CM

## 2024-05-15 PROCEDURE — G0279 TOMOSYNTHESIS, MAMMO: HCPCS

## 2024-05-28 ENCOUNTER — TRANSCRIBE ORDERS (OUTPATIENT)
Facility: HOSPITAL | Age: 84
End: 2024-05-28

## 2024-05-28 DIAGNOSIS — Z12.39 SCREENING BREAST EXAMINATION: ICD-10-CM

## 2024-05-28 DIAGNOSIS — Z12.31 ENCOUNTER FOR SCREENING MAMMOGRAM FOR MALIGNANT NEOPLASM OF BREAST: Primary | ICD-10-CM

## 2024-05-30 RX ORDER — LISINOPRIL 20 MG/1
20 TABLET ORAL DAILY
Qty: 90 TABLET | Refills: 3 | Status: SHIPPED | OUTPATIENT
Start: 2024-05-30

## 2024-05-30 NOTE — TELEPHONE ENCOUNTER
PCP: Teri Valdes MD    LAST REFILL PER CHART:  Medication:lisinopril (PRINIVIL;ZESTRIL) 10 MG tablet   Ordered On:03/26/2024  Instructions:Take 2 tablets by mouth daily   Dispense:1  Refills:0      Future Appointments   Date Time Provider Department Center   6/12/2024  8:15 AM [facility] LAB VISIT [facility] BS AMB   6/19/2024  8:00 AM Teri Valdes MD [facility] BS AMB   8/1/2024  8:00 AM [facility] RM 4 3D [facility]   10/30/2024 10:00 AM Tasha Gould DO [facility] BS AMB   1/10/2025  8:00 AM Kirk Shrestha MD [facility] BS AMB

## 2024-05-30 NOTE — TELEPHONE ENCOUNTER
Please advise her that I sent in the lisinopril 20 mg tablets so she should only take one (1) tablet daily

## 2024-05-30 NOTE — TELEPHONE ENCOUNTER
Refill   Lisinopril   [facility]         Pt is asking if she should continue taking 2 pills a day

## 2024-06-12 ENCOUNTER — HOSPITAL ENCOUNTER (OUTPATIENT)
Facility: HOSPITAL | Age: 84
Setting detail: SPECIMEN
Discharge: HOME | End: 2024-06-15
Payer: MEDICARE

## 2024-06-12 ENCOUNTER — TELEPHONE (OUTPATIENT)
Facility: CLINIC | Age: 84
End: 2024-06-12

## 2024-06-12 DIAGNOSIS — E55.9 VITAMIN D DEFICIENCY: ICD-10-CM

## 2024-06-12 DIAGNOSIS — Z98.61 CAD S/P PERCUTANEOUS CORONARY ANGIOPLASTY: ICD-10-CM

## 2024-06-12 DIAGNOSIS — I25.10 CAD S/P PERCUTANEOUS CORONARY ANGIOPLASTY: ICD-10-CM

## 2024-06-12 DIAGNOSIS — M85.89 OSTEOPENIA OF MULTIPLE SITES: ICD-10-CM

## 2024-06-12 DIAGNOSIS — E78.00 PURE HYPERCHOLESTEROLEMIA: ICD-10-CM

## 2024-06-12 DIAGNOSIS — I10 ESSENTIAL HYPERTENSION: ICD-10-CM

## 2024-06-12 LAB
ALBUMIN SERPL-MCNC: 3.4 G/DL (ref 3.4–5)
ALBUMIN/GLOB SERPL: 1.1 (ref 0.8–1.7)
ALP SERPL-CCNC: 89 U/L (ref 45–117)
ALT SERPL-CCNC: 22 U/L (ref 13–56)
ANION GAP SERPL CALC-SCNC: 8 MMOL/L (ref 3–18)
APPEARANCE UR: CLEAR
AST SERPL-CCNC: 21 U/L (ref 10–38)
BACTERIA URNS QL MICRO: NEGATIVE /HPF
BASOPHILS # BLD: 0 K/UL (ref 0–0.1)
BASOPHILS NFR BLD: 1 % (ref 0–2)
BILIRUB SERPL-MCNC: 0.5 MG/DL (ref 0.2–1)
BILIRUB UR QL: NEGATIVE
BUN SERPL-MCNC: 16 MG/DL (ref 7–18)
BUN/CREAT SERPL: 23 (ref 12–20)
CALCIUM SERPL-MCNC: 9.3 MG/DL (ref 8.5–10.1)
CHLORIDE SERPL-SCNC: 108 MMOL/L (ref 100–111)
CHOLEST SERPL-MCNC: 126 MG/DL
CO2 SERPL-SCNC: 26 MMOL/L (ref 21–32)
COLOR UR: ABNORMAL
CREAT SERPL-MCNC: 0.71 MG/DL (ref 0.6–1.3)
DIFFERENTIAL METHOD BLD: ABNORMAL
EOSINOPHIL # BLD: 0.1 K/UL (ref 0–0.4)
EOSINOPHIL NFR BLD: 3 % (ref 0–5)
EPITH CASTS URNS QL MICRO: ABNORMAL /LPF (ref 0–5)
ERYTHROCYTE [DISTWIDTH] IN BLOOD BY AUTOMATED COUNT: 12.6 % (ref 11.6–14.5)
GLOBULIN SER CALC-MCNC: 3 G/DL (ref 2–4)
GLUCOSE SERPL-MCNC: 102 MG/DL (ref 74–99)
GLUCOSE UR STRIP.AUTO-MCNC: NEGATIVE MG/DL
HCT VFR BLD AUTO: 40.1 % (ref 35–45)
HDLC SERPL-MCNC: 67 MG/DL (ref 40–60)
HDLC SERPL: 1.9 (ref 0–5)
HGB BLD-MCNC: 13.1 G/DL (ref 12–16)
HGB UR QL STRIP: NEGATIVE
IMM GRANULOCYTES # BLD AUTO: 0 K/UL (ref 0–0.04)
IMM GRANULOCYTES NFR BLD AUTO: 0 % (ref 0–0.5)
KETONES UR QL STRIP.AUTO: ABNORMAL MG/DL
LDLC SERPL CALC-MCNC: 48.2 MG/DL (ref 0–100)
LEUKOCYTE ESTERASE UR QL STRIP.AUTO: ABNORMAL
LIPID PANEL: ABNORMAL
LYMPHOCYTES # BLD: 1.1 K/UL (ref 0.9–3.6)
LYMPHOCYTES NFR BLD: 19 % (ref 21–52)
MAGNESIUM SERPL-MCNC: 2.3 MG/DL (ref 1.6–2.6)
MCH RBC QN AUTO: 32 PG (ref 24–34)
MCHC RBC AUTO-ENTMCNC: 32.7 G/DL (ref 31–37)
MCV RBC AUTO: 97.8 FL (ref 78–100)
MONOCYTES # BLD: 0.5 K/UL (ref 0.05–1.2)
NEUTS SEG # BLD: 3.8 K/UL (ref 1.8–8)
NEUTS SEG NFR BLD: 69 % (ref 40–73)
NITRITE UR QL STRIP.AUTO: NEGATIVE
NRBC # BLD: 0 K/UL (ref 0–0.01)
NRBC BLD-RTO: 0 PER 100 WBC
PH UR STRIP: 6 (ref 5–8)
PLATELET # BLD AUTO: 224 K/UL (ref 135–420)
PMV BLD AUTO: 10.4 FL (ref 9.2–11.8)
POTASSIUM SERPL-SCNC: 4.2 MMOL/L (ref 3.5–5.5)
PROT SERPL-MCNC: 6.4 G/DL (ref 6.4–8.2)
PROT UR STRIP-MCNC: NEGATIVE MG/DL
RBC # BLD AUTO: 4.1 M/UL (ref 4.2–5.3)
RBC #/AREA URNS HPF: NEGATIVE /HPF (ref 0–5)
SODIUM SERPL-SCNC: 142 MMOL/L (ref 136–145)
SP GR UR REFRACTOMETRY: 1.02 (ref 1–1.03)
TRIGL SERPL-MCNC: 54 MG/DL
UROBILINOGEN UR QL STRIP.AUTO: 0.2 EU/DL (ref 0.2–1)
VLDLC SERPL CALC-MCNC: 10.8 MG/DL
WBC # BLD AUTO: 5.5 K/UL (ref 4.6–13.2)
WBC URNS QL MICRO: ABNORMAL /HPF (ref 0–4)

## 2024-06-12 PROCEDURE — 80053 COMPREHEN METABOLIC PANEL: CPT

## 2024-06-12 PROCEDURE — 81001 URINALYSIS AUTO W/SCOPE: CPT

## 2024-06-12 PROCEDURE — 83735 ASSAY OF MAGNESIUM: CPT

## 2024-06-12 PROCEDURE — 82306 VITAMIN D 25 HYDROXY: CPT

## 2024-06-12 PROCEDURE — 36415 COLL VENOUS BLD VENIPUNCTURE: CPT

## 2024-06-12 PROCEDURE — 80061 LIPID PANEL: CPT

## 2024-06-12 PROCEDURE — 85025 COMPLETE CBC W/AUTO DIFF WBC: CPT

## 2024-06-12 NOTE — TELEPHONE ENCOUNTER
Pt came into the office to check on the status of her DMV paper work being completed and sent to the [facility]. I see on June 4th there was a telephone encounter stating paperwork completed. Is this the DMV paperwork, if so does that mean it has been sent?\"    Please advise.  DEDRICK#: [phone]

## 2024-06-17 NOTE — TELEPHONE ENCOUNTER
Called pt to inform DMV paperwork is ready for , verbal understanding received and states she would pick it up Wednesday morning during her ov visit.

## 2024-06-19 ENCOUNTER — OFFICE VISIT (OUTPATIENT)
Facility: CLINIC | Age: 84
End: 2024-06-19

## 2024-06-19 VITALS
RESPIRATION RATE: 14 BRPM | DIASTOLIC BLOOD PRESSURE: 58 MMHG | OXYGEN SATURATION: 98 % | TEMPERATURE: 98.7 F | BODY MASS INDEX: 20.49 KG/M2 | SYSTOLIC BLOOD PRESSURE: 122 MMHG | WEIGHT: 123 LBS | HEIGHT: 65 IN | HEART RATE: 64 BPM

## 2024-06-19 DIAGNOSIS — I10 ESSENTIAL HYPERTENSION: Primary | ICD-10-CM

## 2024-06-19 DIAGNOSIS — J30.89 NON-SEASONAL ALLERGIC RHINITIS, UNSPECIFIED TRIGGER: ICD-10-CM

## 2024-06-19 DIAGNOSIS — C50.411 MALIGNANT NEOPLASM OF UPPER-OUTER QUADRANT OF RIGHT BREAST IN FEMALE, ESTROGEN RECEPTOR POSITIVE (HCC): ICD-10-CM

## 2024-06-19 DIAGNOSIS — E78.00 PURE HYPERCHOLESTEROLEMIA: ICD-10-CM

## 2024-06-19 DIAGNOSIS — D05.11 INTRADUCTAL CARCINOMA IN SITU OF RIGHT BREAST: ICD-10-CM

## 2024-06-19 DIAGNOSIS — R73.01 ELEVATED FASTING GLUCOSE: ICD-10-CM

## 2024-06-19 DIAGNOSIS — I25.10 CAD S/P PERCUTANEOUS CORONARY ANGIOPLASTY: ICD-10-CM

## 2024-06-19 DIAGNOSIS — E55.9 VITAMIN D DEFICIENCY: ICD-10-CM

## 2024-06-19 DIAGNOSIS — Z98.61 CAD S/P PERCUTANEOUS CORONARY ANGIOPLASTY: ICD-10-CM

## 2024-06-19 DIAGNOSIS — G20.B2 PARKINSON'S DISEASE WITH DYSKINESIA AND FLUCTUATING MANIFESTATIONS (HCC): ICD-10-CM

## 2024-06-19 DIAGNOSIS — I35.0 AORTIC STENOSIS, MILD: ICD-10-CM

## 2024-06-19 DIAGNOSIS — H81.10 BENIGN PAROXYSMAL POSITIONAL VERTIGO, UNSPECIFIED LATERALITY: ICD-10-CM

## 2024-06-19 DIAGNOSIS — M85.89 OSTEOPENIA OF MULTIPLE SITES: ICD-10-CM

## 2024-06-19 DIAGNOSIS — Z17.0 MALIGNANT NEOPLASM OF UPPER-OUTER QUADRANT OF RIGHT BREAST IN FEMALE, ESTROGEN RECEPTOR POSITIVE (HCC): ICD-10-CM

## 2024-06-19 NOTE — PROGRESS NOTES
Lashae Emerson presents today for   Chief Complaint   Patient presents with    Follow-up       \"Have you been to the ER, urgent care clinic since your last visit?  Hospitalized since your last visit?\"    NO    “Have you seen or consulted any other health care providers outside of [facility] since your last visit?”    NO

## 2024-06-19 NOTE — PROGRESS NOTES
HPI:   Lashae Emerson is a 83 y.o. year old female who presents today for a routine visit.  She has a history of hypertension, ASCVD, hyperlipidemia, syncope, Parkinson's disease, lumbar degenerative disease, GERD, and macular degeneration.  She reports that she is doing reasonably well.  She recently traveled to [place] to represent the [facility] for her [facility] [employer] order and states that she enjoyed the trip.  While away, her  was hospitalized with heart failure and he was transferred to a SNF for approximately 4 weeks, but she expects he will be returning home afterward.  She states that her son is now living with her since her  was hospitalized and he does help with the cooking.    She reports that her blood pressure has remained controlled on lisinopril 20 mg daily.  She does report some intermittent vertigo, but states that it does not persist and she has not needed to use meclizine.  She states that she has noted some imbalance but is careful when walking so as not to fall.  However, she explains that multiple bruises on her arms and her forehead are due to walking into doorways.  She states that she does not feel that balance therapy is needed.  She reports that she is noting some increasing allergy symptoms and has an upcoming appointment with EMY Lara.    She had a follow-up appointment with Dr. Gould on 4/29/2024 and completed a right diagnostic mammogram on 5/15/2024 which was unchanged.  She also completed a follow-up appointment with Dr. DEEPAK Rodriguez on 6/18/2024 and was advised to follow-up in 6 months.  She is scheduled for a bilateral screening mammogram on 8/1/2024 and follow-up with Dr. Gould on 10/30/2024.    She had a follow-up visit with Dr. Shrestha on 1/11/2024, and echocardiogram was obtained on 1/25/2024 for her new systolic murmur showing normal LV size and function (EF 65-70%), diastolic dysfunction, mildly dilated RV with normal systolic function,

## 2024-08-07 RX ORDER — LISINOPRIL 20 MG/1
20 TABLET ORAL DAILY
Qty: 90 TABLET | Refills: 3 | Status: SHIPPED | OUTPATIENT
Start: 2024-08-07

## 2024-08-07 NOTE — TELEPHONE ENCOUNTER
PCP: Teri Valdes MD    LAST REFILL PER CHART:  Medication:lisinopril (PRINIVIL;ZESTRIL) 20 MG tablet   Ordered On:05/30/2024  Instructions: Take 1 tablet by mouth daily   Dispense:90 tablets  Refills:3      Future Appointments   Date Time Provider Department Center   10/30/2024 10:00 AM Tasha Gould DO [facility]   11/29/2024  8:30 AM [facility]NA RM 4 3D [facility]   12/12/2024  9:15 AM IOC LAB VISIT [facility] DEP   12/19/2024  8:30 AM Teri Valdes MD [facility] DEP   1/10/2025  8:00 AM Kirk Shrestha MD [facility] BS AMB         
Refill   Lisinopril 20 mg   [facility]  
details…

## 2024-08-17 ENCOUNTER — HOSPITAL ENCOUNTER (OUTPATIENT)
Facility: HOSPITAL | Age: 84
End: 2024-08-17
Attending: SURGERY
Payer: MEDICARE

## 2024-08-17 VITALS — WEIGHT: 123 LBS | HEIGHT: 65 IN | BODY MASS INDEX: 20.49 KG/M2

## 2024-08-17 DIAGNOSIS — Z12.31 ENCOUNTER FOR SCREENING MAMMOGRAM FOR MALIGNANT NEOPLASM OF BREAST: ICD-10-CM

## 2024-08-17 PROCEDURE — 77063 BREAST TOMOSYNTHESIS BI: CPT

## 2024-08-26 ENCOUNTER — TRANSCRIBE ORDERS (OUTPATIENT)
Facility: HOSPITAL | Age: 84
End: 2024-08-26

## 2024-08-26 DIAGNOSIS — Z12.31 ENCOUNTER FOR SCREENING MAMMOGRAM FOR HIGH-RISK PATIENT: Primary | ICD-10-CM

## 2024-08-31 ENCOUNTER — APPOINTMENT (OUTPATIENT)
Facility: HOSPITAL | Age: 84
End: 2024-08-31
Payer: MEDICARE

## 2024-08-31 ENCOUNTER — HOSPITAL ENCOUNTER (EMERGENCY)
Facility: HOSPITAL | Age: 84
Discharge: HOME | End: 2024-08-31
Attending: EMERGENCY MEDICINE
Payer: MEDICARE

## 2024-08-31 VITALS
HEIGHT: 65 IN | DIASTOLIC BLOOD PRESSURE: 71 MMHG | HEART RATE: 74 BPM | TEMPERATURE: 98 F | BODY MASS INDEX: 19.16 KG/M2 | SYSTOLIC BLOOD PRESSURE: 150 MMHG | OXYGEN SATURATION: 100 % | WEIGHT: 115 LBS | RESPIRATION RATE: 16 BRPM

## 2024-08-31 DIAGNOSIS — S01.81XA FACIAL LACERATION, INITIAL ENCOUNTER: Primary | ICD-10-CM

## 2024-08-31 DIAGNOSIS — S09.90XA INJURY OF HEAD, INITIAL ENCOUNTER: ICD-10-CM

## 2024-08-31 PROCEDURE — 99284 EMERGENCY DEPT VISIT MOD MDM: CPT

## 2024-08-31 PROCEDURE — 70450 CT HEAD/BRAIN W/O DYE: CPT

## 2024-08-31 PROCEDURE — 90715 TDAP VACCINE 7 YRS/> IM: CPT | Performed by: EMERGENCY MEDICINE

## 2024-08-31 PROCEDURE — 90471 IMMUNIZATION ADMIN: CPT | Performed by: EMERGENCY MEDICINE

## 2024-08-31 PROCEDURE — 6360000002 HC RX W HCPCS: Performed by: EMERGENCY MEDICINE

## 2024-08-31 PROCEDURE — 12011 RPR F/E/E/N/L/M 2.5 CM/<: CPT

## 2024-08-31 RX ADMIN — TETANUS TOXOID, REDUCED DIPHTHERIA TOXOID AND ACELLULAR PERTUSSIS VACCINE, ADSORBED 0.5 ML: 5; 2.5; 8; 8; 2.5 SUSPENSION INTRAMUSCULAR at 09:01

## 2024-08-31 ASSESSMENT — PAIN SCALES - GENERAL
PAINLEVEL_OUTOF10: 2
PAINLEVEL_OUTOF10: 3

## 2024-08-31 ASSESSMENT — PAIN - FUNCTIONAL ASSESSMENT
PAIN_FUNCTIONAL_ASSESSMENT: 0-10
PAIN_FUNCTIONAL_ASSESSMENT: 0-10

## 2024-08-31 NOTE — ED PROVIDER NOTES
EMERGENCY DEPARTMENT HISTORY AND PHYSICAL EXAM    8:28 AM EDT seen at this time in room 1        Date: 8/31/2024  Patient Name: Lashae Emerson    History of Presenting Illness     Chief Complaint   Patient presents with    Laceration    Head Injury         History Provided By: patient/son    Additional History (Context): Lashae Emerson is a 83 y.o. female presents with on blood thinners had a mechanical fall, struck left superior orbital rim right elbow and left knee no loss of consciousness.  Ambulatory afterwards.  Good movement of the right upper extremity and the left knee and neurologically intact..    PCP: Teri Valdes MD    Chief Complaint:   Duration:    Timing:    Location:   Quality:   Severity:   Modifying Factors:   Associated Symptoms:       No current facility-administered medications for this encounter.     Current Outpatient Medications   Medication Sig Dispense Refill    lisinopril (PRINIVIL;ZESTRIL) 20 MG tablet Take 1 tablet by mouth daily 90 tablet 3    rosuvastatin (CRESTOR) 10 MG tablet TAKE ONE-HALF (1/2) TABLET EVERY OTHER DAY 30 tablet 3    rasagiline (AZILECT) 0.5 MG TABS Take 1 tablet by mouth every morning      potassium chloride (KLOR-CON M) 20 MEQ extended release tablet TAKE ONE-HALF (1/2) TABLET EVERY OTHER DAY 25 tablet 3    carbidopa-levodopa-entacapone (STALEVO 150) 37.5-150-200 MG Take 1 tablet by mouth 3 times daily      vitamin D3 (CHOLECALCIFEROL) 125 MCG (5000 UT) TABS tablet Take 1 tablet by mouth daily      magnesium hydroxide (MILK OF MAGNESIA) 400 MG/5ML suspension Take 30 mLs by mouth daily as needed      meclizine (ANTIVERT) 25 MG tablet Take 1 tablet by mouth 3 times daily as needed      alendronate (FOSAMAX) 35 MG tablet TAKE 1 TABLET EVERY 7 DAYS (Patient not taking: Reported on 8/31/2024) 12 tablet 3    ferrous sulfate (SUHAS-IN-SOL) 75 (15 Fe) MG/ML solution Take 1 mL by mouth daily      metoprolol succinate (TOPROL XL) 50 MG extended release tablet    2. Left forehead supraorbital soft tissue swelling without fracture or   intraorbital abnormality.   3. Stable chronic microvascular disease versus small chronic infarct at the   right periventricular frontal lobe.            Electronically signed by Dyan Patel        [unfilled]    Medications ordered:   Medications   Tetanus-Diphth-Acell Pertussis (BOOSTRIX) injection 0.5 mL (0.5 mLs IntraMUSCular Given 8/31/24 0901)              Medical Decision Making   Initial Medical Decision Making and DDx:  Will obtain CT scan.  Of head for intracranial bleeding, repair laceration.  Elbow and knee not suggestive of fracture and no laceration requiring repair.  Unknown last tetanus so we will update    ED Course: Progress Notes, Reevaluation, and Consults:     Negative head CT    I am the first provider for this patient.    I reviewed the vital signs, available nursing notes, past medical history, past surgical history, family history and social history.    Patient Vitals for the past 12 hrs:   Temp Pulse Resp BP SpO2   08/31/24 0812 98 °F (36.7 °C) 74 16 (!) 150/71 100 %       Vital Signs-Reviewed the patient's vital signs.    Pulse Oximetry Analysis, Cardiac Monitor, 12 lead ekg:      Interpreted by the EP.      Records Reviewed: Nursing notes reviewed (Time of Review: 9:50 AM)    Procedures:   Critical Care Time: 0  If critical care time is note it is exclusive of any separately billable procedures.     Aspirin: (was aspirin given for stroke?)    Diagnosis     Disposition: DISPOSITION Decision To Discharge 08/31/2024 09:49:08 AM  Condition at Disposition: Stable       DISCHARGE MEDICATIONS:  Current Discharge Medication List             Details   lisinopril (PRINIVIL;ZESTRIL) 20 MG tablet Take 1 tablet by mouth daily  Qty: 90 tablet, Refills: 3      rosuvastatin (CRESTOR) 10 MG tablet TAKE ONE-HALF (1/2) TABLET EVERY OTHER DAY  Qty: 30 tablet, Refills: 3      rasagiline (AZILECT) 0.5 MG TABS Take 1 tablet by mouth

## 2024-08-31 NOTE — ED TRIAGE NOTES
Pt states she opened her door and fell out the door into the flower bed. Has lac to Left forehead, left knee and abrasion to right elbow. No LOC. Does not know when her last Td was

## 2024-09-03 ENCOUNTER — OFFICE VISIT (OUTPATIENT)
Facility: CLINIC | Age: 84
End: 2024-09-03

## 2024-09-03 VITALS
WEIGHT: 122 LBS | SYSTOLIC BLOOD PRESSURE: 104 MMHG | DIASTOLIC BLOOD PRESSURE: 60 MMHG | OXYGEN SATURATION: 98 % | HEIGHT: 65 IN | HEART RATE: 74 BPM | RESPIRATION RATE: 14 BRPM | BODY MASS INDEX: 20.33 KG/M2 | TEMPERATURE: 98.9 F

## 2024-09-03 DIAGNOSIS — I10 ESSENTIAL HYPERTENSION: ICD-10-CM

## 2024-09-03 DIAGNOSIS — Z09 HOSPITAL DISCHARGE FOLLOW-UP: Primary | ICD-10-CM

## 2024-09-03 DIAGNOSIS — I25.10 CAD S/P PERCUTANEOUS CORONARY ANGIOPLASTY: ICD-10-CM

## 2024-09-03 DIAGNOSIS — S09.90XD CLOSED HEAD INJURY, SUBSEQUENT ENCOUNTER: ICD-10-CM

## 2024-09-03 DIAGNOSIS — M25.562 ACUTE PAIN OF LEFT KNEE: ICD-10-CM

## 2024-09-03 DIAGNOSIS — Z98.61 CAD S/P PERCUTANEOUS CORONARY ANGIOPLASTY: ICD-10-CM

## 2024-09-03 DIAGNOSIS — I35.0 AORTIC STENOSIS, MILD: ICD-10-CM

## 2024-09-03 DIAGNOSIS — G20.B2 PARKINSON'S DISEASE WITH DYSKINESIA AND FLUCTUATING MANIFESTATIONS (HCC): ICD-10-CM
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
## 2024-09-03 RX ORDER — LISINOPRIL 20 MG/1
10 TABLET ORAL DAILY
Qty: 90 TABLET | Refills: 3 | Status: SHIPPED | OUTPATIENT
Start: 2024-09-03

## 2024-09-03 RX ORDER — CARBIDOPA, LEVODOPA AND ENTACAPONE 25; 200; 100 MG/1; MG/1; MG/1
1 TABLET, FILM COATED ORAL NIGHTLY
COMMUNITY

## 2024-09-03 NOTE — PATIENT INSTRUCTIONS
Decrease lisinopril to 10 mg daily.  Please monitor blood pressure and call the office if not improving (systolic blood pressure >110 mmHg).    Preventing Falls: Care Instructions  Overview     Getting around your home safely can be a challenge if you have injuries or health problems that make it easy for you to fall. Loose rugs and furniture in walkways are among the dangers for many older people who have problems walking or who have poor eyesight. People who have conditions such as arthritis, osteoporosis, or dementia also have to be careful not to fall.  You can make your home safer with a few simple measures.  Follow-up care is a key part of your treatment and safety. Be sure to make and go to all appointments, and call your doctor if you are having problems. It's also a good idea to know your test results and keep a list of the medicines you take.  How can you care for yourself at home?  Taking care of yourself  Exercise regularly to improve your strength, muscle tone, and balance. Walk if you can. Swimming may be a good choice if you cannot walk easily.  Have your vision and hearing checked each year or any time you notice a change. If you have trouble seeing and hearing, you might not be able to avoid objects and could lose your balance.  Know the side effects of the medicines you take. Ask your doctor or pharmacist whether the medicines you take can affect your balance. Sleeping pills or sedatives can affect your balance.  Limit the amount of alcohol you drink. Alcohol can impair your balance and other senses.  Ask your doctor whether calluses or corns on your feet need to be removed. If you wear loose-fitting shoes because of calluses or corns, you can lose your balance and fall.  Talk to your doctor if you have numbness in your feet.  You may get dizzy if you do not drink enough water. To prevent dehydration, drink plenty of fluids. Choose water and other clear liquids. If you have kidney, heart, or liver

## 2024-09-03 NOTE — PROGRESS NOTES
Lashae Emerson presents today for   Chief Complaint   Patient presents with    Follow-Up from Hospital       \"Have you been to the ER, urgent care clinic since your last visit?  Hospitalized since your last visit?\"    Yes  08/31/2024  [facility]  Fall/facial laceration/head injury    “Have you seen or consulted any other health care providers outside of [facility] since your last visit?”    NO

## 2024-09-03 NOTE — PROGRESS NOTES
HPI:   Lashae Emerson is a 83 y.o. year old female who presents today for a post ED follow-up.  She has a history of hypertension, ASCVD, hyperlipidemia, syncope, Parkinson's disease, lumbar degenerative disease, GERD, and macular degeneration.  She is accompanied by her son.  She reports that she is doing reasonably well.      On 8/31/2024, she was stepping out a side door to her home and slipped and fell into a flower bed.  She sustained injury to her left forehead, left knee, and right elbow and presented to [facility] ED for assessment.  She denied any lightheadedness or loss of consciousness and states that she missed a step while emerging from her doorway.  A CT head with contrast was performed showing a left forehead supraorbital soft tissue swelling without fracture or intraorbital abnormality, stable chronic microvascular disease versus small chronic infarct at the right periventricular frontal lobe without change, and no other intracranial findings.  She received a Tdap and her knee wound was cleaned and bandaged.  She was subsequently discharged.  She reports today that the soft tissue swelling on her left forehead has decreased but she has developed ecchymoses around her left eye.  She denies any vision changes.  She also reports that she has been experiencing left knee pain and swelling but states that this appears to gradually be improving.  She has not changed the bandage on her left knee since it was placed in the ED.  She states that she has been using her 's rollator at times but is otherwise able to ambulate without difficulty.    Her  passed away on 7/5/2024 at a SNF after being hospitalized for heart failure. She states that she has had her son living with her since her  was hospitalized and now has a niece who will be staying with her until the end of the year.    She reports that she has not been monitoring her blood pressure at home, but denies any lightheadedness or

## 2024-10-08 ENCOUNTER — OFFICE VISIT (OUTPATIENT)
Facility: CLINIC | Age: 84
End: 2024-10-08

## 2024-10-08 VITALS
TEMPERATURE: 98.9 F | HEART RATE: 63 BPM | OXYGEN SATURATION: 97 % | RESPIRATION RATE: 14 BRPM | SYSTOLIC BLOOD PRESSURE: 114 MMHG | BODY MASS INDEX: 19.99 KG/M2 | DIASTOLIC BLOOD PRESSURE: 62 MMHG | WEIGHT: 120 LBS | HEIGHT: 65 IN

## 2024-10-08 DIAGNOSIS — I35.0 AORTIC STENOSIS, MILD: ICD-10-CM

## 2024-10-08 DIAGNOSIS — I10 ESSENTIAL HYPERTENSION: ICD-10-CM

## 2024-10-08 DIAGNOSIS — M85.89 OSTEOPENIA OF MULTIPLE SITES: ICD-10-CM

## 2024-10-08 DIAGNOSIS — G20.B2 PARKINSON'S DISEASE WITH DYSKINESIA AND FLUCTUATING MANIFESTATIONS (HCC): ICD-10-CM

## 2024-10-08 DIAGNOSIS — Z17.0 MALIGNANT NEOPLASM OF UPPER-OUTER QUADRANT OF RIGHT BREAST IN FEMALE, ESTROGEN RECEPTOR POSITIVE (HCC): ICD-10-CM

## 2024-10-08 DIAGNOSIS — C50.411 MALIGNANT NEOPLASM OF UPPER-OUTER QUADRANT OF RIGHT BREAST IN FEMALE, ESTROGEN RECEPTOR POSITIVE (HCC): ICD-10-CM
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
## 2024-10-08 DIAGNOSIS — I25.10 CAD S/P PERCUTANEOUS CORONARY ANGIOPLASTY: ICD-10-CM

## 2024-10-08 DIAGNOSIS — E78.00 PURE HYPERCHOLESTEROLEMIA: ICD-10-CM

## 2024-10-08 DIAGNOSIS — Z98.61 CAD S/P PERCUTANEOUS CORONARY ANGIOPLASTY: ICD-10-CM

## 2024-10-08 DIAGNOSIS — S09.90XD CLOSED HEAD INJURY, SUBSEQUENT ENCOUNTER: ICD-10-CM

## 2024-10-08 RX ORDER — LISINOPRIL 10 MG/1
10 TABLET ORAL DAILY
Qty: 90 TABLET | Refills: 3 | Status: SHIPPED | OUTPATIENT
Start: 2024-10-08

## 2024-10-08 RX ORDER — ROSUVASTATIN CALCIUM 5 MG/1
5 TABLET, COATED ORAL DAILY
Qty: 90 TABLET | Refills: 3 | Status: SHIPPED | OUTPATIENT
Start: 2024-10-08

## 2024-10-08 NOTE — PROGRESS NOTES
Lashae Emerson presents today for   Chief Complaint   Patient presents with    Follow-up       \"Have you been to the ER, urgent care clinic since your last visit?  Hospitalized since your last visit?\"    NO    “Have you seen or consulted any other health care providers outside of [facility] since your last visit?”    NO            
treatment. Completed screening labs for secondary osteoporosis in 11/2021, including intact PTH, vitamin D, phosphorus, and gammopathy panel, and all normal.  Last bone density study in 8/2021 showed evidence of progression from prior study in 5/2018 and started on treatment with Fosamax 35 mg weekly on 11/26/2021 for osteoporosis prevention.  Repeat bone density study shows mild improvement compared to prior although not statistically significant.  Will plan to treat with Fosamax until 11/2026. Advised to continue calcium and vitamin D supplements.  Vitamin D level improved today with neck improved compliance.  Encouraged exercise, particularly weight bearing activities. Fall precautions stressed. Will obtain repeat bone density study in 8/2025.  8. Vertigo with decreased left-sided hearing. Presented to [facility] ED (3/20/2024) due to complaint of dizziness consistent with acute vertigo.  Lab evaluation unremarkable except for elevated AST at 54.  CT/CTA head and CTA neck unremarkable and patient's symptoms improved after receiving IV normal saline, meclizine 25 mg, and Zofran.  Completed follow-up with EMY Lara following ED visit due to decreased hearing on the left and underwent cerumen removal with improvement in symptoms.  Continuing to follow with EMY Lara. Advised to use meclizine to help with symptoms if needed.  Reports no recurrence of symptoms today.   9. Lumbar spondylosis.  Reported symptoms of right buttock pain since 8/2018 and diagnosed with right piriformis syndrome. Completed physical therapy and received cortisone injection with some improvement, but subsequently developed right leg paresthesias and pain consistent with right sided sciatica. Treated with five day course of prednisone 50 mg without improvement. LS spine x-rays and lumbar MRI with degenerative changes, although MRI showed a right L4-L5 neuroforamen synovial cyst from the right L4-5 advanced facet arthropathy with significant right

## 2024-10-08 NOTE — PATIENT INSTRUCTIONS
what happened to you. You might not know that you have a serious injury.  If you cannot get up  If you think you are injured after a fall or you cannot get up, try not to panic.  Call out for help.  If you have a phone within reach or you have an emergency call device, use it to call for help.  If you do not have a phone within reach, try to slide yourself toward it. If you cannot get to the phone, try to slide toward a door or window or a place where you think you can be heard.  [place] or use an object to make noise so someone might hear you.  If you can reach something that you can use for a pillow, place it under your head. Try to stay warm by covering yourself with a blanket or clothing while you wait for help.  When should you call for help?   Call 911 anytime you think you may need emergency care. For example, call if:    You passed out (lost consciousness).     You cannot get up after a fall.     You have severe pain.   Call your doctor now or seek immediate medical care if:    You have new or worse pain.     You are dizzy or lightheaded.     You hit your head.   Watch closely for changes in your health, and be sure to contact your doctor if:    You do not get better as expected.   Where can you learn more?  Go to https://www.[employer].net/patientEd and enter G513 to learn more about \"How to Get Up Safely After a Fall: Care Instructions.\"  Current as of: March 9, 2022               Content Version: 13.5  © 7956-5020 [employer].   Care instructions adapted under license by [employer] If you have questions about a medical condition or this instruction, always ask your healthcare professional. [employer] disclaims any warranty or liability for your use of this information.

## 2024-10-30 ENCOUNTER — OFFICE VISIT (OUTPATIENT)
Age: 84
End: 2024-10-30
Payer: MEDICARE

## 2024-10-30 VITALS
TEMPERATURE: 97.3 F | RESPIRATION RATE: 16 BRPM | BODY MASS INDEX: 20.59 KG/M2 | OXYGEN SATURATION: 98 % | SYSTOLIC BLOOD PRESSURE: 118 MMHG | HEIGHT: 65 IN | DIASTOLIC BLOOD PRESSURE: 76 MMHG | WEIGHT: 123.6 LBS | HEART RATE: 84 BPM

## 2024-10-30 DIAGNOSIS — D05.11 DUCTAL CARCINOMA IN SITU (DCIS) OF RIGHT BREAST: Primary | ICD-10-CM

## 2024-10-30 PROCEDURE — G8484 FLU IMMUNIZE NO ADMIN: HCPCS | Performed by: SURGERY

## 2024-10-30 PROCEDURE — G8420 CALC BMI NORM PARAMETERS: HCPCS | Performed by: SURGERY

## 2024-10-30 PROCEDURE — 1160F RVW MEDS BY RX/DR IN RCRD: CPT | Performed by: SURGERY

## 2024-10-30 PROCEDURE — 3078F DIAST BP <80 MM HG: CPT | Performed by: SURGERY

## 2024-10-30 PROCEDURE — 1123F ACP DISCUSS/DSCN MKR DOCD: CPT | Performed by: SURGERY

## 2024-10-30 PROCEDURE — 1126F AMNT PAIN NOTED NONE PRSNT: CPT | Performed by: SURGERY

## 2024-10-30 PROCEDURE — 99214 OFFICE O/P EST MOD 30 MIN: CPT | Performed by: SURGERY

## 2024-10-30 PROCEDURE — 1159F MED LIST DOCD IN RCRD: CPT | Performed by: SURGERY

## 2024-10-30 PROCEDURE — G8399 PT W/DXA RESULTS DOCUMENT: HCPCS | Performed by: SURGERY

## 2024-10-30 PROCEDURE — 1036F TOBACCO NON-USER: CPT | Performed by: SURGERY

## 2024-10-30 PROCEDURE — 3074F SYST BP LT 130 MM HG: CPT | Performed by: SURGERY

## 2024-10-30 PROCEDURE — G8427 DOCREV CUR MEDS BY ELIG CLIN: HCPCS | Performed by: SURGERY

## 2024-10-30 PROCEDURE — 1090F PRES/ABSN URINE INCON ASSESS: CPT | Performed by: SURGERY

## 2024-10-30 ASSESSMENT — ENCOUNTER SYMPTOMS
SHORTNESS OF BREATH: 0
CHEST TIGHTNESS: 0

## 2024-10-30 NOTE — PROGRESS NOTES
Lashae Emerson is a 83 y.o. female (: 1940)     Chief Complaint   Patient presents with    Follow-up     Right breast        Medication list and allergies have been reviewed with Lashae Diez Ki and updated as of today's date.     I have gone over all Medical, Surgical and Social History with Lashae Emerson and updated/added the information accordingly.     1. Have you been to the ER, urgent care clinic since your last visit?  Hospitalized since your last visit?No    2. Have you seen or consulted any other health care providers outside of the [facility] since your last visit?  Include any pap smears or colon screening.  No      
Ratio 06/12/2024 1.9  0 - 5.0   Final    Magnesium 06/12/2024 2.3  1.6 - 2.6 mg/dL Final    Color, UA 06/12/2024 DARK YELLOW    Final    Appearance 06/12/2024 CLEAR    Final    Specific [place], UA 06/12/2024 1.021  1.005 - 1.030   Final    pH, Urine 06/12/2024 6.0  5.0 - 8.0   Final    Protein, UA 06/12/2024 Negative  NEG mg/dL Final    Glucose, Ur 06/12/2024 Negative  NEG mg/dL Final    Ketones, Urine 06/12/2024 TRACE (A)  NEG mg/dL Final    Bilirubin, Urine 06/12/2024 Negative  NEG   Final    Blood, Urine 06/12/2024 Negative  NEG   Final    Urobilinogen, Urine 06/12/2024 0.2  0.2 - 1.0 EU/dL Final    Nitrite, Urine 06/12/2024 Negative  NEG   Final    Leukocyte Esterase, Urine 06/12/2024 TRACE (A)  NEG   Final    WBC, UA 06/12/2024 0 to 1  0 - 4 /hpf Final    RBC, UA 06/12/2024 Negative  0 - 5 /hpf Final    Epithelial Cells, UA 06/12/2024 FEW  0 - 5 /lpf Final    BACTERIA, URINE 06/12/2024 Negative  NEG /hpf Final    Vit D, 25-Hydroxy 06/12/2024 75.9  30 - 100 ng/mL Final    Comment: (NOTE)  Deficiency               <20 ng/mL  Insufficiency          20-30 ng/mL  Sufficient             ng/mL  Possible toxicity       >100 ng/mL    The Method used is Siemens Advia Centaur currently standardized to a   Center of Disease Control and Prevention (CDC) certified reference   method. Samples containing fluorescein dye can produce falsely   elevated values when tested with the ADVIA Centaur Vitamin D Assay.   It is recommended that results in the toxic range, >100 ng/mL, be   retested 72 hours post fluorescein exposure.         CT HEAD WO CONTRAST  Narrative: EXAM: CT HEAD WO CONTRAST    CLINICAL INDICATION/HISTORY: Trauma left superior orbital rim. Fall with  laceration to left forehead.    COMPARISON: 3/20/2024    TECHNIQUE: Axial imaging of the head from the skull base to the vertex without  IV contrast and reconstructed into coronal and sagittal planes.    All CT scans at this facility are performed using dose

## 2024-11-20 RX ORDER — POTASSIUM CHLORIDE 1500 MG/1
TABLET, EXTENDED RELEASE ORAL
Qty: 25 TABLET | Refills: 3 | Status: SHIPPED | OUTPATIENT
Start: 2024-11-20

## 2024-12-12 ENCOUNTER — HOSPITAL ENCOUNTER (OUTPATIENT)
Facility: HOSPITAL | Age: 84
Setting detail: SPECIMEN
Discharge: HOME | End: 2024-12-15
Payer: MEDICARE

## 2024-12-12 DIAGNOSIS — C50.411 MALIGNANT NEOPLASM OF UPPER-OUTER QUADRANT OF RIGHT BREAST IN FEMALE, ESTROGEN RECEPTOR POSITIVE (HCC): ICD-10-CM

## 2024-12-12 DIAGNOSIS — Z17.0 MALIGNANT NEOPLASM OF UPPER-OUTER QUADRANT OF RIGHT BREAST IN FEMALE, ESTROGEN RECEPTOR POSITIVE (HCC): ICD-10-CM

## 2024-12-12 DIAGNOSIS — M85.89 OSTEOPENIA OF MULTIPLE SITES: ICD-10-CM

## 2024-12-12 DIAGNOSIS — E55.9 VITAMIN D DEFICIENCY: ICD-10-CM

## 2024-12-12 DIAGNOSIS — R73.01 ELEVATED FASTING GLUCOSE: ICD-10-CM

## 2024-12-12 DIAGNOSIS — E78.00 PURE HYPERCHOLESTEROLEMIA: ICD-10-CM

## 2024-12-12 DIAGNOSIS — I10 ESSENTIAL HYPERTENSION: ICD-10-CM

## 2024-12-12 LAB
25(OH)D3 SERPL-MCNC: 48.4 NG/ML (ref 30–100)
ALBUMIN SERPL-MCNC: 3.5 G/DL (ref 3.4–5)
ALBUMIN/GLOB SERPL: 1.2 (ref 0.8–1.7)
ALP SERPL-CCNC: 105 U/L (ref 45–117)
ALT SERPL-CCNC: 17 U/L (ref 13–56)
ANION GAP SERPL CALC-SCNC: 3 MMOL/L (ref 3–18)
AST SERPL-CCNC: 22 U/L (ref 10–38)
BASOPHILS # BLD: 0 K/UL (ref 0–0.1)
BASOPHILS NFR BLD: 1 % (ref 0–2)
BILIRUB SERPL-MCNC: 0.5 MG/DL (ref 0.2–1)
BUN SERPL-MCNC: 17 MG/DL (ref 7–18)
BUN/CREAT SERPL: 21 (ref 12–20)
CALCIUM SERPL-MCNC: 9.7 MG/DL (ref 8.5–10.1)
CHLORIDE SERPL-SCNC: 110 MMOL/L (ref 100–111)
CHOLEST SERPL-MCNC: 142 MG/DL
CO2 SERPL-SCNC: 29 MMOL/L (ref 21–32)
CREAT SERPL-MCNC: 0.81 MG/DL (ref 0.6–1.3)
CREAT UR-MCNC: 244 MG/DL (ref 30–125)
DIFFERENTIAL METHOD BLD: ABNORMAL
EOSINOPHIL # BLD: 0.1 K/UL (ref 0–0.4)
EOSINOPHIL NFR BLD: 2 % (ref 0–5)
ERYTHROCYTE [DISTWIDTH] IN BLOOD BY AUTOMATED COUNT: 13.5 % (ref 11.6–14.5)
EST. AVERAGE GLUCOSE BLD GHB EST-MCNC: 117 MG/DL
GLOBULIN SER CALC-MCNC: 3 G/DL (ref 2–4)
GLUCOSE SERPL-MCNC: 108 MG/DL (ref 74–99)
HBA1C MFR BLD: 5.7 % (ref 4.2–5.6)
HCT VFR BLD AUTO: 40.9 % (ref 35–45)
HDLC SERPL-MCNC: 74 MG/DL (ref 40–60)
HDLC SERPL: 1.9 (ref 0–5)
HGB BLD-MCNC: 13 G/DL (ref 12–16)
IMM GRANULOCYTES # BLD AUTO: 0 K/UL (ref 0–0.04)
IMM GRANULOCYTES NFR BLD AUTO: 1 % (ref 0–0.5)
LDLC SERPL CALC-MCNC: 46.6 MG/DL (ref 0–100)
LIPID PANEL: ABNORMAL
LYMPHOCYTES # BLD: 1.3 K/UL (ref 0.9–3.6)
LYMPHOCYTES NFR BLD: 21 % (ref 21–52)
MCH RBC QN AUTO: 30.6 PG (ref 24–34)
MCHC RBC AUTO-ENTMCNC: 31.8 G/DL (ref 31–37)
MCV RBC AUTO: 96.2 FL (ref 78–100)
MICROALBUMIN UR-MCNC: 3.11 MG/DL (ref 0–3)
MICROALBUMIN/CREAT UR-RTO: 13 MG/G (ref 0–30)
MONOCYTES # BLD: 0.6 K/UL (ref 0.05–1.2)
MONOCYTES NFR BLD: 9 % (ref 3–10)
NEUTS SEG # BLD: 4.1 K/UL (ref 1.8–8)
NEUTS SEG NFR BLD: 67 % (ref 40–73)
NRBC # BLD: 0 K/UL (ref 0–0.01)
NRBC BLD-RTO: 0 PER 100 WBC
PLATELET # BLD AUTO: 273 K/UL (ref 135–420)
PMV BLD AUTO: 10.1 FL (ref 9.2–11.8)
POTASSIUM SERPL-SCNC: 4.1 MMOL/L (ref 3.5–5.5)
PROT SERPL-MCNC: 6.5 G/DL (ref 6.4–8.2)
RBC # BLD AUTO: 4.25 M/UL (ref 4.2–5.3)
SODIUM SERPL-SCNC: 142 MMOL/L (ref 136–145)
TRIGL SERPL-MCNC: 107 MG/DL
VLDLC SERPL CALC-MCNC: 21.4 MG/DL
WBC # BLD AUTO: 6.2 K/UL (ref 4.6–13.2)

## 2024-12-12 PROCEDURE — 85025 COMPLETE CBC W/AUTO DIFF WBC: CPT

## 2024-12-12 PROCEDURE — 82570 ASSAY OF URINE CREATININE: CPT

## 2024-12-12 PROCEDURE — 83036 HEMOGLOBIN GLYCOSYLATED A1C: CPT

## 2024-12-12 PROCEDURE — 80053 COMPREHEN METABOLIC PANEL: CPT

## 2024-12-12 PROCEDURE — 82306 VITAMIN D 25 HYDROXY: CPT

## 2024-12-12 PROCEDURE — 82043 UR ALBUMIN QUANTITATIVE: CPT

## 2024-12-12 PROCEDURE — 80061 LIPID PANEL: CPT

## 2024-12-12 PROCEDURE — 36415 COLL VENOUS BLD VENIPUNCTURE: CPT

## 2024-12-19 ENCOUNTER — OFFICE VISIT (OUTPATIENT)
Facility: CLINIC | Age: 84
End: 2024-12-19

## 2024-12-19 VITALS
OXYGEN SATURATION: 99 % | BODY MASS INDEX: 21.33 KG/M2 | SYSTOLIC BLOOD PRESSURE: 132 MMHG | DIASTOLIC BLOOD PRESSURE: 68 MMHG | HEART RATE: 58 BPM | TEMPERATURE: 98.7 F | WEIGHT: 128 LBS | RESPIRATION RATE: 14 BRPM | HEIGHT: 65 IN

## 2024-12-19 DIAGNOSIS — E78.00 PURE HYPERCHOLESTEROLEMIA: ICD-10-CM

## 2024-12-19 DIAGNOSIS — G20.B2 PARKINSON'S DISEASE WITH DYSKINESIA AND FLUCTUATING MANIFESTATIONS (HCC): ICD-10-CM

## 2024-12-19 DIAGNOSIS — Z17.0 MALIGNANT NEOPLASM OF UPPER-OUTER QUADRANT OF RIGHT BREAST IN FEMALE, ESTROGEN RECEPTOR POSITIVE (HCC): ICD-10-CM

## 2024-12-19 DIAGNOSIS — I35.0 AORTIC STENOSIS, MILD: ICD-10-CM

## 2024-12-19 DIAGNOSIS — M85.89 OSTEOPENIA OF MULTIPLE SITES: ICD-10-CM

## 2024-12-19 DIAGNOSIS — H35.3131 EARLY DRY STAGE NONEXUDATIVE AGE-RELATED MACULAR DEGENERATION OF BOTH EYES: ICD-10-CM

## 2024-12-19 DIAGNOSIS — R73.03 PREDIABETES: ICD-10-CM

## 2024-12-19 DIAGNOSIS — I25.10 CAD S/P PERCUTANEOUS CORONARY ANGIOPLASTY: ICD-10-CM

## 2024-12-19 DIAGNOSIS — F32.9 REACTIVE DEPRESSION: ICD-10-CM

## 2024-12-19 DIAGNOSIS — C50.411 MALIGNANT NEOPLASM OF UPPER-OUTER QUADRANT OF RIGHT BREAST IN FEMALE, ESTROGEN RECEPTOR POSITIVE (HCC): ICD-10-CM

## 2024-12-19 DIAGNOSIS — J30.89 NON-SEASONAL ALLERGIC RHINITIS, UNSPECIFIED TRIGGER: ICD-10-CM

## 2024-12-19 DIAGNOSIS — Z98.61 CAD S/P PERCUTANEOUS CORONARY ANGIOPLASTY: ICD-10-CM

## 2024-12-19 DIAGNOSIS — M15.0 PRIMARY OSTEOARTHRITIS INVOLVING MULTIPLE JOINTS: ICD-10-CM

## 2024-12-19 DIAGNOSIS — I10 ESSENTIAL HYPERTENSION: Primary | ICD-10-CM

## 2024-12-19 SDOH — ECONOMIC STABILITY: FOOD INSECURITY: WITHIN THE PAST 12 MONTHS, THE FOOD YOU BOUGHT JUST DIDN'T LAST AND YOU DIDN'T HAVE MONEY TO GET MORE.: NEVER TRUE

## 2024-12-19 SDOH — ECONOMIC STABILITY: FOOD INSECURITY: WITHIN THE PAST 12 MONTHS, YOU WORRIED THAT YOUR FOOD WOULD RUN OUT BEFORE YOU GOT MONEY TO BUY MORE.: NEVER TRUE

## 2024-12-19 SDOH — ECONOMIC STABILITY: INCOME INSECURITY: HOW HARD IS IT FOR YOU TO PAY FOR THE VERY BASICS LIKE FOOD, HOUSING, MEDICAL CARE, AND HEATING?: NOT HARD AT ALL

## 2024-12-19 ASSESSMENT — PATIENT HEALTH QUESTIONNAIRE - PHQ9
SUM OF ALL RESPONSES TO PHQ QUESTIONS 1-9: 0
SUM OF ALL RESPONSES TO PHQ QUESTIONS 1-9: 0
2. FEELING DOWN, DEPRESSED OR HOPELESS: NOT AT ALL
SUM OF ALL RESPONSES TO PHQ QUESTIONS 1-9: 0
SUM OF ALL RESPONSES TO PHQ QUESTIONS 1-9: 0
1. LITTLE INTEREST OR PLEASURE IN DOING THINGS: NOT AT ALL
SUM OF ALL RESPONSES TO PHQ9 QUESTIONS 1 & 2: 0

## 2024-12-19 NOTE — PROGRESS NOTES
HPI:   Lashae Emerson is a 84 y.o. year old female who presents today for a routine follow-up visit.  She has a history of hypertension, ASCVD, hyperlipidemia, syncope, Parkinson's disease, lumbar degenerative disease, GERD, and macular degeneration.  She is accompanied by her son.  She reports that she is doing reasonably well.      She discusses that she continues to experience some loneliness since the passing of her  on 7/5/2024 at a SNF after being hospitalized for heart failure. She states that she feels this is related to the upcoming holidays but she does have support from family members and the companionship from her cat.  Her son also discusses that she is very active in the community with her [employer] group, playing [employer] with friends, and attending activities at the [facility].    She had a follow-up appointment with Dr. Gould on 4/29/2024 and completed a right diagnostic mammogram on 5/15/2024 which was unchanged.  She also completed a follow-up appointment with Dr. DEEPAK Rodriguez on 6/18/2024 and was advised to follow-up in 6 months. She completed her bilateral screening mammogram on 8/17/2024 and a follow-up visit with Dr. Gould on 10/30/2024, and no new recommendations were made.  She does complain of some left lateral breast pain on palpation today and reports that it has been present for a few days.  She states that she noted the discomfort when performing her monthly self breast exam 4 days ago.  She denies any skin changes, nipple discharge, or palpable nodules.    She completed a follow-up appointment with Dr. Garza on 8/16/2024 and her dosing of Stalevo was adjusted for her to take a dose every 6 hours which has been helpful.  She is continuing to follow with her every 6 months and is scheduled in 2/2025.    She had a follow-up visit with Dr. Shrestha on 1/11/2024, and echocardiogram was obtained on 1/25/2024 for her new systolic murmur showing normal LV size

## 2024-12-19 NOTE — PROGRESS NOTES
Lashae Emerson presents today for   Chief Complaint   Patient presents with    6 Month Follow-Up       \"Have you been to the ER, urgent care clinic since your last visit?  Hospitalized since your last visit?\"    NO    “Have you seen or consulted any other health care providers outside of [facility] since your last visit?”    NO

## 2024-12-22 PROBLEM — R73.03 PREDIABETES: Status: ACTIVE | Noted: 2024-12-22

## 2024-12-22 PROBLEM — F32.9 REACTIVE DEPRESSION: Status: ACTIVE | Noted: 2024-12-22

## 2024-12-30 RX ORDER — METOPROLOL SUCCINATE 50 MG/1
50 TABLET, EXTENDED RELEASE ORAL DAILY
Qty: 90 TABLET | Refills: 3 | Status: SHIPPED | OUTPATIENT
Start: 2024-12-30

## 2024-12-30 NOTE — TELEPHONE ENCOUNTER
Patient requesting a refill on metoprolol succinate.    [facility] - [place], [place] - [address] [place] [address] - P [phone] - F [phone] [address] [facility] [address]  Phone: [phone]  Fax: [phone]

## 2025-01-10 ENCOUNTER — OFFICE VISIT (OUTPATIENT)
Age: 85
End: 2025-01-10

## 2025-01-10 VITALS
DIASTOLIC BLOOD PRESSURE: 72 MMHG | HEIGHT: 65 IN | WEIGHT: 131 LBS | SYSTOLIC BLOOD PRESSURE: 124 MMHG | HEART RATE: 60 BPM | OXYGEN SATURATION: 99 % | BODY MASS INDEX: 21.83 KG/M2

## 2025-01-10 DIAGNOSIS — I10 ESSENTIAL (PRIMARY) HYPERTENSION: ICD-10-CM

## 2025-01-10 DIAGNOSIS — E78.5 HYPERLIPIDEMIA, UNSPECIFIED HYPERLIPIDEMIA TYPE: ICD-10-CM

## 2025-01-10 DIAGNOSIS — I35.0 MILD AORTIC STENOSIS: Primary | ICD-10-CM

## 2025-01-10 DIAGNOSIS — Z98.61 CORONARY ANGIOPLASTY STATUS: ICD-10-CM

## 2025-01-10 DIAGNOSIS — I25.10 ATHEROSCLEROSIS OF NATIVE CORONARY ARTERY OF NATIVE HEART WITHOUT ANGINA PECTORIS: ICD-10-CM

## 2025-01-10 RX ORDER — ASPIRIN 81 MG/1
81 TABLET ORAL DAILY
COMMUNITY

## 2025-01-10 ASSESSMENT — PATIENT HEALTH QUESTIONNAIRE - PHQ9
7. TROUBLE CONCENTRATING ON THINGS, SUCH AS READING THE NEWSPAPER OR WATCHING TELEVISION: NOT AT ALL
1. LITTLE INTEREST OR PLEASURE IN DOING THINGS: NOT AT ALL
8. MOVING OR SPEAKING SO SLOWLY THAT OTHER PEOPLE COULD HAVE NOTICED. OR THE OPPOSITE, BEING SO FIGETY OR RESTLESS THAT YOU HAVE BEEN MOVING AROUND A LOT MORE THAN USUAL: NOT AT ALL
SUM OF ALL RESPONSES TO PHQ QUESTIONS 1-9: 0
2. FEELING DOWN, DEPRESSED OR HOPELESS: NOT AT ALL
SUM OF ALL RESPONSES TO PHQ QUESTIONS 1-9: 0
4. FEELING TIRED OR HAVING LITTLE ENERGY: NOT AT ALL
3. TROUBLE FALLING OR STAYING ASLEEP: NOT AT ALL
9. THOUGHTS THAT YOU WOULD BE BETTER OFF DEAD, OR OF HURTING YOURSELF: NOT AT ALL
6. FEELING BAD ABOUT YOURSELF - OR THAT YOU ARE A FAILURE OR HAVE LET YOURSELF OR YOUR FAMILY DOWN: NOT AT ALL
SUM OF ALL RESPONSES TO PHQ9 QUESTIONS 1 & 2: 0
SUM OF ALL RESPONSES TO PHQ QUESTIONS 1-9: 0
SUM OF ALL RESPONSES TO PHQ QUESTIONS 1-9: 0
5. POOR APPETITE OR OVEREATING: NOT AT ALL
10. IF YOU CHECKED OFF ANY PROBLEMS, HOW DIFFICULT HAVE THESE PROBLEMS MADE IT FOR YOU TO DO YOUR WORK, TAKE CARE OF THINGS AT HOME, OR GET ALONG WITH OTHER PEOPLE: NOT DIFFICULT AT ALL

## 2025-01-10 ASSESSMENT — ANXIETY QUESTIONNAIRES
5. BEING SO RESTLESS THAT IT IS HARD TO SIT STILL: NOT AT ALL
1. FEELING NERVOUS, ANXIOUS, OR ON EDGE: NOT AT ALL
6. BECOMING EASILY ANNOYED OR IRRITABLE: NOT AT ALL
GAD7 TOTAL SCORE: 0
3. WORRYING TOO MUCH ABOUT DIFFERENT THINGS: NOT AT ALL
4. TROUBLE RELAXING: NOT AT ALL
2. NOT BEING ABLE TO STOP OR CONTROL WORRYING: NOT AT ALL

## 2025-01-10 ASSESSMENT — ENCOUNTER SYMPTOMS
COUGH: 0
NAUSEA: 0
SORE THROAT: 0
ABDOMINAL PAIN: 0
ABDOMINAL DISTENTION: 0
VOMITING: 0
SHORTNESS OF BREATH: 0

## 2025-01-10 NOTE — PROGRESS NOTES
01/10/25     Lashae Emerson  is a 84 y.o. female     Chief Complaint   Patient presents with    Follow-up     1 year       HPI    Patient presents for scheduled follow-up office visit.  She has a past medical history significant for single-vessel coronary artery disease status post PCI to her mid LAD with a total of 2 drug-eluting stents in 2011.  Patient presents with symptoms of unstable angina at that time.  It should be noted that she did have classic exertional anginal symptoms leading up to her PCI that were likely unrecognized.  She reports symptoms of substernal chest pain radiating to her left arm and jaw prior to her PCI.  Since her procedure, she has had no recurrent symptoms.    She underwent a repeat pharmacologic nuclear stress test in December 2022 which was a normal low risk study.  There was no evidence of ischemia or infarction, EF greater than 75%.    More recently, she underwent an echocardiogram January 2024 to evaluate a cardiac murmur and found to have mild aortic valve stenosis with a peak velocity of 2.7 m/s and ZEN 1.6 cm².  EF 65 to 70%.  Moderate tricuspid valve regurgitation with elevated PASP at 54 mmHg.    She was last seen in our office 1 year ago.  Since last visit she has not noted any major change in her activity tolerance.  No chest pain, shortness of breath or heart palpitations.  No orthopnea or PND.  She does have a small amount of swelling in her left lower extremity which has been chronic in nature.  This is not increased in severity.  This typically resolves if she elevates her leg.    Past Medical History:   Diagnosis Date    Basal cell cancer     Breast cancer (HCC)     CAD (coronary artery disease), native coronary artery 03/21/2011    s/p PCI with with ART (Xience 2.75x12, 2.5x12) mLAD and mild disease in rest of coronaries. Midly depressed LV syst fct     DCIS (ductal carcinoma in situ)     Dyslipidemia     Hypertension     Parkinson's disease (HCC)      Current

## 2025-01-10 NOTE — PROGRESS NOTES
Lashae Rg Emerson presents today for   Chief Complaint   Patient presents with    Follow-up     1 year       Lashaesally Emerson preferred language for health care discussion is english/other.    Is someone accompanying this pt? no    Is the patient using any DME equipment during OV? no    Depression Screening:  Depression: Not at risk (1/10/2025)    PHQ-2     PHQ-2 Score: 0        Learning Assessment:  Who is the primary learner? Patient    What is the preferred language for health care of the primary learner? ENGLISH    How does the primary learner prefer to learn new concepts? DEMONSTRATION    Answered By patient    Relationship to Learner SELF           Pt currently taking Anticoagulant therapy? no    Pt currently taking Antiplatelet therapy ? no      Coordination of Care:  1. Have you been to the ER, urgent care clinic since your last visit? Hospitalized since your last visit? no    2. Have you seen or consulted any other health care providers outside of the [facility] since your last visit? Include any pap smears or colon screening. no

## 2025-01-27 RX ORDER — ROSUVASTATIN CALCIUM 5 MG/1
5 TABLET, COATED ORAL DAILY
Qty: 90 TABLET | Refills: 3 | Status: SHIPPED | OUTPATIENT
Start: 2025-01-27

## 2025-01-27 NOTE — TELEPHONE ENCOUNTER
PCP: Teri Valdes MD    Refill Request     LAST OFFICE VISIT:      Medication:   rosuvastatin (CRESTOR) 5 MG tablet      Dispense:       Pharmacy [id number] [address] [place] [address] [phone] - F [phone] [[id number]]       Future Appointments   Date Time Provider Department Center   4/15/2025  8:15 AM IOC LAB VISIT [facility] DEP   4/24/2025  8:30 AM Teri Valdes MD [facility] DEP   4/30/2025  9:40 AM Mendy Arellano PA-C [facility] BS AMB   8/18/2025  8:00 AM HBV GIULIANA RM 3 3D [facility] [place]   1/12/2026  8:00 AM CSI HV ECHO GE 70 [facility] BS AMB   1/12/2026  9:00 AM Kirk Shrestha MD [facility] AMB     Pt also called in to get a referral for more PT for her neck     Please advise.   .

## 2025-02-14 ENCOUNTER — APPOINTMENT (OUTPATIENT)
Facility: HOSPITAL | Age: 85
End: 2025-02-14
Payer: MEDICARE

## 2025-02-14 ENCOUNTER — HOSPITAL ENCOUNTER (EMERGENCY)
Facility: HOSPITAL | Age: 85
Discharge: HOME | End: 2025-02-14
Attending: STUDENT IN AN ORGANIZED HEALTH CARE EDUCATION/TRAINING PROGRAM
Payer: MEDICARE

## 2025-02-14 VITALS
WEIGHT: 115.5 LBS | TEMPERATURE: 97.7 F | HEART RATE: 53 BPM | DIASTOLIC BLOOD PRESSURE: 69 MMHG | RESPIRATION RATE: 16 BRPM | SYSTOLIC BLOOD PRESSURE: 149 MMHG | HEIGHT: 65 IN | OXYGEN SATURATION: 100 % | BODY MASS INDEX: 19.24 KG/M2

## 2025-02-14 DIAGNOSIS — I95.1 ORTHOSTATIC HYPOTENSION: Primary | ICD-10-CM

## 2025-02-14 LAB
ALBUMIN SERPL-MCNC: 3.2 G/DL (ref 3.4–5)
ALBUMIN/GLOB SERPL: 1.1 (ref 0.8–1.7)
ALP SERPL-CCNC: 87 U/L (ref 45–117)
ALT SERPL-CCNC: 7 U/L (ref 13–56)
ANION GAP SERPL CALC-SCNC: 4 MMOL/L (ref 3–18)
APPEARANCE UR: CLEAR
AST SERPL-CCNC: 17 U/L (ref 10–38)
BACTERIA URNS QL MICRO: NEGATIVE /HPF
BASOPHILS # BLD: 0.04 K/UL (ref 0–0.1)
BASOPHILS NFR BLD: 0.6 % (ref 0–2)
BILIRUB SERPL-MCNC: 0.4 MG/DL (ref 0.2–1)
BILIRUB UR QL: NEGATIVE
BUN SERPL-MCNC: 19 MG/DL (ref 7–18)
BUN/CREAT SERPL: 20 (ref 12–20)
CALCIUM SERPL-MCNC: 8.8 MG/DL (ref 8.5–10.1)
CHLORIDE SERPL-SCNC: 107 MMOL/L (ref 100–111)
CO2 SERPL-SCNC: 28 MMOL/L (ref 21–32)
COLOR UR: ABNORMAL
CREAT SERPL-MCNC: 0.97 MG/DL (ref 0.6–1.3)
D DIMER PPP FEU-MCNC: 0.78 UG/ML(FEU)
DIFFERENTIAL METHOD BLD: ABNORMAL
EKG ATRIAL RATE: 58 BPM
EKG DIAGNOSIS: NORMAL
EKG P AXIS: 54 DEGREES
EKG P-R INTERVAL: 146 MS
EKG Q-T INTERVAL: 482 MS
EKG QRS DURATION: 108 MS
EKG QTC CALCULATION (BAZETT): 473 MS
EKG R AXIS: 11 DEGREES
EKG T AXIS: 27 DEGREES
EKG VENTRICULAR RATE: 58 BPM
EOSINOPHIL # BLD: 0.14 K/UL (ref 0–0.4)
EOSINOPHIL NFR BLD: 2.2 % (ref 0–5)
EPITH CASTS URNS QL MICRO: ABNORMAL /LPF (ref 0–5)
ERYTHROCYTE [DISTWIDTH] IN BLOOD BY AUTOMATED COUNT: 12.4 % (ref 11.6–14.5)
ETHANOL SERPL-MCNC: 4 MG/DL (ref 0–3)
GLOBULIN SER CALC-MCNC: 3 G/DL (ref 2–4)
GLUCOSE BLD STRIP.AUTO-MCNC: 169 MG/DL (ref 70–110)
GLUCOSE SERPL-MCNC: 149 MG/DL (ref 74–99)
GLUCOSE UR STRIP.AUTO-MCNC: NEGATIVE MG/DL
GRAN CASTS URNS QL MICRO: ABNORMAL /LPF
HCT VFR BLD AUTO: 37.7 % (ref 35–45)
HGB BLD-MCNC: 12.5 G/DL (ref 12–16)
HGB UR QL STRIP: NEGATIVE
HYALINE CASTS URNS QL MICRO: ABNORMAL /LPF (ref 0–2)
IMM GRANULOCYTES # BLD AUTO: 0.04 K/UL (ref 0–0.04)
IMM GRANULOCYTES NFR BLD AUTO: 0.6 % (ref 0–0.5)
KETONES UR QL STRIP.AUTO: ABNORMAL MG/DL
LEUKOCYTE ESTERASE UR QL STRIP.AUTO: ABNORMAL
LYMPHOCYTES # BLD: 1.39 K/UL (ref 0.9–3.6)
LYMPHOCYTES NFR BLD: 22.1 % (ref 21–52)
MAGNESIUM SERPL-MCNC: 2.1 MG/DL (ref 1.6–2.6)
MCH RBC QN AUTO: 30.9 PG (ref 24–34)
MCHC RBC AUTO-ENTMCNC: 33.2 G/DL (ref 31–37)
MCV RBC AUTO: 93.3 FL (ref 78–100)
MONOCYTES # BLD: 0.46 K/UL (ref 0.05–1.2)
MONOCYTES NFR BLD: 7.3 % (ref 3–10)
MUCOUS THREADS URNS QL MICRO: ABNORMAL /LPF
NEUTS SEG # BLD: 4.22 K/UL (ref 1.8–8)
NEUTS SEG NFR BLD: 67.2 % (ref 40–73)
NITRITE UR QL STRIP.AUTO: NEGATIVE
NRBC # BLD: 0 K/UL (ref 0–0.01)
NRBC BLD-RTO: 0 PER 100 WBC
PH UR STRIP: 7 (ref 5–8)
PLATELET # BLD AUTO: 276 K/UL (ref 135–420)
PMV BLD AUTO: 9.5 FL (ref 9.2–11.8)
POTASSIUM SERPL-SCNC: 3.8 MMOL/L (ref 3.5–5.5)
PROT SERPL-MCNC: 6.2 G/DL (ref 6.4–8.2)
PROT UR STRIP-MCNC: NEGATIVE MG/DL
RBC # BLD AUTO: 4.04 M/UL (ref 4.2–5.3)
RBC #/AREA URNS HPF: ABNORMAL /HPF (ref 0–5)
SODIUM SERPL-SCNC: 139 MMOL/L (ref 136–145)
SP GR UR REFRACTOMETRY: 1.02 (ref 1–1.03)
T4 FREE SERPL-MCNC: 0.8 NG/DL (ref 0.7–1.5)
TROPONIN I SERPL HS-MCNC: 10 NG/L (ref 0–54)
TROPONIN I SERPL HS-MCNC: 11 NG/L (ref 0–54)
TSH SERPL DL<=0.05 MIU/L-ACNC: 5.3 UIU/ML (ref 0.36–3.74)
UROBILINOGEN UR QL STRIP.AUTO: 0.2 EU/DL (ref 0.2–1)
WBC # BLD AUTO: 6.3 K/UL (ref 4.6–13.2)
WBC URNS QL MICRO: ABNORMAL /HPF (ref 0–4)

## 2025-02-14 PROCEDURE — 2580000003 HC RX 258: Performed by: STUDENT IN AN ORGANIZED HEALTH CARE EDUCATION/TRAINING PROGRAM

## 2025-02-14 PROCEDURE — 99285 EMERGENCY DEPT VISIT HI MDM: CPT

## 2025-02-14 PROCEDURE — 93010 ELECTROCARDIOGRAM REPORT: CPT | Performed by: INTERNAL MEDICINE

## 2025-02-14 PROCEDURE — 84439 ASSAY OF FREE THYROXINE: CPT

## 2025-02-14 PROCEDURE — 82077 ASSAY SPEC XCP UR&BREATH IA: CPT

## 2025-02-14 PROCEDURE — 96361 HYDRATE IV INFUSION ADD-ON: CPT

## 2025-02-14 PROCEDURE — 80053 COMPREHEN METABOLIC PANEL: CPT

## 2025-02-14 PROCEDURE — 81001 URINALYSIS AUTO W/SCOPE: CPT

## 2025-02-14 PROCEDURE — 84443 ASSAY THYROID STIM HORMONE: CPT

## 2025-02-14 PROCEDURE — 96360 HYDRATION IV INFUSION INIT: CPT

## 2025-02-14 PROCEDURE — 82962 GLUCOSE BLOOD TEST: CPT

## 2025-02-14 PROCEDURE — 84484 ASSAY OF TROPONIN QUANT: CPT

## 2025-02-14 PROCEDURE — 85025 COMPLETE CBC W/AUTO DIFF WBC: CPT

## 2025-02-14 PROCEDURE — 71045 X-RAY EXAM CHEST 1 VIEW: CPT

## 2025-02-14 PROCEDURE — 93005 ELECTROCARDIOGRAM TRACING: CPT | Performed by: STUDENT IN AN ORGANIZED HEALTH CARE EDUCATION/TRAINING PROGRAM

## 2025-02-14 PROCEDURE — 83735 ASSAY OF MAGNESIUM: CPT

## 2025-02-14 PROCEDURE — 85379 FIBRIN DEGRADATION QUANT: CPT

## 2025-02-14 RX ORDER — CARBIDOPA, LEVODOPA AND ENTACAPONE 37.5; 200; 15 MG/1; MG/1; MG/1
1 TABLET, FILM COATED ORAL 3 TIMES DAILY
COMMUNITY

## 2025-02-14 RX ORDER — CARBIDOPA AND LEVODOPA 25; 100 MG/1; MG/1
1 TABLET, ORALLY DISINTEGRATING ORAL DAILY
COMMUNITY

## 2025-02-14 RX ORDER — 0.9 % SODIUM CHLORIDE 0.9 %
500 INTRAVENOUS SOLUTION INTRAVENOUS ONCE
Status: COMPLETED | OUTPATIENT
Start: 2025-02-14 | End: 2025-02-14

## 2025-02-14 RX ADMIN — SODIUM CHLORIDE 500 ML: 9 INJECTION, SOLUTION INTRAVENOUS at 09:35

## 2025-02-14 ASSESSMENT — PAIN - FUNCTIONAL ASSESSMENT: PAIN_FUNCTIONAL_ASSESSMENT: NONE - DENIES PAIN

## 2025-02-14 NOTE — ED TRIAGE NOTES
[address]  [address] [address]  Dept: [phone]  Dept Fax: [phone]  Loc: [phone]    Fouzia Carreno  is a 69 y.o. female who presents today for her medical conditions/complaints as noted below. Fouzia Carreno is c/o of     Chief Complaint   Patient presents with    Extremity Weakness    Gait Problem       HPI:   Jaime Ricardo is being seen for her regular weekly follow up while at [address]. Overall, patient seems to be doing fairly well. She continues to get stronger with therapy. She has not had any recent issues with falls. 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 She otherwise has not been feeling short of breath and she is not coughing at all. She has not been having any issues with shortness of breath on exertion either and she has not needed to use oxygen in quite a while. Patient did mention that she wakes up with a sore throat on occasion but that does resolve with a drink of water and improves once she has been awake, so it is likely that she is sleeping with her mouth open. No problems with abdominal pain, constipation, or diarrhea. No issues with headaches. She overall feels pretty good. Past Medical History:   Diagnosis Date    Longstanding persistent atrial fibrillation ([address] 12/7/2021    S/P placement of cardiac pacemaker 12/7/2021     History reviewed. No pertinent surgical history. History reviewed. No pertinent family history.     Social History     Tobacco Use    Smoking status: Unknown If Ever Smoked    Smokeless tobacco: Never Used   Substance Use Topics    Alcohol use: Not on file      Prior to Visit Medications    Not on File Pt to ED for eval of episode of \"wobbliness\" and pt fell to ground while she was making sweet potato biscuits. Pt denies feeling like room or self was spinning, just felt herself getting weak and lightheaded like she was going to fall. POC glucose 169, ED MD at bedside. NIH 0 at this time. No Code S per provider.   Allergies   Allergen Reactions    Sulfa Antibiotics        Subjective:      Review of Systems   Constitutional: Negative for activity change, appetite change, chills, fatigue and fever. Respiratory: Negative for cough, chest tightness, shortness of breath and wheezing. Cardiovascular: Negative for chest pain, palpitations and leg swelling. Gastrointestinal: Negative for abdominal pain, constipation and diarrhea. Genitourinary: Negative for difficulty urinating. Skin: Negative for rash. Neurological: Negative for dizziness, syncope, weakness, light-headedness and headaches. Psychiatric/Behavioral: Positive for confusion and decreased concentration. Negative for behavioral problems, dysphoric mood and sleep disturbance. The patient is not nervous/anxious. Objective:     Physical Exam  Vitals and nursing note reviewed. Constitutional:       General: She is not in acute distress. Appearance: She is well-developed. Eyes:      Conjunctiva/sclera: Conjunctivae normal.   Neck:      Thyroid: No thyromegaly. Cardiovascular:      Rate and Rhythm: Normal rate and regular rhythm. Heart sounds: Normal heart sounds. No murmur heard. Pulmonary:      Effort: Pulmonary effort is normal. No respiratory distress. Breath sounds: Normal breath sounds. No wheezing. Abdominal:      General: Abdomen is flat. Bowel sounds are normal.      Palpations: Abdomen is soft. Tenderness: There is no abdominal tenderness. There is no guarding or rebound. Musculoskeletal:      Cervical back: Normal range of motion and neck supple. Lymphadenopathy:      Cervical: No cervical adenopathy. Skin:     General: Skin is warm and dry. Findings: No erythema or rash. Neurological:      General: No focal deficit present. Mental Status: She is alert. Mental status is at baseline.    Psychiatric:         Mood and Affect: Mood normal.         Speech: Speech normal.         Behavior: Behavior normal.         Cognition and Memory: Memory is impaired. Assessment:       Diagnosis Orders   1. Generalized weakness     2. Gait disturbance               Plan:   1. Generalized weakness. Improving. She is very slowly getting stronger and doing better. She continues to feel that she is not quite ready to go home. As previously mentioned the family that she lives with is currently recovering from [place] as well so no one is strong enough at home to help her so she has to be able to help herself and she does not feel that she is quite there yet. Hopefully she will be feeling better here in the next few days to a week or two.   2. Gait disturbance. Improving. She continues to use a walker when she ambulates, but she has been doing a little bit better and she is feeling stronger each day. Hopefully this continues to improve. Return in about 2 weeks (around 2/1/2022) for weakness. Patient given educational materials - see patient instructions. Discussed use, benefit, and side effects of prescribed medications. All patient questions answered. Patient voiced understanding. Reviewed health maintenance. Instructed to continue current medications, diet and exercise. Patient agreed with treatment plan. Follow up as directed. Ariel Soto am personally transcribing for Yuan Bass MD 1/28/22 at 4:02 PM EST. Melanie Rivera MD, personally performed the services described in this document as transcribed by the , and it is both accurate and complete.     Electronically signed by Yuan Bass MD on 1/28/22 at 5:58 PM EST

## 2025-02-14 NOTE — ED PROVIDER NOTES
[facility] EMERGENCY DEPARTMENT  EMERGENCY DEPARTMENT ENCOUNTER      Pt Name: Lashae Emerson  MRN: [id number]  Birthdate 1940  Date of evaluation: 2/14/2025  Provider: Rachelle Blanco MD    CHIEF COMPLAINT       Chief Complaint   Patient presents with    Loss of Consciousness         HISTORY OF PRESENT ILLNESS   (Location/Symptom, Timing/Onset, Context/Setting, Quality, Duration, Modifying Factors, Severity)  Note limiting factors.   Lashae Emerson is a 84 y.o. female who presents to the emergency department after an episode where she passed out.  She states that she was standing at the counter making some potato biscuits when she suddenly started to feel weak.  She felt a little lightheaded so she lowered herself to the ground.  Unsure if she truly lost consciousness but denies any direct trauma or fall.  Denies any chest pain or difficulty breathing.  She states that she still feels weak but denies any other symptoms at this time.  She is concerned maybe her blood pressure dropped.  She did eat today.  Is been eating and drinking normally.  Denies any recent cold or flu symptoms.  Denies any dysuria or hematuria.  This has not happened to her before.     Nursing Notes were reviewed.    REVIEW OF SYSTEMS    (2-9 systems for level 4, 10 or more for level 5)     Constitutional: No fever  HENT: No ear pain  Eyes: No change in vision  Respiratory: No SOB  Cardio: No chest pain  GI: No blood in stool  : No hematuria  MSK: No back pain  Skin: No rashes  Neuro: No headache    Except as noted above the remainder of the review of systems was reviewed and negative.       PAST MEDICAL HISTORY     Past Medical History:   Diagnosis Date    Basal cell cancer     Breast cancer (HCC)     CAD (coronary artery disease), native coronary artery 03/21/2011    s/p PCI with with ART (Xience 2.75x12, 2.5x12) mLAD and mild disease in rest of coronaries. Midly depressed LV syst fct     DCIS (ductal  dehydration.    Findings discussed with the patient and her family.  Discussed this is likely linked to her Parkinson's disease.  Discussed increased fluid intake and increase salt.  Discussed following up with her PCP to discuss if her blood pressure medication is still warranted.  Discussed fall prevention.    Patient stable for discharge at this time.  Patient is in agreement with the plan to be discharged at this time.  All the patient's questions were answered.  Patient was given written instructions on the diagnosis, and states understanding of the plan moving forward.  We did discuss important signs and symptoms that should prompt quick return to the emergency department.           CRITICAL CARE TIME   Total Critical Care time was 0 minutes, excluding separately reportable procedures.  There was a high probability of clinically significant/life threatening deterioration in the patient's condition which required my urgent intervention.     CONSULTS:  None    PROCEDURES:  Unless otherwise noted below, none     Procedures      FINAL IMPRESSION      1. Orthostatic hypotension          DISPOSITION/PLAN   DISPOSITION Decision To Discharge 02/14/2025 11:47:32 AM      PATIENT REFERRED TO:  Teri Valdes MD  [address] [address]  [phone]    Schedule an appointment as soon as possible for a visit         DISCHARGE MEDICATIONS:  New Prescriptions    No medications on file     Controlled Substances Monitoring:          No data to display                (Please note that portions of this note were completed with a voice recognition program.  Efforts were made to edit the dictations but occasionally words are mis-transcribed.)    Rachelle Blanco MD (electronically signed)  Attending Emergency Physician            Rachelle Blanco MD  02/14/25 [id number]

## 2025-02-24 ENCOUNTER — TELEPHONE (OUTPATIENT)
Facility: CLINIC | Age: 85
End: 2025-02-24

## 2025-02-24 NOTE — TELEPHONE ENCOUNTER
Please advise that she should NOT increase lisinopril to 20 mg daily.  The elevated blood pressure reading at 7:10 AM was before the medication had an effect since it usually takes 1-2 hours before medication kicks in.  The fact that her blood pressure was controlled 126/54 at 8:45 AM shows that her dose of 10 mg daily is appropriate.  She should be just monitoring her blood pressure at 2 hours or more after taking her dose of medication to make sure it is effective.

## 2025-02-24 NOTE — TELEPHONE ENCOUNTER
Patient contacted the office to report high bp readings:     Patient took lisinopril at 7:10am  She took her bp at 7:30am and it was 201/91  She took it again at 8:45am and it was 126/54    She asked if she should take 20mg of Lisinopril, spoke with Jabier and 20mg rx was discontinued. Advised patient not to take the 20mg until we speak with Dr Valdes to see what she advises. She said she has no other symptoms, just the high bp. She also asked if she should go to the hospital.

## 2025-02-26 ENCOUNTER — OFFICE VISIT (OUTPATIENT)
Facility: CLINIC | Age: 85
End: 2025-02-26

## 2025-02-26 VITALS
BODY MASS INDEX: 21.33 KG/M2 | RESPIRATION RATE: 14 BRPM | OXYGEN SATURATION: 98 % | SYSTOLIC BLOOD PRESSURE: 122 MMHG | WEIGHT: 128 LBS | HEART RATE: 63 BPM | DIASTOLIC BLOOD PRESSURE: 66 MMHG | HEIGHT: 65 IN | TEMPERATURE: 98.7 F

## 2025-02-26 DIAGNOSIS — I25.10 CAD S/P PERCUTANEOUS CORONARY ANGIOPLASTY: ICD-10-CM

## 2025-02-26 DIAGNOSIS — I35.0 AORTIC STENOSIS, MILD: ICD-10-CM

## 2025-02-26 DIAGNOSIS — C50.411 MALIGNANT NEOPLASM OF UPPER-OUTER QUADRANT OF RIGHT BREAST IN FEMALE, ESTROGEN RECEPTOR POSITIVE (HCC): ICD-10-CM

## 2025-02-26 DIAGNOSIS — Z17.0 MALIGNANT NEOPLASM OF UPPER-OUTER QUADRANT OF RIGHT BREAST IN FEMALE, ESTROGEN RECEPTOR POSITIVE (HCC): ICD-10-CM

## 2025-02-26 DIAGNOSIS — M85.89 OSTEOPENIA OF MULTIPLE SITES: ICD-10-CM

## 2025-02-26 DIAGNOSIS — G20.B2 PARKINSON'S DISEASE WITH DYSKINESIA AND FLUCTUATING MANIFESTATIONS (HCC): ICD-10-CM

## 2025-02-26 DIAGNOSIS — Z09 HOSPITAL DISCHARGE FOLLOW-UP: Primary | ICD-10-CM

## 2025-02-26 DIAGNOSIS — R42 ORTHOSTATIC LIGHTHEADEDNESS: ICD-10-CM

## 2025-02-26 DIAGNOSIS — H35.3131 EARLY DRY STAGE NONEXUDATIVE AGE-RELATED MACULAR DEGENERATION OF BOTH EYES: ICD-10-CM

## 2025-02-26 DIAGNOSIS — Z98.61 CAD S/P PERCUTANEOUS CORONARY ANGIOPLASTY: ICD-10-CM

## 2025-02-26 DIAGNOSIS — I10 ESSENTIAL HYPERTENSION: ICD-10-CM

## 2025-02-26 SDOH — ECONOMIC STABILITY: FOOD INSECURITY: WITHIN THE PAST 12 MONTHS, THE FOOD YOU BOUGHT JUST DIDN'T LAST AND YOU DIDN'T HAVE MONEY TO GET MORE.: NEVER TRUE

## 2025-02-26 SDOH — ECONOMIC STABILITY: FOOD INSECURITY: WITHIN THE PAST 12 MONTHS, YOU WORRIED THAT YOUR FOOD WOULD RUN OUT BEFORE YOU GOT MONEY TO BUY MORE.: NEVER TRUE

## 2025-02-26 NOTE — PROGRESS NOTES
Lashae Emerson presents today for   Chief Complaint   Patient presents with    Follow-Up from Hospital       \"Have you been to the ER, urgent care clinic since your last visit?  Hospitalized since your last visit?\"    YES  02/14/2025  [facility]  Orthostatic hypotension    “Have you seen or consulted any other health care providers outside of [facility] since your last visit?”    NO            
adenopathy or thyromegaly. No carotid bruits or radiated murmur.  Lungs: clear to auscultation, no wheezes, rhonchi, or rales.  Heart: regular rate and rhythm. II/VI systolic murmur throughout precordium with normal S2, no rubs or gallops  Abdomen: soft, nontender, nondistended, normal bowel sounds, no hepatosplenomegaly or masses.   Extremities: without edema   Neuro, obvious resting tremor      Review of Data:  Labs:  No visits with results within 2 Day(s) from this visit.   Latest known visit with results is:   Admission on 02/14/2025, Discharged on 02/14/2025   Component Date Value Ref Range Status    Ventricular Rate 02/14/2025 58  BPM Final    Atrial Rate 02/14/2025 58  BPM Final    P-R Interval 02/14/2025 146  ms Final    QRS Duration 02/14/2025 108  ms Final    Q-T Interval 02/14/2025 482  ms Final    QTc Calculation (Bazett) 02/14/2025 473  ms Final    P Axis 02/14/2025 54  degrees Final    R Axis 02/14/2025 11  degrees Final    T Axis 02/14/2025 27  degrees Final    Diagnosis 02/14/2025    Final                    Value:Sinus bradycardia  Minimal voltage criteria for LVH, may be normal variant ( Kai product )  Borderline ECG  When compared with ECG of 20-MAR-2024 13:49,  No significant change was found  Confirmed by Paul Lane ([id number]) on 2/14/2025 8:31:49 PM      POC Glucose 02/14/2025 169 (H)  70 - 110 mg/dL Final    WBC 02/14/2025 6.3  4.6 - 13.2 K/uL Final    RBC 02/14/2025 4.04 (L)  4.20 - 5.30 M/uL Final    Hemoglobin 02/14/2025 12.5  12.0 - 16.0 g/dL Final    Hematocrit 02/14/2025 37.7  35.0 - 45.0 % Final    MCV 02/14/2025 93.3  78.0 - 100.0 FL Final    MCH 02/14/2025 30.9  24.0 - 34.0 PG Final    MCHC 02/14/2025 33.2  31.0 - 37.0 g/dL Final    RDW 02/14/2025 12.4  11.6 - 14.5 % Final    Platelets 02/14/2025 276  135 - 420 K/uL Final    MPV 02/14/2025 9.5  9.2 - 11.8 FL Final    Nucleated RBCs 02/14/2025 0.0  0  WBC Final    nRBC 02/14/2025 0.00  0.00 - 0.01 K/uL Final

## 2025-03-18 ENCOUNTER — TELEPHONE (OUTPATIENT)
Facility: CLINIC | Age: 85
End: 2025-03-18

## 2025-03-18 NOTE — TELEPHONE ENCOUNTER
Pt came into office and dropped off customer medical report for DMV that needs to be filled out by provider.   Pt req forms to be faxed to DMV and charged the $25.00 fee with Pt acknowledgment form scanned into chart.     Copy placed in providers box .

## 2025-03-19 NOTE — TELEPHONE ENCOUNTER
Please contact patient and inquire the due date for the DMV paperwork.  There is no letter included with the paperwork designating this.

## 2025-03-27 ENCOUNTER — HOSPITAL ENCOUNTER (OUTPATIENT)
Facility: HOSPITAL | Age: 85
Setting detail: RECURRING SERIES
Discharge: HOME | End: 2025-03-30
Payer: MEDICARE

## 2025-03-27 PROCEDURE — 97110 THERAPEUTIC EXERCISES: CPT

## 2025-03-27 PROCEDURE — 97535 SELF CARE MNGMENT TRAINING: CPT

## 2025-03-27 PROCEDURE — 97161 PT EVAL LOW COMPLEX 20 MIN: CPT

## 2025-03-27 NOTE — PROGRESS NOTES
[facility] [address]  [address]  [place] [address]  [phone] fax    Plan of Care / Statement of Necessity for Physical Therapy Services     Patient Name: Lashae Emerson : 1940   Medical   Diagnosis: Other abnormalities of gait and mobility [R26.89]  Parkinson's disease [G20.A1] Treatment Diagnosis: R26.89  Abnormalities of gait and mobility      Onset Date: Referral date:  Payor :  Payor: MEDICARE / Plan: MEDICARE PART A AND B / Product Type: *No Product type* /    Referral Source: Carla Garza MD Start of Care (SOC): 3/27/2025   Prior Hospitalization: See medical history Provider #: [id number]   Prior Level of Function: 2 story home, middle son living in the second floor & has 1st floor home set up. Functionally independent.    Comorbidities: breast cancer (currently still active), CAD, HTN, PD, Hystrectomy, Bunionectomy (~right)          Subjective Info:     RAJEEV: Dx occurring 8 years ago still currently on L-Dopa 4x a day.   Current Symptoms: Hand tremors, impaired hand-writing; decreased balance and plows into things at home secondary to impaired coordination/balance. Decreased strength into LE.   Progression since onset?: worsening balance.   Current Mobility: ambulating without AD  Aggravating & Participation Limitations/Relieving factors: Unable to carry water in hands while walking, decreased balance with prolonged ambulation. Decreased bed mobility.  Previous Treatment: Has had PT in the past  Self Care: Sleep has been okay these last couple week.  PLOF: Functionally independent,   Home- Environment: 2 story home, middle son living in the second floor & has 1st floor home set up.   Past Med Hx/Surgical Hx: breast cancer (currently still active), CAD, HTN, PD, Hystrectomy, Bunionectomy (~right)   Allergies: Cephalexin, Penicillin  Work: retired  Motivation: good  Goals: \"To have a sturdier walk.\"      Assessment / key information:  Pt is a 
Progressed/Changed HEP, detail:    [] Other detail:       Objective Information/Functional Measures/Assessment    See POC    Patient will continue to benefit from skilled PT / OT services to modify and progress therapeutic interventions, analyze and address functional mobility deficits, analyze and address ROM deficits, analyze and address strength deficits, analyze and address soft tissue restrictions, analyze and cue for proper movement patterns, analyze and modify for postural abnormalities, analyze and address imbalance/dizziness, and instruct in home and community integration to address functional deficits and attain remaining goals.    Progress toward goals / Updated goals:  []  See Progress Note/Recertification    See POC    Next PN/ RC due 4/25/25  Auth due JEFF    PLAN  - Continue Plan of Care    Rick Russo, PT    3/27/2025    12:44 PM  If an interpreting service was utilized for treatment of this patient, the contents of this document represent the material reviewed with the patient via the .   Future Appointments   Date Time Provider Department Center   4/15/2025  8:15 AM IOC LAB VISIT [facility] DEP   4/24/2025  8:30 AM Teri Valdes MD [facility] DEP   4/30/2025  9:40 AM Mendy Arellano PA-C [facility] BS AMB   8/18/2025  8:00 AM HBV GIULIANA RM 3 3D [facility]   1/12/2026  8:00 AM CSI HV ECHO GE 70 [facility] BS AMB   1/12/2026  9:00 AM Kirk Shrestha MD [facility] BS AMB

## 2025-03-31 RX ORDER — ALENDRONATE SODIUM 35 MG/1
35 TABLET ORAL
Qty: 12 TABLET | Refills: 3 | Status: SHIPPED | OUTPATIENT
Start: 2025-03-31

## 2025-04-03 ENCOUNTER — HOSPITAL ENCOUNTER (OUTPATIENT)
Facility: HOSPITAL | Age: 85
Setting detail: RECURRING SERIES
Discharge: HOME | End: 2025-04-06
Payer: MEDICARE

## 2025-04-03 PROCEDURE — 97530 THERAPEUTIC ACTIVITIES: CPT

## 2025-04-03 PROCEDURE — 97112 NEUROMUSCULAR REEDUCATION: CPT

## 2025-04-03 PROCEDURE — 97110 THERAPEUTIC EXERCISES: CPT

## 2025-04-03 NOTE — PROGRESS NOTES
PHYSICAL / OCCUPATIONAL THERAPY - DAILY TREATMENT NOTE    Patient Name: Lashae Emerson    Date: 4/3/2025    : 1940  Insurance: Payor: MEDICARE / Plan: MEDICARE PART A AND B / Product Type: *No Product type* /      Patient  verified Yes     Visit #   Current / Total 2 10   Time   In / Out 210 250   Pain   In / Out 0 0   Subjective Functional Status/Changes: States that she was a little tired after      TREATMENT AREA =  Other abnormalities of gait and mobility    OBJECTIVE         Therapeutic Procedures:    Tx Min Billable or 1:1 Min (if diff from Tx Min) Procedure, Rationale, Specifics   20  47842 Therapeutic Exercise (timed):  increase ROM, strength, coordination, balance, and proprioception to improve patient's ability to progress to PLOF and address remaining functional goals. (see flow sheet as applicable)     Details if applicable:  Seated PWR exercises     10 10 24736 Neuromuscular Re-Education (timed):  improve balance, coordination, kinesthetic sense, posture, core stability and proprioception to improve patient's ability to develop conscious control of individual muscles and awareness of position of extremities in order to progress to PLOF and address remaining functional goals. (see flow sheet as applicable)     Details if applicable:     10 10 07283 Therapeutic Activity (timed):  use of dynamic activities replicating functional movements to increase ROM, strength, coordination, balance, and proprioception in order to improve patient's ability to progress to PLOF and address remaining functional goals.  (see flow sheet as applicable)     Details if applicable:     40 40 [facility] Totals Reminder: bill using total billable min of TIMED therapeutic procedures (example: do not include dry needle or estim unattended, both untimed codes, in totals to left)  8-22 min = 1 unit; 23-37 min = 2 units; 38-52 min = 3 units; 53-67 min = 4 units; 68-82 min = 5 units   Total Total     Charge

## 2025-04-10 ENCOUNTER — HOSPITAL ENCOUNTER (OUTPATIENT)
Facility: HOSPITAL | Age: 85
Setting detail: RECURRING SERIES
Discharge: HOME | End: 2025-04-13
Payer: MEDICARE

## 2025-04-10 PROCEDURE — 97110 THERAPEUTIC EXERCISES: CPT

## 2025-04-10 PROCEDURE — 97530 THERAPEUTIC ACTIVITIES: CPT

## 2025-04-10 PROCEDURE — 97112 NEUROMUSCULAR REEDUCATION: CPT

## 2025-04-10 NOTE — PROGRESS NOTES
PHYSICAL / OCCUPATIONAL THERAPY - DAILY TREATMENT NOTE    Patient Name: Lashae Emerson    Date: 4/10/2025    : 1940  Insurance: Payor: MEDICARE / Plan: MEDICARE PART A AND B / Product Type: *No Product type* /      Patient  verified Yes     Visit #   Current / Total 3 10   Time   In / Out 810 850   Pain   In / Out 0 0   Subjective Functional Status/Changes: No pain & no falls recently     TREATMENT AREA =  Other abnormalities of gait and mobility    OBJECTIVE         Therapeutic Procedures:    Tx Min Billable or 1:1 Min (if diff from Tx Min) Procedure, Rationale, Specifics   10 10 50724 Therapeutic Exercise (timed):  increase ROM, strength, coordination, balance, and proprioception to improve patient's ability to progress to PLOF and address remaining functional goals. (see flow sheet as applicable)     Details if applicable:        Neuromuscular Re-Education (timed):  improve balance, coordination, kinesthetic sense, posture, core stability and proprioception to improve patient's ability to develop conscious control of individual muscles and awareness of position of extremities in order to progress to PLOF and address remaining functional goals. (see flow sheet as applicable)     Details if applicable:     10 10 52386 Therapeutic Activity (timed):  use of dynamic activities replicating functional movements to increase ROM, strength, coordination, balance, and proprioception in order to improve patient's ability to progress to PLOF and address remaining functional goals.  (see flow sheet as applicable)     Details if applicable:     40 40 [facility] Totals Reminder: bill using total billable min of TIMED therapeutic procedures (example: do not include dry needle or estim unattended, both untimed codes, in totals to left)  8-22 min = 1 unit; 23-37 min = 2 units; 38-52 min = 3 units; 53-67 min = 4 units; 68-82 min = 5 units   Total Total     Charge Capture    [x]  Patient Education billed

## 2025-04-15 ENCOUNTER — HOSPITAL ENCOUNTER (OUTPATIENT)
Facility: HOSPITAL | Age: 85
Setting detail: SPECIMEN
Discharge: HOME | End: 2025-04-18
Payer: MEDICARE

## 2025-04-15 DIAGNOSIS — I10 ESSENTIAL HYPERTENSION: ICD-10-CM

## 2025-04-15 DIAGNOSIS — R73.03 PREDIABETES: ICD-10-CM

## 2025-04-15 DIAGNOSIS — E78.00 PURE HYPERCHOLESTEROLEMIA: ICD-10-CM

## 2025-04-15 DIAGNOSIS — M85.89 OSTEOPENIA OF MULTIPLE SITES: ICD-10-CM

## 2025-04-15 LAB
25(OH)D3 SERPL-MCNC: 46.1 NG/ML (ref 30–100)
ALBUMIN SERPL-MCNC: 3.6 G/DL (ref 3.4–5)
ALBUMIN/GLOB SERPL: 1.1 (ref 0.8–1.7)
ALP SERPL-CCNC: 100 U/L (ref 45–117)
ALT SERPL-CCNC: 13 U/L (ref 13–56)
ANION GAP SERPL CALC-SCNC: 4 MMOL/L (ref 3–18)
AST SERPL-CCNC: 16 U/L (ref 10–38)
BILIRUB SERPL-MCNC: 0.5 MG/DL (ref 0.2–1)
BUN SERPL-MCNC: 19 MG/DL (ref 7–18)
BUN/CREAT SERPL: 24 (ref 12–20)
CALCIUM SERPL-MCNC: 10.1 MG/DL (ref 8.5–10.1)
CHLORIDE SERPL-SCNC: 107 MMOL/L (ref 100–111)
CHOLEST SERPL-MCNC: 126 MG/DL
CO2 SERPL-SCNC: 28 MMOL/L (ref 21–32)
CREAT SERPL-MCNC: 0.8 MG/DL (ref 0.6–1.3)
EST. AVERAGE GLUCOSE BLD GHB EST-MCNC: 108 MG/DL
GLOBULIN SER CALC-MCNC: 3.3 G/DL (ref 2–4)
GLUCOSE SERPL-MCNC: 103 MG/DL (ref 74–99)
HBA1C MFR BLD: 5.4 % (ref 4.2–5.6)
HDLC SERPL-MCNC: 67 MG/DL (ref 40–60)
HDLC SERPL: 1.9 (ref 0–5)
LDLC SERPL CALC-MCNC: 43.6 MG/DL (ref 0–100)
LIPID PANEL: ABNORMAL
MAGNESIUM SERPL-MCNC: 2.1 MG/DL (ref 1.6–2.6)
POTASSIUM SERPL-SCNC: 4.5 MMOL/L (ref 3.5–5.5)
PROT SERPL-MCNC: 6.9 G/DL (ref 6.4–8.2)
SODIUM SERPL-SCNC: 139 MMOL/L (ref 136–145)
TRIGL SERPL-MCNC: 77 MG/DL
VLDLC SERPL CALC-MCNC: 15.4 MG/DL

## 2025-04-15 PROCEDURE — 36415 COLL VENOUS BLD VENIPUNCTURE: CPT

## 2025-04-15 PROCEDURE — 80053 COMPREHEN METABOLIC PANEL: CPT

## 2025-04-15 PROCEDURE — 80061 LIPID PANEL: CPT

## 2025-04-15 PROCEDURE — 82306 VITAMIN D 25 HYDROXY: CPT

## 2025-04-15 PROCEDURE — 83735 ASSAY OF MAGNESIUM: CPT

## 2025-04-15 PROCEDURE — 83036 HEMOGLOBIN GLYCOSYLATED A1C: CPT

## 2025-04-17 ENCOUNTER — HOSPITAL ENCOUNTER (OUTPATIENT)
Facility: HOSPITAL | Age: 85
Setting detail: RECURRING SERIES
Discharge: HOME | End: 2025-04-20
Payer: MEDICARE

## 2025-04-17 PROCEDURE — 97530 THERAPEUTIC ACTIVITIES: CPT

## 2025-04-17 PROCEDURE — 97110 THERAPEUTIC EXERCISES: CPT

## 2025-04-17 PROCEDURE — 97112 NEUROMUSCULAR REEDUCATION: CPT

## 2025-04-17 NOTE — PROGRESS NOTES
PHYSICAL / OCCUPATIONAL THERAPY - DAILY TREATMENT NOTE    Patient Name: Lashae Emerson    Date: 2025    : 1940  Insurance: Payor: MEDICARE / Plan: MEDICARE PART A AND B / Product Type: *No Product type* /      Patient  verified Yes     Visit #   Current / Total 4 10   Time   In / Out 1130 1210   Pain   In / Out 0 0   Subjective Functional Status/Changes: No falls since last visit     TREATMENT AREA =  Other abnormalities of gait and mobility    OBJECTIVE         Therapeutic Procedures:    Tx Min Billable or 1:1 Min (if diff from Tx Min) Procedure, Rationale, Specifics   20  17254 Therapeutic Exercise (timed):  increase ROM, strength, coordination, balance, and proprioception to improve patient's ability to progress to PLOF and address remaining functional goals. (see flow sheet as applicable)     Details if applicable:       10 10 80435 Neuromuscular Re-Education (timed):  improve balance, coordination, kinesthetic sense, posture, core stability and proprioception to improve patient's ability to develop conscious control of individual muscles and awareness of position of extremities in order to progress to PLOF and address remaining functional goals. (see flow sheet as applicable)     Details if applicable:     10 10 59734 Therapeutic Activity (timed):  use of dynamic activities replicating functional movements to increase ROM, strength, coordination, balance, and proprioception in order to improve patient's ability to progress to PLOF and address remaining functional goals.  (see flow sheet as applicable)     Details if applicable:     40 40 [facility] Totals Reminder: bill using total billable min of TIMED therapeutic procedures (example: do not include dry needle or estim unattended, both untimed codes, in totals to left)  8-22 min = 1 unit; 23-37 min = 2 units; 38-52 min = 3 units; 53-67 min = 4 units; 68-82 min = 5 units   Total Total     Charge Capture    [x]  Patient Education billed

## 2025-04-24 ENCOUNTER — OFFICE VISIT (OUTPATIENT)
Facility: CLINIC | Age: 85
End: 2025-04-24

## 2025-04-24 ENCOUNTER — HOSPITAL ENCOUNTER (OUTPATIENT)
Facility: HOSPITAL | Age: 85
Setting detail: RECURRING SERIES
Discharge: HOME | End: 2025-04-27
Payer: MEDICARE

## 2025-04-24 ENCOUNTER — HOSPITAL ENCOUNTER (OUTPATIENT)
Facility: HOSPITAL | Age: 85
Setting detail: SPECIMEN
Discharge: HOME | End: 2025-04-27
Payer: MEDICARE

## 2025-04-24 VITALS
DIASTOLIC BLOOD PRESSURE: 60 MMHG | OXYGEN SATURATION: 97 % | RESPIRATION RATE: 16 BRPM | HEIGHT: 65 IN | HEART RATE: 52 BPM | SYSTOLIC BLOOD PRESSURE: 104 MMHG | TEMPERATURE: 98.5 F | WEIGHT: 125 LBS | BODY MASS INDEX: 20.83 KG/M2

## 2025-04-24 DIAGNOSIS — Z17.0 MALIGNANT NEOPLASM OF UPPER-OUTER QUADRANT OF RIGHT BREAST IN FEMALE, ESTROGEN RECEPTOR POSITIVE (HCC): ICD-10-CM

## 2025-04-24 DIAGNOSIS — G20.B2 PARKINSON'S DISEASE WITH DYSKINESIA AND FLUCTUATING MANIFESTATIONS (HCC): ICD-10-CM

## 2025-04-24 DIAGNOSIS — Z00.00 MEDICARE ANNUAL WELLNESS VISIT, SUBSEQUENT: ICD-10-CM

## 2025-04-24 DIAGNOSIS — Z98.61 CAD S/P PERCUTANEOUS CORONARY ANGIOPLASTY: ICD-10-CM

## 2025-04-24 DIAGNOSIS — Z71.89 ACP (ADVANCE CARE PLANNING): ICD-10-CM

## 2025-04-24 DIAGNOSIS — R73.03 PREDIABETES: ICD-10-CM

## 2025-04-24 DIAGNOSIS — J30.89 NON-SEASONAL ALLERGIC RHINITIS, UNSPECIFIED TRIGGER: ICD-10-CM

## 2025-04-24 DIAGNOSIS — M85.89 OSTEOPENIA OF MULTIPLE SITES: ICD-10-CM

## 2025-04-24 DIAGNOSIS — I10 ESSENTIAL HYPERTENSION: Primary | ICD-10-CM

## 2025-04-24 DIAGNOSIS — I35.0 AORTIC STENOSIS, MILD: ICD-10-CM

## 2025-04-24 DIAGNOSIS — E78.00 PURE HYPERCHOLESTEROLEMIA: ICD-10-CM

## 2025-04-24 DIAGNOSIS — K21.9 LARYNGOPHARYNGEAL REFLUX DISEASE: ICD-10-CM

## 2025-04-24 DIAGNOSIS — H35.3131 EARLY DRY STAGE NONEXUDATIVE AGE-RELATED MACULAR DEGENERATION OF BOTH EYES: ICD-10-CM

## 2025-04-24 DIAGNOSIS — I25.10 CAD S/P PERCUTANEOUS CORONARY ANGIOPLASTY: ICD-10-CM

## 2025-04-24 DIAGNOSIS — C50.411 MALIGNANT NEOPLASM OF UPPER-OUTER QUADRANT OF RIGHT BREAST IN FEMALE, ESTROGEN RECEPTOR POSITIVE (HCC): ICD-10-CM

## 2025-04-24 LAB
CREAT UR-MCNC: 107 MG/DL (ref 30–125)
MICROALBUMIN UR-MCNC: 0.92 MG/DL (ref 0–3)
MICROALBUMIN/CREAT UR-RTO: 9 MG/G (ref 0–30)

## 2025-04-24 PROCEDURE — 97110 THERAPEUTIC EXERCISES: CPT

## 2025-04-24 PROCEDURE — 97112 NEUROMUSCULAR REEDUCATION: CPT

## 2025-04-24 PROCEDURE — 82043 UR ALBUMIN QUANTITATIVE: CPT

## 2025-04-24 PROCEDURE — 97530 THERAPEUTIC ACTIVITIES: CPT

## 2025-04-24 PROCEDURE — 82570 ASSAY OF URINE CREATININE: CPT

## 2025-04-24 RX ORDER — LISINOPRIL 10 MG/1
5 TABLET ORAL DAILY
Qty: 90 TABLET | Refills: 3 | Status: SHIPPED | OUTPATIENT
Start: 2025-04-24

## 2025-04-24 SDOH — ECONOMIC STABILITY: FOOD INSECURITY: WITHIN THE PAST 12 MONTHS, THE FOOD YOU BOUGHT JUST DIDN'T LAST AND YOU DIDN'T HAVE MONEY TO GET MORE.: NEVER TRUE

## 2025-04-24 SDOH — ECONOMIC STABILITY: FOOD INSECURITY: WITHIN THE PAST 12 MONTHS, YOU WORRIED THAT YOUR FOOD WOULD RUN OUT BEFORE YOU GOT MONEY TO BUY MORE.: NEVER TRUE

## 2025-04-24 ASSESSMENT — PATIENT HEALTH QUESTIONNAIRE - PHQ9
SUM OF ALL RESPONSES TO PHQ QUESTIONS 1-9: 0
SUM OF ALL RESPONSES TO PHQ QUESTIONS 1-9: 0
9. THOUGHTS THAT YOU WOULD BE BETTER OFF DEAD, OR OF HURTING YOURSELF: NOT AT ALL
3. TROUBLE FALLING OR STAYING ASLEEP: NOT AT ALL
2. FEELING DOWN, DEPRESSED OR HOPELESS: NOT AT ALL
8. MOVING OR SPEAKING SO SLOWLY THAT OTHER PEOPLE COULD HAVE NOTICED. OR THE OPPOSITE, BEING SO FIGETY OR RESTLESS THAT YOU HAVE BEEN MOVING AROUND A LOT MORE THAN USUAL: NOT AT ALL
SUM OF ALL RESPONSES TO PHQ QUESTIONS 1-9: 0
10. IF YOU CHECKED OFF ANY PROBLEMS, HOW DIFFICULT HAVE THESE PROBLEMS MADE IT FOR YOU TO DO YOUR WORK, TAKE CARE OF THINGS AT HOME, OR GET ALONG WITH OTHER PEOPLE: NOT DIFFICULT AT ALL
4. FEELING TIRED OR HAVING LITTLE ENERGY: NOT AT ALL
SUM OF ALL RESPONSES TO PHQ QUESTIONS 1-9: 0
7. TROUBLE CONCENTRATING ON THINGS, SUCH AS READING THE NEWSPAPER OR WATCHING TELEVISION: NOT AT ALL
1. LITTLE INTEREST OR PLEASURE IN DOING THINGS: NOT AT ALL
5. POOR APPETITE OR OVEREATING: NOT AT ALL
6. FEELING BAD ABOUT YOURSELF - OR THAT YOU ARE A FAILURE OR HAVE LET YOURSELF OR YOUR FAMILY DOWN: NOT AT ALL

## 2025-04-24 NOTE — PROGRESS NOTES
Lashae Emerson presents today for   Chief Complaint   Patient presents with    Medicare AWV       \"Have you been to the ER, urgent care clinic since your last visit?  Hospitalized since your last visit?\"    NO    “Have you seen or consulted any other health care providers outside of [facility] since your last visit?”    NO

## 2025-04-24 NOTE — ACP (ADVANCE CARE PLANNING)
possible and not deemed futile.  She and her son both confirmed that they would not wish \"any heroic measures\".    Reviewed DNR/DNI and patient elects Full Code (Attempt Resuscitation)    Length of Voluntary ACP Conversation in minutes:  16 minutes    Teri Valdes MD

## 2025-04-24 NOTE — PROGRESS NOTES
[facility] [address]  [address]  [place] [address]  [phone] fax    CONTINUED PLAN OF CARE/RECERTIFICATION FOR PHYSICAL THERAPY          Patient Name: Lashae Emerson : 1940   Treatment/Medical Diagnosis: Other abnormalities of gait and mobility [R26.89]  Parkinson's disease (HCC) [G20.A1]   Onset Date: 25 referral date - chronic    Referral Source: Carla Garza MD Start of Care (SOC): 3/27/25   Prior Hospitalization: See Medical History Provider #: [id number]   Prior Level of Function: 2 story home, middle son living in the second floor & has 1st floor home set up. Functionally independent.    Comorbidities: breast cancer (currently still active), CAD, HTN, PD, Hystrectomy, Bunionectomy (~right)       Visits from SOC: 5 Missed Visits: 0     Progress to Goals:    Short Term Goals: To be accomplished in 2 weeks     Pt will be able to hold tandem balance for at least 30 seconds bilaterally to improve ability to maneuver through tight spaces.   Eval: 15 seconds bilaterally              NOT MET; 10 seconds bilaterally      Pt will be independent with HEP to facilitate carry-over of functional gains made in PT at home & community. (Initial STG)              Eval: established at eval with Pwr exercises in seated with plans to progress throughout her episode of care              MET; reports daily compliance     Long Term Goals: To be accomplished in 5 weeks     1. Pt will be able to improve strength in hip flexors, knee extensor and ankle DF to at least 5/5 to improve patient's ability to maneuver around at home & community with improved functional endurance. (Initial LTG)              Eval: hip flexion = 4/5 bilaterally, Knee extension: Right = 4+/5, Left = 4-/5, Ankle DF: 4+/5 bilaterally               PROGRESSING; Hip Flexion: Bilaterally 4+/5, Knee Extension: Right 4+/5, Left 4/5, Ankle DF: Right 5/5, Left 4+/5     2. Pt will improve her 5 time

## 2025-04-24 NOTE — PROGRESS NOTES
PHYSICAL / OCCUPATIONAL THERAPY - DAILY TREATMENT NOTE    Patient Name: Lashae Emerson    Date: 2025    : 1940  Insurance: Payor: MEDICARE / Plan: MEDICARE PART A AND B / Product Type: *No Product type* /      Patient  verified Yes     Visit #   Current / Total 5 10   Time   In / Out 1010 1050   Pain   In / Out 0 0   Subjective Functional Status/Changes: \"I have just my usual.\"     TREATMENT AREA =  Other abnormalities of gait and mobility    OBJECTIVE         Therapeutic Procedures:    Tx Min Billable or 1:1 Min (if diff from Tx Min) Procedure, Rationale, Specifics   8 8 29420 Therapeutic Exercise (timed):  increase ROM, strength, coordination, balance, and proprioception to improve patient's ability to progress to PLOF and address remaining functional goals. (see flow sheet as applicable)     Details if applicable:       10 10 94666 Neuromuscular Re-Education (timed):  improve balance, coordination, kinesthetic sense, posture, core stability and proprioception to improve patient's ability to develop conscious control of individual muscles and awareness of position of extremities in order to progress to PLOF and address remaining functional goals. (see flow sheet as applicable)     Details if applicable:      70425 Therapeutic Activity (timed):  use of dynamic activities replicating functional movements to increase ROM, strength, coordination, balance, and proprioception in order to improve patient's ability to progress to PLOF and address remaining functional goals.  (see flow sheet as applicable)     Details if applicable:     40 40 [facility] Totals Reminder: bill using total billable min of TIMED therapeutic procedures (example: do not include dry needle or estim unattended, both untimed codes, in totals to left)  8-22 min = 1 unit; 23-37 min = 2 units; 38-52 min = 3 units; 53-67 min = 4 units; 68-82 min = 5 units   Total Total     Charge Capture    [x]  Patient Education billed

## 2025-04-24 NOTE — PROGRESS NOTES
Medicare Annual Wellness Visit    Lashae Emerson is here for Medicare AWV    Assessment & Plan   Essential hypertension  Pure hypercholesterolemia  Aortic stenosis, mild  CAD S/P percutaneous coronary angioplasty  Parkinson's disease with dyskinesia and fluctuating manifestations (HCC)  Malignant neoplasm of upper-outer quadrant of right breast in female, estrogen receptor positive (HCC)  Osteopenia of multiple sites  Early dry stage nonexudative age-related macular degeneration of both eyes  Prediabetes  Non-seasonal allergic rhinitis, unspecified trigger  Laryngopharyngeal reflux disease  Medicare annual wellness visit, subsequent  ACP (advance care planning)     Return in about 4 weeks (around 5/22/2025), or if symptoms worsen or fail to improve.     Subjective   See office progress note for details.      Patient's complete Health Risk Assessment and screening values have been reviewed and are found in Flowsheets. The following problems were reviewed today and where indicated follow up appointments were made and/or referrals ordered.    Positive Risk Factor Screenings with Interventions:     Cognitive:   Clock Drawing Test (CDT): (!) Abnormal  Words recalled: 0 Words Recalled  Total Score: (!) 0  Total Score Interpretation: Abnormal Mini-Cog  Interventions:  Patient will address at her next visit with Dr. Garza, her neurologist .  Discussed that may be related to her underlying Parkinson's disease.                           Objective   Vitals:    04/24/25 0832 04/24/25 0907   BP: 115/64 104/60   Pulse: 52    Resp: 16    Temp: 98.5 °F (36.9 °C)    TempSrc: Temporal    SpO2: 97%    Weight: 56.7 kg (125 lb)    Height: 1.651 m (5' 5\")       Body mass index is 20.8 kg/m².        See office progress note for details.              Allergies   Allergen Reactions    Cephalexin Other (See Comments)     Yeast infection    Penicillins Other (See Comments)     Yeast infection     Prior to Visit Medications    Medication

## 2025-04-24 NOTE — PROGRESS NOTES
HPI:   Lashae Emerson is a 84 y.o. year old female who presents today for a routine follow-up visit.  She has a history of hypertension, ASCVD, hyperlipidemia, syncope, Parkinson's disease, lumbar degenerative disease, GERD, and macular degeneration.  She is accompanied by her son.  She reports that she is doing reasonably well.      On 2/14/2025, she reports that she was at home in the morning baking biscuits with her niece when she became lightheaded and weak and laid down on the floor in order to avoid falling.  Her niece drove her to [facility] ED and she reported ongoing weakness but denied any other symptoms.  She reported that she had eaten breakfast and had water that morning.  Evaluation showed EKG sinus bradycardia at 58 bpm without change, Na 139, K 3.8, glucose 169, creatinine 0.97/eGFR 57, BUN 19, total protein 6.2, albumin 3.2, EtOH 4 mg/dl, Mg 2.1, TSH 5.3/free T4 0.8, D-dimer 0.78, troponin 10 >> 11, WBC 6.3, Hb 12.5/HCT 37.7, chest x-ray mild cardiomegaly, urinalysis trace ketones and trace leukocyte esterase but 0-3 WBCs.  Initial blood pressure was 109/50 but gradually improved after she was administered 500 cc IV normal saline.  She was subsequently discharged and was told that her episode was likely due to orthostatic hypotension.    She discusses today that she has not had any further issues with lightheadedness. She reports drinking approximately 64 ounces of water daily.  She remains active doing yard work and caring for her home.  She is also participating in physical and occupational therapy to help with her gait and Parkinson's disease as prescribed by Dr. Garza in 2/2025.  She discusses that upon her annual review, her license has now been suspended due to her recent ED visit related to orthostatic hypotension.  She states that friends have been helping her with transportation but she is finding not being able to drive to be very inconvenient.    She acknowledges today that she has been

## 2025-04-30 ENCOUNTER — OFFICE VISIT (OUTPATIENT)
Age: 85
End: 2025-04-30
Payer: MEDICARE

## 2025-04-30 VITALS
HEART RATE: 64 BPM | SYSTOLIC BLOOD PRESSURE: 124 MMHG | RESPIRATION RATE: 18 BRPM | BODY MASS INDEX: 20.66 KG/M2 | TEMPERATURE: 97.4 F | HEIGHT: 65 IN | WEIGHT: 124 LBS | DIASTOLIC BLOOD PRESSURE: 82 MMHG | OXYGEN SATURATION: 98 %

## 2025-04-30 DIAGNOSIS — D05.11 DUCTAL CARCINOMA IN SITU (DCIS) OF RIGHT BREAST: Primary | ICD-10-CM

## 2025-04-30 PROCEDURE — 1123F ACP DISCUSS/DSCN MKR DOCD: CPT

## 2025-04-30 PROCEDURE — 1126F AMNT PAIN NOTED NONE PRSNT: CPT

## 2025-04-30 PROCEDURE — G8427 DOCREV CUR MEDS BY ELIG CLIN: HCPCS

## 2025-04-30 PROCEDURE — 1159F MED LIST DOCD IN RCRD: CPT

## 2025-04-30 PROCEDURE — 99214 OFFICE O/P EST MOD 30 MIN: CPT

## 2025-04-30 PROCEDURE — 1160F RVW MEDS BY RX/DR IN RCRD: CPT

## 2025-04-30 PROCEDURE — 1090F PRES/ABSN URINE INCON ASSESS: CPT

## 2025-04-30 PROCEDURE — G8420 CALC BMI NORM PARAMETERS: HCPCS

## 2025-04-30 PROCEDURE — 1036F TOBACCO NON-USER: CPT

## 2025-04-30 ASSESSMENT — ENCOUNTER SYMPTOMS
NAUSEA: 0
COLOR CHANGE: 0
SHORTNESS OF BREATH: 0
VOMITING: 0

## 2025-04-30 NOTE — PROGRESS NOTES
Chief Complaint   Patient presents with    Follow-up     History of right DCIS     Denies any changes or issues with breast  
11.6 - 14.5 % Final    Platelets 02/14/2025 276  135 - 420 K/uL Final    MPV 02/14/2025 9.5  9.2 - 11.8 FL Final    Nucleated RBCs 02/14/2025 0.0  0  WBC Final    nRBC 02/14/2025 0.00  0.00 - 0.01 K/uL Final    Neutrophils % 02/14/2025 67.2  40.0 - 73.0 % Final    Lymphocytes % 02/14/2025 22.1  21.0 - 52.0 % Final    Monocytes % 02/14/2025 7.3  3.0 - 10.0 % Final    Eosinophils % 02/14/2025 2.2  0.0 - 5.0 % Final    Basophils % 02/14/2025 0.6  0.0 - 2.0 % Final    Immature Granulocytes % 02/14/2025 0.6 (H)  0.0 - 0.5 % Final    Neutrophils Absolute 02/14/2025 4.22  1.80 - 8.00 K/UL Final    Lymphocytes Absolute 02/14/2025 1.39  0.90 - 3.60 K/UL Final    Monocytes Absolute 02/14/2025 0.46  0.05 - 1.20 K/UL Final    Eosinophils Absolute 02/14/2025 0.14  0.00 - 0.40 K/UL Final    Basophils Absolute 02/14/2025 0.04  0.00 - 0.10 K/UL Final    Immature Granulocytes Absolute 02/14/2025 0.04  0.00 - 0.04 K/UL Final    Differential Type 02/14/2025 AUTOMATED    Final    Sodium 02/14/2025 139  136 - 145 mmol/L Final    Potassium 02/14/2025 3.8  3.5 - 5.5 mmol/L Final    Chloride 02/14/2025 107  100 - 111 mmol/L Final    CO2 02/14/2025 28  21 - 32 mmol/L Final    Anion Gap 02/14/2025 4  3.0 - 18 mmol/L Final    Glucose 02/14/2025 149 (H)  74 - 99 mg/dL Final    BUN 02/14/2025 19 (H)  7.0 - 18 MG/DL Final    Creatinine 02/14/2025 0.97  0.6 - 1.3 MG/DL Final    BUN/Creatinine Ratio 02/14/2025 20  12 - 20   Final    Est, Glom Filt Rate 02/14/2025 57 (L)  >60 ml/min/1.73m2 Final    Comment:    Pediatric calculator link: https://www.kidney.org/professionals/kdoqi/gfr_calculatorped     These results are not intended for use in patients <18 years of age.     eGFR results are calculated without a race factor using  the 2021 CKD-EPI equation. Careful clinical correlation is recommended, particularly when comparing to results calculated using previous equations.  The CKD-EPI equation is less accurate in patients with extremes of

## 2025-05-01 ENCOUNTER — HOSPITAL ENCOUNTER (OUTPATIENT)
Facility: HOSPITAL | Age: 85
Setting detail: RECURRING SERIES
Discharge: HOME | End: 2025-05-04
Payer: MEDICARE

## 2025-05-01 PROCEDURE — 97530 THERAPEUTIC ACTIVITIES: CPT

## 2025-05-01 PROCEDURE — 97112 NEUROMUSCULAR REEDUCATION: CPT

## 2025-05-01 PROCEDURE — 97110 THERAPEUTIC EXERCISES: CPT

## 2025-05-01 NOTE — PROGRESS NOTES
PHYSICAL / OCCUPATIONAL THERAPY - DAILY TREATMENT NOTE    Patient Name: Lashae Emerson    Date: 2025    : 1940  Insurance: Payor: MEDICARE / Plan: MEDICARE PART A AND B / Product Type: *No Product type* /      Patient  verified Yes     Visit #   Current / Total 1 10   Time   In / Out 1125 1206   Pain   In / Out 2 0   Subjective Functional Status/Changes: \"Just my normal aches and pains.\"     TREATMENT AREA =  Other abnormalities of gait and mobility    OBJECTIVE         Therapeutic Procedures:    Tx Min Billable or 1:1 Min (if diff from Tx Min) Procedure, Rationale, Specifics   11 11 72822 Therapeutic Exercise (timed):  increase ROM, strength, coordination, balance, and proprioception to improve patient's ability to progress to PLOF and address remaining functional goals. (see flow sheet as applicable)     Details if applicable:       10 10 78209 Neuromuscular Re-Education (timed):  improve balance, coordination, kinesthetic sense, posture, core stability and proprioception to improve patient's ability to develop conscious control of individual muscles and awareness of position of extremities in order to progress to PLOF and address remaining functional goals. (see flow sheet as applicable)     Details if applicable:      00776 Therapeutic Activity (timed):  use of dynamic activities replicating functional movements to increase ROM, strength, coordination, balance, and proprioception in order to improve patient's ability to progress to PLOF and address remaining functional goals.  (see flow sheet as applicable)     Details if applicable:     41 41 [facility] Totals Reminder: bill using total billable min of TIMED therapeutic procedures (example: do not include dry needle or estim unattended, both untimed codes, in totals to left)  8-22 min = 1 unit; 23-37 min = 2 units; 38-52 min = 3 units; 53-67 min = 4 units; 68-82 min = 5 units   Total Total     Charge Capture    [x]  Patient Education

## 2025-05-05 ENCOUNTER — TELEPHONE (OUTPATIENT)
Facility: CLINIC | Age: 85
End: 2025-05-05

## 2025-05-08 ENCOUNTER — TELEPHONE (OUTPATIENT)
Facility: CLINIC | Age: 85
End: 2025-05-08

## 2025-05-08 ENCOUNTER — HOSPITAL ENCOUNTER (OUTPATIENT)
Facility: HOSPITAL | Age: 85
Setting detail: RECURRING SERIES
Discharge: HOME | End: 2025-05-11
Payer: MEDICARE

## 2025-05-08 PROCEDURE — 97110 THERAPEUTIC EXERCISES: CPT

## 2025-05-08 PROCEDURE — 97530 THERAPEUTIC ACTIVITIES: CPT

## 2025-05-08 PROCEDURE — 97112 NEUROMUSCULAR REEDUCATION: CPT

## 2025-05-08 NOTE — TELEPHONE ENCOUNTER
Reviewed [facility] fax, possible scam. Called patient to see if she requested a back brace, no answer LVM.

## 2025-05-08 NOTE — PROGRESS NOTES
PHYSICAL / OCCUPATIONAL THERAPY - DAILY TREATMENT NOTE    Patient Name: Lashae Emerson    Date: 2025    : 1940  Insurance: Payor: MEDICARE / Plan: MEDICARE PART A AND B / Product Type: *No Product type* /      Patient  verified Yes     Visit #   Current / Total 2 10   Time   In / Out 1130 1210   Pain   In / Out 0 0   Subjective Functional Status/Changes: \"No pain. I was able to cut my hedges yesterday and I'm going to clean it all up today.\"     TREATMENT AREA =  Other abnormalities of gait and mobility    OBJECTIVE         Therapeutic Procedures:    Tx Min Billable or 1:1 Min (if diff from Tx Min) Procedure, Rationale, Specifics   10 10 61272 Therapeutic Exercise (timed):  increase ROM, strength, coordination, balance, and proprioception to improve patient's ability to progress to PLOF and address remaining functional goals. (see flow sheet as applicable)     Details if applicable:       15 15 81944 Neuromuscular Re-Education (timed):  improve balance, coordination, kinesthetic sense, posture, core stability and proprioception to improve patient's ability to develop conscious control of individual muscles and awareness of position of extremities in order to progress to PLOF and address remaining functional goals. (see flow sheet as applicable)     Details if applicable:     15 15 07858 Therapeutic Activity (timed):  use of dynamic activities replicating functional movements to increase ROM, strength, coordination, balance, and proprioception in order to improve patient's ability to progress to PLOF and address remaining functional goals.  (see flow sheet as applicable)     Details if applicable:     40 40 [facility] Totals Reminder: bill using total billable min of TIMED therapeutic procedures (example: do not include dry needle or estim unattended, both untimed codes, in totals to left)  8-22 min = 1 unit; 23-37 min = 2 units; 38-52 min = 3 units; 53-67 min = 4 units; 68-82 min = 5 units   Total

## 2025-05-08 NOTE — TELEPHONE ENCOUNTER
[facility] called stating they faxed over a prior authorization request in regards to back pain. They stated they need it returned asap and have been trying to reach provider since 5/5/2025. The fax paperwork has be placed in provider's box.

## 2025-05-14 ENCOUNTER — TELEPHONE (OUTPATIENT)
Facility: CLINIC | Age: 85
End: 2025-05-14

## 2025-05-15 ENCOUNTER — HOSPITAL ENCOUNTER (OUTPATIENT)
Facility: HOSPITAL | Age: 85
Setting detail: RECURRING SERIES
Discharge: HOME | End: 2025-05-18
Payer: MEDICARE

## 2025-05-15 PROCEDURE — 97530 THERAPEUTIC ACTIVITIES: CPT

## 2025-05-15 PROCEDURE — 97112 NEUROMUSCULAR REEDUCATION: CPT

## 2025-05-15 PROCEDURE — 97110 THERAPEUTIC EXERCISES: CPT

## 2025-05-15 NOTE — PROGRESS NOTES
PHYSICAL / OCCUPATIONAL THERAPY - DAILY TREATMENT NOTE    Patient Name: Lashae Emerson    Date: 5/15/2025    : 1940  Insurance: Payor: MEDICARE / Plan: MEDICARE PART A AND B / Product Type: *No Product type* /      Patient  verified Yes     Visit #   Current / Total 3 10   Time   In / Out 930 1010   Pain   In / Out 0 0   Subjective Functional Status/Changes: No pain or falls recently     TREATMENT AREA =  Other abnormalities of gait and mobility    OBJECTIVE         Therapeutic Procedures:    Tx Min Billable or 1:1 Min (if diff from Tx Min) Procedure, Rationale, Specifics   10 10 95018 Therapeutic Exercise (timed):  increase ROM, strength, coordination, balance, and proprioception to improve patient's ability to progress to PLOF and address remaining functional goals. (see flow sheet as applicable)     Details if applicable:       15 15 73414 Neuromuscular Re-Education (timed):  improve balance, coordination, kinesthetic sense, posture, core stability and proprioception to improve patient's ability to develop conscious control of individual muscles and awareness of position of extremities in order to progress to PLOF and address remaining functional goals. (see flow sheet as applicable)     Details if applicable:     15 15 65544 Therapeutic Activity (timed):  use of dynamic activities replicating functional movements to increase ROM, strength, coordination, balance, and proprioception in order to improve patient's ability to progress to PLOF and address remaining functional goals.  (see flow sheet as applicable)     Details if applicable:     40 40 [facility] Totals Reminder: bill using total billable min of TIMED therapeutic procedures (example: do not include dry needle or estim unattended, both untimed codes, in totals to left)  8-22 min = 1 unit; 23-37 min = 2 units; 38-52 min = 3 units; 53-67 min = 4 units; 68-82 min = 5 units   Total Total     Charge Capture    [x]  Patient Education billed

## 2025-05-22 ENCOUNTER — HOSPITAL ENCOUNTER (OUTPATIENT)
Facility: HOSPITAL | Age: 85
Setting detail: RECURRING SERIES
Discharge: HOME | End: 2025-05-25
Payer: MEDICARE

## 2025-05-22 PROCEDURE — 97530 THERAPEUTIC ACTIVITIES: CPT

## 2025-05-22 PROCEDURE — 97110 THERAPEUTIC EXERCISES: CPT

## 2025-05-22 NOTE — PROGRESS NOTES
Physical Therapy Discharge Instructions      [facility] [address]  [address]  [place] [address]  [phone] fax      Patient: Lashae Emerson  : 1940      Continue Home Exercise Program 1-2 times per day      Continue with    [] Ice  as needed      [] Heat           Follow up with MD:     [] Upon completion of therapy     [x] As needed      Recommendations:     [x]   Return to activity with home program    []   Return to activity with the following modifications:       []Post Rehab Program    []Join Independent aquatic program     []Return to/join local gym      Oneal Guallpa PTA, CSCS  2025 9:42 AM

## 2025-05-22 NOTE — PROGRESS NOTES
PHYSICAL / OCCUPATIONAL THERAPY - DAILY TREATMENT NOTE    Patient Name: Lashae Emerson    Date: 2025    : 1940  Insurance: Payor: MEDICARE / Plan: MEDICARE PART A AND B / Product Type: *No Product type* /      Patient  verified Yes     Visit #   Current / Total 4 10   Time   In / Out 930 1004   Pain   In / Out 0 0   Subjective Functional Status/Changes: \"I think I can manage at home.\"     TREATMENT AREA =  Other abnormalities of gait and mobility    OBJECTIVE         Therapeutic Procedures:    Tx Min Billable or 1:1 Min (if diff from Tx Min) Procedure, Rationale, Specifics   10 10 23457 Therapeutic Exercise (timed):  increase ROM, strength, coordination, balance, and proprioception to improve patient's ability to progress to PLOF and address remaining functional goals. (see flow sheet as applicable)     Details if applicable:        24 12429 Therapeutic Activity (timed):  use of dynamic activities replicating functional movements to increase ROM, strength, coordination, balance, and proprioception in order to improve patient's ability to progress to PLOF and address remaining functional goals.  (see flow sheet as applicable)     Details if applicable:     34 34 [facility] Totals Reminder: bill using total billable min of TIMED therapeutic procedures (example: do not include dry needle or estim unattended, both untimed codes, in totals to left)  8-22 min = 1 unit; 23-37 min = 2 units; 38-52 min = 3 units; 53-67 min = 4 units; 68-82 min = 5 units   Total Total     Charge Capture    [x]  Patient Education billed concurrently with other procedures   [x] Review HEP    [] Progressed/Changed HEP, detail:    [] Other detail:       Objective Information/Functional Measures/Assessment    BINGHAM 53/56    5x STS = 11 seconds    MMT Right Left   Hip Flexion  4+/5  4+/5     MMT  Right Left   Knee Extension  5/5  5/5     MMT Right Left   Ankle DF  5/5   5/5      Ms. Emerson has attended 9 visits consistently and

## 2025-05-29 ENCOUNTER — APPOINTMENT (OUTPATIENT)
Facility: HOSPITAL | Age: 85
End: 2025-05-29
Payer: MEDICARE

## 2025-06-03 ENCOUNTER — OFFICE VISIT (OUTPATIENT)
Facility: CLINIC | Age: 85
End: 2025-06-03

## 2025-06-03 VITALS
WEIGHT: 123 LBS | HEART RATE: 62 BPM | TEMPERATURE: 98.2 F | DIASTOLIC BLOOD PRESSURE: 60 MMHG | SYSTOLIC BLOOD PRESSURE: 122 MMHG | RESPIRATION RATE: 16 BRPM | BODY MASS INDEX: 20.49 KG/M2 | OXYGEN SATURATION: 99 % | HEIGHT: 65 IN

## 2025-06-03 DIAGNOSIS — I35.0 AORTIC STENOSIS, MILD: ICD-10-CM

## 2025-06-03 DIAGNOSIS — C50.411 MALIGNANT NEOPLASM OF UPPER-OUTER QUADRANT OF RIGHT BREAST IN FEMALE, ESTROGEN RECEPTOR POSITIVE (HCC): ICD-10-CM

## 2025-06-03 DIAGNOSIS — M85.89 OSTEOPENIA OF MULTIPLE SITES: ICD-10-CM

## 2025-06-03 DIAGNOSIS — I25.10 CAD S/P PERCUTANEOUS CORONARY ANGIOPLASTY: ICD-10-CM

## 2025-06-03 DIAGNOSIS — E55.9 VITAMIN D DEFICIENCY: ICD-10-CM

## 2025-06-03 DIAGNOSIS — Z17.0 MALIGNANT NEOPLASM OF UPPER-OUTER QUADRANT OF RIGHT BREAST IN FEMALE, ESTROGEN RECEPTOR POSITIVE (HCC): ICD-10-CM

## 2025-06-03 DIAGNOSIS — I10 ESSENTIAL HYPERTENSION: Primary | ICD-10-CM

## 2025-06-03 DIAGNOSIS — H35.3131 EARLY DRY STAGE NONEXUDATIVE AGE-RELATED MACULAR DEGENERATION OF BOTH EYES: ICD-10-CM

## 2025-06-03 DIAGNOSIS — E78.00 PURE HYPERCHOLESTEROLEMIA: ICD-10-CM

## 2025-06-03 DIAGNOSIS — R73.03 PREDIABETES: ICD-10-CM

## 2025-06-03 DIAGNOSIS — G20.B2 PARKINSON'S DISEASE WITH DYSKINESIA AND FLUCTUATING MANIFESTATIONS (HCC): ICD-10-CM

## 2025-06-03 DIAGNOSIS — Z98.61 CAD S/P PERCUTANEOUS CORONARY ANGIOPLASTY: ICD-10-CM

## 2025-06-03 RX ORDER — LISINOPRIL 10 MG/1
10 TABLET ORAL DAILY
Qty: 90 TABLET | Refills: 3 | Status: SHIPPED | OUTPATIENT
Start: 2025-06-03

## 2025-06-03 NOTE — PROGRESS NOTES
Lashae Emerson presents today for   Chief Complaint   Patient presents with    Follow-up       \"Have you been to the ER, urgent care clinic since your last visit?  Hospitalized since your last visit?\"    NO    “Have you seen or consulted any other health care providers outside of [facility] since your last visit?”    NO            
continue with 6-month follow-up.    13. Allergic rhinitis/ laryngopharyngeal reflux. Doing well and no longer requiring Dexilant. Receiving immunotherapy to address hoarseness and allergies. Did not tolerate Atrovent due to mouth dryness and reports no longer using azelastine nasal spray.  Continuing to follow with [facility]/ Dr. Wong.   14. Reactive depression.  Described loneliness and sadness following the passing of her .  Remains very active in the community and appears to be managing well.  Weight decreased 3 pounds at last visit but overall stable today and urged to maintain.  Will readdress at next visit.  15. Fall, sequela. Experienced mechanical fall on 8/31/2024 after losing balance when stepping outside her door and falling into a flower bed. Denied any lightheadedness or vertiginous symptoms contributing to fall. Sustained closed head injury involving her left forehead, and also injury to her right elbow and left knee.  Presented to [facility] ED for evaluation and head CT without acute changes other than left forehead supraorbital soft tissue swelling.  No other imaging performed.  Received Tdap and knee wound clean.  No chronic headaches and no longer requiring rollator. Fall precautions stressed.  16. History of COVID-19 infection.  Tested positive for COVID-19 on 6/13/2022 by home rapid antigen testing.  Reported symptoms of low-grade fever, nasal congestion, postnasal drainage, cough, myalgias, and fatigue.  Referred to the [facility] ED on 6/14/2022 and received monoclonal antibody therapy with bebtolivimab.  Tested positive again on 2/15/2024 and treated with Paxlovid.  Reports overall mild symptoms and gradually improved without sequela.  17. Psoriasis. Using clobetasol solution for scalp psoriasis with reasonable control.  18. Health maintenance. Completed Pfizer COVID 19 vaccine series and received one Pfizer booster dose and the updated bivalent booster dose. Received 2/2 doses of Shingrix

## 2025-06-03 NOTE — PATIENT INSTRUCTIONS
Care instructions adapted under license by [employer] (which disclaims liability or warranty for this information). If you have questions about a medical condition or this instruction, always ask your healthcare professional. [employer] disclaims any warranty or liability for your use of this information.    A Healthy Lifestyle: Care Instructions  A healthy lifestyle can help you feel good, have more energy, and stay at a weight that's healthy for you. You can share a healthy lifestyle with your friends and family. And you can do it on your own.    Eat meals with your friends or family. You could try cooking together.    Plan activities with other people. Go for a walk with a friend, try a free online fitness class, or join a sports league.    Eat a variety of healthy foods. These include fruits, vegetables, whole grains, low-fat dairy, and lean protein.    Choose healthy portions of food. You can use the Nutrition Facts label on food packages as a guide.    Eat more fruits and vegetables. You could add vegetables to sandwiches or add fruit to cereal.    Drink water when you are thirsty. Limit soda, juice, and sports drinks.    Try to exercise most days. Aim for at least 2½ hours of exercise each week.    Keep moving. Work in the garden or take your dog on a walk. Use the stairs instead of the elevator.    If you use tobacco or nicotine, try to quit. Ask your doctor about programs and medicines to help you quit.    Limit alcohol. Men should have no more than 2 drinks a day. Women should have no more than 1. For some people, no alcohol is the best choice.  Follow-up care is a key part of your treatment and safety. Be sure to make and go to all appointments, and call your doctor if you are having problems. It's also a good idea to know your test results and keep a list of the medicines you take.  Where can you learn more?  Go to [email or web address].net/patientEd and enter U807 to learn

## 2025-08-18 ENCOUNTER — HOSPITAL ENCOUNTER (OUTPATIENT)
Facility: HOSPITAL | Age: 85
Discharge: HOME | End: 2025-08-21
Attending: SURGERY
Payer: MEDICARE

## 2025-08-18 ENCOUNTER — HOSPITAL ENCOUNTER (OUTPATIENT)
Facility: HOSPITAL | Age: 85
Discharge: HOME | End: 2025-08-21
Attending: INTERNAL MEDICINE
Payer: MEDICARE

## 2025-08-18 VITALS — BODY MASS INDEX: 20.47 KG/M2 | HEIGHT: 65 IN

## 2025-08-18 DIAGNOSIS — M85.89 OSTEOPENIA OF MULTIPLE SITES: ICD-10-CM

## 2025-08-18 DIAGNOSIS — Z12.31 ENCOUNTER FOR SCREENING MAMMOGRAM FOR HIGH-RISK PATIENT: ICD-10-CM

## 2025-08-18 PROCEDURE — 77080 DXA BONE DENSITY AXIAL: CPT

## 2025-08-18 PROCEDURE — 77063 BREAST TOMOSYNTHESIS BI: CPT

## 2025-08-25 ENCOUNTER — CLINICAL DOCUMENTATION (OUTPATIENT)
Facility: CLINIC | Age: 85
End: 2025-08-25

## 2025-08-26 ENCOUNTER — TELEPHONE (OUTPATIENT)
Facility: CLINIC | Age: 85
End: 2025-08-26

## 2025-09-03 ENCOUNTER — HOSPITAL ENCOUNTER (OUTPATIENT)
Facility: HOSPITAL | Age: 85
Setting detail: SPECIMEN
Discharge: HOME | End: 2025-09-06
Payer: MEDICARE

## 2025-09-03 DIAGNOSIS — E55.9 VITAMIN D DEFICIENCY: ICD-10-CM

## 2025-09-03 DIAGNOSIS — I10 ESSENTIAL HYPERTENSION: ICD-10-CM

## 2025-09-03 DIAGNOSIS — R73.03 PREDIABETES: ICD-10-CM

## 2025-09-03 DIAGNOSIS — E78.00 PURE HYPERCHOLESTEROLEMIA: ICD-10-CM

## 2025-09-03 DIAGNOSIS — M85.89 OSTEOPENIA OF MULTIPLE SITES: ICD-10-CM

## 2025-09-03 LAB
25(OH)D3 SERPL-MCNC: 35.5 NG/ML (ref 30–100)
ALBUMIN SERPL-MCNC: 3.5 G/DL (ref 3.4–5)
ALBUMIN/GLOB SERPL: 1.3 (ref 0.8–1.7)
ALP SERPL-CCNC: 94 U/L (ref 45–117)
ALT SERPL-CCNC: 9 U/L (ref 10–35)
ANION GAP SERPL CALC-SCNC: 11 MMOL/L (ref 3–18)
AST SERPL-CCNC: 21 U/L (ref 10–38)
BASOPHILS # BLD: 0.01 K/UL (ref 0–0.1)
BASOPHILS NFR BLD: 0.1 % (ref 0–2)
BILIRUB SERPL-MCNC: 0.3 MG/DL (ref 0.2–1)
BUN SERPL-MCNC: 19 MG/DL (ref 6–23)
BUN/CREAT SERPL: 26 (ref 12–20)
CALCIUM SERPL-MCNC: 9.6 MG/DL (ref 8.5–10.1)
CHLORIDE SERPL-SCNC: 103 MMOL/L (ref 98–107)
CHOLEST SERPL-MCNC: 121 MG/DL
CO2 SERPL-SCNC: 25 MMOL/L (ref 21–32)
CREAT SERPL-MCNC: 0.72 MG/DL (ref 0.6–1.3)
CREAT UR-MCNC: 193 MG/DL (ref 30–125)
DIFFERENTIAL METHOD BLD: ABNORMAL
EOSINOPHIL # BLD: 0.21 K/UL (ref 0–0.4)
EOSINOPHIL NFR BLD: 2.9 % (ref 0–5)
ERYTHROCYTE [DISTWIDTH] IN BLOOD BY AUTOMATED COUNT: 13.2 % (ref 11.6–14.5)
EST. AVERAGE GLUCOSE BLD GHB EST-MCNC: 121 MG/DL
GLOBULIN SER CALC-MCNC: 2.8 G/DL (ref 2–4)
GLUCOSE SERPL-MCNC: 95 MG/DL (ref 74–108)
HBA1C MFR BLD: 5.8 % (ref 4.2–5.6)
HCT VFR BLD AUTO: 36.8 % (ref 35–45)
HDLC SERPL-MCNC: 62 MG/DL (ref 40–60)
HDLC SERPL: 1.9 (ref 0–5)
HGB BLD-MCNC: 11.9 G/DL (ref 12–16)
IMM GRANULOCYTES # BLD AUTO: 0.04 K/UL (ref 0–0.04)
IMM GRANULOCYTES NFR BLD AUTO: 0.6 % (ref 0–0.5)
LDLC SERPL CALC-MCNC: 45 MG/DL (ref 0–100)
LYMPHOCYTES # BLD: 1.44 K/UL (ref 0.9–3.6)
LYMPHOCYTES NFR BLD: 20.1 % (ref 21–52)
MAGNESIUM SERPL-MCNC: 2.2 MG/DL (ref 1.6–2.6)
MCH RBC QN AUTO: 31.2 PG (ref 24–34)
MCHC RBC AUTO-ENTMCNC: 32.3 G/DL (ref 31–37)
MCV RBC AUTO: 96.6 FL (ref 78–100)
MICROALBUMIN UR-MCNC: 1.31 MG/DL (ref 0–3)
MICROALBUMIN/CREAT UR-RTO: 7 MG/G (ref 0–30)
MONOCYTES # BLD: 0.81 K/UL (ref 0.05–1.2)
MONOCYTES NFR BLD: 11.3 % (ref 3–10)
NEUTS SEG # BLD: 4.66 K/UL (ref 1.8–8)
NEUTS SEG NFR BLD: 65 % (ref 40–73)
NRBC # BLD: 0 K/UL (ref 0–0.01)
NRBC BLD-RTO: 0 PER 100 WBC
PLATELET # BLD AUTO: 233 K/UL (ref 135–420)
PMV BLD AUTO: 10.4 FL (ref 9.2–11.8)
POTASSIUM SERPL-SCNC: 3.9 MMOL/L (ref 3.5–5.5)
PROT SERPL-MCNC: 6.2 G/DL (ref 6.4–8.2)
RBC # BLD AUTO: 3.81 M/UL (ref 4.2–5.3)
SODIUM SERPL-SCNC: 140 MMOL/L (ref 136–145)
TRIGL SERPL-MCNC: 70 MG/DL (ref 0–150)
VLDLC SERPL CALC-MCNC: 14 MG/DL
WBC # BLD AUTO: 7.2 K/UL (ref 4.6–13.2)

## 2025-09-03 PROCEDURE — 82306 VITAMIN D 25 HYDROXY: CPT

## 2025-09-03 PROCEDURE — 82570 ASSAY OF URINE CREATININE: CPT

## 2025-09-03 PROCEDURE — 82043 UR ALBUMIN QUANTITATIVE: CPT

## 2025-09-03 PROCEDURE — 83735 ASSAY OF MAGNESIUM: CPT

## 2025-09-03 PROCEDURE — 80061 LIPID PANEL: CPT

## 2025-09-03 PROCEDURE — 83036 HEMOGLOBIN GLYCOSYLATED A1C: CPT

## 2025-09-03 PROCEDURE — 36415 COLL VENOUS BLD VENIPUNCTURE: CPT

## 2025-09-03 PROCEDURE — 85025 COMPLETE CBC W/AUTO DIFF WBC: CPT

## 2025-09-03 PROCEDURE — 80053 COMPREHEN METABOLIC PANEL: CPT

## (undated) DEVICE — COVER,LIGHT HANDLE,FLX,1/PK: Brand: MEDLINE INDUSTRIES, INC.

## (undated) DEVICE — APPLIER CLP L9.38IN M LIG TI DISP STR RNG HNDL LIGACLP

## (undated) DEVICE — APPLICATOR MEDICATED 26 CC SOLUTION HI LT ORNG CHLORAPREP

## (undated) DEVICE — ADHESIVE SKIN CLSR 0.7ML TOP DERMBND ADV

## (undated) DEVICE — NEEDLE 25GA X 1.5 IN ECLIPSE

## (undated) DEVICE — ELECTRODE PT RET AD L9FT HI MOIST COND ADH HYDRGEL CORDED

## (undated) DEVICE — PROBE SET W/ DRP

## (undated) DEVICE — SHEARS ENDOSCP L9CM CRV HARM FOCS +

## (undated) DEVICE — SUTURE VCRL SZ 3-0 L27IN ABSRB UD L26MM SH 1/2 CIR J416H

## (undated) DEVICE — SUTURE MCRYL SZ 4-0 L27IN ABSRB UD L24MM PS-1 3/8 CIR PRIM Y935H

## (undated) DEVICE — COVER INSTRUMENT LOCALIZER

## (undated) DEVICE — MAJOR PLASTIC-LF: Brand: MEDLINE INDUSTRIES, INC.

## (undated) DEVICE — INTENDED FOR TISSUE SEPARATION, AND OTHER PROCEDURES THAT REQUIRE A SHARP SURGICAL BLADE TO PUNCTURE OR CUT.: Brand: BARD-PARKER ® STAINLESS STEEL BLADES

## (undated) DEVICE — GAMMEX® NON-LATEX PI UNDERGLOVE SIZE 6.5, STERILE POLYISOPRENE POWDER-FREE SURGICAL GLOVE: Brand: GAMMEX

## (undated) DEVICE — BLADE ES ELASTOMERIC COAT INSUL DURABLE BEND UPTO 90DEG

## (undated) DEVICE — SUTURE PERMA-HAND SZ 2-0 L30IN NONABSORBABLE BLK L26MM SH K833H

## (undated) DEVICE — GLOVE SURG SZ 6 CRM LTX FREE POLYISOPRENE POLYMER BEAD ANTI

## (undated) DEVICE — KIT OR TURNOVER